# Patient Record
Sex: FEMALE | Race: OTHER | Employment: UNEMPLOYED
[De-identification: names, ages, dates, MRNs, and addresses within clinical notes are randomized per-mention and may not be internally consistent; named-entity substitution may affect disease eponyms.]

---

## 2017-01-26 ENCOUNTER — OFFICE VISIT (OUTPATIENT)
Dept: PRIMARY CARE CLINIC | Facility: CLINIC | Age: 52
End: 2017-01-26

## 2017-01-26 VITALS
RESPIRATION RATE: 20 BRPM | BODY MASS INDEX: 46.24 KG/M2 | SYSTOLIC BLOOD PRESSURE: 135 MMHG | HEART RATE: 83 BPM | DIASTOLIC BLOOD PRESSURE: 90 MMHG | WEIGHT: 252.8 LBS | TEMPERATURE: 97.4 F

## 2017-01-26 DIAGNOSIS — R06.83 SNORES: Primary | ICD-10-CM

## 2017-01-26 DIAGNOSIS — J01.10 ACUTE NON-RECURRENT FRONTAL SINUSITIS: ICD-10-CM

## 2017-01-26 DIAGNOSIS — R40.0 DAYTIME SOMNOLENCE: ICD-10-CM

## 2017-01-26 DIAGNOSIS — J01.00 ACUTE NON-RECURRENT MAXILLARY SINUSITIS: ICD-10-CM

## 2017-01-26 PROCEDURE — 99213 OFFICE O/P EST LOW 20 MIN: CPT | Performed by: NURSE PRACTITIONER

## 2017-01-26 RX ORDER — AMOXICILLIN AND CLAVULANATE POTASSIUM 500; 125 MG/1; MG/1
1 TABLET, FILM COATED ORAL 2 TIMES DAILY
Qty: 14 TABLET | Refills: 0 | Status: SHIPPED | OUTPATIENT
Start: 2017-01-26 | End: 2017-02-02

## 2017-01-26 ASSESSMENT — ENCOUNTER SYMPTOMS
TROUBLE SWALLOWING: 0
SORE THROAT: 0
EYES NEGATIVE: 1
SINUS PRESSURE: 1
GASTROINTESTINAL NEGATIVE: 1
RESPIRATORY NEGATIVE: 1

## 2017-01-30 RX ORDER — POTASSIUM CHLORIDE 1500 MG/1
20 TABLET, FILM COATED, EXTENDED RELEASE ORAL DAILY
Qty: 30 TABLET | Refills: 3 | Status: SHIPPED | OUTPATIENT
Start: 2017-01-30 | End: 2017-01-30 | Stop reason: SDUPTHER

## 2017-01-30 RX ORDER — POTASSIUM CHLORIDE 1500 MG/1
20 TABLET, FILM COATED, EXTENDED RELEASE ORAL DAILY
Qty: 30 TABLET | Refills: 3 | Status: SHIPPED | OUTPATIENT
Start: 2017-01-30 | End: 2017-05-22

## 2017-02-06 PROBLEM — E86.0 DEHYDRATION: Status: ACTIVE | Noted: 2017-02-06

## 2017-02-07 PROBLEM — E66.01 MORBID OBESITY (HCC): Chronic | Status: ACTIVE | Noted: 2017-02-07

## 2017-02-08 ENCOUNTER — OFFICE VISIT (OUTPATIENT)
Dept: PRIMARY CARE CLINIC | Facility: CLINIC | Age: 52
End: 2017-02-08

## 2017-02-08 VITALS
HEART RATE: 71 BPM | DIASTOLIC BLOOD PRESSURE: 88 MMHG | BODY MASS INDEX: 47.6 KG/M2 | SYSTOLIC BLOOD PRESSURE: 134 MMHG | RESPIRATION RATE: 18 BRPM | WEIGHT: 258.7 LBS | HEIGHT: 62 IN

## 2017-02-08 DIAGNOSIS — R42 VERTIGO: Primary | ICD-10-CM

## 2017-02-08 DIAGNOSIS — Z12.11 COLON CANCER SCREENING: ICD-10-CM

## 2017-02-08 DIAGNOSIS — J30.9 ALLERGIC RHINITIS, UNSPECIFIED ALLERGIC RHINITIS TRIGGER, UNSPECIFIED RHINITIS SEASONALITY: ICD-10-CM

## 2017-02-08 DIAGNOSIS — R63.1 POLYDIPSIA: ICD-10-CM

## 2017-02-08 DIAGNOSIS — I10 ESSENTIAL HYPERTENSION: Chronic | ICD-10-CM

## 2017-02-08 PROCEDURE — 99213 OFFICE O/P EST LOW 20 MIN: CPT | Performed by: NURSE PRACTITIONER

## 2017-02-08 RX ORDER — CARVEDILOL 12.5 MG/1
12.5 TABLET ORAL 2 TIMES DAILY
Qty: 180 TABLET | Refills: 3 | Status: SHIPPED | OUTPATIENT
Start: 2017-02-08 | End: 2017-10-06 | Stop reason: SDUPTHER

## 2017-02-08 ASSESSMENT — ENCOUNTER SYMPTOMS
SORE THROAT: 0
VISUAL CHANGE: 0
NAUSEA: 0
VOMITING: 0
RESPIRATORY NEGATIVE: 1
EYES NEGATIVE: 1
GASTROINTESTINAL NEGATIVE: 1
TROUBLE SWALLOWING: 0

## 2017-02-10 ENCOUNTER — TELEPHONE (OUTPATIENT)
Dept: PRIMARY CARE CLINIC | Facility: CLINIC | Age: 52
End: 2017-02-10

## 2017-02-10 PROBLEM — J30.9 ALLERGIC RHINITIS: Status: ACTIVE | Noted: 2017-02-10

## 2017-02-10 RX ORDER — CETIRIZINE HYDROCHLORIDE 5 MG/1
5 TABLET ORAL DAILY
Qty: 30 TABLET | Refills: 3 | Status: SHIPPED | OUTPATIENT
Start: 2017-02-10 | End: 2017-05-10 | Stop reason: SDUPTHER

## 2017-02-13 DIAGNOSIS — R40.0 DAYTIME SOMNOLENCE: ICD-10-CM

## 2017-02-13 DIAGNOSIS — R06.83 SNORES: ICD-10-CM

## 2017-02-14 ENCOUNTER — TELEPHONE (OUTPATIENT)
Dept: PRIMARY CARE CLINIC | Facility: CLINIC | Age: 52
End: 2017-02-14

## 2017-02-21 ENCOUNTER — OFFICE VISIT (OUTPATIENT)
Dept: PRIMARY CARE CLINIC | Facility: CLINIC | Age: 52
End: 2017-02-21

## 2017-02-21 VITALS
TEMPERATURE: 97.5 F | WEIGHT: 246.6 LBS | HEART RATE: 88 BPM | SYSTOLIC BLOOD PRESSURE: 128 MMHG | BODY MASS INDEX: 45.1 KG/M2 | DIASTOLIC BLOOD PRESSURE: 85 MMHG | RESPIRATION RATE: 20 BRPM

## 2017-02-21 DIAGNOSIS — R73.09 ELEVATED GLUCOSE: ICD-10-CM

## 2017-02-21 DIAGNOSIS — J01.91 ACUTE RECURRENT SINUSITIS, UNSPECIFIED: Primary | ICD-10-CM

## 2017-02-21 PROCEDURE — 99213 OFFICE O/P EST LOW 20 MIN: CPT | Performed by: NURSE PRACTITIONER

## 2017-02-21 RX ORDER — FLUTICASONE PROPIONATE 50 MCG
1 SPRAY, SUSPENSION (ML) NASAL DAILY
Qty: 1 BOTTLE | Refills: 3 | Status: SHIPPED | OUTPATIENT
Start: 2017-02-21 | End: 2017-05-10 | Stop reason: SDUPTHER

## 2017-02-21 ASSESSMENT — ENCOUNTER SYMPTOMS
SORE THROAT: 0
TROUBLE SWALLOWING: 0
SHORTNESS OF BREATH: 0
COUGH: 0
SINUS COMPLAINT: 1
SINUS PRESSURE: 1
RESPIRATORY NEGATIVE: 1
GASTROINTESTINAL NEGATIVE: 1
EYES NEGATIVE: 1

## 2017-02-22 ENCOUNTER — TELEPHONE (OUTPATIENT)
Dept: PRIMARY CARE CLINIC | Facility: CLINIC | Age: 52
End: 2017-02-22

## 2017-02-22 DIAGNOSIS — Z12.11 COLON CANCER SCREENING: Primary | ICD-10-CM

## 2017-02-22 RX ORDER — LEVOFLOXACIN 250 MG/1
250 TABLET ORAL DAILY
Qty: 10 TABLET | Refills: 0 | Status: SHIPPED | OUTPATIENT
Start: 2017-02-22 | End: 2017-03-04

## 2017-02-22 ASSESSMENT — ENCOUNTER SYMPTOMS: RHINORRHEA: 0

## 2017-02-26 PROBLEM — Z12.11 COLON CANCER SCREENING: Status: ACTIVE | Noted: 2017-02-26

## 2017-03-08 ENCOUNTER — OFFICE VISIT (OUTPATIENT)
Dept: ONCOLOGY | Facility: CLINIC | Age: 52
End: 2017-03-08

## 2017-03-08 VITALS
RESPIRATION RATE: 16 BRPM | HEIGHT: 62 IN | BODY MASS INDEX: 47.11 KG/M2 | SYSTOLIC BLOOD PRESSURE: 139 MMHG | TEMPERATURE: 97.2 F | WEIGHT: 256 LBS | DIASTOLIC BLOOD PRESSURE: 71 MMHG | HEART RATE: 97 BPM

## 2017-03-08 DIAGNOSIS — I77.6 VASCULITIS (HCC): Chronic | ICD-10-CM

## 2017-03-08 DIAGNOSIS — D50.8 OTHER IRON DEFICIENCY ANEMIA: Primary | ICD-10-CM

## 2017-03-08 DIAGNOSIS — D50.8 OTHER IRON DEFICIENCY ANEMIA: ICD-10-CM

## 2017-03-08 DIAGNOSIS — N17.9 AKI (ACUTE KIDNEY INJURY) (HCC): Chronic | ICD-10-CM

## 2017-03-08 DIAGNOSIS — I77.82 ANCA-ASSOCIATED VASCULITIS: Chronic | ICD-10-CM

## 2017-03-08 DIAGNOSIS — I77.6 VASCULITIS (HCC): ICD-10-CM

## 2017-03-08 DIAGNOSIS — N92.1 MENOMETRORRHAGIA: ICD-10-CM

## 2017-03-08 PROCEDURE — 99214 OFFICE O/P EST MOD 30 MIN: CPT | Performed by: INTERNAL MEDICINE

## 2017-03-09 ENCOUNTER — OFFICE VISIT (OUTPATIENT)
Dept: PRIMARY CARE CLINIC | Facility: CLINIC | Age: 52
End: 2017-03-09

## 2017-03-09 ENCOUNTER — TELEPHONE (OUTPATIENT)
Dept: PRIMARY CARE CLINIC | Facility: CLINIC | Age: 52
End: 2017-03-09

## 2017-03-09 VITALS
SYSTOLIC BLOOD PRESSURE: 107 MMHG | WEIGHT: 256.1 LBS | RESPIRATION RATE: 20 BRPM | HEART RATE: 96 BPM | TEMPERATURE: 97.8 F | DIASTOLIC BLOOD PRESSURE: 78 MMHG | BODY MASS INDEX: 46.84 KG/M2

## 2017-03-09 DIAGNOSIS — K52.9 GASTROENTERITIS: ICD-10-CM

## 2017-03-09 DIAGNOSIS — R05.9 COUGH: Primary | ICD-10-CM

## 2017-03-09 DIAGNOSIS — E78.1 HYPERTRIGLYCERIDEMIA: Primary | ICD-10-CM

## 2017-03-09 DIAGNOSIS — J06.9 UPPER RESPIRATORY TRACT INFECTION, UNSPECIFIED TYPE: ICD-10-CM

## 2017-03-09 DIAGNOSIS — N18.30 CHRONIC KIDNEY DISEASE, STAGE III (MODERATE) (HCC): Chronic | ICD-10-CM

## 2017-03-09 DIAGNOSIS — I10 ESSENTIAL HYPERTENSION: Chronic | ICD-10-CM

## 2017-03-09 DIAGNOSIS — R52 BODY ACHES: ICD-10-CM

## 2017-03-09 LAB
INFLUENZA A ANTIBODY: NORMAL
INFLUENZA B ANTIBODY: NORMAL

## 2017-03-09 PROCEDURE — 87804 INFLUENZA ASSAY W/OPTIC: CPT | Performed by: NURSE PRACTITIONER

## 2017-03-09 PROCEDURE — 99213 OFFICE O/P EST LOW 20 MIN: CPT | Performed by: NURSE PRACTITIONER

## 2017-03-09 RX ORDER — FENOFIBRATE 48 MG/1
48 TABLET, COATED ORAL DAILY
Qty: 30 TABLET | Refills: 3 | Status: SHIPPED | OUTPATIENT
Start: 2017-03-09 | End: 2017-03-16 | Stop reason: ALTCHOICE

## 2017-03-09 ASSESSMENT — ENCOUNTER SYMPTOMS
BLOOD IN STOOL: 0
SINUS PRESSURE: 1
SORE THROAT: 1
CHEST TIGHTNESS: 0
DIARRHEA: 0
EYES NEGATIVE: 1
NAUSEA: 0
VOICE CHANGE: 0
SHORTNESS OF BREATH: 0
WHEEZING: 0
TROUBLE SWALLOWING: 0
STRIDOR: 0
VOMITING: 1
RHINORRHEA: 1

## 2017-03-10 ASSESSMENT — ENCOUNTER SYMPTOMS: COUGH: 1

## 2017-03-13 DIAGNOSIS — Z90.710 S/P TAH-BSO: Primary | ICD-10-CM

## 2017-03-13 DIAGNOSIS — Z90.79 S/P TAH-BSO: Primary | ICD-10-CM

## 2017-03-13 DIAGNOSIS — Z90.722 S/P TAH-BSO: Primary | ICD-10-CM

## 2017-03-16 ENCOUNTER — TELEPHONE (OUTPATIENT)
Dept: PRIMARY CARE CLINIC | Age: 52
End: 2017-03-16

## 2017-03-16 DIAGNOSIS — E78.2 MIXED HYPERLIPIDEMIA: Primary | ICD-10-CM

## 2017-03-16 RX ORDER — ATORVASTATIN CALCIUM 20 MG/1
20 TABLET, FILM COATED ORAL DAILY
Qty: 30 TABLET | Refills: 3 | Status: SHIPPED | OUTPATIENT
Start: 2017-03-16 | End: 2017-05-10 | Stop reason: SDUPTHER

## 2017-03-21 ENCOUNTER — HOSPITAL ENCOUNTER (OUTPATIENT)
Age: 52
Discharge: HOME | End: 2017-03-21
Payer: MEDICARE

## 2017-03-21 PROCEDURE — 84156 ASSAY OF PROTEIN URINE: CPT

## 2017-03-21 PROCEDURE — 82570 ASSAY OF URINE CREATININE: CPT

## 2017-03-21 PROCEDURE — 80053 COMPREHEN METABOLIC PANEL: CPT

## 2017-03-21 PROCEDURE — 85025 COMPLETE CBC W/AUTO DIFF WBC: CPT

## 2017-03-21 PROCEDURE — 36415 COLL VENOUS BLD VENIPUNCTURE: CPT

## 2017-03-22 PROBLEM — E86.0 DEHYDRATION: Status: RESOLVED | Noted: 2017-02-06 | Resolved: 2017-03-22

## 2017-03-22 PROBLEM — J30.9 ALLERGIC RHINITIS: Chronic | Status: ACTIVE | Noted: 2017-02-10

## 2017-03-28 ENCOUNTER — HOSPITAL ENCOUNTER (OUTPATIENT)
Age: 52
Discharge: HOME | End: 2017-03-28
Payer: MEDICARE

## 2017-03-28 LAB
ANION GAP SERPL CALCULATED.3IONS-SCNC: 16 MMOL/L (ref 9–17)
BUN BLDV-MCNC: 33 MG/DL (ref 6–20)
BUN/CREAT BLD: 18 (ref 9–20)
CALCIUM SERPL-MCNC: 8.9 MG/DL (ref 8.6–10.4)
CHLORIDE BLD-SCNC: 99 MMOL/L (ref 98–107)
CO2: 25 MMOL/L (ref 20–31)
CREAT SERPL-MCNC: 1.79 MG/DL (ref 0.5–0.9)
GFR AFRICAN AMERICAN: 36 ML/MIN
GFR NON-AFRICAN AMERICAN: 30 ML/MIN
GFR SERPL CREATININE-BSD FRML MDRD: ABNORMAL ML/MIN/{1.73_M2}
GFR SERPL CREATININE-BSD FRML MDRD: ABNORMAL ML/MIN/{1.73_M2}
GLUCOSE BLD-MCNC: 156 MG/DL (ref 70–99)
POTASSIUM SERPL-SCNC: 3.3 MMOL/L (ref 3.7–5.3)
SODIUM BLD-SCNC: 140 MMOL/L (ref 135–144)

## 2017-03-28 PROCEDURE — 80048 BASIC METABOLIC PNL TOTAL CA: CPT

## 2017-03-28 PROCEDURE — 36415 COLL VENOUS BLD VENIPUNCTURE: CPT

## 2017-04-07 PROBLEM — D50.9 IRON DEFICIENCY ANEMIA: Status: ACTIVE | Noted: 2017-04-07

## 2017-05-02 ENCOUNTER — TELEPHONE (OUTPATIENT)
Dept: PRIMARY CARE CLINIC | Age: 52
End: 2017-05-02

## 2017-05-05 ENCOUNTER — TELEPHONE (OUTPATIENT)
Dept: PRIMARY CARE CLINIC | Age: 52
End: 2017-05-05

## 2017-05-05 ENCOUNTER — HOSPITAL ENCOUNTER (OUTPATIENT)
Age: 52
Discharge: HOME | End: 2017-05-05
Payer: MEDICARE

## 2017-05-05 DIAGNOSIS — R63.1 POLYDIPSIA: ICD-10-CM

## 2017-05-05 DIAGNOSIS — I10 ESSENTIAL HYPERTENSION: Chronic | ICD-10-CM

## 2017-05-05 DIAGNOSIS — R40.0 DAYTIME SOMNOLENCE: ICD-10-CM

## 2017-05-05 DIAGNOSIS — N18.9 CKD (CHRONIC KIDNEY DISEASE), UNSPECIFIED STAGE: Primary | ICD-10-CM

## 2017-05-05 DIAGNOSIS — E78.1 HYPERTRIGLYCERIDEMIA: ICD-10-CM

## 2017-05-05 LAB
-: ABNORMAL
ABSOLUTE EOS #: 0.2 K/UL (ref 0–0.4)
ABSOLUTE LYMPH #: 0.6 K/UL (ref 1–4.8)
ABSOLUTE MONO #: 0.4 K/UL (ref 0–1)
ALBUMIN SERPL-MCNC: 3.4 G/DL (ref 3.5–5.2)
ALBUMIN/GLOBULIN RATIO: 0.9 (ref 1–2.5)
ALP BLD-CCNC: 116 U/L (ref 35–104)
ALT SERPL-CCNC: 8 U/L (ref 5–33)
AMORPHOUS: ABNORMAL
ANION GAP SERPL CALCULATED.3IONS-SCNC: 16 MMOL/L (ref 9–17)
AST SERPL-CCNC: 15 U/L
BACTERIA: ABNORMAL
BASOPHILS # BLD: 0 %
BASOPHILS ABSOLUTE: 0 K/UL (ref 0–0.2)
BILIRUB SERPL-MCNC: 0.24 MG/DL (ref 0.3–1.2)
BILIRUBIN URINE: NEGATIVE
BUN BLDV-MCNC: 33 MG/DL (ref 6–20)
BUN/CREAT BLD: 18 (ref 9–20)
CALCIUM SERPL-MCNC: 8.8 MG/DL (ref 8.6–10.4)
CASTS UA: ABNORMAL /LPF
CHLORIDE BLD-SCNC: 100 MMOL/L (ref 98–107)
CHOLESTEROL/HDL RATIO: 5.7
CHOLESTEROL: 266 MG/DL
CO2: 23 MMOL/L (ref 20–31)
COLOR: YELLOW
COMMENT UA: ABNORMAL
CREAT SERPL-MCNC: 1.86 MG/DL (ref 0.5–0.9)
CREATININE URINE: 119.2 MG/DL (ref 28–217)
CRYSTALS, UA: ABNORMAL /HPF
DIFFERENTIAL TYPE: ABNORMAL
EOSINOPHILS RELATIVE PERCENT: 4 %
EPITHELIAL CELLS UA: ABNORMAL /HPF (ref 0–25)
ESTIMATED AVERAGE GLUCOSE: 120 MG/DL
GFR AFRICAN AMERICAN: 35 ML/MIN
GFR NON-AFRICAN AMERICAN: 29 ML/MIN
GFR SERPL CREATININE-BSD FRML MDRD: ABNORMAL ML/MIN/{1.73_M2}
GFR SERPL CREATININE-BSD FRML MDRD: ABNORMAL ML/MIN/{1.73_M2}
GLUCOSE BLD-MCNC: 114 MG/DL (ref 70–99)
GLUCOSE URINE: NEGATIVE
HBA1C MFR BLD: 5.8 % (ref 4.8–5.9)
HCT VFR BLD CALC: 31.2 % (ref 36–46)
HDLC SERPL-MCNC: 47 MG/DL
HEMOGLOBIN: 10.8 G/DL (ref 12–16)
KETONES, URINE: NEGATIVE
LDL CHOLESTEROL DIRECT: 99 MG/DL
LDL CHOLESTEROL: ABNORMAL MG/DL (ref 0–130)
LEUKOCYTE ESTERASE, URINE: ABNORMAL
LYMPHOCYTES # BLD: 10 %
MCH RBC QN AUTO: 32.3 PG (ref 26–34)
MCHC RBC AUTO-ENTMCNC: 34.5 G/DL (ref 31–37)
MCV RBC AUTO: 93.8 FL (ref 80–100)
MICROALBUMIN/CREAT 24H UR: 2182 MG/L
MICROALBUMIN/CREAT UR-RTO: 1831 MCG/MG CREAT
MONOCYTES # BLD: 7 %
MUCUS: ABNORMAL
NITRITE, URINE: NEGATIVE
OTHER OBSERVATIONS UA: ABNORMAL
PDW BLD-RTO: 15.1 % (ref 12.1–15.2)
PH UA: 5.5 (ref 5–9)
PLATELET # BLD: 285 K/UL (ref 140–450)
PLATELET ESTIMATE: ABNORMAL
PMV BLD AUTO: ABNORMAL FL (ref 6–12)
POTASSIUM SERPL-SCNC: 3.7 MMOL/L (ref 3.7–5.3)
PROTEIN UA: ABNORMAL
RBC # BLD: 3.33 M/UL (ref 4–5.2)
RBC # BLD: ABNORMAL 10*6/UL
RBC UA: ABNORMAL /HPF (ref 0–2)
RENAL EPITHELIAL, UA: ABNORMAL /HPF
SEG NEUTROPHILS: 79 %
SEGMENTED NEUTROPHILS ABSOLUTE COUNT: 4.3 K/UL (ref 1.8–7.7)
SODIUM BLD-SCNC: 139 MMOL/L (ref 135–144)
SPECIFIC GRAVITY UA: 1.02 (ref 1.01–1.02)
TOTAL PROTEIN: 7.1 G/DL (ref 6.4–8.3)
TRICHOMONAS: ABNORMAL
TRIGL SERPL-MCNC: 621 MG/DL
TSH SERPL DL<=0.05 MIU/L-ACNC: 1.48 MIU/L (ref 0.3–5)
TURBIDITY: CLEAR
URINE HGB: ABNORMAL
UROBILINOGEN, URINE: NORMAL
VLDLC SERPL CALC-MCNC: ABNORMAL MG/DL (ref 1–30)
WBC # BLD: 5.4 K/UL (ref 3.5–11)
WBC # BLD: ABNORMAL 10*3/UL
WBC UA: ABNORMAL /HPF (ref 0–5)
YEAST: ABNORMAL

## 2017-05-05 PROCEDURE — 84443 ASSAY THYROID STIM HORMONE: CPT

## 2017-05-05 PROCEDURE — 83721 ASSAY OF BLOOD LIPOPROTEIN: CPT

## 2017-05-05 PROCEDURE — 82043 UR ALBUMIN QUANTITATIVE: CPT

## 2017-05-05 PROCEDURE — 85025 COMPLETE CBC W/AUTO DIFF WBC: CPT

## 2017-05-05 PROCEDURE — 81001 URINALYSIS AUTO W/SCOPE: CPT

## 2017-05-05 PROCEDURE — 80061 LIPID PANEL: CPT

## 2017-05-05 PROCEDURE — 82570 ASSAY OF URINE CREATININE: CPT

## 2017-05-05 PROCEDURE — 83036 HEMOGLOBIN GLYCOSYLATED A1C: CPT

## 2017-05-05 PROCEDURE — 36415 COLL VENOUS BLD VENIPUNCTURE: CPT

## 2017-05-05 PROCEDURE — 80053 COMPREHEN METABOLIC PANEL: CPT

## 2017-05-05 RX ORDER — CIPROFLOXACIN 250 MG/1
250 TABLET, FILM COATED ORAL
Qty: 9 TABLET | Refills: 0 | Status: SHIPPED | OUTPATIENT
Start: 2017-05-05 | End: 2017-05-12

## 2017-05-08 ENCOUNTER — HOSPITAL ENCOUNTER (OUTPATIENT)
Age: 52
Discharge: HOME | End: 2017-05-08
Payer: MEDICARE

## 2017-05-08 DIAGNOSIS — N18.9 CKD (CHRONIC KIDNEY DISEASE), UNSPECIFIED STAGE: ICD-10-CM

## 2017-05-08 LAB
ALBUMIN SERPL-MCNC: 3.5 G/DL (ref 3.5–5.2)
ALBUMIN/GLOBULIN RATIO: 1 (ref 1–2.5)
ALP BLD-CCNC: 120 U/L (ref 35–104)
ALT SERPL-CCNC: 8 U/L (ref 5–33)
ANION GAP SERPL CALCULATED.3IONS-SCNC: 13 MMOL/L (ref 9–17)
AST SERPL-CCNC: 14 U/L
BILIRUB SERPL-MCNC: 0.18 MG/DL (ref 0.3–1.2)
BUN BLDV-MCNC: 33 MG/DL (ref 6–20)
BUN/CREAT BLD: 20 (ref 9–20)
CALCIUM SERPL-MCNC: 9 MG/DL (ref 8.6–10.4)
CHLORIDE BLD-SCNC: 98 MMOL/L (ref 98–107)
CO2: 27 MMOL/L (ref 20–31)
CREAT SERPL-MCNC: 1.69 MG/DL (ref 0.5–0.9)
GFR AFRICAN AMERICAN: 39 ML/MIN
GFR NON-AFRICAN AMERICAN: 32 ML/MIN
GFR SERPL CREATININE-BSD FRML MDRD: ABNORMAL ML/MIN/{1.73_M2}
GFR SERPL CREATININE-BSD FRML MDRD: ABNORMAL ML/MIN/{1.73_M2}
GLUCOSE BLD-MCNC: 118 MG/DL (ref 70–99)
POTASSIUM SERPL-SCNC: 4 MMOL/L (ref 3.7–5.3)
SODIUM BLD-SCNC: 138 MMOL/L (ref 135–144)
TOTAL PROTEIN: 7 G/DL (ref 6.4–8.3)

## 2017-05-08 PROCEDURE — 80053 COMPREHEN METABOLIC PANEL: CPT

## 2017-05-08 PROCEDURE — 36415 COLL VENOUS BLD VENIPUNCTURE: CPT

## 2017-05-10 ENCOUNTER — OFFICE VISIT (OUTPATIENT)
Dept: PRIMARY CARE CLINIC | Age: 52
End: 2017-05-10
Payer: MEDICARE

## 2017-05-10 ENCOUNTER — TELEPHONE (OUTPATIENT)
Dept: PRIMARY CARE CLINIC | Age: 52
End: 2017-05-10

## 2017-05-10 VITALS
HEART RATE: 91 BPM | DIASTOLIC BLOOD PRESSURE: 78 MMHG | SYSTOLIC BLOOD PRESSURE: 118 MMHG | WEIGHT: 267.3 LBS | TEMPERATURE: 97.6 F | RESPIRATION RATE: 20 BRPM | BODY MASS INDEX: 48.89 KG/M2

## 2017-05-10 DIAGNOSIS — Z23 NEED FOR 23-POLYVALENT PNEUMOCOCCAL POLYSACCHARIDE VACCINE: Primary | ICD-10-CM

## 2017-05-10 DIAGNOSIS — N18.30 CHRONIC KIDNEY DISEASE, STAGE III (MODERATE) (HCC): Chronic | ICD-10-CM

## 2017-05-10 DIAGNOSIS — E78.2 MIXED HYPERLIPIDEMIA: ICD-10-CM

## 2017-05-10 DIAGNOSIS — J30.9 ALLERGIC RHINITIS, UNSPECIFIED ALLERGIC RHINITIS TRIGGER, UNSPECIFIED RHINITIS SEASONALITY: ICD-10-CM

## 2017-05-10 DIAGNOSIS — E78.49 OTHER HYPERLIPIDEMIA: ICD-10-CM

## 2017-05-10 DIAGNOSIS — E66.01 MORBID OBESITY, UNSPECIFIED OBESITY TYPE (HCC): Chronic | ICD-10-CM

## 2017-05-10 DIAGNOSIS — I10 ESSENTIAL HYPERTENSION: Chronic | ICD-10-CM

## 2017-05-10 DIAGNOSIS — F41.8 DEPRESSION WITH ANXIETY: ICD-10-CM

## 2017-05-10 PROBLEM — E78.5 HYPERLIPIDEMIA: Status: ACTIVE | Noted: 2017-05-10

## 2017-05-10 PROCEDURE — 90732 PPSV23 VACC 2 YRS+ SUBQ/IM: CPT | Performed by: NURSE PRACTITIONER

## 2017-05-10 PROCEDURE — 99213 OFFICE O/P EST LOW 20 MIN: CPT | Performed by: NURSE PRACTITIONER

## 2017-05-10 PROCEDURE — 90471 IMMUNIZATION ADMIN: CPT | Performed by: NURSE PRACTITIONER

## 2017-05-10 RX ORDER — FLUTICASONE PROPIONATE 50 MCG
1 SPRAY, SUSPENSION (ML) NASAL DAILY
Qty: 1 BOTTLE | Refills: 3 | Status: SHIPPED | OUTPATIENT
Start: 2017-05-10 | End: 2017-08-10 | Stop reason: SDUPTHER

## 2017-05-10 RX ORDER — ATORVASTATIN CALCIUM 20 MG/1
20 TABLET, FILM COATED ORAL DAILY
Qty: 30 TABLET | Refills: 3 | Status: SHIPPED | OUTPATIENT
Start: 2017-05-10 | End: 2017-05-10 | Stop reason: DRUGHIGH

## 2017-05-10 RX ORDER — ATORVASTATIN CALCIUM 40 MG/1
40 TABLET, FILM COATED ORAL DAILY
Qty: 30 TABLET | Refills: 3 | Status: SHIPPED | OUTPATIENT
Start: 2017-05-10 | End: 2017-08-08 | Stop reason: DRUGHIGH

## 2017-05-10 RX ORDER — DULOXETIN HYDROCHLORIDE 30 MG/1
30 CAPSULE, DELAYED RELEASE ORAL DAILY
Qty: 30 CAPSULE | Refills: 3 | Status: SHIPPED | OUTPATIENT
Start: 2017-05-10 | End: 2017-08-10 | Stop reason: SDUPTHER

## 2017-05-10 RX ORDER — CETIRIZINE HYDROCHLORIDE 5 MG/1
5 TABLET ORAL DAILY
Qty: 30 TABLET | Refills: 3 | Status: SHIPPED | OUTPATIENT
Start: 2017-05-10 | End: 2017-08-10 | Stop reason: SDUPTHER

## 2017-05-10 ASSESSMENT — ENCOUNTER SYMPTOMS
RECTAL PAIN: 0
BLOOD IN STOOL: 0
GASTROINTESTINAL NEGATIVE: 1
ABDOMINAL PAIN: 0
EYES NEGATIVE: 1
RESPIRATORY NEGATIVE: 1
VOMITING: 0
CONSTIPATION: 0
ANAL BLEEDING: 0
SINUS PRESSURE: 1
DIARRHEA: 0
SORE THROAT: 0
CHANGE IN BOWEL HABIT: 0
COUGH: 0
VISUAL CHANGE: 0
NAUSEA: 0
ABDOMINAL DISTENTION: 0

## 2017-05-10 ASSESSMENT — PATIENT HEALTH QUESTIONNAIRE - PHQ9
1. LITTLE INTEREST OR PLEASURE IN DOING THINGS: 0
2. FEELING DOWN, DEPRESSED OR HOPELESS: 0
SUM OF ALL RESPONSES TO PHQ QUESTIONS 1-9: 0
SUM OF ALL RESPONSES TO PHQ9 QUESTIONS 1 & 2: 0

## 2017-05-18 ENCOUNTER — HOSPITAL ENCOUNTER (EMERGENCY)
Age: 52
Discharge: HOME | End: 2017-05-18
Attending: FAMILY MEDICINE
Payer: MEDICARE

## 2017-05-18 ENCOUNTER — OFFICE VISIT (OUTPATIENT)
Dept: PRIMARY CARE CLINIC | Age: 52
End: 2017-05-18
Payer: MEDICARE

## 2017-05-18 VITALS
TEMPERATURE: 98.6 F | SYSTOLIC BLOOD PRESSURE: 114 MMHG | BODY MASS INDEX: 49.47 KG/M2 | DIASTOLIC BLOOD PRESSURE: 76 MMHG | HEIGHT: 61 IN | OXYGEN SATURATION: 96 % | WEIGHT: 262 LBS | RESPIRATION RATE: 18 BRPM | HEART RATE: 92 BPM

## 2017-05-18 VITALS
TEMPERATURE: 97.7 F | SYSTOLIC BLOOD PRESSURE: 121 MMHG | WEIGHT: 264 LBS | BODY MASS INDEX: 48.29 KG/M2 | HEART RATE: 93 BPM | DIASTOLIC BLOOD PRESSURE: 85 MMHG

## 2017-05-18 DIAGNOSIS — R50.9 FEVER, UNSPECIFIED FEVER CAUSE: ICD-10-CM

## 2017-05-18 DIAGNOSIS — R42 VERTIGO: ICD-10-CM

## 2017-05-18 DIAGNOSIS — E86.0 MILD DEHYDRATION: ICD-10-CM

## 2017-05-18 DIAGNOSIS — R11.2 NAUSEA AND VOMITING, INTRACTABILITY OF VOMITING NOT SPECIFIED, UNSPECIFIED VOMITING TYPE: Primary | ICD-10-CM

## 2017-05-18 DIAGNOSIS — R30.0 DYSURIA: Primary | ICD-10-CM

## 2017-05-18 DIAGNOSIS — R19.7 DIARRHEA, UNSPECIFIED TYPE: ICD-10-CM

## 2017-05-18 LAB
-: ABNORMAL
ABSOLUTE EOS #: 0.1 K/UL (ref 0–0.4)
ABSOLUTE LYMPH #: 0.3 K/UL (ref 1–4.8)
ABSOLUTE MONO #: 0.4 K/UL (ref 0.2–0.8)
ALBUMIN SERPL-MCNC: 3.5 G/DL (ref 3.5–5.2)
ALBUMIN/GLOBULIN RATIO: 1.1 (ref 1–2.5)
ALP BLD-CCNC: 112 U/L (ref 35–104)
ALT SERPL-CCNC: 10 U/L (ref 5–33)
AMORPHOUS: ABNORMAL
ANION GAP SERPL CALCULATED.3IONS-SCNC: 16 MMOL/L (ref 9–17)
AST SERPL-CCNC: 20 U/L
BACTERIA: ABNORMAL
BASOPHILS # BLD: 0 %
BASOPHILS ABSOLUTE: 0 K/UL (ref 0–0.2)
BILIRUB SERPL-MCNC: 0.22 MG/DL (ref 0.3–1.2)
BILIRUBIN URINE: NEGATIVE
BUN BLDV-MCNC: 31 MG/DL (ref 6–20)
BUN/CREAT BLD: 20 (ref 9–20)
CALCIUM SERPL-MCNC: 8.6 MG/DL (ref 8.6–10.4)
CASTS UA: ABNORMAL /LPF
CHLORIDE BLD-SCNC: 99 MMOL/L (ref 98–107)
CO2: 22 MMOL/L (ref 20–31)
COLOR: YELLOW
COMMENT UA: ABNORMAL
CREAT SERPL-MCNC: 1.52 MG/DL (ref 0.5–0.9)
CRYSTALS, UA: ABNORMAL /HPF
DIFFERENTIAL TYPE: ABNORMAL
EOSINOPHILS RELATIVE PERCENT: 2 %
EPITHELIAL CELLS UA: ABNORMAL /HPF (ref 0–25)
GFR AFRICAN AMERICAN: 44 ML/MIN
GFR NON-AFRICAN AMERICAN: 36 ML/MIN
GFR SERPL CREATININE-BSD FRML MDRD: ABNORMAL ML/MIN/{1.73_M2}
GFR SERPL CREATININE-BSD FRML MDRD: ABNORMAL ML/MIN/{1.73_M2}
GLUCOSE BLD-MCNC: 116 MG/DL (ref 70–99)
GLUCOSE URINE: NEGATIVE
HCT VFR BLD CALC: 29.8 % (ref 36–46)
HEMOGLOBIN: 11.2 G/DL (ref 12–16)
INFLUENZA A ANTIBODY: NORMAL
INFLUENZA B ANTIBODY: NORMAL
KETONES, URINE: NEGATIVE
LEUKOCYTE ESTERASE, URINE: NEGATIVE
LYMPHOCYTES # BLD: 8 %
MCH RBC QN AUTO: 35.5 PG (ref 26–34)
MCHC RBC AUTO-ENTMCNC: 37.4 G/DL (ref 31–37)
MCV RBC AUTO: 94.9 FL (ref 80–100)
MONOCYTES # BLD: 8 %
MUCUS: ABNORMAL
NITRITE, URINE: NEGATIVE
OTHER OBSERVATIONS UA: ABNORMAL
PDW BLD-RTO: 14.2 % (ref 12.1–15.2)
PH UA: 5.5 (ref 5–9)
PLATELET # BLD: 247 K/UL (ref 140–450)
PLATELET ESTIMATE: ABNORMAL
PMV BLD AUTO: ABNORMAL FL (ref 6–12)
POTASSIUM SERPL-SCNC: 3.6 MMOL/L (ref 3.7–5.3)
PROTEIN UA: ABNORMAL
RBC # BLD: 3.14 M/UL (ref 4–5.2)
RBC # BLD: ABNORMAL 10*6/UL
RBC UA: ABNORMAL /HPF (ref 0–2)
RENAL EPITHELIAL, UA: ABNORMAL /HPF
SEG NEUTROPHILS: 82 %
SEGMENTED NEUTROPHILS ABSOLUTE COUNT: 3.8 K/UL (ref 1.8–7.7)
SODIUM BLD-SCNC: 137 MMOL/L (ref 135–144)
SPECIFIC GRAVITY UA: 1.02 (ref 1.01–1.02)
TOTAL PROTEIN: 6.8 G/DL (ref 6.4–8.3)
TRICHOMONAS: ABNORMAL
TURBIDITY: CLEAR
URINE HGB: ABNORMAL
UROBILINOGEN, URINE: NORMAL
WBC # BLD: 4.6 K/UL (ref 3.5–11)
WBC # BLD: ABNORMAL 10*3/UL
WBC UA: ABNORMAL /HPF (ref 0–5)
YEAST: ABNORMAL

## 2017-05-18 PROCEDURE — 87804 INFLUENZA ASSAY W/OPTIC: CPT | Performed by: NURSE PRACTITIONER

## 2017-05-18 PROCEDURE — 85025 COMPLETE CBC W/AUTO DIFF WBC: CPT

## 2017-05-18 PROCEDURE — 96374 THER/PROPH/DIAG INJ IV PUSH: CPT

## 2017-05-18 PROCEDURE — 99284 EMERGENCY DEPT VISIT MOD MDM: CPT

## 2017-05-18 PROCEDURE — 80053 COMPREHEN METABOLIC PANEL: CPT

## 2017-05-18 PROCEDURE — 2580000003 HC RX 258: Performed by: FAMILY MEDICINE

## 2017-05-18 PROCEDURE — 6370000000 HC RX 637 (ALT 250 FOR IP): Performed by: FAMILY MEDICINE

## 2017-05-18 PROCEDURE — 6360000002 HC RX W HCPCS: Performed by: FAMILY MEDICINE

## 2017-05-18 PROCEDURE — 81001 URINALYSIS AUTO W/SCOPE: CPT

## 2017-05-18 PROCEDURE — 99213 OFFICE O/P EST LOW 20 MIN: CPT | Performed by: NURSE PRACTITIONER

## 2017-05-18 PROCEDURE — 87086 URINE CULTURE/COLONY COUNT: CPT

## 2017-05-18 RX ORDER — ONDANSETRON 4 MG/1
4 TABLET, ORALLY DISINTEGRATING ORAL EVERY 4 HOURS PRN
Qty: 10 TABLET | Refills: 0 | Status: SHIPPED | OUTPATIENT
Start: 2017-05-18 | End: 2019-10-28

## 2017-05-18 RX ORDER — ONDANSETRON 2 MG/ML
4 INJECTION INTRAMUSCULAR; INTRAVENOUS ONCE
Status: COMPLETED | OUTPATIENT
Start: 2017-05-18 | End: 2017-05-18

## 2017-05-18 RX ORDER — 0.9 % SODIUM CHLORIDE 0.9 %
1000 INTRAVENOUS SOLUTION INTRAVENOUS ONCE
Status: COMPLETED | OUTPATIENT
Start: 2017-05-18 | End: 2017-05-18

## 2017-05-18 RX ORDER — MECLIZINE HYDROCHLORIDE 25 MG/1
25 TABLET ORAL 3 TIMES DAILY PRN
Qty: 30 TABLET | Refills: 0 | Status: SHIPPED | OUTPATIENT
Start: 2017-05-18 | End: 2017-05-28

## 2017-05-18 RX ORDER — MECLIZINE HCL 12.5 MG/1
25 TABLET ORAL ONCE
Status: COMPLETED | OUTPATIENT
Start: 2017-05-18 | End: 2017-05-18

## 2017-05-18 RX ORDER — PROMETHAZINE HYDROCHLORIDE 25 MG/1
25 TABLET ORAL EVERY 6 HOURS PRN
Qty: 10 TABLET | Refills: 0 | Status: SHIPPED | OUTPATIENT
Start: 2017-05-18 | End: 2017-05-25

## 2017-05-18 RX ADMIN — SODIUM CHLORIDE 1000 ML: 9 INJECTION, SOLUTION INTRAVENOUS at 14:16

## 2017-05-18 RX ADMIN — MECLIZINE 25 MG: 12.5 TABLET ORAL at 14:17

## 2017-05-18 RX ADMIN — ONDANSETRON 4 MG: 2 INJECTION INTRAMUSCULAR; INTRAVENOUS at 14:17

## 2017-05-18 ASSESSMENT — PAIN DESCRIPTION - INTENSITY: RATING_2: 8

## 2017-05-18 ASSESSMENT — ENCOUNTER SYMPTOMS
TROUBLE SWALLOWING: 0
NAUSEA: 1
COUGH: 0
SINUS PRESSURE: 0
BLOOD IN STOOL: 0
ABDOMINAL PAIN: 1
RHINORRHEA: 1
EYES NEGATIVE: 1
VOMITING: 1
RESPIRATORY NEGATIVE: 1
CONSTIPATION: 0
VOICE CHANGE: 0
ABDOMINAL DISTENTION: 0
RECTAL PAIN: 0
WHEEZING: 0
DIARRHEA: 1
ANAL BLEEDING: 0
SORE THROAT: 0

## 2017-05-18 ASSESSMENT — PAIN DESCRIPTION - LOCATION
LOCATION_2: ABDOMEN
LOCATION: EAR

## 2017-05-18 ASSESSMENT — PAIN SCALES - GENERAL: PAINLEVEL_OUTOF10: 7

## 2017-05-18 ASSESSMENT — PAIN DESCRIPTION - DESCRIPTORS: DESCRIPTORS_2: CRAMPING

## 2017-05-18 ASSESSMENT — PAIN DESCRIPTION - ORIENTATION: ORIENTATION: RIGHT

## 2017-05-19 LAB
CULTURE: NORMAL
CULTURE: NORMAL
Lab: NORMAL
Lab: NORMAL
SPECIMEN DESCRIPTION: NORMAL
SPECIMEN DESCRIPTION: NORMAL
STATUS: NORMAL

## 2017-05-22 RX ORDER — POTASSIUM CHLORIDE 1500 MG/1
TABLET, FILM COATED, EXTENDED RELEASE ORAL
Qty: 30 TABLET | Refills: 0 | Status: SHIPPED | OUTPATIENT
Start: 2017-05-22 | End: 2017-06-01 | Stop reason: SDUPTHER

## 2017-05-23 ENCOUNTER — OFFICE VISIT (OUTPATIENT)
Dept: UROLOGY | Age: 52
End: 2017-05-23
Payer: MEDICARE

## 2017-05-23 VITALS
SYSTOLIC BLOOD PRESSURE: 118 MMHG | HEIGHT: 62 IN | BODY MASS INDEX: 48.95 KG/M2 | TEMPERATURE: 98.1 F | DIASTOLIC BLOOD PRESSURE: 78 MMHG | WEIGHT: 266 LBS

## 2017-05-23 DIAGNOSIS — R31.0 GROSS HEMATURIA: Primary | ICD-10-CM

## 2017-05-23 DIAGNOSIS — R35.0 URINARY FREQUENCY: ICD-10-CM

## 2017-05-23 DIAGNOSIS — R31.29 MICROHEMATURIA: ICD-10-CM

## 2017-05-23 DIAGNOSIS — N39.46 MIXED INCONTINENCE: ICD-10-CM

## 2017-05-23 DIAGNOSIS — N32.81 OAB (OVERACTIVE BLADDER): ICD-10-CM

## 2017-05-23 PROCEDURE — 99214 OFFICE O/P EST MOD 30 MIN: CPT | Performed by: PHYSICIAN ASSISTANT

## 2017-05-23 PROCEDURE — 51798 US URINE CAPACITY MEASURE: CPT | Performed by: PHYSICIAN ASSISTANT

## 2017-05-23 ASSESSMENT — ENCOUNTER SYMPTOMS
EYE PAIN: 0
DIARRHEA: 0
NAUSEA: 0
CONSTIPATION: 0
SHORTNESS OF BREATH: 0
COUGH: 0
ABDOMINAL PAIN: 0
ABDOMINAL DISTENTION: 0
VOMITING: 0

## 2017-06-01 RX ORDER — POTASSIUM CHLORIDE 1500 MG/1
TABLET, EXTENDED RELEASE ORAL
Qty: 30 TABLET | Refills: 0 | Status: SHIPPED | OUTPATIENT
Start: 2017-06-01 | End: 2017-07-17

## 2017-06-01 RX ORDER — POTASSIUM CHLORIDE 1500 MG/1
TABLET, FILM COATED, EXTENDED RELEASE ORAL
Qty: 30 TABLET | Refills: 1 | Status: SHIPPED | OUTPATIENT
Start: 2017-06-01 | End: 2017-07-17

## 2017-06-05 ENCOUNTER — HOSPITAL ENCOUNTER (OUTPATIENT)
Age: 52
Discharge: HOME | End: 2017-06-05
Payer: MEDICARE

## 2017-06-05 DIAGNOSIS — N18.30 CHRONIC KIDNEY DISEASE, STAGE III (MODERATE) (HCC): Chronic | ICD-10-CM

## 2017-06-05 LAB
ANION GAP SERPL CALCULATED.3IONS-SCNC: 15 MMOL/L (ref 9–17)
BUN BLDV-MCNC: 29 MG/DL (ref 6–20)
BUN/CREAT BLD: 17 (ref 9–20)
CALCIUM IONIZED: 1.24 MMOL/L (ref 1.13–1.33)
CALCIUM SERPL-MCNC: 8.6 MG/DL (ref 8.6–10.4)
CHLORIDE BLD-SCNC: 101 MMOL/L (ref 98–107)
CO2: 22 MMOL/L (ref 20–31)
CREAT SERPL-MCNC: 1.75 MG/DL (ref 0.5–0.9)
CREATININE URINE: 89.9 MG/DL (ref 28–217)
GFR AFRICAN AMERICAN: 37 ML/MIN
GFR NON-AFRICAN AMERICAN: 31 ML/MIN
GFR SERPL CREATININE-BSD FRML MDRD: ABNORMAL ML/MIN/{1.73_M2}
GFR SERPL CREATININE-BSD FRML MDRD: ABNORMAL ML/MIN/{1.73_M2}
GLUCOSE BLD-MCNC: 186 MG/DL (ref 70–99)
PHOSPHORUS: 3.2 MG/DL (ref 2.6–4.5)
POTASSIUM SERPL-SCNC: 3.6 MMOL/L (ref 3.7–5.3)
PTH INTACT: 116.2 PG/ML (ref 15–65)
SODIUM BLD-SCNC: 138 MMOL/L (ref 135–144)
TOTAL PROTEIN, URINE: 257 MG/DL

## 2017-06-05 PROCEDURE — 82570 ASSAY OF URINE CREATININE: CPT

## 2017-06-05 PROCEDURE — 83970 ASSAY OF PARATHORMONE: CPT

## 2017-06-05 PROCEDURE — 82330 ASSAY OF CALCIUM: CPT

## 2017-06-05 PROCEDURE — 82306 VITAMIN D 25 HYDROXY: CPT

## 2017-06-05 PROCEDURE — 84100 ASSAY OF PHOSPHORUS: CPT

## 2017-06-05 PROCEDURE — 80048 BASIC METABOLIC PNL TOTAL CA: CPT

## 2017-06-05 PROCEDURE — 36415 COLL VENOUS BLD VENIPUNCTURE: CPT

## 2017-06-05 PROCEDURE — 84156 ASSAY OF PROTEIN URINE: CPT

## 2017-06-06 LAB — VITAMIN D 25-HYDROXY: 19.2 NG/ML (ref 30–100)

## 2017-06-07 ENCOUNTER — PROCEDURE VISIT (OUTPATIENT)
Dept: UROLOGY | Age: 52
End: 2017-06-07
Payer: MEDICARE

## 2017-06-07 VITALS
SYSTOLIC BLOOD PRESSURE: 142 MMHG | HEIGHT: 62 IN | BODY MASS INDEX: 49.13 KG/M2 | DIASTOLIC BLOOD PRESSURE: 88 MMHG | WEIGHT: 267 LBS

## 2017-06-07 DIAGNOSIS — R35.0 URINARY FREQUENCY: ICD-10-CM

## 2017-06-07 DIAGNOSIS — E66.01 MORBID OBESITY, UNSPECIFIED OBESITY TYPE (HCC): Chronic | ICD-10-CM

## 2017-06-07 DIAGNOSIS — N39.46 MIXED INCONTINENCE: Primary | ICD-10-CM

## 2017-06-07 PROCEDURE — 51797 INTRAABDOMINAL PRESSURE TEST: CPT | Performed by: NURSE PRACTITIONER

## 2017-06-07 PROCEDURE — 51741 ELECTRO-UROFLOWMETRY FIRST: CPT | Performed by: NURSE PRACTITIONER

## 2017-06-07 PROCEDURE — 51728 CYSTOMETROGRAM W/VP: CPT | Performed by: NURSE PRACTITIONER

## 2017-06-08 DIAGNOSIS — E55.9 VITAMIN D DEFICIENCY: Primary | ICD-10-CM

## 2017-06-29 DIAGNOSIS — Z90.79 S/P TAH-BSO: ICD-10-CM

## 2017-06-29 DIAGNOSIS — Z90.722 S/P TAH-BSO: ICD-10-CM

## 2017-06-29 DIAGNOSIS — Z90.710 S/P TAH-BSO: ICD-10-CM

## 2017-06-29 RX ORDER — CONJUGATED ESTROGENS 0.62 MG/1
TABLET, FILM COATED ORAL
Qty: 30 TABLET | Refills: 3 | Status: SHIPPED | OUTPATIENT
Start: 2017-06-29 | End: 2017-11-02 | Stop reason: SDUPTHER

## 2017-07-17 RX ORDER — POTASSIUM CHLORIDE 1500 MG/1
TABLET, EXTENDED RELEASE ORAL
Qty: 30 TABLET | Refills: 0 | Status: SHIPPED | OUTPATIENT
Start: 2017-07-17 | End: 2017-08-22 | Stop reason: SDUPTHER

## 2017-07-21 ENCOUNTER — HOSPITAL ENCOUNTER (OUTPATIENT)
Age: 52
Setting detail: SPECIMEN
Discharge: HOME | End: 2017-07-21
Payer: MEDICARE

## 2017-07-21 ENCOUNTER — OFFICE VISIT (OUTPATIENT)
Dept: UROLOGY | Age: 52
End: 2017-07-21
Payer: MEDICARE

## 2017-07-21 VITALS
BODY MASS INDEX: 50.24 KG/M2 | SYSTOLIC BLOOD PRESSURE: 122 MMHG | HEIGHT: 62 IN | DIASTOLIC BLOOD PRESSURE: 80 MMHG | WEIGHT: 273 LBS

## 2017-07-21 DIAGNOSIS — N32.81 OAB (OVERACTIVE BLADDER): ICD-10-CM

## 2017-07-21 DIAGNOSIS — R35.0 FREQUENCY OF URINATION: ICD-10-CM

## 2017-07-21 DIAGNOSIS — R10.84 GENERALIZED ABDOMINAL PAIN: ICD-10-CM

## 2017-07-21 DIAGNOSIS — N89.8 VAGINAL ITCHING: ICD-10-CM

## 2017-07-21 DIAGNOSIS — R30.0 DYSURIA: ICD-10-CM

## 2017-07-21 DIAGNOSIS — N39.46 MIXED INCONTINENCE: Primary | ICD-10-CM

## 2017-07-21 LAB
-: ABNORMAL
AMORPHOUS: ABNORMAL
BACTERIA: ABNORMAL
BILIRUBIN URINE: NEGATIVE
CASTS UA: ABNORMAL /LPF
COLOR: YELLOW
COMMENT UA: ABNORMAL
CRYSTALS, UA: ABNORMAL /HPF
EPITHELIAL CELLS UA: ABNORMAL /HPF (ref 0–25)
GLUCOSE URINE: NEGATIVE
KETONES, URINE: NEGATIVE
LEUKOCYTE ESTERASE, URINE: NEGATIVE
MUCUS: ABNORMAL
NITRITE, URINE: NEGATIVE
OTHER OBSERVATIONS UA: ABNORMAL
PH UA: 5.5 (ref 5–9)
PROTEIN UA: ABNORMAL
RBC UA: ABNORMAL /HPF (ref 0–2)
RENAL EPITHELIAL, UA: ABNORMAL /HPF
SPECIFIC GRAVITY UA: 1.02 (ref 1.01–1.02)
TRICHOMONAS: ABNORMAL
TURBIDITY: CLEAR
URINE HGB: ABNORMAL
UROBILINOGEN, URINE: NORMAL
WBC UA: ABNORMAL /HPF (ref 0–5)
YEAST: ABNORMAL

## 2017-07-21 PROCEDURE — 87086 URINE CULTURE/COLONY COUNT: CPT

## 2017-07-21 PROCEDURE — 81001 URINALYSIS AUTO W/SCOPE: CPT

## 2017-07-21 PROCEDURE — 99214 OFFICE O/P EST MOD 30 MIN: CPT | Performed by: UROLOGY

## 2017-07-21 RX ORDER — SOLIFENACIN SUCCINATE 5 MG/1
5 TABLET, FILM COATED ORAL DAILY
Qty: 28 TABLET | Refills: 0 | COMMUNITY
Start: 2017-07-21 | End: 2017-08-24 | Stop reason: SDUPTHER

## 2017-07-21 ASSESSMENT — ENCOUNTER SYMPTOMS
COLOR CHANGE: 0
SHORTNESS OF BREATH: 0
NAUSEA: 0
EYE REDNESS: 0
COUGH: 0
ABDOMINAL PAIN: 0
VOMITING: 0
BACK PAIN: 0
WHEEZING: 0
EYE PAIN: 0

## 2017-08-01 ENCOUNTER — HOSPITAL ENCOUNTER (OUTPATIENT)
Age: 52
Discharge: HOME | End: 2017-08-01
Payer: MEDICARE

## 2017-08-01 DIAGNOSIS — N18.30 CHRONIC KIDNEY DISEASE, STAGE III (MODERATE) (HCC): Chronic | ICD-10-CM

## 2017-08-01 LAB
ANION GAP SERPL CALCULATED.3IONS-SCNC: 15 MMOL/L (ref 9–17)
BUN BLDV-MCNC: 33 MG/DL (ref 6–20)
BUN/CREAT BLD: 17 (ref 9–20)
CALCIUM SERPL-MCNC: 8.7 MG/DL (ref 8.6–10.4)
CHLORIDE BLD-SCNC: 99 MMOL/L (ref 98–107)
CO2: 24 MMOL/L (ref 20–31)
CREAT SERPL-MCNC: 1.99 MG/DL (ref 0.5–0.9)
CREATININE URINE: 107.9 MG/DL (ref 28–217)
GFR AFRICAN AMERICAN: 32 ML/MIN
GFR NON-AFRICAN AMERICAN: 26 ML/MIN
GFR SERPL CREATININE-BSD FRML MDRD: ABNORMAL ML/MIN/{1.73_M2}
GFR SERPL CREATININE-BSD FRML MDRD: ABNORMAL ML/MIN/{1.73_M2}
GLUCOSE BLD-MCNC: 119 MG/DL (ref 70–99)
POTASSIUM SERPL-SCNC: 4.2 MMOL/L (ref 3.7–5.3)
SODIUM BLD-SCNC: 138 MMOL/L (ref 135–144)
TOTAL PROTEIN, URINE: 255 MG/DL

## 2017-08-01 PROCEDURE — 80048 BASIC METABOLIC PNL TOTAL CA: CPT

## 2017-08-01 PROCEDURE — 82570 ASSAY OF URINE CREATININE: CPT

## 2017-08-01 PROCEDURE — 84156 ASSAY OF PROTEIN URINE: CPT

## 2017-08-01 PROCEDURE — 36415 COLL VENOUS BLD VENIPUNCTURE: CPT

## 2017-08-03 ENCOUNTER — APPOINTMENT (OUTPATIENT)
Dept: CT IMAGING | Age: 52
End: 2017-08-03
Payer: MEDICARE

## 2017-08-03 ENCOUNTER — HOSPITAL ENCOUNTER (EMERGENCY)
Age: 52
Discharge: HOME | End: 2017-08-03
Payer: MEDICARE

## 2017-08-03 VITALS
OXYGEN SATURATION: 97 % | RESPIRATION RATE: 7 BRPM | HEART RATE: 73 BPM | TEMPERATURE: 98.4 F | DIASTOLIC BLOOD PRESSURE: 84 MMHG | SYSTOLIC BLOOD PRESSURE: 152 MMHG

## 2017-08-03 DIAGNOSIS — H81.10 BENIGN PAROXYSMAL POSITIONAL VERTIGO, UNSPECIFIED LATERALITY: Primary | ICD-10-CM

## 2017-08-03 LAB
ABSOLUTE EOS #: 0.1 K/UL (ref 0–0.4)
ABSOLUTE LYMPH #: 0.6 K/UL (ref 1–4.8)
ABSOLUTE MONO #: 0.3 K/UL (ref 0–1)
ALBUMIN SERPL-MCNC: 3.5 G/DL (ref 3.5–5.2)
ALBUMIN/GLOBULIN RATIO: 0.9 (ref 1–2.5)
ALP BLD-CCNC: 114 U/L (ref 35–104)
ALT SERPL-CCNC: 12 U/L (ref 5–33)
ANION GAP SERPL CALCULATED.3IONS-SCNC: 13 MMOL/L (ref 9–17)
AST SERPL-CCNC: 16 U/L
BASOPHILS # BLD: 0 %
BASOPHILS ABSOLUTE: 0 K/UL (ref 0–0.2)
BILIRUB SERPL-MCNC: 0.24 MG/DL (ref 0.3–1.2)
BUN BLDV-MCNC: 29 MG/DL (ref 6–20)
BUN/CREAT BLD: 16 (ref 9–20)
CALCIUM SERPL-MCNC: 9.2 MG/DL (ref 8.6–10.4)
CHLORIDE BLD-SCNC: 99 MMOL/L (ref 98–107)
CO2: 24 MMOL/L (ref 20–31)
CREAT SERPL-MCNC: 1.81 MG/DL (ref 0.5–0.9)
DIFFERENTIAL TYPE: ABNORMAL
EOSINOPHILS RELATIVE PERCENT: 2 %
GFR AFRICAN AMERICAN: 36 ML/MIN
GFR NON-AFRICAN AMERICAN: 29 ML/MIN
GFR SERPL CREATININE-BSD FRML MDRD: ABNORMAL ML/MIN/{1.73_M2}
GFR SERPL CREATININE-BSD FRML MDRD: ABNORMAL ML/MIN/{1.73_M2}
GLUCOSE BLD-MCNC: 107 MG/DL (ref 70–99)
HCT VFR BLD CALC: 31.3 % (ref 36–46)
HEMOGLOBIN: 10.9 G/DL (ref 12–16)
LYMPHOCYTES # BLD: 12 %
MCH RBC QN AUTO: 32.6 PG (ref 26–34)
MCHC RBC AUTO-ENTMCNC: 34.9 G/DL (ref 31–37)
MCV RBC AUTO: 93.4 FL (ref 80–100)
MONOCYTES # BLD: 7 %
PDW BLD-RTO: 14.1 % (ref 12.1–15.2)
PLATELET # BLD: 298 K/UL (ref 140–450)
PLATELET ESTIMATE: ABNORMAL
PMV BLD AUTO: 8.1 FL (ref 6–12)
POTASSIUM SERPL-SCNC: 3.9 MMOL/L (ref 3.7–5.3)
RBC # BLD: 3.35 M/UL (ref 4–5.2)
RBC # BLD: ABNORMAL 10*6/UL
SEG NEUTROPHILS: 79 %
SEGMENTED NEUTROPHILS ABSOLUTE COUNT: 4.1 K/UL (ref 1.8–7.7)
SODIUM BLD-SCNC: 136 MMOL/L (ref 135–144)
THYROXINE, FREE: 1.18 NG/DL (ref 0.93–1.7)
TOTAL PROTEIN: 7.2 G/DL (ref 6.4–8.3)
TROPONIN INTERP: NORMAL
TROPONIN T: <0.03 NG/ML
TSH SERPL DL<=0.05 MIU/L-ACNC: 1.23 MIU/L (ref 0.3–5)
WBC # BLD: 5.2 K/UL (ref 3.5–11)
WBC # BLD: ABNORMAL 10*3/UL

## 2017-08-03 PROCEDURE — 84443 ASSAY THYROID STIM HORMONE: CPT

## 2017-08-03 PROCEDURE — 80053 COMPREHEN METABOLIC PANEL: CPT

## 2017-08-03 PROCEDURE — 99284 EMERGENCY DEPT VISIT MOD MDM: CPT

## 2017-08-03 PROCEDURE — 85025 COMPLETE CBC W/AUTO DIFF WBC: CPT

## 2017-08-03 PROCEDURE — 2580000003 HC RX 258: Performed by: PHYSICIAN ASSISTANT

## 2017-08-03 PROCEDURE — 84439 ASSAY OF FREE THYROXINE: CPT

## 2017-08-03 PROCEDURE — 84484 ASSAY OF TROPONIN QUANT: CPT

## 2017-08-03 PROCEDURE — 70450 CT HEAD/BRAIN W/O DYE: CPT

## 2017-08-03 PROCEDURE — 93005 ELECTROCARDIOGRAM TRACING: CPT

## 2017-08-03 PROCEDURE — 6370000000 HC RX 637 (ALT 250 FOR IP): Performed by: PHYSICIAN ASSISTANT

## 2017-08-03 RX ORDER — MECLIZINE HCL 12.5 MG/1
25 TABLET ORAL ONCE
Status: COMPLETED | OUTPATIENT
Start: 2017-08-03 | End: 2017-08-03

## 2017-08-03 RX ORDER — 0.9 % SODIUM CHLORIDE 0.9 %
500 INTRAVENOUS SOLUTION INTRAVENOUS ONCE
Status: COMPLETED | OUTPATIENT
Start: 2017-08-03 | End: 2017-08-03

## 2017-08-03 RX ORDER — BUTALBITAL, ACETAMINOPHEN AND CAFFEINE 50; 325; 40 MG/1; MG/1; MG/1
1 TABLET ORAL EVERY 4 HOURS PRN
Status: DISCONTINUED | OUTPATIENT
Start: 2017-08-03 | End: 2017-08-03 | Stop reason: HOSPADM

## 2017-08-03 RX ORDER — MECLIZINE HYDROCHLORIDE 25 MG/1
25 TABLET ORAL 3 TIMES DAILY PRN
Qty: 21 TABLET | Refills: 0 | Status: SHIPPED | OUTPATIENT
Start: 2017-08-03 | End: 2017-08-13

## 2017-08-03 RX ADMIN — BUTALBITAL, ACETAMINOPHEN, AND CAFFEINE 1 TABLET: 50; 325; 40 TABLET ORAL at 16:14

## 2017-08-03 RX ADMIN — SODIUM CHLORIDE 500 ML: 9 INJECTION, SOLUTION INTRAVENOUS at 17:11

## 2017-08-03 RX ADMIN — MECLIZINE 25 MG: 12.5 TABLET ORAL at 15:58

## 2017-08-03 ASSESSMENT — PAIN SCALES - GENERAL
PAINLEVEL_OUTOF10: 4
PAINLEVEL_OUTOF10: 8
PAINLEVEL_OUTOF10: 9

## 2017-08-03 ASSESSMENT — ENCOUNTER SYMPTOMS
COUGH: 0
NAUSEA: 0
WHEEZING: 0
BLOOD IN STOOL: 0
SORE THROAT: 0
CONSTIPATION: 0
SHORTNESS OF BREATH: 0
ABDOMINAL PAIN: 0
BACK PAIN: 0
EYE REDNESS: 0
VOMITING: 0
EYE DISCHARGE: 0
CHEST TIGHTNESS: 0
RHINORRHEA: 0
DIARRHEA: 0

## 2017-08-03 ASSESSMENT — PAIN DESCRIPTION - LOCATION: LOCATION: FLANK

## 2017-08-03 ASSESSMENT — PAIN DESCRIPTION - ORIENTATION: ORIENTATION: RIGHT;LEFT

## 2017-08-03 ASSESSMENT — PAIN DESCRIPTION - PAIN TYPE: TYPE: CHRONIC PAIN

## 2017-08-08 ENCOUNTER — TELEPHONE (OUTPATIENT)
Dept: PRIMARY CARE CLINIC | Age: 52
End: 2017-08-08

## 2017-08-08 ENCOUNTER — HOSPITAL ENCOUNTER (OUTPATIENT)
Age: 52
Discharge: HOME | End: 2017-08-08
Payer: MEDICARE

## 2017-08-08 DIAGNOSIS — E78.1 HYPERTRIGLYCERIDEMIA: Primary | ICD-10-CM

## 2017-08-08 DIAGNOSIS — N18.30 CHRONIC KIDNEY DISEASE, STAGE III (MODERATE) (HCC): Chronic | ICD-10-CM

## 2017-08-08 DIAGNOSIS — E78.5 HYPERLIPIDEMIA, UNSPECIFIED: ICD-10-CM

## 2017-08-08 LAB
-: ABNORMAL
ABSOLUTE EOS #: 0.1 K/UL (ref 0–0.4)
ABSOLUTE LYMPH #: 0.6 K/UL (ref 1–4.8)
ABSOLUTE MONO #: 0.3 K/UL (ref 0–1)
ALBUMIN SERPL-MCNC: 3.3 G/DL (ref 3.5–5.2)
ALBUMIN/GLOBULIN RATIO: 0.9 (ref 1–2.5)
ALP BLD-CCNC: 126 U/L (ref 35–104)
ALT SERPL-CCNC: 9 U/L (ref 5–33)
AMORPHOUS: ABNORMAL
ANION GAP SERPL CALCULATED.3IONS-SCNC: 14 MMOL/L (ref 9–17)
AST SERPL-CCNC: 17 U/L
BACTERIA: ABNORMAL
BASOPHILS # BLD: 1 %
BASOPHILS ABSOLUTE: 0 K/UL (ref 0–0.2)
BILIRUB SERPL-MCNC: <0.1 MG/DL (ref 0.3–1.2)
BILIRUBIN URINE: NEGATIVE
BUN BLDV-MCNC: 37 MG/DL (ref 6–20)
BUN/CREAT BLD: 17 (ref 9–20)
CALCIUM SERPL-MCNC: 9 MG/DL (ref 8.6–10.4)
CASTS UA: ABNORMAL /LPF
CHLORIDE BLD-SCNC: 96 MMOL/L (ref 98–107)
CHOLESTEROL/HDL RATIO: 6.7
CHOLESTEROL: 255 MG/DL
CO2: 25 MMOL/L (ref 20–31)
COLOR: YELLOW
COMMENT UA: ABNORMAL
CREAT SERPL-MCNC: 2.15 MG/DL (ref 0.5–0.9)
CREATININE URINE: 90.7 MG/DL (ref 28–217)
CRYSTALS, UA: ABNORMAL /HPF
DIFFERENTIAL TYPE: ABNORMAL
EOSINOPHILS RELATIVE PERCENT: 3 %
EPITHELIAL CELLS UA: ABNORMAL /HPF (ref 0–25)
GFR AFRICAN AMERICAN: 29 ML/MIN
GFR NON-AFRICAN AMERICAN: 24 ML/MIN
GFR SERPL CREATININE-BSD FRML MDRD: ABNORMAL ML/MIN/{1.73_M2}
GFR SERPL CREATININE-BSD FRML MDRD: ABNORMAL ML/MIN/{1.73_M2}
GLUCOSE BLD-MCNC: 118 MG/DL (ref 70–99)
GLUCOSE URINE: NEGATIVE
HCT VFR BLD CALC: 30.8 % (ref 36–46)
HDLC SERPL-MCNC: 38 MG/DL
HEMOGLOBIN: 10.9 G/DL (ref 12–16)
KETONES, URINE: NEGATIVE
LDL CHOLESTEROL DIRECT: 77 MG/DL
LDL CHOLESTEROL: ABNORMAL MG/DL (ref 0–130)
LEUKOCYTE ESTERASE, URINE: NEGATIVE
LYMPHOCYTES # BLD: 13 %
MCH RBC QN AUTO: 33.5 PG (ref 26–34)
MCHC RBC AUTO-ENTMCNC: 35.4 G/DL (ref 31–37)
MCV RBC AUTO: 94.5 FL (ref 80–100)
MICROALBUMIN/CREAT 24H UR: 1022 MG/L
MICROALBUMIN/CREAT UR-RTO: 1127 MCG/MG CREAT
MONOCYTES # BLD: 7 %
MUCUS: ABNORMAL
NITRITE, URINE: NEGATIVE
OTHER OBSERVATIONS UA: ABNORMAL
PDW BLD-RTO: 14.2 % (ref 12.1–15.2)
PH UA: 5.5 (ref 5–9)
PLATELET # BLD: 273 K/UL (ref 140–450)
PLATELET ESTIMATE: ABNORMAL
PMV BLD AUTO: 8.1 FL (ref 6–12)
POTASSIUM SERPL-SCNC: 3.7 MMOL/L (ref 3.7–5.3)
PROTEIN UA: ABNORMAL
RBC # BLD: 3.26 M/UL (ref 4–5.2)
RBC # BLD: ABNORMAL 10*6/UL
RBC UA: ABNORMAL /HPF (ref 0–2)
RENAL EPITHELIAL, UA: ABNORMAL /HPF
SEG NEUTROPHILS: 76 %
SEGMENTED NEUTROPHILS ABSOLUTE COUNT: 3.5 K/UL (ref 1.8–7.7)
SODIUM BLD-SCNC: 135 MMOL/L (ref 135–144)
SPECIFIC GRAVITY UA: 1.02 (ref 1.01–1.02)
TOTAL PROTEIN: 7 G/DL (ref 6.4–8.3)
TRICHOMONAS: ABNORMAL
TRIGL SERPL-MCNC: 880 MG/DL
TURBIDITY: CLEAR
URINE HGB: ABNORMAL
UROBILINOGEN, URINE: NORMAL
VLDLC SERPL CALC-MCNC: ABNORMAL MG/DL (ref 1–30)
WBC # BLD: 4.4 K/UL (ref 3.5–11)
WBC # BLD: ABNORMAL 10*3/UL
WBC UA: ABNORMAL /HPF (ref 0–5)
YEAST: ABNORMAL

## 2017-08-08 PROCEDURE — 82570 ASSAY OF URINE CREATININE: CPT

## 2017-08-08 PROCEDURE — 80061 LIPID PANEL: CPT

## 2017-08-08 PROCEDURE — 36415 COLL VENOUS BLD VENIPUNCTURE: CPT

## 2017-08-08 PROCEDURE — 85025 COMPLETE CBC W/AUTO DIFF WBC: CPT

## 2017-08-08 PROCEDURE — 80053 COMPREHEN METABOLIC PANEL: CPT

## 2017-08-08 PROCEDURE — 81001 URINALYSIS AUTO W/SCOPE: CPT

## 2017-08-08 PROCEDURE — 83721 ASSAY OF BLOOD LIPOPROTEIN: CPT

## 2017-08-08 PROCEDURE — 82043 UR ALBUMIN QUANTITATIVE: CPT

## 2017-08-08 RX ORDER — ATORVASTATIN CALCIUM 80 MG/1
80 TABLET, FILM COATED ORAL DAILY
Qty: 30 TABLET | Refills: 3 | Status: SHIPPED | OUTPATIENT
Start: 2017-08-08 | End: 2017-11-15 | Stop reason: SDUPTHER

## 2017-08-09 LAB
EKG ATRIAL RATE: 81 BPM
EKG P AXIS: 1 DEGREES
EKG P-R INTERVAL: 188 MS
EKG Q-T INTERVAL: 392 MS
EKG QRS DURATION: 76 MS
EKG QTC CALCULATION (BAZETT): 455 MS
EKG R AXIS: 6 DEGREES
EKG T AXIS: 37 DEGREES
EKG VENTRICULAR RATE: 81 BPM

## 2017-08-10 ENCOUNTER — OFFICE VISIT (OUTPATIENT)
Dept: PRIMARY CARE CLINIC | Age: 52
End: 2017-08-10
Payer: MEDICARE

## 2017-08-10 VITALS
HEART RATE: 83 BPM | TEMPERATURE: 97.6 F | RESPIRATION RATE: 20 BRPM | BODY MASS INDEX: 49.26 KG/M2 | WEIGHT: 269.3 LBS | SYSTOLIC BLOOD PRESSURE: 115 MMHG | DIASTOLIC BLOOD PRESSURE: 80 MMHG

## 2017-08-10 DIAGNOSIS — I10 ESSENTIAL HYPERTENSION: Chronic | ICD-10-CM

## 2017-08-10 DIAGNOSIS — F41.8 DEPRESSION WITH ANXIETY: Chronic | ICD-10-CM

## 2017-08-10 DIAGNOSIS — Z12.39 BREAST CANCER SCREENING: ICD-10-CM

## 2017-08-10 DIAGNOSIS — D50.9 IRON DEFICIENCY ANEMIA, UNSPECIFIED IRON DEFICIENCY ANEMIA TYPE: ICD-10-CM

## 2017-08-10 DIAGNOSIS — E66.01 MORBID OBESITY, UNSPECIFIED OBESITY TYPE (HCC): Chronic | ICD-10-CM

## 2017-08-10 DIAGNOSIS — J30.9 ALLERGIC RHINITIS, UNSPECIFIED ALLERGIC RHINITIS TRIGGER, UNSPECIFIED RHINITIS SEASONALITY: ICD-10-CM

## 2017-08-10 DIAGNOSIS — E78.5 HYPERLIPIDEMIA, UNSPECIFIED HYPERLIPIDEMIA TYPE: ICD-10-CM

## 2017-08-10 DIAGNOSIS — Z78.0 POST-MENOPAUSAL: ICD-10-CM

## 2017-08-10 DIAGNOSIS — N18.30 CHRONIC KIDNEY DISEASE, STAGE III (MODERATE) (HCC): Primary | Chronic | ICD-10-CM

## 2017-08-10 PROCEDURE — 99213 OFFICE O/P EST LOW 20 MIN: CPT | Performed by: NURSE PRACTITIONER

## 2017-08-10 RX ORDER — DULOXETIN HYDROCHLORIDE 30 MG/1
30 CAPSULE, DELAYED RELEASE ORAL DAILY
Qty: 30 CAPSULE | Refills: 3 | Status: CANCELLED | OUTPATIENT
Start: 2017-08-10

## 2017-08-10 RX ORDER — FLUTICASONE PROPIONATE 50 MCG
1 SPRAY, SUSPENSION (ML) NASAL DAILY
Qty: 1 BOTTLE | Refills: 3 | Status: SHIPPED | OUTPATIENT
Start: 2017-08-10 | End: 2017-12-12 | Stop reason: SDUPTHER

## 2017-08-10 RX ORDER — DULOXETIN HYDROCHLORIDE 30 MG/1
30 CAPSULE, DELAYED RELEASE ORAL DAILY
Qty: 30 CAPSULE | Refills: 3 | Status: SHIPPED | OUTPATIENT
Start: 2017-08-10 | End: 2017-11-16 | Stop reason: SDUPTHER

## 2017-08-10 RX ORDER — CETIRIZINE HYDROCHLORIDE 5 MG/1
5 TABLET ORAL DAILY
Qty: 30 TABLET | Refills: 3 | Status: SHIPPED | OUTPATIENT
Start: 2017-08-10 | End: 2017-11-16 | Stop reason: SDUPTHER

## 2017-08-10 RX ORDER — CETIRIZINE HYDROCHLORIDE 5 MG/1
5 TABLET ORAL DAILY
Qty: 30 TABLET | Refills: 3 | Status: CANCELLED | OUTPATIENT
Start: 2017-08-10

## 2017-08-10 RX ORDER — FLUTICASONE PROPIONATE 50 MCG
1 SPRAY, SUSPENSION (ML) NASAL DAILY
Qty: 1 BOTTLE | Refills: 3 | Status: CANCELLED | OUTPATIENT
Start: 2017-08-10

## 2017-08-10 ASSESSMENT — ENCOUNTER SYMPTOMS
TROUBLE SWALLOWING: 0
NAUSEA: 0
CHANGE IN BOWEL HABIT: 0
ABDOMINAL PAIN: 0
RESPIRATORY NEGATIVE: 1
VOMITING: 0
GASTROINTESTINAL NEGATIVE: 1
COUGH: 0
EYES NEGATIVE: 1
SINUS PRESSURE: 1

## 2017-08-12 RX ORDER — BUTALBITAL, ACETAMINOPHEN AND CAFFEINE 50; 325; 40 MG/1; MG/1; MG/1
1 TABLET ORAL EVERY 6 HOURS PRN
Qty: 120 TABLET | Refills: 1 | Status: SHIPPED | OUTPATIENT
Start: 2017-08-12 | End: 2018-03-28 | Stop reason: SDUPTHER

## 2017-08-15 ENCOUNTER — HOSPITAL ENCOUNTER (OUTPATIENT)
Dept: LAB | Age: 52
Discharge: HOME | End: 2017-08-15
Payer: MEDICARE

## 2017-08-15 DIAGNOSIS — N17.9 AKI (ACUTE KIDNEY INJURY) (HCC): Chronic | ICD-10-CM

## 2017-08-15 LAB
ANION GAP SERPL CALCULATED.3IONS-SCNC: 15 MMOL/L (ref 9–17)
BUN BLDV-MCNC: 40 MG/DL (ref 6–20)
BUN/CREAT BLD: 21 (ref 9–20)
CALCIUM SERPL-MCNC: 9.1 MG/DL (ref 8.6–10.4)
CHLORIDE BLD-SCNC: 100 MMOL/L (ref 98–107)
CO2: 23 MMOL/L (ref 20–31)
CREAT SERPL-MCNC: 1.91 MG/DL (ref 0.5–0.9)
GFR AFRICAN AMERICAN: 34 ML/MIN
GFR NON-AFRICAN AMERICAN: 28 ML/MIN
GFR SERPL CREATININE-BSD FRML MDRD: ABNORMAL ML/MIN/{1.73_M2}
GFR SERPL CREATININE-BSD FRML MDRD: ABNORMAL ML/MIN/{1.73_M2}
GLUCOSE BLD-MCNC: 114 MG/DL (ref 70–99)
POTASSIUM SERPL-SCNC: 3.6 MMOL/L (ref 3.7–5.3)
SODIUM BLD-SCNC: 138 MMOL/L (ref 135–144)

## 2017-08-15 PROCEDURE — 36415 COLL VENOUS BLD VENIPUNCTURE: CPT

## 2017-08-15 PROCEDURE — 80048 BASIC METABOLIC PNL TOTAL CA: CPT

## 2017-08-22 RX ORDER — POTASSIUM CHLORIDE 1500 MG/1
TABLET, EXTENDED RELEASE ORAL
Qty: 30 TABLET | Refills: 0 | Status: SHIPPED | OUTPATIENT
Start: 2017-08-22 | End: 2017-09-16 | Stop reason: SDUPTHER

## 2017-08-24 ENCOUNTER — OFFICE VISIT (OUTPATIENT)
Dept: UROLOGY | Age: 52
End: 2017-08-24
Payer: MEDICARE

## 2017-08-24 ENCOUNTER — HOSPITAL ENCOUNTER (OUTPATIENT)
Age: 52
Setting detail: SPECIMEN
Discharge: HOME | End: 2017-08-24
Payer: MEDICARE

## 2017-08-24 VITALS
SYSTOLIC BLOOD PRESSURE: 140 MMHG | BODY MASS INDEX: 50.05 KG/M2 | WEIGHT: 272 LBS | HEIGHT: 62 IN | DIASTOLIC BLOOD PRESSURE: 90 MMHG

## 2017-08-24 DIAGNOSIS — N39.46 MIXED INCONTINENCE: Primary | ICD-10-CM

## 2017-08-24 DIAGNOSIS — R35.0 URINARY FREQUENCY: ICD-10-CM

## 2017-08-24 DIAGNOSIS — N39.46 MIXED INCONTINENCE: ICD-10-CM

## 2017-08-24 PROCEDURE — 99214 OFFICE O/P EST MOD 30 MIN: CPT | Performed by: NURSE PRACTITIONER

## 2017-08-24 PROCEDURE — 87186 SC STD MICRODIL/AGAR DIL: CPT

## 2017-08-24 PROCEDURE — 87086 URINE CULTURE/COLONY COUNT: CPT

## 2017-08-24 PROCEDURE — 87088 URINE BACTERIA CULTURE: CPT

## 2017-08-24 PROCEDURE — 81001 URINALYSIS AUTO W/SCOPE: CPT

## 2017-08-24 PROCEDURE — 51798 US URINE CAPACITY MEASURE: CPT | Performed by: NURSE PRACTITIONER

## 2017-08-24 RX ORDER — SOLIFENACIN SUCCINATE 5 MG/1
5 TABLET, FILM COATED ORAL DAILY
Qty: 90 TABLET | Refills: 3 | Status: SHIPPED | OUTPATIENT
Start: 2017-08-24 | End: 2018-08-22 | Stop reason: SDUPTHER

## 2017-08-24 ASSESSMENT — ENCOUNTER SYMPTOMS
SHORTNESS OF BREATH: 0
COUGH: 0
ABDOMINAL PAIN: 0
BACK PAIN: 0
NAUSEA: 0
CONSTIPATION: 0
EYE REDNESS: 0
WHEEZING: 0
EYE PAIN: 0
VOMITING: 0
COLOR CHANGE: 0

## 2017-08-25 LAB
CULTURE: ABNORMAL
CULTURE: ABNORMAL
Lab: ABNORMAL
Lab: ABNORMAL
ORGANISM: ABNORMAL
SPECIMEN DESCRIPTION: ABNORMAL
SPECIMEN DESCRIPTION: ABNORMAL
STATUS: ABNORMAL

## 2017-08-28 ENCOUNTER — TELEPHONE (OUTPATIENT)
Dept: UROLOGY | Age: 52
End: 2017-08-28

## 2017-08-28 RX ORDER — SULFAMETHOXAZOLE AND TRIMETHOPRIM 800; 160 MG/1; MG/1
1 TABLET ORAL 2 TIMES DAILY
Qty: 20 TABLET | Refills: 0 | Status: SHIPPED | OUTPATIENT
Start: 2017-08-28 | End: 2017-09-07

## 2017-08-29 ENCOUNTER — TELEPHONE (OUTPATIENT)
Dept: PRIMARY CARE CLINIC | Age: 52
End: 2017-08-29

## 2017-08-29 ENCOUNTER — HOSPITAL ENCOUNTER (OUTPATIENT)
Age: 52
Discharge: HOME | End: 2017-08-29
Payer: MEDICARE

## 2017-08-29 ENCOUNTER — OFFICE VISIT (OUTPATIENT)
Dept: PRIMARY CARE CLINIC | Age: 52
End: 2017-08-29
Payer: MEDICARE

## 2017-08-29 VITALS
BODY MASS INDEX: 49.42 KG/M2 | WEIGHT: 270.2 LBS | RESPIRATION RATE: 20 BRPM | SYSTOLIC BLOOD PRESSURE: 132 MMHG | TEMPERATURE: 97.7 F | DIASTOLIC BLOOD PRESSURE: 80 MMHG | HEART RATE: 84 BPM

## 2017-08-29 DIAGNOSIS — K59.00 CONSTIPATION, UNSPECIFIED CONSTIPATION TYPE: ICD-10-CM

## 2017-08-29 DIAGNOSIS — R53.83 FATIGUE, UNSPECIFIED TYPE: ICD-10-CM

## 2017-08-29 DIAGNOSIS — N18.30 CHRONIC KIDNEY DISEASE, STAGE III (MODERATE) (HCC): Chronic | ICD-10-CM

## 2017-08-29 DIAGNOSIS — I10 ESSENTIAL HYPERTENSION: Primary | ICD-10-CM

## 2017-08-29 DIAGNOSIS — I10 ESSENTIAL HYPERTENSION: Chronic | ICD-10-CM

## 2017-08-29 DIAGNOSIS — K62.5 BRBPR (BRIGHT RED BLOOD PER RECTUM): ICD-10-CM

## 2017-08-29 DIAGNOSIS — I95.9 HYPOTENSION, UNSPECIFIED HYPOTENSION TYPE: ICD-10-CM

## 2017-08-29 LAB
ABSOLUTE EOS #: 0.1 K/UL (ref 0–0.4)
ABSOLUTE LYMPH #: 0.5 K/UL (ref 1–4.8)
ABSOLUTE MONO #: 0.3 K/UL (ref 0–1)
ALBUMIN SERPL-MCNC: 3.5 G/DL (ref 3.5–5.2)
ALBUMIN/GLOBULIN RATIO: 0.9 (ref 1–2.5)
ALP BLD-CCNC: 118 U/L (ref 35–104)
ALT SERPL-CCNC: 10 U/L (ref 5–33)
ANION GAP SERPL CALCULATED.3IONS-SCNC: 14 MMOL/L (ref 9–17)
AST SERPL-CCNC: 16 U/L
BASOPHILS # BLD: 0 %
BASOPHILS ABSOLUTE: 0 K/UL (ref 0–0.2)
BILIRUB SERPL-MCNC: 0.19 MG/DL (ref 0.3–1.2)
BUN BLDV-MCNC: 30 MG/DL (ref 6–20)
BUN/CREAT BLD: 16 (ref 9–20)
CALCIUM SERPL-MCNC: 9.1 MG/DL (ref 8.6–10.4)
CHLORIDE BLD-SCNC: 100 MMOL/L (ref 98–107)
CO2: 25 MMOL/L (ref 20–31)
CREAT SERPL-MCNC: 1.85 MG/DL (ref 0.5–0.9)
DIFFERENTIAL TYPE: ABNORMAL
EOSINOPHILS RELATIVE PERCENT: 2 %
GFR AFRICAN AMERICAN: 35 ML/MIN
GFR NON-AFRICAN AMERICAN: 29 ML/MIN
GFR SERPL CREATININE-BSD FRML MDRD: ABNORMAL ML/MIN/{1.73_M2}
GFR SERPL CREATININE-BSD FRML MDRD: ABNORMAL ML/MIN/{1.73_M2}
GLUCOSE BLD-MCNC: 106 MG/DL (ref 70–99)
HCT VFR BLD CALC: 28.9 % (ref 36–46)
HEMOGLOBIN: 10.2 G/DL (ref 12–16)
LYMPHOCYTES # BLD: 9 %
MCH RBC QN AUTO: 33.8 PG (ref 26–34)
MCHC RBC AUTO-ENTMCNC: 35.5 G/DL (ref 31–37)
MCV RBC AUTO: 95.4 FL (ref 80–100)
MONOCYTES # BLD: 6 %
PDW BLD-RTO: 14.4 % (ref 12.1–15.2)
PLATELET # BLD: 254 K/UL (ref 140–450)
PLATELET ESTIMATE: ABNORMAL
PMV BLD AUTO: 8.2 FL (ref 6–12)
POTASSIUM SERPL-SCNC: 4 MMOL/L (ref 3.7–5.3)
RBC # BLD: 3.02 M/UL (ref 4–5.2)
RBC # BLD: ABNORMAL 10*6/UL
SEG NEUTROPHILS: 83 %
SEGMENTED NEUTROPHILS ABSOLUTE COUNT: 4.6 K/UL (ref 1.8–7.7)
SODIUM BLD-SCNC: 139 MMOL/L (ref 135–144)
TOTAL PROTEIN: 7.3 G/DL (ref 6.4–8.3)
WBC # BLD: 5.6 K/UL (ref 3.5–11)
WBC # BLD: ABNORMAL 10*3/UL

## 2017-08-29 PROCEDURE — 85025 COMPLETE CBC W/AUTO DIFF WBC: CPT

## 2017-08-29 PROCEDURE — 36415 COLL VENOUS BLD VENIPUNCTURE: CPT

## 2017-08-29 PROCEDURE — 99213 OFFICE O/P EST LOW 20 MIN: CPT | Performed by: NURSE PRACTITIONER

## 2017-08-29 PROCEDURE — 80053 COMPREHEN METABOLIC PANEL: CPT

## 2017-08-29 RX ORDER — BLOOD PRESSURE TEST KIT
1 KIT MISCELLANEOUS DAILY
Qty: 1 KIT | Refills: 0 | Status: SHIPPED | OUTPATIENT
Start: 2017-08-29 | End: 2019-11-07 | Stop reason: SDUPTHER

## 2017-08-29 ASSESSMENT — ENCOUNTER SYMPTOMS
CHANGE IN BOWEL HABIT: 0
CHEST TIGHTNESS: 0
WHEEZING: 0
SHORTNESS OF BREATH: 0
ABDOMINAL PAIN: 0
TROUBLE SWALLOWING: 0
VOMITING: 0
ANAL BLEEDING: 0
BLOOD IN STOOL: 1
NAUSEA: 1
RESPIRATORY NEGATIVE: 1
ABDOMINAL DISTENTION: 0
EYES NEGATIVE: 1
RECTAL PAIN: 0
DIARRHEA: 0

## 2017-08-30 ENCOUNTER — TELEPHONE (OUTPATIENT)
Dept: PRIMARY CARE CLINIC | Age: 52
End: 2017-08-30

## 2017-08-31 ENCOUNTER — HOSPITAL ENCOUNTER (OUTPATIENT)
Dept: NUTRITION | Age: 52
Discharge: HOME | End: 2017-08-31
Payer: MEDICARE

## 2017-08-31 VITALS — HEIGHT: 62 IN | BODY MASS INDEX: 49.69 KG/M2 | WEIGHT: 270 LBS

## 2017-08-31 PROCEDURE — 97802 MEDICAL NUTRITION INDIV IN: CPT

## 2017-09-05 ENCOUNTER — TELEPHONE (OUTPATIENT)
Dept: PRIMARY CARE CLINIC | Age: 52
End: 2017-09-05

## 2017-09-05 DIAGNOSIS — K62.5 BRBPR (BRIGHT RED BLOOD PER RECTUM): Primary | ICD-10-CM

## 2017-09-05 ASSESSMENT — ENCOUNTER SYMPTOMS: CONSTIPATION: 1

## 2017-09-13 ENCOUNTER — HOSPITAL ENCOUNTER (OUTPATIENT)
Age: 52
Discharge: HOME | End: 2017-09-13
Payer: MEDICARE

## 2017-09-13 ENCOUNTER — TELEPHONE (OUTPATIENT)
Dept: UROLOGY | Age: 52
End: 2017-09-13

## 2017-09-13 DIAGNOSIS — N30.00 ACUTE CYSTITIS WITHOUT HEMATURIA: ICD-10-CM

## 2017-09-13 DIAGNOSIS — N30.00 ACUTE CYSTITIS WITHOUT HEMATURIA: Primary | ICD-10-CM

## 2017-09-13 PROCEDURE — 87077 CULTURE AEROBIC IDENTIFY: CPT

## 2017-09-13 PROCEDURE — 87086 URINE CULTURE/COLONY COUNT: CPT

## 2017-09-13 PROCEDURE — 81001 URINALYSIS AUTO W/SCOPE: CPT

## 2017-09-13 PROCEDURE — 87186 SC STD MICRODIL/AGAR DIL: CPT

## 2017-09-14 ENCOUNTER — PROCEDURE VISIT (OUTPATIENT)
Dept: UROLOGY | Age: 52
End: 2017-09-14
Payer: MEDICARE

## 2017-09-14 VITALS
BODY MASS INDEX: 50.42 KG/M2 | DIASTOLIC BLOOD PRESSURE: 78 MMHG | HEIGHT: 62 IN | WEIGHT: 274 LBS | SYSTOLIC BLOOD PRESSURE: 128 MMHG

## 2017-09-14 DIAGNOSIS — N39.46 MIXED INCONTINENCE: Primary | ICD-10-CM

## 2017-09-14 DIAGNOSIS — M62.81 MUSCLE WEAKNESS: ICD-10-CM

## 2017-09-14 DIAGNOSIS — R35.0 URINARY FREQUENCY: ICD-10-CM

## 2017-09-14 PROCEDURE — 97032 APPL MODALITY 1+ESTIM EA 15: CPT | Performed by: NURSE PRACTITIONER

## 2017-09-14 PROCEDURE — 97750 PHYSICAL PERFORMANCE TEST: CPT | Performed by: NURSE PRACTITIONER

## 2017-09-14 PROCEDURE — 51784 ANAL/URINARY MUSCLE STUDY: CPT | Performed by: NURSE PRACTITIONER

## 2017-09-14 PROCEDURE — 91122 PR ANAL/URINARY MUSCLE STUDY: CPT | Performed by: NURSE PRACTITIONER

## 2017-09-14 PROCEDURE — 99214 OFFICE O/P EST MOD 30 MIN: CPT | Performed by: NURSE PRACTITIONER

## 2017-09-15 ENCOUNTER — TELEPHONE (OUTPATIENT)
Dept: UROLOGY | Age: 52
End: 2017-09-15

## 2017-09-15 DIAGNOSIS — N30.00 ACUTE CYSTITIS WITHOUT HEMATURIA: Primary | ICD-10-CM

## 2017-09-15 RX ORDER — CIPROFLOXACIN 500 MG/1
500 TABLET, FILM COATED ORAL 2 TIMES DAILY
Qty: 20 TABLET | Refills: 0 | Status: SHIPPED | OUTPATIENT
Start: 2017-09-15 | End: 2017-09-22 | Stop reason: ALTCHOICE

## 2017-09-18 RX ORDER — POTASSIUM CHLORIDE 1500 MG/1
TABLET, EXTENDED RELEASE ORAL
Qty: 30 TABLET | Refills: 0 | Status: SHIPPED | OUTPATIENT
Start: 2017-09-18 | End: 2017-10-23 | Stop reason: SDUPTHER

## 2017-09-20 ENCOUNTER — HOSPITAL ENCOUNTER (OUTPATIENT)
Age: 52
Discharge: HOME | End: 2017-09-20
Payer: MEDICARE

## 2017-09-20 LAB
ABSOLUTE EOS #: 0.18 K/UL (ref 0–0.4)
ABSOLUTE LYMPH #: 0.45 K/UL (ref 1–4.8)
ABSOLUTE MONO #: 0.27 K/UL (ref 0–1)
ANION GAP SERPL CALCULATED.3IONS-SCNC: 16 MMOL/L (ref 9–17)
BASOPHILS # BLD: 1 %
BASOPHILS ABSOLUTE: 0.05 K/UL (ref 0–0.2)
BUN BLDV-MCNC: 34 MG/DL (ref 6–20)
BUN/CREAT BLD: 17 (ref 9–20)
CALCIUM SERPL-MCNC: 8.6 MG/DL (ref 8.6–10.4)
CHLORIDE BLD-SCNC: 98 MMOL/L (ref 98–107)
CO2: 23 MMOL/L (ref 20–31)
CREAT SERPL-MCNC: 2.06 MG/DL (ref 0.5–0.9)
DIFFERENTIAL TYPE: ABNORMAL
EOSINOPHILS RELATIVE PERCENT: 4 %
FERRITIN: 432 UG/L (ref 13–150)
GFR AFRICAN AMERICAN: 31 ML/MIN
GFR NON-AFRICAN AMERICAN: 25 ML/MIN
GFR SERPL CREATININE-BSD FRML MDRD: ABNORMAL ML/MIN/{1.73_M2}
GFR SERPL CREATININE-BSD FRML MDRD: ABNORMAL ML/MIN/{1.73_M2}
GLUCOSE BLD-MCNC: 108 MG/DL (ref 70–99)
HCT VFR BLD CALC: 28.6 % (ref 36–46)
HEMOGLOBIN: 10.2 G/DL (ref 12–16)
IRON SATURATION: 25 % (ref 20–55)
IRON: 61 UG/DL (ref 37–145)
LYMPHOCYTES # BLD: 10 %
MCH RBC QN AUTO: 34 PG (ref 26–34)
MCHC RBC AUTO-ENTMCNC: 35.6 G/DL (ref 31–37)
MCV RBC AUTO: 95.6 FL (ref 80–100)
MONOCYTES # BLD: 6 %
MORPHOLOGY: ABNORMAL
PDW BLD-RTO: 15.1 % (ref 12.1–15.2)
PLATELET # BLD: 283 K/UL (ref 140–450)
PLATELET ESTIMATE: ABNORMAL
PMV BLD AUTO: 8.3 FL (ref 6–12)
POTASSIUM SERPL-SCNC: 3.9 MMOL/L (ref 3.7–5.3)
RBC # BLD: 2.99 M/UL (ref 4–5.2)
RBC # BLD: ABNORMAL 10*6/UL
SEG NEUTROPHILS: 79 %
SEGMENTED NEUTROPHILS ABSOLUTE COUNT: 3.55 K/UL (ref 1.8–7.7)
SODIUM BLD-SCNC: 137 MMOL/L (ref 135–144)
TOTAL IRON BINDING CAPACITY: 242 UG/DL (ref 250–450)
UNSATURATED IRON BINDING CAPACITY: 181 UG/DL (ref 112–347)
WBC # BLD: 4.5 K/UL (ref 3.5–11)
WBC # BLD: ABNORMAL 10*3/UL

## 2017-09-20 PROCEDURE — 83550 IRON BINDING TEST: CPT

## 2017-09-20 PROCEDURE — 36415 COLL VENOUS BLD VENIPUNCTURE: CPT

## 2017-09-20 PROCEDURE — 85025 COMPLETE CBC W/AUTO DIFF WBC: CPT

## 2017-09-20 PROCEDURE — 83540 ASSAY OF IRON: CPT

## 2017-09-20 PROCEDURE — 82728 ASSAY OF FERRITIN: CPT

## 2017-09-20 PROCEDURE — 80048 BASIC METABOLIC PNL TOTAL CA: CPT

## 2017-09-21 ENCOUNTER — PROCEDURE VISIT (OUTPATIENT)
Dept: UROLOGY | Age: 52
End: 2017-09-21
Payer: MEDICARE

## 2017-09-21 VITALS
BODY MASS INDEX: 50.24 KG/M2 | HEIGHT: 62 IN | DIASTOLIC BLOOD PRESSURE: 88 MMHG | SYSTOLIC BLOOD PRESSURE: 142 MMHG | WEIGHT: 273 LBS

## 2017-09-21 DIAGNOSIS — M62.81 MUSCLE WEAKNESS: ICD-10-CM

## 2017-09-21 DIAGNOSIS — N39.46 MIXED INCONTINENCE: Primary | ICD-10-CM

## 2017-09-21 PROCEDURE — 97750 PHYSICAL PERFORMANCE TEST: CPT | Performed by: NURSE PRACTITIONER

## 2017-09-21 PROCEDURE — 99213 OFFICE O/P EST LOW 20 MIN: CPT | Performed by: NURSE PRACTITIONER

## 2017-09-21 PROCEDURE — 91122 PR ANAL/URINARY MUSCLE STUDY: CPT | Performed by: NURSE PRACTITIONER

## 2017-09-21 PROCEDURE — 51784 ANAL/URINARY MUSCLE STUDY: CPT | Performed by: NURSE PRACTITIONER

## 2017-09-21 PROCEDURE — 97032 APPL MODALITY 1+ESTIM EA 15: CPT | Performed by: NURSE PRACTITIONER

## 2017-09-22 ENCOUNTER — OFFICE VISIT (OUTPATIENT)
Dept: ONCOLOGY | Age: 52
End: 2017-09-22
Payer: MEDICARE

## 2017-09-22 VITALS
WEIGHT: 274 LBS | TEMPERATURE: 97.3 F | HEART RATE: 90 BPM | RESPIRATION RATE: 16 BRPM | DIASTOLIC BLOOD PRESSURE: 88 MMHG | HEIGHT: 62 IN | SYSTOLIC BLOOD PRESSURE: 128 MMHG | BODY MASS INDEX: 50.42 KG/M2

## 2017-09-22 DIAGNOSIS — D63.1 ANEMIA OF CHRONIC KIDNEY FAILURE, STAGE 4 (SEVERE) (HCC): Primary | ICD-10-CM

## 2017-09-22 DIAGNOSIS — D47.2 MGUS (MONOCLONAL GAMMOPATHY OF UNKNOWN SIGNIFICANCE): ICD-10-CM

## 2017-09-22 DIAGNOSIS — N18.30 CHRONIC KIDNEY DISEASE, STAGE III (MODERATE) (HCC): Chronic | ICD-10-CM

## 2017-09-22 DIAGNOSIS — N18.4 ANEMIA OF CHRONIC KIDNEY FAILURE, STAGE 4 (SEVERE) (HCC): Primary | ICD-10-CM

## 2017-09-22 PROCEDURE — 99214 OFFICE O/P EST MOD 30 MIN: CPT | Performed by: INTERNAL MEDICINE

## 2017-09-26 ENCOUNTER — HOSPITAL ENCOUNTER (OUTPATIENT)
Age: 52
Discharge: HOME | End: 2017-09-26
Payer: MEDICARE

## 2017-09-26 DIAGNOSIS — N30.00 ACUTE CYSTITIS WITHOUT HEMATURIA: ICD-10-CM

## 2017-09-26 LAB
-: ABNORMAL
AMORPHOUS: ABNORMAL
BACTERIA: ABNORMAL
BILIRUBIN URINE: NEGATIVE
CASTS UA: ABNORMAL /LPF
COLOR: YELLOW
COMMENT UA: ABNORMAL
CRYSTALS, UA: ABNORMAL /HPF
EPITHELIAL CELLS UA: ABNORMAL /HPF (ref 0–25)
GLUCOSE URINE: NEGATIVE
KETONES, URINE: NEGATIVE
LEUKOCYTE ESTERASE, URINE: NEGATIVE
MUCUS: ABNORMAL
NITRITE, URINE: NEGATIVE
OTHER OBSERVATIONS UA: ABNORMAL
PH UA: 5 (ref 5–9)
PROTEIN UA: ABNORMAL
RBC UA: ABNORMAL /HPF (ref 0–2)
RENAL EPITHELIAL, UA: ABNORMAL /HPF
SPECIFIC GRAVITY UA: 1.02 (ref 1.01–1.02)
TRICHOMONAS: ABNORMAL
TURBIDITY: CLEAR
URINE HGB: ABNORMAL
UROBILINOGEN, URINE: NORMAL
WBC UA: ABNORMAL /HPF (ref 0–5)
YEAST: ABNORMAL

## 2017-09-26 PROCEDURE — 87086 URINE CULTURE/COLONY COUNT: CPT

## 2017-09-26 PROCEDURE — 81001 URINALYSIS AUTO W/SCOPE: CPT

## 2017-09-27 LAB
CULTURE: NORMAL
CULTURE: NORMAL
Lab: NORMAL
SPECIMEN DESCRIPTION: NORMAL
SPECIMEN DESCRIPTION: NORMAL
STATUS: NORMAL

## 2017-09-28 ENCOUNTER — PROCEDURE VISIT (OUTPATIENT)
Dept: UROLOGY | Age: 52
End: 2017-09-28
Payer: MEDICARE

## 2017-09-28 VITALS — BODY MASS INDEX: 50.12 KG/M2 | SYSTOLIC BLOOD PRESSURE: 130 MMHG | WEIGHT: 274 LBS | DIASTOLIC BLOOD PRESSURE: 84 MMHG

## 2017-09-28 DIAGNOSIS — N39.46 MIXED INCONTINENCE: Primary | ICD-10-CM

## 2017-09-28 DIAGNOSIS — M62.81 MUSCLE WEAKNESS: ICD-10-CM

## 2017-09-28 PROCEDURE — 91122 PR ANAL/URINARY MUSCLE STUDY: CPT | Performed by: NURSE PRACTITIONER

## 2017-09-28 PROCEDURE — 97750 PHYSICAL PERFORMANCE TEST: CPT | Performed by: NURSE PRACTITIONER

## 2017-09-28 PROCEDURE — 99213 OFFICE O/P EST LOW 20 MIN: CPT | Performed by: NURSE PRACTITIONER

## 2017-09-28 PROCEDURE — 51784 ANAL/URINARY MUSCLE STUDY: CPT | Performed by: NURSE PRACTITIONER

## 2017-09-28 PROCEDURE — 97032 APPL MODALITY 1+ESTIM EA 15: CPT | Performed by: NURSE PRACTITIONER

## 2017-09-28 RX ORDER — TRAMADOL HYDROCHLORIDE 50 MG/1
50 TABLET ORAL EVERY 6 HOURS PRN
COMMUNITY
Start: 2017-09-27 | End: 2019-04-09 | Stop reason: ALTCHOICE

## 2017-10-05 ENCOUNTER — HOSPITAL ENCOUNTER (OUTPATIENT)
Dept: WOMENS IMAGING | Age: 52
Discharge: HOME | End: 2017-10-05
Payer: MEDICARE

## 2017-10-05 ENCOUNTER — TELEPHONE (OUTPATIENT)
Dept: PRIMARY CARE CLINIC | Age: 52
End: 2017-10-05

## 2017-10-05 ENCOUNTER — HOSPITAL ENCOUNTER (OUTPATIENT)
Dept: BONE DENSITY | Age: 52
Discharge: HOME | End: 2017-10-05
Payer: MEDICARE

## 2017-10-05 DIAGNOSIS — Z78.0 POST-MENOPAUSAL: ICD-10-CM

## 2017-10-05 DIAGNOSIS — Z12.39 BREAST CANCER SCREENING: ICD-10-CM

## 2017-10-05 PROCEDURE — 77080 DXA BONE DENSITY AXIAL: CPT

## 2017-10-05 PROCEDURE — G0202 SCR MAMMO BI INCL CAD: HCPCS

## 2017-10-05 NOTE — TELEPHONE ENCOUNTER
----- Message from Lilli Lopez CNP sent at 10/5/2017  5:11 PM EDT -----  Results are normal, please call patient and make them aware.

## 2017-10-06 ENCOUNTER — PROCEDURE VISIT (OUTPATIENT)
Dept: UROLOGY | Age: 52
End: 2017-10-06
Payer: MEDICARE

## 2017-10-06 VITALS
HEIGHT: 63 IN | WEIGHT: 274 LBS | SYSTOLIC BLOOD PRESSURE: 116 MMHG | DIASTOLIC BLOOD PRESSURE: 82 MMHG | BODY MASS INDEX: 48.55 KG/M2

## 2017-10-06 DIAGNOSIS — N39.46 MIXED INCONTINENCE: Primary | ICD-10-CM

## 2017-10-06 DIAGNOSIS — M62.81 MUSCLE WEAKNESS: ICD-10-CM

## 2017-10-06 PROCEDURE — 91122 PR ANAL/URINARY MUSCLE STUDY: CPT | Performed by: NURSE PRACTITIONER

## 2017-10-06 PROCEDURE — 99212 OFFICE O/P EST SF 10 MIN: CPT | Performed by: NURSE PRACTITIONER

## 2017-10-06 PROCEDURE — 97750 PHYSICAL PERFORMANCE TEST: CPT | Performed by: NURSE PRACTITIONER

## 2017-10-06 PROCEDURE — 51784 ANAL/URINARY MUSCLE STUDY: CPT | Performed by: NURSE PRACTITIONER

## 2017-10-06 PROCEDURE — 97032 APPL MODALITY 1+ESTIM EA 15: CPT | Performed by: NURSE PRACTITIONER

## 2017-10-06 NOTE — MR AVS SNAPSHOT
Additional Information about your Body Mass Index (BMI)           Your BMI as listed above is considered obese (30 or more). BMI is an estimate of body fat, calculated from your height and weight. The higher your BMI, the greater your risk of heart disease, high blood pressure, type 2 diabetes, stroke, gallstones, arthritis, sleep apnea, and certain cancers. BMI is not perfect. It may overestimate body fat in athletes and people who are more muscular. Even a small weight loss (between 5 and 10 percent of your current weight) by decreasing your calorie intake and becoming more physically active will help lower your risk of developing or worsening diseases associated with obesity. Learn more at: "[employer].uk          Instructions    Thank you for enrolling in [address] Please follow the instructions below to securely access your online medical record. [employer] allows you to send messages to your doctor, view your test results, renew your prescriptions, schedule appointments, and more. How Do I Sign Up? 1. In your Internet browser, go to [email or web address]  2. Click on the Sign Up Now link in the Sign In box. You will see the New Member Sign Up page. 3. Enter your [employer] Access Code exactly as it appears below. You will not need to use this code after youve completed the sign-up process. If you do not sign up before the expiration date, you must request a new code. Movayahart Access Code: Activation code not generated  Current [employer] Status: Patient Declined    4. Enter your Social Security Number ([id number]) and Date of Birth (mm/dd/yyyy) as indicated and click Submit. You will be taken to the next sign-up page. 5. Create a [employer] ID. This will be your [employer] login ID and cannot be changed, so think of one that is secure and easy to remember. 6. Create a [employer] password. You can change your password at any time. 7. Enter your Password Reset Question and Answer. This can be used at a later time if you forget your password. 8. Enter your e-mail address. You will receive e-mail notification when new information is available in [address] 9. Click Sign Up. You can now view your medical record. Additional Information  If you have questions, please contact your physician practice where you receive care. Remember, MyChart is NOT to be used for urgent needs. For medical emergencies, dial 911. Medications and Orders      Your Current Medications Are              traMADol (ULTRAM) 50 MG tablet     azaTHIOprine (IMURAN) 50 MG tablet TAKE THREE TABLETS BY MOUTH ONCE DAILY    KLOR-CON M20 20 MEQ extended release tablet TAKE ONE TABLET BY MOUTH ONCE DAILY    hydrochlorothiazide (HYDRODIURIL) 25 MG tablet Take 1 tablet by mouth daily    Blood Pressure KIT 1 Units by Does not apply route daily    solifenacin (VESICARE) 5 MG tablet Take 1 tablet by mouth daily    fluticasone (FLONASE) 50 MCG/ACT nasal spray 1 spray by Nasal route daily    DULoxetine (CYMBALTA) 30 MG extended release capsule Take 1 capsule by mouth daily    cetirizine (ZYRTEC) 5 MG tablet Take 1 tablet by mouth daily    atorvastatin (LIPITOR) 80 MG tablet Take 1 tablet by mouth daily    losartan (COZAAR) 25 MG tablet Take 1 tablet by mouth 2 times daily    PREMARIN 0.625 MG tablet TAKE ONE TABLET BY MOUTH ONCE DAILY    vitamin D (ERGOCALCIFEROL) 19349 UNITS CAPS capsule Take 1 capsule by mouth once a week for three months then take 1 Capsule PO once a month for six months.     ondansetron (ZOFRAN ODT) 4 MG disintegrating tablet Take 1 tablet by mouth every 4 hours as needed for Nausea or Vomiting    pantoprazole (PROTONIX) 40 MG tablet Take 1 tablet by mouth every morning (before breakfast)    carvedilol (COREG) 12.5 MG tablet Take 1 tablet by mouth 2 times daily    Docusate Calcium (STOOL SOFTENER PO) Take 100 mg by mouth daily diphenhydrAMINE (BENADRYL) 25 MG capsule Take 25 mg by mouth Pt takes 2 capsules at HS \"due to hives from it being cold now\". Pt tho states these are NOT helping her this time. NIFEdipine (ADALAT CC) 60 MG extended release tablet Take 1 tablet by mouth daily    butalbital-acetaminophen-caffeine (FIORICET, ESGIC) -40 MG per tablet Take 1 tablet by mouth every 6 hours as needed for Headaches      Allergies           No Known Allergies         Additional Information        Basic Information     Date Of Birth Sex Race Ethnicity Preferred Language Preferred Written Language    1965 Female  / English English      Problem List as of 10/6/2017  Date Reviewed: 8/29/2017                MGUS (monoclonal gammopathy of unknown significance)    Muscle weakness    Gross hematuria    Microhematuria    Mixed incontinence    Urinary frequency    Hyperlipidemia    Iron deficiency anemia    Colon cancer screening    Allergic rhinitis (Chronic)    Morbid obesity (HCC) (Chronic)    Postural vertigo (Chronic)    S/P DOMO-BSO (Chronic)    Endometrial hyperplasia without atypia, simple    Menometrorrhagia    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) (Chronic)    Essential hypertension (Chronic)    Depression with anxiety (Chronic)    Chronic kidney disease, stage III (moderate) (Chronic)    Retinal edema of both eyes    Rectocele (Chronic)    ANCA-associated vasculitis (HCC) (Chronic)    Vasculitis (HCC) (Chronic)      Immunizations as of 10/6/2017     Name Date    Influenza, [place] Head, 3 yrs and older, IM, Preservative Free 11/10/2016    Pneumococcal 13-valent Conjugate (Nbxsmaf19) 8/26/2016    Pneumococcal Polysaccharide ([id number]) 5/10/2017      Preventive Care        Date Due    HIV screening is recommended for all people regardless of risk factors  aged 15-65 years at least once (lifetime) who have never been HIV tested.  9/5/1980    Tetanus Combination Vaccine (1 - Tdap) 9/5/1984 Yearly Flu Vaccine (1) 9/1/2017    Mammograms are recommended every 2 years for low/average risk patients aged 48 - 69, and every year for high risk patients per updated national guidelines. However these guidelines can be individualized by your provider. 10/5/2019    Diabetes Screening 5/5/2020    Pneumococcal Vaccines (two) for adults aged 19-64  years who are immunocompromised or whose spleen is missing or not working  (3 of 3 - PPSV23) 5/10/2022    Cholesterol Screening 8/8/2022    Colonoscopy 5/2/2026            MyChart Signup           Our records indicate that you have declined MyChart signup.

## 2017-10-06 NOTE — PATIENT INSTRUCTIONS
Thank you for enrolling in [address] Please follow the instructions below to securely access your online medical record. [employer] allows you to send messages to your doctor, view your test results, renew your prescriptions, schedule appointments, and more. How Do I Sign Up? 1. In your Internet browser, go to [email or web address]  2. Click on the Sign Up Now link in the Sign In box. You will see the New Member Sign Up page. 3. Enter your [employer] Access Code exactly as it appears below. You will not need to use this code after youve completed the sign-up process. If you do not sign up before the expiration date, you must request a new code. Men[employer]t Access Code: Activation code not generated  Current [facility] Status: Patient Declined    4. Enter your Social Security Number ([id number]) and Date of Birth (mm/dd/yyyy) as indicated and click Submit. You will be taken to the next sign-up page. 5. Create a [employer] ID. This will be your [employer] login ID and cannot be changed, so think of one that is secure and easy to remember. 6. Create a [employer] password. You can change your password at any time. 7. Enter your Password Reset Question and Answer. This can be used at a later time if you forget your password. 8. Enter your e-mail address. You will receive e-mail notification when new information is available in [address] 9. Click Sign Up. You can now view your medical record. Additional Information  If you have questions, please contact your physician practice where you receive care. Remember, [employer] is NOT to be used for urgent needs. For medical emergencies, dial 911.

## 2017-10-18 ENCOUNTER — PROCEDURE VISIT (OUTPATIENT)
Dept: UROLOGY | Age: 52
End: 2017-10-18
Payer: MEDICARE

## 2017-10-18 VITALS — BODY MASS INDEX: 48.18 KG/M2 | WEIGHT: 272 LBS

## 2017-10-18 DIAGNOSIS — M62.81 MUSCLE WEAKNESS: ICD-10-CM

## 2017-10-18 DIAGNOSIS — N39.46 MIXED INCONTINENCE: Primary | ICD-10-CM

## 2017-10-18 PROCEDURE — 97032 APPL MODALITY 1+ESTIM EA 15: CPT | Performed by: NURSE PRACTITIONER

## 2017-10-18 PROCEDURE — 99212 OFFICE O/P EST SF 10 MIN: CPT | Performed by: NURSE PRACTITIONER

## 2017-10-18 PROCEDURE — 97750 PHYSICAL PERFORMANCE TEST: CPT | Performed by: NURSE PRACTITIONER

## 2017-10-18 PROCEDURE — 91122 PR ANAL/URINARY MUSCLE STUDY: CPT | Performed by: NURSE PRACTITIONER

## 2017-10-18 PROCEDURE — 51784 ANAL/URINARY MUSCLE STUDY: CPT | Performed by: NURSE PRACTITIONER

## 2017-10-18 NOTE — PROGRESS NOTES
PELVIC FLOOR REHAB FOLLOW UP    At last visit (PFR # 5, 1 weeks ago): Was able to hold for 5-6 seconds at low-moderate amplitude (wavy). Fatigue after 5 repetitions.      Home exercise regimen prescribed:   Repetitions - 7   Contract - 7 sec   Relax - 10 sec   + 10 Quick Flicks  Frequency- 4 times daily     Stimulated with 40 average mA for 15 minutes. Symptoms  Daytime voiding frequency: q 1 hrs, decreased  Nighttime voiding frequency: 1 times per night, decreased  Urgency: moderate, decreased    Incontinence episodes per day:5, Causes: coughing, sneezing, decreased  Number of pads used per day: 5, decreased    Pelvic pain: none    Bowel habits: unchanged, daily bowel movement  Fecal incontinence: none    OVERALL IMPROVEMENT SINCE INITIAL SESSION:   marked in degree    Compliance:  Has pt completed exercises as instructed: Yes    Changes Since Initial Visit  Intake of spicy/citrus/tomato based foods: unchanged, none  Caffeine intake per day: 1/2 cup unchanged  Fluid intake per day: 80 oz unchanged  Alcohol intake: none  Smoking:none unchanged    Was able to hold for 7-8 seconds at moderate amplitude wavy, but less than previous session. Fatigue after 8 repetitions. Home exercise regimen prescribed:   Repetitions - 8   Contract - 10 sec   Relax - 10 sec   + 10 Quick Flicks  Frequency- 4 times daily    Stimulated with 46 average mA for 15 minutes. Spent 10 mins total time face-to-face with patient, >50% of time spent in counseling discussing manometry results & home exercise plan. All questions answered/concerns addressed. Return in 2 week (s).

## 2017-10-23 ENCOUNTER — HOSPITAL ENCOUNTER (OUTPATIENT)
Age: 52
Discharge: HOME | End: 2017-10-23
Payer: MEDICARE

## 2017-10-23 DIAGNOSIS — N18.30 CHRONIC KIDNEY DISEASE, STAGE III (MODERATE) (HCC): Chronic | ICD-10-CM

## 2017-10-23 LAB
ABSOLUTE EOS #: 0.2 K/UL (ref 0–0.4)
ABSOLUTE IMMATURE GRANULOCYTE: ABNORMAL K/UL (ref 0–0.3)
ABSOLUTE LYMPH #: 0.5 K/UL (ref 1–4.8)
ABSOLUTE MONO #: 0.3 K/UL (ref 0–1)
ANION GAP SERPL CALCULATED.3IONS-SCNC: 22 MMOL/L (ref 9–17)
BASOPHILS # BLD: 0 %
BASOPHILS ABSOLUTE: 0 K/UL (ref 0–0.2)
BUN BLDV-MCNC: 38 MG/DL (ref 6–20)
BUN/CREAT BLD: 17 (ref 9–20)
CALCIUM SERPL-MCNC: 9.1 MG/DL (ref 8.6–10.4)
CHLORIDE BLD-SCNC: 100 MMOL/L (ref 98–107)
CO2: 20 MMOL/L (ref 20–31)
CREAT SERPL-MCNC: 2.25 MG/DL (ref 0.5–0.9)
CREATININE URINE: 100.1 MG/DL (ref 28–217)
DIFFERENTIAL TYPE: ABNORMAL
EOSINOPHILS RELATIVE PERCENT: 4 %
GFR AFRICAN AMERICAN: 28 ML/MIN
GFR NON-AFRICAN AMERICAN: 23 ML/MIN
GFR SERPL CREATININE-BSD FRML MDRD: ABNORMAL ML/MIN/{1.73_M2}
GFR SERPL CREATININE-BSD FRML MDRD: ABNORMAL ML/MIN/{1.73_M2}
GLUCOSE BLD-MCNC: 135 MG/DL (ref 70–99)
HCT VFR BLD CALC: 29.6 % (ref 36–46)
HEMOGLOBIN: 10.2 G/DL (ref 12–16)
IMMATURE GRANULOCYTES: ABNORMAL %
LYMPHOCYTES # BLD: 8 %
MCH RBC QN AUTO: 32.7 PG (ref 26–34)
MCHC RBC AUTO-ENTMCNC: 34.5 G/DL (ref 31–37)
MCV RBC AUTO: 94.9 FL (ref 80–100)
MONOCYTES # BLD: 5 %
PDW BLD-RTO: 14.5 % (ref 12.1–15.2)
PLATELET # BLD: 255 K/UL (ref 140–450)
PLATELET ESTIMATE: ABNORMAL
PMV BLD AUTO: 8.3 FL (ref 6–12)
POTASSIUM SERPL-SCNC: 4.1 MMOL/L (ref 3.7–5.3)
RBC # BLD: 3.12 M/UL (ref 4–5.2)
RBC # BLD: ABNORMAL 10*6/UL
SEG NEUTROPHILS: 83 %
SEGMENTED NEUTROPHILS ABSOLUTE COUNT: 5.3 K/UL (ref 1.8–7.7)
SODIUM BLD-SCNC: 142 MMOL/L (ref 135–144)
TOTAL PROTEIN, URINE: 100 MG/DL
WBC # BLD: 6.3 K/UL (ref 3.5–11)
WBC # BLD: ABNORMAL 10*3/UL

## 2017-10-23 PROCEDURE — 36415 COLL VENOUS BLD VENIPUNCTURE: CPT

## 2017-10-23 PROCEDURE — 85025 COMPLETE CBC W/AUTO DIFF WBC: CPT

## 2017-10-23 PROCEDURE — 80048 BASIC METABOLIC PNL TOTAL CA: CPT

## 2017-10-23 PROCEDURE — 84156 ASSAY OF PROTEIN URINE: CPT

## 2017-10-23 PROCEDURE — 82570 ASSAY OF URINE CREATININE: CPT

## 2017-10-23 RX ORDER — POTASSIUM CHLORIDE 20 MEQ/1
TABLET, EXTENDED RELEASE ORAL
Qty: 30 TABLET | Refills: 0 | Status: SHIPPED | OUTPATIENT
Start: 2017-10-23 | End: 2017-11-27 | Stop reason: SDUPTHER

## 2017-10-27 ENCOUNTER — HOSPITAL ENCOUNTER (OUTPATIENT)
Age: 52
Discharge: HOME | End: 2017-10-27
Payer: MEDICARE

## 2017-10-27 DIAGNOSIS — N18.4 CKD (CHRONIC KIDNEY DISEASE) STAGE 4, GFR 15-29 ML/MIN (HCC): Chronic | ICD-10-CM

## 2017-10-27 LAB
ANION GAP SERPL CALCULATED.3IONS-SCNC: 14 MMOL/L (ref 9–17)
BUN BLDV-MCNC: 29 MG/DL (ref 6–20)
BUN/CREAT BLD: 15 (ref 9–20)
CALCIUM SERPL-MCNC: 8.8 MG/DL (ref 8.6–10.4)
CHLORIDE BLD-SCNC: 100 MMOL/L (ref 98–107)
CO2: 25 MMOL/L (ref 20–31)
CREAT SERPL-MCNC: 1.91 MG/DL (ref 0.5–0.9)
GFR AFRICAN AMERICAN: 33 ML/MIN
GFR NON-AFRICAN AMERICAN: 28 ML/MIN
GFR SERPL CREATININE-BSD FRML MDRD: ABNORMAL ML/MIN/{1.73_M2}
GFR SERPL CREATININE-BSD FRML MDRD: ABNORMAL ML/MIN/{1.73_M2}
GLUCOSE BLD-MCNC: 145 MG/DL (ref 70–99)
POTASSIUM SERPL-SCNC: 3.9 MMOL/L (ref 3.7–5.3)
SODIUM BLD-SCNC: 139 MMOL/L (ref 135–144)

## 2017-10-27 PROCEDURE — 36415 COLL VENOUS BLD VENIPUNCTURE: CPT

## 2017-10-27 PROCEDURE — 80048 BASIC METABOLIC PNL TOTAL CA: CPT

## 2017-11-02 ENCOUNTER — PROCEDURE VISIT (OUTPATIENT)
Dept: UROLOGY | Age: 52
End: 2017-11-02
Payer: MEDICARE

## 2017-11-02 VITALS — DIASTOLIC BLOOD PRESSURE: 80 MMHG | BODY MASS INDEX: 49.57 KG/M2 | SYSTOLIC BLOOD PRESSURE: 120 MMHG | WEIGHT: 271 LBS

## 2017-11-02 DIAGNOSIS — Z90.710 S/P TAH-BSO: ICD-10-CM

## 2017-11-02 DIAGNOSIS — Z90.722 S/P TAH-BSO: ICD-10-CM

## 2017-11-02 DIAGNOSIS — M62.81 MUSCLE WEAKNESS: Primary | ICD-10-CM

## 2017-11-02 DIAGNOSIS — Z90.79 S/P TAH-BSO: ICD-10-CM

## 2017-11-02 DIAGNOSIS — N39.46 MIXED INCONTINENCE: ICD-10-CM

## 2017-11-02 PROCEDURE — 99212 OFFICE O/P EST SF 10 MIN: CPT | Performed by: NURSE PRACTITIONER

## 2017-11-02 PROCEDURE — G8484 FLU IMMUNIZE NO ADMIN: HCPCS | Performed by: NURSE PRACTITIONER

## 2017-11-02 PROCEDURE — 3017F COLORECTAL CA SCREEN DOC REV: CPT | Performed by: NURSE PRACTITIONER

## 2017-11-02 PROCEDURE — 97750 PHYSICAL PERFORMANCE TEST: CPT | Performed by: NURSE PRACTITIONER

## 2017-11-02 PROCEDURE — G8427 DOCREV CUR MEDS BY ELIG CLIN: HCPCS | Performed by: NURSE PRACTITIONER

## 2017-11-02 PROCEDURE — G8417 CALC BMI ABV UP PARAM F/U: HCPCS | Performed by: NURSE PRACTITIONER

## 2017-11-02 PROCEDURE — 91122 PR ANAL/URINARY MUSCLE STUDY: CPT | Performed by: NURSE PRACTITIONER

## 2017-11-02 PROCEDURE — 1036F TOBACCO NON-USER: CPT | Performed by: NURSE PRACTITIONER

## 2017-11-02 PROCEDURE — 51784 ANAL/URINARY MUSCLE STUDY: CPT | Performed by: NURSE PRACTITIONER

## 2017-11-02 PROCEDURE — 97032 APPL MODALITY 1+ESTIM EA 15: CPT | Performed by: NURSE PRACTITIONER

## 2017-11-02 PROCEDURE — 3014F SCREEN MAMMO DOC REV: CPT | Performed by: NURSE PRACTITIONER

## 2017-11-02 NOTE — PATIENT INSTRUCTIONS
Perform PFR home exercises 4 times per day in different positions (lying, sitting, standing). Do this every day except for the day of in office PFR. Discussed risks (including worsening symptoms, infection, pain) and benefits of pelvic floor rehab to help with EVE.

## 2017-11-02 NOTE — PROGRESS NOTES
PELVIC FLOOR REHAB FOLLOW UP    At last visit (PFR # 6, 2 weeks ago): Was able to hold for 7-8 seconds at moderate amplitude wavy, but less than previous session. Fatigue after 8 repetitions.      Home exercise regimen prescribed:   Repetitions - 8   Contract - 10 sec   Relax - 10 sec   + 10 Quick Flicks  Frequency- 4 times daily     Stimulated with 46 average mA for 15 minutes. Symptoms  Daytime voiding frequency: q 2 hrs, decreased  Nighttime voiding frequency: 3 times per night, unchanged  Urgency: mild, decreased    Incontinence episodes per day: triggers, Causes: coughing, sneezing, decreased  Number of pads used per day: 4-5x/day, decreased, less saturated unless coughing or sneezing    Pelvic pain: unchanged, none    Bowel habits: unchanged, daily, soft  Fecal incontinence: unchanged,none    OVERALL IMPROVEMENT SINCE INITIAL SESSION:   moderate in degree    Compliance:  Has pt completed exercises as instructed: Yes    Changes Since Initial Visit  Intake of spicy/citrus/tomato based foods: unchanged  Caffeine intake per day: unchanged, 1/2 cup/day  Fluid intake per day: unchanged, 80 oz  Alcohol intake: unchanged, none  Smoking: unchanged, none    Was able to hold for 6-8 seconds at moderate-strong amplitude, less wavy. Fatigue after 8 repetitions. Home exercise regimen prescribed:   Repetitions - 8   Contract - 10 sec   Relax - 10 sec   + 12 Quick Flicks  Frequency- 4 times daily    Stimulated with 38 average mA for 15 minutes. Perform PFR home exercises 4 times per day in different positions (lying, sitting, standing). Instructed to this every day except for the day of in office PFR. Discussed risks (including worsening symptoms, infection, pain) and benefits of pelvic floor rehab to help with EVE. Sessions typically last one hour each week. Spent 10 mins total time face-to-face with patient, >50% of time spent in counseling discussing manometry results & home exercise plan.  All questions answered/concerns addressed. Return in 2 week (s).

## 2017-11-06 DIAGNOSIS — Z90.722 S/P TAH-BSO: ICD-10-CM

## 2017-11-06 DIAGNOSIS — Z90.710 S/P TAH-BSO: ICD-10-CM

## 2017-11-06 DIAGNOSIS — Z90.79 S/P TAH-BSO: ICD-10-CM

## 2017-11-06 RX ORDER — CONJUGATED ESTROGENS 0.62 MG/1
TABLET, FILM COATED ORAL
Qty: 30 TABLET | Refills: 3 | Status: SHIPPED | OUTPATIENT
Start: 2017-11-06 | End: 2017-11-07 | Stop reason: SDUPTHER

## 2017-11-09 ENCOUNTER — TELEPHONE (OUTPATIENT)
Dept: PRIMARY CARE CLINIC | Age: 52
End: 2017-11-09

## 2017-11-09 ENCOUNTER — HOSPITAL ENCOUNTER (OUTPATIENT)
Age: 52
Discharge: HOME | End: 2017-11-09
Payer: MEDICARE

## 2017-11-09 DIAGNOSIS — R73.09 ELEVATED GLUCOSE: Primary | ICD-10-CM

## 2017-11-09 DIAGNOSIS — R53.83 FATIGUE, UNSPECIFIED TYPE: ICD-10-CM

## 2017-11-09 DIAGNOSIS — I10 ESSENTIAL HYPERTENSION: Chronic | ICD-10-CM

## 2017-11-09 DIAGNOSIS — N18.30 CHRONIC KIDNEY DISEASE, STAGE III (MODERATE) (HCC): Chronic | ICD-10-CM

## 2017-11-09 DIAGNOSIS — E78.1 HYPERTRIGLYCERIDEMIA: ICD-10-CM

## 2017-11-09 LAB
-: NORMAL
ABSOLUTE EOS #: 0.2 K/UL (ref 0–0.4)
ABSOLUTE IMMATURE GRANULOCYTE: ABNORMAL K/UL (ref 0–0.3)
ABSOLUTE LYMPH #: 0.5 K/UL (ref 1–4.8)
ABSOLUTE MONO #: 0.3 K/UL (ref 0–1)
ALBUMIN SERPL-MCNC: 3.7 G/DL (ref 3.5–5.2)
ALBUMIN/GLOBULIN RATIO: 1.1 (ref 1–2.5)
ALP BLD-CCNC: 128 U/L (ref 35–104)
ALT SERPL-CCNC: 7 U/L (ref 5–33)
AMORPHOUS: NORMAL
ANION GAP SERPL CALCULATED.3IONS-SCNC: 14 MMOL/L (ref 9–17)
AST SERPL-CCNC: 13 U/L
BACTERIA: NORMAL
BASOPHILS # BLD: 0 %
BASOPHILS ABSOLUTE: 0 K/UL (ref 0–0.2)
BILIRUB SERPL-MCNC: 0.23 MG/DL (ref 0.3–1.2)
BILIRUBIN URINE: NEGATIVE
BUN BLDV-MCNC: 38 MG/DL (ref 6–20)
BUN/CREAT BLD: 19 (ref 9–20)
CALCIUM SERPL-MCNC: 8.8 MG/DL (ref 8.6–10.4)
CASTS UA: NORMAL /LPF
CHLORIDE BLD-SCNC: 96 MMOL/L (ref 98–107)
CHOLESTEROL/HDL RATIO: 4.2
CHOLESTEROL: 206 MG/DL
CO2: 26 MMOL/L (ref 20–31)
COLOR: YELLOW
COMMENT UA: ABNORMAL
CREAT SERPL-MCNC: 2.05 MG/DL (ref 0.5–0.9)
CREATININE URINE: 108.3 MG/DL (ref 28–217)
CRYSTALS, UA: NORMAL /HPF
DIFFERENTIAL TYPE: ABNORMAL
EOSINOPHILS RELATIVE PERCENT: 4 %
EPITHELIAL CELLS UA: NORMAL /HPF (ref 0–25)
ESTIMATED AVERAGE GLUCOSE: 103 MG/DL
GFR AFRICAN AMERICAN: 31 ML/MIN
GFR NON-AFRICAN AMERICAN: 25 ML/MIN
GFR SERPL CREATININE-BSD FRML MDRD: ABNORMAL ML/MIN/{1.73_M2}
GFR SERPL CREATININE-BSD FRML MDRD: ABNORMAL ML/MIN/{1.73_M2}
GLUCOSE BLD-MCNC: 123 MG/DL (ref 70–99)
GLUCOSE URINE: NEGATIVE
HBA1C MFR BLD: 5.2 % (ref 4.8–5.9)
HCT VFR BLD CALC: 30.1 % (ref 36–46)
HDLC SERPL-MCNC: 49 MG/DL
HEMOGLOBIN: 10.3 G/DL (ref 12–16)
IMMATURE GRANULOCYTES: ABNORMAL %
KETONES, URINE: NEGATIVE
LDL CHOLESTEROL DIRECT: 84 MG/DL
LDL CHOLESTEROL: ABNORMAL MG/DL (ref 0–130)
LEUKOCYTE ESTERASE, URINE: NEGATIVE
LYMPHOCYTES # BLD: 9 %
MCH RBC QN AUTO: 32.7 PG (ref 26–34)
MCHC RBC AUTO-ENTMCNC: 34.1 G/DL (ref 31–37)
MCV RBC AUTO: 95.8 FL (ref 80–100)
MICROALBUMIN/CREAT 24H UR: 861 MG/L
MICROALBUMIN/CREAT UR-RTO: 795 MCG/MG CREAT
MONOCYTES # BLD: 6 %
MUCUS: NORMAL
NITRITE, URINE: NEGATIVE
OTHER OBSERVATIONS UA: NORMAL
PDW BLD-RTO: 14.7 % (ref 12.1–15.2)
PH UA: 5.5 (ref 5–9)
PLATELET # BLD: 296 K/UL (ref 140–450)
PLATELET ESTIMATE: ABNORMAL
PMV BLD AUTO: 7.6 FL (ref 6–12)
POTASSIUM SERPL-SCNC: 4.1 MMOL/L (ref 3.7–5.3)
PROTEIN UA: ABNORMAL
RBC # BLD: 3.14 M/UL (ref 4–5.2)
RBC # BLD: ABNORMAL 10*6/UL
RBC UA: NORMAL /HPF (ref 0–2)
RENAL EPITHELIAL, UA: NORMAL /HPF
SEG NEUTROPHILS: 81 %
SEGMENTED NEUTROPHILS ABSOLUTE COUNT: 4.5 K/UL (ref 1.8–7.7)
SODIUM BLD-SCNC: 136 MMOL/L (ref 135–144)
SPECIFIC GRAVITY UA: 1.02 (ref 1.01–1.02)
TOTAL PROTEIN: 7.2 G/DL (ref 6.4–8.3)
TRICHOMONAS: NORMAL
TRIGL SERPL-MCNC: 460 MG/DL
TURBIDITY: CLEAR
URINE HGB: ABNORMAL
UROBILINOGEN, URINE: NORMAL
VLDLC SERPL CALC-MCNC: ABNORMAL MG/DL (ref 1–30)
WBC # BLD: 5.5 K/UL (ref 3.5–11)
WBC # BLD: ABNORMAL 10*3/UL
WBC UA: NORMAL /HPF (ref 0–5)
YEAST: NORMAL

## 2017-11-09 PROCEDURE — 83721 ASSAY OF BLOOD LIPOPROTEIN: CPT

## 2017-11-09 PROCEDURE — 81001 URINALYSIS AUTO W/SCOPE: CPT

## 2017-11-09 PROCEDURE — 82570 ASSAY OF URINE CREATININE: CPT

## 2017-11-09 PROCEDURE — 83036 HEMOGLOBIN GLYCOSYLATED A1C: CPT

## 2017-11-09 PROCEDURE — 82043 UR ALBUMIN QUANTITATIVE: CPT

## 2017-11-09 PROCEDURE — 80061 LIPID PANEL: CPT

## 2017-11-09 PROCEDURE — 85025 COMPLETE CBC W/AUTO DIFF WBC: CPT

## 2017-11-09 PROCEDURE — 80053 COMPREHEN METABOLIC PANEL: CPT

## 2017-11-09 PROCEDURE — 36415 COLL VENOUS BLD VENIPUNCTURE: CPT

## 2017-11-09 NOTE — TELEPHONE ENCOUNTER
----- Message from Siena Ryder NP sent at 11/9/2017  5:02 PM EST -----  Results of testing normal. Please relate to patient/parent. Thank you.    Anita Marr

## 2017-11-09 NOTE — TELEPHONE ENCOUNTER
Called patient and spoke with  and informed him that pts A1C test was normal. Verbalizes understanding.

## 2017-11-09 NOTE — TELEPHONE ENCOUNTER
Called patient and spoke with  and informed him that pts blood sugar was elevated and that Jude Chahal was ordering an A1C test to see what her blood sugar has been for the last 3 months and that the lab has enough blood left from her blood draw this am. Also informed that creatinine level was 2.05 and that Dr Royer Anderson office notified.

## 2017-11-15 ENCOUNTER — TELEPHONE (OUTPATIENT)
Dept: PRIMARY CARE CLINIC | Age: 52
End: 2017-11-15

## 2017-11-15 ENCOUNTER — OFFICE VISIT (OUTPATIENT)
Dept: PRIMARY CARE CLINIC | Age: 52
End: 2017-11-15
Payer: MEDICARE

## 2017-11-15 VITALS
SYSTOLIC BLOOD PRESSURE: 108 MMHG | RESPIRATION RATE: 20 BRPM | TEMPERATURE: 97.5 F | DIASTOLIC BLOOD PRESSURE: 73 MMHG | WEIGHT: 270.6 LBS | HEART RATE: 90 BPM | BODY MASS INDEX: 49.49 KG/M2

## 2017-11-15 DIAGNOSIS — E78.5 HYPERLIPIDEMIA, UNSPECIFIED HYPERLIPIDEMIA TYPE: Primary | ICD-10-CM

## 2017-11-15 DIAGNOSIS — F41.8 DEPRESSION WITH ANXIETY: Chronic | ICD-10-CM

## 2017-11-15 DIAGNOSIS — N18.4 CKD (CHRONIC KIDNEY DISEASE) STAGE 4, GFR 15-29 ML/MIN (HCC): Chronic | ICD-10-CM

## 2017-11-15 DIAGNOSIS — E78.1 HYPERTRIGLYCERIDEMIA: ICD-10-CM

## 2017-11-15 DIAGNOSIS — Z23 NEED FOR INFLUENZA VACCINATION: ICD-10-CM

## 2017-11-15 DIAGNOSIS — I10 ESSENTIAL HYPERTENSION: Chronic | ICD-10-CM

## 2017-11-15 DIAGNOSIS — Z23 NEED FOR SHINGLES VACCINE: ICD-10-CM

## 2017-11-15 PROCEDURE — 90471 IMMUNIZATION ADMIN: CPT | Performed by: NURSE PRACTITIONER

## 2017-11-15 PROCEDURE — 1036F TOBACCO NON-USER: CPT | Performed by: NURSE PRACTITIONER

## 2017-11-15 PROCEDURE — G8417 CALC BMI ABV UP PARAM F/U: HCPCS | Performed by: NURSE PRACTITIONER

## 2017-11-15 PROCEDURE — G8484 FLU IMMUNIZE NO ADMIN: HCPCS | Performed by: NURSE PRACTITIONER

## 2017-11-15 PROCEDURE — G8427 DOCREV CUR MEDS BY ELIG CLIN: HCPCS | Performed by: NURSE PRACTITIONER

## 2017-11-15 PROCEDURE — 99213 OFFICE O/P EST LOW 20 MIN: CPT | Performed by: NURSE PRACTITIONER

## 2017-11-15 PROCEDURE — 90686 IIV4 VACC NO PRSV 0.5 ML IM: CPT | Performed by: NURSE PRACTITIONER

## 2017-11-15 PROCEDURE — 3017F COLORECTAL CA SCREEN DOC REV: CPT | Performed by: NURSE PRACTITIONER

## 2017-11-15 PROCEDURE — 3014F SCREEN MAMMO DOC REV: CPT | Performed by: NURSE PRACTITIONER

## 2017-11-15 RX ORDER — ATORVASTATIN CALCIUM 80 MG/1
80 TABLET, FILM COATED ORAL DAILY
Qty: 30 TABLET | Refills: 3 | Status: SHIPPED | OUTPATIENT
Start: 2017-11-15 | End: 2018-02-15 | Stop reason: SDUPTHER

## 2017-11-15 ASSESSMENT — ENCOUNTER SYMPTOMS: BLURRED VISION: 0

## 2017-11-15 NOTE — PROGRESS NOTES
After obtaining consent, and per orders of Karis Wilburn CNP, injection of Influenza vaccine given IM in Right deltoid by Alexis Viv. Patient instructed to remain in clinic for 20 minutes afterwards, and to report any adverse reaction to me immediately.

## 2017-11-15 NOTE — PROGRESS NOTES
Name: Saba Stokes  : 1965         Chief Complaint:     Chief Complaint   Patient presents with    Hypertension       History of Present Illness:      Saba Stokes is a 46 y.o.  female who presents with Hypertension    HYPERTENSION visit     BP Readings from Last 3 Encounters:   17 120/80   10/25/17 135/83   10/06/17 116/82       LDL Cholesterol (mg/dL)   Date Value   2017          HDL (mg/dL)   Date Value   2017 49     BUN (mg/dL)   Date Value   2017 38 (H)     CREATININE (mg/dL)   Date Value   2017 2.05 (H)     Glucose (mg/dL)   Date Value   2017 123 (H)              Have you changed or started any medications since your last visit including any over-the-counter medicines, vitamins, or herbal medicines? yes - Ultram   Have you stopped taking any of your medications? Is so, why? -  no  Are you having any side effects from any of your medications? - no    Have you seen any other physician or provider since your last visit? yes -  Arthritis and kidney and blood doctor  Have you had any other diagnostic tests since your last visit? yes -    Have you been seen in the emergency room and/or had an admission in a hospital since we last saw you?  no   Have you had your routine dental cleaning in the past 6 months?  yes -      Do you have an active MyChart account? If no, what is the barrier?   No: declined    Patient Care Team:  Mic Lee NP as PCP - Daphney Burleson MD as Consulting Physician (Nephrology)  Corey Kearns DO as Physician (Rheumatology)    Medical History Review  Past Medical, Family, and Social History reviewed and does contribute to the patient presenting condition    Health Maintenance   Topic Date Due    HIV screen  1980    DTaP/Tdap/Td vaccine (1 - Tdap) 1984    Flu vaccine (1) 2017    Breast cancer screen  10/05/2019    Diabetes screen  2020    Pneumococcal highest risk (3 of 3 - PPSV23) 05/10/2022    Lipid screen  11/09/2022    Colon cancer screen colonoscopy  05/02/2026    Hepatitis C screen  Completed       Patient sees nephrology for chronic kidney failure. Patient sees rheumatology for murmur joint arthritis. Patient sees urology. Patient sees hematology for anemia. Patient sees urology. Hypertension   This is a chronic problem. The current episode started more than 1 year ago. The problem has been gradually improving since onset. The problem is controlled. Pertinent negatives include no blurred vision, chest pain or palpitations. (States continues to have headaches but new medication is helping. ) Risk factors for coronary artery disease include obesity, post-menopausal state, dyslipidemia and sedentary lifestyle. Hypertensive end-organ damage includes kidney disease. Identifiable causes of hypertension include chronic renal disease. Past Medical History:     Past Medical History:   Diagnosis Date    JESS [facility]acute kidney [facility]) ([facility] [address]     Anemia     Arthritis     Chronic kidney disease     Depression with anxiety 9/8/2016    GERD (gastroesophageal reflux disease)     H/O echocardiogram 08/25/2016    EF 55%. Mild mitral regurgitation.  H/O tooth extraction 07/26/2017    Lower posterior right tooth.  History of blood transfusion     x3. Last one in May 2016    Hyperlipidemia     Hypertensive crisis 8/24/2016    Kidney stones     Vasculitis [facility])       Reviewed all health maintenance requirements and ordered appropriate tests  Health Maintenance Due   Topic Date Due    HIV screen  09/05/1980    DTaP/Tdap/Td vaccine (1 - Tdap) 09/05/1984    Flu vaccine (1) 09/01/2017       Past Surgical History:     Past Surgical History:   Procedure Laterality Date    BONE MARROW BIOPSY  5-23-16    Per Hematologist order @ [address] [place] [address].  CHOLECYSTECTOMY      COLONOSCOPY  2016    HYSTERECTOMY  11/09/2016    with tubes and ovaries    TOOTH EXTRACTION  07/26/2017    Right posterior lower tooth. distress  Breath sounds are clear to auscultation over posterior bilateral fields. Chest expansion is symmetrical with non-restricted air movement. Abdominal: Soft. Normal appearance and bowel sounds are normal. She exhibits no distension, no abdominal bruit, no pulsatile midline mass and no mass. There is no hepatosplenomegaly. There is tenderness. There is no rigidity, no rebound, no guarding, no tenderness at McBurney's point and negative Wen's sign. Mild abdominal tenderness with deep palpation  No discoloration to abdomen, no rebound, no rigidity no guarding negative McBurney's point negative McMurphy sign   Musculoskeletal: Normal range of motion. She exhibits no edema or tenderness. Lymphadenopathy:     She has no cervical adenopathy. Neurological: She is alert and oriented to person, place, and time. No cranial nerve deficit. She exhibits normal muscle tone. Coordination and gait normal.   Skin: Skin is warm and dry. No rash noted. She is not diaphoretic. No erythema. No pallor. Psychiatric: She has a normal mood and affect. Her speech is normal and behavior is normal. Judgment and thought content normal.   Nursing note and vitals reviewed. Data:     1. Hyperlipidemia, unspecified hyperlipidemia type  Lipid Panel   2. Need for influenza vaccination  INFLUENZA, QUADV, 3 YRS AND OLDER, IM, PF, PREFILL SYR OR SDV, 0.5ML (FLUZONE QUADV, PF)   3. Need for shingles vaccine  zoster vaccine live, PF, (ZOSTAVAX) 20672 UNT/0.65ML injection   4. Essential hypertension  Comprehensive Metabolic Panel    CBC Auto Differential    Urinalysis   5. CKD (chronic kidney disease) stage 4, GFR 15-29 ml/min ([facility])     6. Hypertriglyceridemia  atorvastatin (LIPITOR) 80 MG tablet   7.  Depression with anxiety  DULoxetine (CYMBALTA) 30 MG extended release capsule       Lab Results   Component Value Date     11/09/2017    K 4.1 11/09/2017    CL 96 11/09/2017    CO2 26 11/09/2017    BUN 38 11/09/2017 CREATININE 2.05 11/09/2017    GLUCOSE 123 11/09/2017    PROT 7.2 11/09/2017    LABALBU 3.7 11/09/2017    BILITOT 0.23 11/09/2017    ALKPHOS 128 11/09/2017    AST 13 11/09/2017    ALT 7 11/09/2017     Lab Results   Component Value Date    WBC 5.5 11/09/2017    RBC 3.14 11/09/2017    HGB 10.3 11/09/2017    HCT 30.1 11/09/2017    MCV 95.8 11/09/2017    MCH 32.7 11/09/2017    MCHC 34.1 11/09/2017    RDW 14.7 11/09/2017     11/09/2017    MPV 7.6 11/09/2017     Lab Results   Component Value Date    TSH 1.23 08/03/2017     Lab Results   Component Value Date    CHOL 206 11/09/2017    HDL 49 11/09/2017    LABA1C 5.2 11/09/2017       Assessment/Plan:     Triglycerides show improvement from last lipid panel. Office staff will notify nephrology today kidney function and labs. Nephrology office notified of [employer] [facility] most recent labs. Continue follow-up with nephrology as indicated. Kidney follow-up with urology, rheumatoid allergy and hematology as previously indicated. Keep upcoming appointment with urology. Informed patient to always take pain medication with food. Caregiver/patient informed today if any severe or worsening of symptoms, spikes in fever, difficulty swallowing, respiratory distress, increased abdominal pain, fever, nausea vomiting or any signs of dehydration to go to ED. Caregiver/patient verbalizes understanding. Discussed at length with Darren Rubin lifestyle modifications with diet and exercise including weight loss. Discussed with Darren Rubin the importance of cholesterol control. Encouraged to contact nutrition services as previously ordered. No diagnosis found. 1.  Darren Rubin received counseling on the following healthy behaviors: nutrition, exercise and medication adherence  2. Patient given educational materials - see patient instructions  3. Was a self-tracking handout given in paper form or via [employer]ble[employer]? No  If yes, see orders or list here.   4.  Discussed use, benefit, and side

## 2017-11-16 ENCOUNTER — PROCEDURE VISIT (OUTPATIENT)
Dept: UROLOGY | Age: 52
End: 2017-11-16
Payer: MEDICARE

## 2017-11-16 VITALS
BODY MASS INDEX: 49.69 KG/M2 | SYSTOLIC BLOOD PRESSURE: 126 MMHG | DIASTOLIC BLOOD PRESSURE: 84 MMHG | HEIGHT: 62 IN | WEIGHT: 270 LBS

## 2017-11-16 DIAGNOSIS — N39.46 MIXED INCONTINENCE: Primary | ICD-10-CM

## 2017-11-16 DIAGNOSIS — M62.81 MUSCLE WEAKNESS: ICD-10-CM

## 2017-11-16 PROCEDURE — G8427 DOCREV CUR MEDS BY ELIG CLIN: HCPCS | Performed by: NURSE PRACTITIONER

## 2017-11-16 PROCEDURE — 97032 APPL MODALITY 1+ESTIM EA 15: CPT | Performed by: NURSE PRACTITIONER

## 2017-11-16 PROCEDURE — 99212 OFFICE O/P EST SF 10 MIN: CPT | Performed by: NURSE PRACTITIONER

## 2017-11-16 PROCEDURE — 3014F SCREEN MAMMO DOC REV: CPT | Performed by: NURSE PRACTITIONER

## 2017-11-16 PROCEDURE — 1036F TOBACCO NON-USER: CPT | Performed by: NURSE PRACTITIONER

## 2017-11-16 PROCEDURE — 51784 ANAL/URINARY MUSCLE STUDY: CPT | Performed by: NURSE PRACTITIONER

## 2017-11-16 PROCEDURE — 97750 PHYSICAL PERFORMANCE TEST: CPT | Performed by: NURSE PRACTITIONER

## 2017-11-16 PROCEDURE — 91122 PR ANAL/URINARY MUSCLE STUDY: CPT | Performed by: NURSE PRACTITIONER

## 2017-11-16 PROCEDURE — G8484 FLU IMMUNIZE NO ADMIN: HCPCS | Performed by: NURSE PRACTITIONER

## 2017-11-16 PROCEDURE — G8417 CALC BMI ABV UP PARAM F/U: HCPCS | Performed by: NURSE PRACTITIONER

## 2017-11-16 PROCEDURE — 3017F COLORECTAL CA SCREEN DOC REV: CPT | Performed by: NURSE PRACTITIONER

## 2017-11-16 RX ORDER — CETIRIZINE HYDROCHLORIDE 5 MG/1
5 TABLET ORAL DAILY
Qty: 30 TABLET | Refills: 3 | Status: SHIPPED | OUTPATIENT
Start: 2017-11-16 | End: 2018-02-15 | Stop reason: SDUPTHER

## 2017-11-16 RX ORDER — DULOXETIN HYDROCHLORIDE 30 MG/1
30 CAPSULE, DELAYED RELEASE ORAL DAILY
Qty: 30 CAPSULE | Refills: 3 | Status: SHIPPED | OUTPATIENT
Start: 2017-11-16 | End: 2018-02-15 | Stop reason: SDUPTHER

## 2017-11-16 ASSESSMENT — ENCOUNTER SYMPTOMS
EYES NEGATIVE: 1
NAUSEA: 1
TROUBLE SWALLOWING: 0
CONSTIPATION: 0
RESPIRATORY NEGATIVE: 1
BLOOD IN STOOL: 0
DIARRHEA: 0
VOMITING: 0
ANAL BLEEDING: 0
ABDOMINAL DISTENTION: 0
ABDOMINAL PAIN: 0

## 2017-11-16 NOTE — PATIENT INSTRUCTIONS
Perform pelvic floor (PFR) home exercises 4 times per day in different positions (lying, sitting, standing). Do this every day except for the day of in office PFR. Risks include worsening symptoms, infection, pain. Benefits of pelvic floor rehab will help with stress urinary incontinence. Urinary leakage when you cough, sneeze, laugh, lift, bend over, exercise, etc...

## 2017-11-22 ENCOUNTER — OFFICE VISIT (OUTPATIENT)
Dept: PRIMARY CARE CLINIC | Age: 52
End: 2017-11-22
Payer: MEDICARE

## 2017-11-22 VITALS
RESPIRATION RATE: 18 BRPM | WEIGHT: 268.5 LBS | HEIGHT: 62 IN | BODY MASS INDEX: 49.41 KG/M2 | SYSTOLIC BLOOD PRESSURE: 135 MMHG | TEMPERATURE: 96.8 F | DIASTOLIC BLOOD PRESSURE: 82 MMHG | HEART RATE: 96 BPM

## 2017-11-22 DIAGNOSIS — J01.00 ACUTE NON-RECURRENT MAXILLARY SINUSITIS: Primary | ICD-10-CM

## 2017-11-22 DIAGNOSIS — J20.9 ACUTE BRONCHITIS, UNSPECIFIED ORGANISM: ICD-10-CM

## 2017-11-22 PROCEDURE — 99213 OFFICE O/P EST LOW 20 MIN: CPT | Performed by: NURSE PRACTITIONER

## 2017-11-22 PROCEDURE — G8484 FLU IMMUNIZE NO ADMIN: HCPCS | Performed by: NURSE PRACTITIONER

## 2017-11-22 PROCEDURE — G8417 CALC BMI ABV UP PARAM F/U: HCPCS | Performed by: NURSE PRACTITIONER

## 2017-11-22 PROCEDURE — 3017F COLORECTAL CA SCREEN DOC REV: CPT | Performed by: NURSE PRACTITIONER

## 2017-11-22 PROCEDURE — G8427 DOCREV CUR MEDS BY ELIG CLIN: HCPCS | Performed by: NURSE PRACTITIONER

## 2017-11-22 PROCEDURE — 3014F SCREEN MAMMO DOC REV: CPT | Performed by: NURSE PRACTITIONER

## 2017-11-22 PROCEDURE — 1036F TOBACCO NON-USER: CPT | Performed by: NURSE PRACTITIONER

## 2017-11-22 RX ORDER — BENZONATATE 100 MG/1
100 CAPSULE ORAL 3 TIMES DAILY PRN
Qty: 20 CAPSULE | Refills: 0 | Status: SHIPPED | OUTPATIENT
Start: 2017-11-22 | End: 2017-11-29

## 2017-11-22 RX ORDER — AMOXICILLIN AND CLAVULANATE POTASSIUM 250; 125 MG/1; MG/1
1 TABLET, FILM COATED ORAL 2 TIMES DAILY
Qty: 20 TABLET | Refills: 0 | Status: SHIPPED | OUTPATIENT
Start: 2017-11-22 | End: 2018-09-06 | Stop reason: SDUPTHER

## 2017-11-22 ASSESSMENT — ENCOUNTER SYMPTOMS
SHORTNESS OF BREATH: 0
WHEEZING: 0
COUGH: 1
RHINORRHEA: 1
SORE THROAT: 1

## 2017-11-22 NOTE — PATIENT INSTRUCTIONS
hot, wet towel or a warm gel pack on your face 3 or 4 times a day for 5 to 10 minutes each time. · Try a decongestant nasal spray like oxymetazoline (Afrin). Do not use it for more than 3 days in a row. Using it for more than 3 days can make your congestion worse. When should you call for help? Call your doctor now or seek immediate medical care if:  · You have new or worse swelling or redness in your face or around your eyes. · You have a new or higher fever. Watch closely for changes in your health, and be sure to contact your doctor if:  · You have new or worse facial pain. · The mucus from your nose becomes thicker (like pus) or has new blood in it. · You are not getting better as expected. Where can you learn more? Go to [email or web address] and sign in to your [employer] account. Enter I950 in the 3yy game platform box to learn more about \"Sinusitis: Care Instructions. \"     If you do not have an account, please click on the \"Sign Up Now\" link. Current as of: July 29, 2016  Content Version: 11.3  © 5644-2578 [employer]. Care instructions adapted under license by [facility] ([facility]). If you have questions about a medical condition or this instruction, always ask your healthcare professional. [address]  any warranty or liability for your use of this information.

## 2017-11-27 RX ORDER — POTASSIUM CHLORIDE 1500 MG/1
TABLET, EXTENDED RELEASE ORAL
Qty: 30 TABLET | Refills: 0 | Status: SHIPPED | OUTPATIENT
Start: 2017-11-27 | End: 2017-12-26 | Stop reason: SDUPTHER

## 2017-12-12 ENCOUNTER — OFFICE VISIT (OUTPATIENT)
Dept: PRIMARY CARE CLINIC | Age: 52
End: 2017-12-12
Payer: MEDICARE

## 2017-12-12 VITALS
RESPIRATION RATE: 20 BRPM | TEMPERATURE: 97.4 F | DIASTOLIC BLOOD PRESSURE: 77 MMHG | SYSTOLIC BLOOD PRESSURE: 133 MMHG | WEIGHT: 267.2 LBS | BODY MASS INDEX: 48.87 KG/M2 | HEART RATE: 91 BPM

## 2017-12-12 DIAGNOSIS — J01.01 ACUTE RECURRENT MAXILLARY SINUSITIS: Primary | ICD-10-CM

## 2017-12-12 DIAGNOSIS — J40 BRONCHITIS: ICD-10-CM

## 2017-12-12 PROCEDURE — 1036F TOBACCO NON-USER: CPT | Performed by: NURSE PRACTITIONER

## 2017-12-12 PROCEDURE — G8417 CALC BMI ABV UP PARAM F/U: HCPCS | Performed by: NURSE PRACTITIONER

## 2017-12-12 PROCEDURE — 99213 OFFICE O/P EST LOW 20 MIN: CPT | Performed by: NURSE PRACTITIONER

## 2017-12-12 PROCEDURE — G8484 FLU IMMUNIZE NO ADMIN: HCPCS | Performed by: NURSE PRACTITIONER

## 2017-12-12 PROCEDURE — 3014F SCREEN MAMMO DOC REV: CPT | Performed by: NURSE PRACTITIONER

## 2017-12-12 PROCEDURE — 3017F COLORECTAL CA SCREEN DOC REV: CPT | Performed by: NURSE PRACTITIONER

## 2017-12-12 PROCEDURE — G8427 DOCREV CUR MEDS BY ELIG CLIN: HCPCS | Performed by: NURSE PRACTITIONER

## 2017-12-12 RX ORDER — FLUTICASONE PROPIONATE 50 MCG
1 SPRAY, SUSPENSION (ML) NASAL DAILY
Qty: 1 BOTTLE | Refills: 3 | Status: SHIPPED | OUTPATIENT
Start: 2017-12-12 | End: 2018-02-15 | Stop reason: SDUPTHER

## 2017-12-12 RX ORDER — BENZONATATE 100 MG/1
100 CAPSULE ORAL 3 TIMES DAILY PRN
Qty: 21 CAPSULE | Refills: 0 | Status: SHIPPED | OUTPATIENT
Start: 2017-12-12 | End: 2017-12-19

## 2017-12-12 RX ORDER — LEVOFLOXACIN 250 MG/1
250 TABLET ORAL DAILY
Qty: 10 TABLET | Refills: 0 | Status: SHIPPED | OUTPATIENT
Start: 2017-12-12 | End: 2017-12-22

## 2017-12-12 ASSESSMENT — ENCOUNTER SYMPTOMS
SINUS PAIN: 1
GASTROINTESTINAL NEGATIVE: 1
RHINORRHEA: 1
WHEEZING: 0
SINUS PRESSURE: 1
TROUBLE SWALLOWING: 0
CHEST TIGHTNESS: 0
SHORTNESS OF BREATH: 0
EYES NEGATIVE: 1
SORE THROAT: 1
COUGH: 1

## 2017-12-12 NOTE — PROGRESS NOTES
Prior to Admission medications    Medication Sig Start Date End Date Taking? Authorizing Provider   KLOR-CON M20 20 MEQ extended release tablet TAKE ONE TABLET BY MOUTH ONCE DAILY 11/27/17  Yes Nino Powers MD   DULoxetine (CYMBALTA) 30 MG extended release capsule Take 1 capsule by mouth daily 11/16/17  Yes Felicia Dunlap NP   cetirizine (ZYRTEC) 5 MG tablet Take 1 tablet by mouth daily 11/16/17  Yes Felicia Dunlap NP   atorvastatin (LIPITOR) 80 MG tablet Take 1 tablet by mouth daily 11/15/17  Yes Felicia Dunlap NP   estrogens, conjugated, (PREMARIN) 0.625 MG tablet Take 1 tablet by mouth daily 11/7/17 2/5/18 Yes Noreen Steward CNM   butalbital-acetaminophen-caffeine (FIORICET, ESGIC) -40 MG per tablet Take 1 tablet by mouth every 6 hours as needed for Headaches   Yes Historical Provider, MD   pantoprazole (PROTONIX) 40 MG tablet Take 1 tablet by mouth every morning (before breakfast) 10/6/17  Yes Yamila Henry MD   carvedilol (COREG) 12.5 MG tablet Take 1 tablet by mouth 2 times daily 10/6/17  Yes Yamila Henry MD   traMADol (ULTRAM) 50 MG tablet 50 mg every 6 hours as needed  9/27/17  Yes Historical Provider, MD   azaTHIOprine (IMURAN) 50 MG tablet TAKE THREE TABLETS BY MOUTH ONCE DAILY 9/26/17  Yes Yamlia Henry MD   hydrochlorothiazide (HYDRODIURIL) 25 MG tablet Take 1 tablet by mouth daily 9/18/17  Yes Yamila Henry MD   Blood Pressure KIT 1 Units by Does not apply route daily 8/29/17  Yes Felicia Dunlap NP   solifenacin (VESICARE) 5 MG tablet Take 1 tablet by mouth daily 8/24/17 8/24/18 Yes ARISTIDES Lu   fluticasone (FLONASE) 50 MCG/ACT nasal spray 1 spray by Nasal route daily 8/10/17  Yes Felicia Dunlap NP   losartan (COZAAR) 25 MG tablet Take 1 tablet by mouth 2 times daily 8/7/17  Yes Yamila Henry MD   vitamin D (ERGOCALCIFEROL) 71889 UNITS CAPS capsule Take 1 capsule by mouth once a week for three months then take 1 Capsule PO once a month for six months.   Patient Negative. Negative for visual disturbance. Respiratory: Positive for cough. Negative for chest tightness, shortness of breath and wheezing. Cardiovascular: Negative. Negative for chest pain. Gastrointestinal: Negative. Genitourinary: Negative. Skin: Negative. Negative for rash. Neurological: Negative. Negative for headaches. Physical Exam:   Vitals:  [facility] 09/09/2016     Physical Exam   Constitutional: She is oriented to person, place, and time. Vital signs are normal. She appears well-developed and well-nourished. Non-toxic appearance. She appears ill. No distress. Appears well hydrated and non toxic. Sitting upright on exam table without distress. Respirations are regular, non labored and quiet. HENT:   Head: Normocephalic and atraumatic. Right Ear: External ear and ear canal normal. No drainage or swelling. No mastoid tenderness. Tympanic membrane is not injected, not perforated, not erythematous and not bulging. A middle ear effusion is present. Left Ear: Tympanic membrane, external ear and ear canal normal.   Nose: Mucosal edema present. No rhinorrhea. Right sinus exhibits maxillary sinus tenderness. Right sinus exhibits no frontal sinus tenderness. Left sinus exhibits maxillary sinus tenderness. Left sinus exhibits no frontal sinus tenderness. Mouth/Throat: Uvula is midline and mucous membranes are normal. Posterior oropharyngeal erythema present. No oropharyngeal exudate, posterior oropharyngeal edema or tonsillar abscesses. Eyes: Conjunctivae and EOM are normal. Pupils are equal, round, and reactive to light. Right eye exhibits no discharge. Left eye exhibits no discharge. Neck: Normal range of motion. Neck supple. No tracheal deviation present. No thyromegaly present. Cardiovascular: Normal rate, regular rhythm, normal heart sounds and intact distal pulses. Exam reveals no gallop and no friction rub. No murmur heard.   Pulses:       Radial pulses are 2+ on the 2017       Assessment/Plan:   [employer] appears to have a sinus infection (usually, multiorganismal) which would benefit from a broad-spectrum antibiotic. Creatinine clearance 27.49. Caregiver/patient informed today if any severe or worsening of symptoms, spikes in fever, difficulty swallowing, respiratory distress to go to ED. Caregiver/patient verbalizes understanding. No diagnosis found. 1.  [employer] received counseling on the following healthy behaviors: nutrition, exercise and medication adherence  2. Patient given educational materials - see patient instructions  3. Was a self-tracking handout given in paper form or via codetagt? No  If yes, see orders or list here. 4.  Discussed use, benefit, and side effects of prescribed medications. Barriers to medication compliance addressed. All patient questions answered. Pt voiced understanding. 5.  Reviewed prior labs and health maintenance  6. Continue current medications, diet and exercise. Completed Refills   Requested Prescriptions      No prescriptions requested or ordered in this encounter     No orders of the defined types were placed in this encounter. Orders Placed This Encounter   Medications    levofloxacin (LEVAQUIN) 250 MG tablet     Sig: Take 1 tablet by mouth daily for 10 days     Dispense:  10 tablet     Refill:  0    fluticasone (FLONASE) 50 MCG/ACT nasal spray     Si spray by Nasal route daily     Dispense:  1 Bottle     Refill:  3    benzonatate (TESSALON PERLES) 100 MG capsule     Sig: Take 1 capsule by mouth 3 times daily as needed for Cough     Dispense:  21 capsule     Refill:  0     She is asked to follow-up if symptoms persist or worsen. No Follow-up on file.

## 2017-12-26 ENCOUNTER — TELEPHONE (OUTPATIENT)
Dept: OBGYN | Age: 52
End: 2017-12-26

## 2017-12-26 RX ORDER — POTASSIUM CHLORIDE 20 MEQ/1
TABLET, EXTENDED RELEASE ORAL
Qty: 30 TABLET | Refills: 0 | Status: SHIPPED | OUTPATIENT
Start: 2017-12-26 | End: 2017-12-27

## 2017-12-26 NOTE — TELEPHONE ENCOUNTER
Patients spouse Panchito Hunt called and left message asking for refill of his wife's Klor con 20. Patient has appointment  01-, ok to refill? David Thacker.

## 2017-12-27 RX ORDER — POTASSIUM CHLORIDE 1500 MG/1
TABLET, EXTENDED RELEASE ORAL
Qty: 30 TABLET | Refills: 0 | Status: SHIPPED | OUTPATIENT
Start: 2017-12-27 | End: 2018-02-22 | Stop reason: SDUPTHER

## 2018-01-23 ENCOUNTER — HOSPITAL ENCOUNTER (OUTPATIENT)
Age: 53
Discharge: HOME | End: 2018-01-23
Payer: MEDICARE

## 2018-01-23 DIAGNOSIS — N18.4 CKD (CHRONIC KIDNEY DISEASE) STAGE 4, GFR 15-29 ML/MIN (HCC): Chronic | ICD-10-CM

## 2018-01-23 LAB
ABSOLUTE EOS #: 0.18 K/UL (ref 0–0.4)
ABSOLUTE IMMATURE GRANULOCYTE: ABNORMAL K/UL (ref 0–0.3)
ABSOLUTE LYMPH #: 0.36 K/UL (ref 1–4.8)
ABSOLUTE MONO #: 0.24 K/UL (ref 0–1)
ANION GAP SERPL CALCULATED.3IONS-SCNC: 12 MMOL/L (ref 9–17)
BASOPHILS # BLD: 0 % (ref 0–2)
BASOPHILS ABSOLUTE: 0 K/UL (ref 0–0.2)
BUN BLDV-MCNC: 34 MG/DL (ref 6–20)
BUN/CREAT BLD: 12 (ref 9–20)
CALCIUM IONIZED: 1.23 MMOL/L (ref 1.13–1.33)
CALCIUM SERPL-MCNC: 8.7 MG/DL (ref 8.6–10.4)
CHLORIDE BLD-SCNC: 103 MMOL/L (ref 98–107)
CO2: 27 MMOL/L (ref 20–31)
CREAT SERPL-MCNC: 2.81 MG/DL (ref 0.5–0.9)
CREATININE URINE: 126.8 MG/DL (ref 28–217)
DIFFERENTIAL TYPE: ABNORMAL
EOSINOPHILS RELATIVE PERCENT: 3 % (ref 0–8)
GFR AFRICAN AMERICAN: 21 ML/MIN
GFR NON-AFRICAN AMERICAN: 18 ML/MIN
GFR SERPL CREATININE-BSD FRML MDRD: ABNORMAL ML/MIN/{1.73_M2}
GFR SERPL CREATININE-BSD FRML MDRD: ABNORMAL ML/MIN/{1.73_M2}
GLUCOSE BLD-MCNC: 132 MG/DL (ref 70–99)
HCT VFR BLD CALC: 28.8 % (ref 36–46)
HEMOGLOBIN: 9.8 G/DL (ref 12–16)
IMMATURE GRANULOCYTES: ABNORMAL %
LYMPHOCYTES # BLD: 6 % (ref 24–44)
MCH RBC QN AUTO: 32.7 PG (ref 26–34)
MCHC RBC AUTO-ENTMCNC: 34 G/DL (ref 31–37)
MCV RBC AUTO: 96.2 FL (ref 80–100)
MONOCYTES # BLD: 4 % (ref 0–12)
NRBC AUTOMATED: ABNORMAL PER 100 WBC
PDW BLD-RTO: 14.9 % (ref 12.1–15.2)
PHOSPHORUS: 3.1 MG/DL (ref 2.6–4.5)
PLATELET # BLD: 265 K/UL (ref 140–450)
PLATELET ESTIMATE: ABNORMAL
PMV BLD AUTO: 8.2 FL (ref 6–12)
POTASSIUM SERPL-SCNC: 3.8 MMOL/L (ref 3.7–5.3)
PTH INTACT: 123.9 PG/ML (ref 15–65)
RBC # BLD: 2.99 M/UL (ref 4–5.2)
RBC # BLD: ABNORMAL 10*6/UL
SEG NEUTROPHILS: 87 % (ref 36–66)
SEGMENTED NEUTROPHILS ABSOLUTE COUNT: 5.22 K/UL (ref 1.8–7.7)
SODIUM BLD-SCNC: 142 MMOL/L (ref 135–144)
TOTAL PROTEIN, URINE: 108 MG/DL
VITAMIN D 25-HYDROXY: 21.9 NG/ML (ref 30–100)
WBC # BLD: 6 K/UL (ref 3.5–11)
WBC # BLD: ABNORMAL 10*3/UL

## 2018-01-23 PROCEDURE — 84100 ASSAY OF PHOSPHORUS: CPT

## 2018-01-23 PROCEDURE — 85025 COMPLETE CBC W/AUTO DIFF WBC: CPT

## 2018-01-23 PROCEDURE — 82306 VITAMIN D 25 HYDROXY: CPT

## 2018-01-23 PROCEDURE — 82330 ASSAY OF CALCIUM: CPT

## 2018-01-23 PROCEDURE — 83970 ASSAY OF PARATHORMONE: CPT

## 2018-01-23 PROCEDURE — 80048 BASIC METABOLIC PNL TOTAL CA: CPT

## 2018-01-23 PROCEDURE — 84156 ASSAY OF PROTEIN URINE: CPT

## 2018-01-23 PROCEDURE — 82570 ASSAY OF URINE CREATININE: CPT

## 2018-01-23 PROCEDURE — 36415 COLL VENOUS BLD VENIPUNCTURE: CPT

## 2018-01-25 ENCOUNTER — HOSPITAL ENCOUNTER (OUTPATIENT)
Age: 53
Discharge: HOME | End: 2018-01-25
Payer: MEDICARE

## 2018-01-25 DIAGNOSIS — N18.4 CKD (CHRONIC KIDNEY DISEASE) STAGE 4, GFR 15-29 ML/MIN (HCC): Chronic | ICD-10-CM

## 2018-01-25 LAB
-: ABNORMAL
AMORPHOUS: ABNORMAL
ANION GAP SERPL CALCULATED.3IONS-SCNC: 14 MMOL/L (ref 9–17)
BACTERIA: ABNORMAL
BILIRUBIN URINE: NEGATIVE
BUN BLDV-MCNC: 31 MG/DL (ref 6–20)
BUN/CREAT BLD: 17 (ref 9–20)
CALCIUM SERPL-MCNC: 8.7 MG/DL (ref 8.6–10.4)
CASTS UA: ABNORMAL /LPF
CHLORIDE BLD-SCNC: 100 MMOL/L (ref 98–107)
CO2: 26 MMOL/L (ref 20–31)
COLOR: YELLOW
COMMENT UA: ABNORMAL
CREAT SERPL-MCNC: 1.8 MG/DL (ref 0.5–0.9)
CRYSTALS, UA: ABNORMAL /HPF
EPITHELIAL CELLS UA: ABNORMAL /HPF (ref 0–25)
GFR AFRICAN AMERICAN: 36 ML/MIN
GFR NON-AFRICAN AMERICAN: 30 ML/MIN
GFR SERPL CREATININE-BSD FRML MDRD: ABNORMAL ML/MIN/{1.73_M2}
GFR SERPL CREATININE-BSD FRML MDRD: ABNORMAL ML/MIN/{1.73_M2}
GLUCOSE BLD-MCNC: 108 MG/DL (ref 70–99)
GLUCOSE URINE: NEGATIVE
KETONES, URINE: NEGATIVE
LEUKOCYTE ESTERASE, URINE: NEGATIVE
MUCUS: ABNORMAL
NITRITE, URINE: NEGATIVE
OTHER OBSERVATIONS UA: ABNORMAL
PH UA: 6 (ref 5–9)
POTASSIUM SERPL-SCNC: 4.1 MMOL/L (ref 3.7–5.3)
PROTEIN UA: ABNORMAL
RBC UA: ABNORMAL /HPF (ref 0–2)
RENAL EPITHELIAL, UA: ABNORMAL /HPF
SODIUM BLD-SCNC: 140 MMOL/L (ref 135–144)
SPECIFIC GRAVITY UA: 1.02 (ref 1.01–1.02)
TRICHOMONAS: ABNORMAL
TURBIDITY: CLEAR
URINE HGB: ABNORMAL
UROBILINOGEN, URINE: NORMAL
WBC UA: ABNORMAL /HPF (ref 0–5)
YEAST: ABNORMAL

## 2018-01-25 PROCEDURE — 81001 URINALYSIS AUTO W/SCOPE: CPT

## 2018-01-25 PROCEDURE — 80048 BASIC METABOLIC PNL TOTAL CA: CPT

## 2018-01-25 PROCEDURE — 83516 IMMUNOASSAY NONANTIBODY: CPT

## 2018-01-25 PROCEDURE — 36415 COLL VENOUS BLD VENIPUNCTURE: CPT

## 2018-01-25 RX ORDER — MECLIZINE HYDROCHLORIDE 25 MG/1
25 TABLET ORAL 3 TIMES DAILY PRN
Qty: 21 TABLET | Refills: 0 | OUTPATIENT
Start: 2018-01-25 | End: 2018-02-04

## 2018-01-25 NOTE — TELEPHONE ENCOUNTER
Appointments  Date Time Provider Tom Oneil   2/14/2018 9:30 AM Josy Ortega NP Tiff Prim [facility]   3/28/2018 10:20 AM Candelario Welsh MD Tiff Onc [facility]   3/28/2018 11:45 AM Celina Burrell MD Neph Tiff None            Patient Active Problem List:     JESS (acute kidney injury) ([facility]     Vasculitis ([facility]     ANCA-associated vasculitis (HCC)     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)     S/P DOMO-BSO     Endometrial hyperplasia without atypia, simple     Menometrorrhagia     Postural vertigo     Morbid obesity (HCC)     Allergic rhinitis     Colon cancer screening     Iron deficiency anemia     Hyperlipidemia     Gross hematuria     Microhematuria     Mixed incontinence     Urinary frequency     Muscle weakness     MGUS (monoclonal gammopathy of unknown significance)

## 2018-01-26 LAB
ANCA MYELOPEROXIDASE: 32 AU/ML
ANCA PROTEINASE 3: 7 AU/ML

## 2018-02-14 ENCOUNTER — HOSPITAL ENCOUNTER (OUTPATIENT)
Age: 53
Discharge: HOME | End: 2018-02-14
Payer: MEDICARE

## 2018-02-14 DIAGNOSIS — E78.5 HYPERLIPIDEMIA, UNSPECIFIED HYPERLIPIDEMIA TYPE: ICD-10-CM

## 2018-02-14 DIAGNOSIS — I10 ESSENTIAL HYPERTENSION: Chronic | ICD-10-CM

## 2018-02-14 LAB
-: NORMAL
ABSOLUTE EOS #: 0.1 K/UL (ref 0–0.4)
ABSOLUTE IMMATURE GRANULOCYTE: ABNORMAL K/UL (ref 0–0.3)
ABSOLUTE LYMPH #: 0.6 K/UL (ref 1–4.8)
ABSOLUTE MONO #: 0.3 K/UL (ref 0–1)
ALBUMIN SERPL-MCNC: 3.3 G/DL (ref 3.5–5.2)
ALBUMIN/GLOBULIN RATIO: 0.9 (ref 1–2.5)
ALP BLD-CCNC: 147 U/L (ref 35–104)
ALT SERPL-CCNC: 8 U/L (ref 5–33)
AMORPHOUS: NORMAL
ANION GAP SERPL CALCULATED.3IONS-SCNC: 11 MMOL/L (ref 9–17)
AST SERPL-CCNC: 14 U/L
BACTERIA: NORMAL
BASOPHILS # BLD: 0 % (ref 0–2)
BASOPHILS ABSOLUTE: 0 K/UL (ref 0–0.2)
BILIRUB SERPL-MCNC: 0.17 MG/DL (ref 0.3–1.2)
BILIRUBIN URINE: NEGATIVE
BUN BLDV-MCNC: 31 MG/DL (ref 6–20)
BUN/CREAT BLD: 15 (ref 9–20)
CALCIUM SERPL-MCNC: 8.7 MG/DL (ref 8.6–10.4)
CASTS UA: NORMAL /LPF
CHLORIDE BLD-SCNC: 97 MMOL/L (ref 98–107)
CHOLESTEROL/HDL RATIO: 5.2
CHOLESTEROL: 186 MG/DL
CO2: 28 MMOL/L (ref 20–31)
COLOR: YELLOW
COMMENT UA: ABNORMAL
CREAT SERPL-MCNC: 2.09 MG/DL (ref 0.5–0.9)
CRYSTALS, UA: NORMAL /HPF
DIFFERENTIAL TYPE: ABNORMAL
EOSINOPHILS RELATIVE PERCENT: 3 % (ref 0–8)
EPITHELIAL CELLS UA: NORMAL /HPF (ref 0–25)
GFR AFRICAN AMERICAN: 30 ML/MIN
GFR NON-AFRICAN AMERICAN: 25 ML/MIN
GFR SERPL CREATININE-BSD FRML MDRD: ABNORMAL ML/MIN/{1.73_M2}
GFR SERPL CREATININE-BSD FRML MDRD: ABNORMAL ML/MIN/{1.73_M2}
GLUCOSE BLD-MCNC: 125 MG/DL (ref 70–99)
GLUCOSE URINE: NEGATIVE
HCT VFR BLD CALC: 29.2 % (ref 36–46)
HDLC SERPL-MCNC: 36 MG/DL
HEMOGLOBIN: 10.1 G/DL (ref 12–16)
IMMATURE GRANULOCYTES: ABNORMAL %
KETONES, URINE: NEGATIVE
LDL CHOLESTEROL DIRECT: 60 MG/DL
LDL CHOLESTEROL: ABNORMAL MG/DL (ref 0–130)
LEUKOCYTE ESTERASE, URINE: NEGATIVE
LYMPHOCYTES # BLD: 12 % (ref 24–44)
MCH RBC QN AUTO: 32.3 PG (ref 26–34)
MCHC RBC AUTO-ENTMCNC: 34.4 G/DL (ref 31–37)
MCV RBC AUTO: 93.8 FL (ref 80–100)
MONOCYTES # BLD: 6 % (ref 0–12)
MUCUS: NORMAL
NITRITE, URINE: NEGATIVE
NRBC AUTOMATED: ABNORMAL PER 100 WBC
OTHER OBSERVATIONS UA: NORMAL
PDW BLD-RTO: 15.1 % (ref 12.1–15.2)
PH UA: 5.5 (ref 5–9)
PLATELET # BLD: 265 K/UL (ref 140–450)
PLATELET ESTIMATE: ABNORMAL
PMV BLD AUTO: 7.7 FL (ref 6–12)
POTASSIUM SERPL-SCNC: 4 MMOL/L (ref 3.7–5.3)
PROTEIN UA: ABNORMAL
RBC # BLD: 3.11 M/UL (ref 4–5.2)
RBC # BLD: ABNORMAL 10*6/UL
RBC UA: NORMAL /HPF (ref 0–2)
RENAL EPITHELIAL, UA: NORMAL /HPF
SEG NEUTROPHILS: 79 % (ref 36–66)
SEGMENTED NEUTROPHILS ABSOLUTE COUNT: 3.6 K/UL (ref 1.8–7.7)
SODIUM BLD-SCNC: 136 MMOL/L (ref 135–144)
SPECIFIC GRAVITY UA: 1.02 (ref 1.01–1.02)
TOTAL PROTEIN: 6.9 G/DL (ref 6.4–8.3)
TRICHOMONAS: NORMAL
TRIGL SERPL-MCNC: 452 MG/DL
TURBIDITY: CLEAR
URINE HGB: ABNORMAL
UROBILINOGEN, URINE: NORMAL
VLDLC SERPL CALC-MCNC: ABNORMAL MG/DL (ref 1–30)
WBC # BLD: 4.6 K/UL (ref 3.5–11)
WBC # BLD: ABNORMAL 10*3/UL
WBC UA: NORMAL /HPF (ref 0–5)
YEAST: NORMAL

## 2018-02-14 PROCEDURE — 85025 COMPLETE CBC W/AUTO DIFF WBC: CPT

## 2018-02-14 PROCEDURE — 81001 URINALYSIS AUTO W/SCOPE: CPT

## 2018-02-14 PROCEDURE — 83721 ASSAY OF BLOOD LIPOPROTEIN: CPT

## 2018-02-14 PROCEDURE — 80061 LIPID PANEL: CPT

## 2018-02-14 PROCEDURE — 36415 COLL VENOUS BLD VENIPUNCTURE: CPT

## 2018-02-14 PROCEDURE — 80053 COMPREHEN METABOLIC PANEL: CPT

## 2018-02-15 ENCOUNTER — TELEPHONE (OUTPATIENT)
Dept: PRIMARY CARE CLINIC | Age: 53
End: 2018-02-15

## 2018-02-15 ENCOUNTER — OFFICE VISIT (OUTPATIENT)
Dept: PRIMARY CARE CLINIC | Age: 53
End: 2018-02-15
Payer: MEDICARE

## 2018-02-15 VITALS
BODY MASS INDEX: 46.31 KG/M2 | SYSTOLIC BLOOD PRESSURE: 124 MMHG | RESPIRATION RATE: 20 BRPM | TEMPERATURE: 98.3 F | HEART RATE: 84 BPM | WEIGHT: 269.8 LBS | DIASTOLIC BLOOD PRESSURE: 82 MMHG

## 2018-02-15 DIAGNOSIS — I10 ESSENTIAL HYPERTENSION: Primary | Chronic | ICD-10-CM

## 2018-02-15 DIAGNOSIS — R04.0 EPISTAXIS: ICD-10-CM

## 2018-02-15 DIAGNOSIS — F41.8 DEPRESSION WITH ANXIETY: Chronic | ICD-10-CM

## 2018-02-15 DIAGNOSIS — E78.1 HYPERTRIGLYCERIDEMIA: ICD-10-CM

## 2018-02-15 DIAGNOSIS — T14.8XXA HEMATOMA: ICD-10-CM

## 2018-02-15 DIAGNOSIS — R73.09 ELEVATED GLUCOSE: ICD-10-CM

## 2018-02-15 DIAGNOSIS — E78.5 HYPERLIPIDEMIA, UNSPECIFIED HYPERLIPIDEMIA TYPE: ICD-10-CM

## 2018-02-15 DIAGNOSIS — J01.01 ACUTE RECURRENT MAXILLARY SINUSITIS: ICD-10-CM

## 2018-02-15 DIAGNOSIS — J30.9 CHRONIC ALLERGIC RHINITIS, UNSPECIFIED SEASONALITY, UNSPECIFIED TRIGGER: Chronic | ICD-10-CM

## 2018-02-15 PROCEDURE — 3017F COLORECTAL CA SCREEN DOC REV: CPT | Performed by: NURSE PRACTITIONER

## 2018-02-15 PROCEDURE — 1036F TOBACCO NON-USER: CPT | Performed by: NURSE PRACTITIONER

## 2018-02-15 PROCEDURE — 3014F SCREEN MAMMO DOC REV: CPT | Performed by: NURSE PRACTITIONER

## 2018-02-15 PROCEDURE — G8427 DOCREV CUR MEDS BY ELIG CLIN: HCPCS | Performed by: NURSE PRACTITIONER

## 2018-02-15 PROCEDURE — G8484 FLU IMMUNIZE NO ADMIN: HCPCS | Performed by: NURSE PRACTITIONER

## 2018-02-15 PROCEDURE — G8417 CALC BMI ABV UP PARAM F/U: HCPCS | Performed by: NURSE PRACTITIONER

## 2018-02-15 PROCEDURE — 99213 OFFICE O/P EST LOW 20 MIN: CPT | Performed by: NURSE PRACTITIONER

## 2018-02-15 RX ORDER — FLUTICASONE PROPIONATE 50 MCG
1 SPRAY, SUSPENSION (ML) NASAL DAILY
Qty: 1 BOTTLE | Refills: 3 | Status: SHIPPED | OUTPATIENT
Start: 2018-02-15 | End: 2018-03-28 | Stop reason: SDUPTHER

## 2018-02-15 RX ORDER — CETIRIZINE HYDROCHLORIDE 5 MG/1
5 TABLET ORAL DAILY
Qty: 30 TABLET | Refills: 3 | Status: SHIPPED | OUTPATIENT
Start: 2018-02-15 | End: 2018-05-16 | Stop reason: SDUPTHER

## 2018-02-15 RX ORDER — DULOXETIN HYDROCHLORIDE 30 MG/1
30 CAPSULE, DELAYED RELEASE ORAL DAILY
Qty: 30 CAPSULE | Refills: 3 | Status: SHIPPED | OUTPATIENT
Start: 2018-02-15 | End: 2018-05-16 | Stop reason: SDUPTHER

## 2018-02-15 RX ORDER — ATORVASTATIN CALCIUM 80 MG/1
80 TABLET, FILM COATED ORAL DAILY
Qty: 30 TABLET | Refills: 3 | Status: SHIPPED | OUTPATIENT
Start: 2018-02-15 | End: 2018-05-16 | Stop reason: SDUPTHER

## 2018-02-15 ASSESSMENT — ENCOUNTER SYMPTOMS
SINUS PRESSURE: 1
EYES NEGATIVE: 1
GASTROINTESTINAL NEGATIVE: 1
SINUS COMPLAINT: 1
TROUBLE SWALLOWING: 0
RESPIRATORY NEGATIVE: 1
COUGH: 0
CHEST TIGHTNESS: 0
SORE THROAT: 0
COLOR CHANGE: 1

## 2018-02-15 NOTE — TELEPHONE ENCOUNTER
Called patient and spoke with  and reminded to f/u with urologist and nephrologist and to keep appt later today.  verbalizes understanding.

## 2018-02-15 NOTE — PROGRESS NOTES
Name: Di Malin  : 1965         Chief Complaint:     Chief Complaint   Patient presents with    Hypertension    Sinus Problem    Arm Pain     left arm states had labs done yesterday and having left arm apin and feels like arm is swollen. History of Present Illness:      Di Malin is a 46 y.o.  female who presents with Hypertension; Sinus Problem; and Arm Pain (left arm states had labs done yesterday and having left arm apin and feels like arm is swollen.)    HYPERTENSION visit     BP Readings from Last 3 Encounters:   02/15/18 124/82   18 (!) 145/82   17 133/77       LDL Cholesterol (mg/dL)   Date Value   2018          HDL (mg/dL)   Date Value   2018 36 (L)     BUN (mg/dL)   Date Value   2018 31 (H)     CREATININE (mg/dL)   Date Value   2018 2.09 (H)     Glucose (mg/dL)   Date Value   2018 125 (H)              Have you changed or started any medications since your last visit including any over-the-counter medicines, vitamins, or herbal medicines? no   Have you stopped taking any of your medications? Is so, why? -  no  Are you having any side effects from any of your medications? - no    Have you seen any other physician or provider since your last visit? yes - Dr Stephen Sheffield   Have you had any other diagnostic tests since your last visit? yes -    Have you been seen in the emergency room and/or had an admission in a hospital since we last saw you?  no   Have you had your routine dental cleaning in the past 6 months?  yes -      Do you have an active MyChart account? If no, what is the barrier?   No: declined    Patient Care Team:  Salvador Sanchez NP as PCP - Yunior Sorenson MD as Consulting Physician (Nephrology)  Iwona Horan DO as Physician (Rheumatology)    Medical History Review  Past Medical, Family, and Social History reviewed and does contribute to the patient presenting condition    Health Maintenance   Topic Date Due    HIV auscultation over posterior bilateral fields. Chest expansion is symmetrical with non-restricted air movement. Abdominal: Soft. Bowel sounds are normal. She exhibits no distension and no mass. There is no tenderness. Musculoskeletal: Normal range of motion. She exhibits no edema or tenderness. Lymphadenopathy:     She has no cervical adenopathy. Neurological: She is alert and oriented to person, place, and time. No cranial nerve deficit. She exhibits normal muscle tone. Coordination and gait normal.   Skin: Skin is warm and dry. Bruising noted. No lesion and no rash noted. She is not diaphoretic. No erythema. No pallor. Psychiatric: She has a normal mood and affect. Her speech is normal and behavior is normal. Judgment and thought content normal.   Nursing note and vitals reviewed. Data:     1. Essential hypertension  Comprehensive Metabolic Panel    CBC Auto Differential    Urinalysis   2. Hyperlipidemia, unspecified hyperlipidemia type  Lipid Panel   3. Depression with anxiety  DULoxetine (CYMBALTA) 30 MG extended release capsule   4. Elevated glucose  Hemoglobin A1C   5. Hematoma     6. Epistaxis     7. Chronic allergic rhinitis, unspecified seasonality, unspecified trigger  cetirizine (ZYRTEC) 5 MG tablet   8. Acute recurrent maxillary sinusitis  fluticasone (FLONASE) 50 MCG/ACT nasal spray   9.  Hypertriglyceridemia  atorvastatin (LIPITOR) 80 MG tablet       Lab Results   Component Value Date     02/14/2018    K 4.0 02/14/2018    CL 97 02/14/2018    CO2 28 02/14/2018    BUN 31 02/14/2018    CREATININE 2.09 02/14/2018    GLUCOSE 125 02/14/2018    PROT 6.9 02/14/2018    LABALBU 3.3 02/14/2018    BILITOT 0.17 02/14/2018    ALKPHOS 147 02/14/2018    AST 14 02/14/2018    ALT 8 02/14/2018     Lab Results   Component Value Date    WBC 4.6 02/14/2018    RBC 3.11 02/14/2018    HGB 10.1 02/14/2018    HCT 29.2 02/14/2018    MCV 93.8 02/14/2018    MCH 32.3 02/14/2018    MCHC 34.4 02/14/2018

## 2018-02-22 RX ORDER — POTASSIUM CHLORIDE 1500 MG/1
TABLET, EXTENDED RELEASE ORAL
Qty: 30 TABLET | Refills: 0 | Status: SHIPPED | OUTPATIENT
Start: 2018-02-22 | End: 2018-03-21 | Stop reason: SDUPTHER

## 2018-02-27 DIAGNOSIS — Z12.11 COLON CANCER SCREENING: Primary | ICD-10-CM

## 2018-03-16 DIAGNOSIS — Z12.39 SCREENING FOR BREAST CANCER: Primary | ICD-10-CM

## 2018-03-21 RX ORDER — POTASSIUM CHLORIDE 1500 MG/1
TABLET, EXTENDED RELEASE ORAL
Qty: 30 TABLET | Refills: 0 | Status: SHIPPED | OUTPATIENT
Start: 2018-03-21 | End: 2018-03-22 | Stop reason: SDUPTHER

## 2018-03-22 RX ORDER — POTASSIUM CHLORIDE 20 MEQ/1
TABLET, EXTENDED RELEASE ORAL
Qty: 90 TABLET | Refills: 3 | Status: SHIPPED | OUTPATIENT
Start: 2018-03-22 | End: 2019-03-25 | Stop reason: SDUPTHER

## 2018-03-23 ENCOUNTER — HOSPITAL ENCOUNTER (OUTPATIENT)
Age: 53
Discharge: HOME | End: 2018-03-23
Payer: MEDICARE

## 2018-03-23 DIAGNOSIS — N18.30 CHRONIC KIDNEY DISEASE, STAGE III (MODERATE) (HCC): Chronic | ICD-10-CM

## 2018-03-23 DIAGNOSIS — D47.2 MGUS (MONOCLONAL GAMMOPATHY OF UNKNOWN SIGNIFICANCE): ICD-10-CM

## 2018-03-23 DIAGNOSIS — D63.1 ANEMIA OF CHRONIC KIDNEY FAILURE, STAGE 4 (SEVERE) (HCC): ICD-10-CM

## 2018-03-23 DIAGNOSIS — N18.4 ANEMIA OF CHRONIC KIDNEY FAILURE, STAGE 4 (SEVERE) (HCC): ICD-10-CM

## 2018-03-23 LAB
ABSOLUTE EOS #: 0.05 K/UL (ref 0–0.44)
ABSOLUTE IMMATURE GRANULOCYTE: 0 K/UL (ref 0–0.3)
ABSOLUTE LYMPH #: 0.78 K/UL (ref 1.1–3.7)
ABSOLUTE MONO #: 0.25 K/UL (ref 0.1–1.2)
ANION GAP SERPL CALCULATED.3IONS-SCNC: 13 MMOL/L (ref 9–17)
BASOPHILS # BLD: 0 % (ref 0–2)
BASOPHILS ABSOLUTE: 0 K/UL (ref 0–0.2)
BUN BLDV-MCNC: 37 MG/DL (ref 6–20)
BUN/CREAT BLD: 19 (ref 9–20)
CALCIUM IONIZED: 1.22 MMOL/L (ref 1.13–1.33)
CALCIUM SERPL-MCNC: 9.3 MG/DL (ref 8.6–10.4)
CHLORIDE BLD-SCNC: 102 MMOL/L (ref 98–107)
CO2: 25 MMOL/L (ref 20–31)
CREAT SERPL-MCNC: 1.9 MG/DL (ref 0.5–0.9)
CREATININE URINE: 90.5 MG/DL (ref 28–217)
DIFFERENTIAL TYPE: ABNORMAL
EOSINOPHILS RELATIVE PERCENT: 1 % (ref 1–4)
FREE KAPPA/LAMBDA RATIO: 2.26 (ref 0.26–1.65)
GFR AFRICAN AMERICAN: 34 ML/MIN
GFR NON-AFRICAN AMERICAN: 28 ML/MIN
GFR SERPL CREATININE-BSD FRML MDRD: ABNORMAL ML/MIN/{1.73_M2}
GFR SERPL CREATININE-BSD FRML MDRD: ABNORMAL ML/MIN/{1.73_M2}
GLUCOSE BLD-MCNC: 111 MG/DL (ref 70–99)
HCT VFR BLD CALC: 32 % (ref 36.3–47.1)
HEMOGLOBIN: 10.6 G/DL (ref 11.9–15.1)
IGA: 156 MG/DL (ref 70–400)
IGG: 1057 MG/DL (ref 700–1600)
IGM: 223 MG/DL (ref 40–230)
IMMATURE GRANULOCYTES: 0 %
KAPPA FREE LIGHT CHAINS QNT: 8.78 MG/DL (ref 0.37–1.94)
LAMBDA FREE LIGHT CHAINS QNT: 3.88 MG/DL (ref 0.57–2.63)
LYMPHOCYTES # BLD: 16 % (ref 24–43)
MCH RBC QN AUTO: 31.9 PG (ref 25.2–33.5)
MCHC RBC AUTO-ENTMCNC: 33.1 G/DL (ref 28.4–34.8)
MCV RBC AUTO: 96.4 FL (ref 82.6–102.9)
MONOCYTES # BLD: 5 % (ref 3–12)
MORPHOLOGY: NORMAL
NRBC AUTOMATED: 0 PER 100 WBC
PDW BLD-RTO: 13.9 % (ref 11.8–14.4)
PLATELET # BLD: 268 K/UL (ref 138–453)
PLATELET ESTIMATE: ABNORMAL
PMV BLD AUTO: 9.6 FL (ref 8.1–13.5)
POTASSIUM SERPL-SCNC: 3.9 MMOL/L (ref 3.7–5.3)
PTH INTACT: 95.66 PG/ML (ref 15–65)
RBC # BLD: 3.32 M/UL (ref 3.95–5.11)
RBC # BLD: ABNORMAL 10*6/UL
SEG NEUTROPHILS: 78 % (ref 36–65)
SEGMENTED NEUTROPHILS ABSOLUTE COUNT: 3.82 K/UL (ref 1.5–8.1)
SODIUM BLD-SCNC: 140 MMOL/L (ref 135–144)
TOTAL PROTEIN, URINE: 142 MG/DL
VITAMIN D 25-HYDROXY: 21.2 NG/ML (ref 30–100)
WBC # BLD: 4.9 K/UL (ref 3.5–11.3)
WBC # BLD: ABNORMAL 10*3/UL

## 2018-03-23 PROCEDURE — 80048 BASIC METABOLIC PNL TOTAL CA: CPT

## 2018-03-23 PROCEDURE — 82330 ASSAY OF CALCIUM: CPT

## 2018-03-23 PROCEDURE — 82570 ASSAY OF URINE CREATININE: CPT

## 2018-03-23 PROCEDURE — 83883 ASSAY NEPHELOMETRY NOT SPEC: CPT

## 2018-03-23 PROCEDURE — 84156 ASSAY OF PROTEIN URINE: CPT

## 2018-03-23 PROCEDURE — 36415 COLL VENOUS BLD VENIPUNCTURE: CPT

## 2018-03-23 PROCEDURE — 85025 COMPLETE CBC W/AUTO DIFF WBC: CPT

## 2018-03-23 PROCEDURE — 82784 ASSAY IGA/IGD/IGG/IGM EACH: CPT

## 2018-03-23 PROCEDURE — 82306 VITAMIN D 25 HYDROXY: CPT

## 2018-03-23 PROCEDURE — 83970 ASSAY OF PARATHORMONE: CPT

## 2018-03-28 ENCOUNTER — OFFICE VISIT (OUTPATIENT)
Dept: ONCOLOGY | Age: 53
End: 2018-03-28
Payer: MEDICARE

## 2018-03-28 VITALS
WEIGHT: 270.2 LBS | TEMPERATURE: 97.5 F | DIASTOLIC BLOOD PRESSURE: 65 MMHG | HEART RATE: 94 BPM | BODY MASS INDEX: 46.13 KG/M2 | RESPIRATION RATE: 16 BRPM | SYSTOLIC BLOOD PRESSURE: 135 MMHG | HEIGHT: 64 IN

## 2018-03-28 DIAGNOSIS — D47.2 MGUS (MONOCLONAL GAMMOPATHY OF UNKNOWN SIGNIFICANCE): Primary | ICD-10-CM

## 2018-03-28 DIAGNOSIS — I77.82 ANCA-ASSOCIATED VASCULITIS: Chronic | ICD-10-CM

## 2018-03-28 DIAGNOSIS — N18.9 HISTORY OF ANEMIA DUE TO CHRONIC KIDNEY DISEASE: ICD-10-CM

## 2018-03-28 DIAGNOSIS — Z86.2 HISTORY OF ANEMIA DUE TO CHRONIC KIDNEY DISEASE: ICD-10-CM

## 2018-03-28 DIAGNOSIS — J01.01 ACUTE RECURRENT MAXILLARY SINUSITIS: ICD-10-CM

## 2018-03-28 PROCEDURE — G8417 CALC BMI ABV UP PARAM F/U: HCPCS | Performed by: INTERNAL MEDICINE

## 2018-03-28 PROCEDURE — 3014F SCREEN MAMMO DOC REV: CPT | Performed by: INTERNAL MEDICINE

## 2018-03-28 PROCEDURE — G8427 DOCREV CUR MEDS BY ELIG CLIN: HCPCS | Performed by: INTERNAL MEDICINE

## 2018-03-28 PROCEDURE — 1036F TOBACCO NON-USER: CPT | Performed by: INTERNAL MEDICINE

## 2018-03-28 PROCEDURE — G8482 FLU IMMUNIZE ORDER/ADMIN: HCPCS | Performed by: INTERNAL MEDICINE

## 2018-03-28 PROCEDURE — 3017F COLORECTAL CA SCREEN DOC REV: CPT | Performed by: INTERNAL MEDICINE

## 2018-03-28 PROCEDURE — 99214 OFFICE O/P EST MOD 30 MIN: CPT | Performed by: INTERNAL MEDICINE

## 2018-03-28 RX ORDER — CONJUGATED ESTROGENS 0.62 MG/1
0.62 TABLET, FILM COATED ORAL DAILY
COMMUNITY
Start: 2018-03-05 | End: 2018-06-27 | Stop reason: ALTCHOICE

## 2018-03-28 RX ORDER — FLUTICASONE PROPIONATE 50 MCG
2 SPRAY, SUSPENSION (ML) NASAL 2 TIMES DAILY
Qty: 1 BOTTLE | Refills: 3 | Status: ON HOLD | OUTPATIENT
Start: 2018-03-28 | End: 2018-11-06 | Stop reason: HOSPADM

## 2018-03-28 NOTE — PROGRESS NOTES
BIOPSY:    ANCA-ASSOCIATED, PAUCI-IMMUNE NECROTIZING GLOMERULONEPHRITIS   WITH CELLULAR / FIBROCELLULAR CRESCENTS IN 17 OF 33   GLOMERULI (46%). GLOBAL GLOMERULOSCLEROSIS IN 12 GLOMERULI (32%). MILD INTERSTITIAL LYMPHOHISTIOCYTIC INFLAMMATION. MILD INTERSTITIAL FIBROSIS AND TUBULAR ATROPHY (20%). -- Diagnosis Comment --    RENAL BIOPSY DEMONSTRATES ACTIVE NECROTIZING GLOMERULONEPHRITIS,  PAUCI-IMMUNE AND ANCA ASSOCIATED. OF 37 GLOMERULI 5 SHOW  FIBROCELLULAR CRESCENTS, 12 SHOW CELLULAR CRESCENTS AND 12 ARE  END-STAGING GLOBALLY SCLEROTIC. IMPRESSION:   · Anemia, microcytic, Iron deficiency. · Anemia of chronic renal insufficiency  · MARIO  · Elevated kappa light chain immunoglobulins. Bone marrow negative for myeloma. · Vasculitis, ANCA associated necrotizing glomerulonephritis. Plan:   I reviewed the labs as above and discussed with the patient. Previously done Bone marrow test is negative for myeloma. Patient had a kidney biopsy which showed vasculitis. She was evaluated by rheumatology. She would have follow-up. At the present time iron studies are normal.  She has normocytic anemia. His anemia of chronic renal insufficiency. Hemoglobin is above 10. We will consider treatment with Epogen or Aranesp if hemoglobin drops below 10. RV 6 months with repeated labs including immunoglobulins levels. Patient's questions were answered to the best of her satisfaction and she verbalized full understanding and agreement.

## 2018-04-19 ENCOUNTER — HOSPITAL ENCOUNTER (OUTPATIENT)
Age: 53
Setting detail: SPECIMEN
Discharge: HOME | End: 2018-04-19
Payer: MEDICARE

## 2018-04-19 ENCOUNTER — OFFICE VISIT (OUTPATIENT)
Dept: OBGYN | Age: 53
End: 2018-04-19
Payer: MEDICARE

## 2018-04-19 VITALS
BODY MASS INDEX: 48.36 KG/M2 | DIASTOLIC BLOOD PRESSURE: 76 MMHG | HEIGHT: 62 IN | SYSTOLIC BLOOD PRESSURE: 120 MMHG | WEIGHT: 262.8 LBS

## 2018-04-19 DIAGNOSIS — Z11.51 ENCOUNTER FOR SCREENING FOR HUMAN PAPILLOMAVIRUS (HPV): ICD-10-CM

## 2018-04-19 DIAGNOSIS — Z00.121 ENCOUNTER FOR ROUTINE CHILD HEALTH EXAMINATION WITH ABNORMAL FINDINGS: ICD-10-CM

## 2018-04-19 DIAGNOSIS — Z00.121 ENCOUNTER FOR ROUTINE CHILD HEALTH EXAMINATION WITH ABNORMAL FINDINGS: Primary | ICD-10-CM

## 2018-04-19 DIAGNOSIS — N90.89 MASS OF LABIUM: ICD-10-CM

## 2018-04-19 PROBLEM — M25.562 BILATERAL KNEE PAIN: Status: ACTIVE | Noted: 2017-09-27

## 2018-04-19 PROBLEM — Z91.199 PATIENT NONADHERENCE: Status: ACTIVE | Noted: 2017-09-27

## 2018-04-19 PROBLEM — M70.52 PES ANSERINUS BURSITIS OF BOTH KNEES: Status: ACTIVE | Noted: 2017-09-27

## 2018-04-19 PROBLEM — M19.90 UNSPECIFIED OSTEOARTHRITIS, UNSPECIFIED SITE: Status: ACTIVE | Noted: 2018-04-19

## 2018-04-19 PROBLEM — D72.829 ELEVATED WHITE BLOOD CELL COUNT, UNSPECIFIED: Status: ACTIVE | Noted: 2018-04-19

## 2018-04-19 PROBLEM — M51.36 OTHER INTERVERTEBRAL DISC DEGENERATION, LUMBAR REGION: Status: ACTIVE | Noted: 2018-04-19

## 2018-04-19 PROBLEM — R76.8 SCL-70 ANTIBODY POSITIVE: Status: ACTIVE | Noted: 2017-09-27

## 2018-04-19 PROBLEM — R94.4 ABNORMAL RENAL FUNCTION TEST: Status: ACTIVE | Noted: 2018-04-19

## 2018-04-19 PROBLEM — M25.552 BILATERAL HIP PAIN: Status: ACTIVE | Noted: 2017-09-27

## 2018-04-19 PROBLEM — E55.9 VITAMIN D DEFICIENCY: Status: ACTIVE | Noted: 2018-04-19

## 2018-04-19 PROBLEM — M19.011 OSTEOARTHRITIS OF RIGHT ACROMIOCLAVICULAR JOINT: Status: ACTIVE | Noted: 2017-09-27

## 2018-04-19 PROBLEM — R76.8 P-ANCA AND MPO ANTIBODIES POSITIVE: Status: ACTIVE | Noted: 2017-09-27

## 2018-04-19 PROBLEM — M05.762: Status: ACTIVE | Noted: 2018-04-19

## 2018-04-19 PROBLEM — M70.51 PES ANSERINUS BURSITIS OF BOTH KNEES: Status: ACTIVE | Noted: 2017-09-27

## 2018-04-19 PROBLEM — M51.34 OTHER INTERVERTEBRAL DISC DEGENERATION, THORACIC REGION: Status: ACTIVE | Noted: 2018-04-19

## 2018-04-19 PROBLEM — M46.1 OSTEOARTHRITIS OF SACROILIAC JOINT (HCC): Status: ACTIVE | Noted: 2017-09-27

## 2018-04-19 PROBLEM — L50.2 URTICARIA DUE TO COLD: Status: ACTIVE | Noted: 2018-04-19

## 2018-04-19 PROBLEM — C55 CANCER OF UTERUS (HCC): Status: ACTIVE | Noted: 2018-04-19

## 2018-04-19 PROBLEM — R76.8 RHEUMATOID FACTOR POSITIVE: Status: ACTIVE | Noted: 2017-09-27

## 2018-04-19 PROBLEM — M25.551 BILATERAL HIP PAIN: Status: ACTIVE | Noted: 2017-09-27

## 2018-04-19 PROBLEM — M25.561 BILATERAL KNEE PAIN: Status: ACTIVE | Noted: 2017-09-27

## 2018-04-19 PROBLEM — R79.82 ELEVATED C-REACTIVE PROTEIN (CRP): Status: ACTIVE | Noted: 2018-04-19

## 2018-04-19 PROBLEM — D47.3 ESSENTIAL THROMBOCYTHEMIA (HCC): Status: ACTIVE | Noted: 2018-04-19

## 2018-04-19 PROBLEM — E83.51 HYPOCALCEMIA: Status: ACTIVE | Noted: 2018-04-19

## 2018-04-19 PROBLEM — R79.9 ABNORMAL FINDING OF BLOOD CHEMISTRY: Status: ACTIVE | Noted: 2018-04-19

## 2018-04-19 PROBLEM — M51.369 OTHER INTERVERTEBRAL DISC DEGENERATION, LUMBAR REGION: Status: ACTIVE | Noted: 2018-04-19

## 2018-04-19 PROBLEM — M31.7 MICROSCOPIC POLYANGIITIS (HCC): Status: ACTIVE | Noted: 2018-04-19

## 2018-04-19 PROBLEM — R73.9 HYPERGLYCEMIA: Status: ACTIVE | Noted: 2018-04-19

## 2018-04-19 PROBLEM — R76.9 ABNORMAL IMMUNOLOGICAL FINDING IN SERUM: Status: ACTIVE | Noted: 2018-04-19

## 2018-04-19 PROBLEM — R76.8 ANTI-CYCLIC CITRULLINATED PEPTIDE ANTIBODY POSITIVE: Status: ACTIVE | Noted: 2017-09-27

## 2018-04-19 PROBLEM — M70.61 GREATER TROCHANTERIC BURSITIS OF BOTH HIPS: Status: ACTIVE | Noted: 2017-09-27

## 2018-04-19 PROBLEM — M70.62 GREATER TROCHANTERIC BURSITIS OF BOTH HIPS: Status: ACTIVE | Noted: 2017-09-27

## 2018-04-19 PROCEDURE — G0145 SCR C/V CYTO,THINLAYER,RESCR: HCPCS

## 2018-04-19 PROCEDURE — 87624 HPV HI-RISK TYP POOLED RSLT: CPT

## 2018-04-19 PROCEDURE — 11100 PR BIOPSY OF SKIN LESION: CPT | Performed by: ADVANCED PRACTICE MIDWIFE

## 2018-04-19 PROCEDURE — 88305 TISSUE EXAM BY PATHOLOGIST: CPT

## 2018-04-19 PROCEDURE — 99396 PREV VISIT EST AGE 40-64: CPT | Performed by: ADVANCED PRACTICE MIDWIFE

## 2018-04-20 LAB
HPV SAMPLE: NORMAL
HPV SOURCE: NORMAL
HPV, GENOTYPE 16: NOT DETECTED
HPV, GENOTYPE 18: NOT DETECTED
HPV, HIGH RISK OTHER: NOT DETECTED
HPV, INTERPRETATION: NORMAL

## 2018-04-23 LAB — SURGICAL PATHOLOGY REPORT: NORMAL

## 2018-04-26 ENCOUNTER — OFFICE VISIT (OUTPATIENT)
Dept: OBGYN | Age: 53
End: 2018-04-26
Payer: MEDICARE

## 2018-04-26 VITALS
SYSTOLIC BLOOD PRESSURE: 126 MMHG | DIASTOLIC BLOOD PRESSURE: 84 MMHG | WEIGHT: 266.4 LBS | BODY MASS INDEX: 49.02 KG/M2 | HEIGHT: 62 IN

## 2018-04-26 DIAGNOSIS — D23.9 HIDRADENOMA: Primary | ICD-10-CM

## 2018-04-26 PROCEDURE — G8417 CALC BMI ABV UP PARAM F/U: HCPCS | Performed by: ADVANCED PRACTICE MIDWIFE

## 2018-04-26 PROCEDURE — 1036F TOBACCO NON-USER: CPT | Performed by: ADVANCED PRACTICE MIDWIFE

## 2018-04-26 PROCEDURE — G8427 DOCREV CUR MEDS BY ELIG CLIN: HCPCS | Performed by: ADVANCED PRACTICE MIDWIFE

## 2018-04-26 PROCEDURE — 99213 OFFICE O/P EST LOW 20 MIN: CPT | Performed by: ADVANCED PRACTICE MIDWIFE

## 2018-04-26 PROCEDURE — 3017F COLORECTAL CA SCREEN DOC REV: CPT | Performed by: ADVANCED PRACTICE MIDWIFE

## 2018-05-01 ENCOUNTER — HOSPITAL ENCOUNTER (OUTPATIENT)
Age: 53
Discharge: HOME | End: 2018-05-01
Payer: MEDICARE

## 2018-05-01 LAB
ANION GAP SERPL CALCULATED.3IONS-SCNC: 13 MMOL/L (ref 9–17)
BUN BLDV-MCNC: 32 MG/DL (ref 6–20)
BUN/CREAT BLD: 15 (ref 9–20)
CALCIUM SERPL-MCNC: 8.8 MG/DL (ref 8.6–10.4)
CHLORIDE BLD-SCNC: 102 MMOL/L (ref 98–107)
CO2: 26 MMOL/L (ref 20–31)
CREAT SERPL-MCNC: 2.08 MG/DL (ref 0.5–0.9)
GFR AFRICAN AMERICAN: 30 ML/MIN
GFR NON-AFRICAN AMERICAN: 25 ML/MIN
GFR SERPL CREATININE-BSD FRML MDRD: ABNORMAL ML/MIN/{1.73_M2}
GFR SERPL CREATININE-BSD FRML MDRD: ABNORMAL ML/MIN/{1.73_M2}
GLUCOSE BLD-MCNC: 103 MG/DL (ref 70–99)
POTASSIUM SERPL-SCNC: 3.6 MMOL/L (ref 3.7–5.3)
SODIUM BLD-SCNC: 141 MMOL/L (ref 135–144)

## 2018-05-01 PROCEDURE — 80048 BASIC METABOLIC PNL TOTAL CA: CPT

## 2018-05-01 PROCEDURE — 36415 COLL VENOUS BLD VENIPUNCTURE: CPT

## 2018-05-08 LAB — CYTOLOGY REPORT: NORMAL

## 2018-05-15 ENCOUNTER — TELEPHONE (OUTPATIENT)
Dept: PRIMARY CARE CLINIC | Age: 53
End: 2018-05-15

## 2018-05-15 ENCOUNTER — HOSPITAL ENCOUNTER (OUTPATIENT)
Age: 53
Discharge: HOME | End: 2018-05-15
Payer: MEDICARE

## 2018-05-15 DIAGNOSIS — I10 ESSENTIAL HYPERTENSION: Chronic | ICD-10-CM

## 2018-05-15 DIAGNOSIS — R73.09 ELEVATED GLUCOSE: ICD-10-CM

## 2018-05-15 DIAGNOSIS — E78.5 HYPERLIPIDEMIA, UNSPECIFIED HYPERLIPIDEMIA TYPE: ICD-10-CM

## 2018-05-15 DIAGNOSIS — D50.9 IRON DEFICIENCY ANEMIA, UNSPECIFIED IRON DEFICIENCY ANEMIA TYPE: ICD-10-CM

## 2018-05-15 DIAGNOSIS — K22.719 BARRETT'S ESOPHAGUS WITH DYSPLASIA: ICD-10-CM

## 2018-05-15 DIAGNOSIS — R79.9 ELEVATED BUN: Primary | ICD-10-CM

## 2018-05-15 DIAGNOSIS — I77.6 VASCULITIS (HCC): ICD-10-CM

## 2018-05-15 LAB
-: ABNORMAL
ABSOLUTE EOS #: 0.22 K/UL (ref 0–0.44)
ABSOLUTE IMMATURE GRANULOCYTE: 0 K/UL (ref 0–0.3)
ABSOLUTE LYMPH #: 0.44 K/UL (ref 1.1–3.7)
ABSOLUTE MONO #: 0.39 K/UL (ref 0.1–1.2)
ALBUMIN SERPL-MCNC: 3.7 G/DL (ref 3.5–5.2)
ALBUMIN/GLOBULIN RATIO: 1 (ref 1–2.5)
ALP BLD-CCNC: 154 U/L (ref 35–104)
ALT SERPL-CCNC: 11 U/L (ref 5–33)
AMORPHOUS: ABNORMAL
ANION GAP SERPL CALCULATED.3IONS-SCNC: 13 MMOL/L (ref 9–17)
AST SERPL-CCNC: 15 U/L
BACTERIA: ABNORMAL
BASOPHILS # BLD: 1 % (ref 0–2)
BASOPHILS ABSOLUTE: 0.06 K/UL (ref 0–0.2)
BILIRUB SERPL-MCNC: 0.25 MG/DL (ref 0.3–1.2)
BILIRUBIN URINE: NEGATIVE
BUN BLDV-MCNC: 37 MG/DL (ref 6–20)
BUN/CREAT BLD: 18 (ref 9–20)
CALCIUM SERPL-MCNC: 9.4 MG/DL (ref 8.6–10.4)
CASTS UA: ABNORMAL /LPF
CHLORIDE BLD-SCNC: 99 MMOL/L (ref 98–107)
CHOLESTEROL/HDL RATIO: 4.5
CHOLESTEROL: 185 MG/DL
CO2: 27 MMOL/L (ref 20–31)
COLOR: YELLOW
COMMENT UA: ABNORMAL
CREAT SERPL-MCNC: 2.09 MG/DL (ref 0.5–0.9)
CRYSTALS, UA: ABNORMAL /HPF
DIFFERENTIAL TYPE: ABNORMAL
EOSINOPHILS RELATIVE PERCENT: 4 % (ref 1–4)
EPITHELIAL CELLS UA: ABNORMAL /HPF (ref 0–25)
ESTIMATED AVERAGE GLUCOSE: 111 MG/DL
GFR AFRICAN AMERICAN: 30 ML/MIN
GFR NON-AFRICAN AMERICAN: 25 ML/MIN
GFR SERPL CREATININE-BSD FRML MDRD: ABNORMAL ML/MIN/{1.73_M2}
GFR SERPL CREATININE-BSD FRML MDRD: ABNORMAL ML/MIN/{1.73_M2}
GLUCOSE BLD-MCNC: 116 MG/DL (ref 70–99)
GLUCOSE URINE: NEGATIVE
HBA1C MFR BLD: 5.5 % (ref 4.8–5.9)
HCT VFR BLD CALC: 32.3 % (ref 36.3–47.1)
HDLC SERPL-MCNC: 41 MG/DL
HEMOGLOBIN: 10.6 G/DL (ref 11.9–15.1)
IMMATURE GRANULOCYTES: 0 %
KETONES, URINE: NEGATIVE
LDL CHOLESTEROL DIRECT: 76 MG/DL
LDL CHOLESTEROL: ABNORMAL MG/DL (ref 0–130)
LEUKOCYTE ESTERASE, URINE: NEGATIVE
LYMPHOCYTES # BLD: 8 % (ref 24–43)
MCH RBC QN AUTO: 31.3 PG (ref 25.2–33.5)
MCHC RBC AUTO-ENTMCNC: 32.8 G/DL (ref 28.4–34.8)
MCV RBC AUTO: 95.3 FL (ref 82.6–102.9)
MONOCYTES # BLD: 7 % (ref 3–12)
MORPHOLOGY: NORMAL
MUCUS: ABNORMAL
NITRITE, URINE: NEGATIVE
NRBC AUTOMATED: 0 PER 100 WBC
OTHER OBSERVATIONS UA: ABNORMAL
PDW BLD-RTO: 13.2 % (ref 11.8–14.4)
PH UA: 5.5 (ref 5–9)
PLATELET # BLD: 244 K/UL (ref 138–453)
PLATELET ESTIMATE: ABNORMAL
PMV BLD AUTO: 9.2 FL (ref 8.1–13.5)
POTASSIUM SERPL-SCNC: 3.9 MMOL/L (ref 3.7–5.3)
PROTEIN UA: ABNORMAL
RBC # BLD: 3.39 M/UL (ref 3.95–5.11)
RBC # BLD: ABNORMAL 10*6/UL
RBC UA: ABNORMAL /HPF (ref 0–2)
RENAL EPITHELIAL, UA: ABNORMAL /HPF
SEG NEUTROPHILS: 80 % (ref 36–65)
SEGMENTED NEUTROPHILS ABSOLUTE COUNT: 4.39 K/UL (ref 1.5–8.1)
SODIUM BLD-SCNC: 139 MMOL/L (ref 135–144)
SPECIFIC GRAVITY UA: 1.02 (ref 1.01–1.02)
TOTAL PROTEIN: 7.4 G/DL (ref 6.4–8.3)
TRICHOMONAS: ABNORMAL
TRIGL SERPL-MCNC: 413 MG/DL
TURBIDITY: CLEAR
URINE HGB: ABNORMAL
UROBILINOGEN, URINE: NORMAL
VLDLC SERPL CALC-MCNC: ABNORMAL MG/DL (ref 1–30)
WBC # BLD: 5.5 K/UL (ref 3.5–11.3)
WBC # BLD: ABNORMAL 10*3/UL
WBC UA: ABNORMAL /HPF (ref 0–5)
YEAST: ABNORMAL

## 2018-05-15 PROCEDURE — 80053 COMPREHEN METABOLIC PANEL: CPT

## 2018-05-15 PROCEDURE — 81001 URINALYSIS AUTO W/SCOPE: CPT

## 2018-05-15 PROCEDURE — 83721 ASSAY OF BLOOD LIPOPROTEIN: CPT

## 2018-05-15 PROCEDURE — 85025 COMPLETE CBC W/AUTO DIFF WBC: CPT

## 2018-05-15 PROCEDURE — 80061 LIPID PANEL: CPT

## 2018-05-15 PROCEDURE — 36415 COLL VENOUS BLD VENIPUNCTURE: CPT

## 2018-05-15 PROCEDURE — 83036 HEMOGLOBIN GLYCOSYLATED A1C: CPT

## 2018-05-16 ENCOUNTER — OFFICE VISIT (OUTPATIENT)
Dept: PRIMARY CARE CLINIC | Age: 53
End: 2018-05-16
Payer: MEDICARE

## 2018-05-16 VITALS
WEIGHT: 266.4 LBS | BODY MASS INDEX: 48.73 KG/M2 | HEART RATE: 90 BPM | TEMPERATURE: 97.7 F | SYSTOLIC BLOOD PRESSURE: 110 MMHG | DIASTOLIC BLOOD PRESSURE: 86 MMHG | RESPIRATION RATE: 20 BRPM

## 2018-05-16 DIAGNOSIS — I10 ESSENTIAL HYPERTENSION: Chronic | ICD-10-CM

## 2018-05-16 DIAGNOSIS — F33.9 EPISODE OF RECURRENT MAJOR DEPRESSIVE DISORDER, UNSPECIFIED DEPRESSION EPISODE SEVERITY (HCC): ICD-10-CM

## 2018-05-16 DIAGNOSIS — E78.5 HYPERLIPIDEMIA, UNSPECIFIED HYPERLIPIDEMIA TYPE: ICD-10-CM

## 2018-05-16 DIAGNOSIS — N18.4 CKD (CHRONIC KIDNEY DISEASE) STAGE 4, GFR 15-29 ML/MIN (HCC): Chronic | ICD-10-CM

## 2018-05-16 DIAGNOSIS — M79.604 BILATERAL LEG PAIN: Primary | ICD-10-CM

## 2018-05-16 DIAGNOSIS — J30.9 CHRONIC ALLERGIC RHINITIS, UNSPECIFIED SEASONALITY, UNSPECIFIED TRIGGER: Chronic | ICD-10-CM

## 2018-05-16 DIAGNOSIS — M79.604 RIGHT LEG PAIN: ICD-10-CM

## 2018-05-16 DIAGNOSIS — F41.8 DEPRESSION WITH ANXIETY: Chronic | ICD-10-CM

## 2018-05-16 DIAGNOSIS — M79.605 LEFT LEG PAIN: ICD-10-CM

## 2018-05-16 DIAGNOSIS — E78.1 HYPERTRIGLYCERIDEMIA: ICD-10-CM

## 2018-05-16 DIAGNOSIS — M79.605 BILATERAL LEG PAIN: Primary | ICD-10-CM

## 2018-05-16 PROCEDURE — 3017F COLORECTAL CA SCREEN DOC REV: CPT | Performed by: NURSE PRACTITIONER

## 2018-05-16 PROCEDURE — 96160 PT-FOCUSED HLTH RISK ASSMT: CPT | Performed by: NURSE PRACTITIONER

## 2018-05-16 PROCEDURE — G8417 CALC BMI ABV UP PARAM F/U: HCPCS | Performed by: NURSE PRACTITIONER

## 2018-05-16 PROCEDURE — 1036F TOBACCO NON-USER: CPT | Performed by: NURSE PRACTITIONER

## 2018-05-16 PROCEDURE — G8427 DOCREV CUR MEDS BY ELIG CLIN: HCPCS | Performed by: NURSE PRACTITIONER

## 2018-05-16 PROCEDURE — 99214 OFFICE O/P EST MOD 30 MIN: CPT | Performed by: NURSE PRACTITIONER

## 2018-05-16 RX ORDER — CETIRIZINE HYDROCHLORIDE 5 MG/1
5 TABLET ORAL DAILY
Qty: 30 TABLET | Refills: 3 | Status: SHIPPED | OUTPATIENT
Start: 2018-05-16 | End: 2018-09-06 | Stop reason: SDUPTHER

## 2018-05-16 RX ORDER — DULOXETIN HYDROCHLORIDE 30 MG/1
30 CAPSULE, DELAYED RELEASE ORAL DAILY
Qty: 30 CAPSULE | Refills: 3 | Status: SHIPPED | OUTPATIENT
Start: 2018-05-16 | End: 2018-09-06 | Stop reason: SDUPTHER

## 2018-05-16 RX ORDER — ATORVASTATIN CALCIUM 80 MG/1
80 TABLET, FILM COATED ORAL DAILY
Qty: 30 TABLET | Refills: 3 | Status: SHIPPED | OUTPATIENT
Start: 2018-05-16 | End: 2018-09-06 | Stop reason: SDUPTHER

## 2018-05-16 ASSESSMENT — PATIENT HEALTH QUESTIONNAIRE - PHQ9
4. FEELING TIRED OR HAVING LITTLE ENERGY: 3
3. TROUBLE FALLING OR STAYING ASLEEP: 3
SUM OF ALL RESPONSES TO PHQ9 QUESTIONS 1 & 2: 3
7. TROUBLE CONCENTRATING ON THINGS, SUCH AS READING THE NEWSPAPER OR WATCHING TELEVISION: 1
6. FEELING BAD ABOUT YOURSELF - OR THAT YOU ARE A FAILURE OR HAVE LET YOURSELF OR YOUR FAMILY DOWN: 3
2. FEELING DOWN, DEPRESSED OR HOPELESS: 1
5. POOR APPETITE OR OVEREATING: 1
9. THOUGHTS THAT YOU WOULD BE BETTER OFF DEAD, OR OF HURTING YOURSELF: 1
8. MOVING OR SPEAKING SO SLOWLY THAT OTHER PEOPLE COULD HAVE NOTICED. OR THE OPPOSITE, BEING SO FIGETY OR RESTLESS THAT YOU HAVE BEEN MOVING AROUND A LOT MORE THAN USUAL: 1
SUM OF ALL RESPONSES TO PHQ QUESTIONS 1-9: 16
1. LITTLE INTEREST OR PLEASURE IN DOING THINGS: 2
10. IF YOU CHECKED OFF ANY PROBLEMS, HOW DIFFICULT HAVE THESE PROBLEMS MADE IT FOR YOU TO DO YOUR WORK, TAKE CARE OF THINGS AT HOME, OR GET ALONG WITH OTHER PEOPLE: 1

## 2018-05-16 ASSESSMENT — ENCOUNTER SYMPTOMS
SORE THROAT: 0
EYES NEGATIVE: 1
GASTROINTESTINAL NEGATIVE: 1
RESPIRATORY NEGATIVE: 1
TROUBLE SWALLOWING: 0

## 2018-05-17 ASSESSMENT — ENCOUNTER SYMPTOMS
ANAL BLEEDING: 0
ABDOMINAL PAIN: 0
ABDOMINAL DISTENTION: 0
BLOOD IN STOOL: 0

## 2018-05-19 PROBLEM — Z12.11 COLON CANCER SCREENING: Status: RESOLVED | Noted: 2017-02-26 | Resolved: 2018-05-19

## 2018-06-13 ENCOUNTER — TELEPHONE (OUTPATIENT)
Dept: PRIMARY CARE CLINIC | Age: 53
End: 2018-06-13

## 2018-06-13 ENCOUNTER — HOSPITAL ENCOUNTER (OUTPATIENT)
Dept: VASCULAR LAB | Age: 53
Discharge: HOME | End: 2018-06-15
Payer: MEDICARE

## 2018-06-13 DIAGNOSIS — M79.604 RIGHT LEG PAIN: ICD-10-CM

## 2018-06-13 DIAGNOSIS — M79.605 LEFT LEG PAIN: ICD-10-CM

## 2018-06-13 PROCEDURE — 93970 EXTREMITY STUDY: CPT

## 2018-06-20 RX ORDER — BUTALBITAL, ACETAMINOPHEN AND CAFFEINE 50; 325; 40 MG/1; MG/1; MG/1
1 TABLET ORAL EVERY 6 HOURS PRN
Qty: 120 TABLET | Refills: 0 | Status: SHIPPED | OUTPATIENT
Start: 2018-06-20 | End: 2019-04-09 | Stop reason: SDUPTHER

## 2018-06-26 ENCOUNTER — HOSPITAL ENCOUNTER (OUTPATIENT)
Age: 53
Discharge: HOME | End: 2018-06-26
Payer: MEDICARE

## 2018-06-26 DIAGNOSIS — N18.4 CKD (CHRONIC KIDNEY DISEASE) STAGE 4, GFR 15-29 ML/MIN (HCC): Chronic | ICD-10-CM

## 2018-06-26 LAB
ANION GAP SERPL CALCULATED.3IONS-SCNC: 16 MMOL/L (ref 9–17)
BUN BLDV-MCNC: 29 MG/DL (ref 6–20)
BUN/CREAT BLD: 14 (ref 9–20)
CALCIUM IONIZED: 1.23 MMOL/L (ref 1.13–1.33)
CALCIUM SERPL-MCNC: 9.1 MG/DL (ref 8.6–10.4)
CHLORIDE BLD-SCNC: 101 MMOL/L (ref 98–107)
CO2: 25 MMOL/L (ref 20–31)
CREAT SERPL-MCNC: 2.03 MG/DL (ref 0.5–0.9)
CREATININE URINE: 178.6 MG/DL (ref 28–217)
GFR AFRICAN AMERICAN: 31 ML/MIN
GFR NON-AFRICAN AMERICAN: 26 ML/MIN
GFR SERPL CREATININE-BSD FRML MDRD: ABNORMAL ML/MIN/{1.73_M2}
GFR SERPL CREATININE-BSD FRML MDRD: ABNORMAL ML/MIN/{1.73_M2}
GLUCOSE BLD-MCNC: 164 MG/DL (ref 70–99)
PHOSPHORUS: 3.6 MG/DL (ref 2.6–4.5)
POTASSIUM SERPL-SCNC: 3.3 MMOL/L (ref 3.7–5.3)
PTH INTACT: 105.9 PG/ML (ref 15–65)
SODIUM BLD-SCNC: 142 MMOL/L (ref 135–144)
TOTAL PROTEIN, URINE: 177 MG/DL

## 2018-06-26 PROCEDURE — 82330 ASSAY OF CALCIUM: CPT

## 2018-06-26 PROCEDURE — 84156 ASSAY OF PROTEIN URINE: CPT

## 2018-06-26 PROCEDURE — 80048 BASIC METABOLIC PNL TOTAL CA: CPT

## 2018-06-26 PROCEDURE — 36415 COLL VENOUS BLD VENIPUNCTURE: CPT

## 2018-06-26 PROCEDURE — 84100 ASSAY OF PHOSPHORUS: CPT

## 2018-06-26 PROCEDURE — 82306 VITAMIN D 25 HYDROXY: CPT

## 2018-06-26 PROCEDURE — 83970 ASSAY OF PARATHORMONE: CPT

## 2018-06-26 PROCEDURE — 82570 ASSAY OF URINE CREATININE: CPT

## 2018-06-27 PROBLEM — R76.9 ABNORMAL IMMUNOLOGICAL FINDING IN SERUM: Status: RESOLVED | Noted: 2018-04-19 | Resolved: 2018-06-27

## 2018-06-27 PROBLEM — M25.551 BILATERAL HIP PAIN: Chronic | Status: ACTIVE | Noted: 2017-09-27

## 2018-06-27 PROBLEM — R76.8 P-ANCA AND MPO ANTIBODIES POSITIVE: Status: RESOLVED | Noted: 2017-09-27 | Resolved: 2018-06-27

## 2018-06-27 PROBLEM — E83.51 HYPOCALCEMIA: Status: RESOLVED | Noted: 2018-04-19 | Resolved: 2018-06-27

## 2018-06-27 PROBLEM — K22.719 BARRETT'S ESOPHAGUS WITH DYSPLASIA: Chronic | Status: ACTIVE | Noted: 2018-05-15

## 2018-06-27 PROBLEM — M25.561 BILATERAL KNEE PAIN: Chronic | Status: ACTIVE | Noted: 2017-09-27

## 2018-06-27 PROBLEM — E55.9 VITAMIN D DEFICIENCY: Chronic | Status: ACTIVE | Noted: 2018-04-19

## 2018-06-27 PROBLEM — R94.4 ABNORMAL RENAL FUNCTION TEST: Status: RESOLVED | Noted: 2018-04-19 | Resolved: 2018-06-27

## 2018-06-27 PROBLEM — M25.562 BILATERAL KNEE PAIN: Chronic | Status: ACTIVE | Noted: 2017-09-27

## 2018-06-27 PROBLEM — M05.762: Chronic | Status: ACTIVE | Noted: 2018-04-19

## 2018-06-27 PROBLEM — R76.8 ANTI-CYCLIC CITRULLINATED PEPTIDE ANTIBODY POSITIVE: Chronic | Status: ACTIVE | Noted: 2017-09-27

## 2018-06-27 PROBLEM — M25.552 BILATERAL HIP PAIN: Chronic | Status: ACTIVE | Noted: 2017-09-27

## 2018-06-27 PROBLEM — E21.3 HYPERPARATHYROIDISM (HCC): Status: ACTIVE | Noted: 2018-06-27

## 2018-06-27 PROBLEM — N39.46 MIXED INCONTINENCE: Status: RESOLVED | Noted: 2017-05-23 | Resolved: 2018-06-27

## 2018-06-27 PROBLEM — R31.0 GROSS HEMATURIA: Status: RESOLVED | Noted: 2017-05-23 | Resolved: 2018-06-27

## 2018-06-27 PROBLEM — R73.9 HYPERGLYCEMIA: Status: RESOLVED | Noted: 2018-04-19 | Resolved: 2018-06-27

## 2018-06-27 PROBLEM — R79.9 ABNORMAL FINDING OF BLOOD CHEMISTRY: Status: RESOLVED | Noted: 2018-04-19 | Resolved: 2018-06-27

## 2018-06-27 PROBLEM — R31.29 MICROHEMATURIA: Status: RESOLVED | Noted: 2017-05-23 | Resolved: 2018-06-27

## 2018-06-27 LAB — VITAMIN D 25-HYDROXY: 21.7 NG/ML (ref 30–100)

## 2018-06-28 ENCOUNTER — OFFICE VISIT (OUTPATIENT)
Dept: PRIMARY CARE CLINIC | Age: 53
End: 2018-06-28
Payer: MEDICARE

## 2018-06-28 ENCOUNTER — HOSPITAL ENCOUNTER (OUTPATIENT)
Age: 53
Setting detail: SPECIMEN
Discharge: HOME | End: 2018-06-28
Payer: MEDICARE

## 2018-06-28 VITALS
BODY MASS INDEX: 48.12 KG/M2 | SYSTOLIC BLOOD PRESSURE: 115 MMHG | DIASTOLIC BLOOD PRESSURE: 77 MMHG | HEART RATE: 95 BPM | WEIGHT: 263.1 LBS | TEMPERATURE: 97.8 F | RESPIRATION RATE: 20 BRPM

## 2018-06-28 DIAGNOSIS — J02.9 SORE THROAT: Primary | ICD-10-CM

## 2018-06-28 DIAGNOSIS — J30.9 CHRONIC ALLERGIC RHINITIS, UNSPECIFIED SEASONALITY, UNSPECIFIED TRIGGER: Chronic | ICD-10-CM

## 2018-06-28 DIAGNOSIS — J02.9 SORE THROAT: ICD-10-CM

## 2018-06-28 LAB — S PYO AG THROAT QL: NORMAL

## 2018-06-28 PROCEDURE — G8427 DOCREV CUR MEDS BY ELIG CLIN: HCPCS | Performed by: NURSE PRACTITIONER

## 2018-06-28 PROCEDURE — G8417 CALC BMI ABV UP PARAM F/U: HCPCS | Performed by: NURSE PRACTITIONER

## 2018-06-28 PROCEDURE — 99213 OFFICE O/P EST LOW 20 MIN: CPT | Performed by: NURSE PRACTITIONER

## 2018-06-28 PROCEDURE — 3017F COLORECTAL CA SCREEN DOC REV: CPT | Performed by: NURSE PRACTITIONER

## 2018-06-28 PROCEDURE — 87651 STREP A DNA AMP PROBE: CPT

## 2018-06-28 PROCEDURE — 1036F TOBACCO NON-USER: CPT | Performed by: NURSE PRACTITIONER

## 2018-06-28 PROCEDURE — 87880 STREP A ASSAY W/OPTIC: CPT | Performed by: NURSE PRACTITIONER

## 2018-06-28 ASSESSMENT — ENCOUNTER SYMPTOMS
SINUS PRESSURE: 0
EYES NEGATIVE: 1
GASTROINTESTINAL NEGATIVE: 1
WHEEZING: 0
VOICE CHANGE: 1
SHORTNESS OF BREATH: 0
RESPIRATORY NEGATIVE: 1
SINUS PAIN: 0
COUGH: 0
RHINORRHEA: 1
TROUBLE SWALLOWING: 0
SORE THROAT: 1

## 2018-06-30 LAB
DIRECT EXAM: NORMAL
Lab: NORMAL
SPECIMEN DESCRIPTION: NORMAL
STATUS: NORMAL

## 2018-07-02 ENCOUNTER — TELEPHONE (OUTPATIENT)
Dept: PRIMARY CARE CLINIC | Age: 53
End: 2018-07-02

## 2018-07-02 NOTE — TELEPHONE ENCOUNTER
----- Message from ARISTIDES Hernández CNP sent at 7/2/2018  2:16 PM EDT -----  Results of strep normal.   She is asked to follow-up if symptoms persist or worsen. Please relate to patient/parent. Thank you.    [facility]

## 2018-07-03 ENCOUNTER — HOSPITAL ENCOUNTER (OUTPATIENT)
Age: 53
Discharge: HOME | End: 2018-07-03
Payer: MEDICARE

## 2018-07-03 DIAGNOSIS — I10 ESSENTIAL HYPERTENSION: Chronic | ICD-10-CM

## 2018-07-03 DIAGNOSIS — D50.9 IRON DEFICIENCY ANEMIA, UNSPECIFIED IRON DEFICIENCY ANEMIA TYPE: ICD-10-CM

## 2018-07-03 DIAGNOSIS — E55.9 VITAMIN D DEFICIENCY: Chronic | ICD-10-CM

## 2018-07-03 DIAGNOSIS — N18.4 CKD (CHRONIC KIDNEY DISEASE) STAGE 4, GFR 15-29 ML/MIN (HCC): Chronic | ICD-10-CM

## 2018-07-03 LAB
ANION GAP SERPL CALCULATED.3IONS-SCNC: 13 MMOL/L (ref 9–17)
CHLORIDE BLD-SCNC: 102 MMOL/L (ref 98–107)
CO2: 25 MMOL/L (ref 20–31)
POTASSIUM SERPL-SCNC: 3.9 MMOL/L (ref 3.7–5.3)
SODIUM BLD-SCNC: 140 MMOL/L (ref 135–144)

## 2018-07-03 PROCEDURE — 80051 ELECTROLYTE PANEL: CPT

## 2018-07-03 PROCEDURE — 36415 COLL VENOUS BLD VENIPUNCTURE: CPT

## 2018-08-16 ENCOUNTER — HOSPITAL ENCOUNTER (OUTPATIENT)
Age: 53
Discharge: HOME | End: 2018-08-16
Payer: MEDICARE

## 2018-08-16 DIAGNOSIS — I10 ESSENTIAL HYPERTENSION: Chronic | ICD-10-CM

## 2018-08-16 DIAGNOSIS — E78.5 HYPERLIPIDEMIA, UNSPECIFIED HYPERLIPIDEMIA TYPE: ICD-10-CM

## 2018-08-16 LAB
ABSOLUTE EOS #: 0.29 K/UL (ref 0–0.44)
ABSOLUTE IMMATURE GRANULOCYTE: <0.03 K/UL (ref 0–0.3)
ABSOLUTE LYMPH #: 0.75 K/UL (ref 1.1–3.7)
ABSOLUTE MONO #: 0.4 K/UL (ref 0.1–1.2)
ALBUMIN SERPL-MCNC: 3.7 G/DL (ref 3.5–5.2)
ALBUMIN/GLOBULIN RATIO: 1.1 (ref 1–2.5)
ALP BLD-CCNC: 166 U/L (ref 35–104)
ALT SERPL-CCNC: 12 U/L (ref 5–33)
ANION GAP SERPL CALCULATED.3IONS-SCNC: 12 MMOL/L (ref 9–17)
AST SERPL-CCNC: 16 U/L
BASOPHILS # BLD: 1 % (ref 0–2)
BASOPHILS ABSOLUTE: 0.03 K/UL (ref 0–0.2)
BILIRUB SERPL-MCNC: 0.21 MG/DL (ref 0.3–1.2)
BUN BLDV-MCNC: 38 MG/DL (ref 6–20)
BUN/CREAT BLD: 17 (ref 9–20)
CALCIUM SERPL-MCNC: 9.3 MG/DL (ref 8.6–10.4)
CHLORIDE BLD-SCNC: 104 MMOL/L (ref 98–107)
CHOLESTEROL/HDL RATIO: 4.4
CHOLESTEROL: 157 MG/DL
CO2: 26 MMOL/L (ref 20–31)
CREAT SERPL-MCNC: 2.27 MG/DL (ref 0.5–0.9)
DIFFERENTIAL TYPE: ABNORMAL
EOSINOPHILS RELATIVE PERCENT: 5 % (ref 1–4)
GFR AFRICAN AMERICAN: 27 ML/MIN
GFR NON-AFRICAN AMERICAN: 23 ML/MIN
GFR SERPL CREATININE-BSD FRML MDRD: ABNORMAL ML/MIN/{1.73_M2}
GFR SERPL CREATININE-BSD FRML MDRD: ABNORMAL ML/MIN/{1.73_M2}
GLUCOSE BLD-MCNC: 127 MG/DL (ref 70–99)
HCT VFR BLD CALC: 30.7 % (ref 36.3–47.1)
HDLC SERPL-MCNC: 36 MG/DL
HEMOGLOBIN: 10 G/DL (ref 11.9–15.1)
IMMATURE GRANULOCYTES: 0 %
LDL CHOLESTEROL: 68 MG/DL (ref 0–130)
LYMPHOCYTES # BLD: 14 % (ref 24–43)
MCH RBC QN AUTO: 30 PG (ref 25.2–33.5)
MCHC RBC AUTO-ENTMCNC: 32.6 G/DL (ref 28.4–34.8)
MCV RBC AUTO: 92.2 FL (ref 82.6–102.9)
MONOCYTES # BLD: 7 % (ref 3–12)
NRBC AUTOMATED: 0 PER 100 WBC
PDW BLD-RTO: 13.5 % (ref 11.8–14.4)
PLATELET # BLD: 219 K/UL (ref 138–453)
PLATELET ESTIMATE: ABNORMAL
PMV BLD AUTO: 9.6 FL (ref 8.1–13.5)
POTASSIUM SERPL-SCNC: 3.7 MMOL/L (ref 3.7–5.3)
RBC # BLD: 3.33 M/UL (ref 3.95–5.11)
RBC # BLD: ABNORMAL 10*6/UL
SEG NEUTROPHILS: 73 % (ref 36–65)
SEGMENTED NEUTROPHILS ABSOLUTE COUNT: 3.99 K/UL (ref 1.5–8.1)
SODIUM BLD-SCNC: 142 MMOL/L (ref 135–144)
TOTAL PROTEIN: 7.2 G/DL (ref 6.4–8.3)
TRIGL SERPL-MCNC: 265 MG/DL
VLDLC SERPL CALC-MCNC: ABNORMAL MG/DL (ref 1–30)
WBC # BLD: 5.5 K/UL (ref 3.5–11.3)
WBC # BLD: ABNORMAL 10*3/UL

## 2018-08-16 PROCEDURE — 85025 COMPLETE CBC W/AUTO DIFF WBC: CPT

## 2018-08-16 PROCEDURE — 80061 LIPID PANEL: CPT

## 2018-08-16 PROCEDURE — 36415 COLL VENOUS BLD VENIPUNCTURE: CPT

## 2018-08-16 PROCEDURE — 80053 COMPREHEN METABOLIC PANEL: CPT

## 2018-08-17 ENCOUNTER — TELEPHONE (OUTPATIENT)
Dept: PRIMARY CARE CLINIC | Age: 53
End: 2018-08-17

## 2018-08-20 ENCOUNTER — HOSPITAL ENCOUNTER (OUTPATIENT)
Age: 53
Discharge: HOME | End: 2018-08-20
Payer: MEDICARE

## 2018-08-20 DIAGNOSIS — N18.4 CKD (CHRONIC KIDNEY DISEASE) STAGE 4, GFR 15-29 ML/MIN (HCC): Chronic | ICD-10-CM

## 2018-08-20 LAB
ANION GAP SERPL CALCULATED.3IONS-SCNC: 13 MMOL/L (ref 9–17)
BUN BLDV-MCNC: 39 MG/DL (ref 6–20)
BUN/CREAT BLD: 18 (ref 9–20)
CALCIUM SERPL-MCNC: 9.3 MG/DL (ref 8.6–10.4)
CHLORIDE BLD-SCNC: 101 MMOL/L (ref 98–107)
CO2: 27 MMOL/L (ref 20–31)
CREAT SERPL-MCNC: 2.11 MG/DL (ref 0.5–0.9)
GFR AFRICAN AMERICAN: 30 ML/MIN
GFR NON-AFRICAN AMERICAN: 25 ML/MIN
GFR SERPL CREATININE-BSD FRML MDRD: ABNORMAL ML/MIN/{1.73_M2}
GFR SERPL CREATININE-BSD FRML MDRD: ABNORMAL ML/MIN/{1.73_M2}
GLUCOSE BLD-MCNC: 115 MG/DL (ref 70–99)
POTASSIUM SERPL-SCNC: 4.1 MMOL/L (ref 3.7–5.3)
SODIUM BLD-SCNC: 141 MMOL/L (ref 135–144)

## 2018-08-20 PROCEDURE — 36415 COLL VENOUS BLD VENIPUNCTURE: CPT

## 2018-08-20 PROCEDURE — 80048 BASIC METABOLIC PNL TOTAL CA: CPT

## 2018-09-05 ENCOUNTER — HOSPITAL ENCOUNTER (OUTPATIENT)
Age: 53
Discharge: HOME | End: 2018-09-05
Payer: MEDICARE

## 2018-09-05 DIAGNOSIS — N18.30 CHRONIC KIDNEY DISEASE, STAGE III (MODERATE) (HCC): Chronic | ICD-10-CM

## 2018-09-05 DIAGNOSIS — D47.2 MGUS (MONOCLONAL GAMMOPATHY OF UNKNOWN SIGNIFICANCE): ICD-10-CM

## 2018-09-05 DIAGNOSIS — N18.4 ANEMIA OF CHRONIC KIDNEY FAILURE, STAGE 4 (SEVERE) (HCC): ICD-10-CM

## 2018-09-05 DIAGNOSIS — D63.1 ANEMIA OF CHRONIC KIDNEY FAILURE, STAGE 4 (SEVERE) (HCC): ICD-10-CM

## 2018-09-05 LAB
ABSOLUTE EOS #: 0.39 K/UL (ref 0–0.44)
ABSOLUTE IMMATURE GRANULOCYTE: <0.03 K/UL (ref 0–0.3)
ABSOLUTE LYMPH #: 0.89 K/UL (ref 1.1–3.7)
ABSOLUTE MONO #: 0.45 K/UL (ref 0.1–1.2)
ANION GAP SERPL CALCULATED.3IONS-SCNC: 13 MMOL/L (ref 9–17)
BASOPHILS # BLD: 1 % (ref 0–2)
BASOPHILS ABSOLUTE: 0.04 K/UL (ref 0–0.2)
BUN BLDV-MCNC: 40 MG/DL (ref 6–20)
BUN/CREAT BLD: 21 (ref 9–20)
CALCIUM SERPL-MCNC: 9 MG/DL (ref 8.6–10.4)
CHLORIDE BLD-SCNC: 100 MMOL/L (ref 98–107)
CO2: 26 MMOL/L (ref 20–31)
CREAT SERPL-MCNC: 1.94 MG/DL (ref 0.5–0.9)
DIFFERENTIAL TYPE: ABNORMAL
EOSINOPHILS RELATIVE PERCENT: 7 % (ref 1–4)
FREE KAPPA/LAMBDA RATIO: 2.06 (ref 0.26–1.65)
GFR AFRICAN AMERICAN: 33 ML/MIN
GFR NON-AFRICAN AMERICAN: 27 ML/MIN
GFR SERPL CREATININE-BSD FRML MDRD: ABNORMAL ML/MIN/{1.73_M2}
GFR SERPL CREATININE-BSD FRML MDRD: ABNORMAL ML/MIN/{1.73_M2}
GLUCOSE BLD-MCNC: 110 MG/DL (ref 70–99)
HCT VFR BLD CALC: 32.6 % (ref 36.3–47.1)
HEMOGLOBIN: 10.5 G/DL (ref 11.9–15.1)
IGA: 179 MG/DL (ref 70–400)
IGG: 1424 MG/DL (ref 700–1600)
IGM: 224 MG/DL (ref 40–230)
IMMATURE GRANULOCYTES: 0 %
KAPPA FREE LIGHT CHAINS QNT: 11.13 MG/DL (ref 0.37–1.94)
LAMBDA FREE LIGHT CHAINS QNT: 5.4 MG/DL (ref 0.57–2.63)
LYMPHOCYTES # BLD: 15 % (ref 24–43)
MCH RBC QN AUTO: 29.9 PG (ref 25.2–33.5)
MCHC RBC AUTO-ENTMCNC: 32.2 G/DL (ref 28.4–34.8)
MCV RBC AUTO: 92.9 FL (ref 82.6–102.9)
MONOCYTES # BLD: 8 % (ref 3–12)
NRBC AUTOMATED: 0 PER 100 WBC
PDW BLD-RTO: 13.2 % (ref 11.8–14.4)
PLATELET # BLD: 263 K/UL (ref 138–453)
PLATELET ESTIMATE: ABNORMAL
PMV BLD AUTO: 9.6 FL (ref 8.1–13.5)
POTASSIUM SERPL-SCNC: 4.4 MMOL/L (ref 3.7–5.3)
RBC # BLD: 3.51 M/UL (ref 3.95–5.11)
RBC # BLD: ABNORMAL 10*6/UL
SEG NEUTROPHILS: 69 % (ref 36–65)
SEGMENTED NEUTROPHILS ABSOLUTE COUNT: 4.24 K/UL (ref 1.5–8.1)
SODIUM BLD-SCNC: 139 MMOL/L (ref 135–144)
WBC # BLD: 6 K/UL (ref 3.5–11.3)
WBC # BLD: ABNORMAL 10*3/UL

## 2018-09-05 PROCEDURE — 82784 ASSAY IGA/IGD/IGG/IGM EACH: CPT

## 2018-09-05 PROCEDURE — 36415 COLL VENOUS BLD VENIPUNCTURE: CPT

## 2018-09-05 PROCEDURE — 80048 BASIC METABOLIC PNL TOTAL CA: CPT

## 2018-09-05 PROCEDURE — 83883 ASSAY NEPHELOMETRY NOT SPEC: CPT

## 2018-09-05 PROCEDURE — 85025 COMPLETE CBC W/AUTO DIFF WBC: CPT

## 2018-09-06 ENCOUNTER — TELEPHONE (OUTPATIENT)
Dept: PRIMARY CARE CLINIC | Age: 53
End: 2018-09-06

## 2018-09-06 ENCOUNTER — OFFICE VISIT (OUTPATIENT)
Dept: PRIMARY CARE CLINIC | Age: 53
End: 2018-09-06
Payer: MEDICARE

## 2018-09-06 VITALS
DIASTOLIC BLOOD PRESSURE: 76 MMHG | TEMPERATURE: 97.9 F | BODY MASS INDEX: 47.87 KG/M2 | RESPIRATION RATE: 20 BRPM | WEIGHT: 261.7 LBS | HEART RATE: 79 BPM | SYSTOLIC BLOOD PRESSURE: 119 MMHG

## 2018-09-06 DIAGNOSIS — Z23 NEED FOR SHINGLES VACCINE: ICD-10-CM

## 2018-09-06 DIAGNOSIS — R73.09 ELEVATED GLUCOSE: ICD-10-CM

## 2018-09-06 DIAGNOSIS — Z23 NEED FOR INFLUENZA VACCINATION: ICD-10-CM

## 2018-09-06 DIAGNOSIS — E78.2 MIXED HYPERLIPIDEMIA: ICD-10-CM

## 2018-09-06 DIAGNOSIS — Z23 NEED FOR TDAP VACCINATION: ICD-10-CM

## 2018-09-06 DIAGNOSIS — F41.8 DEPRESSION WITH ANXIETY: Chronic | ICD-10-CM

## 2018-09-06 DIAGNOSIS — I10 ESSENTIAL HYPERTENSION: Primary | Chronic | ICD-10-CM

## 2018-09-06 DIAGNOSIS — J01.10 ACUTE NON-RECURRENT FRONTAL SINUSITIS: ICD-10-CM

## 2018-09-06 DIAGNOSIS — N18.4 CKD (CHRONIC KIDNEY DISEASE) STAGE 4, GFR 15-29 ML/MIN (HCC): Chronic | ICD-10-CM

## 2018-09-06 DIAGNOSIS — D50.8 OTHER IRON DEFICIENCY ANEMIA: ICD-10-CM

## 2018-09-06 DIAGNOSIS — Z12.39 BREAST CANCER SCREENING: ICD-10-CM

## 2018-09-06 DIAGNOSIS — R04.0 EPISTAXIS: ICD-10-CM

## 2018-09-06 DIAGNOSIS — J01.00 ACUTE NON-RECURRENT MAXILLARY SINUSITIS: ICD-10-CM

## 2018-09-06 DIAGNOSIS — J30.2 SEASONAL ALLERGIES: ICD-10-CM

## 2018-09-06 LAB — HBA1C MFR BLD: 6.1 %

## 2018-09-06 PROCEDURE — G8427 DOCREV CUR MEDS BY ELIG CLIN: HCPCS | Performed by: NURSE PRACTITIONER

## 2018-09-06 PROCEDURE — 1036F TOBACCO NON-USER: CPT | Performed by: NURSE PRACTITIONER

## 2018-09-06 PROCEDURE — 90472 IMMUNIZATION ADMIN EACH ADD: CPT | Performed by: NURSE PRACTITIONER

## 2018-09-06 PROCEDURE — 99214 OFFICE O/P EST MOD 30 MIN: CPT | Performed by: NURSE PRACTITIONER

## 2018-09-06 PROCEDURE — 90686 IIV4 VACC NO PRSV 0.5 ML IM: CPT | Performed by: NURSE PRACTITIONER

## 2018-09-06 PROCEDURE — 3017F COLORECTAL CA SCREEN DOC REV: CPT | Performed by: NURSE PRACTITIONER

## 2018-09-06 PROCEDURE — 90715 TDAP VACCINE 7 YRS/> IM: CPT | Performed by: NURSE PRACTITIONER

## 2018-09-06 PROCEDURE — G8417 CALC BMI ABV UP PARAM F/U: HCPCS | Performed by: NURSE PRACTITIONER

## 2018-09-06 PROCEDURE — 83036 HEMOGLOBIN GLYCOSYLATED A1C: CPT | Performed by: NURSE PRACTITIONER

## 2018-09-06 PROCEDURE — 90471 IMMUNIZATION ADMIN: CPT | Performed by: NURSE PRACTITIONER

## 2018-09-06 RX ORDER — DOXYCYCLINE HYCLATE 100 MG
100 TABLET ORAL 2 TIMES DAILY
Qty: 14 TABLET | Refills: 0 | Status: SHIPPED | OUTPATIENT
Start: 2018-09-06 | End: 2018-09-13

## 2018-09-06 RX ORDER — CETIRIZINE HYDROCHLORIDE 5 MG/1
5 TABLET ORAL DAILY
Qty: 30 TABLET | Refills: 3 | Status: SHIPPED | OUTPATIENT
Start: 2018-09-06 | End: 2019-01-15 | Stop reason: SDUPTHER

## 2018-09-06 RX ORDER — DULOXETIN HYDROCHLORIDE 30 MG/1
30 CAPSULE, DELAYED RELEASE ORAL DAILY
Qty: 30 CAPSULE | Refills: 3 | Status: SHIPPED | OUTPATIENT
Start: 2018-09-06 | End: 2019-04-06 | Stop reason: SDUPTHER

## 2018-09-06 RX ORDER — ATORVASTATIN CALCIUM 80 MG/1
80 TABLET, FILM COATED ORAL DAILY
Qty: 30 TABLET | Refills: 3 | Status: SHIPPED | OUTPATIENT
Start: 2018-09-06 | End: 2019-01-27 | Stop reason: SDUPTHER

## 2018-09-06 RX ORDER — AMOXICILLIN AND CLAVULANATE POTASSIUM 250; 125 MG/1; MG/1
1 TABLET, FILM COATED ORAL 2 TIMES DAILY
Qty: 20 TABLET | Refills: 0 | Status: SHIPPED | OUTPATIENT
Start: 2018-09-06 | End: 2018-09-06

## 2018-09-06 ASSESSMENT — ENCOUNTER SYMPTOMS
EYES NEGATIVE: 1
RESPIRATORY NEGATIVE: 1
SINUS PAIN: 1
SORE THROAT: 0
ABDOMINAL PAIN: 0
SINUS PRESSURE: 1
TROUBLE SWALLOWING: 0
GASTROINTESTINAL NEGATIVE: 1

## 2018-09-06 NOTE — TELEPHONE ENCOUNTER
Alternative antibiotic sent to pharmacy due to unavailability of Augmentin. Please inform patient.   Thank you

## 2018-09-06 NOTE — PROGRESS NOTES
problem has been unchanged. Associated symptoms include congestion. Pertinent negatives include no abdominal pain, chest pain, chills, fever, headaches, rash or sore throat. Associated symptoms comments: C/o sinus pressure, nose feels plugged up. Nothing aggravates the symptoms. She has tried nothing for the symptoms. Mental Health Problem   The current episode started more than 1 month ago. This is a chronic problem. The degree of incapacity that she is experiencing as a consequence of her illness is mild. Additional symptoms of the illness do not include hypersomnia, appetite change, unexpected weight change, agitation, headaches or abdominal pain. She does not admit to suicidal ideas. She does not have a plan to commit suicide. She does not contemplate harming herself. She has not already injured self. She does not contemplate injuring another person. She has not already  injured another person. Past Medical History:     Past Medical History:   Diagnosis Date    [facility] [address]     Anemia     Arthritis     Chronic kidney disease     Depression with anxiety 9/8/2016    GERD (gastroesophageal reflux disease)     H/O echocardiogram 08/25/2016    EF 55%. Mild mitral regurgitation.  H/O tooth extraction 07/26/2017    Lower posterior right tooth.  History of blood transfusion     x3. Last one in May 2016    Hyperlipidemia     Hypertensive crisis 8/24/2016    Kidney stones     Vasculitis [facility])       Reviewed all health maintenance requirements and ordered appropriate tests  There are no preventive care reminders to display for this patient. Past Surgical History:     Past Surgical History:   Procedure Laterality Date    BONE MARROW BIOPSY  5-23-16    Per Hematologist order @ [facility].     CHOLECYSTECTOMY      COLONOSCOPY  2016    ESOPHAGEAL DILATATION  9/12/2018    ESOPHAGEAL DILATATION performed by Akin Harrell MD at [address] [place] [address]  11/09/2016    with tubes swallowing. Eyes: Negative. Negative for visual disturbance. Respiratory: Negative. Cardiovascular: Negative. Negative for chest pain, palpitations and leg swelling. Gastrointestinal: Negative. Negative for abdominal pain. Genitourinary: Negative. Musculoskeletal: Negative. Skin: Negative. Negative for rash. Neurological: Negative. Negative for dizziness and headaches. Psychiatric/Behavioral: Negative. Negative for agitation, self-injury, sleep disturbance and suicidal ideas. Physical Exam:   Vitals:  /76 (Site: Left Arm, Position: Sitting, Cuff Size: Large Adult)   Pulse 79   Temp 97.9 °F (36.6 °C) (Temporal)   Resp 20   Wt 261 lb 11.2 oz (118.7 kg)   LMP 09/09/2016   BMI 47.87 kg/m²     Physical Exam   Constitutional: She is oriented to person, place, and time. Vital signs are normal. She appears well-developed and well-nourished. She is cooperative. Non-toxic appearance. She does not appear ill. No distress. Appears well hydrated and non toxic. Sitting upright on exam table without distress. Respirations are regular, non labored and quiet. Talkative    HENT:   Head: Normocephalic and atraumatic. Right Ear: Tympanic membrane, external ear and ear canal normal.   Left Ear: Tympanic membrane, external ear and ear canal normal.   Nose: Mucosal edema present. No rhinorrhea. No epistaxis. Right sinus exhibits maxillary sinus tenderness and frontal sinus tenderness. Left sinus exhibits maxillary sinus tenderness and frontal sinus tenderness. Mouth/Throat: Uvula is midline and mucous membranes are normal. No trismus in the jaw. Posterior oropharyngeal erythema present. No oropharyngeal exudate, posterior oropharyngeal edema or tonsillar abscesses. Uvula midline, no swelling, no elongation, no erythema. No peritonsillar abscess. No blood noted to the posterior oropharynx. Eyes: Pupils are equal, round, and reactive to light.  EOM are normal.   Neck: Normal range of motion. Neck supple. No tracheal deviation present. No thyromegaly present. Cardiovascular: Normal rate, regular rhythm, normal heart sounds and intact distal pulses. Exam reveals no gallop and no friction rub. No murmur heard. Pulses:       Radial pulses are 2+ on the left side. Dorsalis pedis pulses are 2+ on the right side, and 2+ on the left side. No peripheral edema   Pulmonary/Chest: Effort normal and breath sounds normal. No respiratory distress. She has no decreased breath sounds. She has no wheezes. She has no rhonchi. She has no rales. No cough, no wheeze, no distress  Breath sounds are clear to auscultation over posterior bilateral fields. Chest expansion is symmetrical with non-restricted air movement. Abdominal: Soft. Normal appearance and bowel sounds are normal. There is no hepatosplenomegaly. There is no rigidity, no guarding, no tenderness at McBurney's point and negative Wen's sign. Musculoskeletal: Normal range of motion. She exhibits no edema. Lymphadenopathy:     She has no cervical adenopathy. Neurological: She is alert and oriented to person, place, and time. She exhibits normal muscle tone. Skin: Skin is warm and dry. No rash noted. No erythema. Psychiatric: She has a normal mood and affect. Her speech is normal and behavior is normal. Judgment and thought content normal.   Nursing note and vitals reviewed.       Date:     Lab Results   Component Value Date     09/05/2018    K 4.4 09/05/2018     09/05/2018    CO2 26 09/05/2018    BUN 40 09/05/2018    CREATININE 1.94 09/05/2018    GLUCOSE 110 09/05/2018    PROT 7.2 08/16/2018    LABALBU 3.7 08/16/2018    BILITOT 0.21 08/16/2018    ALKPHOS 166 08/16/2018    AST 16 08/16/2018    ALT 12 08/16/2018     Lab Results   Component Value Date    WBC 6.0 09/05/2018    RBC 3.51 09/05/2018    HGB 10.5 09/05/2018    HCT 32.6 09/05/2018    MCV 92.9 09/05/2018    MCH 29.9 09/05/2018    MCHC 32.2

## 2018-09-11 ENCOUNTER — OFFICE VISIT (OUTPATIENT)
Dept: GASTROENTEROLOGY | Age: 53
End: 2018-09-11
Payer: MEDICARE

## 2018-09-11 VITALS
WEIGHT: 259.1 LBS | HEART RATE: 89 BPM | HEIGHT: 64 IN | BODY MASS INDEX: 44.24 KG/M2 | TEMPERATURE: 98.1 F | RESPIRATION RATE: 16 BRPM | SYSTOLIC BLOOD PRESSURE: 121 MMHG | DIASTOLIC BLOOD PRESSURE: 77 MMHG

## 2018-09-11 DIAGNOSIS — K22.70 BARRETT'S ESOPHAGUS WITHOUT DYSPLASIA: ICD-10-CM

## 2018-09-11 DIAGNOSIS — R13.19 ESOPHAGEAL DYSPHAGIA: ICD-10-CM

## 2018-09-11 DIAGNOSIS — K21.9 GASTROESOPHAGEAL REFLUX DISEASE, ESOPHAGITIS PRESENCE NOT SPECIFIED: Primary | ICD-10-CM

## 2018-09-11 DIAGNOSIS — Z86.010 HISTORY OF COLON POLYPS: ICD-10-CM

## 2018-09-11 PROBLEM — Z86.0100 HISTORY OF COLON POLYPS: Status: ACTIVE | Noted: 2018-09-11

## 2018-09-11 PROCEDURE — G8417 CALC BMI ABV UP PARAM F/U: HCPCS | Performed by: INTERNAL MEDICINE

## 2018-09-11 PROCEDURE — 99204 OFFICE O/P NEW MOD 45 MIN: CPT | Performed by: INTERNAL MEDICINE

## 2018-09-11 PROCEDURE — G8427 DOCREV CUR MEDS BY ELIG CLIN: HCPCS | Performed by: INTERNAL MEDICINE

## 2018-09-11 PROCEDURE — 1036F TOBACCO NON-USER: CPT | Performed by: INTERNAL MEDICINE

## 2018-09-11 PROCEDURE — 3017F COLORECTAL CA SCREEN DOC REV: CPT | Performed by: INTERNAL MEDICINE

## 2018-09-11 RX ORDER — MECLIZINE HYDROCHLORIDE 25 MG/1
25 TABLET ORAL 3 TIMES DAILY PRN
COMMUNITY
End: 2019-09-26 | Stop reason: SDUPTHER

## 2018-09-11 NOTE — PATIENT INSTRUCTIONS
SURVEY:    You may be receiving a survey from [employer] regarding your visit today. Please complete the survey to enable us to provide the highest quality of care to you and your family. If you cannot score us a very good on any question, please call the office to discuss how we could have made your experience a very good one. Thank you.

## 2018-09-11 NOTE — PROGRESS NOTES
coloration and turgor, no rashes, no suspicious skin lesions noted    This lady has known GERD with biopsy proven short segment Chicas's esophagus without dysplasia. In spite of of daily PPI therapy, she has ongoing symptoms as discussed above. Her ongoing symptoms could be due to inadequately treated GERD, neoplasm or stricture. She will undergo EGD for further evaluation. She also has a history of a 1 cm adenomatous colon polyp which was removed in May, 2016. Given the size of the lesion, she will need a follow-up colonoscopy in May, 2019. Alternatives, risks and benefits of this approach were discussed with the patient who understands and agrees. Please note that portions of this note were completed with a voice recognition program. Efforts were made to edit the dictation but occasionally words are mis-transcribed.

## 2018-09-12 ENCOUNTER — ANESTHESIA (OUTPATIENT)
Dept: OPERATING ROOM | Age: 53
End: 2018-09-12
Payer: MEDICARE

## 2018-09-12 ENCOUNTER — HOSPITAL ENCOUNTER (OUTPATIENT)
Age: 53
Setting detail: OUTPATIENT SURGERY
Discharge: HOME | End: 2018-09-12
Attending: INTERNAL MEDICINE | Admitting: INTERNAL MEDICINE
Payer: MEDICARE

## 2018-09-12 ENCOUNTER — ANESTHESIA EVENT (OUTPATIENT)
Dept: OPERATING ROOM | Age: 53
End: 2018-09-12
Payer: MEDICARE

## 2018-09-12 VITALS
RESPIRATION RATE: 18 BRPM | BODY MASS INDEX: 44.22 KG/M2 | OXYGEN SATURATION: 96 % | DIASTOLIC BLOOD PRESSURE: 83 MMHG | TEMPERATURE: 97.1 F | HEIGHT: 64 IN | HEART RATE: 82 BPM | WEIGHT: 259 LBS | SYSTOLIC BLOOD PRESSURE: 135 MMHG

## 2018-09-12 VITALS
RESPIRATION RATE: 17 BRPM | SYSTOLIC BLOOD PRESSURE: 116 MMHG | DIASTOLIC BLOOD PRESSURE: 75 MMHG | OXYGEN SATURATION: 97 %

## 2018-09-12 PROCEDURE — 3609012400 HC EGD TRANSORAL BIOPSY SINGLE/MULTIPLE: Performed by: INTERNAL MEDICINE

## 2018-09-12 PROCEDURE — 2709999900 HC NON-CHARGEABLE SUPPLY: Performed by: INTERNAL MEDICINE

## 2018-09-12 PROCEDURE — 43248 EGD GUIDE WIRE INSERTION: CPT | Performed by: INTERNAL MEDICINE

## 2018-09-12 PROCEDURE — 43239 EGD BIOPSY SINGLE/MULTIPLE: CPT | Performed by: INTERNAL MEDICINE

## 2018-09-12 PROCEDURE — 3609019100 HC ESOPHAGEAL DILATION: Performed by: INTERNAL MEDICINE

## 2018-09-12 PROCEDURE — 7100000010 HC PHASE II RECOVERY - FIRST 15 MIN: Performed by: INTERNAL MEDICINE

## 2018-09-12 PROCEDURE — 88342 IMHCHEM/IMCYTCHM 1ST ANTB: CPT

## 2018-09-12 PROCEDURE — 2580000003 HC RX 258: Performed by: INTERNAL MEDICINE

## 2018-09-12 PROCEDURE — 3700000000 HC ANESTHESIA ATTENDED CARE: Performed by: INTERNAL MEDICINE

## 2018-09-12 PROCEDURE — 7100000011 HC PHASE II RECOVERY - ADDTL 15 MIN: Performed by: INTERNAL MEDICINE

## 2018-09-12 PROCEDURE — 88305 TISSUE EXAM BY PATHOLOGIST: CPT

## 2018-09-12 PROCEDURE — 6360000002 HC RX W HCPCS: Performed by: NURSE ANESTHETIST, CERTIFIED REGISTERED

## 2018-09-12 PROCEDURE — 3700000001 HC ADD 15 MINUTES (ANESTHESIA): Performed by: INTERNAL MEDICINE

## 2018-09-12 PROCEDURE — 2500000003 HC RX 250 WO HCPCS: Performed by: NURSE ANESTHETIST, CERTIFIED REGISTERED

## 2018-09-12 RX ORDER — LIDOCAINE HYDROCHLORIDE 20 MG/ML
INJECTION, SOLUTION EPIDURAL; INFILTRATION; INTRACAUDAL; PERINEURAL PRN
Status: DISCONTINUED | OUTPATIENT
Start: 2018-09-12 | End: 2018-09-12 | Stop reason: SDUPTHER

## 2018-09-12 RX ORDER — FENTANYL CITRATE 50 UG/ML
INJECTION, SOLUTION INTRAMUSCULAR; INTRAVENOUS PRN
Status: DISCONTINUED | OUTPATIENT
Start: 2018-09-12 | End: 2018-09-12 | Stop reason: SDUPTHER

## 2018-09-12 RX ORDER — PROPOFOL 10 MG/ML
INJECTION, EMULSION INTRAVENOUS PRN
Status: DISCONTINUED | OUTPATIENT
Start: 2018-09-12 | End: 2018-09-12 | Stop reason: SDUPTHER

## 2018-09-12 RX ORDER — SODIUM CHLORIDE, SODIUM LACTATE, POTASSIUM CHLORIDE, CALCIUM CHLORIDE 600; 310; 30; 20 MG/100ML; MG/100ML; MG/100ML; MG/100ML
INJECTION, SOLUTION INTRAVENOUS CONTINUOUS
Status: DISCONTINUED | OUTPATIENT
Start: 2018-09-12 | End: 2018-09-12 | Stop reason: HOSPADM

## 2018-09-12 RX ADMIN — LIDOCAINE HYDROCHLORIDE 40 MG: 20 INJECTION, SOLUTION EPIDURAL; INFILTRATION; INTRACAUDAL at 14:24

## 2018-09-12 RX ADMIN — FENTANYL CITRATE 25 MCG: 50 INJECTION INTRAMUSCULAR; INTRAVENOUS at 14:35

## 2018-09-12 RX ADMIN — PROPOFOL 30 MG: 10 INJECTION, EMULSION INTRAVENOUS at 14:35

## 2018-09-12 RX ADMIN — PROPOFOL 40 MG: 10 INJECTION, EMULSION INTRAVENOUS at 14:39

## 2018-09-12 RX ADMIN — PROPOFOL 40 MG: 10 INJECTION, EMULSION INTRAVENOUS at 14:31

## 2018-09-12 RX ADMIN — PROPOFOL 40 MG: 10 INJECTION, EMULSION INTRAVENOUS at 14:33

## 2018-09-12 RX ADMIN — SODIUM CHLORIDE, POTASSIUM CHLORIDE, SODIUM LACTATE AND CALCIUM CHLORIDE: 600; 310; 30; 20 INJECTION, SOLUTION INTRAVENOUS at 13:10

## 2018-09-12 RX ADMIN — LIDOCAINE HYDROCHLORIDE 60 MG: 20 INJECTION, SOLUTION EPIDURAL; INFILTRATION; INTRACAUDAL at 14:29

## 2018-09-12 RX ADMIN — PROPOFOL 80 MG: 10 INJECTION, EMULSION INTRAVENOUS at 14:29

## 2018-09-12 RX ADMIN — FENTANYL CITRATE 25 MCG: 50 INJECTION INTRAMUSCULAR; INTRAVENOUS at 14:24

## 2018-09-12 ASSESSMENT — PAIN - FUNCTIONAL ASSESSMENT: PAIN_FUNCTIONAL_ASSESSMENT: 0-10

## 2018-09-12 ASSESSMENT — PAIN SCALES - GENERAL
PAINLEVEL_OUTOF10: 0

## 2018-09-12 ASSESSMENT — LIFESTYLE VARIABLES: SMOKING_STATUS: 0

## 2018-09-12 NOTE — ANESTHESIA PRE PROCEDURE
Mira Kern MD   traMADol (ULTRAM) 50 MG tablet 50 mg every 6 hours as needed  9/27/17  Yes Historical Provider, MD   hydrochlorothiazide (HYDRODIURIL) 25 MG tablet Take 1 tablet by mouth daily 9/18/17  Yes Digna Delacruz MD   meclizine (ANTIVERT) 25 MG tablet Take 25 mg by mouth 3 times daily as needed    Historical Provider, MD   calcitRIOL (ROCALTROL) 0.25 MCG capsule Take 1 capsule by mouth three times a week Monday/Wednesday/Friday 6/27/18 9/11/18  Digna Delacruz MD   Blood Pressure KIT 1 Units by Does not apply route daily 8/29/17   Kvng Dunlap APRN - CNP   ondansetron (ZOFRAN ODT) 4 MG disintegrating tablet Take 1 tablet by mouth every 4 hours as needed for Nausea or Vomiting 5/18/17   Irene Marin MD   Docusate Calcium (STOOL SOFTENER PO) Take 100 mg by mouth as needed     Historical Provider, MD   diphenhydrAMINE (BENADRYL) 25 MG capsule Take 25 mg by mouth Pt takes 2 capsules at HS \"due to hives from it being cold now\". Pt tho states these are NOT helping her this time.     Historical Provider, MD       Current medications:    Current Facility-Administered Medications   Medication Dose Route Frequency Provider Last Rate Last Dose    lactated ringers infusion   Intravenous Continuous Danny Thompson  mL/hr at 09/12/18 1310         Allergies:  No Known Allergies    Problem List:    Patient Active Problem List   Diagnosis Code    JESS (acute kidney injury) ([facility] [address] N17.9    Arteritis ([facility] [address] I77.6    ANCA-associated vasculitis (HCC) I77.6    Rectocele N81.6    Retinal edema of both eyes H35.81    CKD (chronic kidney disease) stage 4, GFR 15-29 ml/min ([facility] N18.4    Depression with anxiety F41.8    Essential hypertension I10    Rheumatoid arthritis with positive rheumatoid factor ([facility] M05.9    S/P DOMO-BSO Z90.710, Z90.722, Z90.79    Endometrial hyperplasia without atypia, simple N85.01    Menometrorrhagia N92.1    Postural vertigo AVQ8960    Morbid obesity ([facility] [address] E66.01    Allergic POC Tests: No results for input(s): POCGLU, POCNA, POCK, POCCL, POCBUN, POCHEMO, POCHCT in the last 72 hours. Coags:   Lab Results   Component Value Date    PROTIME 9.4 08/24/2016    INR 0.9 08/24/2016    APTT 25.6 08/24/2016       HCG (If Applicable): No results found for: PREGTESTUR, PREGSERUM, HCG, HCGQUANT     ABGs: No results found for: PHART, PO2ART, EPX4GMF, TPJ0SNK, BEART, [id number]     Type & Screen (If Applicable):  No results found for: LABABO, LABRH    Anesthesia Evaluation   no history of anesthetic complications:   Airway: Mallampati: II  TM distance: >3 FB   Neck ROM: full  Mouth opening: > = 3 FB Dental: normal exam         Pulmonary:normal exam    (+) sleep apnea: on noncompliant,      (-) recent URI and not a current smoker                           Cardiovascular:  Exercise tolerance: good (>4 METS),   (+) hypertension: moderate,          Beta Blocker:  Dose within 24 Hrs         Neuro/Psych:               GI/Hepatic/Renal:   (+) GERD (Barretts esophagus): poorly controlled,           Endo/Other:    (+) : arthritis: rheumatoid. , .                 Abdominal:   (+) obese,         Vascular:                                        Anesthesia Plan      general and TIVA     ASA 3       Induction: intravenous. MIPS: Prophylactic antiemetics administered. Anesthetic plan and risks discussed with patient.                       [address] [place] [address], APRN - CRNA   9/12/2018

## 2018-09-12 NOTE — ANESTHESIA POSTPROCEDURE EVALUATION
Department of Anesthesiology  Postprocedure Note    Patient: Bhavin Miranda  MRN: [id number]  YOB: 1965  Date of evaluation: 9/12/2018  Time:  3:19 PM     Procedure Summary     Date:  09/12/18 Room / [facility]:  [facility]    Anesthesia Start:  8961 Anesthesia Stop:  5677    Procedures:       EGD BIOPSY (N/A )      ESOPHAGEAL DILATATION Diagnosis:  (GERD, JAQUEZ'S ESOPHAGUS,GASTROESOPHAGEAL REFLUX DISEASE, ESOPHAGITIS )    Surgeon:  Abraham Thompson MD Responsible Provider:  ARISTIDES Ghosh CRNA    Anesthesia Type:  general, TIVA ASA Status:  3          Anesthesia Type: general, TIVA    Ovidio Phase I:      Ovidio Phase II:      Last vitals: Reviewed and per EMR flowsheets. Anesthesia Post Evaluation    Patient location during evaluation: PACU  Patient participation: complete - patient participated  Level of consciousness: awake and alert  Pain score: 0  Airway patency: patent  Nausea & Vomiting: no nausea and no vomiting  Complications: no  Cardiovascular status: blood pressure returned to baseline  Respiratory status: acceptable and room air  Hydration status: stable  Comments: Patient had bleeding initially from left eye after arrival to recovery. Patient had very reactive airway during procedure, extensive coughing. RN states Dr. Chilango Galo removed small clot from lateral canthus when speaking with patient post op. Conjunctiva of lower lid red and irritated, no active bleeding or hematoma present. Patient is mother-in-law of periop RN, she is aware and will followup for any concerns.

## 2018-09-12 NOTE — PROGRESS NOTES
Discharge instructions reviewed with patient and patient's daughter. Verbalized understanding and denied any questions.

## 2018-09-17 LAB — SURGICAL PATHOLOGY REPORT: NORMAL

## 2018-09-18 ENCOUNTER — TELEPHONE (OUTPATIENT)
Dept: PRIMARY CARE CLINIC | Age: 53
End: 2018-09-18

## 2018-09-18 NOTE — TELEPHONE ENCOUNTER
Please verify Sol Warren has received the letter stated below from Dr. Dewayne Morse. Informed to repeat EGD in 3 years. Thank you             [place] [address] [address]  Chyna Moore [address]  Phone: [phone]  Fax: [phone]        9/18/18     [address] [place] [id number]        Dear Ms. Amol,     Your gastric biopsy was negative for H. Pylori and esophageal biopsies revealed Chicas's esophagus with no evidence of dysplasia. I suggest that you add a second dose of Protonix daily to be taken before dinner. If this does not lead to any significant improvement in 6-8 weeks, we will have to consider further evaluation and possible surgical intervention as we had discussed. In view of the Chicas's esophagus, you will will need a follow-up EGD in 3 years for repeat biopsies.     If you have any questions or concerns, please do not hesitate to call the GI clinic at [phone].            Sincerely,    Maricarmen Skinner.  Dewayne Morse MD, [facility]

## 2018-09-20 PROBLEM — Z84.2 FAMILY HISTORY OF TOTAL ABDOMINAL HYSTERECTOMY AND BILATERAL SALPINGO-OOPHORECTOMY: Status: ACTIVE | Noted: 2018-09-20

## 2018-09-25 ENCOUNTER — TELEPHONE (OUTPATIENT)
Dept: GASTROENTEROLOGY | Age: 53
End: 2018-09-25

## 2018-09-25 DIAGNOSIS — K21.00 GASTROESOPHAGEAL REFLUX DISEASE WITH ESOPHAGITIS: Primary | ICD-10-CM

## 2018-09-25 RX ORDER — PANTOPRAZOLE SODIUM 40 MG/1
40 TABLET, DELAYED RELEASE ORAL 2 TIMES DAILY
Qty: 60 TABLET | Refills: 5 | Status: SHIPPED | OUTPATIENT
Start: 2018-09-25 | End: 2019-04-19 | Stop reason: SDUPTHER

## 2018-09-25 NOTE — TELEPHONE ENCOUNTER
Patient is requesting new script for protonix 40 mg , one tablet BID. Stated she had EGD done and saw Dr. Beni Ruth and he was ok with the increased dose of medication but they indicated he would not write the script. PLEASE ADVISE.

## 2018-09-26 NOTE — TELEPHONE ENCOUNTER
I called and notified Gloria Yumi () that the prescription has been sent electronically to [facility]. Voices understanding.

## 2018-10-03 ENCOUNTER — OFFICE VISIT (OUTPATIENT)
Dept: ONCOLOGY | Age: 53
End: 2018-10-03
Payer: MEDICARE

## 2018-10-03 VITALS
BODY MASS INDEX: 44.9 KG/M2 | HEART RATE: 88 BPM | WEIGHT: 263 LBS | HEIGHT: 64 IN | RESPIRATION RATE: 20 BRPM | TEMPERATURE: 98.9 F | DIASTOLIC BLOOD PRESSURE: 89 MMHG | SYSTOLIC BLOOD PRESSURE: 139 MMHG

## 2018-10-03 DIAGNOSIS — I77.82 ANCA-ASSOCIATED VASCULITIS: Chronic | ICD-10-CM

## 2018-10-03 DIAGNOSIS — N18.4 CKD (CHRONIC KIDNEY DISEASE) STAGE 4, GFR 15-29 ML/MIN (HCC): Primary | Chronic | ICD-10-CM

## 2018-10-03 DIAGNOSIS — D63.1 ANEMIA OF CHRONIC KIDNEY FAILURE, UNSPECIFIED STAGE: ICD-10-CM

## 2018-10-03 DIAGNOSIS — N18.9 ANEMIA OF CHRONIC KIDNEY FAILURE, UNSPECIFIED STAGE: ICD-10-CM

## 2018-10-03 PROCEDURE — 1036F TOBACCO NON-USER: CPT | Performed by: INTERNAL MEDICINE

## 2018-10-03 PROCEDURE — 99214 OFFICE O/P EST MOD 30 MIN: CPT | Performed by: INTERNAL MEDICINE

## 2018-10-03 PROCEDURE — G8417 CALC BMI ABV UP PARAM F/U: HCPCS | Performed by: INTERNAL MEDICINE

## 2018-10-03 PROCEDURE — G8482 FLU IMMUNIZE ORDER/ADMIN: HCPCS | Performed by: INTERNAL MEDICINE

## 2018-10-03 PROCEDURE — 3017F COLORECTAL CA SCREEN DOC REV: CPT | Performed by: INTERNAL MEDICINE

## 2018-10-03 PROCEDURE — G8428 CUR MEDS NOT DOCUMENT: HCPCS | Performed by: INTERNAL MEDICINE

## 2018-10-03 RX ORDER — OMEPRAZOLE 10 MG/1
10 CAPSULE, DELAYED RELEASE ORAL 2 TIMES DAILY
Status: ON HOLD | COMMUNITY
End: 2018-11-06 | Stop reason: HOSPADM

## 2018-10-03 NOTE — LETTER
Randall Hale MD    10/3/2018     Raul Jimenez, APRN - CNP   [address]    Dear Ienz Menendez : Thank you for referring Sergey Robertson, 1965, to me for evaluation. Below are the relevant portions of my assessment and plan of care. Chief Complaint   Patient presents with    Chronic Renal Failure    Anemia       DIAGNOSIS:       · Anemia, microcytic, Iron deficiency. · Anemia of chronic renal insufficiency  · MARIO  · Elevated kappa light chain immunoglobulins. Bone marrow negative for myeloma. · Vasculitis, ANCA associated necrotizing glomerulonephritis. CURRENT THERAPY:         Status post IV iron dextran June 2016. BRIEF CASE HISTORY:       Sergey Robertson is a very pleasant 48 y.o. female who is referred to us for anemia and monoclonal protein. The patient has been running between 7 and 90 hemoglobin. White count and platelets are slightly elevated. MCV and MCH are low. However, she has other concerning signs including rising creatinine. Workup showed small monoclonal protein less than 0.1 g..    She is sent to us for a consultation, she is quite concerned about the findings. She has neutrophilic leukocytosis and microcytic anemia. She denies any active bleeding. urine analysis showed +4 proteinuria  The patient was started on Cytoxan induction. Then maintenance Imuran by nephrology. All treatments were stopped in March/2018  INTERIM HISTORY:   The patient seen for follow-up anemia and abnormal immunoglobulins. Overall she is doing well but she started having significant nosebleeds over the last few weeks. She describes about an hour or bleeding almost every day. She was sent to ENT where the bleeding was cauterized. I do not see any records of a biopsy    PAST MEDICAL HISTORY: has a past medical history of JESS (acute kidney injury) ([address] Anemia;  Arthritis; Chronic kidney disease; Depression with

## 2018-10-03 NOTE — PROGRESS NOTES
Chief Complaint   Patient presents with    Chronic Renal Failure    Anemia       DIAGNOSIS:       · Anemia, microcytic, Iron deficiency. · Anemia of chronic renal insufficiency  · MARIO  · Elevated kappa light chain immunoglobulins. Bone marrow negative for myeloma. · Vasculitis, ANCA associated necrotizing glomerulonephritis. CURRENT THERAPY:         Status post IV iron dextran June 2016. BRIEF CASE HISTORY:       James Colón is a very pleasant 48 y.o. female who is referred to us for anemia and monoclonal protein. The patient has been running between 7 and 90 hemoglobin. White count and platelets are slightly elevated. MCV and MCH are low. However, she has other concerning signs including rising creatinine. Workup showed small monoclonal protein less than 0.1 g..    She is sent to us for a consultation, she is quite concerned about the findings. She has neutrophilic leukocytosis and microcytic anemia. She denies any active bleeding. urine analysis showed +4 proteinuria  The patient was started on Cytoxan induction. Then maintenance Imuran by nephrology. All treatments were stopped in March/2018  INTERIM HISTORY:   The patient seen for follow-up anemia and abnormal immunoglobulins. Overall she is doing well but she started having significant nosebleeds over the last few weeks. She describes about an hour or bleeding almost every day. She was sent to ENT where the bleeding was cauterized. I do not see any records of a biopsy    PAST MEDICAL HISTORY: has a past medical history of JESS (acute kidney injury) ([address] Anemia; Arthritis; Chronic kidney disease; Depression with anxiety; GERD (gastroesophageal reflux disease); H/O echocardiogram; H/O tooth extraction; History of blood transfusion; Hyperlipidemia; Hypertensive crisis; Kidney stones; and Vasculitis ([address]     PAST SURGICAL HISTORY: has a past surgical history that includes Cholecystectomy; bone marrow biopsy (5-23-16);

## 2018-10-04 ENCOUNTER — HOSPITAL ENCOUNTER (OUTPATIENT)
Dept: LAB | Age: 53
Discharge: HOME | End: 2018-10-04
Payer: MEDICARE

## 2018-10-04 DIAGNOSIS — N18.4 CKD (CHRONIC KIDNEY DISEASE) STAGE 4, GFR 15-29 ML/MIN (HCC): Chronic | ICD-10-CM

## 2018-10-04 DIAGNOSIS — N18.9 ANEMIA OF CHRONIC KIDNEY FAILURE, UNSPECIFIED STAGE: ICD-10-CM

## 2018-10-04 DIAGNOSIS — D63.1 ANEMIA OF CHRONIC KIDNEY FAILURE, UNSPECIFIED STAGE: ICD-10-CM

## 2018-10-04 LAB
CREATININE URINE: 84.3 MG/DL (ref 28–217)
SEDIMENTATION RATE, ERYTHROCYTE: 112 MM (ref 0–20)
TOTAL PROTEIN, URINE: 80 MG/DL
URINE TOTAL PROTEIN CREATININE RATIO: 0.95 (ref 0–0.2)

## 2018-10-04 PROCEDURE — 82570 ASSAY OF URINE CREATININE: CPT

## 2018-10-04 PROCEDURE — 84156 ASSAY OF PROTEIN URINE: CPT

## 2018-10-04 PROCEDURE — 85651 RBC SED RATE NONAUTOMATED: CPT

## 2018-10-04 PROCEDURE — 36415 COLL VENOUS BLD VENIPUNCTURE: CPT

## 2018-10-15 ENCOUNTER — HOSPITAL ENCOUNTER (OUTPATIENT)
Age: 53
Discharge: HOME | End: 2018-10-15
Payer: MEDICARE

## 2018-10-15 DIAGNOSIS — N18.4 CKD (CHRONIC KIDNEY DISEASE) STAGE 4, GFR 15-29 ML/MIN (HCC): Chronic | ICD-10-CM

## 2018-10-15 LAB
ANION GAP SERPL CALCULATED.3IONS-SCNC: 14 MMOL/L (ref 9–17)
BUN BLDV-MCNC: 35 MG/DL (ref 6–20)
BUN/CREAT BLD: 16 (ref 9–20)
CALCIUM IONIZED: 1.22 MMOL/L (ref 1.13–1.33)
CALCIUM SERPL-MCNC: 9 MG/DL (ref 8.6–10.4)
CHLORIDE BLD-SCNC: 98 MMOL/L (ref 98–107)
CO2: 25 MMOL/L (ref 20–31)
CREAT SERPL-MCNC: 2.21 MG/DL (ref 0.5–0.9)
CREATININE URINE: 108 MG/DL (ref 28–217)
GFR AFRICAN AMERICAN: 28 ML/MIN
GFR NON-AFRICAN AMERICAN: 23 ML/MIN
GFR SERPL CREATININE-BSD FRML MDRD: ABNORMAL ML/MIN/{1.73_M2}
GFR SERPL CREATININE-BSD FRML MDRD: ABNORMAL ML/MIN/{1.73_M2}
GLUCOSE BLD-MCNC: 133 MG/DL (ref 70–99)
POTASSIUM SERPL-SCNC: 3.8 MMOL/L (ref 3.7–5.3)
PTH INTACT: 124.2 PG/ML (ref 15–65)
SODIUM BLD-SCNC: 137 MMOL/L (ref 135–144)
TOTAL PROTEIN, URINE: 108 MG/DL
VITAMIN D 25-HYDROXY: 28 NG/ML (ref 30–100)

## 2018-10-15 PROCEDURE — 83970 ASSAY OF PARATHORMONE: CPT

## 2018-10-15 PROCEDURE — 82306 VITAMIN D 25 HYDROXY: CPT

## 2018-10-15 PROCEDURE — 36415 COLL VENOUS BLD VENIPUNCTURE: CPT

## 2018-10-15 PROCEDURE — 84156 ASSAY OF PROTEIN URINE: CPT

## 2018-10-15 PROCEDURE — 82330 ASSAY OF CALCIUM: CPT

## 2018-10-15 PROCEDURE — 80048 BASIC METABOLIC PNL TOTAL CA: CPT

## 2018-10-15 PROCEDURE — 82570 ASSAY OF URINE CREATININE: CPT

## 2018-10-24 ENCOUNTER — HOSPITAL ENCOUNTER (OUTPATIENT)
Age: 53
Discharge: HOME | End: 2018-10-24
Payer: MEDICARE

## 2018-10-24 DIAGNOSIS — N18.4 CKD (CHRONIC KIDNEY DISEASE) STAGE 4, GFR 15-29 ML/MIN (HCC): Chronic | ICD-10-CM

## 2018-10-24 LAB
-: ABNORMAL
AMORPHOUS: ABNORMAL
ANION GAP SERPL CALCULATED.3IONS-SCNC: 11 MMOL/L (ref 9–17)
BACTERIA: ABNORMAL
BILIRUBIN URINE: NEGATIVE
BUN BLDV-MCNC: 32 MG/DL (ref 6–20)
BUN/CREAT BLD: 14 (ref 9–20)
CALCIUM SERPL-MCNC: 9.3 MG/DL (ref 8.6–10.4)
CASTS UA: ABNORMAL /LPF
CHLORIDE BLD-SCNC: 97 MMOL/L (ref 98–107)
CO2: 28 MMOL/L (ref 20–31)
COLOR: YELLOW
COMMENT UA: ABNORMAL
CREAT SERPL-MCNC: 2.23 MG/DL (ref 0.5–0.9)
CRYSTALS, UA: ABNORMAL /HPF
EPITHELIAL CELLS UA: ABNORMAL /HPF (ref 0–25)
GFR AFRICAN AMERICAN: 28 ML/MIN
GFR NON-AFRICAN AMERICAN: 23 ML/MIN
GFR SERPL CREATININE-BSD FRML MDRD: ABNORMAL ML/MIN/{1.73_M2}
GFR SERPL CREATININE-BSD FRML MDRD: ABNORMAL ML/MIN/{1.73_M2}
GLUCOSE BLD-MCNC: 118 MG/DL (ref 70–99)
GLUCOSE URINE: NEGATIVE
KETONES, URINE: NEGATIVE
LEUKOCYTE ESTERASE, URINE: NEGATIVE
MUCUS: ABNORMAL
NITRITE, URINE: NEGATIVE
OTHER OBSERVATIONS UA: ABNORMAL
PH UA: 6.5 (ref 5–9)
POTASSIUM SERPL-SCNC: 4.1 MMOL/L (ref 3.7–5.3)
PROTEIN UA: ABNORMAL
RBC UA: ABNORMAL /HPF (ref 0–2)
RENAL EPITHELIAL, UA: ABNORMAL /HPF
SODIUM BLD-SCNC: 136 MMOL/L (ref 135–144)
SPECIFIC GRAVITY UA: 1.02 (ref 1.01–1.02)
TRICHOMONAS: ABNORMAL
TURBIDITY: CLEAR
URINE HGB: ABNORMAL
UROBILINOGEN, URINE: NORMAL
WBC UA: ABNORMAL /HPF (ref 0–5)
YEAST: ABNORMAL

## 2018-10-24 PROCEDURE — 36415 COLL VENOUS BLD VENIPUNCTURE: CPT

## 2018-10-24 PROCEDURE — 83516 IMMUNOASSAY NONANTIBODY: CPT

## 2018-10-24 PROCEDURE — 80048 BASIC METABOLIC PNL TOTAL CA: CPT

## 2018-10-24 PROCEDURE — 81001 URINALYSIS AUTO W/SCOPE: CPT

## 2018-10-26 LAB
ANCA MYELOPEROXIDASE: 229 AU/ML
ANCA PROTEINASE 3: 13 AU/ML

## 2018-10-31 ENCOUNTER — HOSPITAL ENCOUNTER (OUTPATIENT)
Age: 53
Discharge: HOME | End: 2018-10-31
Payer: MEDICARE

## 2018-10-31 ENCOUNTER — HOSPITAL ENCOUNTER (OUTPATIENT)
Dept: GENERAL RADIOLOGY | Age: 53
Discharge: HOME | End: 2018-11-02
Payer: MEDICARE

## 2018-10-31 ENCOUNTER — HOSPITAL ENCOUNTER (OUTPATIENT)
Age: 53
Discharge: HOME | End: 2018-11-02
Payer: MEDICARE

## 2018-10-31 DIAGNOSIS — R05.9 COUGH: ICD-10-CM

## 2018-10-31 LAB
-: ABNORMAL
ALBUMIN SERPL-MCNC: 3.7 G/DL (ref 3.5–5.2)
ALBUMIN/GLOBULIN RATIO: 0.9 (ref 1–2.5)
ALP BLD-CCNC: 141 U/L (ref 35–104)
ALT SERPL-CCNC: 13 U/L (ref 5–33)
AMORPHOUS: ABNORMAL
ANION GAP SERPL CALCULATED.3IONS-SCNC: 11 MMOL/L (ref 9–17)
AST SERPL-CCNC: 18 U/L
BACTERIA: ABNORMAL
BILIRUB SERPL-MCNC: 0.28 MG/DL (ref 0.3–1.2)
BILIRUBIN URINE: NEGATIVE
BUN BLDV-MCNC: 33 MG/DL (ref 6–20)
BUN/CREAT BLD: 13 (ref 9–20)
C-REACTIVE PROTEIN: 20.4 MG/L (ref 0–5)
CALCIUM SERPL-MCNC: 9.5 MG/DL (ref 8.6–10.4)
CASTS UA: ABNORMAL /LPF
CHLORIDE BLD-SCNC: 99 MMOL/L (ref 98–107)
CO2: 27 MMOL/L (ref 20–31)
COLOR: YELLOW
COMMENT UA: ABNORMAL
COMPLEMENT C3: 166 MG/DL (ref 90–180)
COMPLEMENT C4: 38 MG/DL (ref 10–40)
CREAT SERPL-MCNC: 2.53 MG/DL (ref 0.5–0.9)
CRYSTALS, UA: ABNORMAL /HPF
EPITHELIAL CELLS UA: ABNORMAL /HPF (ref 0–25)
GFR AFRICAN AMERICAN: 24 ML/MIN
GFR NON-AFRICAN AMERICAN: 20 ML/MIN
GFR SERPL CREATININE-BSD FRML MDRD: ABNORMAL ML/MIN/{1.73_M2}
GFR SERPL CREATININE-BSD FRML MDRD: ABNORMAL ML/MIN/{1.73_M2}
GLUCOSE BLD-MCNC: 117 MG/DL (ref 70–99)
GLUCOSE URINE: NEGATIVE
HAV IGM SER IA-ACNC: NONREACTIVE
HCT VFR BLD CALC: 30.6 % (ref 36.3–47.1)
HEMOGLOBIN: 10.1 G/DL (ref 11.9–15.1)
HEPATITIS B CORE IGM ANTIBODY: NONREACTIVE
HEPATITIS B SURFACE ANTIGEN: NONREACTIVE
HEPATITIS C ANTIBODY: NONREACTIVE
KETONES, URINE: NEGATIVE
LEUKOCYTE ESTERASE, URINE: NEGATIVE
MAGNESIUM: 2 MG/DL (ref 1.6–2.6)
MCH RBC QN AUTO: 30 PG (ref 25.2–33.5)
MCHC RBC AUTO-ENTMCNC: 33 G/DL (ref 28.4–34.8)
MCV RBC AUTO: 90.8 FL (ref 82.6–102.9)
MUCUS: ABNORMAL
NITRITE, URINE: NEGATIVE
NRBC AUTOMATED: 0 PER 100 WBC
OTHER OBSERVATIONS UA: ABNORMAL
PDW BLD-RTO: 13.5 % (ref 11.8–14.4)
PH UA: 6 (ref 5–9)
PLATELET # BLD: 255 K/UL (ref 138–453)
PMV BLD AUTO: 9.3 FL (ref 8.1–13.5)
POTASSIUM SERPL-SCNC: 4.2 MMOL/L (ref 3.7–5.3)
PROTEIN UA: ABNORMAL
RBC # BLD: 3.37 M/UL (ref 3.95–5.11)
RBC UA: ABNORMAL /HPF (ref 0–2)
RENAL EPITHELIAL, UA: ABNORMAL /HPF
RHEUMATOID FACTOR: 42.8 IU/ML
SEDIMENTATION RATE, ERYTHROCYTE: 101 MM (ref 0–20)
SODIUM BLD-SCNC: 137 MMOL/L (ref 135–144)
SPECIFIC GRAVITY UA: 1.01 (ref 1.01–1.02)
TOTAL CK: 138 U/L (ref 26–192)
TOTAL PROTEIN: 7.9 G/DL (ref 6.4–8.3)
TRICHOMONAS: ABNORMAL
TSH SERPL DL<=0.05 MIU/L-ACNC: 1.33 MIU/L (ref 0.3–5)
TURBIDITY: CLEAR
URIC ACID: 9 MG/DL (ref 2.4–5.7)
URINE HGB: ABNORMAL
UROBILINOGEN, URINE: NORMAL
WBC # BLD: 5.4 K/UL (ref 3.5–11.3)
WBC UA: ABNORMAL /HPF (ref 0–5)
YEAST: ABNORMAL

## 2018-10-31 PROCEDURE — 80074 ACUTE HEPATITIS PANEL: CPT

## 2018-10-31 PROCEDURE — 83735 ASSAY OF MAGNESIUM: CPT

## 2018-10-31 PROCEDURE — 86140 C-REACTIVE PROTEIN: CPT

## 2018-10-31 PROCEDURE — 86160 COMPLEMENT ANTIGEN: CPT

## 2018-10-31 PROCEDURE — 86038 ANTINUCLEAR ANTIBODIES: CPT

## 2018-10-31 PROCEDURE — 86334 IMMUNOFIX E-PHORESIS SERUM: CPT

## 2018-10-31 PROCEDURE — 85651 RBC SED RATE NONAUTOMATED: CPT

## 2018-10-31 PROCEDURE — 71046 X-RAY EXAM CHEST 2 VIEWS: CPT

## 2018-10-31 PROCEDURE — 84550 ASSAY OF BLOOD/URIC ACID: CPT

## 2018-10-31 PROCEDURE — 84155 ASSAY OF PROTEIN SERUM: CPT

## 2018-10-31 PROCEDURE — 86431 RHEUMATOID FACTOR QUANT: CPT

## 2018-10-31 PROCEDURE — 84443 ASSAY THYROID STIM HORMONE: CPT

## 2018-10-31 PROCEDURE — 85027 COMPLETE CBC AUTOMATED: CPT

## 2018-10-31 PROCEDURE — 83516 IMMUNOASSAY NONANTIBODY: CPT

## 2018-10-31 PROCEDURE — 86200 CCP ANTIBODY: CPT

## 2018-10-31 PROCEDURE — 86235 NUCLEAR ANTIGEN ANTIBODY: CPT

## 2018-10-31 PROCEDURE — 81001 URINALYSIS AUTO W/SCOPE: CPT

## 2018-10-31 PROCEDURE — 80053 COMPREHEN METABOLIC PANEL: CPT

## 2018-10-31 PROCEDURE — 36415 COLL VENOUS BLD VENIPUNCTURE: CPT

## 2018-10-31 PROCEDURE — 84165 PROTEIN E-PHORESIS SERUM: CPT

## 2018-10-31 PROCEDURE — 82550 ASSAY OF CK (CPK): CPT

## 2018-11-01 ENCOUNTER — HOSPITAL ENCOUNTER (INPATIENT)
Age: 53
LOS: 5 days | Discharge: HOME | DRG: 346 | End: 2018-11-06
Attending: INTERNAL MEDICINE | Admitting: INTERNAL MEDICINE
Payer: MEDICARE

## 2018-11-01 PROBLEM — R04.0 EPISTAXIS, RECURRENT: Status: ACTIVE | Noted: 2018-11-01

## 2018-11-01 LAB
ALBUMIN (CALCULATED): 4.1 G/DL (ref 3.2–5.2)
ALBUMIN PERCENT: 56 % (ref 45–65)
ALPHA 1 PERCENT: 3 % (ref 3–6)
ALPHA 2 PERCENT: 12 % (ref 6–13)
ALPHA-1-GLOBULIN: 0.2 G/DL (ref 0.1–0.4)
ALPHA-2-GLOBULIN: 0.9 G/DL (ref 0.5–0.9)
ANTI-NUCLEAR ANTIBODY (ANA): ABNORMAL
ANTI-SCLERODERMA: 68 U/ML
BETA GLOBULIN: 0.8 G/DL (ref 0.5–1.1)
BETA PERCENT: 11 % (ref 11–19)
GAMMA GLOBULIN %: 17 % (ref 9–20)
GAMMA GLOBULIN: 1.2 G/DL (ref 0.5–1.5)
PATHOLOGIST: NORMAL
PATHOLOGIST: NORMAL
PROTEIN ELECTROPHORESIS, SERUM: NORMAL
SERUM IFX INTERP: NORMAL
TISSUE TRANSGLUTAMINASE IGA: 0.6 U/ML
TOTAL PROT. SUM,%: 99 % (ref 98–102)
TOTAL PROT. SUM: 7.2 G/DL (ref 6.3–8.2)
TOTAL PROTEIN: 7.2 G/DL (ref 6.4–8.3)
TROPONIN INTERP: NORMAL
TROPONIN T: <0.03 NG/ML

## 2018-11-01 PROCEDURE — 82550 ASSAY OF CK (CPK): CPT

## 2018-11-01 PROCEDURE — 84156 ASSAY OF PROTEIN URINE: CPT

## 2018-11-01 PROCEDURE — 84484 ASSAY OF TROPONIN QUANT: CPT

## 2018-11-01 PROCEDURE — 6370000000 HC RX 637 (ALT 250 FOR IP): Performed by: HOSPITALIST

## 2018-11-01 PROCEDURE — 82570 ASSAY OF URINE CREATININE: CPT

## 2018-11-01 PROCEDURE — 1200000000 HC SEMI PRIVATE

## 2018-11-01 PROCEDURE — 36415 COLL VENOUS BLD VENIPUNCTURE: CPT

## 2018-11-01 PROCEDURE — 6370000000 HC RX 637 (ALT 250 FOR IP): Performed by: OTOLARYNGOLOGY

## 2018-11-01 RX ORDER — SODIUM CHLORIDE 9 MG/ML
INJECTION, SOLUTION INTRAVENOUS CONTINUOUS
Status: DISCONTINUED | OUTPATIENT
Start: 2018-11-01 | End: 2018-11-02

## 2018-11-01 RX ORDER — SODIUM CHLORIDE/ALOE VERA
GEL (GRAM) NASAL 2 TIMES DAILY
Status: DISCONTINUED | OUTPATIENT
Start: 2018-11-01 | End: 2018-11-06 | Stop reason: HOSPADM

## 2018-11-01 RX ORDER — POTASSIUM CHLORIDE 20MEQ/15ML
40 LIQUID (ML) ORAL PRN
Status: DISCONTINUED | OUTPATIENT
Start: 2018-11-01 | End: 2018-11-06 | Stop reason: HOSPADM

## 2018-11-01 RX ORDER — POTASSIUM CHLORIDE 20 MEQ/1
40 TABLET, EXTENDED RELEASE ORAL PRN
Status: DISCONTINUED | OUTPATIENT
Start: 2018-11-01 | End: 2018-11-06 | Stop reason: HOSPADM

## 2018-11-01 RX ORDER — TRAMADOL HYDROCHLORIDE 50 MG/1
50 TABLET ORAL EVERY 6 HOURS PRN
Status: DISCONTINUED | OUTPATIENT
Start: 2018-11-01 | End: 2018-11-06 | Stop reason: HOSPADM

## 2018-11-01 RX ORDER — ATORVASTATIN CALCIUM 80 MG/1
80 TABLET, FILM COATED ORAL DAILY
Status: DISCONTINUED | OUTPATIENT
Start: 2018-11-01 | End: 2018-11-06 | Stop reason: HOSPADM

## 2018-11-01 RX ORDER — DULOXETIN HYDROCHLORIDE 30 MG/1
30 CAPSULE, DELAYED RELEASE ORAL DAILY
Status: DISCONTINUED | OUTPATIENT
Start: 2018-11-01 | End: 2018-11-06 | Stop reason: HOSPADM

## 2018-11-01 RX ORDER — DOCUSATE SODIUM 100 MG/1
100 CAPSULE, LIQUID FILLED ORAL PRN
Status: DISCONTINUED | OUTPATIENT
Start: 2018-11-01 | End: 2018-11-06 | Stop reason: HOSPADM

## 2018-11-01 RX ORDER — LABETALOL HYDROCHLORIDE 5 MG/ML
20 INJECTION, SOLUTION INTRAVENOUS EVERY 4 HOURS PRN
Status: DISCONTINUED | OUTPATIENT
Start: 2018-11-01 | End: 2018-11-06 | Stop reason: HOSPADM

## 2018-11-01 RX ORDER — SODIUM CHLORIDE 0.9 % (FLUSH) 0.9 %
10 SYRINGE (ML) INJECTION EVERY 12 HOURS SCHEDULED
Status: DISCONTINUED | OUTPATIENT
Start: 2018-11-01 | End: 2018-11-06 | Stop reason: HOSPADM

## 2018-11-01 RX ORDER — SODIUM CHLORIDE 0.9 % (FLUSH) 0.9 %
10 SYRINGE (ML) INJECTION PRN
Status: DISCONTINUED | OUTPATIENT
Start: 2018-11-01 | End: 2018-11-06 | Stop reason: HOSPADM

## 2018-11-01 RX ORDER — CALCITRIOL 0.25 UG/1
0.25 CAPSULE, LIQUID FILLED ORAL DAILY
Status: DISCONTINUED | OUTPATIENT
Start: 2018-11-01 | End: 2018-11-06 | Stop reason: HOSPADM

## 2018-11-01 RX ORDER — CETIRIZINE HYDROCHLORIDE 10 MG/1
5 TABLET ORAL DAILY
Status: DISCONTINUED | OUTPATIENT
Start: 2018-11-01 | End: 2018-11-06 | Stop reason: HOSPADM

## 2018-11-01 RX ORDER — PANTOPRAZOLE SODIUM 40 MG/1
40 TABLET, DELAYED RELEASE ORAL 2 TIMES DAILY
Status: DISCONTINUED | OUTPATIENT
Start: 2018-11-01 | End: 2018-11-06 | Stop reason: HOSPADM

## 2018-11-01 RX ORDER — POTASSIUM CHLORIDE 7.45 MG/ML
10 INJECTION INTRAVENOUS PRN
Status: DISCONTINUED | OUTPATIENT
Start: 2018-11-01 | End: 2018-11-06 | Stop reason: HOSPADM

## 2018-11-01 RX ORDER — ONDANSETRON 2 MG/ML
4 INJECTION INTRAMUSCULAR; INTRAVENOUS EVERY 6 HOURS PRN
Status: DISCONTINUED | OUTPATIENT
Start: 2018-11-01 | End: 2018-11-06 | Stop reason: HOSPADM

## 2018-11-01 RX ORDER — CARVEDILOL 12.5 MG/1
12.5 TABLET ORAL 2 TIMES DAILY
Status: DISCONTINUED | OUTPATIENT
Start: 2018-11-01 | End: 2018-11-06 | Stop reason: HOSPADM

## 2018-11-01 RX ORDER — OXYMETAZOLINE HYDROCHLORIDE 0.05 G/100ML
2 SPRAY NASAL 2 TIMES DAILY
Status: COMPLETED | OUTPATIENT
Start: 2018-11-01 | End: 2018-11-03

## 2018-11-01 RX ORDER — BUTALBITAL, ACETAMINOPHEN AND CAFFEINE 50; 325; 40 MG/1; MG/1; MG/1
1 TABLET ORAL EVERY 6 HOURS PRN
Status: DISCONTINUED | OUTPATIENT
Start: 2018-11-01 | End: 2018-11-06 | Stop reason: HOSPADM

## 2018-11-01 RX ORDER — MAGNESIUM SULFATE 1 G/100ML
1 INJECTION INTRAVENOUS PRN
Status: DISCONTINUED | OUTPATIENT
Start: 2018-11-01 | End: 2018-11-06 | Stop reason: HOSPADM

## 2018-11-01 RX ADMIN — CETIRIZINE HYDROCHLORIDE 5 MG: 10 TABLET ORAL at 17:44

## 2018-11-01 RX ADMIN — BUTALBITAL, ACETAMINOPHEN, AND CAFFEINE 1 TABLET: 50; 325; 40 TABLET ORAL at 17:43

## 2018-11-01 RX ADMIN — Medication: at 17:46

## 2018-11-01 RX ADMIN — CALCITRIOL 0.25 MCG: 0.25 CAPSULE ORAL at 17:44

## 2018-11-01 RX ADMIN — OXYMETAZOLINE HYDROCHLORIDE 2 SPRAY: 5 SPRAY NASAL at 14:24

## 2018-11-01 RX ADMIN — CARVEDILOL 12.5 MG: 12.5 TABLET, FILM COATED ORAL at 21:38

## 2018-11-01 RX ADMIN — PANTOPRAZOLE SODIUM 40 MG: 40 TABLET, DELAYED RELEASE ORAL at 21:38

## 2018-11-01 RX ADMIN — DULOXETINE HYDROCHLORIDE 30 MG: 30 CAPSULE, DELAYED RELEASE ORAL at 17:43

## 2018-11-01 RX ADMIN — VITAMIN D, TAB 1000IU (100/BT) 1000 UNITS: 25 TAB at 17:43

## 2018-11-01 RX ADMIN — OXYMETAZOLINE HYDROCHLORIDE 2 SPRAY: 5 SPRAY NASAL at 21:38

## 2018-11-01 RX ADMIN — ATORVASTATIN CALCIUM 80 MG: 80 TABLET, FILM COATED ORAL at 17:44

## 2018-11-01 ASSESSMENT — PAIN DESCRIPTION - LOCATION
LOCATION: FLANK
LOCATION: BACK;LEG

## 2018-11-01 ASSESSMENT — PAIN DESCRIPTION - FREQUENCY
FREQUENCY: CONTINUOUS
FREQUENCY: CONTINUOUS

## 2018-11-01 ASSESSMENT — PAIN DESCRIPTION - ORIENTATION
ORIENTATION: OTHER (COMMENT)
ORIENTATION: RIGHT

## 2018-11-01 ASSESSMENT — PAIN DESCRIPTION - PAIN TYPE
TYPE: ACUTE PAIN
TYPE: CHRONIC PAIN

## 2018-11-01 ASSESSMENT — PAIN DESCRIPTION - DESCRIPTORS
DESCRIPTORS: THROBBING
DESCRIPTORS: SORE

## 2018-11-01 ASSESSMENT — PAIN DESCRIPTION - ONSET: ONSET: ON-GOING

## 2018-11-01 ASSESSMENT — PAIN SCALES - GENERAL
PAINLEVEL_OUTOF10: 5
PAINLEVEL_OUTOF10: 6
PAINLEVEL_OUTOF10: 8

## 2018-11-01 NOTE — H&P
daily  hydrochlorothiazide (HYDRODIURIL) 25 MG tablet, TAKE ONE TABLET BY MOUTH ONCE DAILY  pantoprazole (PROTONIX) 40 MG tablet, Take 1 tablet by mouth 2 times daily  cetirizine (ZYRTEC) 5 MG tablet, Take 1 tablet by mouth daily  DULoxetine (CYMBALTA) 30 MG extended release capsule, Take 1 capsule by mouth daily  atorvastatin (LIPITOR) 80 MG tablet, Take 1 tablet by mouth daily  VESICARE 5 MG tablet, TAKE ONE TABLET BY MOUTH ONCE DAILY  butalbital-acetaminophen-caffeine (FIORICET, ESGIC) -40 MG per tablet, Take 1 tablet by mouth every 6 hours as needed for Headaches  fluticasone (FLONASE) 50 MCG/ACT nasal spray, 2 sprays by Nasal route 2 times daily (Patient taking differently: 2 sprays by Nasal route 2 times daily as needed )  potassium chloride (KLOR-CON M20) 20 MEQ extended release tablet, TAKE 1 TABLET BY MOUTH ONCE DAILY  traMADol (ULTRAM) 50 MG tablet, 50 mg every 6 hours as needed   Docusate Calcium (STOOL SOFTENER PO), Take 100 mg by mouth as needed   diphenhydrAMINE (BENADRYL) 25 MG capsule, Take 25 mg by mouth Pt takes 2 capsules at HS \"due to hives from it being cold now\". Pt tho states these are NOT helping her this time. calcitRIOL (ROCALTROL) 0.25 MCG capsule, Take 1 capsule by mouth daily  omeprazole (PRILOSEC) 10 MG delayed release capsule, Take 10 mg by mouth 2 times daily  meclizine (ANTIVERT) 25 MG tablet, Take 25 mg by mouth 3 times daily as needed  vitamin D (CHOLECALCIFEROL) 1000 UNIT TABS tablet, Take 1,000 Units by mouth daily  Blood Pressure KIT, 1 Units by Does not apply route daily  ondansetron (ZOFRAN ODT) 4 MG disintegrating tablet, Take 1 tablet by mouth every 4 hours as needed for Nausea or Vomiting    Allergies:  Patient has no known allergies.     Social History:   TOBACCO:  Never smoker  ETOH: none  DRUGS: none    Family History:   Family History   Problem Relation Age of Onset    Cancer Mother     Diabetes Mother     Heart Disease Mother     High Blood Pressure Mother

## 2018-11-02 ENCOUNTER — APPOINTMENT (OUTPATIENT)
Dept: ULTRASOUND IMAGING | Age: 53
DRG: 346 | End: 2018-11-02
Attending: INTERNAL MEDICINE
Payer: MEDICARE

## 2018-11-02 LAB
ABSOLUTE EOS #: 0.23 K/UL (ref 0–0.44)
ABSOLUTE IMMATURE GRANULOCYTE: <0.03 K/UL (ref 0–0.3)
ABSOLUTE LYMPH #: 0.86 K/UL (ref 1.1–3.7)
ABSOLUTE MONO #: 0.42 K/UL (ref 0.1–1.2)
ALBUMIN SERPL-MCNC: 3.2 G/DL (ref 3.5–5.2)
ALBUMIN/GLOBULIN RATIO: 0.9 (ref 1–2.5)
ALP BLD-CCNC: 115 U/L (ref 35–104)
ALT SERPL-CCNC: 11 U/L (ref 5–33)
AMPHETAMINE SCREEN URINE: NEGATIVE
ANION GAP SERPL CALCULATED.3IONS-SCNC: 17 MMOL/L (ref 9–17)
AST SERPL-CCNC: 19 U/L
BARBITURATE SCREEN URINE: POSITIVE
BASOPHILS # BLD: 1 % (ref 0–2)
BASOPHILS ABSOLUTE: 0.04 K/UL (ref 0–0.2)
BENZODIAZEPINE SCREEN, URINE: NEGATIVE
BILIRUB SERPL-MCNC: 0.24 MG/DL (ref 0.3–1.2)
BILIRUBIN DIRECT: <0.08 MG/DL
BILIRUBIN, INDIRECT: ABNORMAL MG/DL (ref 0–1)
BUN BLDV-MCNC: 40 MG/DL (ref 6–20)
BUN/CREAT BLD: ABNORMAL (ref 9–20)
BUPRENORPHINE URINE: ABNORMAL
CALCIUM SERPL-MCNC: 8.6 MG/DL (ref 8.6–10.4)
CANNABINOID SCREEN URINE: NEGATIVE
CCP IGG ANTIBODIES: 2.4 U/ML
CHLORIDE BLD-SCNC: 100 MMOL/L (ref 98–107)
CO2: 21 MMOL/L (ref 20–31)
COCAINE METABOLITE, URINE: NEGATIVE
CREAT SERPL-MCNC: 2.23 MG/DL (ref 0.5–0.9)
CREATININE URINE: 81 MG/DL (ref 28–217)
DIFFERENTIAL TYPE: ABNORMAL
EOSINOPHILS RELATIVE PERCENT: 6 % (ref 1–4)
GFR AFRICAN AMERICAN: 28 ML/MIN
GFR NON-AFRICAN AMERICAN: 23 ML/MIN
GFR SERPL CREATININE-BSD FRML MDRD: ABNORMAL ML/MIN/{1.73_M2}
GFR SERPL CREATININE-BSD FRML MDRD: ABNORMAL ML/MIN/{1.73_M2}
GLOBULIN: ABNORMAL G/DL (ref 1.5–3.8)
GLUCOSE BLD-MCNC: 112 MG/DL (ref 70–99)
HCT VFR BLD CALC: 30.1 % (ref 36.3–47.1)
HEMOGLOBIN: 9.5 G/DL (ref 11.9–15.1)
IMMATURE GRANULOCYTES: 0 %
INR BLD: 1
LYMPHOCYTES # BLD: 21 % (ref 24–43)
MCH RBC QN AUTO: 29.1 PG (ref 25.2–33.5)
MCHC RBC AUTO-ENTMCNC: 31.6 G/DL (ref 28.4–34.8)
MCV RBC AUTO: 92 FL (ref 82.6–102.9)
MDMA URINE: ABNORMAL
METHADONE SCREEN, URINE: NEGATIVE
METHAMPHETAMINE, URINE: ABNORMAL
MONOCYTES # BLD: 10 % (ref 3–12)
NRBC AUTOMATED: 0 PER 100 WBC
OPIATES, URINE: NEGATIVE
OXYCODONE SCREEN URINE: NEGATIVE
PARTIAL THROMBOPLASTIN TIME: 27.9 SEC (ref 20.5–30.5)
PDW BLD-RTO: 13.7 % (ref 11.8–14.4)
PHENCYCLIDINE, URINE: NEGATIVE
PLATELET # BLD: 217 K/UL (ref 138–453)
PLATELET ESTIMATE: ABNORMAL
PMV BLD AUTO: 9.9 FL (ref 8.1–13.5)
POTASSIUM SERPL-SCNC: 4.1 MMOL/L (ref 3.7–5.3)
PROPOXYPHENE, URINE: ABNORMAL
PROTHROMBIN TIME: 10.4 SEC (ref 9–12)
RBC # BLD: 3.27 M/UL (ref 3.95–5.11)
RBC # BLD: ABNORMAL 10*6/UL
SEG NEUTROPHILS: 62 % (ref 36–65)
SEGMENTED NEUTROPHILS ABSOLUTE COUNT: 2.46 K/UL (ref 1.5–8.1)
SODIUM BLD-SCNC: 138 MMOL/L (ref 135–144)
TEST INFORMATION: ABNORMAL
TOTAL CK: 93 U/L (ref 26–192)
TOTAL PROTEIN, URINE: 80 MG/DL
TOTAL PROTEIN: 6.7 G/DL (ref 6.4–8.3)
TRICYCLIC ANTIDEPRESSANTS, UR: ABNORMAL
TROPONIN INTERP: NORMAL
TROPONIN INTERP: NORMAL
TROPONIN T: <0.03 NG/ML
TROPONIN T: <0.03 NG/ML
WBC # BLD: 4 K/UL (ref 3.5–11.3)
WBC # BLD: ABNORMAL 10*3/UL

## 2018-11-02 PROCEDURE — 82570 ASSAY OF URINE CREATININE: CPT

## 2018-11-02 PROCEDURE — 85025 COMPLETE CBC W/AUTO DIFF WBC: CPT

## 2018-11-02 PROCEDURE — 6370000000 HC RX 637 (ALT 250 FOR IP): Performed by: HOSPITALIST

## 2018-11-02 PROCEDURE — 1200000000 HC SEMI PRIVATE

## 2018-11-02 PROCEDURE — 84484 ASSAY OF TROPONIN QUANT: CPT

## 2018-11-02 PROCEDURE — 80307 DRUG TEST PRSMV CHEM ANLYZR: CPT

## 2018-11-02 PROCEDURE — 76770 US EXAM ABDO BACK WALL COMP: CPT

## 2018-11-02 PROCEDURE — 84156 ASSAY OF PROTEIN URINE: CPT

## 2018-11-02 PROCEDURE — 6360000002 HC RX W HCPCS: Performed by: INTERNAL MEDICINE

## 2018-11-02 PROCEDURE — 85610 PROTHROMBIN TIME: CPT

## 2018-11-02 PROCEDURE — 85730 THROMBOPLASTIN TIME PARTIAL: CPT

## 2018-11-02 PROCEDURE — 82550 ASSAY OF CK (CPK): CPT

## 2018-11-02 PROCEDURE — 80076 HEPATIC FUNCTION PANEL: CPT

## 2018-11-02 PROCEDURE — 94761 N-INVAS EAR/PLS OXIMETRY MLT: CPT

## 2018-11-02 PROCEDURE — 86480 TB TEST CELL IMMUN MEASURE: CPT

## 2018-11-02 PROCEDURE — 80048 BASIC METABOLIC PNL TOTAL CA: CPT

## 2018-11-02 PROCEDURE — 2580000003 HC RX 258: Performed by: HOSPITALIST

## 2018-11-02 PROCEDURE — 36415 COLL VENOUS BLD VENIPUNCTURE: CPT

## 2018-11-02 RX ORDER — METHYLPREDNISOLONE SODIUM SUCCINATE 40 MG/ML
40 INJECTION, POWDER, LYOPHILIZED, FOR SOLUTION INTRAMUSCULAR; INTRAVENOUS EVERY 12 HOURS
Status: DISCONTINUED | OUTPATIENT
Start: 2018-11-02 | End: 2018-11-06 | Stop reason: HOSPADM

## 2018-11-02 RX ADMIN — OXYMETAZOLINE HYDROCHLORIDE 2 SPRAY: 5 SPRAY NASAL at 20:30

## 2018-11-02 RX ADMIN — PANTOPRAZOLE SODIUM 40 MG: 40 TABLET, DELAYED RELEASE ORAL at 10:31

## 2018-11-02 RX ADMIN — Medication: at 18:00

## 2018-11-02 RX ADMIN — CARVEDILOL 12.5 MG: 12.5 TABLET, FILM COATED ORAL at 20:31

## 2018-11-02 RX ADMIN — Medication: at 10:34

## 2018-11-02 RX ADMIN — METHYLPREDNISOLONE SODIUM SUCCINATE 40 MG: 40 INJECTION, POWDER, FOR SOLUTION INTRAMUSCULAR; INTRAVENOUS at 18:00

## 2018-11-02 RX ADMIN — TRAMADOL HYDROCHLORIDE 50 MG: 50 TABLET, FILM COATED ORAL at 01:37

## 2018-11-02 RX ADMIN — BUTALBITAL, ACETAMINOPHEN, AND CAFFEINE 1 TABLET: 50; 325; 40 TABLET ORAL at 18:00

## 2018-11-02 RX ADMIN — OXYMETAZOLINE HYDROCHLORIDE 2 SPRAY: 5 SPRAY NASAL at 10:34

## 2018-11-02 RX ADMIN — CARVEDILOL 12.5 MG: 12.5 TABLET, FILM COATED ORAL at 10:31

## 2018-11-02 RX ADMIN — SODIUM CHLORIDE: 9 INJECTION, SOLUTION INTRAVENOUS at 01:26

## 2018-11-02 RX ADMIN — ATORVASTATIN CALCIUM 80 MG: 80 TABLET, FILM COATED ORAL at 10:31

## 2018-11-02 RX ADMIN — VITAMIN D, TAB 1000IU (100/BT) 1000 UNITS: 25 TAB at 10:31

## 2018-11-02 RX ADMIN — DULOXETINE HYDROCHLORIDE 30 MG: 30 CAPSULE, DELAYED RELEASE ORAL at 10:31

## 2018-11-02 RX ADMIN — BUTALBITAL, ACETAMINOPHEN, AND CAFFEINE 1 TABLET: 50; 325; 40 TABLET ORAL at 10:31

## 2018-11-02 RX ADMIN — Medication 10 ML: at 01:27

## 2018-11-02 RX ADMIN — PANTOPRAZOLE SODIUM 40 MG: 40 TABLET, DELAYED RELEASE ORAL at 20:30

## 2018-11-02 RX ADMIN — Medication 10 ML: at 20:31

## 2018-11-02 RX ADMIN — CETIRIZINE HYDROCHLORIDE 5 MG: 10 TABLET ORAL at 10:31

## 2018-11-02 RX ADMIN — CALCITRIOL 0.25 MCG: 0.25 CAPSULE ORAL at 10:31

## 2018-11-02 RX ADMIN — BUTALBITAL, ACETAMINOPHEN, AND CAFFEINE 1 TABLET: 50; 325; 40 TABLET ORAL at 05:02

## 2018-11-02 ASSESSMENT — PAIN DESCRIPTION - DESCRIPTORS
DESCRIPTORS: HEADACHE
DESCRIPTORS: HEADACHE

## 2018-11-02 ASSESSMENT — PAIN SCALES - GENERAL
PAINLEVEL_OUTOF10: 5
PAINLEVEL_OUTOF10: 6
PAINLEVEL_OUTOF10: 0
PAINLEVEL_OUTOF10: 9
PAINLEVEL_OUTOF10: 9
PAINLEVEL_OUTOF10: 5
PAINLEVEL_OUTOF10: 5
PAINLEVEL_OUTOF10: 4

## 2018-11-02 ASSESSMENT — PAIN DESCRIPTION - LOCATION
LOCATION: HEAD

## 2018-11-02 ASSESSMENT — PAIN DESCRIPTION - PAIN TYPE
TYPE: ACUTE PAIN

## 2018-11-02 ASSESSMENT — PAIN DESCRIPTION - FREQUENCY
FREQUENCY: INTERMITTENT
FREQUENCY: INTERMITTENT

## 2018-11-02 NOTE — CONSULTS
tenderness  No flexor crepitus, : 4+/4+  HIPS: full ROM on FABERE  KNEES: no swelling, no warmth, no effusion  ANKLES:  No warmth, no swelling, no erythema. Full ROM  TOES: non-tender  SKIN: No rashes or nodules noted.     DATA:    CBC with Differential:    Lab Results   Component Value Date    WBC 4.0 11/02/2018    RBC 3.27 11/02/2018    HGB 9.5 11/02/2018    HCT 30.1 11/02/2018     11/02/2018    MCV 92.0 11/02/2018    MCH 29.1 11/02/2018    MCHC 31.6 11/02/2018    RDW 13.7 11/02/2018    METASPCT 2 07/27/2016    LYMPHOPCT 21 11/02/2018    MONOPCT 10 11/02/2018    MYELOPCT 1 07/06/2016    BASOPCT 1 11/02/2018    MONOSABS 0.42 11/02/2018    LYMPHSABS 0.86 11/02/2018    EOSABS 0.23 11/02/2018    BASOSABS 0.04 11/02/2018    DIFFTYPE NOT REPORTED 11/02/2018     CMP:    Lab Results   Component Value Date     11/02/2018    K 4.1 11/02/2018     11/02/2018    CO2 21 11/02/2018    BUN 40 11/02/2018    CREATININE 2.23 11/02/2018    GFRAA 28 11/02/2018    LABGLOM 23 11/02/2018    GLUCOSE 112 11/02/2018    PROT 6.7 11/02/2018    LABALBU 3.2 11/02/2018    CALCIUM 8.6 11/02/2018    BILITOT 0.24 11/02/2018    ALKPHOS 115 11/02/2018    AST 19 11/02/2018    ALT 11 11/02/2018     U/A:    Lab Results   Component Value Date    COLORU YELLOW 10/31/2018    PROTEINU 2+ 10/31/2018    PHUR 6.0 10/31/2018    WBCUA 0 TO 2 10/31/2018    RBCUA 0 TO 2 10/31/2018    MUCUS NOT REPORTED 10/31/2018    TRICHOMONAS NOT REPORTED 10/31/2018    YEAST NOT REPORTED 10/31/2018    BACTERIA TRACE 10/31/2018    SPECGRAV 1.015 10/31/2018    LEUKOCYTESUR NEGATIVE 10/31/2018    UROBILINOGEN Normal 10/31/2018    BILIRUBINUR NEGATIVE 10/31/2018    GLUCOSEU NEGATIVE 10/31/2018    AMORPHOUS NOT REPORTED 10/31/2018     Urine Culture:  No components found for: AMBER  Blood Culture:  No components found for: CBLOOD, CFUNGUSBL    IMPRESSION/RECOMMENDATIONS:      Patient Active Problem List   Diagnosis    JESS (acute kidney injury) ([facility]   

## 2018-11-03 ENCOUNTER — APPOINTMENT (OUTPATIENT)
Dept: CT IMAGING | Age: 53
DRG: 346 | End: 2018-11-03
Attending: INTERNAL MEDICINE
Payer: MEDICARE

## 2018-11-03 LAB
ANION GAP SERPL CALCULATED.3IONS-SCNC: 15 MMOL/L (ref 9–17)
BUN BLDV-MCNC: 36 MG/DL (ref 6–20)
BUN/CREAT BLD: ABNORMAL (ref 9–20)
CALCIUM SERPL-MCNC: 9.4 MG/DL (ref 8.6–10.4)
CHLORIDE BLD-SCNC: 101 MMOL/L (ref 98–107)
CO2: 23 MMOL/L (ref 20–31)
CREAT SERPL-MCNC: 2.06 MG/DL (ref 0.5–0.9)
GFR AFRICAN AMERICAN: 31 ML/MIN
GFR NON-AFRICAN AMERICAN: 25 ML/MIN
GFR SERPL CREATININE-BSD FRML MDRD: ABNORMAL ML/MIN/{1.73_M2}
GFR SERPL CREATININE-BSD FRML MDRD: ABNORMAL ML/MIN/{1.73_M2}
GLUCOSE BLD-MCNC: 153 MG/DL (ref 70–99)
POTASSIUM SERPL-SCNC: 4.4 MMOL/L (ref 3.7–5.3)
SODIUM BLD-SCNC: 139 MMOL/L (ref 135–144)

## 2018-11-03 PROCEDURE — 80048 BASIC METABOLIC PNL TOTAL CA: CPT

## 2018-11-03 PROCEDURE — 70486 CT MAXILLOFACIAL W/O DYE: CPT

## 2018-11-03 PROCEDURE — 99232 SBSQ HOSP IP/OBS MODERATE 35: CPT | Performed by: INTERNAL MEDICINE

## 2018-11-03 PROCEDURE — 6360000002 HC RX W HCPCS: Performed by: INTERNAL MEDICINE

## 2018-11-03 PROCEDURE — 6370000000 HC RX 637 (ALT 250 FOR IP): Performed by: HOSPITALIST

## 2018-11-03 PROCEDURE — 1200000000 HC SEMI PRIVATE

## 2018-11-03 PROCEDURE — 71250 CT THORAX DX C-: CPT

## 2018-11-03 PROCEDURE — 36415 COLL VENOUS BLD VENIPUNCTURE: CPT

## 2018-11-03 PROCEDURE — 6370000000 HC RX 637 (ALT 250 FOR IP): Performed by: STUDENT IN AN ORGANIZED HEALTH CARE EDUCATION/TRAINING PROGRAM

## 2018-11-03 PROCEDURE — 2580000003 HC RX 258: Performed by: HOSPITALIST

## 2018-11-03 RX ORDER — LACTULOSE 10 G/15ML
20 SOLUTION ORAL 3 TIMES DAILY
Status: DISCONTINUED | OUTPATIENT
Start: 2018-11-03 | End: 2018-11-06 | Stop reason: HOSPADM

## 2018-11-03 RX ADMIN — TRAMADOL HYDROCHLORIDE 50 MG: 50 TABLET, FILM COATED ORAL at 03:29

## 2018-11-03 RX ADMIN — CETIRIZINE HYDROCHLORIDE 5 MG: 10 TABLET ORAL at 08:46

## 2018-11-03 RX ADMIN — Medication: at 08:47

## 2018-11-03 RX ADMIN — DULOXETINE HYDROCHLORIDE 30 MG: 30 CAPSULE, DELAYED RELEASE ORAL at 08:46

## 2018-11-03 RX ADMIN — CARVEDILOL 12.5 MG: 12.5 TABLET, FILM COATED ORAL at 20:38

## 2018-11-03 RX ADMIN — METHYLPREDNISOLONE SODIUM SUCCINATE 40 MG: 40 INJECTION, POWDER, FOR SOLUTION INTRAMUSCULAR; INTRAVENOUS at 17:01

## 2018-11-03 RX ADMIN — TRAMADOL HYDROCHLORIDE 50 MG: 50 TABLET, FILM COATED ORAL at 08:47

## 2018-11-03 RX ADMIN — CALCITRIOL 0.25 MCG: 0.25 CAPSULE ORAL at 08:46

## 2018-11-03 RX ADMIN — LACTULOSE 20 G: 20 SOLUTION ORAL at 08:47

## 2018-11-03 RX ADMIN — CARVEDILOL 12.5 MG: 12.5 TABLET, FILM COATED ORAL at 08:46

## 2018-11-03 RX ADMIN — OXYMETAZOLINE HYDROCHLORIDE 2 SPRAY: 5 SPRAY NASAL at 20:38

## 2018-11-03 RX ADMIN — Medication 10 ML: at 08:50

## 2018-11-03 RX ADMIN — ATORVASTATIN CALCIUM 80 MG: 80 TABLET, FILM COATED ORAL at 08:46

## 2018-11-03 RX ADMIN — VITAMIN D, TAB 1000IU (100/BT) 1000 UNITS: 25 TAB at 08:46

## 2018-11-03 RX ADMIN — PANTOPRAZOLE SODIUM 40 MG: 40 TABLET, DELAYED RELEASE ORAL at 20:38

## 2018-11-03 RX ADMIN — OXYMETAZOLINE HYDROCHLORIDE 2 SPRAY: 5 SPRAY NASAL at 08:53

## 2018-11-03 RX ADMIN — Medication 10 ML: at 20:38

## 2018-11-03 RX ADMIN — Medication: at 17:01

## 2018-11-03 RX ADMIN — PANTOPRAZOLE SODIUM 40 MG: 40 TABLET, DELAYED RELEASE ORAL at 08:47

## 2018-11-03 RX ADMIN — METHYLPREDNISOLONE SODIUM SUCCINATE 40 MG: 40 INJECTION, POWDER, FOR SOLUTION INTRAMUSCULAR; INTRAVENOUS at 03:32

## 2018-11-03 RX ADMIN — BUTALBITAL, ACETAMINOPHEN, AND CAFFEINE 1 TABLET: 50; 325; 40 TABLET ORAL at 14:07

## 2018-11-03 ASSESSMENT — PAIN SCALES - GENERAL
PAINLEVEL_OUTOF10: 4
PAINLEVEL_OUTOF10: 9
PAINLEVEL_OUTOF10: 0
PAINLEVEL_OUTOF10: 8
PAINLEVEL_OUTOF10: 6
PAINLEVEL_OUTOF10: 3

## 2018-11-04 LAB
ANION GAP SERPL CALCULATED.3IONS-SCNC: 12 MMOL/L (ref 9–17)
BUN BLDV-MCNC: 44 MG/DL (ref 6–20)
BUN/CREAT BLD: ABNORMAL (ref 9–20)
CALCIUM SERPL-MCNC: 9.1 MG/DL (ref 8.6–10.4)
CHLORIDE BLD-SCNC: 103 MMOL/L (ref 98–107)
CO2: 21 MMOL/L (ref 20–31)
CREAT SERPL-MCNC: 2 MG/DL (ref 0.5–0.9)
GFR AFRICAN AMERICAN: 32 ML/MIN
GFR NON-AFRICAN AMERICAN: 26 ML/MIN
GFR SERPL CREATININE-BSD FRML MDRD: ABNORMAL ML/MIN/{1.73_M2}
GFR SERPL CREATININE-BSD FRML MDRD: ABNORMAL ML/MIN/{1.73_M2}
GLUCOSE BLD-MCNC: 134 MG/DL (ref 70–99)
POTASSIUM SERPL-SCNC: 4.3 MMOL/L (ref 3.7–5.3)
SODIUM BLD-SCNC: 136 MMOL/L (ref 135–144)

## 2018-11-04 PROCEDURE — 99232 SBSQ HOSP IP/OBS MODERATE 35: CPT | Performed by: INTERNAL MEDICINE

## 2018-11-04 PROCEDURE — 80048 BASIC METABOLIC PNL TOTAL CA: CPT

## 2018-11-04 PROCEDURE — 6370000000 HC RX 637 (ALT 250 FOR IP): Performed by: HOSPITALIST

## 2018-11-04 PROCEDURE — 1200000000 HC SEMI PRIVATE

## 2018-11-04 PROCEDURE — 6360000002 HC RX W HCPCS: Performed by: INTERNAL MEDICINE

## 2018-11-04 PROCEDURE — 94762 N-INVAS EAR/PLS OXIMTRY CONT: CPT

## 2018-11-04 PROCEDURE — 36415 COLL VENOUS BLD VENIPUNCTURE: CPT

## 2018-11-04 PROCEDURE — 2580000003 HC RX 258: Performed by: HOSPITALIST

## 2018-11-04 RX ORDER — DAPSONE 100 MG/1
100 TABLET ORAL DAILY
Status: DISCONTINUED | OUTPATIENT
Start: 2018-11-05 | End: 2018-11-06 | Stop reason: HOSPADM

## 2018-11-04 RX ADMIN — Medication: at 17:00

## 2018-11-04 RX ADMIN — CETIRIZINE HYDROCHLORIDE 5 MG: 10 TABLET ORAL at 09:23

## 2018-11-04 RX ADMIN — Medication: at 09:23

## 2018-11-04 RX ADMIN — DULOXETINE HYDROCHLORIDE 30 MG: 30 CAPSULE, DELAYED RELEASE ORAL at 09:23

## 2018-11-04 RX ADMIN — CALCITRIOL 0.25 MCG: 0.25 CAPSULE ORAL at 09:23

## 2018-11-04 RX ADMIN — CARVEDILOL 12.5 MG: 12.5 TABLET, FILM COATED ORAL at 21:38

## 2018-11-04 RX ADMIN — ATORVASTATIN CALCIUM 80 MG: 80 TABLET, FILM COATED ORAL at 09:23

## 2018-11-04 RX ADMIN — Medication 10 ML: at 21:30

## 2018-11-04 RX ADMIN — VITAMIN D, TAB 1000IU (100/BT) 1000 UNITS: 25 TAB at 09:23

## 2018-11-04 RX ADMIN — PANTOPRAZOLE SODIUM 40 MG: 40 TABLET, DELAYED RELEASE ORAL at 09:23

## 2018-11-04 RX ADMIN — Medication 10 ML: at 09:24

## 2018-11-04 RX ADMIN — PANTOPRAZOLE SODIUM 40 MG: 40 TABLET, DELAYED RELEASE ORAL at 21:38

## 2018-11-04 RX ADMIN — METHYLPREDNISOLONE SODIUM SUCCINATE 40 MG: 40 INJECTION, POWDER, FOR SOLUTION INTRAMUSCULAR; INTRAVENOUS at 04:26

## 2018-11-04 RX ADMIN — BUTALBITAL, ACETAMINOPHEN, AND CAFFEINE 1 TABLET: 50; 325; 40 TABLET ORAL at 07:51

## 2018-11-04 RX ADMIN — METHYLPREDNISOLONE SODIUM SUCCINATE 40 MG: 40 INJECTION, POWDER, FOR SOLUTION INTRAMUSCULAR; INTRAVENOUS at 17:00

## 2018-11-04 RX ADMIN — CARVEDILOL 12.5 MG: 12.5 TABLET, FILM COATED ORAL at 09:23

## 2018-11-04 ASSESSMENT — PAIN SCALES - GENERAL
PAINLEVEL_OUTOF10: 6
PAINLEVEL_OUTOF10: 2

## 2018-11-05 LAB
ANION GAP SERPL CALCULATED.3IONS-SCNC: 14 MMOL/L (ref 9–17)
BUN BLDV-MCNC: 45 MG/DL (ref 6–20)
BUN/CREAT BLD: ABNORMAL (ref 9–20)
C-REACTIVE PROTEIN: 5.8 MG/L (ref 0–5)
CALCIUM SERPL-MCNC: 8.5 MG/DL (ref 8.6–10.4)
CHLORIDE BLD-SCNC: 103 MMOL/L (ref 98–107)
CO2: 20 MMOL/L (ref 20–31)
CREAT SERPL-MCNC: 2.14 MG/DL (ref 0.5–0.9)
GFR AFRICAN AMERICAN: 29 ML/MIN
GFR NON-AFRICAN AMERICAN: 24 ML/MIN
GFR SERPL CREATININE-BSD FRML MDRD: ABNORMAL ML/MIN/{1.73_M2}
GFR SERPL CREATININE-BSD FRML MDRD: ABNORMAL ML/MIN/{1.73_M2}
GLUCOSE BLD-MCNC: 130 MG/DL (ref 70–99)
POTASSIUM SERPL-SCNC: 4.3 MMOL/L (ref 3.7–5.3)
QUANTI TB GOLD PLUS: NEGATIVE
QUANTI TB1 MINUS NIL: 0 IU/ML (ref 0–0.34)
QUANTI TB2 MINUS NIL: 0 IU/ML (ref 0–0.34)
QUANTIFERON MITOGEN: 8.8 IU/ML
QUANTIFERON NIL: 0.03 IU/ML
SEDIMENTATION RATE, ERYTHROCYTE: 86 MM (ref 0–20)
SODIUM BLD-SCNC: 137 MMOL/L (ref 135–144)

## 2018-11-05 PROCEDURE — 2580000003 HC RX 258: Performed by: INTERNAL MEDICINE

## 2018-11-05 PROCEDURE — 85651 RBC SED RATE NONAUTOMATED: CPT

## 2018-11-05 PROCEDURE — 6370000000 HC RX 637 (ALT 250 FOR IP): Performed by: HOSPITALIST

## 2018-11-05 PROCEDURE — 2580000003 HC RX 258: Performed by: HOSPITALIST

## 2018-11-05 PROCEDURE — 86140 C-REACTIVE PROTEIN: CPT

## 2018-11-05 PROCEDURE — 80048 BASIC METABOLIC PNL TOTAL CA: CPT

## 2018-11-05 PROCEDURE — 6360000002 HC RX W HCPCS: Performed by: INTERNAL MEDICINE

## 2018-11-05 PROCEDURE — 2500000003 HC RX 250 WO HCPCS: Performed by: HOSPITALIST

## 2018-11-05 PROCEDURE — 76937 US GUIDE VASCULAR ACCESS: CPT

## 2018-11-05 PROCEDURE — 1200000000 HC SEMI PRIVATE

## 2018-11-05 PROCEDURE — 6370000000 HC RX 637 (ALT 250 FOR IP): Performed by: INTERNAL MEDICINE

## 2018-11-05 PROCEDURE — 36415 COLL VENOUS BLD VENIPUNCTURE: CPT

## 2018-11-05 RX ORDER — LOSARTAN POTASSIUM 25 MG/1
25 TABLET ORAL 2 TIMES DAILY
Status: DISCONTINUED | OUTPATIENT
Start: 2018-11-05 | End: 2018-11-06 | Stop reason: HOSPADM

## 2018-11-05 RX ORDER — DIPHENHYDRAMINE HCL 25 MG
50 TABLET ORAL
Status: COMPLETED | OUTPATIENT
Start: 2018-11-05 | End: 2018-11-05

## 2018-11-05 RX ORDER — LOSARTAN POTASSIUM 25 MG/1
25 TABLET ORAL DAILY
Status: DISCONTINUED | OUTPATIENT
Start: 2018-11-05 | End: 2018-11-05

## 2018-11-05 RX ORDER — ACETAMINOPHEN 500 MG
1000 TABLET ORAL
Status: COMPLETED | OUTPATIENT
Start: 2018-11-05 | End: 2018-11-05

## 2018-11-05 RX ADMIN — DIPHENHYDRAMINE HCL 50 MG: 25 TABLET ORAL at 18:30

## 2018-11-05 RX ADMIN — Medication 10 ML: at 09:11

## 2018-11-05 RX ADMIN — Medication 10 ML: at 22:31

## 2018-11-05 RX ADMIN — Medication: at 17:53

## 2018-11-05 RX ADMIN — Medication 20 MG: at 09:12

## 2018-11-05 RX ADMIN — PANTOPRAZOLE SODIUM 40 MG: 40 TABLET, DELAYED RELEASE ORAL at 09:09

## 2018-11-05 RX ADMIN — RITUXIMAB 860 MG: 10 INJECTION, SOLUTION INTRAVENOUS at 19:20

## 2018-11-05 RX ADMIN — METHYLPREDNISOLONE SODIUM SUCCINATE 40 MG: 40 INJECTION, POWDER, FOR SOLUTION INTRAMUSCULAR; INTRAVENOUS at 04:41

## 2018-11-05 RX ADMIN — VITAMIN D, TAB 1000IU (100/BT) 1000 UNITS: 25 TAB at 09:09

## 2018-11-05 RX ADMIN — DULOXETINE HYDROCHLORIDE 30 MG: 30 CAPSULE, DELAYED RELEASE ORAL at 09:08

## 2018-11-05 RX ADMIN — Medication: at 09:09

## 2018-11-05 RX ADMIN — CARVEDILOL 12.5 MG: 12.5 TABLET, FILM COATED ORAL at 22:30

## 2018-11-05 RX ADMIN — Medication 20 MG: at 18:52

## 2018-11-05 RX ADMIN — METHYLPREDNISOLONE SODIUM SUCCINATE 40 MG: 40 INJECTION, POWDER, FOR SOLUTION INTRAMUSCULAR; INTRAVENOUS at 16:33

## 2018-11-05 RX ADMIN — ACETAMINOPHEN 1000 MG: 500 TABLET ORAL at 18:25

## 2018-11-05 RX ADMIN — TRAMADOL HYDROCHLORIDE 50 MG: 50 TABLET, FILM COATED ORAL at 13:47

## 2018-11-05 RX ADMIN — PANTOPRAZOLE SODIUM 40 MG: 40 TABLET, DELAYED RELEASE ORAL at 21:28

## 2018-11-05 RX ADMIN — LOSARTAN POTASSIUM 25 MG: 25 TABLET, FILM COATED ORAL at 12:42

## 2018-11-05 RX ADMIN — CARVEDILOL 12.5 MG: 12.5 TABLET, FILM COATED ORAL at 09:08

## 2018-11-05 RX ADMIN — LOSARTAN POTASSIUM 25 MG: 25 TABLET, FILM COATED ORAL at 22:30

## 2018-11-05 RX ADMIN — ATORVASTATIN CALCIUM 80 MG: 80 TABLET, FILM COATED ORAL at 09:09

## 2018-11-05 RX ADMIN — CETIRIZINE HYDROCHLORIDE 5 MG: 10 TABLET ORAL at 09:09

## 2018-11-05 RX ADMIN — DAPSONE 100 MG: 100 TABLET ORAL at 09:09

## 2018-11-05 RX ADMIN — CALCITRIOL 0.25 MCG: 0.25 CAPSULE ORAL at 09:09

## 2018-11-05 RX ADMIN — BUTALBITAL, ACETAMINOPHEN, AND CAFFEINE 1 TABLET: 50; 325; 40 TABLET ORAL at 09:27

## 2018-11-05 ASSESSMENT — PAIN SCALES - GENERAL
PAINLEVEL_OUTOF10: 3
PAINLEVEL_OUTOF10: 7
PAINLEVEL_OUTOF10: 3
PAINLEVEL_OUTOF10: 7
PAINLEVEL_OUTOF10: 0

## 2018-11-06 VITALS
SYSTOLIC BLOOD PRESSURE: 149 MMHG | BODY MASS INDEX: 48.21 KG/M2 | TEMPERATURE: 98.4 F | WEIGHT: 262 LBS | HEIGHT: 62 IN | OXYGEN SATURATION: 94 % | HEART RATE: 68 BPM | DIASTOLIC BLOOD PRESSURE: 86 MMHG | RESPIRATION RATE: 16 BRPM

## 2018-11-06 LAB
ANION GAP SERPL CALCULATED.3IONS-SCNC: 12 MMOL/L (ref 9–17)
BUN BLDV-MCNC: 42 MG/DL (ref 6–20)
BUN/CREAT BLD: ABNORMAL (ref 9–20)
CALCIUM SERPL-MCNC: 8.5 MG/DL (ref 8.6–10.4)
CHLORIDE BLD-SCNC: 107 MMOL/L (ref 98–107)
CO2: 20 MMOL/L (ref 20–31)
CREAT SERPL-MCNC: 1.82 MG/DL (ref 0.5–0.9)
GFR AFRICAN AMERICAN: 35 ML/MIN
GFR NON-AFRICAN AMERICAN: 29 ML/MIN
GFR SERPL CREATININE-BSD FRML MDRD: ABNORMAL ML/MIN/{1.73_M2}
GFR SERPL CREATININE-BSD FRML MDRD: ABNORMAL ML/MIN/{1.73_M2}
GLUCOSE BLD-MCNC: 129 MG/DL (ref 70–99)
POTASSIUM SERPL-SCNC: 4.6 MMOL/L (ref 3.7–5.3)
SODIUM BLD-SCNC: 139 MMOL/L (ref 135–144)

## 2018-11-06 PROCEDURE — 99239 HOSP IP/OBS DSCHRG MGMT >30: CPT | Performed by: INTERNAL MEDICINE

## 2018-11-06 PROCEDURE — 6370000000 HC RX 637 (ALT 250 FOR IP): Performed by: HOSPITALIST

## 2018-11-06 PROCEDURE — 2580000003 HC RX 258: Performed by: HOSPITALIST

## 2018-11-06 PROCEDURE — 6360000002 HC RX W HCPCS: Performed by: INTERNAL MEDICINE

## 2018-11-06 PROCEDURE — 2500000003 HC RX 250 WO HCPCS: Performed by: HOSPITALIST

## 2018-11-06 PROCEDURE — 80048 BASIC METABOLIC PNL TOTAL CA: CPT

## 2018-11-06 PROCEDURE — 6370000000 HC RX 637 (ALT 250 FOR IP): Performed by: INTERNAL MEDICINE

## 2018-11-06 PROCEDURE — 6370000000 HC RX 637 (ALT 250 FOR IP): Performed by: STUDENT IN AN ORGANIZED HEALTH CARE EDUCATION/TRAINING PROGRAM

## 2018-11-06 PROCEDURE — 36415 COLL VENOUS BLD VENIPUNCTURE: CPT

## 2018-11-06 RX ORDER — OXYMETAZOLINE HYDROCHLORIDE 0.05 G/100ML
2 SPRAY NASAL DAILY PRN
Qty: 1 BOTTLE | Refills: 1 | Status: SHIPPED | OUTPATIENT
Start: 2018-11-06 | End: 2019-11-06

## 2018-11-06 RX ORDER — HYDROCHLOROTHIAZIDE 25 MG/1
12.5 TABLET ORAL DAILY
Qty: 30 TABLET | Refills: 2 | Status: SHIPPED | OUTPATIENT
Start: 2018-11-06 | End: 2018-12-14 | Stop reason: SDUPTHER

## 2018-11-06 RX ORDER — SODIUM CHLORIDE/ALOE VERA
GEL (GRAM) NASAL 2 TIMES DAILY
Qty: 1 TUBE | Refills: 3 | Status: SHIPPED | OUTPATIENT
Start: 2018-11-06

## 2018-11-06 RX ORDER — PREDNISONE 20 MG/1
40 TABLET ORAL DAILY
Qty: 60 TABLET | Refills: 0 | Status: SHIPPED | OUTPATIENT
Start: 2018-11-06 | End: 2018-12-06

## 2018-11-06 RX ORDER — ECHINACEA PURPUREA EXTRACT 125 MG
1 TABLET ORAL PRN
Qty: 1 BOTTLE | Refills: 3 | Status: SHIPPED | OUTPATIENT
Start: 2018-11-06

## 2018-11-06 RX ORDER — DAPSONE 100 MG/1
100 TABLET ORAL DAILY
Qty: 30 TABLET | Refills: 1 | Status: SHIPPED | OUTPATIENT
Start: 2018-11-07 | End: 2018-12-07

## 2018-11-06 RX ADMIN — Medication 20 MG: at 11:36

## 2018-11-06 RX ADMIN — DAPSONE 100 MG: 100 TABLET ORAL at 08:54

## 2018-11-06 RX ADMIN — PANTOPRAZOLE SODIUM 40 MG: 40 TABLET, DELAYED RELEASE ORAL at 08:53

## 2018-11-06 RX ADMIN — LOSARTAN POTASSIUM 25 MG: 25 TABLET, FILM COATED ORAL at 08:53

## 2018-11-06 RX ADMIN — Medication: at 10:17

## 2018-11-06 RX ADMIN — CALCITRIOL 0.25 MCG: 0.25 CAPSULE ORAL at 08:53

## 2018-11-06 RX ADMIN — Medication 10 ML: at 08:54

## 2018-11-06 RX ADMIN — VITAMIN D, TAB 1000IU (100/BT) 1000 UNITS: 25 TAB at 08:53

## 2018-11-06 RX ADMIN — DULOXETINE HYDROCHLORIDE 30 MG: 30 CAPSULE, DELAYED RELEASE ORAL at 08:53

## 2018-11-06 RX ADMIN — ATORVASTATIN CALCIUM 80 MG: 80 TABLET, FILM COATED ORAL at 08:54

## 2018-11-06 RX ADMIN — CETIRIZINE HYDROCHLORIDE 5 MG: 10 TABLET ORAL at 08:53

## 2018-11-06 RX ADMIN — CARVEDILOL 12.5 MG: 12.5 TABLET, FILM COATED ORAL at 08:53

## 2018-11-06 RX ADMIN — Medication 10 ML: at 01:36

## 2018-11-06 RX ADMIN — METHYLPREDNISOLONE SODIUM SUCCINATE 40 MG: 40 INJECTION, POWDER, FOR SOLUTION INTRAMUSCULAR; INTRAVENOUS at 04:06

## 2018-11-06 RX ADMIN — Medication 10 ML: at 04:06

## 2018-11-06 RX ADMIN — Medication 20 MG: at 01:36

## 2018-11-06 NOTE — PLAN OF CARE
Problem: Falls - Risk of:  Goal: Will remain free from falls  Will remain free from falls   Outcome: Met This Shift    Goal: Absence of physical injury  Absence of physical injury   Outcome: Met This Shift      Problem: Pain:  Goal: Pain level will decrease  Pain level will decrease   Outcome: Met This Shift  Denies pain this shift.   Goal: Control of acute pain  Control of acute pain   Outcome: Met This Shift    Goal: Control of chronic pain  Control of chronic pain   Outcome: Met This Shift

## 2018-11-06 NOTE — PROGRESS NOTES
Physical Therapy  DATE: 2018    NAME: Monica Herbert  MRN: [id number]   : 1965    Patient not seen this date for Physical Therapy due to:  [] Blood transfusion in progress  [] Hemodialysis  []  Patient Declined  [] Spine Precautions   [] Strict Bedrest  [] Surgery/ Procedure  [] Testing      [] Other        [x] PT being discontinued at this time. Patient independent. No further needs. Pt educated on the importance of mobility, verbalizes no concerns about safety and functional mobility at this time and states mobility is at baseline. [] PT being discontinued at this time as the patient has been transferred to palliative care. No further needs. Janey Rosales, SPT  Evaluation/treatment performed by Student PT under the supervision of co-signing PT who agrees with all evaluation/treatment and documentation.
Rituxan infusion complete. No adverse reaction observed. Tolerated well.
pt has chronic kidney disease, did you still want fluids at 125mL/hr?     [address] [facility] [address] about above    No new orders at this time
distal pulses 2+     Medications:  Scheduled Medications   lactulose  20 g Oral TID    methylPREDNISolone  40 mg Intravenous Q12H    saline nasal gel   Nasal BID    atorvastatin  80 mg Oral Daily    calcitRIOL  0.25 mcg Oral Daily    carvedilol  12.5 mg Oral BID    cetirizine  5 mg Oral Daily    DULoxetine  30 mg Oral Daily    pantoprazole  40 mg Oral BID    vitamin D  1,000 Units Oral Daily    sodium chloride flush  10 mL Intravenous 2 times per day       PRN Medications  butalbital-acetaminophen-caffeine 1 tablet Q6H PRN   docusate sodium 100 mg PRN   traMADol 50 mg Q6H PRN   sodium chloride flush 10 mL PRN   magnesium hydroxide 30 mL Daily PRN   ondansetron 4 mg Q6H PRN   potassium chloride 40 mEq PRN   Or     potassium chloride 40 mEq PRN   Or     potassium chloride 10 mEq PRN   magnesium sulfate 1 g PRN   labetalol 20 mg Q4H PRN       Diagnostic Labs and Imaging:  CBC:  Recent Labs      11/02/18   0542   WBC  4.0   HGB  9.5*   PLT  217     BMP: Recent Labs      11/02/18   0542  11/03/18   0545  11/04/18   0529   NA  138  139  136   K  4.1  4.4  4.3   CL  100  101  103   CO2  21  23  21   BUN  40*  36*  44*   CREATININE  2.23*  2.06*  2.00*   GLUCOSE  112*  153*  134*     Hepatic: Recent Labs      11/02/18   0542   AST  19   ALT  11   BILITOT  0.24*   ALKPHOS  115*       Assessment and Plan:   1. c-ANCA positive vasculitis possible relapse: UA negative for RBC or casts. Likely extra renal relapse at this time. Recent spike of MPO to 229 with raised ESR and CRP. Epistaxis better after oxymetazoline start. Abdomen soft and not distended, given stool softener. CT of the sinuses showing mild opacification of maxillary sinuses, chest chest showing bi basal scarring with fissural nodules largest  Solid  noncalcified pulmonary nodule right upper lobe measuring 4 mm with no evidence of acute bleed. 2. JESS on CKD: Resolved. 3. Hypertension: Losartan and HCTZ on hold. Continue Coreg 12.5 mg po qd.  Labetalol
outpatient. 3. CKD 3 with baseline creat 2- 2.2  4. Hypertension: HCTZ on hold. Restart Losartan 25 mg for continued HTN. Continue Coreg 12.5 mg po qd. Labetalol 20 mg IV q 4 h prn for SBP > 160 mmHg. 5. GERD with barrets: Continue home protonix. 6. DVT prophylaxis: Heparin held due to recent epistaxis. Gabrielle Hernandez MD, PGY-1     Department of Internal Medicine  [facility]         11/6/2018, 1:15 PM    Attending Physician Statement  I have discussed the care of Anamika Aragon, including pertinent history and exam findings with the resident. I have reviewed the key elements of all parts of the encounter with the resident. I have seen and examined the patient with the resident and the key elements of all parts of the encounter have been performed by me.

## 2018-11-06 NOTE — CARE COORDINATION
Discharge [address] [facility]  Clinical Case Management Department  Written by: Prashant Majano RN    Patient Name: Larsteve Graft  Attending Provider: No att. providers found  Admit Date: 2018 11:18 AM  MRN: [id number]  Account: [de-identified]                     : 1965  Discharge Date: 2018      Disposition: home, continue weekly Rituxan infusions at [facility]. They will call to set up times.      Prashant Majano RN

## 2018-11-07 ENCOUNTER — TELEPHONE (OUTPATIENT)
Dept: PRIMARY CARE CLINIC | Age: 53
End: 2018-11-07

## 2018-11-07 NOTE — TELEPHONE ENCOUNTER
[facility] Transitions Initial Follow Up Call    Outreach made within 2 business days of discharge: Yes    Patient: Nikki Siu Patient : 1965   MRN: [id number]  Reason for Admission: There are no discharge diagnoses documented for the most recent discharge. Discharge Date: 18       Spoke with:     Discharge department/facility: [facility] Interactive Patient Contact:  Was patient able to fill all prescriptions: Yes  Was patient instructed to bring all medications to the follow-up visit: Yes  Is patient taking all medications as directed in the discharge summary?  Yes  Does patient understand their discharge instructions: Yes  Does patient have questions or concerns that need addressed prior to 7-14 day follow up office visit: no    Scheduled appointment with PCP within 7-14 days    Follow Up  Future Appointments  Date Time Provider Tom Oneil   2018 2:40 PM ARISTIDES Arnold - CNP [facility]   2018 2:15 PM Bradley Nur MD [facility] None   2019 1:20 PM Elijah Mane MD [facility]       Melissa Munguia

## 2018-11-08 RX ORDER — METHYLPREDNISOLONE SODIUM SUCCINATE 125 MG/2ML
125 INJECTION, POWDER, LYOPHILIZED, FOR SOLUTION INTRAMUSCULAR; INTRAVENOUS ONCE
Status: CANCELLED
Start: 2018-11-12 | End: 2018-11-12

## 2018-11-08 RX ORDER — SODIUM CHLORIDE 9 MG/ML
INJECTION, SOLUTION INTRAVENOUS ONCE
Status: CANCELLED | OUTPATIENT
Start: 2018-11-12

## 2018-11-08 RX ORDER — SODIUM CHLORIDE 0.9 % (FLUSH) 0.9 %
5 SYRINGE (ML) INJECTION PRN
Status: CANCELLED | OUTPATIENT
Start: 2018-11-12

## 2018-11-08 RX ORDER — SODIUM CHLORIDE 0.9 % (FLUSH) 0.9 %
10 SYRINGE (ML) INJECTION PRN
Status: CANCELLED | OUTPATIENT
Start: 2018-11-12

## 2018-11-08 RX ORDER — ACETAMINOPHEN 500 MG
1000 TABLET ORAL ONCE
Status: CANCELLED | OUTPATIENT
Start: 2018-11-12

## 2018-11-08 RX ORDER — DIPHENHYDRAMINE HCL 50 MG
50 CAPSULE ORAL ONCE
Status: CANCELLED
Start: 2018-11-12 | End: 2018-11-12

## 2018-11-09 LAB
SEND OUT REPORT: NORMAL
TEST NAME: NORMAL

## 2018-11-12 ENCOUNTER — OFFICE VISIT (OUTPATIENT)
Dept: PRIMARY CARE CLINIC | Age: 53
End: 2018-11-12
Payer: MEDICARE

## 2018-11-12 VITALS
RESPIRATION RATE: 20 BRPM | HEART RATE: 76 BPM | WEIGHT: 252.3 LBS | BODY MASS INDEX: 46.15 KG/M2 | TEMPERATURE: 97.2 F | DIASTOLIC BLOOD PRESSURE: 69 MMHG | SYSTOLIC BLOOD PRESSURE: 113 MMHG

## 2018-11-12 DIAGNOSIS — M05.762: ICD-10-CM

## 2018-11-12 DIAGNOSIS — I77.6 VASCULITIS (HCC): Primary | ICD-10-CM

## 2018-11-12 DIAGNOSIS — M31.7 MICROSCOPIC POLYANGIITIS (HCC): ICD-10-CM

## 2018-11-12 DIAGNOSIS — D47.3 ESSENTIAL THROMBOCYTHEMIA (HCC): ICD-10-CM

## 2018-11-12 DIAGNOSIS — M05.79 RHEUMATOID ARTHRITIS INVOLVING MULTIPLE SITES WITH POSITIVE RHEUMATOID FACTOR (HCC): ICD-10-CM

## 2018-11-12 PROCEDURE — 99495 TRANSJ CARE MGMT MOD F2F 14D: CPT | Performed by: NURSE PRACTITIONER

## 2018-11-12 PROCEDURE — 1111F DSCHRG MED/CURRENT MED MERGE: CPT | Performed by: NURSE PRACTITIONER

## 2018-11-13 NOTE — PROGRESS NOTES
Nasal route daily as needed for Congestion             pantoprazole (PROTONIX) 40 MG tablet  Take 1 tablet by mouth 2 times daily             potassium chloride (KLOR-CON M20) 20 MEQ extended release tablet  TAKE 1 TABLET BY MOUTH ONCE DAILY             predniSONE (DELTASONE) 20 MG tablet  Take 2 tablets by mouth daily             saline nasal gel (AYR) GEL  by Nasal route 2 times daily             sodium chloride (ALTAMIST SPRAY) 0.65 % nasal spray  1 spray by Nasal route as needed for Congestion             traMADol (ULTRAM) 50 MG tablet  50 mg every 6 hours as needed. As needed.              VESICARE 5 MG tablet  TAKE ONE TABLET BY MOUTH ONCE DAILY             vitamin D (CHOLECALCIFEROL) 1000 UNIT TABS tablet  Take 1,000 Units by mouth daily                   Medications marked \"taking\" at this time  Outpatient Prescriptions Marked as Taking for the 11/12/18 encounter (Office Visit) with ARISTIDES Marshall CNP   Medication Sig Dispense Refill    dapsone 100 MG tablet Take 1 tablet by mouth daily 30 tablet 1    hydrochlorothiazide (HYDRODIURIL) 25 MG tablet Take 0.5 tablets by mouth daily 30 tablet 2    oxymetazoline (12 HOUR NASAL SPRAY) 0.05 % nasal spray 2 sprays by Nasal route daily as needed for Congestion 1 Bottle 1    predniSONE (DELTASONE) 20 MG tablet Take 2 tablets by mouth daily 60 tablet 0    sodium chloride (ALTAMIST SPRAY) 0.65 % nasal spray 1 spray by Nasal route as needed for Congestion 1 Bottle 3    calcitRIOL (ROCALTROL) 0.25 MCG capsule Take 1 capsule by mouth daily Monday/Wednesday/Friday 30 capsule 3    carvedilol (COREG) 12.5 MG tablet TAKE ONE TABLET BY MOUTH TWICE DAILY 60 tablet 5    losartan (COZAAR) 25 MG tablet Take 1 tablet by mouth 2 times daily 60 tablet 5    pantoprazole (PROTONIX) 40 MG tablet Take 1 tablet by mouth 2 times daily 60 tablet 5    meclizine (ANTIVERT) 25 MG tablet Take 25 mg by mouth 3 times daily as needed      vitamin D (CHOLECALCIFEROL) 1000 UNIT TABS tablet Take 1,000 Units by mouth daily      cetirizine (ZYRTEC) 5 MG tablet Take 1 tablet by mouth daily 30 tablet 3    DULoxetine (CYMBALTA) 30 MG extended release capsule Take 1 capsule by mouth daily 30 capsule 3    atorvastatin (LIPITOR) 80 MG tablet Take 1 tablet by mouth daily 30 tablet 3    VESICARE 5 MG tablet TAKE ONE TABLET BY MOUTH ONCE DAILY 90 tablet 0    butalbital-acetaminophen-caffeine (FIORICET, ESGIC) -40 MG per tablet Take 1 tablet by mouth every 6 hours as needed for Headaches 120 tablet 0    potassium chloride (KLOR-CON M20) 20 MEQ extended release tablet TAKE 1 TABLET BY MOUTH ONCE DAILY 90 tablet 3    traMADol (ULTRAM) 50 MG tablet 50 mg every 6 hours as needed. As needed.  ondansetron (ZOFRAN ODT) 4 MG disintegrating tablet Take 1 tablet by mouth every 4 hours as needed for Nausea or Vomiting 10 tablet 0    Docusate Calcium (STOOL SOFTENER PO) Take 100 mg by mouth as needed           Medications patient taking as of now reconciled against medications ordered at time of hospital discharge: Yes    Chief Complaint   Patient presents with    Other     Transitional Care- C-ANCA Vasculitis associated necrotizing glomerulonephritis, Anemia, Chronic Kidney failure, stage 4    Epistaxis     had nose bleed this am       History of Present illness - Follow up of Hospital diagnosis(es): The patient is a pleasant 48 y.o. 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 Pylori, elevated kappa light chain immunoglobulins (bone marrow negative for myeloma), nephrolithiasis, and morbid obesity presented to hospital at her nephrologist's request. Pt has had a history of C-ANCA Myeloperoxidase vasculitis.  She was initially treated on Cytoxan

## 2018-11-16 ENCOUNTER — HOSPITAL ENCOUNTER (OUTPATIENT)
Dept: INFUSION THERAPY | Age: 53
Discharge: HOME | End: 2018-11-16
Payer: MEDICARE

## 2018-11-16 VITALS
RESPIRATION RATE: 16 BRPM | SYSTOLIC BLOOD PRESSURE: 134 MMHG | TEMPERATURE: 96.2 F | DIASTOLIC BLOOD PRESSURE: 73 MMHG | HEART RATE: 68 BPM | WEIGHT: 249 LBS | BODY MASS INDEX: 45.82 KG/M2 | HEIGHT: 62 IN

## 2018-11-16 DIAGNOSIS — M31.7 MICROSCOPIC POLYANGIITIS (HCC): ICD-10-CM

## 2018-11-16 PROCEDURE — 96375 TX/PRO/DX INJ NEW DRUG ADDON: CPT

## 2018-11-16 PROCEDURE — 6360000002 HC RX W HCPCS: Performed by: INTERNAL MEDICINE

## 2018-11-16 PROCEDURE — 96415 CHEMO IV INFUSION ADDL HR: CPT

## 2018-11-16 PROCEDURE — 2580000003 HC RX 258: Performed by: INTERNAL MEDICINE

## 2018-11-16 PROCEDURE — 96413 CHEMO IV INFUSION 1 HR: CPT

## 2018-11-16 PROCEDURE — 6370000000 HC RX 637 (ALT 250 FOR IP): Performed by: INTERNAL MEDICINE

## 2018-11-16 RX ORDER — DIPHENHYDRAMINE HCL 25 MG
50 CAPSULE ORAL ONCE
Status: CANCELLED
Start: 2018-11-16 | End: 2018-11-16

## 2018-11-16 RX ORDER — SODIUM CHLORIDE 0.9 % (FLUSH) 0.9 %
10 SYRINGE (ML) INJECTION PRN
Status: DISCONTINUED | OUTPATIENT
Start: 2018-11-16 | End: 2018-11-17 | Stop reason: HOSPADM

## 2018-11-16 RX ORDER — DIPHENHYDRAMINE HCL 25 MG
50 CAPSULE ORAL ONCE
Status: COMPLETED | OUTPATIENT
Start: 2018-11-16 | End: 2018-11-16

## 2018-11-16 RX ORDER — SODIUM CHLORIDE 0.9 % (FLUSH) 0.9 %
10 SYRINGE (ML) INJECTION PRN
Status: CANCELLED | OUTPATIENT
Start: 2018-11-16

## 2018-11-16 RX ORDER — ACETAMINOPHEN 500 MG
1000 TABLET ORAL ONCE
Status: COMPLETED | OUTPATIENT
Start: 2018-11-16 | End: 2018-11-16

## 2018-11-16 RX ORDER — ACETAMINOPHEN 500 MG
1000 TABLET ORAL ONCE
Status: CANCELLED | OUTPATIENT
Start: 2018-11-16

## 2018-11-16 RX ORDER — METHYLPREDNISOLONE SODIUM SUCCINATE 125 MG/2ML
125 INJECTION, POWDER, LYOPHILIZED, FOR SOLUTION INTRAMUSCULAR; INTRAVENOUS ONCE
Status: COMPLETED | OUTPATIENT
Start: 2018-11-16 | End: 2018-11-16

## 2018-11-16 RX ORDER — SODIUM CHLORIDE 9 MG/ML
INJECTION, SOLUTION INTRAVENOUS ONCE
Status: CANCELLED | OUTPATIENT
Start: 2018-11-16

## 2018-11-16 RX ORDER — SODIUM CHLORIDE 9 MG/ML
INJECTION, SOLUTION INTRAVENOUS ONCE
Status: COMPLETED | OUTPATIENT
Start: 2018-11-16 | End: 2018-11-16

## 2018-11-16 RX ORDER — SODIUM CHLORIDE 0.9 % (FLUSH) 0.9 %
5 SYRINGE (ML) INJECTION PRN
Status: CANCELLED | OUTPATIENT
Start: 2018-11-16

## 2018-11-16 RX ORDER — METHYLPREDNISOLONE SODIUM SUCCINATE 125 MG/2ML
125 INJECTION, POWDER, LYOPHILIZED, FOR SOLUTION INTRAMUSCULAR; INTRAVENOUS ONCE
Status: CANCELLED
Start: 2018-11-16 | End: 2018-11-16

## 2018-11-16 RX ADMIN — Medication 10 ML: at 09:11

## 2018-11-16 RX ADMIN — DIPHENHYDRAMINE HYDROCHLORIDE 50 MG: 25 CAPSULE ORAL at 09:18

## 2018-11-16 RX ADMIN — RITUXIMAB 800 MG: 10 INJECTION, SOLUTION INTRAVENOUS at 09:47

## 2018-11-16 RX ADMIN — METHYLPREDNISOLONE SODIUM SUCCINATE 125 MG: 125 INJECTION, POWDER, FOR SOLUTION INTRAMUSCULAR; INTRAVENOUS at 09:16

## 2018-11-16 RX ADMIN — SODIUM CHLORIDE: 9 INJECTION, SOLUTION INTRAVENOUS at 09:12

## 2018-11-16 RX ADMIN — ACETAMINOPHEN 1000 MG: 500 TABLET ORAL at 09:18

## 2018-11-16 ASSESSMENT — PAIN SCALES - GENERAL: PAINLEVEL_OUTOF10: 0

## 2018-11-19 ENCOUNTER — HOSPITAL ENCOUNTER (OUTPATIENT)
Age: 53
Discharge: HOME | End: 2018-11-19
Payer: MEDICARE

## 2018-11-19 DIAGNOSIS — N18.4 CKD (CHRONIC KIDNEY DISEASE) STAGE 4, GFR 15-29 ML/MIN (HCC): Chronic | ICD-10-CM

## 2018-11-19 LAB
ABSOLUTE EOS #: 0 K/UL (ref 0–0.44)
ABSOLUTE IMMATURE GRANULOCYTE: 0 K/UL (ref 0–0.3)
ABSOLUTE LYMPH #: 1.02 K/UL (ref 1.1–3.7)
ABSOLUTE MONO #: 0.28 K/UL (ref 0.1–1.2)
ANION GAP SERPL CALCULATED.3IONS-SCNC: 12 MMOL/L (ref 9–17)
BASOPHILS # BLD: 0 % (ref 0–2)
BASOPHILS ABSOLUTE: 0 K/UL (ref 0–0.2)
BUN BLDV-MCNC: 44 MG/DL (ref 6–20)
BUN/CREAT BLD: 21 (ref 9–20)
CALCIUM IONIZED: 1.22 MMOL/L (ref 1.13–1.33)
CALCIUM SERPL-MCNC: 8.6 MG/DL (ref 8.6–10.4)
CHLORIDE BLD-SCNC: 104 MMOL/L (ref 98–107)
CO2: 24 MMOL/L (ref 20–31)
CREAT SERPL-MCNC: 2.1 MG/DL (ref 0.5–0.9)
DIFFERENTIAL TYPE: ABNORMAL
EOSINOPHILS RELATIVE PERCENT: 0 % (ref 1–4)
GFR AFRICAN AMERICAN: 30 ML/MIN
GFR NON-AFRICAN AMERICAN: 25 ML/MIN
GFR SERPL CREATININE-BSD FRML MDRD: ABNORMAL ML/MIN/{1.73_M2}
GFR SERPL CREATININE-BSD FRML MDRD: ABNORMAL ML/MIN/{1.73_M2}
GLUCOSE BLD-MCNC: 106 MG/DL (ref 70–99)
HCT VFR BLD CALC: 33.3 % (ref 36.3–47.1)
HEMOGLOBIN: 10.4 G/DL (ref 11.9–15.1)
IMMATURE GRANULOCYTES: 0 %
LYMPHOCYTES # BLD: 11 % (ref 24–43)
MCH RBC QN AUTO: 29.9 PG (ref 25.2–33.5)
MCHC RBC AUTO-ENTMCNC: 31.2 G/DL (ref 28.4–34.8)
MCV RBC AUTO: 95.7 FL (ref 82.6–102.9)
MONOCYTES # BLD: 3 % (ref 3–12)
MORPHOLOGY: NORMAL
NRBC AUTOMATED: 0 PER 100 WBC
PDW BLD-RTO: 14.6 % (ref 11.8–14.4)
PLATELET # BLD: 186 K/UL (ref 138–453)
PLATELET ESTIMATE: ABNORMAL
PMV BLD AUTO: 10.1 FL (ref 8.1–13.5)
POTASSIUM SERPL-SCNC: 4.1 MMOL/L (ref 3.7–5.3)
PTH INTACT: 120.5 PG/ML (ref 15–65)
RBC # BLD: 3.48 M/UL (ref 3.95–5.11)
RBC # BLD: ABNORMAL 10*6/UL
SEG NEUTROPHILS: 86 % (ref 36–65)
SEGMENTED NEUTROPHILS ABSOLUTE COUNT: 8 K/UL (ref 1.5–8.1)
SODIUM BLD-SCNC: 140 MMOL/L (ref 135–144)
WBC # BLD: 9.3 K/UL (ref 3.5–11.3)
WBC # BLD: ABNORMAL 10*3/UL

## 2018-11-19 PROCEDURE — 83970 ASSAY OF PARATHORMONE: CPT

## 2018-11-19 PROCEDURE — 80048 BASIC METABOLIC PNL TOTAL CA: CPT

## 2018-11-19 PROCEDURE — 85025 COMPLETE CBC W/AUTO DIFF WBC: CPT

## 2018-11-19 PROCEDURE — 36415 COLL VENOUS BLD VENIPUNCTURE: CPT

## 2018-11-19 PROCEDURE — 82330 ASSAY OF CALCIUM: CPT

## 2018-11-20 ENCOUNTER — TELEPHONE (OUTPATIENT)
Dept: PRIMARY CARE CLINIC | Age: 53
End: 2018-11-20

## 2018-11-20 DIAGNOSIS — R32 URINARY INCONTINENCE, UNSPECIFIED TYPE: Primary | ICD-10-CM

## 2018-11-20 RX ORDER — DIAPER,BRIEF,ADULT, DISPOSABLE
3 EACH MISCELLANEOUS DAILY
Qty: 300 ML | Refills: 11 | Status: SHIPPED | OUTPATIENT
Start: 2018-11-20 | End: 2022-04-28

## 2018-11-26 RX ORDER — SOLIFENACIN SUCCINATE 5 MG/1
TABLET, FILM COATED ORAL
Qty: 30 TABLET | Refills: 0 | Status: SHIPPED | OUTPATIENT
Start: 2018-11-26 | End: 2019-12-09 | Stop reason: ALTCHOICE

## 2018-11-30 ENCOUNTER — HOSPITAL ENCOUNTER (OUTPATIENT)
Dept: INFUSION THERAPY | Age: 53
Discharge: HOME | End: 2018-11-30
Payer: MEDICARE

## 2018-11-30 VITALS
TEMPERATURE: 97.2 F | DIASTOLIC BLOOD PRESSURE: 80 MMHG | RESPIRATION RATE: 20 BRPM | HEIGHT: 62 IN | SYSTOLIC BLOOD PRESSURE: 142 MMHG | HEART RATE: 88 BPM | BODY MASS INDEX: 45.08 KG/M2 | WEIGHT: 245 LBS

## 2018-11-30 DIAGNOSIS — M31.7 MICROSCOPIC POLYANGIITIS (HCC): ICD-10-CM

## 2018-11-30 PROCEDURE — 96415 CHEMO IV INFUSION ADDL HR: CPT

## 2018-11-30 PROCEDURE — 2580000003 HC RX 258: Performed by: INTERNAL MEDICINE

## 2018-11-30 PROCEDURE — 6360000002 HC RX W HCPCS: Performed by: INTERNAL MEDICINE

## 2018-11-30 PROCEDURE — 6370000000 HC RX 637 (ALT 250 FOR IP): Performed by: INTERNAL MEDICINE

## 2018-11-30 PROCEDURE — 96375 TX/PRO/DX INJ NEW DRUG ADDON: CPT

## 2018-11-30 PROCEDURE — 96413 CHEMO IV INFUSION 1 HR: CPT

## 2018-11-30 RX ORDER — METHYLPREDNISOLONE SODIUM SUCCINATE 125 MG/2ML
125 INJECTION, POWDER, LYOPHILIZED, FOR SOLUTION INTRAMUSCULAR; INTRAVENOUS ONCE
Status: COMPLETED | OUTPATIENT
Start: 2018-11-30 | End: 2018-11-30

## 2018-11-30 RX ORDER — SODIUM CHLORIDE 0.9 % (FLUSH) 0.9 %
10 SYRINGE (ML) INJECTION PRN
Status: DISCONTINUED | OUTPATIENT
Start: 2018-11-30 | End: 2018-12-01 | Stop reason: HOSPADM

## 2018-11-30 RX ORDER — DIPHENHYDRAMINE HCL 25 MG
50 CAPSULE ORAL ONCE
Status: COMPLETED | OUTPATIENT
Start: 2018-11-30 | End: 2018-11-30

## 2018-11-30 RX ORDER — ACETAMINOPHEN 500 MG
1000 TABLET ORAL ONCE
Status: CANCELLED | OUTPATIENT
Start: 2018-11-30

## 2018-11-30 RX ORDER — SODIUM CHLORIDE 0.9 % (FLUSH) 0.9 %
10 SYRINGE (ML) INJECTION PRN
Status: CANCELLED | OUTPATIENT
Start: 2018-11-30

## 2018-11-30 RX ORDER — DIPHENHYDRAMINE HCL 25 MG
50 CAPSULE ORAL ONCE
Status: CANCELLED
Start: 2018-11-30 | End: 2018-11-30

## 2018-11-30 RX ORDER — METHYLPREDNISOLONE SODIUM SUCCINATE 125 MG/2ML
125 INJECTION, POWDER, LYOPHILIZED, FOR SOLUTION INTRAMUSCULAR; INTRAVENOUS ONCE
Status: CANCELLED
Start: 2018-11-30 | End: 2018-11-30

## 2018-11-30 RX ORDER — ACETAMINOPHEN 500 MG
1000 TABLET ORAL ONCE
Status: COMPLETED | OUTPATIENT
Start: 2018-11-30 | End: 2018-11-30

## 2018-11-30 RX ORDER — SODIUM CHLORIDE 0.9 % (FLUSH) 0.9 %
5 SYRINGE (ML) INJECTION PRN
Status: CANCELLED | OUTPATIENT
Start: 2018-11-30

## 2018-11-30 RX ORDER — SODIUM CHLORIDE 9 MG/ML
INJECTION, SOLUTION INTRAVENOUS ONCE
Status: CANCELLED | OUTPATIENT
Start: 2018-11-30

## 2018-11-30 RX ORDER — SODIUM CHLORIDE 9 MG/ML
INJECTION, SOLUTION INTRAVENOUS ONCE
Status: COMPLETED | OUTPATIENT
Start: 2018-11-30 | End: 2018-11-30

## 2018-11-30 RX ADMIN — RITUXIMAB 800 MG: 10 INJECTION, SOLUTION INTRAVENOUS at 10:06

## 2018-11-30 RX ADMIN — DIPHENHYDRAMINE HYDROCHLORIDE 50 MG: 25 CAPSULE ORAL at 09:31

## 2018-11-30 RX ADMIN — Medication 10 ML: at 09:24

## 2018-11-30 RX ADMIN — ACETAMINOPHEN 1000 MG: 500 TABLET ORAL at 09:32

## 2018-11-30 RX ADMIN — SODIUM CHLORIDE: 9 INJECTION, SOLUTION INTRAVENOUS at 09:30

## 2018-11-30 RX ADMIN — METHYLPREDNISOLONE SODIUM SUCCINATE 125 MG: 125 INJECTION, POWDER, FOR SOLUTION INTRAMUSCULAR; INTRAVENOUS at 09:32

## 2018-11-30 ASSESSMENT — PAIN SCALES - GENERAL: PAINLEVEL_OUTOF10: 0

## 2018-12-04 RX ORDER — PREDNISONE 20 MG/1
TABLET ORAL
Qty: 60 TABLET | Refills: 0 | OUTPATIENT
Start: 2018-12-04

## 2018-12-04 RX ORDER — PREDNISONE 20 MG/1
40 TABLET ORAL DAILY
Qty: 60 TABLET | Refills: 0 | Status: CANCELLED | OUTPATIENT
Start: 2018-12-04 | End: 2019-01-03

## 2018-12-04 NOTE — TELEPHONE ENCOUNTER
advised and verbalized understanding.
01/21/2019   Phosphorus Once 01/21/2019   CBC Auto Differential Once 01/21/2019   Transfuse RBC     CBC Auto Differential     Basic Metabolic Panel     Immunoglobulin Panel (IgG, IgA, IgM)     [place]/Lambda Free Lt Chains, Serum Quant         Next Visit Date:  Future Appointments  Date Time Provider Tom Oneil   12/7/2018 9:00 AM SCHEDULE, [facility] ROOM 5 [facility] [place]   1/16/2019 1:20 PM Eliz Constantino MD [facility] Spe [facility]   1/28/2019 1:20 PM Wilbert Paris MD [facility] None   2/13/2019 1:00 PM Clemetine Pacini Might, APRN - CNP [facility]            Patient Active Problem List:     JESS (acute kidney injury) ([facility] [address] [facility]     Arteritis ([facility]     ANCA-associated vasculitis flare(HCC)     Rectocele     Retinal edema of both eyes     CKD (chronic kidney disease) stage 4, GFR 15-29 ml/min ([facility])     Depression with anxiety     Essential hypertension     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)     S/P DOMO-BSO     Endometrial hyperplasia without atypia, simple     Postural vertigo     Morbid obesity (HCC)     Allergic rhinitis     Iron deficiency anemia     Hyperlipidemia     Urinary frequency     Muscle weakness     MGUS (monoclonal gammopathy of unknown significance)     Anti-cyclic citrullinated peptide antibody positive     Bilateral hip pain     Bilateral knee pain     Cancer of uterus (HCC)     Elevated C-reactive protein (CRP)     Elevated white blood cell count, unspecified     Essential thrombocythemia (HCC)     Greater trochanteric bursitis of both hips     Microscopic polyangiitis (HCC)     Osteoarthritis of right acromioclavicular joint     Osteoarthritis of sacroiliac joint     Other intervertebral disc degeneration, thoracic region     Patient nonadherence     Pes anserinus bursitis of both knees     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

## 2018-12-07 ENCOUNTER — HOSPITAL ENCOUNTER (OUTPATIENT)
Dept: INFUSION THERAPY | Age: 53
Discharge: HOME | End: 2018-12-07
Payer: MEDICARE

## 2018-12-07 VITALS
SYSTOLIC BLOOD PRESSURE: 131 MMHG | BODY MASS INDEX: 44.53 KG/M2 | HEIGHT: 62 IN | TEMPERATURE: 97 F | HEART RATE: 86 BPM | DIASTOLIC BLOOD PRESSURE: 82 MMHG | RESPIRATION RATE: 18 BRPM | WEIGHT: 242 LBS

## 2018-12-07 DIAGNOSIS — M31.7 MICROSCOPIC POLYANGIITIS (HCC): ICD-10-CM

## 2018-12-07 PROCEDURE — 96413 CHEMO IV INFUSION 1 HR: CPT

## 2018-12-07 PROCEDURE — 6370000000 HC RX 637 (ALT 250 FOR IP): Performed by: INTERNAL MEDICINE

## 2018-12-07 PROCEDURE — 6360000002 HC RX W HCPCS: Performed by: INTERNAL MEDICINE

## 2018-12-07 PROCEDURE — 2580000003 HC RX 258: Performed by: INTERNAL MEDICINE

## 2018-12-07 PROCEDURE — 96415 CHEMO IV INFUSION ADDL HR: CPT

## 2018-12-07 PROCEDURE — 96375 TX/PRO/DX INJ NEW DRUG ADDON: CPT

## 2018-12-07 RX ORDER — ACETAMINOPHEN 500 MG
1000 TABLET ORAL ONCE
Status: COMPLETED | OUTPATIENT
Start: 2018-12-07 | End: 2018-12-07

## 2018-12-07 RX ORDER — SODIUM CHLORIDE 0.9 % (FLUSH) 0.9 %
10 SYRINGE (ML) INJECTION PRN
Status: DISCONTINUED | OUTPATIENT
Start: 2018-12-07 | End: 2018-12-08 | Stop reason: HOSPADM

## 2018-12-07 RX ORDER — DIPHENHYDRAMINE HCL 25 MG
50 CAPSULE ORAL ONCE
Status: CANCELLED
Start: 2018-12-07 | End: 2018-12-07

## 2018-12-07 RX ORDER — ACETAMINOPHEN 500 MG
1000 TABLET ORAL ONCE
Status: CANCELLED | OUTPATIENT
Start: 2018-12-07

## 2018-12-07 RX ORDER — SODIUM CHLORIDE 0.9 % (FLUSH) 0.9 %
10 SYRINGE (ML) INJECTION PRN
Status: CANCELLED | OUTPATIENT
Start: 2018-12-07

## 2018-12-07 RX ORDER — METHYLPREDNISOLONE SODIUM SUCCINATE 125 MG/2ML
125 INJECTION, POWDER, LYOPHILIZED, FOR SOLUTION INTRAMUSCULAR; INTRAVENOUS ONCE
Status: COMPLETED | OUTPATIENT
Start: 2018-12-07 | End: 2018-12-07

## 2018-12-07 RX ORDER — DIPHENHYDRAMINE HCL 25 MG
50 CAPSULE ORAL ONCE
Status: COMPLETED | OUTPATIENT
Start: 2018-12-07 | End: 2018-12-07

## 2018-12-07 RX ORDER — METHYLPREDNISOLONE SODIUM SUCCINATE 125 MG/2ML
125 INJECTION, POWDER, LYOPHILIZED, FOR SOLUTION INTRAMUSCULAR; INTRAVENOUS ONCE
Status: CANCELLED
Start: 2018-12-07 | End: 2018-12-07

## 2018-12-07 RX ORDER — SODIUM CHLORIDE 0.9 % (FLUSH) 0.9 %
5 SYRINGE (ML) INJECTION PRN
Status: CANCELLED | OUTPATIENT
Start: 2018-12-07

## 2018-12-07 RX ORDER — SODIUM CHLORIDE 9 MG/ML
INJECTION, SOLUTION INTRAVENOUS ONCE
Status: CANCELLED | OUTPATIENT
Start: 2018-12-07

## 2018-12-07 RX ORDER — SODIUM CHLORIDE 9 MG/ML
INJECTION, SOLUTION INTRAVENOUS ONCE
Status: COMPLETED | OUTPATIENT
Start: 2018-12-07 | End: 2018-12-07

## 2018-12-07 RX ADMIN — RITUXIMAB 800 MG: 10 INJECTION, SOLUTION INTRAVENOUS at 09:48

## 2018-12-07 RX ADMIN — METHYLPREDNISOLONE SODIUM SUCCINATE 125 MG: 125 INJECTION, POWDER, FOR SOLUTION INTRAMUSCULAR; INTRAVENOUS at 09:15

## 2018-12-07 RX ADMIN — SODIUM CHLORIDE: 9 INJECTION, SOLUTION INTRAVENOUS at 09:08

## 2018-12-07 RX ADMIN — DIPHENHYDRAMINE HYDROCHLORIDE 50 MG: 25 CAPSULE ORAL at 09:15

## 2018-12-07 RX ADMIN — Medication 10 ML: at 09:03

## 2018-12-07 RX ADMIN — ACETAMINOPHEN 1000 MG: 500 TABLET ORAL at 09:14

## 2018-12-07 ASSESSMENT — PAIN SCALES - GENERAL: PAINLEVEL_OUTOF10: 0

## 2019-01-02 ENCOUNTER — OFFICE VISIT (OUTPATIENT)
Dept: PRIMARY CARE CLINIC | Age: 54
End: 2019-01-02
Payer: MEDICARE

## 2019-01-02 VITALS
WEIGHT: 247.6 LBS | HEART RATE: 104 BPM | TEMPERATURE: 97.2 F | BODY MASS INDEX: 45.29 KG/M2 | DIASTOLIC BLOOD PRESSURE: 107 MMHG | SYSTOLIC BLOOD PRESSURE: 182 MMHG

## 2019-01-02 DIAGNOSIS — J20.9 BRONCHITIS, ACUTE, WITH BRONCHOSPASM: ICD-10-CM

## 2019-01-02 DIAGNOSIS — J32.2 SINUSITIS CHRONIC, ETHMOIDAL: Primary | ICD-10-CM

## 2019-01-02 PROCEDURE — 1036F TOBACCO NON-USER: CPT | Performed by: NURSE PRACTITIONER

## 2019-01-02 PROCEDURE — 3017F COLORECTAL CA SCREEN DOC REV: CPT | Performed by: NURSE PRACTITIONER

## 2019-01-02 PROCEDURE — G8482 FLU IMMUNIZE ORDER/ADMIN: HCPCS | Performed by: NURSE PRACTITIONER

## 2019-01-02 PROCEDURE — G8417 CALC BMI ABV UP PARAM F/U: HCPCS | Performed by: NURSE PRACTITIONER

## 2019-01-02 PROCEDURE — 99214 OFFICE O/P EST MOD 30 MIN: CPT | Performed by: NURSE PRACTITIONER

## 2019-01-02 PROCEDURE — G8427 DOCREV CUR MEDS BY ELIG CLIN: HCPCS | Performed by: NURSE PRACTITIONER

## 2019-01-02 RX ORDER — AMOXICILLIN AND CLAVULANATE POTASSIUM 875; 125 MG/1; MG/1
1 TABLET, FILM COATED ORAL 2 TIMES DAILY
Qty: 14 TABLET | Refills: 0 | Status: SHIPPED | OUTPATIENT
Start: 2019-01-02 | End: 2019-01-09

## 2019-01-02 RX ORDER — BENZONATATE 100 MG/1
100 CAPSULE ORAL 2 TIMES DAILY PRN
Qty: 20 CAPSULE | Refills: 0 | Status: SHIPPED | OUTPATIENT
Start: 2019-01-02 | End: 2019-01-09

## 2019-01-02 ASSESSMENT — ENCOUNTER SYMPTOMS
SORE THROAT: 0
WHEEZING: 1
SINUS PRESSURE: 1
VOMITING: 1
COUGH: 1
EYES NEGATIVE: 1
RHINORRHEA: 1
SINUS PAIN: 1

## 2019-01-15 DIAGNOSIS — J30.2 SEASONAL ALLERGIES: ICD-10-CM

## 2019-01-15 RX ORDER — CETIRIZINE HYDROCHLORIDE 5 MG/1
5 TABLET ORAL DAILY
Qty: 90 TABLET | Refills: 3 | Status: SHIPPED | OUTPATIENT
Start: 2019-01-15 | End: 2019-05-23 | Stop reason: ALTCHOICE

## 2019-01-21 ENCOUNTER — HOSPITAL ENCOUNTER (OUTPATIENT)
Age: 54
Discharge: HOME | End: 2019-01-21
Payer: MEDICARE

## 2019-01-21 DIAGNOSIS — N18.4 CKD (CHRONIC KIDNEY DISEASE) STAGE 4, GFR 15-29 ML/MIN (HCC): Chronic | ICD-10-CM

## 2019-01-21 DIAGNOSIS — N18.9 ANEMIA OF CHRONIC KIDNEY FAILURE, UNSPECIFIED STAGE: ICD-10-CM

## 2019-01-21 DIAGNOSIS — D63.1 ANEMIA OF CHRONIC KIDNEY FAILURE, UNSPECIFIED STAGE: ICD-10-CM

## 2019-01-21 LAB
ABSOLUTE EOS #: 0.1 K/UL (ref 0–0.44)
ABSOLUTE IMMATURE GRANULOCYTE: 0.1 K/UL (ref 0–0.3)
ABSOLUTE LYMPH #: 0.82 K/UL (ref 1.1–3.7)
ABSOLUTE MONO #: 0 K/UL (ref 0.1–1.2)
ALBUMIN SERPL-MCNC: 3.5 G/DL (ref 3.5–5.2)
ALBUMIN/GLOBULIN RATIO: 1.3 (ref 1–2.5)
ALP BLD-CCNC: 96 U/L (ref 35–104)
ALT SERPL-CCNC: 24 U/L (ref 5–33)
ANION GAP SERPL CALCULATED.3IONS-SCNC: 11 MMOL/L (ref 9–17)
AST SERPL-CCNC: 19 U/L
BASOPHILS # BLD: 0 % (ref 0–2)
BASOPHILS ABSOLUTE: 0 K/UL (ref 0–0.2)
BILIRUB SERPL-MCNC: 0.21 MG/DL (ref 0.3–1.2)
BUN BLDV-MCNC: 39 MG/DL (ref 6–20)
BUN/CREAT BLD: 21 (ref 9–20)
CALCIUM IONIZED: 1.13 MMOL/L (ref 1.13–1.33)
CALCIUM SERPL-MCNC: 8.7 MG/DL (ref 8.6–10.4)
CHLORIDE BLD-SCNC: 100 MMOL/L (ref 98–107)
CO2: 27 MMOL/L (ref 20–31)
CREAT SERPL-MCNC: 1.87 MG/DL (ref 0.5–0.9)
CREATININE URINE: 45.5 MG/DL (ref 28–217)
DIFFERENTIAL TYPE: ABNORMAL
EOSINOPHILS RELATIVE PERCENT: 1 % (ref 1–4)
GFR AFRICAN AMERICAN: 34 ML/MIN
GFR NON-AFRICAN AMERICAN: 28 ML/MIN
GFR SERPL CREATININE-BSD FRML MDRD: ABNORMAL ML/MIN/{1.73_M2}
GFR SERPL CREATININE-BSD FRML MDRD: ABNORMAL ML/MIN/{1.73_M2}
GLUCOSE BLD-MCNC: 108 MG/DL (ref 70–99)
HCT VFR BLD CALC: 34.9 % (ref 36.3–47.1)
HEMOGLOBIN: 10.9 G/DL (ref 11.9–15.1)
IMMATURE GRANULOCYTES: 1 %
LYMPHOCYTES # BLD: 8 % (ref 24–43)
MCH RBC QN AUTO: 33.1 PG (ref 25.2–33.5)
MCHC RBC AUTO-ENTMCNC: 31.2 G/DL (ref 28.4–34.8)
MCV RBC AUTO: 106.1 FL (ref 82.6–102.9)
MONOCYTES # BLD: 0 % (ref 3–12)
MORPHOLOGY: NORMAL
NRBC AUTOMATED: 0 PER 100 WBC
PDW BLD-RTO: 13.3 % (ref 11.8–14.4)
PHOSPHORUS: 3.9 MG/DL (ref 2.6–4.5)
PLATELET # BLD: 227 K/UL (ref 138–453)
PLATELET ESTIMATE: ABNORMAL
PMV BLD AUTO: 10.2 FL (ref 8.1–13.5)
POTASSIUM SERPL-SCNC: 4.3 MMOL/L (ref 3.7–5.3)
PTH INTACT: 134.7 PG/ML (ref 15–65)
RBC # BLD: 3.29 M/UL (ref 3.95–5.11)
RBC # BLD: ABNORMAL 10*6/UL
SEDIMENTATION RATE, ERYTHROCYTE: 28 MM (ref 0–20)
SEG NEUTROPHILS: 90 % (ref 36–65)
SEGMENTED NEUTROPHILS ABSOLUTE COUNT: 9.28 K/UL (ref 1.5–8.1)
SODIUM BLD-SCNC: 138 MMOL/L (ref 135–144)
TOTAL PROTEIN, URINE: 154 MG/DL
TOTAL PROTEIN: 6.2 G/DL (ref 6.4–8.3)
VITAMIN D 25-HYDROXY: 23.9 NG/ML (ref 30–100)
WBC # BLD: 10.3 K/UL (ref 3.5–11.3)
WBC # BLD: ABNORMAL 10*3/UL

## 2019-01-21 PROCEDURE — 80053 COMPREHEN METABOLIC PANEL: CPT

## 2019-01-21 PROCEDURE — 83970 ASSAY OF PARATHORMONE: CPT

## 2019-01-21 PROCEDURE — 82570 ASSAY OF URINE CREATININE: CPT

## 2019-01-21 PROCEDURE — 84100 ASSAY OF PHOSPHORUS: CPT

## 2019-01-21 PROCEDURE — 82306 VITAMIN D 25 HYDROXY: CPT

## 2019-01-21 PROCEDURE — 82330 ASSAY OF CALCIUM: CPT

## 2019-01-21 PROCEDURE — 36415 COLL VENOUS BLD VENIPUNCTURE: CPT

## 2019-01-21 PROCEDURE — 85025 COMPLETE CBC W/AUTO DIFF WBC: CPT

## 2019-01-21 PROCEDURE — 85651 RBC SED RATE NONAUTOMATED: CPT

## 2019-01-21 PROCEDURE — 84156 ASSAY OF PROTEIN URINE: CPT

## 2019-02-06 ENCOUNTER — OFFICE VISIT (OUTPATIENT)
Dept: ONCOLOGY | Age: 54
End: 2019-02-06
Payer: MEDICARE

## 2019-02-06 VITALS
BODY MASS INDEX: 47.48 KG/M2 | DIASTOLIC BLOOD PRESSURE: 85 MMHG | RESPIRATION RATE: 20 BRPM | HEIGHT: 62 IN | TEMPERATURE: 97.5 F | WEIGHT: 258 LBS | SYSTOLIC BLOOD PRESSURE: 134 MMHG | HEART RATE: 87 BPM

## 2019-02-06 DIAGNOSIS — R76.8 ANTI-CYCLIC CITRULLINATED PEPTIDE ANTIBODY POSITIVE: Primary | Chronic | ICD-10-CM

## 2019-02-06 DIAGNOSIS — D47.2 MGUS (MONOCLONAL GAMMOPATHY OF UNKNOWN SIGNIFICANCE): ICD-10-CM

## 2019-02-06 DIAGNOSIS — I77.82 ANCA-ASSOCIATED VASCULITIS: Chronic | ICD-10-CM

## 2019-02-06 DIAGNOSIS — N18.4 CKD (CHRONIC KIDNEY DISEASE) STAGE 4, GFR 15-29 ML/MIN (HCC): Chronic | ICD-10-CM

## 2019-02-06 PROCEDURE — 3017F COLORECTAL CA SCREEN DOC REV: CPT | Performed by: INTERNAL MEDICINE

## 2019-02-06 PROCEDURE — G8482 FLU IMMUNIZE ORDER/ADMIN: HCPCS | Performed by: INTERNAL MEDICINE

## 2019-02-06 PROCEDURE — 1036F TOBACCO NON-USER: CPT | Performed by: INTERNAL MEDICINE

## 2019-02-06 PROCEDURE — G8428 CUR MEDS NOT DOCUMENT: HCPCS | Performed by: INTERNAL MEDICINE

## 2019-02-06 PROCEDURE — 99214 OFFICE O/P EST MOD 30 MIN: CPT | Performed by: INTERNAL MEDICINE

## 2019-02-06 PROCEDURE — G8417 CALC BMI ABV UP PARAM F/U: HCPCS | Performed by: INTERNAL MEDICINE

## 2019-02-07 ENCOUNTER — OFFICE VISIT (OUTPATIENT)
Dept: PRIMARY CARE CLINIC | Age: 54
End: 2019-02-07
Payer: MEDICARE

## 2019-02-07 VITALS
HEART RATE: 87 BPM | SYSTOLIC BLOOD PRESSURE: 108 MMHG | BODY MASS INDEX: 47.55 KG/M2 | RESPIRATION RATE: 20 BRPM | TEMPERATURE: 97.7 F | DIASTOLIC BLOOD PRESSURE: 81 MMHG | WEIGHT: 260 LBS

## 2019-02-07 DIAGNOSIS — J01.01 ACUTE RECURRENT MAXILLARY SINUSITIS: Primary | ICD-10-CM

## 2019-02-07 PROCEDURE — G8482 FLU IMMUNIZE ORDER/ADMIN: HCPCS | Performed by: NURSE PRACTITIONER

## 2019-02-07 PROCEDURE — 1036F TOBACCO NON-USER: CPT | Performed by: NURSE PRACTITIONER

## 2019-02-07 PROCEDURE — G8417 CALC BMI ABV UP PARAM F/U: HCPCS | Performed by: NURSE PRACTITIONER

## 2019-02-07 PROCEDURE — G8427 DOCREV CUR MEDS BY ELIG CLIN: HCPCS | Performed by: NURSE PRACTITIONER

## 2019-02-07 PROCEDURE — 3017F COLORECTAL CA SCREEN DOC REV: CPT | Performed by: NURSE PRACTITIONER

## 2019-02-07 PROCEDURE — 99213 OFFICE O/P EST LOW 20 MIN: CPT | Performed by: NURSE PRACTITIONER

## 2019-02-07 RX ORDER — LEVOFLOXACIN 250 MG/1
250 TABLET ORAL DAILY
Qty: 10 TABLET | Refills: 0 | Status: SHIPPED | OUTPATIENT
Start: 2019-02-07 | End: 2019-02-17

## 2019-02-07 ASSESSMENT — ENCOUNTER SYMPTOMS
FACIAL SWELLING: 1
COUGH: 0
GASTROINTESTINAL NEGATIVE: 1
SORE THROAT: 1
EYES NEGATIVE: 1
RESPIRATORY NEGATIVE: 1
SINUS PAIN: 1
SINUS PRESSURE: 1
RHINORRHEA: 0

## 2019-02-13 ENCOUNTER — OFFICE VISIT (OUTPATIENT)
Dept: PRIMARY CARE CLINIC | Age: 54
End: 2019-02-13
Payer: MEDICARE

## 2019-02-13 VITALS
HEART RATE: 98 BPM | TEMPERATURE: 97.7 F | SYSTOLIC BLOOD PRESSURE: 124 MMHG | WEIGHT: 259.2 LBS | DIASTOLIC BLOOD PRESSURE: 96 MMHG | BODY MASS INDEX: 47.7 KG/M2 | RESPIRATION RATE: 16 BRPM | HEIGHT: 62 IN

## 2019-02-13 DIAGNOSIS — M31.7 MICROSCOPIC POLYANGIITIS (HCC): ICD-10-CM

## 2019-02-13 DIAGNOSIS — E78.2 MIXED HYPERLIPIDEMIA: ICD-10-CM

## 2019-02-13 DIAGNOSIS — J06.9 UPPER RESPIRATORY TRACT INFECTION, UNSPECIFIED TYPE: ICD-10-CM

## 2019-02-13 DIAGNOSIS — I10 ESSENTIAL HYPERTENSION: Primary | Chronic | ICD-10-CM

## 2019-02-13 DIAGNOSIS — Z23 NEED FOR SHINGLES VACCINE: ICD-10-CM

## 2019-02-13 DIAGNOSIS — D47.3 ESSENTIAL THROMBOCYTHEMIA (HCC): ICD-10-CM

## 2019-02-13 PROCEDURE — 99214 OFFICE O/P EST MOD 30 MIN: CPT | Performed by: NURSE PRACTITIONER

## 2019-02-13 RX ORDER — GUAIFENESIN 600 MG/1
600 TABLET, EXTENDED RELEASE ORAL 2 TIMES DAILY
Qty: 30 TABLET | Refills: 0 | Status: SHIPPED | OUTPATIENT
Start: 2019-02-13 | End: 2019-02-28

## 2019-02-13 ASSESSMENT — ENCOUNTER SYMPTOMS
NAUSEA: 0
TROUBLE SWALLOWING: 0
SHORTNESS OF BREATH: 0
SINUS PRESSURE: 1
WHEEZING: 0
DIARRHEA: 0
VOMITING: 0
ABDOMINAL PAIN: 0

## 2019-02-13 ASSESSMENT — PATIENT HEALTH QUESTIONNAIRE - PHQ9
1. LITTLE INTEREST OR PLEASURE IN DOING THINGS: 1
SUM OF ALL RESPONSES TO PHQ9 QUESTIONS 1 & 2: 1
SUM OF ALL RESPONSES TO PHQ QUESTIONS 1-9: 1
2. FEELING DOWN, DEPRESSED OR HOPELESS: 0
SUM OF ALL RESPONSES TO PHQ QUESTIONS 1-9: 1

## 2019-02-21 ENCOUNTER — HOSPITAL ENCOUNTER (OUTPATIENT)
Dept: CT IMAGING | Age: 54
Discharge: HOME | End: 2019-02-23
Payer: MEDICARE

## 2019-02-21 ENCOUNTER — OFFICE VISIT (OUTPATIENT)
Dept: OBGYN | Age: 54
End: 2019-02-21
Payer: MEDICARE

## 2019-02-21 VITALS
HEIGHT: 62 IN | WEIGHT: 263 LBS | BODY MASS INDEX: 48.4 KG/M2 | SYSTOLIC BLOOD PRESSURE: 132 MMHG | DIASTOLIC BLOOD PRESSURE: 82 MMHG

## 2019-02-21 DIAGNOSIS — J01.01 ACUTE RECURRENT MAXILLARY SINUSITIS: ICD-10-CM

## 2019-02-21 DIAGNOSIS — E89.41 SURGICAL MENOPAUSE, SYMPTOMATIC: Primary | ICD-10-CM

## 2019-02-21 DIAGNOSIS — Z90.79 S/P TAH-BSO: ICD-10-CM

## 2019-02-21 DIAGNOSIS — Z90.722 S/P TAH-BSO: ICD-10-CM

## 2019-02-21 DIAGNOSIS — Z90.710 S/P TAH-BSO: ICD-10-CM

## 2019-02-21 PROCEDURE — 70486 CT MAXILLOFACIAL W/O DYE: CPT

## 2019-02-21 PROCEDURE — 1036F TOBACCO NON-USER: CPT | Performed by: ADVANCED PRACTICE MIDWIFE

## 2019-02-21 PROCEDURE — G8427 DOCREV CUR MEDS BY ELIG CLIN: HCPCS | Performed by: ADVANCED PRACTICE MIDWIFE

## 2019-02-21 PROCEDURE — 99213 OFFICE O/P EST LOW 20 MIN: CPT | Performed by: ADVANCED PRACTICE MIDWIFE

## 2019-02-21 PROCEDURE — 3017F COLORECTAL CA SCREEN DOC REV: CPT | Performed by: ADVANCED PRACTICE MIDWIFE

## 2019-02-21 PROCEDURE — G8417 CALC BMI ABV UP PARAM F/U: HCPCS | Performed by: ADVANCED PRACTICE MIDWIFE

## 2019-02-21 PROCEDURE — G8482 FLU IMMUNIZE ORDER/ADMIN: HCPCS | Performed by: ADVANCED PRACTICE MIDWIFE

## 2019-02-21 ASSESSMENT — PATIENT HEALTH QUESTIONNAIRE - PHQ9
2. FEELING DOWN, DEPRESSED OR HOPELESS: 0
1. LITTLE INTEREST OR PLEASURE IN DOING THINGS: 0
SUM OF ALL RESPONSES TO PHQ9 QUESTIONS 1 & 2: 0
SUM OF ALL RESPONSES TO PHQ QUESTIONS 1-9: 0
SUM OF ALL RESPONSES TO PHQ QUESTIONS 1-9: 0

## 2019-03-25 ENCOUNTER — HOSPITAL ENCOUNTER (OUTPATIENT)
Age: 54
Discharge: HOME | End: 2019-03-25
Payer: MEDICARE

## 2019-03-25 DIAGNOSIS — N18.4 CKD (CHRONIC KIDNEY DISEASE) STAGE 4, GFR 15-29 ML/MIN (HCC): Chronic | ICD-10-CM

## 2019-03-25 DIAGNOSIS — R76.8 ANTI-CYCLIC CITRULLINATED PEPTIDE ANTIBODY POSITIVE: Chronic | ICD-10-CM

## 2019-03-25 DIAGNOSIS — D47.2 MGUS (MONOCLONAL GAMMOPATHY OF UNKNOWN SIGNIFICANCE): ICD-10-CM

## 2019-03-25 DIAGNOSIS — I77.82 ANCA-ASSOCIATED VASCULITIS: Chronic | ICD-10-CM

## 2019-03-25 LAB
ABSOLUTE RETIC #: 0.08 M/UL (ref 0.03–0.08)
FOLATE: 6.6 NG/ML
IMMATURE RETIC FRACT: 15.7 % (ref 2.7–18.3)
RETIC %: 2.2 % (ref 0.5–1.9)
RETIC HEMOGLOBIN: 34 PG (ref 28.2–35.7)
SEDIMENTATION RATE, ERYTHROCYTE: 91 MM (ref 0–20)
TSH SERPL DL<=0.05 MIU/L-ACNC: 2.17 MIU/L (ref 0.3–5)
VITAMIN B-12: 479 PG/ML (ref 232–1245)

## 2019-03-25 PROCEDURE — 85651 RBC SED RATE NONAUTOMATED: CPT

## 2019-03-25 PROCEDURE — 82607 VITAMIN B-12: CPT

## 2019-03-25 PROCEDURE — 36415 COLL VENOUS BLD VENIPUNCTURE: CPT

## 2019-03-25 PROCEDURE — 82746 ASSAY OF FOLIC ACID SERUM: CPT

## 2019-03-25 PROCEDURE — 84443 ASSAY THYROID STIM HORMONE: CPT

## 2019-03-25 PROCEDURE — 85045 AUTOMATED RETICULOCYTE COUNT: CPT

## 2019-03-26 RX ORDER — POTASSIUM CHLORIDE 20 MEQ/1
TABLET, EXTENDED RELEASE ORAL
Qty: 30 TABLET | Refills: 11 | Status: SHIPPED | OUTPATIENT
Start: 2019-03-26 | End: 2020-04-02

## 2019-04-09 ENCOUNTER — OFFICE VISIT (OUTPATIENT)
Dept: PRIMARY CARE CLINIC | Age: 54
End: 2019-04-09
Payer: MEDICARE

## 2019-04-09 VITALS
HEART RATE: 83 BPM | BODY MASS INDEX: 49.86 KG/M2 | DIASTOLIC BLOOD PRESSURE: 95 MMHG | SYSTOLIC BLOOD PRESSURE: 170 MMHG | WEIGHT: 272.6 LBS | TEMPERATURE: 98 F

## 2019-04-09 DIAGNOSIS — R19.5 LOOSE STOOLS: ICD-10-CM

## 2019-04-09 DIAGNOSIS — H66.001 ACUTE SUPPURATIVE OTITIS MEDIA OF RIGHT EAR WITHOUT SPONTANEOUS RUPTURE OF TYMPANIC MEMBRANE, RECURRENCE NOT SPECIFIED: Primary | ICD-10-CM

## 2019-04-09 DIAGNOSIS — R05.9 COUGH: ICD-10-CM

## 2019-04-09 LAB
INFLUENZA A ANTIBODY: NEGATIVE
INFLUENZA B ANTIBODY: NEGATIVE

## 2019-04-09 PROCEDURE — 3017F COLORECTAL CA SCREEN DOC REV: CPT | Performed by: NURSE PRACTITIONER

## 2019-04-09 PROCEDURE — G8417 CALC BMI ABV UP PARAM F/U: HCPCS | Performed by: NURSE PRACTITIONER

## 2019-04-09 PROCEDURE — 99214 OFFICE O/P EST MOD 30 MIN: CPT | Performed by: NURSE PRACTITIONER

## 2019-04-09 PROCEDURE — 87804 INFLUENZA ASSAY W/OPTIC: CPT | Performed by: NURSE PRACTITIONER

## 2019-04-09 PROCEDURE — G8427 DOCREV CUR MEDS BY ELIG CLIN: HCPCS | Performed by: NURSE PRACTITIONER

## 2019-04-09 PROCEDURE — 1036F TOBACCO NON-USER: CPT | Performed by: NURSE PRACTITIONER

## 2019-04-09 RX ORDER — BUTALBITAL, ACETAMINOPHEN AND CAFFEINE 50; 325; 40 MG/1; MG/1; MG/1
1 TABLET ORAL EVERY 6 HOURS PRN
Qty: 120 TABLET | Refills: 0 | Status: SHIPPED | OUTPATIENT
Start: 2019-04-09 | End: 2019-12-30 | Stop reason: SDUPTHER

## 2019-04-09 RX ORDER — AMOXICILLIN AND CLAVULANATE POTASSIUM 875; 125 MG/1; MG/1
1 TABLET, FILM COATED ORAL 2 TIMES DAILY
Qty: 20 TABLET | Refills: 0 | Status: SHIPPED | OUTPATIENT
Start: 2019-04-09 | End: 2019-04-19

## 2019-04-09 RX ORDER — HYDROCODONE BITARTRATE AND ACETAMINOPHEN 5; 325 MG/1; MG/1
1 TABLET ORAL EVERY 6 HOURS PRN
Qty: 12 TABLET | Refills: 0 | Status: SHIPPED | OUTPATIENT
Start: 2019-04-09 | End: 2019-04-12

## 2019-04-09 ASSESSMENT — ENCOUNTER SYMPTOMS
DIARRHEA: 1
BLOATING: 0
SORE THROAT: 1
VOMITING: 0
ABDOMINAL PAIN: 0
RHINORRHEA: 1
FLATUS: 0
COUGH: 1

## 2019-04-09 NOTE — PATIENT INSTRUCTIONS
Patient Education        Ear Infection (Otitis Media): Care Instructions  Your Care Instructions    An ear infection may start with a cold and affect the middle ear (otitis media). It can hurt a lot. Most ear infections clear up on their own in a couple of days. Most often you will not need antibiotics. This is because many ear infections are caused by a virus. Antibiotics don't work against a virus. Regular doses of pain medicines are the best way to reduce your fever and help you feel better. Follow-up care is a key part of your treatment and safety. Be sure to make and go to all appointments, and call your doctor if you are having problems. It's also a good idea to know your test results and keep a list of the medicines you take. How can you care for yourself at home? · Take pain medicines exactly as directed. ? If the doctor gave you a prescription medicine for pain, take it as prescribed. ? If you are not taking a prescription pain medicine, take an over-the-counter medicine, such as acetaminophen (Tylenol), ibuprofen (Advil, Motrin), or naproxen (Aleve). Read and follow all instructions on the label. ? Do not take two or more pain medicines at the same time unless the doctor told you to. Many pain medicines have acetaminophen, which is Tylenol. Too much acetaminophen (Tylenol) can be harmful. · Plan to take a full dose of pain reliever before bedtime. Getting enough sleep will help you get better. · Try a warm, moist washcloth on the ear. It may help relieve pain. · If your doctor prescribed antibiotics, take them as directed. Do not stop taking them just because you feel better. You need to take the full course of antibiotics. When should you call for help?   Call your doctor now or seek immediate medical care if:    · You have new or increasing ear pain.     · You have new or increasing pus or blood draining from your ear.     · You have a fever with a stiff neck or a severe headache.    Watch closely for changes in your health, and be sure to contact your doctor if:    · You have new or worse symptoms.     · You are not getting better after taking an antibiotic for 2 days. Where can you learn more? Go to [email or web address] and sign in to your [employer] account. Enter O444 in the LaunchKey box to learn more about \"Ear Infection (Otitis Media): Care Instructions. \"     If you do not have an account, please click on the \"Sign Up Now\" link. Current as of: March 27, 2018  Content Version: 11.9  © 1282-4037 [employer]. Care instructions adapted under license by [facility] ([facility]). If you have questions about a medical condition or this instruction, always ask your healthcare professional. [address] any warranty or liability for your use of this information.

## 2019-04-09 NOTE — PROGRESS NOTES
[facility] PHYSICIANS  [facility] PRIMARY CARE [place]  [address] [facility] [address]  Dept: [phone]  Dept Fax: [phone]    Hay Nunez is a 48 y.o. female who [facility] today for her medical conditions/complaints as noted below. Hay Nunez is c/o of Cough (ear pain, diarrhea, congestion -took tylenol -started 2 days ago)      HPI:     Tierra Moffett is here today for a walk in care visit. Otalgia    There is pain in the right ear. This is a new problem. The current episode started in the past 7 days. The problem occurs constantly. The problem has been gradually worsening. There has been no fever. The pain is at a severity of 6/10. The pain is moderate. Associated symptoms include coughing, diarrhea, headaches, rhinorrhea and a sore throat. Pertinent negatives include no abdominal pain, ear discharge, hearing loss, neck pain, rash or vomiting. She has tried acetaminophen for the symptoms. The treatment provided mild relief. There is no history of a chronic ear infection, hearing loss or a tympanostomy tube. Diarrhea    This is a recurrent problem. The current episode started in the past 7 days. The problem occurs less than 2 times per day. The problem has been unchanged. The stool consistency is described as watery. The patient states that diarrhea does not awaken her from sleep. Associated symptoms include coughing, headaches and a URI. Pertinent negatives include no abdominal pain, arthralgias, bloating, chills, fever, increased  flatus, myalgias, sweats, vomiting or weight loss. Nothing aggravates the symptoms. There are no known risk factors. She has tried nothing for the symptoms. The treatment provided no relief. There is no history of bowel resection, inflammatory bowel disease or irritable bowel syndrome.        Past Medical History:   Diagnosis Date    JESS (acute kidney injury) ([facility] [address]     Anemia     Arthritis     Chronic kidney disease     Depression with anxiety 9/8/2016    GERD (gastroesophageal reflux disease)     H/O echocardiogram 08/25/2016    EF 55%. Mild mitral regurgitation.  H/O tooth extraction 07/26/2017    Lower posterior right tooth.  History of blood transfusion     x3. Last one in May 2016    Hyperlipidemia     Hypertensive crisis 8/24/2016    Kidney stones     Vasculitis (HCC)         Current Outpatient Medications   Medication Sig Dispense Refill    amoxicillin-clavulanate (AUGMENTIN) 875-125 MG per tablet Take 1 tablet by mouth 2 times daily for 10 days 20 tablet 0    HYDROcodone-acetaminophen (NORCO) 5-325 MG per tablet Take 1 tablet by mouth every 6 hours as needed for Pain for up to 3 days. Intended supply: 3 days. Take lowest dose possible to manage pain 12 tablet 0    butalbital-acetaminophen-caffeine (FIORICET, ESGIC) -40 MG per tablet Take 1 tablet by mouth every 6 hours as needed for Headaches 120 tablet 0    DULoxetine (CYMBALTA) 30 MG extended release capsule TAKE 1 CAPSULE BY MOUTH ONCE DAILY 90 capsule 3    potassium chloride (KLOR-CON M20) 20 MEQ extended release tablet TAKE 1 TABLET BY MOUTH ONCE DAILY 30 tablet 11    estrogens, conjugated, (PREMARIN) 0.625 MG tablet TAKE ONE TABLET BY MOUTH ONCE DAILY 30 tablet 3    losartan (COZAAR) 25 MG tablet TAKE 1 TABLET BY MOUTH TWICE DAILY 60 tablet 5    atorvastatin (LIPITOR) 80 MG tablet TAKE 1 TABLET BY MOUTH ONCE DAILY 90 tablet 3    predniSONE (DELTASONE) 5 MG tablet Take 5 mg by mouth daily Pt is taking this on a weaning dose.       cetirizine (ZYRTEC) 5 MG tablet Take 1 tablet by mouth daily 90 tablet 3    hydrochlorothiazide (HYDRODIURIL) 25 MG tablet Take 0.5 tablets by mouth daily 45 tablet 3    calcitRIOL (ROCALTROL) 0.25 MCG capsule Take 2 mcg by mouth daily       VESICARE 5 MG tablet TAKE 1 TABLET BY MOUTH ONCE DAILY 30 tablet 0    Incontinence Supply Disposable (PREVAIL ADULT BRIEF LARGE) MISC 3 each by Does not apply route daily 300 mL 11    saline nasal gel (AYR) GEL by Nasal route 2 times daily (Patient taking differently: by Nasal route 2 times daily as needed ) 1 Tube 3    carvedilol (COREG) 12.5 MG tablet TAKE ONE TABLET BY MOUTH TWICE DAILY 60 tablet 5    losartan (COZAAR) 25 MG tablet Take 1 tablet by mouth 2 times daily 60 tablet 5    pantoprazole (PROTONIX) 40 MG tablet Take 1 tablet by mouth 2 times daily 60 tablet 5    meclizine (ANTIVERT) 25 MG tablet Take 25 mg by mouth 3 times daily as needed      vitamin D (CHOLECALCIFEROL) 1000 UNIT TABS tablet Take 2,000 Units by mouth daily       Blood Pressure KIT 1 Units by Does not apply route daily 1 kit 0    ondansetron (ZOFRAN ODT) 4 MG disintegrating tablet Take 1 tablet by mouth every 4 hours as needed for Nausea or Vomiting 10 tablet 0    Docusate Calcium (STOOL SOFTENER PO) Take 100 mg by mouth as needed       oxymetazoline (12 HOUR NASAL SPRAY) 0.05 % nasal spray 2 sprays by Nasal route daily as needed for Congestion 1 Bottle 1    sodium chloride (ALTAMIST SPRAY) 0.65 % nasal spray 1 spray by Nasal route as needed for Congestion 1 Bottle 3    diphenhydrAMINE (BENADRYL) 25 MG capsule Take 25 mg by mouth Pt takes 2 capsules at HS \"due to hives from it being cold now\". Pt tho states these are NOT helping her this time. No current facility-administered medications for this visit. No Known Allergies    Subjective:      Review of Systems   Constitutional: Negative for chills, fever and weight loss. HENT: Positive for ear pain, rhinorrhea and sore throat. Negative for ear discharge and hearing loss. Respiratory: Positive for cough. Gastrointestinal: Positive for diarrhea. Negative for abdominal pain, bloating, flatus and vomiting. Musculoskeletal: Negative for arthralgias, myalgias and neck pain. Skin: Negative for rash. Neurological: Positive for headaches. Objective:     Physical Exam   Constitutional: She appears well-developed and well-nourished.  She appears ill. No distress. HENT:   Right Ear: Ear canal normal. Tympanic membrane is erythematous and bulging. A middle ear effusion is present. Left Ear: Tympanic membrane and ear canal normal.  No middle ear effusion. Nose: Mucosal edema present. No rhinorrhea. Right sinus exhibits frontal sinus tenderness. Right sinus exhibits no maxillary sinus tenderness. Left sinus exhibits frontal sinus tenderness. Left sinus exhibits no maxillary sinus tenderness. Mouth/Throat: Mucous membranes are normal. Mucous membranes are not dry. Posterior oropharyngeal erythema present. Eyes: Conjunctivae are normal.   Neck: Normal range of motion. Neck supple. Cardiovascular: Normal rate, regular rhythm and normal heart sounds. Pulmonary/Chest: Effort normal and breath sounds normal. She has no wheezes. Abdominal: Soft. Bowel sounds are normal. She exhibits no distension. There is no tenderness. Lymphadenopathy:     She has no cervical adenopathy. Neurological: She is alert. Skin: Skin is warm and dry. No rash noted. Nursing note and vitals reviewed. BP (!) 170/95 (Site: Left Lower Arm, Position: Sitting, Cuff Size: Medium Adult)   Pulse 83   Temp 98 °F (36.7 °C) (Temporal)   Wt 272 lb 9.6 oz (123.7 kg)   LMP 09/09/2016   BMI 49.86 kg/m²     Assessment:      Diagnosis Orders   1. Acute suppurative otitis media of right ear without spontaneous rupture of tympanic membrane, recurrence not specified  amoxicillin-clavulanate (AUGMENTIN) 875-125 MG per tablet    HYDROcodone-acetaminophen (NORCO) 5-325 MG per tablet   2. Cough  POCT Influenza A/B   3. Loose stools         Plan:             Discussed exam, POCT findings, plan of care (including prescriptive and supportive as listed below) and follow-up atlength with patient and or Patient. Reviewed all prescribed and recommended medications, administration and side effects.  Encouraged to return to [facility] for noimprovement and or worsening of symptoms. To ER or call 911 if any difficulty breathing, shortness of breath, inability to swallow, hives or temp greater than 103 degrees. Questions answered. They verbalized understandingand agreement. Return if symptoms worsen or fail to improve. Orders Placed This Encounter   Medications    amoxicillin-clavulanate (AUGMENTIN) 875-125 MG per tablet     Sig: Take 1 tablet by mouth 2 times daily for 10 days     Dispense:  20 tablet     Refill:  0    HYDROcodone-acetaminophen (NORCO) 5-325 MG per tablet     Sig: Take 1 tablet by mouth every 6 hours as needed for Pain for up to 3 days. Intended supply: 3 days. Take lowest dose possible to manage pain     Dispense:  12 tablet     Refill:  0     Reduce doses taken as pain becomes manageable    butalbital-acetaminophen-caffeine (FIORICET, ESGIC) -40 MG per tablet     Sig: Take 1 tablet by mouth every 6 hours as needed for Headaches     Dispense:  120 tablet     Refill:  0          All patient questions answered. Pt voiced understanding.       Electronically signed by ARISTIDES Castro CNP on 4/9/2019 at 11:34 AM

## 2019-04-16 ENCOUNTER — TELEPHONE (OUTPATIENT)
Dept: PRIMARY CARE CLINIC | Age: 54
End: 2019-04-16

## 2019-04-16 DIAGNOSIS — R05.9 COUGH: Primary | ICD-10-CM

## 2019-04-16 RX ORDER — ALBUTEROL SULFATE 90 UG/1
2 AEROSOL, METERED RESPIRATORY (INHALATION) EVERY 6 HOURS PRN
Qty: 1 INHALER | Refills: 3 | Status: SHIPPED | OUTPATIENT
Start: 2019-04-16

## 2019-04-16 NOTE — TELEPHONE ENCOUNTER
Patient contacted the office this morning requesting medication refill or change due to symptoms still ongoing. She was seen in the office on  04/09/2019. Patient mentioned she uses [address]. She states she is still experiencing fatigue, cold symptoms, and severe cough. Patient was advised the providers would be informed and the office would be back in touch with her today.

## 2019-04-22 ENCOUNTER — OFFICE VISIT (OUTPATIENT)
Dept: OBGYN | Age: 54
End: 2019-04-22
Payer: MEDICARE

## 2019-04-22 VITALS
BODY MASS INDEX: 48.4 KG/M2 | SYSTOLIC BLOOD PRESSURE: 122 MMHG | WEIGHT: 263 LBS | HEIGHT: 62 IN | DIASTOLIC BLOOD PRESSURE: 80 MMHG

## 2019-04-22 DIAGNOSIS — N95.1 HOT FLASH, MENOPAUSAL: Primary | ICD-10-CM

## 2019-04-22 DIAGNOSIS — Z90.722 S/P TAH-BSO: ICD-10-CM

## 2019-04-22 DIAGNOSIS — E89.41 SURGICAL MENOPAUSE, SYMPTOMATIC: ICD-10-CM

## 2019-04-22 DIAGNOSIS — R61 SWEATING PROFUSELY: ICD-10-CM

## 2019-04-22 DIAGNOSIS — Z90.79 S/P TAH-BSO: ICD-10-CM

## 2019-04-22 DIAGNOSIS — Z90.710 S/P TAH-BSO: ICD-10-CM

## 2019-04-22 PROCEDURE — 1036F TOBACCO NON-USER: CPT | Performed by: ADVANCED PRACTICE MIDWIFE

## 2019-04-22 PROCEDURE — 3017F COLORECTAL CA SCREEN DOC REV: CPT | Performed by: ADVANCED PRACTICE MIDWIFE

## 2019-04-22 PROCEDURE — G8417 CALC BMI ABV UP PARAM F/U: HCPCS | Performed by: ADVANCED PRACTICE MIDWIFE

## 2019-04-22 PROCEDURE — 99213 OFFICE O/P EST LOW 20 MIN: CPT | Performed by: ADVANCED PRACTICE MIDWIFE

## 2019-04-22 PROCEDURE — G8427 DOCREV CUR MEDS BY ELIG CLIN: HCPCS | Performed by: ADVANCED PRACTICE MIDWIFE

## 2019-04-22 RX ORDER — PANTOPRAZOLE SODIUM 40 MG/1
TABLET, DELAYED RELEASE ORAL
Qty: 60 TABLET | Refills: 5 | Status: SHIPPED | OUTPATIENT
Start: 2019-04-22 | End: 2019-10-28 | Stop reason: SDUPTHER

## 2019-04-22 NOTE — PROGRESS NOTES
Sweating profusely  estrogens, conjugated, (PREMARIN) 0.625 MG tablet             I am having Kathrin Carmichael maintain her Docusate Calcium (STOOL SOFTENER PO), diphenhydrAMINE, ondansetron, Blood Pressure, vitamin D, meclizine, pantoprazole, carvedilol, losartan, saline nasal gel, oxymetazoline, sodium chloride, PREVAIL ADULT BRIEF LARGE, calcitRIOL, VESICARE, hydrochlorothiazide, cetirizine, atorvastatin, predniSONE, losartan, potassium chloride, DULoxetine, butalbital-acetaminophen-caffeine, albuterol sulfate HFA, and estrogens (conjugated). No follow-ups on file. There are no Patient Instructions on file for this visit. Over 50% of time spent on counseling and care coordination on: see assessment and plan,  She was also counseled on her preventative health maintenance recommendations and follow-up.         FF time: 15 min      [facility]  Pool,4/22/2019 11:12 AM

## 2019-05-07 ENCOUNTER — HOSPITAL ENCOUNTER (OUTPATIENT)
Age: 54
Discharge: HOME | End: 2019-05-07
Payer: MEDICARE

## 2019-05-07 ENCOUNTER — TELEPHONE (OUTPATIENT)
Dept: PRIMARY CARE CLINIC | Age: 54
End: 2019-05-07

## 2019-05-07 DIAGNOSIS — E78.2 MIXED HYPERLIPIDEMIA: ICD-10-CM

## 2019-05-07 LAB
CHOLESTEROL/HDL RATIO: 3.6
CHOLESTEROL: 222 MG/DL
HDLC SERPL-MCNC: 61 MG/DL
LDL CHOLESTEROL: 99 MG/DL (ref 0–130)
TRIGL SERPL-MCNC: 308 MG/DL
VLDLC SERPL CALC-MCNC: ABNORMAL MG/DL (ref 1–30)

## 2019-05-07 PROCEDURE — 80061 LIPID PANEL: CPT

## 2019-05-07 PROCEDURE — 36415 COLL VENOUS BLD VENIPUNCTURE: CPT

## 2019-05-07 NOTE — TELEPHONE ENCOUNTER
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)     S/P DOMO-BSO     Endometrial hyperplasia without atypia, simple     Postural vertigo     Morbid obesity (HCC)     Allergic rhinitis     Iron deficiency anemia     Hyperlipidemia     Urinary frequency     Muscle weakness     MGUS (monoclonal gammopathy of unknown significance)     Anti-cyclic citrullinated peptide antibody positive     Bilateral hip pain     Bilateral knee pain     Cancer of uterus (HCC)     Elevated C-reactive protein (CRP)     Elevated white blood cell count, unspecified     Essential thrombocythemia (HCC)     Greater trochanteric bursitis of both hips     Microscopic polyangiitis (HCC)     Osteoarthritis of right acromioclavicular joint     Osteoarthritis of sacroiliac joint     Other intervertebral disc degeneration, thoracic region     Patient nonadherence     Pes anserinus bursitis of both knees     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     Urticaria due to cold     Vitamin D deficiency     Elevated BUN     Chicas's esophagus without dysplasia     Hyperparathyroidism (HCC)     Gastroesophageal reflux disease     Esophageal dysphagia     History of colon polyps     Epistaxis, recurrent

## 2019-05-20 ENCOUNTER — HOSPITAL ENCOUNTER (OUTPATIENT)
Age: 54
Discharge: HOME | End: 2019-05-20
Payer: MEDICARE

## 2019-05-20 DIAGNOSIS — N18.4 CKD (CHRONIC KIDNEY DISEASE) STAGE 4, GFR 15-29 ML/MIN (HCC): ICD-10-CM

## 2019-05-20 LAB
ABSOLUTE EOS #: 0.18 K/UL (ref 0–0.44)
ABSOLUTE IMMATURE GRANULOCYTE: 0 K/UL (ref 0–0.3)
ABSOLUTE LYMPH #: 0.79 K/UL (ref 1.1–3.7)
ABSOLUTE MONO #: 0.53 K/UL (ref 0.1–1.2)
ANION GAP SERPL CALCULATED.3IONS-SCNC: 13 MMOL/L (ref 9–17)
BASOPHILS # BLD: 1 % (ref 0–2)
BASOPHILS ABSOLUTE: 0.09 K/UL (ref 0–0.2)
BUN BLDV-MCNC: 36 MG/DL (ref 6–20)
BUN/CREAT BLD: 19 (ref 9–20)
CALCIUM SERPL-MCNC: 8.9 MG/DL (ref 8.6–10.4)
CHLORIDE BLD-SCNC: 100 MMOL/L (ref 98–107)
CO2: 26 MMOL/L (ref 20–31)
CREAT SERPL-MCNC: 1.93 MG/DL (ref 0.5–0.9)
CREATININE URINE: 64.5 MG/DL (ref 28–217)
DIFFERENTIAL TYPE: ABNORMAL
EOSINOPHILS RELATIVE PERCENT: 2 % (ref 1–4)
GFR AFRICAN AMERICAN: 33 ML/MIN
GFR NON-AFRICAN AMERICAN: 27 ML/MIN
GFR SERPL CREATININE-BSD FRML MDRD: ABNORMAL ML/MIN/{1.73_M2}
GFR SERPL CREATININE-BSD FRML MDRD: ABNORMAL ML/MIN/{1.73_M2}
GLUCOSE BLD-MCNC: 110 MG/DL (ref 70–99)
HCT VFR BLD CALC: 36 % (ref 36.3–47.1)
HEMOGLOBIN: 11.4 G/DL (ref 11.9–15.1)
IMMATURE GRANULOCYTES: 0 %
LYMPHOCYTES # BLD: 9 % (ref 24–43)
MCH RBC QN AUTO: 29.2 PG (ref 25.2–33.5)
MCHC RBC AUTO-ENTMCNC: 31.7 G/DL (ref 28.4–34.8)
MCV RBC AUTO: 92.3 FL (ref 82.6–102.9)
MONOCYTES # BLD: 6 % (ref 3–12)
NRBC AUTOMATED: 0 PER 100 WBC
PDW BLD-RTO: 13.8 % (ref 11.8–14.4)
PHOSPHORUS: 3.7 MG/DL (ref 2.6–4.5)
PLATELET # BLD: 263 K/UL (ref 138–453)
PLATELET ESTIMATE: ABNORMAL
PMV BLD AUTO: 10.5 FL (ref 8.1–13.5)
POTASSIUM SERPL-SCNC: 3.7 MMOL/L (ref 3.7–5.3)
RBC # BLD: 3.9 M/UL (ref 3.95–5.11)
RBC # BLD: ABNORMAL 10*6/UL
SEG NEUTROPHILS: 82 % (ref 36–65)
SEGMENTED NEUTROPHILS ABSOLUTE COUNT: 7.21 K/UL (ref 1.5–8.1)
SODIUM BLD-SCNC: 139 MMOL/L (ref 135–144)
TOTAL PROTEIN, URINE: 194 MG/DL
VITAMIN D 25-HYDROXY: 23.5 NG/ML (ref 30–100)
WBC # BLD: 8.8 K/UL (ref 3.5–11.3)
WBC # BLD: ABNORMAL 10*3/UL

## 2019-05-20 PROCEDURE — 82570 ASSAY OF URINE CREATININE: CPT

## 2019-05-20 PROCEDURE — 84100 ASSAY OF PHOSPHORUS: CPT

## 2019-05-20 PROCEDURE — 84156 ASSAY OF PROTEIN URINE: CPT

## 2019-05-20 PROCEDURE — 80048 BASIC METABOLIC PNL TOTAL CA: CPT

## 2019-05-20 PROCEDURE — 82306 VITAMIN D 25 HYDROXY: CPT

## 2019-05-20 PROCEDURE — 82330 ASSAY OF CALCIUM: CPT

## 2019-05-20 PROCEDURE — 83970 ASSAY OF PARATHORMONE: CPT

## 2019-05-20 PROCEDURE — 85025 COMPLETE CBC W/AUTO DIFF WBC: CPT

## 2019-05-21 LAB
CALCIUM IONIZED: 1.23 MMOL/L (ref 1.13–1.33)
PTH INTACT: 91.08 PG/ML (ref 15–65)

## 2019-05-23 ENCOUNTER — OFFICE VISIT (OUTPATIENT)
Dept: PRIMARY CARE CLINIC | Age: 54
End: 2019-05-23
Payer: MEDICARE

## 2019-05-23 VITALS
OXYGEN SATURATION: 99 % | BODY MASS INDEX: 49.66 KG/M2 | SYSTOLIC BLOOD PRESSURE: 162 MMHG | WEIGHT: 271.5 LBS | TEMPERATURE: 97.3 F | DIASTOLIC BLOOD PRESSURE: 90 MMHG | RESPIRATION RATE: 22 BRPM | HEART RATE: 91 BPM

## 2019-05-23 DIAGNOSIS — J30.2 SEASONAL ALLERGIES: ICD-10-CM

## 2019-05-23 DIAGNOSIS — J01.00 ACUTE MAXILLARY SINUSITIS, RECURRENCE NOT SPECIFIED: Primary | ICD-10-CM

## 2019-05-23 DIAGNOSIS — I10 ESSENTIAL HYPERTENSION: ICD-10-CM

## 2019-05-23 DIAGNOSIS — E78.5 HYPERLIPIDEMIA, UNSPECIFIED HYPERLIPIDEMIA TYPE: ICD-10-CM

## 2019-05-23 DIAGNOSIS — J20.9 ACUTE BRONCHITIS, UNSPECIFIED ORGANISM: ICD-10-CM

## 2019-05-23 PROCEDURE — 99214 OFFICE O/P EST MOD 30 MIN: CPT | Performed by: NURSE PRACTITIONER

## 2019-05-23 PROCEDURE — 3017F COLORECTAL CA SCREEN DOC REV: CPT | Performed by: NURSE PRACTITIONER

## 2019-05-23 PROCEDURE — G8417 CALC BMI ABV UP PARAM F/U: HCPCS | Performed by: NURSE PRACTITIONER

## 2019-05-23 PROCEDURE — 1036F TOBACCO NON-USER: CPT | Performed by: NURSE PRACTITIONER

## 2019-05-23 PROCEDURE — G8427 DOCREV CUR MEDS BY ELIG CLIN: HCPCS | Performed by: NURSE PRACTITIONER

## 2019-05-23 RX ORDER — DOXYCYCLINE HYCLATE 100 MG/1
100 CAPSULE ORAL 2 TIMES DAILY
Qty: 14 CAPSULE | Refills: 0 | Status: SHIPPED | OUTPATIENT
Start: 2019-05-23 | End: 2019-05-30

## 2019-05-23 RX ORDER — GUAIFENESIN 600 MG/1
600 TABLET, EXTENDED RELEASE ORAL 2 TIMES DAILY
Qty: 30 TABLET | Refills: 0 | Status: SHIPPED | OUTPATIENT
Start: 2019-05-23 | End: 2019-06-07

## 2019-05-23 RX ORDER — FLUTICASONE PROPIONATE 50 MCG
1 SPRAY, SUSPENSION (ML) NASAL DAILY
Qty: 2 BOTTLE | Refills: 1 | Status: SHIPPED | OUTPATIENT
Start: 2019-05-23 | End: 2021-09-16 | Stop reason: SDUPTHER

## 2019-05-23 ASSESSMENT — ENCOUNTER SYMPTOMS
NAUSEA: 0
EYE DISCHARGE: 0
WHEEZING: 1
VOMITING: 0
CHEST TIGHTNESS: 0
BACK PAIN: 1
SHORTNESS OF BREATH: 1
COUGH: 1
EYE ITCHING: 0
SINUS PRESSURE: 1
EYE PAIN: 0
DIARRHEA: 0
TROUBLE SWALLOWING: 0

## 2019-05-23 NOTE — PROGRESS NOTES
Name: Shannon Newell  : 1965         Chief Complaint:     Chief Complaint   Patient presents with    Hyperlipidemia     routine check, 3 month check    Hypertension    URI     X 3 days with cough,fever,     Wheezing     started this AM.       History of Present Illness:      Shannon Newell is a 48 y.o.  female who presents with Hyperlipidemia (routine check, 3 month check); Hypertension; URI (X 3 days with cough,fever, ); and Wheezing (started this AM.)      Pao Kirk is here today for a routine office visit. Hyperlipidemia   This is a chronic problem. The current episode started more than 1 year ago. Recent lipid tests were reviewed and are variable. Exacerbating diseases include chronic renal disease and obesity. She has no history of diabetes or hypothyroidism. Factors aggravating her hyperlipidemia include beta blockers, thiazides and fatty foods. Associated symptoms include shortness of breath. Pertinent negatives include no chest pain. Current antihyperlipidemic treatment includes statins. The current treatment provides moderate improvement of lipids. Risk factors for coronary artery disease include dyslipidemia, hypertension and obesity. Hypertension   This is a chronic problem. The current episode started more than 1 year ago. Associated symptoms include headaches (frontal same as previous headaches), malaise/fatigue and shortness of breath. Pertinent negatives include no chest pain or palpitations. Risk factors for coronary artery disease include dyslipidemia and obesity. There is no history of kidney disease. Identifiable causes of hypertension include chronic renal disease. URI    This is a new problem. The current episode started in the past 7 days. The problem has been waxing and waning. Maximum temperature: FELT HOT. Associated symptoms include congestion, coughing (non productive.), ear pain, headaches (frontal same as previous headaches) and wheezing.  Pertinent negatives include no chest pain, diarrhea, nausea, rash, sinus pain, sneezing or vomiting. She has tried nothing for the symptoms. Past Medical History:     Past Medical History:   Diagnosis Date    JESS (acute kidney injury) ([address]     Anemia     Arthritis     Chronic kidney disease     Depression with anxiety 9/8/2016    GERD (gastroesophageal reflux disease)     H/O echocardiogram 08/25/2016    EF 55%. Mild mitral regurgitation.  H/O tooth extraction 07/26/2017    Lower posterior right tooth.  History of blood transfusion     x3. Last one in May 2016    Hyperlipidemia     Hypertensive crisis 8/24/2016    Kidney stones     Vasculitis [facility])       Reviewed all health maintenance requirements and ordered appropriate tests  There are no preventive care reminders to display for this patient. Past Surgical History:     Past Surgical History:   Procedure Laterality Date    BONE MARROW BIOPSY  5-23-16    Per Hematologist order @ [facility].  CHOLECYSTECTOMY      COLONOSCOPY  2016    ESOPHAGEAL DILATATION  9/12/2018    ESOPHAGEAL DILATATION performed by Alvaro Fountian MD at [address]  11/09/2016    with tubes and ovaries    TOOTH EXTRACTION  07/26/2017    Right posterior lower tooth.  TUBAL LIGATION      UPPER GASTROINTESTINAL ENDOSCOPY  09/12/2018    -dilation,bx(Chicas's esophagus,neg H-Pylori)hiatal hernia,grade 2 reflux esophagitis    UPPER GASTROINTESTINAL ENDOSCOPY N/A 9/12/2018    EGD BIOPSY performed by Alvaro Fountain MD at [address]        Medications:       Prior to Admission medications    Medication Sig Start Date End Date Taking?  Authorizing Provider   cetirizine (ZYRTEC) 5 MG tablet Take 1 tablet by mouth daily 5/28/19  Yes ARISTIDES Rios CNP   doxycycline hyclate (VIBRAMYCIN) 100 MG capsule Take 1 capsule by mouth 2 times daily for 7 days 5/23/19 5/30/19 Yes ARISTIDES Rios CNP   fluticasone (FLONASE) 50 MCG/ACT nasal spray 1 spray by Each Nostril route daily 5/23/19  Yes [de-identified] M ARISTIDES Bright CNP   guaiFENesin (MUCINEX) 600 MG extended release tablet Take 1 tablet by mouth 2 times daily for 15 days 5/23/19 6/7/19 Yes ARISTIDES Rios CNP   losartan (COZAAR) 50 MG tablet Take 1 tablet by mouth 2 times daily 5/21/19  Yes Kip Burden MD   carvedilol (COREG) 12.5 MG tablet Take 1 tablet by mouth 2 times daily 5/3/19  Yes Kip Burden MD   pantoprazole (PROTONIX) 40 MG tablet TAKE 1 TABLET BY MOUTH TWICE DAILY 4/22/19  Yes Will Ferro MD   estrogens, conjugated, (PREMARIN) 0.625 MG tablet TAKE ONE TABLET BY MOUTH ONCE DAILY 4/22/19  Yes ARISTIDES Estrella CNM   albuterol sulfate HFA (VENTOLIN HFA) 108 (90 Base) MCG/ACT inhaler Inhale 2 puffs into the lungs every 6 hours as needed for Wheezing 4/16/19  Yes ARISTIDES Vance CNP   butalbital-acetaminophen-caffeine (FIORICET, ESGIC) -40 MG per tablet Take 1 tablet by mouth every 6 hours as needed for Headaches 4/9/19  Yes ARISTIDES Vance CNP   DULoxetine (CYMBALTA) 30 MG extended release capsule TAKE 1 CAPSULE BY MOUTH ONCE DAILY 4/8/19  Yes ARISTIDES Rios CNP   potassium chloride (KLOR-CON M20) 20 MEQ extended release tablet TAKE 1 TABLET BY MOUTH ONCE DAILY 3/26/19  Yes Bernie Chandler MD   atorvastatin (LIPITOR) 80 MG tablet TAKE 1 TABLET BY MOUTH ONCE DAILY 1/28/19  Yes ARISTIDES Vance CNP   predniSONE (DELTASONE) 5 MG tablet Take 5 mg by mouth daily Pt is taking this on a weaning dose.    Yes Historical Provider, MD   hydrochlorothiazide (HYDRODIURIL) 25 MG tablet Take 0.5 tablets by mouth daily 12/14/18  Yes Kip Burden MD   calcitRIOL (ROCALTROL) 0.25 MCG capsule Take 2 mcg by mouth daily    Yes Historical Provider, MD   VESICARE 5 MG tablet TAKE 1 TABLET BY MOUTH ONCE DAILY 11/26/18  Yes ARISTIDES Lr CNP   Incontinence Supply Disposable (PREVAIL ADULT BRIEF LARGE) MISC 3 each by Does not apply route daily 11/20/18  Yes David Vargas, APRN - VANDANA   saline nasal gel (AYR) GEL by Nasal route 2 times daily  Patient taking differently: by Nasal route 2 times daily as needed  11/6/18  Yes Brandon Kim MD   oxymetazoline (12 HOUR NASAL SPRAY) 0.05 % nasal spray 2 sprays by Nasal route daily as needed for Congestion 11/6/18 11/6/19 Yes Brandon Kim MD   sodium chloride (ALTAMIST SPRAY) 0.65 % nasal spray 1 spray by Nasal route as needed for Congestion 11/6/18  Yes Brandon Kim MD   meclizine (ANTIVERT) 25 MG tablet Take 25 mg by mouth 3 times daily as needed   Yes Historical Provider, MD   vitamin D (CHOLECALCIFEROL) 1000 UNIT TABS tablet Take 2,000 Units by mouth daily    Yes Historical Provider, MD   Blood Pressure KIT 1 Units by Does not apply route daily 8/29/17  Yes Kvng Dunlap, APRN - CNP   ondansetron (ZOFRAN ODT) 4 MG disintegrating tablet Take 1 tablet by mouth every 4 hours as needed for Nausea or Vomiting 5/18/17  Yes Alyx Pollard MD   Docusate Calcium (STOOL SOFTENER PO) Take 100 mg by mouth as needed    Yes Historical Provider, MD   diphenhydrAMINE (BENADRYL) 25 MG capsule Take 25 mg by mouth Pt takes 2 capsules at HS \"due to hives from it being cold now\". Pt tho states these are NOT helping her this time. Yes Historical Provider, MD        Allergies:       Patient has no known allergies. Social History:     Tobacco:    reports that she has never smoked. She has never used smokeless tobacco.  Alcohol:      reports that she does not drink alcohol. Drug Use:  reports that she does not use drugs.     Family History:     Family History   Problem Relation Age of Onset   Fabian Cancer Mother     Diabetes Mother     Heart Disease Mother     High Blood Pressure Mother     Cancer Father         prostate    High Blood Pressure Father     Cancer Sister         ovarian    Diabetes Brother     Cancer Sister         ovarian    High Blood Pressure Brother     Diabetes Brother        Review of Systems:     Positive and Negative as described in HPI    Review of Systems   Constitutional: Positive for activity change, chills, fatigue and malaise/fatigue. Negative for fever. HENT: Positive for congestion, ear pain, postnasal drip and sinus pressure. Negative for dental problem, sinus pain, sneezing and trouble swallowing. Eyes: Negative for pain, discharge and itching. Respiratory: Positive for cough (non productive.), shortness of breath and wheezing. Negative for chest tightness. Cardiovascular: Negative for chest pain and palpitations. Gastrointestinal: Negative for diarrhea, nausea and vomiting. Genitourinary: Negative for difficulty urinating, hematuria and urgency. Musculoskeletal: Positive for back pain. Negative for gait problem and joint swelling. Skin: Negative for rash and wound. Neurological: Positive for dizziness (From coughing) and headaches (frontal same as previous headaches). Negative for syncope and light-headedness. Psychiatric/Behavioral: Positive for dysphoric mood (well controlled on cymbalta). Negative for agitation. The patient is not nervous/anxious. Physical Exam:   Vitals:  BP (!) 162/90 (Site: Left Lower Arm, Position: Sitting, Cuff Size: Large Adult) Comment: MANUAL, MEDICATION JHAS BEEN ADJUSTED  Pulse 91   Temp 97.3 °F (36.3 °C) (Temporal)   Resp 22   Wt 271 lb 8 oz (123.2 kg)   LMP 09/09/2016   SpO2 99%   BMI 49.66 kg/m²     Physical Exam   Constitutional: She is oriented to person, place, and time. She appears well-developed and well-nourished. Non-toxic appearance. She appears ill. No distress. HENT:   Right Ear: Tympanic membrane is not erythematous and not bulging. A middle ear effusion is present. Left Ear: Tympanic membrane is not erythematous and not bulging. No middle ear effusion. Nose: Mucosal edema and rhinorrhea present. Right sinus exhibits no maxillary sinus tenderness and no frontal sinus tenderness. Left sinus exhibits maxillary sinus tenderness.  Left sinus exhibits no frontal sinus tenderness. Mouth/Throat: Posterior oropharyngeal erythema present. No oropharyngeal exudate. Eyes: Pupils are equal, round, and reactive to light. Right eye exhibits no discharge. Left eye exhibits no discharge. Neck: Normal range of motion. Neck supple. Cardiovascular: Normal rate and regular rhythm. No murmur heard. Pulmonary/Chest: Effort normal and breath sounds normal. No respiratory distress. She has no decreased breath sounds. She has no wheezes. She has no rhonchi. She has no rales. She exhibits tenderness (posterior right tender to touch. ). Abdominal: Soft. She exhibits no distension. There is no tenderness. Obese abdomen   Musculoskeletal: Normal range of motion. She exhibits no edema. Neurological: She is alert and oriented to person, place, and time. Skin: Skin is warm. No rash noted. No erythema. Psychiatric: She has a normal mood and affect. Nursing note and vitals reviewed. Data:     Lab Results   Component Value Date     05/20/2019    K 3.7 05/20/2019     05/20/2019    CO2 26 05/20/2019    BUN 36 05/20/2019    CREATININE 1.93 05/20/2019    GLUCOSE 110 05/20/2019    PROT 6.2 01/21/2019    LABALBU 3.5 01/21/2019    BILITOT 0.21 01/21/2019    ALKPHOS 96 01/21/2019    AST 19 01/21/2019    ALT 24 01/21/2019     Lab Results   Component Value Date    WBC 8.8 05/20/2019    RBC 3.90 05/20/2019    HGB 11.4 05/20/2019    HCT 36.0 05/20/2019    MCV 92.3 05/20/2019    MCH 29.2 05/20/2019    MCHC 31.7 05/20/2019    RDW 13.8 05/20/2019     05/20/2019    MPV 10.5 05/20/2019     Lab Results   Component Value Date    TSH 2.17 03/25/2019     Lab Results   Component Value Date    CHOL 222 05/07/2019    HDL 61 05/07/2019    LABA1C 6.1 09/06/2018       Assessment/Plan:      Diagnosis Orders   1. Acute maxillary sinusitis, recurrence not specified  doxycycline hyclate (VIBRAMYCIN) 100 MG capsule    fluticasone (FLONASE) 50 MCG/ACT nasal spray   2. Hyperlipidemia, unspecified hyperlipidemia type  Stable. Continue Lipitor 80 mg daily. Educated on diet and exercise. 3. Acute bronchitis, unspecified organism  No wheezing on exam. She is on prednisone 5 mg daily will not give a burst at this time. . She has albuterol at home she will use q6 hrs as needed for shortness of breath/wheezing. I advised her to come back to the office if cough worsens or symptoms do not improve. I advised her to go to the ER if she developes worsening shortness of breath, chest pain or hemoptysis. 4. Essential hypertension  She follows with nephrology for management of chronic kidney disease. Her Cozaar was increased to from 25 mg BID to 50 mg BID on 2019. Continue coreg 12.5 BID, hydrochlorothiazide 12.5,        1. Jesus Holder received counseling on the following healthy behaviors: nutrition, exercise and medication adherence  2. Patient given educational materials - see patient instructions  3. Was a self-tracking handout given in paper form or via [employer]? No  If yes, see orders or list here. 4.  Discussed use, benefit, and side effects of prescribed medications. Barriers to medication compliance addressed. All patient questions answered. Pt voiced understanding. 5.  Reviewed prior labs and health maintenance  6. Continue current medications, diet and exercise.     Completed Refills   Requested Prescriptions     Signed Prescriptions Disp Refills    doxycycline hyclate (VIBRAMYCIN) 100 MG capsule 14 capsule 0     Sig: Take 1 capsule by mouth 2 times daily for 7 days    fluticasone (FLONASE) 50 MCG/ACT nasal spray 2 Bottle 1     Si spray by Each Nostril route daily    guaiFENesin (MUCINEX) 600 MG extended release tablet 30 tablet 0     Sig: Take 1 tablet by mouth 2 times daily for 15 days    cetirizine (ZYRTEC) 5 MG tablet 90 tablet 3     Sig: Take 1 tablet by mouth daily         Return in about 3 months (around 2019), or if symptoms worsen or fail to improve, for Check up.

## 2019-05-23 NOTE — PATIENT INSTRUCTIONS
SURVEY:    You may be receiving a survey from [email or web address] regarding your visit today. Please complete the survey to enable us to provide the highest quality of care to you and your family. If you cannot score us a very good on any question, please call the office to discuss how we could have made your experience a very good one. Thank you. Patient Education        Sinusitis: Care Instructions  Your Care Instructions    Sinusitis is an infection of the lining of the sinus cavities in your head. Sinusitis often follows a cold. It causes pain and pressure in your head and face. In most cases, sinusitis gets better on its own in 1 to 2 weeks. But some mild symptoms may last for several weeks. Sometimes antibiotics are needed. Follow-up care is a key part of your treatment and safety. Be sure to make and go to all appointments, and call your doctor if you are having problems. It's also a good idea to know your test results and keep a list of the medicines you take. How can you care for yourself at home? · Take an over-the-counter pain medicine, such as acetaminophen (Tylenol), ibuprofen (Advil, Motrin), or naproxen (Aleve). Read and follow all instructions on the label. · If the doctor prescribed antibiotics, take them as directed. Do not stop taking them just because you feel better. You need to take the full course of antibiotics. · Be careful when taking over-the-counter cold or flu medicines and Tylenol at the same time. Many of these medicines have acetaminophen, which is Tylenol. Read the labels to make sure that you are not taking more than the recommended dose. Too much acetaminophen (Tylenol) can be harmful. · Breathe warm, moist air from a steamy shower, a hot bath, or a sink filled with hot water. Avoid cold, dry air. Using a humidifier in your home may help. Follow the directions for cleaning the machine.   · Use saline (saltwater) nasal washes to help keep your nasal passages open and wash inflamed bronchial tubes make you cough. You may have trouble breathing. Most cases of bronchitis are caused by viruses like those that cause colds. Antibiotics usually do not help and they may be harmful. Bronchitis usually develops rapidly and lasts about 2 to 3 weeks in otherwise healthy people. Follow-up care is a key part of your treatment and safety. Be sure to make and go to all appointments, and call your doctor if you are having problems. It's also a good idea to know your test results and keep a list of the medicines you take. How can you care for yourself at home? · Take all medicines exactly as prescribed. Call your doctor if you think you are having a problem with your medicine. · Get some extra rest.  · Take an over-the-counter pain medicine, such as acetaminophen (Tylenol), ibuprofen (Advil, Motrin), or naproxen (Aleve) to reduce fever and relieve body aches. Read and follow all instructions on the label. · Do not take two or more pain medicines at the same time unless the doctor told you to. Many pain medicines have acetaminophen, which is Tylenol. Too much acetaminophen (Tylenol) can be harmful. · Take an over-the-counter cough medicine that contains dextromethorphan to help quiet a dry, hacking cough so that you can sleep. Avoid cough medicines that have more than one active ingredient. Read and follow all instructions on the label. · Breathe moist air from a humidifier, hot shower, or sink filled with hot water. The heat and moisture will thin mucus so you can cough it out. · Do not smoke. Smoking can make bronchitis worse. If you need help quitting, talk to your doctor about stop-smoking programs and medicines. These can increase your chances of quitting for good. When should you call for help? Call 911 anytime you think you may need emergency care.  For example, call if:    · You have severe trouble breathing.    Call your doctor now or seek immediate medical care if:    · You have new or worse trouble breathing.     · You cough up dark brown or bloody mucus (sputum).     · You have a new or higher fever.     · You have a new rash.    Watch closely for changes in your health, and be sure to contact your doctor if:    · You cough more deeply or more often, especially if you notice more mucus or a change in the color of your mucus.     · You are not getting better as expected. Where can you learn more? Go to https://[email or web address].[employer]hentic[employer]. org and sign in to your [employer] account. Enter H333 in the [employer] box to learn more about \"Bronchitis: Care Instructions. \"     If you do not have an account, please click on the \"Sign Up Now\" link. Current as of: September 5, 2018  Content Version: 12.0  © 5910-5091 [employer]. Care instructions adapted under license by [facility] ([facility]). If you have questions about a medical condition or this instruction, always ask your healthcare professional. [address] any warranty or liability for your use of this information.

## 2019-05-28 RX ORDER — CETIRIZINE HYDROCHLORIDE 5 MG/1
5 TABLET ORAL DAILY
Qty: 90 TABLET | Refills: 3
Start: 2019-05-28 | End: 2020-07-27

## 2019-05-28 ASSESSMENT — ENCOUNTER SYMPTOMS: SINUS PAIN: 0

## 2019-05-29 ENCOUNTER — TELEPHONE (OUTPATIENT)
Dept: ONCOLOGY | Age: 54
End: 2019-05-29

## 2019-05-29 NOTE — TELEPHONE ENCOUNTER
Called [place] to obtain authorization for Rituxan  for DX M31.7. Authorization obtained effective 5/28/2019-5/28/2020 for 500 mg X 2 doses over 12 months. Auth:  [id number]. Medical authorization # [id number]. Documents scanned into media.

## 2019-06-05 ENCOUNTER — OFFICE VISIT (OUTPATIENT)
Dept: ONCOLOGY | Age: 54
End: 2019-06-05
Payer: MEDICARE

## 2019-06-05 VITALS
HEART RATE: 83 BPM | HEIGHT: 62 IN | WEIGHT: 268.6 LBS | DIASTOLIC BLOOD PRESSURE: 92 MMHG | RESPIRATION RATE: 18 BRPM | SYSTOLIC BLOOD PRESSURE: 156 MMHG | TEMPERATURE: 96.6 F | BODY MASS INDEX: 49.43 KG/M2

## 2019-06-05 DIAGNOSIS — R76.8 ANTI-CYCLIC CITRULLINATED PEPTIDE ANTIBODY POSITIVE: Primary | ICD-10-CM

## 2019-06-05 DIAGNOSIS — N18.9 ANEMIA OF CHRONIC KIDNEY FAILURE, UNSPECIFIED STAGE: ICD-10-CM

## 2019-06-05 DIAGNOSIS — I77.82 ANCA-ASSOCIATED VASCULITIS: ICD-10-CM

## 2019-06-05 DIAGNOSIS — D47.2 MGUS (MONOCLONAL GAMMOPATHY OF UNKNOWN SIGNIFICANCE): ICD-10-CM

## 2019-06-05 DIAGNOSIS — D47.2 MGUS (MONOCLONAL GAMMOPATHY OF UNKNOWN SIGNIFICANCE): Primary | ICD-10-CM

## 2019-06-05 DIAGNOSIS — N18.4 CKD (CHRONIC KIDNEY DISEASE) STAGE 4, GFR 15-29 ML/MIN (HCC): ICD-10-CM

## 2019-06-05 DIAGNOSIS — D63.1 ANEMIA OF CHRONIC KIDNEY FAILURE, UNSPECIFIED STAGE: ICD-10-CM

## 2019-06-05 PROCEDURE — 99214 OFFICE O/P EST MOD 30 MIN: CPT | Performed by: INTERNAL MEDICINE

## 2019-06-05 PROCEDURE — 3017F COLORECTAL CA SCREEN DOC REV: CPT | Performed by: INTERNAL MEDICINE

## 2019-06-05 PROCEDURE — 1036F TOBACCO NON-USER: CPT | Performed by: INTERNAL MEDICINE

## 2019-06-05 PROCEDURE — G8428 CUR MEDS NOT DOCUMENT: HCPCS | Performed by: INTERNAL MEDICINE

## 2019-06-05 PROCEDURE — G8417 CALC BMI ABV UP PARAM F/U: HCPCS | Performed by: INTERNAL MEDICINE

## 2019-06-05 RX ORDER — 0.9 % SODIUM CHLORIDE 0.9 %
10 VIAL (ML) INJECTION ONCE
Status: CANCELLED | OUTPATIENT
Start: 2019-06-10

## 2019-06-05 RX ORDER — SODIUM CHLORIDE 0.9 % (FLUSH) 0.9 %
10 SYRINGE (ML) INJECTION PRN
Status: CANCELLED | OUTPATIENT
Start: 2019-06-10

## 2019-06-05 RX ORDER — DIPHENHYDRAMINE HYDROCHLORIDE 50 MG/ML
50 INJECTION INTRAMUSCULAR; INTRAVENOUS ONCE
Status: CANCELLED | OUTPATIENT
Start: 2019-06-10

## 2019-06-05 RX ORDER — METHYLPREDNISOLONE SODIUM SUCCINATE 125 MG/2ML
100 INJECTION, POWDER, LYOPHILIZED, FOR SOLUTION INTRAMUSCULAR; INTRAVENOUS ONCE
Status: CANCELLED | OUTPATIENT
Start: 2019-06-10

## 2019-06-05 RX ORDER — SODIUM CHLORIDE 9 MG/ML
INJECTION, SOLUTION INTRAVENOUS CONTINUOUS
Status: CANCELLED | OUTPATIENT
Start: 2019-06-10

## 2019-06-05 RX ORDER — SODIUM CHLORIDE 0.9 % (FLUSH) 0.9 %
5 SYRINGE (ML) INJECTION PRN
Status: CANCELLED | OUTPATIENT
Start: 2019-06-10

## 2019-06-05 RX ORDER — METHYLPREDNISOLONE SODIUM SUCCINATE 125 MG/2ML
125 INJECTION, POWDER, LYOPHILIZED, FOR SOLUTION INTRAMUSCULAR; INTRAVENOUS ONCE
Status: CANCELLED | OUTPATIENT
Start: 2019-06-10

## 2019-06-05 RX ORDER — ACETAMINOPHEN 325 MG/1
650 TABLET ORAL ONCE
Status: CANCELLED | OUTPATIENT
Start: 2019-06-10

## 2019-06-05 RX ORDER — DIPHENHYDRAMINE HYDROCHLORIDE 50 MG/ML
25 INJECTION INTRAMUSCULAR; INTRAVENOUS ONCE
Status: CANCELLED | OUTPATIENT
Start: 2019-06-10

## 2019-06-05 RX ORDER — HEPARIN SODIUM (PORCINE) LOCK FLUSH IV SOLN 100 UNIT/ML 100 UNIT/ML
500 SOLUTION INTRAVENOUS PRN
Status: CANCELLED | OUTPATIENT
Start: 2019-06-10

## 2019-06-05 NOTE — PATIENT INSTRUCTIONS
rv in 3 months, need cbc, cmp, light chains and spep prior to RV   Schedule rituxan per rheumatology recommendations

## 2019-06-05 NOTE — PROGRESS NOTES
Chief Complaint   Patient presents with    Anemia    Chronic Kidney Disease       DIAGNOSIS:       · Anemia, microcytic, Iron deficiency. · Anemia of chronic renal insufficiency  · MARIO  · Elevated kappa light chain immunoglobulins. Bone marrow negative for myeloma. · Vasculitis, ANCA associated necrotizing glomerulonephritis. CURRENT THERAPY:         Status post IV iron dextran June 2016. Received 4 doses of IV rituximab weekly, completed in December/2018  Plan maintenance rituximab every 6 months    BRIEF CASE HISTORY:       Jake Rose is a very pleasant 48 y.o. female who is referred to us for anemia and monoclonal protein. The patient has been running between 7 and 90 hemoglobin. White count and platelets are slightly elevated. MCV and MCH are low. However, she has other concerning signs including rising creatinine. Workup showed small monoclonal protein less than 0.1 g..    She is sent to us for a consultation, she is quite concerned about the findings. She has neutrophilic leukocytosis and microcytic anemia. She denies any active bleeding. urine analysis showed +4 proteinuria  The patient was started on Cytoxan induction. Then maintenance Imuran by nephrology. All treatments were stopped in March/2018  In late 2018, she was admitted to the hospital with worsening kidney function and vasculitis, she was seen by rheumatology, the decision was to proceed with rituximab. Because of distance, she decided to see that in our office. She completed 4 weekly doses of rituximab in December/2018. INTERIM HISTORY:   The patient seen for follow-up anemia . She overall feels about the same. She continues to complain of diffuse aches and pains especially in his sides. She complains of severe exhaustion unchanged from before. Her Cymbalta dose was changed to 60 mg but that did not help.   PAST MEDICAL HISTORY: has a past medical history of JESS (acute kidney injury) ([facility] [address] Anemia, Arthritis, Chronic kidney disease, Depression with anxiety, GERD (gastroesophageal reflux disease), H/O echocardiogram, H/O tooth extraction, History of blood transfusion, Hyperlipidemia, Hypertensive crisis, Kidney stones, and [facility] PAST SURGICAL HISTORY: has a past surgical history that includes Cholecystectomy; bone marrow biopsy (5-23-16); Tubal ligation; Colonoscopy (2016); Hysterectomy (11/09/2016); Tooth Extraction (07/26/2017); Upper gastrointestinal endoscopy (09/12/2018); Upper gastrointestinal endoscopy (N/A, 9/12/2018); and Esophagus dilation (9/12/2018). CURRENT MEDICATIONS:  has a current medication list which includes the following prescription(s): calcitriol, cetirizine, fluticasone, guaifenesin, losartan, carvedilol, pantoprazole, estrogens (conjugated), albuterol sulfate hfa, duloxetine, potassium chloride, atorvastatin, prednisone, hydrochlorothiazide, vesicare, prevail adult brief large, saline nasal gel, oxymetazoline, sodium chloride, meclizine, vitamin d, blood pressure, ondansetron, butalbital-acetaminophen-caffeine, docusate calcium, and diphenhydramine. ALLERGIES:  has No Known Allergies. FAMILY HISTORY: Negative for any hematological or oncological conditions. SOCIAL HISTORY:  reports that she has never smoked. She has never used smokeless tobacco. She reports that she does not drink alcohol or use drugs. REVIEW OF SYSTEMS:   General: no fever or night sweats, Weight is stable. + fatigue   ENT: No double or blurred vision, no tinnitus or hearing problem, no dysphagia or sore throat   Respiratory: No chest pain, no shortness of breath, no cough or hemoptysis. Cardiovascular: Denies chest pain, PND or orthopnea. No L E swelling or palpitations. Gastrointestinal:    No nausea or vomiting, abdominal pain, diarrhea or constipation. Genitourinary: Denies dysuria, hematuria, frequency, urgency or incontinence.     Neurological: Denies headaches, decreased LOC, no sensory or motor focal deficits. Musculoskeletal:  Diffuse aches and pains. Skin: There are no rashes or bleeding. Psychiatric:  No anxiety, no depression. Endocrine: no diabetes or thyroid disease. Hematologic: no bleeding , no adenopathy. PHYSICAL EXAM: Shows a well appearing 48y.o.-year-old female who is not in pain or distress. Vital Signs: Blood pressure (!) 156/92, pulse 83, temperature 96.6 °F (35.9 °C), temperature source Temporal, resp. rate 18, height 5' 2\" (1.575 m), weight 268 lb 9.6 oz (121.8 kg), last menstrual period 09/09/2016, not currently breastfeeding. HEENT: Normocephalic and atraumatic. Pupils are equal, round, reactive to light and accommodation. Extraocular muscles are intact. Neck: Showed no JVD, no carotid bruit . Lungs: Clear to auscultation bilaterally. Heart: Regular without any murmur. Abdomen: Soft, nontender. No hepatosplenomegaly. Extremities: Lower extremities show no edema, clubbing, or cyanosis. Neuro exam: intact cranial nerves bilaterally no motor or sensory deficit, gait is normal. Lymphatic: no adenopathy appreciated in the supraclavicular, axillary, cervical or inguinal area    Review of radiological studies:     Review of laboratory data:   Lab Results   Component Value Date    WBC 8.8 05/20/2019    HGB 11.4 (L) 05/20/2019    HCT 36.0 (L) 05/20/2019    MCV 92.3 05/20/2019     05/20/2019     Lab Results   Component Value Date    IRON 61 09/20/2017    TIBC 242 (L) 09/20/2017    FERRITIN 432 (H) 09/20/2017     Results for Larson Jose E (MRN [id number]) as of 10/3/2018 14:30   Ref.  Range 3/23/2018 10:28 9/5/2018 09:01   [place] Free Light Chains QNT Latest Ref Range: 0.37 - 1.94 mg/dL 8.78 (H) 11.13 (H)   Lambda Free Light Chains QNT Latest Ref Range: 0.57 - 2.63 mg/dL 3.88 (H) 5.40 (H)   Free Kappa/Lambda Ratio Latest Ref Range: 0.26 - 1.65  2.26 (H) 2.06 (H)     Bone marrow test 5/23/16:  Final Diagnosis    BONE MARROW ASPIRATE, CLOT SECTION AND BIOPSY:    MATURING TRILINEAGE HEMATOPOIESIS, NORMAL CELLULARITY. PLASMA CELLS, 5% (POLYCLONAL). Kidney biopsy 5/27/16:  KIDNEY, CORE NEEDLE BIOPSY:    ANCA-ASSOCIATED, PAUCI-IMMUNE NECROTIZING GLOMERULONEPHRITIS   WITH CELLULAR / FIBROCELLULAR CRESCENTS IN 17 OF 33   GLOMERULI (46%). GLOBAL GLOMERULOSCLEROSIS IN 12 GLOMERULI (32%). MILD INTERSTITIAL LYMPHOHISTIOCYTIC INFLAMMATION. MILD INTERSTITIAL FIBROSIS AND TUBULAR ATROPHY (20%). -- Diagnosis Comment --    RENAL BIOPSY DEMONSTRATES ACTIVE NECROTIZING GLOMERULONEPHRITIS,  PAUCI-IMMUNE AND ANCA ASSOCIATED. OF 37 GLOMERULI 5 SHOW  FIBROCELLULAR CRESCENTS, 12 SHOW CELLULAR CRESCENTS AND 12 ARE  END-STAGING GLOBALLY SCLEROTIC. IMPRESSION:   · Anemia, microcytic, Iron deficiency. Corrected with IV iron  · Anemia of chronic renal insufficiency  · ANCA Vasculitis  · Elevated kappa light chain immunoglobulins. Bone marrow negative for myeloma. · Vasculitis, ANCA associated necrotizing glomerulonephritis. Treated with Cytoxan and then Imuran, then finally switched to rituximab in December/2018      Plan:   I reviewed the labs as above and discussed with the patient. Her hemoglobin improved significantly. This above 11  With plans to continue with maintenance rituximab every 6 months  Not sure about the cause of her fatigue and pain.   I asked her to follow with rheumatology, hopefully maintenance rituximab would help with that  We will see her back in 3 months

## 2019-06-10 ENCOUNTER — HOSPITAL ENCOUNTER (OUTPATIENT)
Dept: INFUSION THERAPY | Age: 54
Discharge: HOME | End: 2019-06-10
Payer: MEDICARE

## 2019-06-10 VITALS
WEIGHT: 272 LBS | SYSTOLIC BLOOD PRESSURE: 159 MMHG | DIASTOLIC BLOOD PRESSURE: 91 MMHG | TEMPERATURE: 96 F | BODY MASS INDEX: 50.05 KG/M2 | RESPIRATION RATE: 18 BRPM | HEIGHT: 62 IN | HEART RATE: 85 BPM

## 2019-06-10 DIAGNOSIS — R76.8 SCL-70 ANTIBODY POSITIVE: ICD-10-CM

## 2019-06-10 DIAGNOSIS — M31.7 MICROSCOPIC POLYANGIITIS (HCC): Primary | ICD-10-CM

## 2019-06-10 PROCEDURE — 96413 CHEMO IV INFUSION 1 HR: CPT

## 2019-06-10 PROCEDURE — 96415 CHEMO IV INFUSION ADDL HR: CPT

## 2019-06-10 PROCEDURE — 6360000002 HC RX W HCPCS: Performed by: INTERNAL MEDICINE

## 2019-06-10 PROCEDURE — 6370000000 HC RX 637 (ALT 250 FOR IP): Performed by: INTERNAL MEDICINE

## 2019-06-10 PROCEDURE — 2580000003 HC RX 258: Performed by: INTERNAL MEDICINE

## 2019-06-10 PROCEDURE — 96375 TX/PRO/DX INJ NEW DRUG ADDON: CPT

## 2019-06-10 PROCEDURE — 96417 CHEMO IV INFUS EACH ADDL SEQ: CPT

## 2019-06-10 RX ORDER — SODIUM CHLORIDE 9 MG/ML
INJECTION, SOLUTION INTRAVENOUS CONTINUOUS
Status: ACTIVE | OUTPATIENT
Start: 2019-06-10 | End: 2019-06-10

## 2019-06-10 RX ORDER — 0.9 % SODIUM CHLORIDE 0.9 %
10 VIAL (ML) INJECTION ONCE
Status: CANCELLED | OUTPATIENT
Start: 2019-06-24

## 2019-06-10 RX ORDER — SODIUM CHLORIDE 0.9 % (FLUSH) 0.9 %
10 SYRINGE (ML) INJECTION PRN
Status: DISCONTINUED | OUTPATIENT
Start: 2019-06-10 | End: 2019-06-11 | Stop reason: HOSPADM

## 2019-06-10 RX ORDER — SODIUM CHLORIDE 0.9 % (FLUSH) 0.9 %
10 SYRINGE (ML) INJECTION PRN
Status: CANCELLED | OUTPATIENT
Start: 2019-06-24

## 2019-06-10 RX ORDER — METHYLPREDNISOLONE SODIUM SUCCINATE 125 MG/2ML
100 INJECTION, POWDER, LYOPHILIZED, FOR SOLUTION INTRAMUSCULAR; INTRAVENOUS ONCE
Status: CANCELLED | OUTPATIENT
Start: 2019-06-24

## 2019-06-10 RX ORDER — METHYLPREDNISOLONE SODIUM SUCCINATE 125 MG/2ML
100 INJECTION, POWDER, LYOPHILIZED, FOR SOLUTION INTRAMUSCULAR; INTRAVENOUS ONCE
Status: COMPLETED | OUTPATIENT
Start: 2019-06-10 | End: 2019-06-10

## 2019-06-10 RX ORDER — METHYLPREDNISOLONE SODIUM SUCCINATE 125 MG/2ML
125 INJECTION, POWDER, LYOPHILIZED, FOR SOLUTION INTRAMUSCULAR; INTRAVENOUS ONCE
Status: CANCELLED | OUTPATIENT
Start: 2019-06-24

## 2019-06-10 RX ORDER — EPINEPHRINE 1 MG/ML
0.3 INJECTION, SOLUTION, CONCENTRATE INTRAVENOUS PRN
Status: CANCELLED | OUTPATIENT
Start: 2019-06-24

## 2019-06-10 RX ORDER — SODIUM CHLORIDE 9 MG/ML
INJECTION, SOLUTION INTRAVENOUS CONTINUOUS
Status: CANCELLED | OUTPATIENT
Start: 2019-06-24

## 2019-06-10 RX ORDER — DIPHENHYDRAMINE HYDROCHLORIDE 50 MG/ML
50 INJECTION INTRAMUSCULAR; INTRAVENOUS ONCE
Status: CANCELLED | OUTPATIENT
Start: 2019-06-24

## 2019-06-10 RX ORDER — DIPHENHYDRAMINE HYDROCHLORIDE 50 MG/ML
25 INJECTION INTRAMUSCULAR; INTRAVENOUS ONCE
Status: CANCELLED | OUTPATIENT
Start: 2019-06-24

## 2019-06-10 RX ORDER — SODIUM CHLORIDE 0.9 % (FLUSH) 0.9 %
5 SYRINGE (ML) INJECTION PRN
Status: CANCELLED | OUTPATIENT
Start: 2019-06-24

## 2019-06-10 RX ORDER — HEPARIN SODIUM (PORCINE) LOCK FLUSH IV SOLN 100 UNIT/ML 100 UNIT/ML
500 SOLUTION INTRAVENOUS PRN
Status: CANCELLED | OUTPATIENT
Start: 2019-06-24

## 2019-06-10 RX ORDER — DIPHENHYDRAMINE HYDROCHLORIDE 50 MG/ML
25 INJECTION INTRAMUSCULAR; INTRAVENOUS ONCE
Status: COMPLETED | OUTPATIENT
Start: 2019-06-10 | End: 2019-06-10

## 2019-06-10 RX ORDER — ACETAMINOPHEN 325 MG/1
650 TABLET ORAL ONCE
Status: CANCELLED | OUTPATIENT
Start: 2019-06-24

## 2019-06-10 RX ORDER — ACETAMINOPHEN 325 MG/1
650 TABLET ORAL ONCE
Status: COMPLETED | OUTPATIENT
Start: 2019-06-10 | End: 2019-06-10

## 2019-06-10 RX ADMIN — SODIUM CHLORIDE: 9 INJECTION, SOLUTION INTRAVENOUS at 09:25

## 2019-06-10 RX ADMIN — METHYLPREDNISOLONE SODIUM SUCCINATE 100 MG: 125 INJECTION, POWDER, FOR SOLUTION INTRAMUSCULAR; INTRAVENOUS at 09:30

## 2019-06-10 RX ADMIN — ACETAMINOPHEN 650 MG: 325 TABLET ORAL at 09:25

## 2019-06-10 RX ADMIN — RITUXIMAB 500 MG: 10 INJECTION, SOLUTION INTRAVENOUS at 10:05

## 2019-06-10 RX ADMIN — Medication 10 ML: at 09:25

## 2019-06-10 RX ADMIN — DIPHENHYDRAMINE HYDROCHLORIDE 25 MG: 50 INJECTION, SOLUTION INTRAMUSCULAR; INTRAVENOUS at 09:25

## 2019-06-10 ASSESSMENT — PAIN SCALES - GENERAL: PAINLEVEL_OUTOF10: 0

## 2019-06-17 DIAGNOSIS — E89.41 SURGICAL MENOPAUSE, SYMPTOMATIC: ICD-10-CM

## 2019-06-17 DIAGNOSIS — Z90.710 S/P TAH-BSO: ICD-10-CM

## 2019-06-17 DIAGNOSIS — R61 SWEATING PROFUSELY: ICD-10-CM

## 2019-06-17 DIAGNOSIS — N95.1 HOT FLASH, MENOPAUSAL: ICD-10-CM

## 2019-06-17 DIAGNOSIS — Z90.79 S/P TAH-BSO: ICD-10-CM

## 2019-06-17 DIAGNOSIS — Z90.722 S/P TAH-BSO: ICD-10-CM

## 2019-07-06 ENCOUNTER — HOSPITAL ENCOUNTER (EMERGENCY)
Age: 54
Discharge: HOME | End: 2019-07-06
Attending: EMERGENCY MEDICINE
Payer: MEDICARE

## 2019-07-06 ENCOUNTER — APPOINTMENT (OUTPATIENT)
Dept: GENERAL RADIOLOGY | Age: 54
End: 2019-07-06
Payer: MEDICARE

## 2019-07-06 ENCOUNTER — APPOINTMENT (OUTPATIENT)
Dept: CT IMAGING | Age: 54
End: 2019-07-06
Payer: MEDICARE

## 2019-07-06 VITALS
HEART RATE: 81 BPM | HEIGHT: 62 IN | TEMPERATURE: 98.3 F | WEIGHT: 273 LBS | DIASTOLIC BLOOD PRESSURE: 78 MMHG | OXYGEN SATURATION: 97 % | BODY MASS INDEX: 50.24 KG/M2 | SYSTOLIC BLOOD PRESSURE: 153 MMHG | RESPIRATION RATE: 18 BRPM

## 2019-07-06 DIAGNOSIS — S39.012A STRAIN OF LUMBAR REGION, INITIAL ENCOUNTER: Primary | ICD-10-CM

## 2019-07-06 DIAGNOSIS — M79.89 RIGHT LEG SWELLING: ICD-10-CM

## 2019-07-06 LAB
-: ABNORMAL
ABSOLUTE EOS #: 0.07 K/UL (ref 0–0.44)
ABSOLUTE IMMATURE GRANULOCYTE: 0 K/UL (ref 0–0.3)
ABSOLUTE LYMPH #: 0.35 K/UL (ref 1.1–3.7)
ABSOLUTE MONO #: 0.42 K/UL (ref 0.1–1.2)
AMORPHOUS: ABNORMAL
ANION GAP SERPL CALCULATED.3IONS-SCNC: 11 MMOL/L (ref 9–17)
BACTERIA: ABNORMAL
BASOPHILS # BLD: 1 % (ref 0–2)
BASOPHILS ABSOLUTE: 0.07 K/UL (ref 0–0.2)
BILIRUBIN URINE: NEGATIVE
BUN BLDV-MCNC: 37 MG/DL (ref 6–20)
BUN/CREAT BLD: 16 (ref 9–20)
CALCIUM SERPL-MCNC: 8.8 MG/DL (ref 8.6–10.4)
CASTS UA: ABNORMAL /LPF
CHLORIDE BLD-SCNC: 101 MMOL/L (ref 98–107)
CO2: 26 MMOL/L (ref 20–31)
COLOR: YELLOW
COMMENT UA: ABNORMAL
CREAT SERPL-MCNC: 2.36 MG/DL (ref 0.5–0.9)
CRYSTALS, UA: ABNORMAL /HPF
DIFFERENTIAL TYPE: ABNORMAL
EOSINOPHILS RELATIVE PERCENT: 1 % (ref 1–4)
EPITHELIAL CELLS UA: ABNORMAL /HPF (ref 0–25)
GFR AFRICAN AMERICAN: 26 ML/MIN
GFR NON-AFRICAN AMERICAN: 22 ML/MIN
GFR SERPL CREATININE-BSD FRML MDRD: ABNORMAL ML/MIN/{1.73_M2}
GFR SERPL CREATININE-BSD FRML MDRD: ABNORMAL ML/MIN/{1.73_M2}
GLUCOSE BLD-MCNC: 99 MG/DL (ref 70–99)
GLUCOSE URINE: NEGATIVE
HCT VFR BLD CALC: 35.7 % (ref 36.3–47.1)
HEMOGLOBIN: 11.7 G/DL (ref 11.9–15.1)
IMMATURE GRANULOCYTES: 0 %
KETONES, URINE: NEGATIVE
LEUKOCYTE ESTERASE, URINE: NEGATIVE
LYMPHOCYTES # BLD: 5 % (ref 24–43)
MCH RBC QN AUTO: 29.8 PG (ref 25.2–33.5)
MCHC RBC AUTO-ENTMCNC: 32.8 G/DL (ref 28.4–34.8)
MCV RBC AUTO: 90.8 FL (ref 82.6–102.9)
MONOCYTES # BLD: 6 % (ref 3–12)
MORPHOLOGY: NORMAL
MUCUS: ABNORMAL
NITRITE, URINE: NEGATIVE
NRBC AUTOMATED: 0 PER 100 WBC
OTHER OBSERVATIONS UA: ABNORMAL
PDW BLD-RTO: 13.9 % (ref 11.8–14.4)
PH UA: 5.5 (ref 5–9)
PLATELET # BLD: 262 K/UL (ref 138–453)
PLATELET ESTIMATE: ABNORMAL
PMV BLD AUTO: 10.2 FL (ref 8.1–13.5)
POTASSIUM SERPL-SCNC: 3.8 MMOL/L (ref 3.7–5.3)
PROTEIN UA: ABNORMAL
RBC # BLD: 3.93 M/UL (ref 3.95–5.11)
RBC # BLD: ABNORMAL 10*6/UL
RBC UA: ABNORMAL /HPF (ref 0–2)
RENAL EPITHELIAL, UA: ABNORMAL /HPF
SEG NEUTROPHILS: 87 % (ref 36–65)
SEGMENTED NEUTROPHILS ABSOLUTE COUNT: 6.09 K/UL (ref 1.5–8.1)
SODIUM BLD-SCNC: 138 MMOL/L (ref 135–144)
SPECIFIC GRAVITY UA: 1.02 (ref 1.01–1.02)
TOTAL CK: 84 U/L (ref 26–192)
TRICHOMONAS: ABNORMAL
TURBIDITY: CLEAR
URINE HGB: ABNORMAL
UROBILINOGEN, URINE: NORMAL
WBC # BLD: 7 K/UL (ref 3.5–11.3)
WBC # BLD: ABNORMAL 10*3/UL
WBC UA: ABNORMAL /HPF (ref 0–5)
YEAST: ABNORMAL

## 2019-07-06 PROCEDURE — 74176 CT ABD & PELVIS W/O CONTRAST: CPT

## 2019-07-06 PROCEDURE — 81001 URINALYSIS AUTO W/SCOPE: CPT

## 2019-07-06 PROCEDURE — 96374 THER/PROPH/DIAG INJ IV PUSH: CPT

## 2019-07-06 PROCEDURE — 99284 EMERGENCY DEPT VISIT MOD MDM: CPT

## 2019-07-06 PROCEDURE — 82550 ASSAY OF CK (CPK): CPT

## 2019-07-06 PROCEDURE — 96372 THER/PROPH/DIAG INJ SC/IM: CPT

## 2019-07-06 PROCEDURE — 36415 COLL VENOUS BLD VENIPUNCTURE: CPT

## 2019-07-06 PROCEDURE — 2580000003 HC RX 258: Performed by: EMERGENCY MEDICINE

## 2019-07-06 PROCEDURE — 72100 X-RAY EXAM L-S SPINE 2/3 VWS: CPT

## 2019-07-06 PROCEDURE — 6360000002 HC RX W HCPCS: Performed by: EMERGENCY MEDICINE

## 2019-07-06 PROCEDURE — 85025 COMPLETE CBC W/AUTO DIFF WBC: CPT

## 2019-07-06 PROCEDURE — 80048 BASIC METABOLIC PNL TOTAL CA: CPT

## 2019-07-06 PROCEDURE — 96375 TX/PRO/DX INJ NEW DRUG ADDON: CPT

## 2019-07-06 RX ORDER — MORPHINE SULFATE 2 MG/ML
2 INJECTION, SOLUTION INTRAMUSCULAR; INTRAVENOUS ONCE
Status: COMPLETED | OUTPATIENT
Start: 2019-07-06 | End: 2019-07-06

## 2019-07-06 RX ORDER — ORPHENADRINE CITRATE 30 MG/ML
60 INJECTION INTRAMUSCULAR; INTRAVENOUS ONCE
Status: COMPLETED | OUTPATIENT
Start: 2019-07-06 | End: 2019-07-06

## 2019-07-06 RX ORDER — 0.9 % SODIUM CHLORIDE 0.9 %
1000 INTRAVENOUS SOLUTION INTRAVENOUS ONCE
Status: COMPLETED | OUTPATIENT
Start: 2019-07-06 | End: 2019-07-06

## 2019-07-06 RX ORDER — PREDNISONE 20 MG/1
20 TABLET ORAL DAILY
Qty: 5 TABLET | Refills: 0 | Status: SHIPPED | OUTPATIENT
Start: 2019-07-06 | End: 2019-07-11

## 2019-07-06 RX ORDER — KETOROLAC TROMETHAMINE 15 MG/ML
15 INJECTION, SOLUTION INTRAMUSCULAR; INTRAVENOUS ONCE
Status: DISCONTINUED | OUTPATIENT
Start: 2019-07-06 | End: 2019-07-06

## 2019-07-06 RX ORDER — ONDANSETRON 2 MG/ML
4 INJECTION INTRAMUSCULAR; INTRAVENOUS ONCE
Status: COMPLETED | OUTPATIENT
Start: 2019-07-06 | End: 2019-07-06

## 2019-07-06 RX ADMIN — ENOXAPARIN SODIUM 120 MG: 120 INJECTION SUBCUTANEOUS at 16:00

## 2019-07-06 RX ADMIN — ORPHENADRINE CITRATE 60 MG: 60 INJECTION INTRAMUSCULAR; INTRAVENOUS at 13:58

## 2019-07-06 RX ADMIN — ONDANSETRON 4 MG: 2 INJECTION INTRAMUSCULAR; INTRAVENOUS at 14:40

## 2019-07-06 RX ADMIN — SODIUM CHLORIDE 1000 ML: 9 INJECTION, SOLUTION INTRAVENOUS at 13:54

## 2019-07-06 RX ADMIN — MORPHINE SULFATE 2 MG: 2 INJECTION, SOLUTION INTRAMUSCULAR; INTRAVENOUS at 14:40

## 2019-07-06 ASSESSMENT — ENCOUNTER SYMPTOMS
EYE DISCHARGE: 0
ABDOMINAL PAIN: 1
CHEST TIGHTNESS: 0
SORE THROAT: 0
APNEA: 0
EYE REDNESS: 0
SHORTNESS OF BREATH: 0

## 2019-07-06 ASSESSMENT — PAIN DESCRIPTION - PAIN TYPE: TYPE: ACUTE PAIN

## 2019-07-06 ASSESSMENT — PAIN DESCRIPTION - ORIENTATION: ORIENTATION: RIGHT;LOWER

## 2019-07-06 ASSESSMENT — PAIN SCALES - GENERAL
PAINLEVEL_OUTOF10: 9
PAINLEVEL_OUTOF10: 9

## 2019-07-06 ASSESSMENT — PAIN DESCRIPTION - DIRECTION: RADIATING_TOWARDS: RIGHT LEG

## 2019-07-06 ASSESSMENT — PAIN DESCRIPTION - LOCATION: LOCATION: BACK;FLANK

## 2019-07-06 NOTE — ED PROVIDER NOTES
[facility] ED  EMERGENCY DEPARTMENT ENCOUNTER      Pt Name: Lupe Pelaez  MRN: [id number]  [place] 1965  Date of evaluation: 7/6/2019  Provider: Emiliano Rosales, [address] [place] [address]       Chief Complaint   Patient presents with    Back Pain     Right lower, onset 2-3 days ago, radiating to right leg. Pt states she has kidney disease       HISTORY OF PRESENT ILLNESS    Lupe Pelaez is a 48 y.o. female with a PMH of JESS who presents to the emergency department from home with CC of low back, right flank, right leg swelling, and right abdominal pain for 2-3 days. Patient states the flank pain is worse with flexion, extension, and rotation of lumbar spine. Patient has associated right leg pain and swelling. Patient denies: hematuria, dysuria, vaginal discharge, vaginal bleeding, fever, nausea, vomiting, chest pain, sob, and neuro-focal focal deficit. Triage notes and Nursing notes were reviewed by myself. Any discrepancies are addressed above. PAST MEDICAL HISTORY     Past Medical History:   Diagnosis Date    [facility]acute [facility]) ([facility] [address]     Anemia     Arthritis     Chronic kidney disease     Depression with anxiety 9/8/2016    GERD (gastroesophageal reflux disease)     H/O echocardiogram 08/25/2016    EF 55%. Mild mitral regurgitation.  H/O tooth extraction 07/26/2017    Lower posterior right tooth.  History of blood transfusion     x3. Last one in May 2016    Hyperlipidemia     Hypertensive crisis 8/24/2016    Kidney stones     Vasculitis [facility])        SURGICAL HISTORY       Past Surgical History:   Procedure Laterality Date    BONE MARROW BIOPSY  5-23-16    Per Hematologist order @ [facility].  CHOLECYSTECTOMY      COLONOSCOPY  2016    ESOPHAGEAL DILATATION  9/12/2018    ESOPHAGEAL DILATATION performed by Aida Khan MD at [address]  11/09/2016    with tubes and ovaries    TOOTH EXTRACTION  07/26/2017    Right posterior lower tooth.     Negative for pallor and rash. Neurological: Negative for dizziness, tremors, seizures, syncope, facial asymmetry, speech difficulty, weakness, light-headedness, numbness and headaches. All other systems reviewed and are negative. Except as noted above the remainder of the review of systems was reviewed and is negative. PHYSICAL EXAM    (up to 7 for level 4, 8 or more for level 5)     ED Triage Vitals [07/06/19 1244]   BP Temp Temp Source Pulse Resp SpO2 Height Weight   -- 98.3 °F (36.8 °C) Tympanic 91 18 97 % 5' 2\" (1.575 m) 260 lb (117.9 kg)       Physical Exam   Constitutional: She is oriented to person, place, and time. She appears well-developed and well-nourished. HENT:   Head: Normocephalic and atraumatic. Eyes: Pupils are equal, round, and reactive to light. Conjunctivae and EOM are normal.   Neck: Normal range of motion. Neck supple. Cardiovascular: Normal rate, regular rhythm, normal heart sounds and intact distal pulses. Pulses:       Radial pulses are 2+ on the right side, and 2+ on the left side. Dorsalis pedis pulses are 2+ on the right side, and 2+ on the left side. Pulmonary/Chest: Effort normal and breath sounds normal.   Abdominal: Soft. Bowel sounds are normal. There is tenderness in the right lower quadrant. There is CVA tenderness (right). Musculoskeletal: Normal range of motion. Lumbar back: She exhibits tenderness and spasm. Right lower leg: She exhibits tenderness and swelling. Increased lumbar pain with:  Flexion/extension/rotation of lumbar spine   Neurological: She is alert and oriented to person, place, and time. GCS eye subscore is 4. GCS verbal subscore is 5. GCS motor subscore is 6. Reflex Scores:       Bicep reflexes are 2+ on the right side and 2+ on the left side. Patellar reflexes are 2+ on the right side and 2+ on the left side. Cranial nerves II through XII intact.   +5/5 upper extremity upper/lower extremity strength bilaterally. Patient denies any loss of bowel or bladder function. Patient denies saddle anesthesia and numbness. Patient denies neuro-focal focal deficit or weakness. Labs and imaging done in Emergency department are normal.  Upon discharge, patient denies abdominal rebound and guarding. Patient is educated upon appendicitis, and encouraged to return to the emergency room with increased RLQ abdominal pain, worsening fever, and intractable vomiting. I do have a clinical concern for DVT of the right leg. I did speak with the patient about the pros/cons of Lovenox and treatment of DVT prior to u/s of the right lower extremity. Patient does want to have a Lovenox shot prior to U/S of right leg in the morning. Patient to return to emergency room in the morning for an U/S of right leg to rule out DVT. I suspect patient likely has a lower back sprain. Patient informed to take NSAIDS, and Tylenol at home. Vitals stable. No fever. Patient to return to emergency room in the morning for an U/S of right leg to rule out DVT. Will d/c to follow-up in 7-10 days with PCP or orthopedic surgery if no resolution of pain. Patient may require an MRI of lumbar spine. ED Medications administered this visit:    Medications   0.9 % sodium chloride bolus (0 mLs Intravenous Stopped 7/6/19 1424)   orphenadrine (NORFLEX) injection 60 mg (60 mg Intramuscular Given 7/6/19 1358)   morphine (PF) injection 2 mg (2 mg Intravenous Given 7/6/19 1440)   ondansetron (ZOFRAN) injection 4 mg (4 mg Intravenous Given 7/6/19 1440)   enoxaparin (LOVENOX) injection 120 mg (120 mg Subcutaneous Given 7/6/19 1600)          CLINICAL       1. Strain of lumbar region, initial encounter    2.  Right leg swelling          DISPOSITION/PLAN   DISPOSITION  07/06/2019 02:55:03 PM      PATIENT REFERRED TO:  ARISTIDES Storey - CNP  [address]  [phone]    Schedule an appointment as soon as possible for a visit in 1

## 2019-07-07 ENCOUNTER — HOSPITAL ENCOUNTER (OUTPATIENT)
Age: 54
Discharge: HOME | End: 2019-07-09
Payer: MEDICARE

## 2019-07-07 ENCOUNTER — HOSPITAL ENCOUNTER (OUTPATIENT)
Dept: VASCULAR LAB | Age: 54
Discharge: HOME | End: 2019-07-09
Payer: MEDICARE

## 2019-07-07 DIAGNOSIS — R60.9 SWELLING: ICD-10-CM

## 2019-07-07 PROCEDURE — 93971 EXTREMITY STUDY: CPT

## 2019-07-08 ENCOUNTER — TELEPHONE (OUTPATIENT)
Dept: PRIMARY CARE CLINIC | Age: 54
End: 2019-07-08

## 2019-08-08 ENCOUNTER — HOSPITAL ENCOUNTER (OUTPATIENT)
Age: 54
Discharge: HOME | End: 2019-08-08
Payer: MEDICARE

## 2019-08-08 DIAGNOSIS — N18.4 CKD (CHRONIC KIDNEY DISEASE) STAGE 4, GFR 15-29 ML/MIN (HCC): ICD-10-CM

## 2019-08-08 LAB
ANION GAP SERPL CALCULATED.3IONS-SCNC: 15 MMOL/L (ref 9–17)
BUN BLDV-MCNC: 30 MG/DL (ref 6–20)
BUN/CREAT BLD: 14 (ref 9–20)
CALCIUM SERPL-MCNC: 9 MG/DL (ref 8.6–10.4)
CHLORIDE BLD-SCNC: 99 MMOL/L (ref 98–107)
CO2: 26 MMOL/L (ref 20–31)
CREAT SERPL-MCNC: 2.2 MG/DL (ref 0.5–0.9)
CREATININE URINE: 87 MG/DL (ref 28–217)
GFR AFRICAN AMERICAN: 28 ML/MIN
GFR NON-AFRICAN AMERICAN: 23 ML/MIN
GFR SERPL CREATININE-BSD FRML MDRD: ABNORMAL ML/MIN/{1.73_M2}
GFR SERPL CREATININE-BSD FRML MDRD: ABNORMAL ML/MIN/{1.73_M2}
GLUCOSE BLD-MCNC: 102 MG/DL (ref 70–99)
POTASSIUM SERPL-SCNC: 3.9 MMOL/L (ref 3.7–5.3)
SODIUM BLD-SCNC: 140 MMOL/L (ref 135–144)
TOTAL PROTEIN, URINE: 336 MG/DL

## 2019-08-08 PROCEDURE — 82570 ASSAY OF URINE CREATININE: CPT

## 2019-08-08 PROCEDURE — 80048 BASIC METABOLIC PNL TOTAL CA: CPT

## 2019-08-08 PROCEDURE — 36415 COLL VENOUS BLD VENIPUNCTURE: CPT

## 2019-08-08 PROCEDURE — 84156 ASSAY OF PROTEIN URINE: CPT

## 2019-08-19 PROBLEM — M31.7 MICROSCOPIC POLYANGIITIS (HCC): Chronic | Status: ACTIVE | Noted: 2018-04-19

## 2019-08-19 PROBLEM — M62.81 MUSCLE WEAKNESS: Status: RESOLVED | Noted: 2017-09-14 | Resolved: 2019-08-19

## 2019-08-19 PROBLEM — E21.3 HYPERPARATHYROIDISM (HCC): Chronic | Status: ACTIVE | Noted: 2018-06-27

## 2019-08-19 PROBLEM — E78.5 HYPERLIPIDEMIA: Chronic | Status: ACTIVE | Noted: 2017-05-10

## 2019-08-19 PROBLEM — D50.9 IRON DEFICIENCY ANEMIA: Chronic | Status: ACTIVE | Noted: 2017-04-07

## 2019-08-26 ENCOUNTER — TELEPHONE (OUTPATIENT)
Dept: PRIMARY CARE CLINIC | Age: 54
End: 2019-08-26

## 2019-08-26 DIAGNOSIS — Z12.31 SCREENING MAMMOGRAM, ENCOUNTER FOR: Primary | ICD-10-CM

## 2019-08-27 ENCOUNTER — HOSPITAL ENCOUNTER (OUTPATIENT)
Age: 54
Discharge: HOME | End: 2019-08-27
Payer: MEDICARE

## 2019-08-27 DIAGNOSIS — Z79.899 MEDICATION DOSE CHANGED: ICD-10-CM

## 2019-08-27 DIAGNOSIS — I77.82 ANCA-ASSOCIATED VASCULITIS: ICD-10-CM

## 2019-08-27 DIAGNOSIS — N18.4 CKD (CHRONIC KIDNEY DISEASE) STAGE 4, GFR 15-29 ML/MIN (HCC): Chronic | ICD-10-CM

## 2019-08-27 DIAGNOSIS — D63.1 ANEMIA OF CHRONIC KIDNEY FAILURE, UNSPECIFIED STAGE: ICD-10-CM

## 2019-08-27 DIAGNOSIS — N18.9 ANEMIA OF CHRONIC KIDNEY FAILURE, UNSPECIFIED STAGE: ICD-10-CM

## 2019-08-27 LAB
ABSOLUTE EOS #: 0.3 K/UL (ref 0–0.44)
ABSOLUTE IMMATURE GRANULOCYTE: <0.03 K/UL (ref 0–0.3)
ABSOLUTE LYMPH #: 1.02 K/UL (ref 1.1–3.7)
ABSOLUTE MONO #: 0.5 K/UL (ref 0.1–1.2)
ALBUMIN SERPL-MCNC: 3.5 G/DL (ref 3.5–5.2)
ALBUMIN/GLOBULIN RATIO: 1.1 (ref 1–2.5)
ALP BLD-CCNC: 104 U/L (ref 35–104)
ALT SERPL-CCNC: 10 U/L (ref 5–33)
ANION GAP SERPL CALCULATED.3IONS-SCNC: 12 MMOL/L (ref 9–17)
AST SERPL-CCNC: 12 U/L
BASOPHILS # BLD: 1 % (ref 0–2)
BASOPHILS ABSOLUTE: 0.06 K/UL (ref 0–0.2)
BILIRUB SERPL-MCNC: <0.1 MG/DL (ref 0.3–1.2)
BUN BLDV-MCNC: 34 MG/DL (ref 6–20)
BUN/CREAT BLD: 16 (ref 9–20)
CALCIUM SERPL-MCNC: 8.8 MG/DL (ref 8.6–10.4)
CHLORIDE BLD-SCNC: 106 MMOL/L (ref 98–107)
CO2: 24 MMOL/L (ref 20–31)
CREAT SERPL-MCNC: 2.19 MG/DL (ref 0.5–0.9)
CREATININE URINE: 96.5 MG/DL (ref 28–217)
DIFFERENTIAL TYPE: ABNORMAL
EOSINOPHILS RELATIVE PERCENT: 4 % (ref 1–4)
FREE KAPPA/LAMBDA RATIO: 1.06 (ref 0.26–1.65)
GFR AFRICAN AMERICAN: 28 ML/MIN
GFR NON-AFRICAN AMERICAN: 23 ML/MIN
GFR SERPL CREATININE-BSD FRML MDRD: ABNORMAL ML/MIN/{1.73_M2}
GFR SERPL CREATININE-BSD FRML MDRD: ABNORMAL ML/MIN/{1.73_M2}
GLUCOSE BLD-MCNC: 107 MG/DL (ref 70–99)
HCT VFR BLD CALC: 36.3 % (ref 36.3–47.1)
HEMOGLOBIN: 11.5 G/DL (ref 11.9–15.1)
IMMATURE GRANULOCYTES: 0 %
KAPPA FREE LIGHT CHAINS QNT: 3.64 MG/DL (ref 0.37–1.94)
LAMBDA FREE LIGHT CHAINS QNT: 3.43 MG/DL (ref 0.57–2.63)
LYMPHOCYTES # BLD: 13 % (ref 24–43)
MCH RBC QN AUTO: 29.8 PG (ref 25.2–33.5)
MCHC RBC AUTO-ENTMCNC: 31.7 G/DL (ref 28.4–34.8)
MCV RBC AUTO: 94 FL (ref 82.6–102.9)
MONOCYTES # BLD: 7 % (ref 3–12)
NRBC AUTOMATED: 0 PER 100 WBC
PDW BLD-RTO: 13.9 % (ref 11.8–14.4)
PLATELET # BLD: 279 K/UL (ref 138–453)
PLATELET ESTIMATE: ABNORMAL
PMV BLD AUTO: 10.3 FL (ref 8.1–13.5)
POTASSIUM SERPL-SCNC: 3.9 MMOL/L (ref 3.7–5.3)
RBC # BLD: 3.86 M/UL (ref 3.95–5.11)
RBC # BLD: ABNORMAL 10*6/UL
SEG NEUTROPHILS: 75 % (ref 36–65)
SEGMENTED NEUTROPHILS ABSOLUTE COUNT: 5.79 K/UL (ref 1.5–8.1)
SODIUM BLD-SCNC: 142 MMOL/L (ref 135–144)
TOTAL PROTEIN, URINE: 399 MG/DL
TOTAL PROTEIN: 6.7 G/DL (ref 6.4–8.3)
WBC # BLD: 7.7 K/UL (ref 3.5–11.3)
WBC # BLD: ABNORMAL 10*3/UL

## 2019-08-27 PROCEDURE — 82570 ASSAY OF URINE CREATININE: CPT

## 2019-08-27 PROCEDURE — 84156 ASSAY OF PROTEIN URINE: CPT

## 2019-08-27 PROCEDURE — 84165 PROTEIN E-PHORESIS SERUM: CPT

## 2019-08-27 PROCEDURE — 36415 COLL VENOUS BLD VENIPUNCTURE: CPT

## 2019-08-27 PROCEDURE — 84155 ASSAY OF PROTEIN SERUM: CPT

## 2019-08-27 PROCEDURE — 85025 COMPLETE CBC W/AUTO DIFF WBC: CPT

## 2019-08-27 PROCEDURE — 80053 COMPREHEN METABOLIC PANEL: CPT

## 2019-08-27 PROCEDURE — 83883 ASSAY NEPHELOMETRY NOT SPEC: CPT

## 2019-08-28 LAB
ALBUMIN (CALCULATED): 3.4 G/DL (ref 3.2–5.2)
ALBUMIN PERCENT: 56 % (ref 45–65)
ALPHA 1 PERCENT: 4 % (ref 3–6)
ALPHA 2 PERCENT: 17 % (ref 6–13)
ALPHA-1-GLOBULIN: 0.2 G/DL (ref 0.1–0.4)
ALPHA-2-GLOBULIN: 1 G/DL (ref 0.5–0.9)
BETA GLOBULIN: 0.9 G/DL (ref 0.5–1.1)
BETA PERCENT: 15 % (ref 11–19)
GAMMA GLOBULIN %: 9 % (ref 9–20)
GAMMA GLOBULIN: 0.6 G/DL (ref 0.5–1.5)
PATHOLOGIST: ABNORMAL
PROTEIN ELECTROPHORESIS, SERUM: ABNORMAL
TOTAL PROT. SUM,%: 101 % (ref 98–102)
TOTAL PROT. SUM: 6.1 G/DL (ref 6.3–8.2)
TOTAL PROTEIN: 6.1 G/DL (ref 6.4–8.3)

## 2019-09-06 ENCOUNTER — OFFICE VISIT (OUTPATIENT)
Dept: PRIMARY CARE CLINIC | Age: 54
End: 2019-09-06
Payer: MEDICARE

## 2019-09-06 VITALS
WEIGHT: 273.2 LBS | SYSTOLIC BLOOD PRESSURE: 127 MMHG | BODY MASS INDEX: 50.27 KG/M2 | HEIGHT: 62 IN | TEMPERATURE: 97.6 F | HEART RATE: 94 BPM | RESPIRATION RATE: 18 BRPM | DIASTOLIC BLOOD PRESSURE: 72 MMHG

## 2019-09-06 DIAGNOSIS — I10 ESSENTIAL HYPERTENSION: Primary | ICD-10-CM

## 2019-09-06 DIAGNOSIS — E78.5 HYPERLIPIDEMIA, UNSPECIFIED HYPERLIPIDEMIA TYPE: ICD-10-CM

## 2019-09-06 DIAGNOSIS — R73.9 ELEVATED BLOOD SUGAR: ICD-10-CM

## 2019-09-06 DIAGNOSIS — N18.4 CKD (CHRONIC KIDNEY DISEASE) STAGE 4, GFR 15-29 ML/MIN (HCC): ICD-10-CM

## 2019-09-06 DIAGNOSIS — R04.0 EPISTAXIS, RECURRENT: ICD-10-CM

## 2019-09-06 DIAGNOSIS — F41.8 DEPRESSION WITH ANXIETY: ICD-10-CM

## 2019-09-06 DIAGNOSIS — Z23 NEED FOR INFLUENZA VACCINATION: ICD-10-CM

## 2019-09-06 PROBLEM — Z86.79 HISTORY OF ARTERITIS: Status: ACTIVE | Noted: 2018-10-26

## 2019-09-06 PROBLEM — R06.02 SOB (SHORTNESS OF BREATH): Status: ACTIVE | Noted: 2018-10-26

## 2019-09-06 PROBLEM — Z87.39 HISTORY OF RHEUMATOID ARTHRITIS: Status: ACTIVE | Noted: 2018-10-26

## 2019-09-06 PROBLEM — Z91.199 NONCOMPLIANCE: Status: ACTIVE | Noted: 2018-10-26

## 2019-09-06 PROBLEM — R05.9 COUGH: Status: ACTIVE | Noted: 2018-10-26

## 2019-09-06 LAB — HBA1C MFR BLD: 5.8 %

## 2019-09-06 PROCEDURE — 83036 HEMOGLOBIN GLYCOSYLATED A1C: CPT | Performed by: NURSE PRACTITIONER

## 2019-09-06 PROCEDURE — 1036F TOBACCO NON-USER: CPT | Performed by: NURSE PRACTITIONER

## 2019-09-06 PROCEDURE — 99214 OFFICE O/P EST MOD 30 MIN: CPT | Performed by: NURSE PRACTITIONER

## 2019-09-06 PROCEDURE — 3017F COLORECTAL CA SCREEN DOC REV: CPT | Performed by: NURSE PRACTITIONER

## 2019-09-06 PROCEDURE — 90686 IIV4 VACC NO PRSV 0.5 ML IM: CPT | Performed by: NURSE PRACTITIONER

## 2019-09-06 PROCEDURE — 90471 IMMUNIZATION ADMIN: CPT | Performed by: NURSE PRACTITIONER

## 2019-09-06 PROCEDURE — G8427 DOCREV CUR MEDS BY ELIG CLIN: HCPCS | Performed by: NURSE PRACTITIONER

## 2019-09-06 PROCEDURE — G8417 CALC BMI ABV UP PARAM F/U: HCPCS | Performed by: NURSE PRACTITIONER

## 2019-09-06 RX ORDER — GABAPENTIN 300 MG/1
300 CAPSULE ORAL 2 TIMES DAILY
Refills: 0 | COMMUNITY
Start: 2019-09-04 | End: 2020-09-25 | Stop reason: ALTCHOICE

## 2019-09-06 ASSESSMENT — ENCOUNTER SYMPTOMS
NAUSEA: 0
VOMITING: 0
WHEEZING: 0
EYE PAIN: 0
SHORTNESS OF BREATH: 0
ABDOMINAL PAIN: 0
SINUS PRESSURE: 0
EYE DISCHARGE: 0
CONSTIPATION: 0
CHEST TIGHTNESS: 0
COUGH: 0
DIARRHEA: 0
TROUBLE SWALLOWING: 0
SINUS PAIN: 0
BACK PAIN: 1
EYE ITCHING: 0

## 2019-09-06 NOTE — PATIENT INSTRUCTIONS
SURVEY:    You may be receiving a survey from [employer] regarding your visit today. Please complete the survey to enable us to provide the highest quality of care to you and your family. If you cannot score us a very good on any question, please call the office to discuss how we could have made your experience a very good one. Thank you. Patient Education        High Cholesterol: Care Instructions  Your Care Instructions    Cholesterol is a type of fat in your blood. It is needed for many body functions, such as making new cells. Cholesterol is made by your body. It also comes from food you eat. High cholesterol means that you have too much of the fat in your blood. This raises your risk of a heart attack and stroke. LDL and HDL are part of your total cholesterol. LDL is the \"bad\" cholesterol. High LDL can raise your risk for heart disease, heart attack, and stroke. HDL is the \"good\" cholesterol. It helps clear bad cholesterol from the body. High HDL is linked with a lower risk of heart disease, heart attack, and stroke. Your cholesterol levels help your doctor find out your risk for having a heart attack or stroke. You and your doctor can talk about whether you need to lower your risk and what treatment is best for you. A heart-healthy lifestyle along with medicines can help lower your cholesterol and your risk. The way you choose to lower your risk will depend on how high your risk is for heart attack and stroke. It will also depend on how you feel about taking medicines. Follow-up care is a key part of your treatment and safety. Be sure to make and go to all appointments, and call your doctor if you are having problems. It's also a good idea to know your test results and keep a list of the medicines you take. How can you care for yourself at home? · Eat a variety of foods every day.  Good choices include fruits, vegetables, whole grains (like oatmeal), dried beans and peas, nuts and seeds, soy high blood pressure is hypertension. Despite what a lot of people think, high blood pressure usually doesn't cause headaches or make you feel dizzy or lightheaded. It usually has no symptoms. But it does increase your risk of stroke, heart attack, and other problems. You and your doctor will talk about your risks of these problems based on your blood pressure. Your doctor will give you a goal for your blood pressure. Your goal will be based on your health and your age. Lifestyle changes, such as eating healthy and being active, are always important to help lower blood pressure. You might also take medicine to reach your blood pressure goal.  Follow-up care is a key part of your treatment and safety. Be sure to make and go to all appointments, and call your doctor if you are having problems. It's also a good idea to know your test results and keep a list of the medicines you take. How can you care for yourself at home? Medical treatment  · If you stop taking your medicine, your blood pressure will go back up. You may take one or more types of medicine to lower your blood pressure. Be safe with medicines. Take your medicine exactly as prescribed. Call your doctor if you think you are having a problem with your medicine. · Talk to your doctor before you start taking aspirin every day. Aspirin can help certain people lower their risk of a heart attack or stroke. But taking aspirin isn't right for everyone, because it can cause serious bleeding. · See your doctor regularly. You may need to see the doctor more often at first or until your blood pressure comes down. · If you are taking blood pressure medicine, talk to your doctor before you take decongestants or anti-inflammatory medicine, such as ibuprofen. Some of these medicines can raise blood pressure. · Learn how to check your blood pressure at home. Lifestyle changes  · Stay at a healthy weight.  This is especially important if you put on weight around the

## 2019-09-10 ENCOUNTER — TELEPHONE (OUTPATIENT)
Dept: PRIMARY CARE CLINIC | Age: 54
End: 2019-09-10

## 2019-09-11 ENCOUNTER — OFFICE VISIT (OUTPATIENT)
Dept: ONCOLOGY | Age: 54
End: 2019-09-11
Payer: MEDICARE

## 2019-09-11 VITALS
SYSTOLIC BLOOD PRESSURE: 151 MMHG | DIASTOLIC BLOOD PRESSURE: 96 MMHG | TEMPERATURE: 96.9 F | RESPIRATION RATE: 18 BRPM | BODY MASS INDEX: 51.34 KG/M2 | HEIGHT: 62 IN | HEART RATE: 86 BPM | WEIGHT: 279 LBS

## 2019-09-11 DIAGNOSIS — D47.2 MGUS (MONOCLONAL GAMMOPATHY OF UNKNOWN SIGNIFICANCE): Primary | ICD-10-CM

## 2019-09-11 DIAGNOSIS — D63.1 ANEMIA OF CHRONIC KIDNEY FAILURE, UNSPECIFIED STAGE: ICD-10-CM

## 2019-09-11 DIAGNOSIS — N18.9 ANEMIA OF CHRONIC KIDNEY FAILURE, UNSPECIFIED STAGE: ICD-10-CM

## 2019-09-11 DIAGNOSIS — I15.1 HYPERTENSION SECONDARY TO OTHER RENAL DISORDERS: ICD-10-CM

## 2019-09-11 DIAGNOSIS — N28.89 HYPERTENSION SECONDARY TO OTHER RENAL DISORDERS: ICD-10-CM

## 2019-09-11 PROCEDURE — 99213 OFFICE O/P EST LOW 20 MIN: CPT | Performed by: INTERNAL MEDICINE

## 2019-09-11 PROCEDURE — 1036F TOBACCO NON-USER: CPT | Performed by: INTERNAL MEDICINE

## 2019-09-11 PROCEDURE — G8417 CALC BMI ABV UP PARAM F/U: HCPCS | Performed by: INTERNAL MEDICINE

## 2019-09-11 PROCEDURE — 3017F COLORECTAL CA SCREEN DOC REV: CPT | Performed by: INTERNAL MEDICINE

## 2019-09-11 PROCEDURE — G8427 DOCREV CUR MEDS BY ELIG CLIN: HCPCS | Performed by: INTERNAL MEDICINE

## 2019-09-18 ENCOUNTER — NURSE ONLY (OUTPATIENT)
Dept: PRIMARY CARE CLINIC | Age: 54
End: 2019-09-18

## 2019-09-18 ENCOUNTER — APPOINTMENT (OUTPATIENT)
Dept: GENERAL RADIOLOGY | Age: 54
End: 2019-09-18
Payer: MEDICARE

## 2019-09-18 ENCOUNTER — HOSPITAL ENCOUNTER (EMERGENCY)
Age: 54
Discharge: HOME | End: 2019-09-18
Payer: MEDICARE

## 2019-09-18 ENCOUNTER — APPOINTMENT (OUTPATIENT)
Dept: CT IMAGING | Age: 54
End: 2019-09-18
Payer: MEDICARE

## 2019-09-18 VITALS
RESPIRATION RATE: 22 BRPM | DIASTOLIC BLOOD PRESSURE: 141 MMHG | WEIGHT: 279.7 LBS | OXYGEN SATURATION: 97 % | SYSTOLIC BLOOD PRESSURE: 190 MMHG | HEART RATE: 79 BPM | BODY MASS INDEX: 51.16 KG/M2 | TEMPERATURE: 97.7 F

## 2019-09-18 VITALS
RESPIRATION RATE: 16 BRPM | DIASTOLIC BLOOD PRESSURE: 95 MMHG | HEART RATE: 81 BPM | OXYGEN SATURATION: 97 % | TEMPERATURE: 98.2 F | SYSTOLIC BLOOD PRESSURE: 157 MMHG

## 2019-09-18 DIAGNOSIS — R42 VERTIGO: Primary | ICD-10-CM

## 2019-09-18 DIAGNOSIS — I16.1 HYPERTENSIVE EMERGENCY: Primary | ICD-10-CM

## 2019-09-18 LAB
-: ABNORMAL
AMORPHOUS: ABNORMAL
ANION GAP SERPL CALCULATED.3IONS-SCNC: 15 MMOL/L (ref 9–17)
BACTERIA: ABNORMAL
BILIRUBIN URINE: NEGATIVE
BNP INTERPRETATION: ABNORMAL
BUN BLDV-MCNC: 32 MG/DL (ref 6–20)
BUN/CREAT BLD: 15 (ref 9–20)
CALCIUM SERPL-MCNC: 9.2 MG/DL (ref 8.6–10.4)
CASTS UA: ABNORMAL /LPF
CHLORIDE BLD-SCNC: 97 MMOL/L (ref 98–107)
CO2: 25 MMOL/L (ref 20–31)
COLOR: YELLOW
COMMENT UA: ABNORMAL
CREAT SERPL-MCNC: 2.18 MG/DL (ref 0.5–0.9)
CRYSTALS, UA: ABNORMAL /HPF
EKG ATRIAL RATE: 79 BPM
EKG P AXIS: 6 DEGREES
EKG P-R INTERVAL: 168 MS
EKG Q-T INTERVAL: 396 MS
EKG QRS DURATION: 74 MS
EKG QTC CALCULATION (BAZETT): 454 MS
EKG R AXIS: 2 DEGREES
EKG T AXIS: 23 DEGREES
EKG VENTRICULAR RATE: 79 BPM
EPITHELIAL CELLS UA: ABNORMAL /HPF (ref 0–25)
GFR AFRICAN AMERICAN: 28 ML/MIN
GFR NON-AFRICAN AMERICAN: 24 ML/MIN
GFR SERPL CREATININE-BSD FRML MDRD: ABNORMAL ML/MIN/{1.73_M2}
GFR SERPL CREATININE-BSD FRML MDRD: ABNORMAL ML/MIN/{1.73_M2}
GLUCOSE BLD-MCNC: 119 MG/DL (ref 70–99)
GLUCOSE URINE: NEGATIVE
HCT VFR BLD CALC: 33.7 % (ref 36.3–47.1)
HEMOGLOBIN: 10.9 G/DL (ref 11.9–15.1)
KETONES, URINE: NEGATIVE
LEUKOCYTE ESTERASE, URINE: NEGATIVE
MAGNESIUM: 2 MG/DL (ref 1.6–2.6)
MCH RBC QN AUTO: 30.4 PG (ref 25.2–33.5)
MCHC RBC AUTO-ENTMCNC: 32.3 G/DL (ref 28.4–34.8)
MCV RBC AUTO: 93.9 FL (ref 82.6–102.9)
MUCUS: ABNORMAL
NITRITE, URINE: NEGATIVE
NRBC AUTOMATED: 0 PER 100 WBC
OTHER OBSERVATIONS UA: ABNORMAL
PDW BLD-RTO: 13.8 % (ref 11.8–14.4)
PH UA: 6.5 (ref 5–9)
PLATELET # BLD: 257 K/UL (ref 138–453)
PMV BLD AUTO: 10.5 FL (ref 8.1–13.5)
POTASSIUM SERPL-SCNC: 4.1 MMOL/L (ref 3.7–5.3)
PRO-BNP: 695 PG/ML
PROTEIN UA: ABNORMAL
RBC # BLD: 3.59 M/UL (ref 3.95–5.11)
RBC UA: ABNORMAL /HPF (ref 0–2)
RENAL EPITHELIAL, UA: ABNORMAL /HPF
SODIUM BLD-SCNC: 137 MMOL/L (ref 135–144)
SPECIFIC GRAVITY UA: 1.01 (ref 1.01–1.02)
TRICHOMONAS: ABNORMAL
TROPONIN INTERP: NORMAL
TROPONIN T: <0.03 NG/ML
TROPONIN, HIGH SENSITIVITY: NORMAL NG/L (ref 0–14)
TURBIDITY: CLEAR
URINE HGB: ABNORMAL
UROBILINOGEN, URINE: NORMAL
WBC # BLD: 8 K/UL (ref 3.5–11.3)
WBC UA: ABNORMAL /HPF (ref 0–5)
YEAST: ABNORMAL

## 2019-09-18 PROCEDURE — 71045 X-RAY EXAM CHEST 1 VIEW: CPT

## 2019-09-18 PROCEDURE — 99284 EMERGENCY DEPT VISIT MOD MDM: CPT

## 2019-09-18 PROCEDURE — 81001 URINALYSIS AUTO W/SCOPE: CPT

## 2019-09-18 PROCEDURE — 6370000000 HC RX 637 (ALT 250 FOR IP): Performed by: PHYSICIAN ASSISTANT

## 2019-09-18 PROCEDURE — 70450 CT HEAD/BRAIN W/O DYE: CPT

## 2019-09-18 PROCEDURE — 93010 ELECTROCARDIOGRAM REPORT: CPT | Performed by: INTERNAL MEDICINE

## 2019-09-18 PROCEDURE — 93005 ELECTROCARDIOGRAM TRACING: CPT | Performed by: PHYSICIAN ASSISTANT

## 2019-09-18 PROCEDURE — 83735 ASSAY OF MAGNESIUM: CPT

## 2019-09-18 PROCEDURE — 80048 BASIC METABOLIC PNL TOTAL CA: CPT

## 2019-09-18 PROCEDURE — 83880 ASSAY OF NATRIURETIC PEPTIDE: CPT

## 2019-09-18 PROCEDURE — 84484 ASSAY OF TROPONIN QUANT: CPT

## 2019-09-18 PROCEDURE — 85027 COMPLETE CBC AUTOMATED: CPT

## 2019-09-18 RX ORDER — ACETAMINOPHEN 325 MG/1
650 TABLET ORAL ONCE
Status: DISCONTINUED | OUTPATIENT
Start: 2019-09-18 | End: 2019-09-18

## 2019-09-18 RX ORDER — BUTALBITAL, ACETAMINOPHEN AND CAFFEINE 50; 325; 40 MG/1; MG/1; MG/1
1 TABLET ORAL ONCE
Status: COMPLETED | OUTPATIENT
Start: 2019-09-18 | End: 2019-09-18

## 2019-09-18 RX ORDER — MECLIZINE HCL 12.5 MG/1
25 TABLET ORAL ONCE
Status: COMPLETED | OUTPATIENT
Start: 2019-09-18 | End: 2019-09-18

## 2019-09-18 RX ADMIN — MECLIZINE 25 MG: 12.5 TABLET ORAL at 13:38

## 2019-09-18 RX ADMIN — BUTALBITAL, ACETAMINOPHEN, AND CAFFEINE 1 TABLET: 50; 325; 40 TABLET ORAL at 13:37

## 2019-09-18 ASSESSMENT — ENCOUNTER SYMPTOMS
BLOOD IN STOOL: 0
CHEST TIGHTNESS: 0
EYE REDNESS: 0
SORE THROAT: 0
NAUSEA: 0
CONSTIPATION: 0
VOMITING: 0
SHORTNESS OF BREATH: 0
COUGH: 0
BACK PAIN: 0
RHINORRHEA: 0
DIARRHEA: 0
ABDOMINAL PAIN: 0
EYE DISCHARGE: 0
WHEEZING: 0

## 2019-09-18 ASSESSMENT — PAIN SCALES - GENERAL
PAINLEVEL_OUTOF10: 8
PAINLEVEL_OUTOF10: 9

## 2019-09-18 ASSESSMENT — PAIN DESCRIPTION - LOCATION: LOCATION: GENERALIZED

## 2019-09-18 ASSESSMENT — PAIN DESCRIPTION - PAIN TYPE: TYPE: ACUTE PAIN

## 2019-09-18 NOTE — ED PROVIDER NOTES
EKG: All EKG's are interpreted by the Emergency Department Physician who either signs orCo-signs this chart in the absence of a cardiologist.      RADIOLOGY:   Non-plainfilm images such as CT, Ultrasound and MRI are read by the radiologist. Jarome Crosser radiographic images are visualized and preliminarily interpreted by the emergency physician with the below findings:      Interpretationper the Radiologist below, if available at the time of this note:    CT Head WO Contrast   Final Result   No acute intracranial abnormality. XR CHEST PORTABLE   Final Result   No acute process. ED BEDSIDE ULTRASOUND:   Performed by ED Physician - none    LABS:  Labs Reviewed   CBC - Abnormal; Notable for the following components:       Result Value    RBC 3.59 (*)     Hemoglobin 10.9 (*)     Hematocrit 33.7 (*)     All other components within normal limits   BASIC METABOLIC PANEL - Abnormal; Notable for the following components:    Glucose 119 (*)     BUN 32 (*)     CREATININE 2.18 (*)     Chloride 97 (*)     GFR Non- 24 (*)     GFR  28 (*)     All other components within normal limits   URINE RT REFLEX TO CULTURE - Abnormal; Notable for the following components:    Urine Hgb TRACE (*)     Protein, UA 2+ (*)     All other components within normal limits   BRAIN NATRIURETIC PEPTIDE - Abnormal; Notable for the following components:    Pro- (*)     All other components within normal limits   MICROSCOPIC URINALYSIS - Abnormal; Notable for the following components:    Bacteria, UA 1+ (*)     All other components within normal limits   MAGNESIUM   TROPONIN       All other labs were within normal range or not returned as of this dictation.     EMERGENCY DEPARTMENT COURSE and DIFFERENTIAL DIAGNOSIS/MDM:   Vitals:    Vitals:    09/18/19 1132 09/18/19 1147 09/18/19 1148 09/18/19 1324   BP: (!) 145/90 (!) 142/94     Pulse:       Resp:       Temp:       TempSrc:       SpO2: 94%  97% 97%

## 2019-09-18 NOTE — PATIENT INSTRUCTIONS
Health. If you have questions about a medical condition or this instruction, always ask your healthcare professional. [place] [address] any warranty or liability for your use of this information.

## 2019-09-19 ENCOUNTER — TELEPHONE (OUTPATIENT)
Dept: PRIMARY CARE CLINIC | Age: 54
End: 2019-09-19

## 2019-09-26 ENCOUNTER — TELEPHONE (OUTPATIENT)
Dept: PRIMARY CARE CLINIC | Age: 54
End: 2019-09-26

## 2019-09-26 RX ORDER — MECLIZINE HYDROCHLORIDE 25 MG/1
25 TABLET ORAL 3 TIMES DAILY PRN
Qty: 30 TABLET | Refills: 0 | Status: SHIPPED | OUTPATIENT
Start: 2019-09-26 | End: 2020-07-22 | Stop reason: SDUPTHER

## 2019-09-26 NOTE — TELEPHONE ENCOUNTER
Let her know I will send this however if her dizziness does not improve she needs to be evaluated.
Letter know I will send this in however if her dizziness does not improve she needs to be evaluated.
Urticaria due to cold     Vitamin D deficiency     Elevated BUN     Chicas's esophagus without dysplasia     Hyperparathyroidism (HCC)     Gastroesophageal reflux disease     Esophageal dysphagia     History of colon polyps     Epistaxis, recurrent     Cough     History of arteritis     History of rheumatoid arthritis     Noncompliance     SOB (shortness of breath)

## 2019-10-06 ENCOUNTER — HOSPITAL ENCOUNTER (OUTPATIENT)
Dept: WOMENS IMAGING | Age: 54
Discharge: HOME | End: 2019-10-08
Payer: MEDICARE

## 2019-10-06 DIAGNOSIS — Z12.31 SCREENING MAMMOGRAM, ENCOUNTER FOR: ICD-10-CM

## 2019-10-06 PROBLEM — R05.9 COUGH: Status: RESOLVED | Noted: 2018-10-26 | Resolved: 2019-10-06

## 2019-10-06 PROCEDURE — 77063 BREAST TOMOSYNTHESIS BI: CPT

## 2019-10-07 ENCOUNTER — TELEPHONE (OUTPATIENT)
Dept: PRIMARY CARE CLINIC | Age: 54
End: 2019-10-07

## 2019-10-28 ENCOUNTER — HOSPITAL ENCOUNTER (EMERGENCY)
Age: 54
Discharge: HOME | End: 2019-10-28
Attending: EMERGENCY MEDICINE
Payer: MEDICARE

## 2019-10-28 VITALS
TEMPERATURE: 100.6 F | HEART RATE: 108 BPM | BODY MASS INDEX: 51.03 KG/M2 | RESPIRATION RATE: 18 BRPM | DIASTOLIC BLOOD PRESSURE: 101 MMHG | SYSTOLIC BLOOD PRESSURE: 177 MMHG | WEIGHT: 279 LBS

## 2019-10-28 DIAGNOSIS — B34.9 VIRAL SYNDROME: Primary | ICD-10-CM

## 2019-10-28 DIAGNOSIS — N30.00 ACUTE CYSTITIS WITHOUT HEMATURIA: ICD-10-CM

## 2019-10-28 LAB
-: ABNORMAL
ABSOLUTE EOS #: 0.09 K/UL (ref 0–0.44)
ABSOLUTE IMMATURE GRANULOCYTE: 0 K/UL (ref 0–0.3)
ABSOLUTE LYMPH #: 0.09 K/UL (ref 1.1–3.7)
ABSOLUTE MONO #: 0.09 K/UL (ref 0.1–1.2)
ALBUMIN SERPL-MCNC: 3.4 G/DL (ref 3.5–5.2)
ALBUMIN/GLOBULIN RATIO: 1 (ref 1–2.5)
ALP BLD-CCNC: 113 U/L (ref 35–104)
ALT SERPL-CCNC: 14 U/L (ref 5–33)
AMORPHOUS: ABNORMAL
ANION GAP SERPL CALCULATED.3IONS-SCNC: 12 MMOL/L (ref 9–17)
AST SERPL-CCNC: 19 U/L
BACTERIA: ABNORMAL
BASOPHILS # BLD: 0 % (ref 0–2)
BASOPHILS ABSOLUTE: 0 K/UL (ref 0–0.2)
BILIRUB SERPL-MCNC: 0.31 MG/DL (ref 0.3–1.2)
BILIRUBIN URINE: NEGATIVE
BUN BLDV-MCNC: 28 MG/DL (ref 6–20)
BUN/CREAT BLD: 13 (ref 9–20)
CALCIUM SERPL-MCNC: 8.3 MG/DL (ref 8.6–10.4)
CASTS UA: ABNORMAL /LPF
CHLORIDE BLD-SCNC: 106 MMOL/L (ref 98–107)
CO2: 20 MMOL/L (ref 20–31)
COLOR: YELLOW
COMMENT UA: ABNORMAL
CREAT SERPL-MCNC: 2.08 MG/DL (ref 0.5–0.9)
CRYSTALS, UA: ABNORMAL /HPF
DIFFERENTIAL TYPE: ABNORMAL
DIRECT EXAM: NORMAL
EOSINOPHILS RELATIVE PERCENT: 1 % (ref 1–4)
EPITHELIAL CELLS UA: ABNORMAL /HPF (ref 0–25)
GFR AFRICAN AMERICAN: 30 ML/MIN
GFR NON-AFRICAN AMERICAN: 25 ML/MIN
GFR SERPL CREATININE-BSD FRML MDRD: ABNORMAL ML/MIN/{1.73_M2}
GFR SERPL CREATININE-BSD FRML MDRD: ABNORMAL ML/MIN/{1.73_M2}
GLUCOSE BLD-MCNC: 113 MG/DL (ref 70–99)
GLUCOSE URINE: NEGATIVE
HCT VFR BLD CALC: 37.5 % (ref 36.3–47.1)
HEMOGLOBIN: 12.3 G/DL (ref 11.9–15.1)
IMMATURE GRANULOCYTES: 0 %
KETONES, URINE: NEGATIVE
LEUKOCYTE ESTERASE, URINE: NEGATIVE
LIPASE: 17 U/L (ref 13–60)
LYMPHOCYTES # BLD: 1 % (ref 24–43)
Lab: NORMAL
MCH RBC QN AUTO: 30.4 PG (ref 25.2–33.5)
MCHC RBC AUTO-ENTMCNC: 32.8 G/DL (ref 28.4–34.8)
MCV RBC AUTO: 92.6 FL (ref 82.6–102.9)
MONOCYTES # BLD: 1 % (ref 3–12)
MORPHOLOGY: NORMAL
MUCUS: ABNORMAL
NITRITE, URINE: POSITIVE
NRBC AUTOMATED: 0 PER 100 WBC
OTHER OBSERVATIONS UA: ABNORMAL
PDW BLD-RTO: 13.3 % (ref 11.8–14.4)
PH UA: 5.5 (ref 5–9)
PLATELET # BLD: 266 K/UL (ref 138–453)
PLATELET ESTIMATE: ABNORMAL
PMV BLD AUTO: 9.9 FL (ref 8.1–13.5)
POTASSIUM SERPL-SCNC: 3.6 MMOL/L (ref 3.7–5.3)
PROTEIN UA: ABNORMAL
RBC # BLD: 4.05 M/UL (ref 3.95–5.11)
RBC # BLD: ABNORMAL 10*6/UL
RBC UA: ABNORMAL /HPF (ref 0–2)
RENAL EPITHELIAL, UA: ABNORMAL /HPF
SEG NEUTROPHILS: 97 % (ref 36–65)
SEGMENTED NEUTROPHILS ABSOLUTE COUNT: 8.73 K/UL (ref 1.5–8.1)
SODIUM BLD-SCNC: 138 MMOL/L (ref 135–144)
SPECIFIC GRAVITY UA: 1.02 (ref 1.01–1.02)
SPECIMEN DESCRIPTION: NORMAL
TOTAL PROTEIN: 6.7 G/DL (ref 6.4–8.3)
TRICHOMONAS: ABNORMAL
TROPONIN INTERP: NORMAL
TROPONIN T: <0.03 NG/ML
TROPONIN, HIGH SENSITIVITY: NORMAL NG/L (ref 0–14)
TURBIDITY: CLEAR
URINE HGB: ABNORMAL
UROBILINOGEN, URINE: NORMAL
WBC # BLD: 9 K/UL (ref 3.5–11.3)
WBC # BLD: ABNORMAL 10*3/UL
WBC UA: ABNORMAL /HPF (ref 0–5)
YEAST: ABNORMAL

## 2019-10-28 PROCEDURE — 83690 ASSAY OF LIPASE: CPT

## 2019-10-28 PROCEDURE — 87186 SC STD MICRODIL/AGAR DIL: CPT

## 2019-10-28 PROCEDURE — 85025 COMPLETE CBC W/AUTO DIFF WBC: CPT

## 2019-10-28 PROCEDURE — 84484 ASSAY OF TROPONIN QUANT: CPT

## 2019-10-28 PROCEDURE — 36415 COLL VENOUS BLD VENIPUNCTURE: CPT

## 2019-10-28 PROCEDURE — 2580000003 HC RX 258: Performed by: EMERGENCY MEDICINE

## 2019-10-28 PROCEDURE — 96375 TX/PRO/DX INJ NEW DRUG ADDON: CPT

## 2019-10-28 PROCEDURE — 87086 URINE CULTURE/COLONY COUNT: CPT

## 2019-10-28 PROCEDURE — 6370000000 HC RX 637 (ALT 250 FOR IP): Performed by: EMERGENCY MEDICINE

## 2019-10-28 PROCEDURE — 87088 URINE BACTERIA CULTURE: CPT

## 2019-10-28 PROCEDURE — 81001 URINALYSIS AUTO W/SCOPE: CPT

## 2019-10-28 PROCEDURE — 6360000002 HC RX W HCPCS: Performed by: EMERGENCY MEDICINE

## 2019-10-28 PROCEDURE — 96374 THER/PROPH/DIAG INJ IV PUSH: CPT

## 2019-10-28 PROCEDURE — 87804 INFLUENZA ASSAY W/OPTIC: CPT

## 2019-10-28 PROCEDURE — 99284 EMERGENCY DEPT VISIT MOD MDM: CPT

## 2019-10-28 PROCEDURE — 93005 ELECTROCARDIOGRAM TRACING: CPT | Performed by: EMERGENCY MEDICINE

## 2019-10-28 PROCEDURE — 80053 COMPREHEN METABOLIC PANEL: CPT

## 2019-10-28 RX ORDER — ONDANSETRON 2 MG/ML
4 INJECTION INTRAMUSCULAR; INTRAVENOUS ONCE
Status: COMPLETED | OUTPATIENT
Start: 2019-10-28 | End: 2019-10-28

## 2019-10-28 RX ORDER — 0.9 % SODIUM CHLORIDE 0.9 %
500 INTRAVENOUS SOLUTION INTRAVENOUS ONCE
Status: COMPLETED | OUTPATIENT
Start: 2019-10-28 | End: 2019-10-28

## 2019-10-28 RX ORDER — PANTOPRAZOLE SODIUM 40 MG/1
TABLET, DELAYED RELEASE ORAL
Qty: 60 TABLET | Refills: 0 | Status: SHIPPED | OUTPATIENT
Start: 2019-10-28 | End: 2019-12-09

## 2019-10-28 RX ORDER — CEPHALEXIN 500 MG/1
500 CAPSULE ORAL 2 TIMES DAILY
Qty: 14 CAPSULE | Refills: 0 | Status: SHIPPED | OUTPATIENT
Start: 2019-10-28 | End: 2019-11-04

## 2019-10-28 RX ORDER — ONDANSETRON 4 MG/1
4 TABLET, FILM COATED ORAL EVERY 8 HOURS PRN
Qty: 15 TABLET | Refills: 0 | Status: SHIPPED | OUTPATIENT
Start: 2019-10-28 | End: 2020-07-22 | Stop reason: SDUPTHER

## 2019-10-28 RX ORDER — DIPHENHYDRAMINE HYDROCHLORIDE 50 MG/ML
50 INJECTION INTRAMUSCULAR; INTRAVENOUS ONCE
Status: COMPLETED | OUTPATIENT
Start: 2019-10-28 | End: 2019-10-28

## 2019-10-28 RX ORDER — ACETAMINOPHEN 500 MG
1000 TABLET ORAL ONCE
Status: COMPLETED | OUTPATIENT
Start: 2019-10-28 | End: 2019-10-28

## 2019-10-28 RX ADMIN — SODIUM CHLORIDE 500 ML: 9 INJECTION, SOLUTION INTRAVENOUS at 13:17

## 2019-10-28 RX ADMIN — ACETAMINOPHEN 1000 MG: 500 TABLET, FILM COATED ORAL at 12:08

## 2019-10-28 RX ADMIN — SODIUM CHLORIDE 500 ML: 9 INJECTION, SOLUTION INTRAVENOUS at 12:08

## 2019-10-28 RX ADMIN — DIPHENHYDRAMINE HYDROCHLORIDE 50 MG: 50 INJECTION, SOLUTION INTRAMUSCULAR; INTRAVENOUS at 13:17

## 2019-10-28 RX ADMIN — ONDANSETRON 4 MG: 2 INJECTION INTRAMUSCULAR; INTRAVENOUS at 12:08

## 2019-10-28 ASSESSMENT — PAIN DESCRIPTION - PAIN TYPE: TYPE: ACUTE PAIN

## 2019-10-28 ASSESSMENT — PAIN DESCRIPTION - DESCRIPTORS: DESCRIPTORS: ACHING

## 2019-10-28 ASSESSMENT — PAIN SCALES - GENERAL
PAINLEVEL_OUTOF10: 9
PAINLEVEL_OUTOF10: 9

## 2019-10-28 ASSESSMENT — PAIN DESCRIPTION - LOCATION: LOCATION: GENERALIZED

## 2019-10-29 ENCOUNTER — TELEPHONE (OUTPATIENT)
Dept: PRIMARY CARE CLINIC | Age: 54
End: 2019-10-29

## 2019-10-29 LAB
EKG ATRIAL RATE: 106 BPM
EKG P AXIS: 50 DEGREES
EKG P-R INTERVAL: 180 MS
EKG Q-T INTERVAL: 318 MS
EKG QRS DURATION: 64 MS
EKG QTC CALCULATION (BAZETT): 422 MS
EKG R AXIS: 12 DEGREES
EKG T AXIS: 23 DEGREES
EKG VENTRICULAR RATE: 106 BPM

## 2019-10-29 PROCEDURE — 93010 ELECTROCARDIOGRAM REPORT: CPT | Performed by: INTERNAL MEDICINE

## 2019-10-29 RX ORDER — PANTOPRAZOLE SODIUM 40 MG/1
TABLET, DELAYED RELEASE ORAL
Qty: 60 TABLET | Refills: 5 | Status: SHIPPED | OUTPATIENT
Start: 2019-10-29 | End: 2020-05-26 | Stop reason: SDUPTHER

## 2019-10-29 ASSESSMENT — ENCOUNTER SYMPTOMS
ABDOMINAL PAIN: 0
SHORTNESS OF BREATH: 0
EYE PAIN: 0

## 2019-10-30 LAB
CULTURE: ABNORMAL
Lab: ABNORMAL
SPECIMEN DESCRIPTION: ABNORMAL

## 2019-11-07 DIAGNOSIS — I10 ESSENTIAL HYPERTENSION: ICD-10-CM

## 2019-11-07 RX ORDER — BLOOD PRESSURE TEST KIT
1 KIT MISCELLANEOUS DAILY
Qty: 1 KIT | Refills: 0 | Status: SHIPPED | OUTPATIENT
Start: 2019-11-07

## 2019-11-18 ENCOUNTER — HOSPITAL ENCOUNTER (OUTPATIENT)
Age: 54
Discharge: HOME | End: 2019-11-18
Payer: MEDICARE

## 2019-11-18 DIAGNOSIS — D50.8 OTHER IRON DEFICIENCY ANEMIA: Chronic | ICD-10-CM

## 2019-11-18 DIAGNOSIS — E55.9 AVITAMINOSIS D: ICD-10-CM

## 2019-11-18 DIAGNOSIS — N18.4 CKD (CHRONIC KIDNEY DISEASE) STAGE 4, GFR 15-29 ML/MIN (HCC): Chronic | ICD-10-CM

## 2019-11-18 LAB
ABSOLUTE EOS #: 0 K/UL (ref 0–0.44)
ABSOLUTE IMMATURE GRANULOCYTE: 0 K/UL (ref 0–0.3)
ABSOLUTE LYMPH #: 0.65 K/UL (ref 1.1–3.7)
ABSOLUTE MONO #: 0.37 K/UL (ref 0.1–1.2)
ALBUMIN SERPL-MCNC: 3.2 G/DL (ref 3.5–5.2)
ANION GAP SERPL CALCULATED.3IONS-SCNC: 14 MMOL/L (ref 9–17)
BASOPHILS # BLD: 0 % (ref 0–2)
BASOPHILS ABSOLUTE: 0 K/UL (ref 0–0.2)
BUN BLDV-MCNC: 33 MG/DL (ref 6–20)
BUN/CREAT BLD: 13 (ref 9–20)
CALCIUM IONIZED: 1.17 MMOL/L (ref 1.13–1.33)
CALCIUM SERPL-MCNC: 8.5 MG/DL (ref 8.6–10.4)
CHLORIDE BLD-SCNC: 103 MMOL/L (ref 98–107)
CO2: 22 MMOL/L (ref 20–31)
CREAT SERPL-MCNC: 2.63 MG/DL (ref 0.5–0.9)
CREATININE URINE: 69.9 MG/DL (ref 28–217)
CREATININE URINE: 76.1 MG/DL (ref 28–217)
DIFFERENTIAL TYPE: ABNORMAL
EOSINOPHILS RELATIVE PERCENT: 0 % (ref 1–4)
GFR AFRICAN AMERICAN: 23 ML/MIN
GFR NON-AFRICAN AMERICAN: 19 ML/MIN
GFR SERPL CREATININE-BSD FRML MDRD: ABNORMAL ML/MIN/{1.73_M2}
GFR SERPL CREATININE-BSD FRML MDRD: ABNORMAL ML/MIN/{1.73_M2}
GLUCOSE BLD-MCNC: 93 MG/DL (ref 70–99)
HCT VFR BLD CALC: 34.7 % (ref 36.3–47.1)
HEMOGLOBIN: 11.3 G/DL (ref 11.9–15.1)
IMMATURE GRANULOCYTES: 0 %
LYMPHOCYTES # BLD: 7 % (ref 24–43)
MCH RBC QN AUTO: 30.3 PG (ref 25.2–33.5)
MCHC RBC AUTO-ENTMCNC: 32.6 G/DL (ref 28.4–34.8)
MCV RBC AUTO: 93 FL (ref 82.6–102.9)
MICROALBUMIN/CREAT 24H UR: 2724 MG/L
MICROALBUMIN/CREAT UR-RTO: 3897 MCG/MG CREAT
MONOCYTES # BLD: 4 % (ref 3–12)
MORPHOLOGY: NORMAL
NRBC AUTOMATED: 0 PER 100 WBC
PDW BLD-RTO: 13.2 % (ref 11.8–14.4)
PHOSPHORUS: 2.7 MG/DL (ref 2.6–4.5)
PLATELET # BLD: 290 K/UL (ref 138–453)
PLATELET ESTIMATE: ABNORMAL
PMV BLD AUTO: 10.4 FL (ref 8.1–13.5)
POTASSIUM SERPL-SCNC: 4.1 MMOL/L (ref 3.7–5.3)
PTH INTACT: 182.6 PG/ML (ref 15–65)
RBC # BLD: 3.73 M/UL (ref 3.95–5.11)
RBC # BLD: ABNORMAL 10*6/UL
SEG NEUTROPHILS: 89 % (ref 36–65)
SEGMENTED NEUTROPHILS ABSOLUTE COUNT: 8.28 K/UL (ref 1.5–8.1)
SODIUM BLD-SCNC: 139 MMOL/L (ref 135–144)
TOTAL PROTEIN, URINE: 395 MG/DL
VITAMIN D 25-HYDROXY: 20.1 NG/ML (ref 30–100)
WBC # BLD: 9.3 K/UL (ref 3.5–11.3)
WBC # BLD: ABNORMAL 10*3/UL

## 2019-11-18 PROCEDURE — 82306 VITAMIN D 25 HYDROXY: CPT

## 2019-11-18 PROCEDURE — 84100 ASSAY OF PHOSPHORUS: CPT

## 2019-11-18 PROCEDURE — 82330 ASSAY OF CALCIUM: CPT

## 2019-11-18 PROCEDURE — 82570 ASSAY OF URINE CREATININE: CPT

## 2019-11-18 PROCEDURE — 82040 ASSAY OF SERUM ALBUMIN: CPT

## 2019-11-18 PROCEDURE — 85025 COMPLETE CBC W/AUTO DIFF WBC: CPT

## 2019-11-18 PROCEDURE — 83970 ASSAY OF PARATHORMONE: CPT

## 2019-11-18 PROCEDURE — 36415 COLL VENOUS BLD VENIPUNCTURE: CPT

## 2019-11-18 PROCEDURE — 82043 UR ALBUMIN QUANTITATIVE: CPT

## 2019-11-18 PROCEDURE — 84156 ASSAY OF PROTEIN URINE: CPT

## 2019-11-18 PROCEDURE — 80048 BASIC METABOLIC PNL TOTAL CA: CPT

## 2019-11-25 ENCOUNTER — HOSPITAL ENCOUNTER (OUTPATIENT)
Age: 54
Discharge: HOME | End: 2019-11-25
Payer: MEDICARE

## 2019-11-25 DIAGNOSIS — N18.4 CKD (CHRONIC KIDNEY DISEASE) STAGE 4, GFR 15-29 ML/MIN (HCC): ICD-10-CM

## 2019-11-25 LAB
ANION GAP SERPL CALCULATED.3IONS-SCNC: 14 MMOL/L (ref 9–17)
BUN BLDV-MCNC: 36 MG/DL (ref 6–20)
BUN/CREAT BLD: 13 (ref 9–20)
CALCIUM SERPL-MCNC: 9.3 MG/DL (ref 8.6–10.4)
CHLORIDE BLD-SCNC: 102 MMOL/L (ref 98–107)
CO2: 23 MMOL/L (ref 20–31)
CREAT SERPL-MCNC: 2.67 MG/DL (ref 0.5–0.9)
GFR AFRICAN AMERICAN: 23 ML/MIN
GFR NON-AFRICAN AMERICAN: 19 ML/MIN
GFR SERPL CREATININE-BSD FRML MDRD: ABNORMAL ML/MIN/{1.73_M2}
GFR SERPL CREATININE-BSD FRML MDRD: ABNORMAL ML/MIN/{1.73_M2}
GLUCOSE BLD-MCNC: 112 MG/DL (ref 70–99)
POTASSIUM SERPL-SCNC: 4.5 MMOL/L (ref 3.7–5.3)
SODIUM BLD-SCNC: 139 MMOL/L (ref 135–144)

## 2019-11-25 PROCEDURE — 36415 COLL VENOUS BLD VENIPUNCTURE: CPT

## 2019-11-25 PROCEDURE — 80048 BASIC METABOLIC PNL TOTAL CA: CPT

## 2019-12-03 ENCOUNTER — HOSPITAL ENCOUNTER (OUTPATIENT)
Age: 54
Discharge: HOME | End: 2019-12-03
Payer: MEDICARE

## 2019-12-03 DIAGNOSIS — N18.4 CKD (CHRONIC KIDNEY DISEASE) STAGE 4, GFR 15-29 ML/MIN (HCC): ICD-10-CM

## 2019-12-03 LAB
ANION GAP SERPL CALCULATED.3IONS-SCNC: 12 MMOL/L (ref 9–17)
BUN BLDV-MCNC: 36 MG/DL (ref 6–20)
BUN/CREAT BLD: 14 (ref 9–20)
CALCIUM SERPL-MCNC: 8.7 MG/DL (ref 8.6–10.4)
CHLORIDE BLD-SCNC: 104 MMOL/L (ref 98–107)
CO2: 24 MMOL/L (ref 20–31)
CREAT SERPL-MCNC: 2.59 MG/DL (ref 0.5–0.9)
GFR AFRICAN AMERICAN: 23 ML/MIN
GFR NON-AFRICAN AMERICAN: 19 ML/MIN
GFR SERPL CREATININE-BSD FRML MDRD: ABNORMAL ML/MIN/{1.73_M2}
GFR SERPL CREATININE-BSD FRML MDRD: ABNORMAL ML/MIN/{1.73_M2}
GLUCOSE BLD-MCNC: 105 MG/DL (ref 70–99)
POTASSIUM SERPL-SCNC: 3.9 MMOL/L (ref 3.7–5.3)
SODIUM BLD-SCNC: 140 MMOL/L (ref 135–144)

## 2019-12-03 PROCEDURE — 36415 COLL VENOUS BLD VENIPUNCTURE: CPT

## 2019-12-03 PROCEDURE — 80048 BASIC METABOLIC PNL TOTAL CA: CPT

## 2019-12-09 ENCOUNTER — HOSPITAL ENCOUNTER (OUTPATIENT)
Dept: INFUSION THERAPY | Age: 54
Discharge: HOME | End: 2019-12-09
Payer: MEDICARE

## 2019-12-09 VITALS
BODY MASS INDEX: 53 KG/M2 | HEIGHT: 62 IN | SYSTOLIC BLOOD PRESSURE: 153 MMHG | DIASTOLIC BLOOD PRESSURE: 95 MMHG | HEART RATE: 79 BPM | TEMPERATURE: 97.6 F | WEIGHT: 288 LBS | RESPIRATION RATE: 20 BRPM

## 2019-12-09 DIAGNOSIS — M31.7 MICROSCOPIC POLYANGIITIS (HCC): Primary | ICD-10-CM

## 2019-12-09 DIAGNOSIS — R76.8 SCL-70 ANTIBODY POSITIVE: ICD-10-CM

## 2019-12-09 PROCEDURE — 96413 CHEMO IV INFUSION 1 HR: CPT

## 2019-12-09 PROCEDURE — 6370000000 HC RX 637 (ALT 250 FOR IP): Performed by: INTERNAL MEDICINE

## 2019-12-09 PROCEDURE — 6360000002 HC RX W HCPCS: Performed by: INTERNAL MEDICINE

## 2019-12-09 PROCEDURE — 96415 CHEMO IV INFUSION ADDL HR: CPT

## 2019-12-09 PROCEDURE — 2580000003 HC RX 258: Performed by: INTERNAL MEDICINE

## 2019-12-09 PROCEDURE — 96375 TX/PRO/DX INJ NEW DRUG ADDON: CPT

## 2019-12-09 RX ORDER — DIPHENHYDRAMINE HYDROCHLORIDE 50 MG/ML
25 INJECTION INTRAMUSCULAR; INTRAVENOUS ONCE
Status: CANCELLED | OUTPATIENT
Start: 2019-12-09

## 2019-12-09 RX ORDER — METHYLPREDNISOLONE SODIUM SUCCINATE 125 MG/2ML
125 INJECTION, POWDER, LYOPHILIZED, FOR SOLUTION INTRAMUSCULAR; INTRAVENOUS ONCE
Status: CANCELLED | OUTPATIENT
Start: 2019-12-09

## 2019-12-09 RX ORDER — SODIUM CHLORIDE 0.9 % (FLUSH) 0.9 %
10 SYRINGE (ML) INJECTION PRN
Status: DISCONTINUED | OUTPATIENT
Start: 2019-12-09 | End: 2019-12-10 | Stop reason: HOSPADM

## 2019-12-09 RX ORDER — SODIUM CHLORIDE 9 MG/ML
INJECTION, SOLUTION INTRAVENOUS CONTINUOUS
Status: ACTIVE | OUTPATIENT
Start: 2019-12-09 | End: 2019-12-09

## 2019-12-09 RX ORDER — EPINEPHRINE 1 MG/ML
0.3 INJECTION, SOLUTION, CONCENTRATE INTRAVENOUS PRN
Status: CANCELLED | OUTPATIENT
Start: 2019-12-09

## 2019-12-09 RX ORDER — HEPARIN SODIUM (PORCINE) LOCK FLUSH IV SOLN 100 UNIT/ML 100 UNIT/ML
500 SOLUTION INTRAVENOUS PRN
Status: CANCELLED | OUTPATIENT
Start: 2019-12-09

## 2019-12-09 RX ORDER — 0.9 % SODIUM CHLORIDE 0.9 %
10 VIAL (ML) INJECTION ONCE
Status: CANCELLED | OUTPATIENT
Start: 2019-12-09

## 2019-12-09 RX ORDER — METHYLPREDNISOLONE SODIUM SUCCINATE 125 MG/2ML
100 INJECTION, POWDER, LYOPHILIZED, FOR SOLUTION INTRAMUSCULAR; INTRAVENOUS ONCE
Status: CANCELLED | OUTPATIENT
Start: 2019-12-09

## 2019-12-09 RX ORDER — DIPHENHYDRAMINE HYDROCHLORIDE 50 MG/ML
25 INJECTION INTRAMUSCULAR; INTRAVENOUS ONCE
Status: COMPLETED | OUTPATIENT
Start: 2019-12-09 | End: 2019-12-09

## 2019-12-09 RX ORDER — METHYLPREDNISOLONE SODIUM SUCCINATE 125 MG/2ML
100 INJECTION, POWDER, LYOPHILIZED, FOR SOLUTION INTRAMUSCULAR; INTRAVENOUS ONCE
Status: COMPLETED | OUTPATIENT
Start: 2019-12-09 | End: 2019-12-09

## 2019-12-09 RX ORDER — DIPHENHYDRAMINE HYDROCHLORIDE 50 MG/ML
50 INJECTION INTRAMUSCULAR; INTRAVENOUS ONCE
Status: CANCELLED | OUTPATIENT
Start: 2019-12-09

## 2019-12-09 RX ORDER — SODIUM CHLORIDE 9 MG/ML
INJECTION, SOLUTION INTRAVENOUS CONTINUOUS
Status: CANCELLED | OUTPATIENT
Start: 2019-12-09

## 2019-12-09 RX ORDER — ACETAMINOPHEN 325 MG/1
650 TABLET ORAL ONCE
Status: CANCELLED | OUTPATIENT
Start: 2019-12-09

## 2019-12-09 RX ORDER — SODIUM CHLORIDE 0.9 % (FLUSH) 0.9 %
10 SYRINGE (ML) INJECTION PRN
Status: CANCELLED | OUTPATIENT
Start: 2019-12-09

## 2019-12-09 RX ORDER — ACETAMINOPHEN 325 MG/1
650 TABLET ORAL ONCE
Status: COMPLETED | OUTPATIENT
Start: 2019-12-09 | End: 2019-12-09

## 2019-12-09 RX ORDER — SODIUM CHLORIDE 0.9 % (FLUSH) 0.9 %
5 SYRINGE (ML) INJECTION PRN
Status: CANCELLED | OUTPATIENT
Start: 2019-12-09

## 2019-12-09 RX ADMIN — METHYLPREDNISOLONE SODIUM SUCCINATE 100 MG: 125 INJECTION, POWDER, FOR SOLUTION INTRAMUSCULAR; INTRAVENOUS at 09:57

## 2019-12-09 RX ADMIN — RITUXIMAB 500 MG: 10 INJECTION, SOLUTION INTRAVENOUS at 10:17

## 2019-12-09 RX ADMIN — SODIUM CHLORIDE: 9 INJECTION, SOLUTION INTRAVENOUS at 09:30

## 2019-12-09 RX ADMIN — Medication 10 ML: at 09:30

## 2019-12-09 RX ADMIN — ACETAMINOPHEN 650 MG: 325 TABLET ORAL at 09:38

## 2019-12-09 RX ADMIN — DIPHENHYDRAMINE HYDROCHLORIDE 25 MG: 50 INJECTION, SOLUTION INTRAMUSCULAR; INTRAVENOUS at 09:58

## 2019-12-09 ASSESSMENT — PAIN SCALES - GENERAL
PAINLEVEL_OUTOF10: 0
PAINLEVEL_OUTOF10: 0

## 2019-12-10 ENCOUNTER — OFFICE VISIT (OUTPATIENT)
Dept: PRIMARY CARE CLINIC | Age: 54
End: 2019-12-10
Payer: MEDICARE

## 2019-12-10 VITALS
HEIGHT: 62 IN | WEIGHT: 288.6 LBS | TEMPERATURE: 98.3 F | DIASTOLIC BLOOD PRESSURE: 86 MMHG | RESPIRATION RATE: 16 BRPM | BODY MASS INDEX: 53.11 KG/M2 | HEART RATE: 95 BPM | SYSTOLIC BLOOD PRESSURE: 177 MMHG

## 2019-12-10 DIAGNOSIS — R60.0 LOWER LEG EDEMA: ICD-10-CM

## 2019-12-10 DIAGNOSIS — E21.3 HYPERPARATHYROIDISM (HCC): ICD-10-CM

## 2019-12-10 DIAGNOSIS — I10 ESSENTIAL HYPERTENSION: Primary | ICD-10-CM

## 2019-12-10 PROCEDURE — G8482 FLU IMMUNIZE ORDER/ADMIN: HCPCS | Performed by: NURSE PRACTITIONER

## 2019-12-10 PROCEDURE — G8417 CALC BMI ABV UP PARAM F/U: HCPCS | Performed by: NURSE PRACTITIONER

## 2019-12-10 PROCEDURE — G8427 DOCREV CUR MEDS BY ELIG CLIN: HCPCS | Performed by: NURSE PRACTITIONER

## 2019-12-10 PROCEDURE — 3017F COLORECTAL CA SCREEN DOC REV: CPT | Performed by: NURSE PRACTITIONER

## 2019-12-10 PROCEDURE — 1036F TOBACCO NON-USER: CPT | Performed by: NURSE PRACTITIONER

## 2019-12-10 PROCEDURE — 99214 OFFICE O/P EST MOD 30 MIN: CPT | Performed by: NURSE PRACTITIONER

## 2019-12-10 RX ORDER — HYDRALAZINE HYDROCHLORIDE 25 MG/1
25 TABLET, FILM COATED ORAL 3 TIMES DAILY
Qty: 270 TABLET | Refills: 0 | Status: SHIPPED | OUTPATIENT
Start: 2019-12-10 | End: 2019-12-13 | Stop reason: SDUPTHER

## 2019-12-10 ASSESSMENT — ENCOUNTER SYMPTOMS
RHINORRHEA: 0
ORTHOPNEA: 0
ABDOMINAL PAIN: 0
BLURRED VISION: 0
DIARRHEA: 0
NAUSEA: 0
COUGH: 0
SORE THROAT: 0
WHEEZING: 0
VOMITING: 0
CONSTIPATION: 0
SHORTNESS OF BREATH: 0

## 2019-12-13 ENCOUNTER — CLINICAL DOCUMENTATION (OUTPATIENT)
Dept: PRIMARY CARE CLINIC | Age: 54
End: 2019-12-13

## 2019-12-13 ENCOUNTER — HOSPITAL ENCOUNTER (EMERGENCY)
Age: 54
Discharge: HOME | End: 2019-12-13
Attending: EMERGENCY MEDICINE
Payer: MEDICARE

## 2019-12-13 VITALS
RESPIRATION RATE: 16 BRPM | WEIGHT: 280 LBS | BODY MASS INDEX: 51.21 KG/M2 | TEMPERATURE: 96.4 F | DIASTOLIC BLOOD PRESSURE: 116 MMHG | HEART RATE: 78 BPM | SYSTOLIC BLOOD PRESSURE: 186 MMHG | OXYGEN SATURATION: 98 %

## 2019-12-13 DIAGNOSIS — I10 ESSENTIAL HYPERTENSION: Primary | ICD-10-CM

## 2019-12-13 LAB
ABSOLUTE EOS #: 0.06 K/UL (ref 0–0.44)
ABSOLUTE IMMATURE GRANULOCYTE: 0.04 K/UL (ref 0–0.3)
ABSOLUTE LYMPH #: 0.7 K/UL (ref 1.1–3.7)
ABSOLUTE MONO #: 0.51 K/UL (ref 0.1–1.2)
ALBUMIN SERPL-MCNC: 3.3 G/DL (ref 3.5–5.2)
ALBUMIN/GLOBULIN RATIO: 1 (ref 1–2.5)
ALP BLD-CCNC: 106 U/L (ref 35–104)
ALT SERPL-CCNC: 12 U/L (ref 5–33)
ANION GAP SERPL CALCULATED.3IONS-SCNC: 10 MMOL/L (ref 9–17)
AST SERPL-CCNC: 16 U/L
BASOPHILS # BLD: 0 % (ref 0–2)
BASOPHILS ABSOLUTE: 0.03 K/UL (ref 0–0.2)
BILIRUB SERPL-MCNC: 0.18 MG/DL (ref 0.3–1.2)
BUN BLDV-MCNC: 36 MG/DL (ref 6–20)
BUN/CREAT BLD: 12 (ref 9–20)
CALCIUM SERPL-MCNC: 8.6 MG/DL (ref 8.6–10.4)
CHLORIDE BLD-SCNC: 99 MMOL/L (ref 98–107)
CO2: 24 MMOL/L (ref 20–31)
CREAT SERPL-MCNC: 3.13 MG/DL (ref 0.5–0.9)
DIFFERENTIAL TYPE: ABNORMAL
EKG ATRIAL RATE: 86 BPM
EKG P AXIS: 22 DEGREES
EKG P-R INTERVAL: 162 MS
EKG Q-T INTERVAL: 378 MS
EKG QRS DURATION: 72 MS
EKG QTC CALCULATION (BAZETT): 452 MS
EKG R AXIS: 3 DEGREES
EKG T AXIS: 22 DEGREES
EKG VENTRICULAR RATE: 86 BPM
EOSINOPHILS RELATIVE PERCENT: 1 % (ref 1–4)
GFR AFRICAN AMERICAN: 19 ML/MIN
GFR NON-AFRICAN AMERICAN: 15 ML/MIN
GFR SERPL CREATININE-BSD FRML MDRD: ABNORMAL ML/MIN/{1.73_M2}
GFR SERPL CREATININE-BSD FRML MDRD: ABNORMAL ML/MIN/{1.73_M2}
GLUCOSE BLD-MCNC: 103 MG/DL (ref 70–99)
HCT VFR BLD CALC: 34.3 % (ref 36.3–47.1)
HEMOGLOBIN: 11.3 G/DL (ref 11.9–15.1)
IMMATURE GRANULOCYTES: 0 %
LYMPHOCYTES # BLD: 8 % (ref 24–43)
MCH RBC QN AUTO: 30.6 PG (ref 25.2–33.5)
MCHC RBC AUTO-ENTMCNC: 32.9 G/DL (ref 28.4–34.8)
MCV RBC AUTO: 93 FL (ref 82.6–102.9)
MONOCYTES # BLD: 6 % (ref 3–12)
NRBC AUTOMATED: 0 PER 100 WBC
PDW BLD-RTO: 13.2 % (ref 11.8–14.4)
PLATELET # BLD: 288 K/UL (ref 138–453)
PLATELET ESTIMATE: ABNORMAL
PMV BLD AUTO: 10.3 FL (ref 8.1–13.5)
POTASSIUM SERPL-SCNC: 4 MMOL/L (ref 3.7–5.3)
RBC # BLD: 3.69 M/UL (ref 3.95–5.11)
RBC # BLD: ABNORMAL 10*6/UL
SEG NEUTROPHILS: 85 % (ref 36–65)
SEGMENTED NEUTROPHILS ABSOLUTE COUNT: 7.67 K/UL (ref 1.5–8.1)
SODIUM BLD-SCNC: 133 MMOL/L (ref 135–144)
TOTAL PROTEIN: 6.6 G/DL (ref 6.4–8.3)
WBC # BLD: 9 K/UL (ref 3.5–11.3)
WBC # BLD: ABNORMAL 10*3/UL

## 2019-12-13 PROCEDURE — 36415 COLL VENOUS BLD VENIPUNCTURE: CPT

## 2019-12-13 PROCEDURE — 85025 COMPLETE CBC W/AUTO DIFF WBC: CPT

## 2019-12-13 PROCEDURE — 93005 ELECTROCARDIOGRAM TRACING: CPT | Performed by: EMERGENCY MEDICINE

## 2019-12-13 PROCEDURE — 96374 THER/PROPH/DIAG INJ IV PUSH: CPT

## 2019-12-13 PROCEDURE — 99283 EMERGENCY DEPT VISIT LOW MDM: CPT

## 2019-12-13 PROCEDURE — 80053 COMPREHEN METABOLIC PANEL: CPT

## 2019-12-13 PROCEDURE — 2500000003 HC RX 250 WO HCPCS: Performed by: EMERGENCY MEDICINE

## 2019-12-13 PROCEDURE — 93010 ELECTROCARDIOGRAM REPORT: CPT | Performed by: INTERNAL MEDICINE

## 2019-12-13 RX ORDER — HYDRALAZINE HYDROCHLORIDE 25 MG/1
50 TABLET, FILM COATED ORAL 3 TIMES DAILY
Qty: 270 TABLET | Refills: 0 | Status: SHIPPED | OUTPATIENT
Start: 2019-12-13 | End: 2020-02-03 | Stop reason: SDUPTHER

## 2019-12-13 RX ORDER — METOPROLOL TARTRATE 5 MG/5ML
5 INJECTION INTRAVENOUS ONCE
Status: COMPLETED | OUTPATIENT
Start: 2019-12-13 | End: 2019-12-13

## 2019-12-13 RX ADMIN — METOPROLOL TARTRATE 5 MG: 5 INJECTION INTRAVENOUS at 16:46

## 2019-12-13 ASSESSMENT — ENCOUNTER SYMPTOMS
SHORTNESS OF BREATH: 0
EYE PAIN: 0
ABDOMINAL PAIN: 0

## 2019-12-13 ASSESSMENT — PAIN DESCRIPTION - LOCATION: LOCATION: GENERALIZED

## 2019-12-13 ASSESSMENT — PAIN DESCRIPTION - PAIN TYPE: TYPE: ACUTE PAIN

## 2019-12-16 ENCOUNTER — TELEPHONE (OUTPATIENT)
Dept: PRIMARY CARE CLINIC | Age: 54
End: 2019-12-16

## 2019-12-20 ENCOUNTER — HOSPITAL ENCOUNTER (OUTPATIENT)
Dept: NUTRITION | Age: 54
Discharge: HOME | End: 2019-12-20
Payer: MEDICARE

## 2019-12-20 VITALS — HEIGHT: 62 IN | BODY MASS INDEX: 52.84 KG/M2 | WEIGHT: 287.13 LBS

## 2019-12-20 PROCEDURE — 97802 MEDICAL NUTRITION INDIV IN: CPT

## 2019-12-24 ENCOUNTER — NURSE ONLY (OUTPATIENT)
Dept: PRIMARY CARE CLINIC | Age: 54
End: 2019-12-24

## 2019-12-24 VITALS
DIASTOLIC BLOOD PRESSURE: 57 MMHG | HEART RATE: 90 BPM | SYSTOLIC BLOOD PRESSURE: 117 MMHG | WEIGHT: 286.4 LBS | BODY MASS INDEX: 52.38 KG/M2 | RESPIRATION RATE: 22 BRPM

## 2019-12-24 DIAGNOSIS — Z01.30 BLOOD PRESSURE CHECK: Primary | ICD-10-CM

## 2019-12-26 ENCOUNTER — OFFICE VISIT (OUTPATIENT)
Dept: PRIMARY CARE CLINIC | Age: 54
End: 2019-12-26
Payer: MEDICARE

## 2019-12-26 VITALS
HEART RATE: 98 BPM | BODY MASS INDEX: 52.26 KG/M2 | TEMPERATURE: 97.6 F | WEIGHT: 285.7 LBS | SYSTOLIC BLOOD PRESSURE: 138 MMHG | RESPIRATION RATE: 22 BRPM | DIASTOLIC BLOOD PRESSURE: 80 MMHG

## 2019-12-26 DIAGNOSIS — R05.9 COUGH: ICD-10-CM

## 2019-12-26 DIAGNOSIS — J20.9 BRONCHITIS, ACUTE, WITH BRONCHOSPASM: Primary | ICD-10-CM

## 2019-12-26 LAB
INFLUENZA A ANTIBODY: NORMAL
INFLUENZA B ANTIBODY: NORMAL

## 2019-12-26 PROCEDURE — G8482 FLU IMMUNIZE ORDER/ADMIN: HCPCS | Performed by: NURSE PRACTITIONER

## 2019-12-26 PROCEDURE — 3017F COLORECTAL CA SCREEN DOC REV: CPT | Performed by: NURSE PRACTITIONER

## 2019-12-26 PROCEDURE — 87804 INFLUENZA ASSAY W/OPTIC: CPT | Performed by: NURSE PRACTITIONER

## 2019-12-26 PROCEDURE — 1036F TOBACCO NON-USER: CPT | Performed by: NURSE PRACTITIONER

## 2019-12-26 PROCEDURE — G8417 CALC BMI ABV UP PARAM F/U: HCPCS | Performed by: NURSE PRACTITIONER

## 2019-12-26 PROCEDURE — 99213 OFFICE O/P EST LOW 20 MIN: CPT | Performed by: NURSE PRACTITIONER

## 2019-12-26 PROCEDURE — G8427 DOCREV CUR MEDS BY ELIG CLIN: HCPCS | Performed by: NURSE PRACTITIONER

## 2019-12-26 RX ORDER — DOXYCYCLINE HYCLATE 100 MG
100 TABLET ORAL 2 TIMES DAILY
Qty: 20 TABLET | Refills: 0 | Status: SHIPPED | OUTPATIENT
Start: 2019-12-26 | End: 2020-01-05

## 2019-12-26 ASSESSMENT — ENCOUNTER SYMPTOMS
SORE THROAT: 1
SHORTNESS OF BREATH: 1
WHEEZING: 1
COUGH: 1
GASTROINTESTINAL NEGATIVE: 1
ALLERGIC/IMMUNOLOGIC NEGATIVE: 1
CHEST TIGHTNESS: 1
EYES NEGATIVE: 1

## 2019-12-30 RX ORDER — BUTALBITAL, ACETAMINOPHEN AND CAFFEINE 50; 325; 40 MG/1; MG/1; MG/1
1 TABLET ORAL EVERY 6 HOURS PRN
Qty: 120 TABLET | Refills: 0 | Status: SHIPPED | OUTPATIENT
Start: 2019-12-30 | End: 2020-06-23 | Stop reason: SDUPTHER

## 2020-02-03 RX ORDER — HYDRALAZINE HYDROCHLORIDE 50 MG/1
50 TABLET, FILM COATED ORAL 3 TIMES DAILY
Qty: 270 TABLET | Refills: 1 | Status: SHIPPED | OUTPATIENT
Start: 2020-02-03 | End: 2020-08-31

## 2020-02-03 RX ORDER — ATORVASTATIN CALCIUM 80 MG/1
TABLET, FILM COATED ORAL
Qty: 90 TABLET | Refills: 1 | Status: SHIPPED | OUTPATIENT
Start: 2020-02-03 | End: 2020-08-03

## 2020-02-03 NOTE — TELEPHONE ENCOUNTER
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)     S/P DOMO-BSO     Endometrial hyperplasia without atypia, simple     Postural vertigo     Morbid obesity (HCC)     Allergic rhinitis     Iron deficiency anemia     Hyperlipidemia     Urinary frequency     MGUS (monoclonal gammopathy of unknown significance)     Anti-cyclic citrullinated peptide antibody positive     Bilateral hip pain     Bilateral knee pain     Cancer of uterus (HCC)     Elevated C-reactive protein (CRP)     Elevated white blood cell count, unspecified     Essential thrombocythemia (HCC)     Greater trochanteric bursitis of both hips     Microscopic polyangiitis (HCC)     Osteoarthritis of right acromioclavicular joint     Osteoarthritis of sacroiliac joint     Other intervertebral disc degeneration, thoracic region     Patient nonadherence     Pes anserinus bursitis of both knees     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

## 2020-02-11 ENCOUNTER — HOSPITAL ENCOUNTER (OUTPATIENT)
Age: 55
Discharge: HOME | End: 2020-02-11
Payer: MEDICARE

## 2020-02-11 LAB
ANION GAP SERPL CALCULATED.3IONS-SCNC: 16 MMOL/L (ref 9–17)
BUN BLDV-MCNC: 34 MG/DL (ref 6–20)
BUN/CREAT BLD: 10 (ref 9–20)
CALCIUM IONIZED: 1.28 MMOL/L (ref 1.13–1.33)
CALCIUM SERPL-MCNC: 9 MG/DL (ref 8.6–10.4)
CHLORIDE BLD-SCNC: 100 MMOL/L (ref 98–107)
CO2: 22 MMOL/L (ref 20–31)
CREAT SERPL-MCNC: 3.3 MG/DL (ref 0.5–0.9)
CREATININE URINE: 80.1 MG/DL (ref 28–217)
GFR AFRICAN AMERICAN: 18 ML/MIN
GFR NON-AFRICAN AMERICAN: 15 ML/MIN
GFR SERPL CREATININE-BSD FRML MDRD: ABNORMAL ML/MIN/{1.73_M2}
GFR SERPL CREATININE-BSD FRML MDRD: ABNORMAL ML/MIN/{1.73_M2}
GLUCOSE BLD-MCNC: 127 MG/DL (ref 70–99)
POTASSIUM SERPL-SCNC: 4 MMOL/L (ref 3.7–5.3)
PTH INTACT: 69.06 PG/ML (ref 15–65)
SODIUM BLD-SCNC: 138 MMOL/L (ref 135–144)
TOTAL PROTEIN, URINE: 189 MG/DL
VITAMIN D 25-HYDROXY: 21.7 NG/ML (ref 30–100)

## 2020-02-11 PROCEDURE — 36415 COLL VENOUS BLD VENIPUNCTURE: CPT

## 2020-02-11 PROCEDURE — 80048 BASIC METABOLIC PNL TOTAL CA: CPT

## 2020-02-11 PROCEDURE — 84156 ASSAY OF PROTEIN URINE: CPT

## 2020-02-11 PROCEDURE — 82306 VITAMIN D 25 HYDROXY: CPT

## 2020-02-11 PROCEDURE — 82330 ASSAY OF CALCIUM: CPT

## 2020-02-11 PROCEDURE — 82570 ASSAY OF URINE CREATININE: CPT

## 2020-02-11 PROCEDURE — 83970 ASSAY OF PARATHORMONE: CPT

## 2020-02-19 ENCOUNTER — HOSPITAL ENCOUNTER (OUTPATIENT)
Age: 55
Discharge: HOME | End: 2020-02-19
Payer: MEDICARE

## 2020-02-19 LAB
ANION GAP SERPL CALCULATED.3IONS-SCNC: 16 MMOL/L (ref 9–17)
BUN BLDV-MCNC: 28 MG/DL (ref 6–20)
BUN/CREAT BLD: 10 (ref 9–20)
CALCIUM SERPL-MCNC: 8 MG/DL (ref 8.6–10.4)
CHLORIDE BLD-SCNC: 99 MMOL/L (ref 98–107)
CO2: 22 MMOL/L (ref 20–31)
CREAT SERPL-MCNC: 2.82 MG/DL (ref 0.5–0.9)
GFR AFRICAN AMERICAN: 21 ML/MIN
GFR NON-AFRICAN AMERICAN: 17 ML/MIN
GFR SERPL CREATININE-BSD FRML MDRD: ABNORMAL ML/MIN/{1.73_M2}
GFR SERPL CREATININE-BSD FRML MDRD: ABNORMAL ML/MIN/{1.73_M2}
GLUCOSE BLD-MCNC: 153 MG/DL (ref 70–99)
POTASSIUM SERPL-SCNC: 3.7 MMOL/L (ref 3.7–5.3)
SODIUM BLD-SCNC: 137 MMOL/L (ref 135–144)

## 2020-02-19 PROCEDURE — 80048 BASIC METABOLIC PNL TOTAL CA: CPT

## 2020-02-19 PROCEDURE — 36415 COLL VENOUS BLD VENIPUNCTURE: CPT

## 2020-02-24 ENCOUNTER — TELEPHONE (OUTPATIENT)
Dept: ONCOLOGY | Age: 55
End: 2020-02-24

## 2020-02-24 NOTE — TELEPHONE ENCOUNTER
I had received a vmail stating from the  that they needed to talk to someone about her appointment tried to reach out to the patients  and no response waiting on a call back.

## 2020-03-06 ENCOUNTER — HOSPITAL ENCOUNTER (OUTPATIENT)
Age: 55
Discharge: HOME | End: 2020-03-06
Payer: MEDICARE

## 2020-03-06 LAB
ANION GAP SERPL CALCULATED.3IONS-SCNC: 14 MMOL/L (ref 9–17)
BUN BLDV-MCNC: 26 MG/DL (ref 6–20)
BUN/CREAT BLD: 8 (ref 9–20)
CALCIUM SERPL-MCNC: 8.7 MG/DL (ref 8.6–10.4)
CHLORIDE BLD-SCNC: 101 MMOL/L (ref 98–107)
CO2: 23 MMOL/L (ref 20–31)
CREAT SERPL-MCNC: 3.42 MG/DL (ref 0.5–0.9)
GFR AFRICAN AMERICAN: 17 ML/MIN
GFR NON-AFRICAN AMERICAN: 14 ML/MIN
GFR SERPL CREATININE-BSD FRML MDRD: ABNORMAL ML/MIN/{1.73_M2}
GFR SERPL CREATININE-BSD FRML MDRD: ABNORMAL ML/MIN/{1.73_M2}
GLUCOSE BLD-MCNC: 139 MG/DL (ref 70–99)
POTASSIUM SERPL-SCNC: 3.7 MMOL/L (ref 3.7–5.3)
SODIUM BLD-SCNC: 138 MMOL/L (ref 135–144)

## 2020-03-06 PROCEDURE — 36415 COLL VENOUS BLD VENIPUNCTURE: CPT

## 2020-03-06 PROCEDURE — 80048 BASIC METABOLIC PNL TOTAL CA: CPT

## 2020-03-10 ENCOUNTER — OFFICE VISIT (OUTPATIENT)
Dept: PRIMARY CARE CLINIC | Age: 55
End: 2020-03-10
Payer: MEDICARE

## 2020-03-10 VITALS
HEIGHT: 62 IN | HEART RATE: 93 BPM | SYSTOLIC BLOOD PRESSURE: 119 MMHG | DIASTOLIC BLOOD PRESSURE: 62 MMHG | WEIGHT: 274.7 LBS | RESPIRATION RATE: 16 BRPM | TEMPERATURE: 97.7 F | BODY MASS INDEX: 50.55 KG/M2

## 2020-03-10 PROBLEM — F33.9 EPISODE OF RECURRENT MAJOR DEPRESSIVE DISORDER (HCC): Status: ACTIVE | Noted: 2020-03-10

## 2020-03-10 PROCEDURE — 3017F COLORECTAL CA SCREEN DOC REV: CPT | Performed by: NURSE PRACTITIONER

## 2020-03-10 PROCEDURE — G8427 DOCREV CUR MEDS BY ELIG CLIN: HCPCS | Performed by: NURSE PRACTITIONER

## 2020-03-10 PROCEDURE — 1036F TOBACCO NON-USER: CPT | Performed by: NURSE PRACTITIONER

## 2020-03-10 PROCEDURE — G8417 CALC BMI ABV UP PARAM F/U: HCPCS | Performed by: NURSE PRACTITIONER

## 2020-03-10 PROCEDURE — 99214 OFFICE O/P EST MOD 30 MIN: CPT | Performed by: NURSE PRACTITIONER

## 2020-03-10 PROCEDURE — G8482 FLU IMMUNIZE ORDER/ADMIN: HCPCS | Performed by: NURSE PRACTITIONER

## 2020-03-10 ASSESSMENT — ENCOUNTER SYMPTOMS
BLURRED VISION: 0
DIARRHEA: 0
VOMITING: 0
CONSTIPATION: 0
COUGH: 0
ORTHOPNEA: 0
RHINORRHEA: 0
SHORTNESS OF BREATH: 0
SORE THROAT: 0
ABDOMINAL PAIN: 0
NAUSEA: 0
WHEEZING: 0

## 2020-03-10 NOTE — PATIENT INSTRUCTIONS
SURVEY:    You may be receiving a survey from [employer] regarding your visit today. Please complete the survey to enable us to provide the highest quality of care to you and your family. If you cannot score us a very good on any question, please call the office to discuss how we could have made your experience a very good one. Thank you. Patient Education        High Blood Pressure: Care Instructions  Overview    It's normal for blood pressure to go up and down throughout the day. But if it stays up, you have high blood pressure. Another name for high blood pressure is hypertension. Despite what a lot of people think, high blood pressure usually doesn't cause headaches or make you feel dizzy or lightheaded. It usually has no symptoms. But it does increase your risk of stroke, heart attack, and other problems. You and your doctor will talk about your risks of these problems based on your blood pressure. Your doctor will give you a goal for your blood pressure. Your goal will be based on your health and your age. Lifestyle changes, such as eating healthy and being active, are always important to help lower blood pressure. You might also take medicine to reach your blood pressure goal.  Follow-up care is a key part of your treatment and safety. Be sure to make and go to all appointments, and call your doctor if you are having problems. It's also a good idea to know your test results and keep a list of the medicines you take. How can you care for yourself at home? Medical treatment  · If you stop taking your medicine, your blood pressure will go back up. You may take one or more types of medicine to lower your blood pressure. Be safe with medicines. Take your medicine exactly as prescribed. Call your doctor if you think you are having a problem with your medicine. · Talk to your doctor before you start taking aspirin every day.  Aspirin can help certain people lower their risk of a heart attack or

## 2020-03-10 NOTE — PROGRESS NOTES
History:     Past Medical History:   Diagnosis Date    JESS [facility]) ([facility]     Anemia     Arthritis     Chronic kidney disease     Depression with anxiety 9/8/2016    GERD (gastroesophageal reflux disease)     H/O echocardiogram 08/25/2016    EF 55%. Mild mitral regurgitation.  H/O tooth extraction 07/26/2017    Lower posterior right tooth.  History of blood transfusion     x3. Last one in May 2016    Hyperlipidemia     Hypertensive crisis 8/24/2016    Kidney stones     Sciatica     Vasculitis ([facility]       Reviewed all health maintenance requirements and ordered appropriate tests  There are no preventive care reminders to display for this patient. Past Surgical History:     Past Surgical History:   Procedure Laterality Date    BONE MARROW BIOPSY  5-23-16    Per Hematologist order @ [facility] ([facility]  CHOLECYSTECTOMY      COLONOSCOPY  2016    ESOPHAGEAL DILATATION  9/12/2018    ESOPHAGEAL DILATATION performed by Donald Cage MD at [address] [place]  11/09/2016    with tubes and ovaries    TOOTH EXTRACTION  07/26/2017    Right posterior lower tooth.  TUBAL LIGATION      UPPER GASTROINTESTINAL ENDOSCOPY  09/12/2018    -dilation,bx(Chicas's esophagus,neg H-Pylori)hiatal hernia,grade 2 reflux esophagitis    UPPER GASTROINTESTINAL ENDOSCOPY N/A 9/12/2018    EGD BIOPSY performed by Donald Cage MD at [address]        Medications:       Prior to Admission medications    Medication Sig Start Date End Date Taking?  Authorizing Provider   furosemide (LASIX) 20 MG tablet Take 20 mg by mouth 2 times daily   Yes Historical Provider, MD   dilTIAZem (CARDIZEM CD) 120 MG extended release capsule TAKE 1 CAPSULE BY MOUTH ONCE DAILY 2/21/20  Yes Luis Aguilera MD   estrogens, conjugated, (PREMARIN) 0.625 MG tablet Take 1 tablet by mouth daily 2/20/20  Yes ARISTIDES Denis - SHELLIE   hydrALAZINE (APRESOLINE) 50 MG tablet Take 1 tablet by mouth 3 times daily 2/3/20  Yes Yvette Greene for fatigue. Negative for chills, fever and malaise/fatigue. HENT: Negative for congestion, rhinorrhea and sore throat. Eyes: Negative for blurred vision and visual disturbance. Respiratory: Negative for cough, shortness of breath and wheezing. Cardiovascular: Positive for leg swelling. Negative for chest pain, palpitations, orthopnea and PND. Gastrointestinal: Negative for abdominal pain, constipation, diarrhea, nausea and vomiting. Genitourinary: Negative for difficulty urinating and dysuria. Musculoskeletal: Negative for gait problem and neck pain. Skin: Negative for rash. Neurological: Negative for dizziness, syncope, light-headedness and headaches. Physical Exam:   Vitals:  /62 (Site: Right Upper Arm, Position: Sitting, Cuff Size: Large Adult)   Pulse 93   Temp 97.7 °F (36.5 °C) (Temporal)   Resp 16   Ht 5' 2\" (1.575 m)   Wt 274 lb 11.2 oz (124.6 kg)   LMP 09/09/2016   BMI 50.24 kg/m²     Physical Exam  Vitals signs and nursing note reviewed. Constitutional:       General: She is not in acute distress. Appearance: She is obese. HENT:      Mouth/Throat:      Mouth: Mucous membranes are moist.   Eyes:      Conjunctiva/sclera: Conjunctivae normal.   Neck:      Musculoskeletal: Normal range of motion and neck supple. Cardiovascular:      Rate and Rhythm: Normal rate and regular rhythm. Pulmonary:      Effort: Pulmonary effort is normal.      Breath sounds: Normal breath sounds. No wheezing or rales. Abdominal:      General: Bowel sounds are normal. There is no distension. Palpations: Abdomen is soft. Tenderness: There is no abdominal tenderness. Musculoskeletal:      Right lower leg: Edema (trace) present. Left lower leg: Edema (trace) present. Lymphadenopathy:      Cervical: No cervical adenopathy. Skin:     General: Skin is warm and dry. Findings: No rash.    Neurological:      Mental Status: She is alert and oriented to person, place, exercise. Completed Refills   Requested Prescriptions      No prescriptions requested or ordered in this encounter         Return in about 6 months (around 9/10/2020) for check up.

## 2020-04-02 RX ORDER — POTASSIUM CHLORIDE 20 MEQ/1
20 TABLET, EXTENDED RELEASE ORAL DAILY
Qty: 30 TABLET | Refills: 11 | Status: SHIPPED | OUTPATIENT
Start: 2020-04-02 | End: 2022-08-01

## 2020-05-04 RX ORDER — DULOXETIN HYDROCHLORIDE 30 MG/1
CAPSULE, DELAYED RELEASE ORAL
Qty: 90 CAPSULE | Refills: 3 | Status: SHIPPED | OUTPATIENT
Start: 2020-05-04 | End: 2021-06-04

## 2020-05-21 RX ORDER — CONJUGATED ESTROGENS 0.62 MG/1
TABLET, FILM COATED ORAL
Qty: 21 TABLET | Refills: 0 | OUTPATIENT
Start: 2020-05-21

## 2020-05-22 RX ORDER — PANTOPRAZOLE SODIUM 40 MG/1
TABLET, DELAYED RELEASE ORAL
Qty: 60 TABLET | Refills: 0 | OUTPATIENT
Start: 2020-05-22

## 2020-05-26 RX ORDER — PANTOPRAZOLE SODIUM 40 MG/1
TABLET, DELAYED RELEASE ORAL
Qty: 180 TABLET | Refills: 0 | Status: SHIPPED | OUTPATIENT
Start: 2020-05-26 | End: 2020-08-11

## 2020-05-26 NOTE — TELEPHONE ENCOUNTER
Dr. Asif Becerra writes Kathrin's pantoprazole 40mg BID script, he is out on sick leave until the end of June, would you be willing to write her a script for 90 days to [facility]   Topic Date Due    Lipid screen  05/07/2020    HIV screen  03/10/2021 (Originally 9/5/1980)    A1C test (Diabetic or Prediabetic)  09/06/2020    Potassium monitoring  03/06/2021    Creatinine monitoring  03/06/2021    Breast cancer screen  10/06/2021    Pneumococcal 0-64 years Vaccine (3 of 3 - PPSV23) 05/10/2022    Colon cancer screen colonoscopy  05/02/2026    DTaP/Tdap/Td vaccine (2 - Td) 09/06/2028    Flu vaccine  Completed    Shingles Vaccine  Completed    Hepatitis C screen  Completed    Hepatitis A vaccine  Aged Out    Hepatitis B vaccine  Aged Out    Hib vaccine  Aged Out    Meningococcal (ACWY) vaccine  Aged Out             (applicable per patient's age: Cancer Screenings, Depression Screening, Fall Risk Screening, Immunizations)    Hemoglobin A1C (%)   Date Value   09/06/2019 5.8   09/06/2018 6.1   05/15/2018 5.5     Microalb/Crt.  Ratio (mcg/mg creat)   Date Value   11/18/2019 3,897 (H)     LDL Cholesterol (mg/dL)   Date Value   05/07/2019 99     AST (U/L)   Date Value   12/13/2019 16     ALT (U/L)   Date Value   12/13/2019 12     BUN (mg/dL)   Date Value   03/06/2020 26 (H)      (goal A1C is < 7)   (goal LDL is <100) need 30-50% reduction from baseline     BP Readings from Last 3 Encounters:   03/10/20 119/62   02/24/20 (!) 153/89   12/26/19 138/80    (goal /80)      All Future Testing planned in [facility]:  Lab Frequency Next Occurrence   CBC Auto Differential Once 09/06/2020   ALT Once 09/10/2020   AST Once 81/43/7125   Basic Metabolic Panel Once 61/54/0066   Lipid Panel Once 09/06/2020   Transfuse RBC TRANSFUSE 1 UNIT        Next Visit Date:  Future Appointments   Date Time Provider Tom Oneil   6/9/2020  9:00 AM SCHEDULE, [facility] MED [facility] ROOM 9 [facility] [place]   9/10/2020 10:00 AM

## 2020-06-09 ENCOUNTER — HOSPITAL ENCOUNTER (OUTPATIENT)
Dept: INFUSION THERAPY | Age: 55
Discharge: HOME | End: 2020-06-09
Payer: MEDICARE

## 2020-06-09 VITALS
SYSTOLIC BLOOD PRESSURE: 142 MMHG | RESPIRATION RATE: 18 BRPM | WEIGHT: 275 LBS | BODY MASS INDEX: 50.61 KG/M2 | DIASTOLIC BLOOD PRESSURE: 66 MMHG | HEART RATE: 84 BPM | HEIGHT: 62 IN | TEMPERATURE: 97.8 F

## 2020-06-09 DIAGNOSIS — R76.8 SCL-70 ANTIBODY POSITIVE: ICD-10-CM

## 2020-06-09 DIAGNOSIS — M31.7 MICROSCOPIC POLYANGIITIS (HCC): Primary | ICD-10-CM

## 2020-06-09 PROCEDURE — 6370000000 HC RX 637 (ALT 250 FOR IP): Performed by: INTERNAL MEDICINE

## 2020-06-09 PROCEDURE — 96415 CHEMO IV INFUSION ADDL HR: CPT

## 2020-06-09 PROCEDURE — 96413 CHEMO IV INFUSION 1 HR: CPT

## 2020-06-09 PROCEDURE — 6360000002 HC RX W HCPCS: Performed by: INTERNAL MEDICINE

## 2020-06-09 PROCEDURE — 96375 TX/PRO/DX INJ NEW DRUG ADDON: CPT

## 2020-06-09 PROCEDURE — 2580000003 HC RX 258: Performed by: INTERNAL MEDICINE

## 2020-06-09 RX ORDER — SODIUM CHLORIDE 9 MG/ML
INJECTION, SOLUTION INTRAVENOUS CONTINUOUS
Status: CANCELLED
Start: 2020-12-09

## 2020-06-09 RX ORDER — SODIUM CHLORIDE 9 MG/ML
INJECTION, SOLUTION INTRAVENOUS CONTINUOUS
Status: DISCONTINUED | OUTPATIENT
Start: 2020-06-09 | End: 2020-06-10 | Stop reason: HOSPADM

## 2020-06-09 RX ORDER — 0.9 % SODIUM CHLORIDE 0.9 %
10 VIAL (ML) INJECTION ONCE
Status: CANCELLED | OUTPATIENT
Start: 2020-06-09

## 2020-06-09 RX ORDER — DIPHENHYDRAMINE HYDROCHLORIDE 50 MG/ML
25 INJECTION INTRAMUSCULAR; INTRAVENOUS ONCE
Status: CANCELLED
Start: 2020-12-09

## 2020-06-09 RX ORDER — ACETAMINOPHEN 325 MG/1
650 TABLET ORAL ONCE
Status: CANCELLED
Start: 2020-06-09

## 2020-06-09 RX ORDER — METHYLPREDNISOLONE SODIUM SUCCINATE 125 MG/2ML
100 INJECTION, POWDER, LYOPHILIZED, FOR SOLUTION INTRAMUSCULAR; INTRAVENOUS ONCE
Status: CANCELLED
Start: 2020-12-09

## 2020-06-09 RX ORDER — SODIUM CHLORIDE 9 MG/ML
INJECTION, SOLUTION INTRAVENOUS CONTINUOUS
Status: CANCELLED
Start: 2020-06-09

## 2020-06-09 RX ORDER — SODIUM CHLORIDE 0.9 % (FLUSH) 0.9 %
10 SYRINGE (ML) INJECTION PRN
Status: CANCELLED | OUTPATIENT
Start: 2020-06-09

## 2020-06-09 RX ORDER — ACETAMINOPHEN 325 MG/1
650 TABLET ORAL ONCE
Status: CANCELLED
Start: 2020-12-09

## 2020-06-09 RX ORDER — METHYLPREDNISOLONE SODIUM SUCCINATE 125 MG/2ML
100 INJECTION, POWDER, LYOPHILIZED, FOR SOLUTION INTRAMUSCULAR; INTRAVENOUS ONCE
Status: COMPLETED | OUTPATIENT
Start: 2020-06-09 | End: 2020-06-09

## 2020-06-09 RX ORDER — ACETAMINOPHEN 325 MG/1
650 TABLET ORAL ONCE
Status: COMPLETED | OUTPATIENT
Start: 2020-06-09 | End: 2020-06-09

## 2020-06-09 RX ORDER — SODIUM CHLORIDE 0.9 % (FLUSH) 0.9 %
10 SYRINGE (ML) INJECTION PRN
Status: DISCONTINUED | OUTPATIENT
Start: 2020-06-09 | End: 2020-06-10 | Stop reason: HOSPADM

## 2020-06-09 RX ORDER — DIPHENHYDRAMINE HYDROCHLORIDE 50 MG/ML
25 INJECTION INTRAMUSCULAR; INTRAVENOUS ONCE
Status: CANCELLED
Start: 2020-06-09

## 2020-06-09 RX ORDER — DIPHENHYDRAMINE HYDROCHLORIDE 50 MG/ML
25 INJECTION INTRAMUSCULAR; INTRAVENOUS ONCE
Status: COMPLETED | OUTPATIENT
Start: 2020-06-09 | End: 2020-06-09

## 2020-06-09 RX ORDER — SODIUM CHLORIDE 0.9 % (FLUSH) 0.9 %
5 SYRINGE (ML) INJECTION PRN
Status: CANCELLED | OUTPATIENT
Start: 2020-06-09

## 2020-06-09 RX ORDER — METHYLPREDNISOLONE SODIUM SUCCINATE 125 MG/2ML
125 INJECTION, POWDER, LYOPHILIZED, FOR SOLUTION INTRAMUSCULAR; INTRAVENOUS ONCE
Status: CANCELLED | OUTPATIENT
Start: 2020-06-09

## 2020-06-09 RX ORDER — HEPARIN SODIUM (PORCINE) LOCK FLUSH IV SOLN 100 UNIT/ML 100 UNIT/ML
500 SOLUTION INTRAVENOUS PRN
Status: CANCELLED | OUTPATIENT
Start: 2020-06-09

## 2020-06-09 RX ORDER — EPINEPHRINE 1 MG/ML
0.3 INJECTION, SOLUTION, CONCENTRATE INTRAVENOUS PRN
Status: CANCELLED | OUTPATIENT
Start: 2020-06-09

## 2020-06-09 RX ORDER — METHYLPREDNISOLONE SODIUM SUCCINATE 125 MG/2ML
100 INJECTION, POWDER, LYOPHILIZED, FOR SOLUTION INTRAMUSCULAR; INTRAVENOUS ONCE
Status: CANCELLED
Start: 2020-06-09

## 2020-06-09 RX ORDER — DIPHENHYDRAMINE HYDROCHLORIDE 50 MG/ML
50 INJECTION INTRAMUSCULAR; INTRAVENOUS ONCE
Status: CANCELLED | OUTPATIENT
Start: 2020-06-09

## 2020-06-09 RX ORDER — SODIUM CHLORIDE 9 MG/ML
INJECTION, SOLUTION INTRAVENOUS CONTINUOUS
Status: CANCELLED | OUTPATIENT
Start: 2020-06-09

## 2020-06-09 RX ADMIN — RITUXIMAB 500 MG: 10 INJECTION, SOLUTION INTRAVENOUS at 10:16

## 2020-06-09 RX ADMIN — METHYLPREDNISOLONE SODIUM SUCCINATE 100 MG: 125 INJECTION, POWDER, FOR SOLUTION INTRAMUSCULAR; INTRAVENOUS at 09:35

## 2020-06-09 RX ADMIN — ACETAMINOPHEN 650 MG: 325 TABLET ORAL at 09:34

## 2020-06-09 RX ADMIN — SODIUM CHLORIDE: 9 INJECTION, SOLUTION INTRAVENOUS at 09:15

## 2020-06-09 RX ADMIN — DIPHENHYDRAMINE HYDROCHLORIDE 25 MG: 50 INJECTION, SOLUTION INTRAMUSCULAR; INTRAVENOUS at 09:40

## 2020-06-09 RX ADMIN — Medication 10 ML: at 09:15

## 2020-06-09 ASSESSMENT — PAIN SCALES - GENERAL: PAINLEVEL_OUTOF10: 0

## 2020-06-23 RX ORDER — BUTALBITAL, ACETAMINOPHEN AND CAFFEINE 50; 325; 40 MG/1; MG/1; MG/1
1 TABLET ORAL EVERY 6 HOURS PRN
Qty: 120 TABLET | Refills: 0 | Status: SHIPPED | OUTPATIENT
Start: 2020-06-23 | End: 2020-11-23

## 2020-07-21 ENCOUNTER — HOSPITAL ENCOUNTER (OUTPATIENT)
Age: 55
Discharge: HOME | End: 2020-07-21
Payer: MEDICARE

## 2020-07-21 LAB
ABSOLUTE BANDS #: 0.07 K/UL (ref 0–1)
ABSOLUTE EOS #: 0.14 K/UL (ref 0–0.44)
ABSOLUTE IMMATURE GRANULOCYTE: 0.07 K/UL (ref 0–0.3)
ABSOLUTE LYMPH #: 0.48 K/UL (ref 1.1–3.7)
ABSOLUTE MONO #: 0.07 K/UL (ref 0.1–1.2)
ANION GAP SERPL CALCULATED.3IONS-SCNC: 11 MMOL/L (ref 9–17)
BANDS: 1 % (ref 0–10)
BASOPHILS # BLD: 0 % (ref 0–2)
BASOPHILS ABSOLUTE: 0 K/UL (ref 0–0.2)
BUN BLDV-MCNC: 37 MG/DL (ref 6–20)
BUN/CREAT BLD: 11 (ref 9–20)
CALCIUM IONIZED: 1.23 MMOL/L (ref 1.13–1.33)
CALCIUM SERPL-MCNC: 9.1 MG/DL (ref 8.6–10.4)
CHLORIDE BLD-SCNC: 104 MMOL/L (ref 98–107)
CO2: 21 MMOL/L (ref 20–31)
CREAT SERPL-MCNC: 3.45 MG/DL (ref 0.5–0.9)
DIFFERENTIAL TYPE: ABNORMAL
EOSINOPHILS RELATIVE PERCENT: 2 % (ref 1–4)
GFR AFRICAN AMERICAN: 17 ML/MIN
GFR NON-AFRICAN AMERICAN: 14 ML/MIN
GFR SERPL CREATININE-BSD FRML MDRD: ABNORMAL ML/MIN/{1.73_M2}
GFR SERPL CREATININE-BSD FRML MDRD: ABNORMAL ML/MIN/{1.73_M2}
GLUCOSE BLD-MCNC: 161 MG/DL (ref 70–99)
HCT VFR BLD CALC: 28.7 % (ref 36.3–47.1)
HEMOGLOBIN: 9.2 G/DL (ref 11.9–15.1)
IMMATURE GRANULOCYTES: 1 %
LYMPHOCYTES # BLD: 7 % (ref 24–43)
MCH RBC QN AUTO: 29.7 PG (ref 25.2–33.5)
MCHC RBC AUTO-ENTMCNC: 32.1 G/DL (ref 28.4–34.8)
MCV RBC AUTO: 92.6 FL (ref 82.6–102.9)
MONOCYTES # BLD: 1 % (ref 3–12)
MORPHOLOGY: NORMAL
NRBC AUTOMATED: 0 PER 100 WBC
PDW BLD-RTO: 13.5 % (ref 11.8–14.4)
PHOSPHORUS: 3.8 MG/DL (ref 2.6–4.5)
PLATELET # BLD: 268 K/UL (ref 138–453)
PLATELET ESTIMATE: ABNORMAL
PMV BLD AUTO: 10.2 FL (ref 8.1–13.5)
POTASSIUM SERPL-SCNC: 4.1 MMOL/L (ref 3.7–5.3)
PTH INTACT: 159 PG/ML (ref 15–65)
RBC # BLD: 3.1 M/UL (ref 3.95–5.11)
RBC # BLD: ABNORMAL 10*6/UL
SEG NEUTROPHILS: 88 % (ref 36–65)
SEGMENTED NEUTROPHILS ABSOLUTE COUNT: 5.97 K/UL (ref 1.5–8.1)
SODIUM BLD-SCNC: 136 MMOL/L (ref 135–144)
VITAMIN D 25-HYDROXY: 24 NG/ML (ref 30–100)
WBC # BLD: 6.8 K/UL (ref 3.5–11.3)
WBC # BLD: ABNORMAL 10*3/UL

## 2020-07-21 PROCEDURE — 82306 VITAMIN D 25 HYDROXY: CPT

## 2020-07-21 PROCEDURE — 85025 COMPLETE CBC W/AUTO DIFF WBC: CPT

## 2020-07-21 PROCEDURE — 82330 ASSAY OF CALCIUM: CPT

## 2020-07-21 PROCEDURE — 36415 COLL VENOUS BLD VENIPUNCTURE: CPT

## 2020-07-21 PROCEDURE — 80048 BASIC METABOLIC PNL TOTAL CA: CPT

## 2020-07-21 PROCEDURE — 84100 ASSAY OF PHOSPHORUS: CPT

## 2020-07-21 PROCEDURE — 83970 ASSAY OF PARATHORMONE: CPT

## 2020-07-22 RX ORDER — MECLIZINE HYDROCHLORIDE 25 MG/1
25 TABLET ORAL 3 TIMES DAILY PRN
Qty: 30 TABLET | Refills: 0 | Status: SHIPPED | OUTPATIENT
Start: 2020-07-22 | End: 2022-08-24 | Stop reason: SDUPTHER

## 2020-07-22 RX ORDER — ONDANSETRON 4 MG/1
4 TABLET, FILM COATED ORAL EVERY 8 HOURS PRN
Qty: 15 TABLET | Refills: 0 | Status: SHIPPED | OUTPATIENT
Start: 2020-07-22 | End: 2022-08-24 | Stop reason: SDUPTHER

## 2020-07-22 NOTE — TELEPHONE ENCOUNTER
injury) ([facility] [address]     [facility]     ANCA-associated vasculitis flare(HCC)     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)     S/P DOMO-BSO     Endometrial hyperplasia without atypia, simple     Postural vertigo     Morbid obesity (HCC)     Allergic rhinitis     Iron deficiency anemia     Hyperlipidemia     Urinary frequency     MGUS (monoclonal gammopathy of unknown significance)     Anti-cyclic citrullinated peptide antibody positive     Bilateral hip pain     Bilateral knee pain     Cancer of uterus (HCC)     Elevated C-reactive protein (CRP)     Elevated white blood cell count, unspecified     Essential thrombocythemia (HCC)     Greater trochanteric bursitis of both hips     Microscopic polyangiitis (HCC)     Osteoarthritis of right acromioclavicular joint     Osteoarthritis of sacroiliac joint     Other intervertebral disc degeneration, thoracic region     Patient nonadherence     Pes anserinus bursitis of both knees     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

## 2020-08-03 RX ORDER — ATORVASTATIN CALCIUM 80 MG/1
TABLET, FILM COATED ORAL
Qty: 90 TABLET | Refills: 0 | Status: SHIPPED | OUTPATIENT
Start: 2020-08-03 | End: 2020-10-27 | Stop reason: SDUPTHER

## 2020-08-03 NOTE — TELEPHONE ENCOUNTER
Health Maintenance   Topic Date Due    Lipid screen  05/07/2020    HIV screen  03/10/2021 (Originally 9/5/1980)    Flu vaccine (1) 09/01/2020    A1C test (Diabetic or Prediabetic)  09/06/2020    Potassium monitoring  07/21/2021    Creatinine monitoring  07/21/2021    Breast cancer screen  10/06/2021    Pneumococcal 0-64 years Vaccine (3 of 3 - PPSV23) 05/10/2022    Colon cancer screen colonoscopy  05/02/2026    DTaP/Tdap/Td vaccine (2 - Td) 09/06/2028    Shingles Vaccine  Completed    Hepatitis C screen  Completed    Hepatitis A vaccine  Aged Out    Hepatitis B vaccine  Aged Out    Hib vaccine  Aged Out    Meningococcal (ACWY) vaccine  Aged Out             (applicable per patient's age: Cancer Screenings, Depression Screening, Fall Risk Screening, Immunizations)    Hemoglobin A1C (%)   Date Value   09/06/2019 5.8   09/06/2018 6.1   05/15/2018 5.5     Microalb/Crt.  Ratio (mcg/mg creat)   Date Value   11/18/2019 3,897 (H)     LDL Cholesterol (mg/dL)   Date Value   05/07/2019 99     AST (U/L)   Date Value   12/13/2019 16     ALT (U/L)   Date Value   12/13/2019 12     BUN (mg/dL)   Date Value   07/21/2020 37 (H)      (goal A1C is < 7)   (goal LDL is <100) need 30-50% reduction from baseline     BP Readings from Last 3 Encounters:   07/27/20 (!) 125/59   06/09/20 (!) 142/66   03/10/20 119/62    (goal /80)      All Future Testing planned in [facility]:  Lab Frequency Next Occurrence   CBC Auto Differential Once 09/06/2020   ALT Once 09/10/2020   AST Once 53/76/6807   Basic Metabolic Panel Once 93/17/0086   Lipid Panel Once 73/74/7905   Basic Metabolic Panel Once 68/83/5324   Ferritin Once 08/10/2020   Iron and TIBC Once 08/10/2020   CBC Auto Differential Once 70/94/5007   Basic Metabolic Panel Once 10/97/7414   PTH, INTACT WITH IONIZED CALCIUM Once 09/14/2020   Transfuse RBC TRANSFUSE 1 UNIT        Next Visit Date:  Future Appointments   Date Time Provider Tom Oneil   9/10/2020 10:00 AM Lester Allen Might, APRN - CNP [facility]   9/28/2020 12:15 PM MD DREW [facility] None   12/9/2020  9:00 AM SCHEDULE, [facility] MED ONC ROOM 9 [facility] ON[facility] [place]            Patient Active Problem List:     JESS (acute kidney injury) ([facility] [address]     Arteritis ([facility] [address]     ANCA-associated vasculitis flare(HCC)     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)     S/P DOMO-BSO     Endometrial hyperplasia without atypia, simple     Postural vertigo     Morbid obesity (HCC)     Allergic rhinitis     Iron deficiency anemia     Hyperlipidemia     Urinary frequency     MGUS (monoclonal gammopathy of unknown significance)     Anti-cyclic citrullinated peptide antibody positive     Bilateral hip pain     Bilateral knee pain     Cancer of uterus (HCC)     Elevated C-reactive protein (CRP)     Elevated white blood cell count, unspecified     Essential thrombocythemia (HCC)     Greater trochanteric bursitis of both hips     Microscopic polyangiitis (HCC)     Osteoarthritis of right acromioclavicular joint     Osteoarthritis of sacroiliac joint     Other intervertebral disc degeneration, thoracic region     Patient nonadherence     Pes anserinus bursitis of both knees     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

## 2020-08-10 ENCOUNTER — HOSPITAL ENCOUNTER (OUTPATIENT)
Age: 55
Discharge: HOME | End: 2020-08-10
Payer: MEDICARE

## 2020-08-10 LAB
ABSOLUTE EOS #: 0.12 K/UL (ref 0–0.44)
ABSOLUTE IMMATURE GRANULOCYTE: 0 K/UL (ref 0–0.3)
ABSOLUTE LYMPH #: 0.56 K/UL (ref 1.1–3.7)
ABSOLUTE MONO #: 0.5 K/UL (ref 0.1–1.2)
ANION GAP SERPL CALCULATED.3IONS-SCNC: 13 MMOL/L (ref 9–17)
BASOPHILS # BLD: 0 % (ref 0–2)
BASOPHILS ABSOLUTE: 0 K/UL (ref 0–0.2)
BUN BLDV-MCNC: 37 MG/DL (ref 6–20)
BUN/CREAT BLD: 11 (ref 9–20)
CALCIUM SERPL-MCNC: 8.9 MG/DL (ref 8.6–10.4)
CHLORIDE BLD-SCNC: 104 MMOL/L (ref 98–107)
CO2: 22 MMOL/L (ref 20–31)
CREAT SERPL-MCNC: 3.5 MG/DL (ref 0.5–0.9)
DIFFERENTIAL TYPE: ABNORMAL
EOSINOPHILS RELATIVE PERCENT: 2 % (ref 1–4)
FERRITIN: 178 UG/L (ref 13–150)
GFR AFRICAN AMERICAN: 17 ML/MIN
GFR NON-AFRICAN AMERICAN: 14 ML/MIN
GFR SERPL CREATININE-BSD FRML MDRD: ABNORMAL ML/MIN/{1.73_M2}
GFR SERPL CREATININE-BSD FRML MDRD: ABNORMAL ML/MIN/{1.73_M2}
GLUCOSE BLD-MCNC: 122 MG/DL (ref 70–99)
HCT VFR BLD CALC: 30.3 % (ref 36.3–47.1)
HEMOGLOBIN: 9.5 G/DL (ref 11.9–15.1)
IMMATURE GRANULOCYTES: 0 %
IRON SATURATION: 19 % (ref 20–55)
IRON: 52 UG/DL (ref 37–145)
LYMPHOCYTES # BLD: 9 % (ref 24–43)
MCH RBC QN AUTO: 29.2 PG (ref 25.2–33.5)
MCHC RBC AUTO-ENTMCNC: 31.4 G/DL (ref 28.4–34.8)
MCV RBC AUTO: 93.2 FL (ref 82.6–102.9)
MONOCYTES # BLD: 8 % (ref 3–12)
MORPHOLOGY: NORMAL
NRBC AUTOMATED: 0 PER 100 WBC
PDW BLD-RTO: 13.5 % (ref 11.8–14.4)
PLATELET # BLD: 284 K/UL (ref 138–453)
PLATELET ESTIMATE: ABNORMAL
PMV BLD AUTO: 9.5 FL (ref 8.1–13.5)
POTASSIUM SERPL-SCNC: 4.2 MMOL/L (ref 3.7–5.3)
RBC # BLD: 3.25 M/UL (ref 3.95–5.11)
RBC # BLD: ABNORMAL 10*6/UL
SEG NEUTROPHILS: 81 % (ref 36–65)
SEGMENTED NEUTROPHILS ABSOLUTE COUNT: 5.02 K/UL (ref 1.5–8.1)
SODIUM BLD-SCNC: 139 MMOL/L (ref 135–144)
TOTAL IRON BINDING CAPACITY: 267 UG/DL (ref 250–450)
UNSATURATED IRON BINDING CAPACITY: 215 UG/DL (ref 112–347)
WBC # BLD: 6.2 K/UL (ref 3.5–11.3)
WBC # BLD: ABNORMAL 10*3/UL

## 2020-08-10 PROCEDURE — 83550 IRON BINDING TEST: CPT

## 2020-08-10 PROCEDURE — 82728 ASSAY OF FERRITIN: CPT

## 2020-08-10 PROCEDURE — 83540 ASSAY OF IRON: CPT

## 2020-08-10 PROCEDURE — 80048 BASIC METABOLIC PNL TOTAL CA: CPT

## 2020-08-10 PROCEDURE — 36415 COLL VENOUS BLD VENIPUNCTURE: CPT

## 2020-08-10 PROCEDURE — 85025 COMPLETE CBC W/AUTO DIFF WBC: CPT

## 2020-08-11 RX ORDER — PANTOPRAZOLE SODIUM 40 MG/1
TABLET, DELAYED RELEASE ORAL
Qty: 180 TABLET | Refills: 1 | Status: SHIPPED | OUTPATIENT
Start: 2020-08-11 | End: 2021-03-02

## 2020-08-11 RX ORDER — PANTOPRAZOLE SODIUM 40 MG/1
TABLET, DELAYED RELEASE ORAL
Qty: 180 TABLET | Refills: 3 | OUTPATIENT
Start: 2020-08-11

## 2020-08-11 NOTE — TELEPHONE ENCOUNTER
Health Maintenance   Topic Date Due    Lipid screen  05/07/2020    HIV screen  03/10/2021 (Originally 9/5/1980)    Flu vaccine (1) 09/01/2020    A1C test (Diabetic or Prediabetic)  09/06/2020    Potassium monitoring  08/10/2021    Creatinine monitoring  08/10/2021    Breast cancer screen  10/06/2021    Pneumococcal 0-64 years Vaccine (3 of 3 - PPSV23) 05/10/2022    Colon cancer screen colonoscopy  05/02/2026    DTaP/Tdap/Td vaccine (2 - Td) 09/06/2028    Shingles Vaccine  Completed    Hepatitis C screen  Completed    Hepatitis A vaccine  Aged Out    Hepatitis B vaccine  Aged Out    Hib vaccine  Aged Out    Meningococcal (ACWY) vaccine  Aged Out             (applicable per patient's age: Cancer Screenings, Depression Screening, Fall Risk Screening, Immunizations)    Hemoglobin A1C (%)   Date Value   09/06/2019 5.8   09/06/2018 6.1   05/15/2018 5.5     Microalb/Crt.  Ratio (mcg/mg creat)   Date Value   11/18/2019 3,897 (H)     LDL Cholesterol (mg/dL)   Date Value   05/07/2019 99     AST (U/L)   Date Value   12/13/2019 16     ALT (U/L)   Date Value   12/13/2019 12     BUN (mg/dL)   Date Value   08/10/2020 37 (H)      (goal A1C is < 7)   (goal LDL is <100) need 30-50% reduction from baseline     BP Readings from Last 3 Encounters:   07/27/20 (!) 125/59   06/09/20 (!) 142/66   03/10/20 119/62    (goal /80)      All Future Testing planned in [facility]:  Lab Frequency Next Occurrence   CBC Auto Differential Once 09/06/2020   ALT Once 09/10/2020   AST Once 95/31/6097   Basic Metabolic Panel Once 97/39/1109   Lipid Panel Once 89/86/6319   Basic Metabolic Panel Once 64/01/1861   PTH, INTACT WITH IONIZED CALCIUM Once 09/14/2020   Transfuse RBC TRANSFUSE 1 UNIT        Next Visit Date:  Future Appointments   Date Time Provider Tom Oneil   9/28/2020 12:15 PM Abdulkadir Ramirez MD [facility] None   12/9/2020  9:00 AM SCHEDULE, [facility] MED ONC ROOM 9 [facility] MED ONC [place]            Patient Active Problem List:     JESS (acute kidney injury) ([facility] [address]     Arteritis ([facility] [address]     ANCA-associated vasculitis flare(HCC)     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)     S/P DOMO-BSO     Endometrial hyperplasia without atypia, simple     Postural vertigo     Morbid obesity (HCC)     Allergic rhinitis     Iron deficiency anemia     Hyperlipidemia     Urinary frequency     MGUS (monoclonal gammopathy of unknown significance)     Anti-cyclic citrullinated peptide antibody positive     Bilateral hip pain     Bilateral knee pain     Cancer of uterus (HCC)     Elevated C-reactive protein (CRP)     Elevated white blood cell count, unspecified     Essential thrombocythemia (HCC)     Greater trochanteric bursitis of both hips     Microscopic polyangiitis (HCC)     Osteoarthritis of right acromioclavicular joint     Osteoarthritis of sacroiliac joint     Other intervertebral disc degeneration, thoracic region     Patient nonadherence     Pes anserinus bursitis of both knees     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     Episode of recurrent major depressive disorder ([facility] [address]

## 2020-08-11 NOTE — TELEPHONE ENCOUNTER
Pt will reshedule appointment once she gets her dialysis scheduled. Health Maintenance   Topic Date Due    Lipid screen  05/07/2020    HIV screen  03/10/2021 (Originally 9/5/1980)    Flu vaccine (1) 09/01/2020    A1C test (Diabetic or Prediabetic)  09/06/2020    Potassium monitoring  08/10/2021    Creatinine monitoring  08/10/2021    Breast cancer screen  10/06/2021    Pneumococcal 0-64 years Vaccine (3 of 3 - PPSV23) 05/10/2022    Colon cancer screen colonoscopy  05/02/2026    DTaP/Tdap/Td vaccine (2 - Td) 09/06/2028    Shingles Vaccine  Completed    Hepatitis C screen  Completed    Hepatitis A vaccine  Aged Out    Hepatitis B vaccine  Aged Out    Hib vaccine  Aged Out    Meningococcal (ACWY) vaccine  Aged Out             (applicable per patient's age: Cancer Screenings, Depression Screening, Fall Risk Screening, Immunizations)    Hemoglobin A1C (%)   Date Value   09/06/2019 5.8   09/06/2018 6.1   05/15/2018 5.5     Microalb/Crt.  Ratio (mcg/mg creat)   Date Value   11/18/2019 3,897 (H)     LDL Cholesterol (mg/dL)   Date Value   05/07/2019 99     AST (U/L)   Date Value   12/13/2019 16     ALT (U/L)   Date Value   12/13/2019 12     BUN (mg/dL)   Date Value   08/10/2020 37 (H)      (goal A1C is < 7)   (goal LDL is <100) need 30-50% reduction from baseline     BP Readings from Last 3 Encounters:   07/27/20 (!) 125/59   06/09/20 (!) 142/66   03/10/20 119/62    (goal /80)      All Future Testing planned in [facility]:  Lab Frequency Next Occurrence   CBC Auto Differential Once 09/06/2020   ALT Once 09/10/2020   AST Once 65/33/6297   Basic Metabolic Panel Once 70/01/3351   Lipid Panel Once 43/78/1290   Basic Metabolic Panel Once 37/92/8563   PTH, INTACT WITH IONIZED CALCIUM Once 09/14/2020   Transfuse RBC TRANSFUSE 1 UNIT        Next Visit Date:  Future Appointments   Date Time Provider Tom Oneil   9/28/2020 12:15 PM Favio Ignacio MD [facility] None   12/9/2020  9:00 AM SCHEDULE, [facility] MED ONC ROOM 9 [facility] ONC [place]            Patient Active Problem List:     JESS (acute kidney injury) ([facility] [address]     Arteritis ([facility] [address]     ANCA-associated vasculitis flare(HCC)     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)     S/P DOMO-BSO     Endometrial hyperplasia without atypia, simple     Postural vertigo     Morbid obesity (HCC)     Allergic rhinitis     Iron deficiency anemia     Hyperlipidemia     Urinary frequency     MGUS (monoclonal gammopathy of unknown significance)     Anti-cyclic citrullinated peptide antibody positive     Bilateral hip pain     Bilateral knee pain     Cancer of uterus (HCC)     Elevated C-reactive protein (CRP)     Elevated white blood cell count, unspecified     Essential thrombocythemia (HCC)     Greater trochanteric bursitis of both hips     Microscopic polyangiitis (HCC)     Osteoarthritis of right acromioclavicular joint     Osteoarthritis of sacroiliac joint     Other intervertebral disc degeneration, thoracic region     Patient nonadherence     Pes anserinus bursitis of both knees     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     Episode of recurrent major depressive disorder ([facility]

## 2020-09-14 ENCOUNTER — TELEPHONE (OUTPATIENT)
Dept: PRIMARY CARE CLINIC | Age: 55
End: 2020-09-14

## 2020-09-14 ENCOUNTER — HOSPITAL ENCOUNTER (OUTPATIENT)
Age: 55
Discharge: HOME | End: 2020-09-14
Payer: MEDICARE

## 2020-09-14 LAB
ANION GAP SERPL CALCULATED.3IONS-SCNC: 10 MMOL/L (ref 9–17)
BUN BLDV-MCNC: 34 MG/DL (ref 6–20)
BUN/CREAT BLD: 10 (ref 9–20)
CALCIUM IONIZED: 1.2 MMOL/L (ref 1.13–1.33)
CALCIUM SERPL-MCNC: 8.6 MG/DL (ref 8.6–10.4)
CHLORIDE BLD-SCNC: 105 MMOL/L (ref 98–107)
CO2: 22 MMOL/L (ref 20–31)
CREAT SERPL-MCNC: 3.37 MG/DL (ref 0.5–0.9)
GFR AFRICAN AMERICAN: 17 ML/MIN
GFR NON-AFRICAN AMERICAN: 14 ML/MIN
GFR SERPL CREATININE-BSD FRML MDRD: ABNORMAL ML/MIN/{1.73_M2}
GFR SERPL CREATININE-BSD FRML MDRD: ABNORMAL ML/MIN/{1.73_M2}
GLUCOSE BLD-MCNC: 124 MG/DL (ref 70–99)
POTASSIUM SERPL-SCNC: 3.9 MMOL/L (ref 3.7–5.3)
PTH INTACT: 131.1 PG/ML (ref 15–65)
SODIUM BLD-SCNC: 137 MMOL/L (ref 135–144)

## 2020-09-14 PROCEDURE — 82330 ASSAY OF CALCIUM: CPT

## 2020-09-14 PROCEDURE — 80048 BASIC METABOLIC PNL TOTAL CA: CPT

## 2020-09-14 PROCEDURE — 36415 COLL VENOUS BLD VENIPUNCTURE: CPT

## 2020-09-14 PROCEDURE — 83970 ASSAY OF PARATHORMONE: CPT

## 2020-09-18 ENCOUNTER — TELEPHONE (OUTPATIENT)
Dept: PRIMARY CARE CLINIC | Age: 55
End: 2020-09-18

## 2020-09-25 ENCOUNTER — OFFICE VISIT (OUTPATIENT)
Dept: CARDIOLOGY | Age: 55
End: 2020-09-25
Payer: MEDICARE

## 2020-09-25 VITALS
HEIGHT: 62 IN | WEIGHT: 263 LBS | SYSTOLIC BLOOD PRESSURE: 137 MMHG | RESPIRATION RATE: 17 BRPM | HEART RATE: 84 BPM | BODY MASS INDEX: 48.4 KG/M2 | DIASTOLIC BLOOD PRESSURE: 80 MMHG

## 2020-09-25 PROCEDURE — G8427 DOCREV CUR MEDS BY ELIG CLIN: HCPCS | Performed by: INTERNAL MEDICINE

## 2020-09-25 PROCEDURE — 93000 ELECTROCARDIOGRAM COMPLETE: CPT | Performed by: INTERNAL MEDICINE

## 2020-09-25 PROCEDURE — G8417 CALC BMI ABV UP PARAM F/U: HCPCS | Performed by: INTERNAL MEDICINE

## 2020-09-25 PROCEDURE — 99243 OFF/OP CNSLTJ NEW/EST LOW 30: CPT | Performed by: INTERNAL MEDICINE

## 2020-09-25 NOTE — PROGRESS NOTES
Ruthy Riddle am scribing for and in the presence of Chago Deleon MD.    Patient: Hiren Montelongo  : 1965  Date of Visit: 2020    REASON FOR VISIT / CONSULTATION: Follow-up (Last seen a couple years ago. Hx of vasculitis disease, dialysis, effecting eyesight. SOB with exertion, wlaking issues. Palpitations (feels it racing). Does have lightheaded/dizziness. Denies: CP. ) and Cardiac Clearance (Port put in - She thinks Dr Annamaria Lino? the  to see which arm to put it in. )      History of Present Illness:        Dear Jonna Penn, APRN - CNP    I had the pleasure of seeing your patient Hiren Montelongo as part of a pre-operative cardiac evaluation today. She is going for AV fistula surgery. HPI: Ms. Ivery Bloch is a 46 y.o. 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      Patient admitted to the hospital on  with systolic BP of 868 mmHg, started on nitroprusside drip. Treated for hypertensive urgency. I saw her back at that time and adjusted her blood pressure medications. She has never been a smoker. No family history of heart disease. She has never had diabetes. She has had hypertension since all of her problems started and does have hyperlipidemia. EKG done today in office on 2020 showed sinus rhythm with no acute ischemic changes. Exercise Tolerance: Ms. Ivery Bloch reports that she has a fair exercise tolerance. Her says that she could ambulate a flight of stairs or walk a block without chest pain or significant shortness of breath. Ms. Ivery Bloch is here today to receive cardiac clearance to have her port inserted for dialysis due to her kidneys shutting down. Her vision is starting to worsen. She was told that this is part of her autoimmune disorder.   She has seen 3 ophthalmologist and they all agreed that nothing can be done for her vision. She seems to be very concerned about that part saying that she can live with dialysis but it is hard for her to be blind. She does have shortness of breath and palpitations. She can feel her heart racing. She does not do much physical activity regularly. She has to force herself to exercise. She has been battling with lightheaded/dizziness that is related due to her worsening eyesight and migraines. She does not sleep well at night. She is up almost every hour 8 night. She is under a lot of stress. She does have sleep apnea and only sometimes uses her CPAP for treatment. She denies orthopnea or PND. She battles with her weight. She has been trying to watch what she eats. She denied any current or recent chest pain, pressure or tightness. She denies any increased lower extremity edema, abdominal pain, bowel issues, bleeding problems, problems with her medications or any other concerns at this time. No nausea or vomiting. No fever or cough. She has 3 kids and 8 grandchildren ranging from ages 6years old to 3 months old. She enjoys spending time with them so they keep her busy. PAST MEDICAL HISTORY:        Past Medical History:   Diagnosis Date    [facility] [address]     Anemia     Arthritis     Chronic kidney disease     Depression with anxiety 9/8/2016    GERD (gastroesophageal reflux disease)     H/O echocardiogram 08/25/2016    EF 55%. Mild mitral regurgitation.  H/O tooth extraction 07/26/2017    Lower posterior right tooth.  History of blood transfusion     x3. Last one in May 2016    Hyperlipidemia     Hypertensive crisis 8/24/2016    Kidney stones     Sciatica     Vasculitis ([facility] [address]        CURRENT ALLERGIES: Patient has no known allergies. REVIEW OF SYSTEMS: 14 systems were reviewed. Pertinent positives and negatives as above, all else negative.      Past Surgical History: Procedure Laterality Date    BONE MARROW BIOPSY  5-23-16    Per Hematologist order @ [address] [place] [address].  CHOLECYSTECTOMY      COLONOSCOPY  2016    ESOPHAGEAL DILATATION  9/12/2018    ESOPHAGEAL DILATATION performed by Raulito Dudley MD at [address]  11/09/2016    with tubes and ovaries    TOOTH EXTRACTION  07/26/2017    Right posterior lower tooth.     TUBAL LIGATION      UPPER GASTROINTESTINAL ENDOSCOPY  09/12/2018    -dede,bx(Chicas's esophagus,neg H-Pylori)hiatal hernia,grade 2 reflux esophagitis    UPPER GASTROINTESTINAL ENDOSCOPY N/A 9/12/2018    EGD BIOPSY performed by Raulito Dudley MD at [address] History:  Social History     Tobacco Use    Smoking status: Never Smoker    Smokeless tobacco: Never Used   Substance Use Topics    Alcohol use: No    Drug use: No        CURRENT MEDICATIONS:        Outpatient Medications Marked as Taking for the 9/25/20 encounter (Office Visit) with Vernestine Fabry, MD   Medication Sig Dispense Refill    hydrALAZINE (APRESOLINE) 50 MG tablet TAKE 1 TABLET BY MOUTH THREE TIMES DAILY 270 tablet 1    pantoprazole (PROTONIX) 40 MG tablet Take 1 tablet by mouth twice daily 180 tablet 1    atorvastatin (LIPITOR) 80 MG tablet Take 1 tablet by mouth once daily 90 tablet 0    meclizine (ANTIVERT) 25 MG tablet Take 1 tablet by mouth 3 times daily as needed for Dizziness 30 tablet 0    furosemide (LASIX) 20 MG tablet Take 1 tablet by mouth 2 times daily 60 tablet 5    butalbital-acetaminophen-caffeine (FIORICET, ESGIC) -40 MG per tablet Take 1 tablet by mouth every 6 hours as needed for Headaches 120 tablet 0    calcitRIOL (ROCALTROL) 0.25 MCG capsule Take 1 capsule by mouth daily 30 capsule 5    estrogens, conjugated, (PREMARIN) 0.625 MG tablet Take 1 tablet by mouth daily 21 tablet 5    DULoxetine (CYMBALTA) 30 MG extended release capsule TAKE 1 CAPSULE BY MOUTH ONCE DAILY 90 capsule 3    carvedilol (COREG) 12.5 MG tablet Take 1 tablet by mouth 2 times daily 60 tablet 5    potassium chloride (KLOR-CON M) 20 MEQ extended release tablet Take 1 tablet by mouth daily 30 tablet 11    dilTIAZem (CARDIZEM CD) 120 MG extended release capsule TAKE 1 CAPSULE BY MOUTH ONCE DAILY 30 capsule 0    losartan (COZAAR) 50 MG tablet TAKE 1 TABLET BY MOUTH TWICE DAILY 60 tablet 0    Blood Pressure KIT 1 Units by Does not apply route daily 1 kit 0    predniSONE (DELTASONE) 5 MG tablet Take 5 mg by mouth daily      fluticasone (FLONASE) 50 MCG/ACT nasal spray 1 spray by Each Nostril route daily (Patient taking differently: 1 spray by Each Nostril route daily as needed for Rhinitis or Allergies ) 2 Bottle 1    albuterol sulfate HFA (VENTOLIN HFA) 108 (90 Base) MCG/ACT inhaler Inhale 2 puffs into the lungs every 6 hours as needed for Wheezing 1 Inhaler 3    Incontinence Supply Disposable (PREVAIL ADULT BRIEF LARGE) MISC 3 each by Does not apply route daily 300 mL 11    saline nasal gel (AYR) GEL by Nasal route 2 times daily (Patient taking differently: by Nasal route as needed ) 1 Tube 3    sodium chloride (ALTAMIST SPRAY) 0.65 % nasal spray 1 spray by Nasal route as needed for Congestion 1 Bottle 3    vitamin D (CHOLECALCIFEROL) 1000 UNIT TABS tablet Take 2,000 Units by mouth 2 times daily       Docusate Calcium (STOOL SOFTENER PO) Take 100 mg by mouth as needed       diphenhydrAMINE (BENADRYL) 25 MG capsule Take 25 mg by mouth every 6 hours as needed for Itching or Allergies Pt takes 2 capsules at HS \"due to hives from it being cold now\". Pt tho states these are NOT helping her this time. FAMILY HISTORY: family history includes Cancer in her father, mother, sister, and sister; Diabetes in her brother, brother, and mother; Heart Disease in her mother; High Blood Pressure in her brother, father, and mother.      Physical Examination:     /80 (Site: Right Lower Arm, Position: Sitting, Cuff Size: Medium Adult)   Pulse 84   Resp 17   Ht 5' 2\" (1.575 m)   Wt 263 lb (119.3 kg)   LMP 09/09/2016   BMI 48.10 kg/m²  Body mass index is 48.1 kg/m². Constitutional: She is oriented to person, place, and time. She appears well-developed and well-nourished. In no acute distress. HEENT: Normocephalic and atraumatic. No JVD present. Carotid bruit is not present. No mass and no thyromegaly present. No lymphadenopathy present. Cardiovascular: Normal rate, regular rhythm, normal heart sounds. Exam reveals no gallop and no friction rubs. No murmur was heard. .   Pulmonary/Chest: Effort normal and breath sounds normal. No respiratory distress. She has no wheezes, rhonchi or rales. Abdominal: Soft, non-tender. Bowel sounds and aorta are normal. She exhibits no organomegaly, mass or bruit. Extremities: No significant edema. No cyanosis or clubbing. 2+ radial and carotid pulses. Distal extremity pulses: 2+ bilaterally. Neurological: She is alert and oriented to person, place, and time. No evidence of gross cranial nerve deficit. Coordination appeared normal.   Skin: Skin is warm and dry. There is no rash or diaphoresis. Psychiatric: She has a normal mood and affect. Her speech is normal and behavior is normal.      MOST RECENT LABS ON RECORD:   Lab Results   Component Value Date    WBC 6.2 08/10/2020    HGB 9.5 (L) 08/10/2020    HCT 30.3 (L) 08/10/2020     08/10/2020    CHOL 222 (H) 05/07/2019    TRIG 308 (H) 05/07/2019    HDL 61 05/07/2019    LDLDIRECT 76 05/15/2018    ALT 12 12/13/2019    AST 16 12/13/2019     09/14/2020    K 3.9 09/14/2020     09/14/2020    CREATININE 3.37 (H) 09/14/2020    BUN 34 (H) 09/14/2020    CO2 22 09/14/2020    TSH 2.17 03/25/2019    INR 1.0 11/02/2018    LABA1C 5.8 09/06/2019    LABMICR 3,897 (H) 11/18/2019    BNP 6 10/31/2013       ASSESSMENT:     1. Preop cardiovascular exam    2. Essential hypertension    3. Mixed hyperlipidemia    4. MARY (obstructive sleep apnea)    5.  Severe obesity (BMI >= 40) (HCC) (Cardizem CD) 120 mg once daily. · Continue hydralazine 50 mg twice daily. Hyperlipidemia: Mixed  Cholesterol Reduction Therapy: Continue Atorvastatin (Lipitor) 80 mg daily. · Obstructive sleep apnea:  · Counseled regarding Latter day use of the CPAP. · Counseled regarding diet, exercise and weight management. I also told Ms. Carmichael to continue her other medications and follow up with you as previously scheduled. FOLLOW UP:   I told Ms. Antwan Martell to call my office if she had any problems, but otherwise told her to No follow-ups on file. However, I would be happy to see her sooner should the need arise. Once again, thank you for allowing me to participate in this patients care. Please do not hesitate to contact me could I be of further assistance. Sincerely,  Farhat Pagan MD, F.A.C.C. [facility] Cardiology Specialist    [address]  Phone: [phone], Fax: [phone]     I believe that the risk of significant morbidity and mortality related to the patient's current medical conditions are: Intermediate. 40 minutes were spent with the patient and all of their questions were answered. The documentation recorded by the scribe, accurately and completely reflects the services I personally performed and the decisions made by me. Farhat Pagan MD, F.A.C.C.  September 25, 2020

## 2020-09-28 ENCOUNTER — HOSPITAL ENCOUNTER (OUTPATIENT)
Dept: NON INVASIVE DIAGNOSTICS | Age: 55
Discharge: HOME | End: 2020-09-28
Payer: MEDICARE

## 2020-09-28 ENCOUNTER — HOSPITAL ENCOUNTER (OUTPATIENT)
Dept: VASCULAR LAB | Age: 55
Discharge: HOME | End: 2020-09-30
Payer: MEDICARE

## 2020-09-28 LAB
LV EF: 55 %
LVEF MODALITY: NORMAL

## 2020-09-28 PROCEDURE — 6360000004 HC RX CONTRAST MEDICATION: Performed by: INTERNAL MEDICINE

## 2020-09-28 PROCEDURE — 93351 STRESS TTE COMPLETE: CPT

## 2020-09-28 PROCEDURE — 93970 EXTREMITY STUDY: CPT

## 2020-09-28 RX ORDER — CONJUGATED ESTROGENS 0.62 MG/1
TABLET, FILM COATED ORAL
Qty: 21 TABLET | Refills: 3 | Status: SHIPPED | OUTPATIENT
Start: 2020-09-28 | End: 2020-12-28 | Stop reason: SDUPTHER

## 2020-09-28 RX ADMIN — PERFLUTREN 2.2 MG: 6.52 INJECTION, SUSPENSION INTRAVENOUS at 12:23

## 2020-09-29 ENCOUNTER — TELEPHONE (OUTPATIENT)
Dept: CARDIOLOGY | Age: 55
End: 2020-09-29

## 2020-09-29 NOTE — TELEPHONE ENCOUNTER
----- Message from Roxanna Douglas MD sent at 9/28/2020  8:54 PM EDT -----  Stress echo is normal.  She is good to go for the surgery. Please call with questions and/or concerns.   Thank you

## 2020-10-05 ENCOUNTER — TELEPHONE (OUTPATIENT)
Dept: PRIMARY CARE CLINIC | Age: 55
End: 2020-10-05

## 2020-10-05 ENCOUNTER — HOSPITAL ENCOUNTER (OUTPATIENT)
Age: 55
Discharge: HOME | End: 2020-10-05
Payer: MEDICARE

## 2020-10-05 LAB
ALT SERPL-CCNC: 7 U/L (ref 5–33)
ANION GAP SERPL CALCULATED.3IONS-SCNC: 12 MMOL/L (ref 9–17)
AST SERPL-CCNC: 11 U/L
BUN BLDV-MCNC: 37 MG/DL (ref 6–20)
BUN/CREAT BLD: 11 (ref 9–20)
CALCIUM SERPL-MCNC: 8.8 MG/DL (ref 8.6–10.4)
CHLORIDE BLD-SCNC: 106 MMOL/L (ref 98–107)
CO2: 20 MMOL/L (ref 20–31)
CREAT SERPL-MCNC: 3.4 MG/DL (ref 0.5–0.9)
GFR AFRICAN AMERICAN: 17 ML/MIN
GFR NON-AFRICAN AMERICAN: 14 ML/MIN
GFR SERPL CREATININE-BSD FRML MDRD: ABNORMAL ML/MIN/{1.73_M2}
GFR SERPL CREATININE-BSD FRML MDRD: ABNORMAL ML/MIN/{1.73_M2}
GLUCOSE BLD-MCNC: 151 MG/DL (ref 70–99)
POTASSIUM SERPL-SCNC: 4.1 MMOL/L (ref 3.7–5.3)
SODIUM BLD-SCNC: 138 MMOL/L (ref 135–144)

## 2020-10-05 PROCEDURE — 84460 ALANINE AMINO (ALT) (SGPT): CPT

## 2020-10-05 PROCEDURE — 36415 COLL VENOUS BLD VENIPUNCTURE: CPT

## 2020-10-05 PROCEDURE — 84450 TRANSFERASE (AST) (SGOT): CPT

## 2020-10-05 PROCEDURE — 80048 BASIC METABOLIC PNL TOTAL CA: CPT

## 2020-10-16 ENCOUNTER — HOSPITAL ENCOUNTER (OUTPATIENT)
Dept: MRI IMAGING | Age: 55
Discharge: HOME | End: 2020-10-18
Payer: MEDICARE

## 2020-10-16 PROCEDURE — 72141 MRI NECK SPINE W/O DYE: CPT

## 2020-10-16 PROCEDURE — 70540 MRI ORBIT/FACE/NECK W/O DYE: CPT

## 2020-10-16 PROCEDURE — 72146 MRI CHEST SPINE W/O DYE: CPT

## 2020-10-27 RX ORDER — ATORVASTATIN CALCIUM 80 MG/1
80 TABLET, FILM COATED ORAL DAILY
Qty: 90 TABLET | Refills: 1 | Status: SHIPPED | OUTPATIENT
Start: 2020-10-27 | End: 2021-05-07

## 2020-10-27 NOTE — TELEPHONE ENCOUNTER
Phone call from  requesting a refill of Atorvastatin. Health Maintenance   Topic Date Due    Lipid screen  05/07/2020    Flu vaccine (1) 09/01/2020    A1C test (Diabetic or Prediabetic)  09/06/2020    HIV screen  03/10/2021 (Originally 9/5/1980)    Potassium monitoring  10/05/2021    Creatinine monitoring  10/05/2021    Breast cancer screen  10/06/2021    Pneumococcal 0-64 years Vaccine (3 of 3 - PPSV23) 05/10/2022    Colon cancer screen colonoscopy  05/02/2026    DTaP/Tdap/Td vaccine (2 - Td) 09/06/2028    Shingles Vaccine  Completed    Hepatitis C screen  Completed    Hepatitis A vaccine  Aged Out    Hepatitis B vaccine  Aged Out    Hib vaccine  Aged Out    Meningococcal (ACWY) vaccine  Aged Out             (applicable per patient's age: Cancer Screenings, Depression Screening, Fall Risk Screening, Immunizations)    Hemoglobin A1C (%)   Date Value   09/06/2019 5.8   09/06/2018 6.1   05/15/2018 5.5     Microalb/Crt.  Ratio (mcg/mg creat)   Date Value   11/18/2019 3,897 (H)     LDL Cholesterol (mg/dL)   Date Value   05/07/2019 99     AST (U/L)   Date Value   10/05/2020 11     ALT (U/L)   Date Value   10/05/2020 7     BUN (mg/dL)   Date Value   10/05/2020 37 (H)      (goal A1C is < 7)   (goal LDL is <100) need 30-50% reduction from baseline     BP Readings from Last 3 Encounters:   09/28/20 131/84   09/25/20 137/80   07/27/20 (!) 125/59    (goal /80)      All Future Testing planned in [facility]:  Lab Frequency Next Occurrence   CBC Auto Differential Once 12/08/2020   Lipid Panel Once 02/55/0421   Basic Metabolic Panel Once 81/94/7817   CBC Auto Differential Once 11/16/2020   Transfuse RBC TRANSFUSE 1 UNIT        Next Visit Date:  Future Appointments   Date Time Provider Tom Oneil   11/2/2020  2:40 PM ARISTIDES Lopez - CNP [facility]   11/23/2020 12:30 PM Qasim Miranda MD [facility] None   12/9/2020  9:00 AM SCHEDULE, [facility] MED ONC ROOM 9 [facility] MED ONC [place] Patient Active Problem List:     JESS (acute kidney injury) ([facility] [address]     Arteritis ([address]     ANCA-associated vasculitis flare(HCC)     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)     S/P DOMO-BSO     Endometrial hyperplasia without atypia, simple     Postural vertigo     Morbid obesity (HCC)     Allergic rhinitis     Iron deficiency anemia     Hyperlipidemia     Urinary frequency     MGUS (monoclonal gammopathy of unknown significance)     Anti-cyclic citrullinated peptide antibody positive     Bilateral hip pain     Bilateral knee pain     Cancer of uterus (HCC)     Elevated C-reactive protein (CRP)     Elevated white blood cell count, unspecified     Essential thrombocythemia (HCC)     Greater trochanteric bursitis of both hips     Microscopic polyangiitis (HCC)     Osteoarthritis of right acromioclavicular joint     Osteoarthritis of sacroiliac joint ([facility] [address]     Other intervertebral disc degeneration, thoracic region     Patient nonadherence     Pes anserinus bursitis of both knees     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

## 2020-11-02 ENCOUNTER — OFFICE VISIT (OUTPATIENT)
Dept: PRIMARY CARE CLINIC | Age: 55
End: 2020-11-02
Payer: MEDICARE

## 2020-11-02 VITALS
RESPIRATION RATE: 22 BRPM | DIASTOLIC BLOOD PRESSURE: 78 MMHG | BODY MASS INDEX: 47.06 KG/M2 | SYSTOLIC BLOOD PRESSURE: 138 MMHG | HEART RATE: 88 BPM | WEIGHT: 257.3 LBS | TEMPERATURE: 97.8 F

## 2020-11-02 PROCEDURE — 90688 IIV4 VACCINE SPLT 0.5 ML IM: CPT | Performed by: NURSE PRACTITIONER

## 2020-11-02 PROCEDURE — 83036 HEMOGLOBIN GLYCOSYLATED A1C: CPT | Performed by: NURSE PRACTITIONER

## 2020-11-02 PROCEDURE — G8482 FLU IMMUNIZE ORDER/ADMIN: HCPCS | Performed by: NURSE PRACTITIONER

## 2020-11-02 PROCEDURE — 90471 IMMUNIZATION ADMIN: CPT | Performed by: NURSE PRACTITIONER

## 2020-11-02 PROCEDURE — G8417 CALC BMI ABV UP PARAM F/U: HCPCS | Performed by: NURSE PRACTITIONER

## 2020-11-02 PROCEDURE — 3017F COLORECTAL CA SCREEN DOC REV: CPT | Performed by: NURSE PRACTITIONER

## 2020-11-02 PROCEDURE — 99214 OFFICE O/P EST MOD 30 MIN: CPT | Performed by: NURSE PRACTITIONER

## 2020-11-02 PROCEDURE — G8427 DOCREV CUR MEDS BY ELIG CLIN: HCPCS | Performed by: NURSE PRACTITIONER

## 2020-11-02 PROCEDURE — 1036F TOBACCO NON-USER: CPT | Performed by: NURSE PRACTITIONER

## 2020-11-02 RX ORDER — METOCLOPRAMIDE 5 MG/1
5 TABLET ORAL 2 TIMES DAILY
Qty: 60 TABLET | Refills: 0 | Status: SHIPPED | OUTPATIENT
Start: 2020-11-02 | End: 2020-11-30 | Stop reason: SDUPTHER

## 2020-11-02 ASSESSMENT — ENCOUNTER SYMPTOMS
SHORTNESS OF BREATH: 0
RHINORRHEA: 0
VOMITING: 0
WHEEZING: 0
SORE THROAT: 0
ORTHOPNEA: 0
COUGH: 0
DIARRHEA: 0
ABDOMINAL PAIN: 0
HEMATOCHEZIA: 0
FLATUS: 0
CONSTIPATION: 0
ABDOMINAL DISTENTION: 1
BLURRED VISION: 0
BELCHING: 0
NAUSEA: 0

## 2020-11-02 ASSESSMENT — PATIENT HEALTH QUESTIONNAIRE - PHQ9
SUM OF ALL RESPONSES TO PHQ9 QUESTIONS 1 & 2: 0
SUM OF ALL RESPONSES TO PHQ QUESTIONS 1-9: 0
1. LITTLE INTEREST OR PLEASURE IN DOING THINGS: 0
SUM OF ALL RESPONSES TO PHQ QUESTIONS 1-9: 0
2. FEELING DOWN, DEPRESSED OR HOPELESS: 0
SUM OF ALL RESPONSES TO PHQ QUESTIONS 1-9: 0

## 2020-11-02 ASSESSMENT — CROHNS DISEASE ACTIVITY INDEX (CDAI): CDAI SCORE: 0

## 2020-11-02 NOTE — PROGRESS NOTES
After obtaining consent, and per orders of Lupe Vargas, injection of Flu given in Right deltoid by Leif Escudero. Patient instructed to remain in clinic for 20 minutes afterwards, and to report any adverse reaction to me immediately. Vaccine Information Sheet, \"Influenza - Inactivated\"  given to Wanda Campbell, or parent/legal guardian of  Wanda Campbell and verbalized understanding. Patient responses:    Have you ever had a reaction to a flu vaccine? No  Are you able to eat eggs without adverse effects? Yes  Do you have any current illness? No  Have you ever had Guillian [place] Syndrome? No    Flu vaccine given per order. Please see immunization tab.

## 2020-11-02 NOTE — PATIENT INSTRUCTIONS
SURVEY:    You may be receiving a survey from [employer] regarding your visit today. Please complete the survey to enable us to provide the highest quality of care to you and your family. If you cannot score us a very good on any question, please call the office to discuss how we could have made your experience a very good one. Thank you. Patient Education        Influenza (Flu) Vaccine: Care Instructions  Your Care Instructions     Influenza (flu) is an infection in the lungs and breathing passages. It is caused by the influenza virus. There are different strains, or types, of the flu virus every year. The flu comes on quickly. It can cause a cough, stuffy nose, fever, chills, tiredness, and aches and pains. These symptoms may last up to 10 days. The flu can make you feel very sick, but most of the time it doesn't lead to other problems. But it can cause serious problems in people who are older or who have a long-term illness, such as heart disease or diabetes. You can help prevent the flu by getting a flu vaccine every year, as soon as it is available. You cannot get the flu from the vaccine. The vaccine prevents most cases of the flu. But even when the vaccine doesn't prevent the flu, it can make symptoms less severe and reduce the chance of problems from the flu. Sometimes, young children and people who have an immune system problem may have a slight fever or muscle aches or pains 6 to 12 hours after getting the shot. These symptoms usually last 1 or 2 days. Follow-up care is a key part of your treatment and safety. Be sure to make and go to all appointments, and call your doctor if you are having problems. It's also a good idea to know your test results and keep a list of the medicines you take. Who should get the flu vaccine? Everyone age 7 months or older should get a flu vaccine each year. It lowers the chance of getting and spreading the flu.  The vaccine is very important for people who are at high risk for getting other health problems from the flu. This includes:  · Anyone 48years of age or older. · People who live in a long-term care center, such as a nursing home. · All children 6 months through 25years of age. · Adults and children 6 months and older who have long-term heart or lung problems, such as asthma. · Adults and children 6 months and older who needed medical care or were in a hospital during the past year because of diabetes, chronic kidney disease, or a weak immune system (including HIV or AIDS). · Women who will be pregnant during the flu season. · People who have any condition that can make it hard to breathe or swallow (such as a brain injury or muscle disorders). · People who can give the flu to others who are at high risk for problems from the flu. This includes all health care workers and close contacts of people age 72 or older. Who should not get the flu vaccine? The person who gives the vaccine may tell you not to get it if you:  · Have a severe allergy to eggs or any part of the vaccine. · Have had a severe reaction to a flu vaccine in the past.  · Have had Guillain-Barré syndrome (GBS). · Are sick with a fever. (Get the vaccine when symptoms are gone.)  How can you care for yourself at home? · If you or your child has a sore arm or a slight fever after the shot, take an over-the-counter pain medicine, such as acetaminophen (Tylenol) or ibuprofen (Advil, Motrin). Read and follow all instructions on the label. Do not give aspirin to anyone younger than 20. It has been linked to Reye syndrome, a serious illness. · Do not take two or more pain medicines at the same time unless the doctor told you to. Many pain medicines have acetaminophen, which is Tylenol. Too much acetaminophen (Tylenol) can be harmful. When should you call for help? Call 911 anytime you think you may need emergency care.  For example, call if after getting the flu vaccine:    · You have symptoms of a severe reaction to the flu vaccine. Symptoms of a severe reaction may include:  ? Severe difficulty breathing. ? Sudden raised, red areas (hives) all over your body. ? Severe lightheadedness. Call your doctor now or seek immediate medical care if after getting the flu vaccine:    · You think you are having a reaction to the flu vaccine, such as a new fever. Watch closely for changes in your health, and be sure to contact your doctor if you have any problems. Where can you learn more? Go to https://[employer]. org and sign in to your [employer] account. Enter H855 in the [employer] box to learn more about \"Influenza (Flu) Vaccine: Care Instructions. \"     If you do not have an account, please click on the \"Sign Up Now\" link. Current as of: December 9, 2019               Content Version: 12.6  © 0527-8721 [employer]. Care instructions adapted under license by [facility] ([facility]). If you have questions about a medical condition or this instruction, always ask your healthcare professional. [address] any warranty or liability for your use of this information.

## 2020-11-02 NOTE — PROGRESS NOTES
Name: Kimberly Montez  : 1965         Chief Complaint:     Chief Complaint   Patient presents with    Hypertension     routine check    Bloated       History of Present Illness:      Kimberly Montez is a 54 y.o.  female who presents with Hypertension (routine check) and Srinivasan Valles is here today for a routine office visit. ESRD-patient will be starting dialysis within the next 2 weeks. She will be following with nephrology closely. Hypertension   This is a chronic problem. The current episode started more than 1 year ago. The problem has been gradually worsening since onset. The problem is uncontrolled. Associated symptoms include anxiety and peripheral edema. Pertinent negatives include no blurred vision, chest pain, headaches, malaise/fatigue, neck pain, orthopnea, palpitations, PND, shortness of breath or sweats. There are no associated agents to hypertension. Risk factors for coronary artery disease include family history, obesity, post-menopausal state and stress. Past treatments include angiotensin blockers, calcium channel blockers and diuretics. The current treatment provides mild improvement. Compliance problems include diet and exercise. Hypertensive end-organ damage includes kidney disease. There is no history of CAD/MI, CVA or heart failure. Identifiable causes of hypertension include chronic renal disease. Abdominal Pain   This is a recurrent problem. The current episode started more than 1 month ago. The onset quality is gradual. The problem occurs daily. The problem has been unchanged. The pain is located in the epigastric region. The pain is at a severity of 3/10. The pain is mild. The quality of the pain is a sensation of fullness. The abdominal pain does not radiate. Associated symptoms include anorexia.  Pertinent negatives include no arthralgias, belching, constipation, diarrhea, dysuria, fever, flatus, frequency, headaches, hematochezia, hematuria, melena, myalgias, nausea, vomiting or weight loss. The pain is aggravated by eating. The pain is relieved by nothing. She has tried nothing for the symptoms. The treatment provided no relief. Her past medical history is significant for abdominal surgery and GERD. There is no history of colon cancer, Crohn's disease, gallstones, irritable bowel syndrome, pancreatitis, PUD or ulcerative colitis. Past Medical History:     Past Medical History:   Diagnosis Date    [facility] [address]     Anemia     Arthritis     Chronic kidney disease     Depression with anxiety 9/8/2016    GERD (gastroesophageal reflux disease)     H/O echocardiogram 08/25/2016    EF 55%. Mild mitral regurgitation.  H/O tooth extraction 07/26/2017    Lower posterior right tooth.  History of blood transfusion     x3. Last one in May 2016    Hyperlipidemia     Hypertensive crisis 8/24/2016    Kidney stones     Sciatica     Vasculitis ([facility] [address]       Reviewed all health maintenance requirements and ordered appropriate tests  Health Maintenance Due   Topic Date Due    Lipid screen  05/07/2020    A1C test (Diabetic or Prediabetic)  09/06/2020       Past Surgical History:     Past Surgical History:   Procedure Laterality Date    BONE MARROW BIOPSY  5-23-16    Per Hematologist order @ [facility].  CHOLECYSTECTOMY      COLONOSCOPY  2016    ESOPHAGEAL DILATATION  9/12/2018    ESOPHAGEAL DILATATION performed by Teresa Rose MD at [address] [place]  11/09/2016    with tubes and ovaries    TOOTH EXTRACTION  07/26/2017    Right posterior lower tooth.  TUBAL LIGATION      UPPER GASTROINTESTINAL ENDOSCOPY  09/12/2018    -dede,bx(Chicas's esophagus,neg H-Pylori)hiatal hernia,grade 2 reflux esophagitis    UPPER GASTROINTESTINAL ENDOSCOPY N/A 9/12/2018    EGD BIOPSY performed by Teresa Rose MD at [address]        Medications:       Prior to Admission medications    Medication Sig Start Date End Date Taking?  Authorizing Provider   metoclopramide (REGLAN) 5 MG tablet Take 1 tablet by mouth 2 times daily 11/2/20  Yes ARISTIDES Wallace CNP   atorvastatin (LIPITOR) 80 MG tablet Take 1 tablet by mouth daily 10/27/20  Yes ARISTIDES Wallace - CNP   carvedilol (COREG) 12.5 MG tablet Take 1 tablet by mouth 2 times daily 10/26/20  Yes Karla Streeter MD   PREMARIN 0.625 MG tablet Take 1 tablet by mouth once daily 9/28/20  Yes ARISTIDES Bowser - SHELLIE   hydrALAZINE (APRESOLINE) 50 MG tablet TAKE 1 TABLET BY MOUTH THREE TIMES DAILY 8/31/20  Yes ARISTIDES Wallace CNP   pantoprazole (PROTONIX) 40 MG tablet Take 1 tablet by mouth twice daily 8/11/20  Yes ARISTIDES Wallace - CNP   meclizine (ANTIVERT) 25 MG tablet Take 1 tablet by mouth 3 times daily as needed for Dizziness 7/22/20  Yes ARISTIDES Wallace - CNP   furosemide (LASIX) 20 MG tablet Take 1 tablet by mouth 2 times daily 6/30/20 11/2/20 Yes Karla Streeter MD   butalbital-acetaminophen-caffeine (FIORICET, ESGIC) -40 MG per tablet Take 1 tablet by mouth every 6 hours as needed for Headaches 6/23/20  Yes Daly Vargas APRN - CNP   calcitRIOL (ROCALTROL) 0.25 MCG capsule Take 1 capsule by mouth daily 6/2/20  Yes Karla Streeter MD   DULoxetine (CYMBALTA) 30 MG extended release capsule TAKE 1 CAPSULE BY MOUTH ONCE DAILY 5/4/20  Yes Daly Vargas APRN - CNP   potassium chloride (KLOR-CON M) 20 MEQ extended release tablet Take 1 tablet by mouth daily 4/2/20  Yes Jose Patel MD   dilTIAZem (CARDIZEM CD) 120 MG extended release capsule TAKE 1 CAPSULE BY MOUTH ONCE DAILY 2/21/20  Yes Karla Streeter MD   losartan (COZAAR) 50 MG tablet TAKE 1 TABLET BY MOUTH TWICE DAILY 12/11/19  Yes Karla Streeter MD   Blood Pressure KIT 1 Units by Does not apply route daily 11/7/19  Yes Alex Bright, APRN - CNP   albuterol sulfate HFA (VENTOLIN HFA) 108 (90 Base) MCG/ACT inhaler Inhale 2 puffs into the lungs every 6 hours as needed for Wheezing 4/16/19  Yes Daly Vargas, APRN - CNP Incontinence Supply Disposable (PREVAIL ADULT BRIEF LARGE) MISC 3 each by Does not apply route daily 11/20/18  Yes Helen Hammans Might, APRN - CNP   saline nasal gel (AYR) GEL by Nasal route 2 times daily  Patient taking differently: by Nasal route as needed  11/6/18  Yes Cami Holden MD   sodium chloride (ALTAMIST SPRAY) 0.65 % nasal spray 1 spray by Nasal route as needed for Congestion 11/6/18  Yes Cami Holden MD   vitamin D (CHOLECALCIFEROL) 1000 UNIT TABS tablet Take 2,000 Units by mouth 2 times daily    Yes Historical Provider, MD   Docusate Calcium (STOOL SOFTENER PO) Take 100 mg by mouth as needed    Yes Historical Provider, MD   diphenhydrAMINE (BENADRYL) 25 MG capsule Take 25 mg by mouth every 6 hours as needed for Itching or Allergies Pt takes 2 capsules at HS \"due to hives from it being cold now\". Pt tho states these are NOT helping her this time. Yes Historical Provider, MD   predniSONE (DELTASONE) 5 MG tablet Take 5 mg by mouth daily    Historical Provider, MD   fluticasone (FLONASE) 50 MCG/ACT nasal spray 1 spray by Each Nostril route daily  Patient not taking: Reported on 11/2/2020 5/23/19   ARISTIDES Waller CNP        Allergies:       Patient has no known allergies. Social History:     Tobacco:    reports that she has never smoked. She has never used smokeless tobacco.  Alcohol:      reports no history of alcohol use. Drug Use:  reports no history of drug use. Family History:     Family History   Problem Relation Age of Onset   Olimpia Hallbs Cancer Mother     Diabetes Mother     Heart Disease Mother     High Blood Pressure Mother     Cancer Father         prostate    High Blood Pressure Father     Cancer Sister         ovarian    Diabetes Brother     Cancer Sister         ovarian    High Blood Pressure Brother     Diabetes Brother        Review of Systems:     Positive and Negative as described in HPI    Review of Systems   Constitutional: Positive for fatigue.  Negative for chills, fever, malaise/fatigue and weight loss. HENT: Negative for congestion, rhinorrhea and sore throat. Eyes: Negative for blurred vision and visual disturbance. Respiratory: Negative for cough, shortness of breath and wheezing. Cardiovascular: Negative for chest pain, palpitations, orthopnea and PND. Gastrointestinal: Positive for abdominal distention (bloating) and anorexia. Negative for abdominal pain, constipation, diarrhea, flatus, hematochezia, melena, nausea and vomiting. Genitourinary: Negative for difficulty urinating, dysuria, frequency and hematuria. Musculoskeletal: Negative for arthralgias, gait problem, myalgias, neck pain and neck stiffness. Skin: Negative for rash. Neurological: Negative for dizziness, syncope, light-headedness and headaches. Physical Exam:   Vitals:  /78 (Site: Right Lower Arm)   Pulse 88   Temp 97.8 °F (36.6 °C) (Temporal)   Resp 22   Wt 257 lb 4.8 oz (116.7 kg)   LMP 09/09/2016   BMI 47.06 kg/m²     Physical Exam  Vitals signs and nursing note reviewed. Constitutional:       General: She is not in acute distress. Appearance: She is obese. HENT:      Mouth/Throat:      Mouth: Mucous membranes are moist.   Eyes:      Conjunctiva/sclera: Conjunctivae normal.   Neck:      Musculoskeletal: Normal range of motion and neck supple. Cardiovascular:      Rate and Rhythm: Normal rate and regular rhythm. Pulmonary:      Effort: Pulmonary effort is normal.      Breath sounds: Normal breath sounds. No wheezing or rales. Abdominal:      General: Bowel sounds are normal. There is no distension. Palpations: Abdomen is soft. Tenderness: There is abdominal tenderness (mild upper abdomen). Musculoskeletal:      Right lower leg: Edema (trace) present. Left lower leg: Edema (trace) present. Lymphadenopathy:      Cervical: No cervical adenopathy. Skin:     General: Skin is warm and dry. Findings: No rash.    Neurological:      Mental Status: She is alert and oriented to person, place, and time. Psychiatric:         Mood and Affect: Mood normal.         Behavior: Behavior normal.         Data:     Lab Results   Component Value Date     10/05/2020    K 4.1 10/05/2020     10/05/2020    CO2 20 10/05/2020    BUN 37 10/05/2020    CREATININE 3.40 10/05/2020    GLUCOSE 151 10/05/2020    PROT 6.6 12/13/2019    LABALBU 3.3 12/13/2019    BILITOT 0.18 12/13/2019    ALKPHOS 106 12/13/2019    AST 11 10/05/2020    ALT 7 10/05/2020     Lab Results   Component Value Date    WBC 6.2 08/10/2020    RBC 3.25 08/10/2020    HGB 9.5 08/10/2020    HCT 30.3 08/10/2020    MCV 93.2 08/10/2020    MCH 29.2 08/10/2020    MCHC 31.4 08/10/2020    RDW 13.5 08/10/2020     08/10/2020    MPV 9.5 08/10/2020     Lab Results   Component Value Date    TSH 2.17 03/25/2019     Lab Results   Component Value Date    CHOL 222 05/07/2019    HDL 61 05/07/2019    LABA1C 5.8 09/06/2019       Assessment/Plan:      Diagnosis Orders   1. Essential hypertension     2. End stage renal disease (HCC)     3. Bloating  metoclopramide (REGLAN) 5 MG tablet   4. Need for vaccination  POCT glycosylated hemoglobin (Hb A1C)    INFLUENZA, QUADV, 3 YRS AND OLDER, IM, MDV, 0.5ML (Niesha Lyn)     We will try Reglan 5 mg twice daily for the suspected loading and gastroparesis due to her chronic conditions. She will also need to check with her nephrologist as she will be starting dialysis soon. 1.  Thomas Hernandez received counseling on the following healthy behaviors: nutrition, exercise and medication adherence  2. Patient given educational materials - see patient instructions  3. Was a self-tracking handout given in paper form or via [employer]? No  If yes, see orders or list here. 4.  Discussed use, benefit, and side effects of prescribed medications. Barriers to medication compliance addressed. All patient questions answered. Pt voiced understanding.    5.  Reviewed prior labs and health maintenance  6. Continue current medications, diet and exercise. Completed Refills   Requested Prescriptions     Signed Prescriptions Disp Refills    metoclopramide (REGLAN) 5 MG tablet 60 tablet 0     Sig: Take 1 tablet by mouth 2 times daily         Return in about 6 months (around 5/2/2021) for Check up.

## 2020-11-03 LAB — HBA1C MFR BLD: 5.6 %

## 2020-11-05 ENCOUNTER — TELEPHONE (OUTPATIENT)
Dept: PRIMARY CARE CLINIC | Age: 55
End: 2020-11-05

## 2020-11-23 ENCOUNTER — HOSPITAL ENCOUNTER (OUTPATIENT)
Age: 55
Discharge: HOME | End: 2020-11-23
Payer: MEDICARE

## 2020-11-23 LAB
ABSOLUTE EOS #: 0.25 K/UL (ref 0–0.44)
ABSOLUTE IMMATURE GRANULOCYTE: <0.03 K/UL (ref 0–0.3)
ABSOLUTE LYMPH #: 0.86 K/UL (ref 1.1–3.7)
ABSOLUTE MONO #: 0.56 K/UL (ref 0.1–1.2)
ANION GAP SERPL CALCULATED.3IONS-SCNC: 14 MMOL/L (ref 9–17)
BASOPHILS # BLD: 1 % (ref 0–2)
BASOPHILS ABSOLUTE: 0.04 K/UL (ref 0–0.2)
BUN BLDV-MCNC: 33 MG/DL (ref 6–20)
BUN/CREAT BLD: 10 (ref 9–20)
CALCIUM SERPL-MCNC: 8.8 MG/DL (ref 8.6–10.4)
CHLORIDE BLD-SCNC: 104 MMOL/L (ref 98–107)
CO2: 21 MMOL/L (ref 20–31)
CREAT SERPL-MCNC: 3.28 MG/DL (ref 0.5–0.9)
DIFFERENTIAL TYPE: ABNORMAL
EOSINOPHILS RELATIVE PERCENT: 5 % (ref 1–4)
GFR AFRICAN AMERICAN: 18 ML/MIN
GFR NON-AFRICAN AMERICAN: 15 ML/MIN
GFR SERPL CREATININE-BSD FRML MDRD: ABNORMAL ML/MIN/{1.73_M2}
GFR SERPL CREATININE-BSD FRML MDRD: ABNORMAL ML/MIN/{1.73_M2}
GLUCOSE BLD-MCNC: 116 MG/DL (ref 70–99)
HCT VFR BLD CALC: 29.1 % (ref 36.3–47.1)
HEMOGLOBIN: 9.1 G/DL (ref 11.9–15.1)
IMMATURE GRANULOCYTES: 0 %
LYMPHOCYTES # BLD: 16 % (ref 24–43)
MCH RBC QN AUTO: 29.6 PG (ref 25.2–33.5)
MCHC RBC AUTO-ENTMCNC: 31.3 G/DL (ref 28.4–34.8)
MCV RBC AUTO: 94.8 FL (ref 82.6–102.9)
MONOCYTES # BLD: 10 % (ref 3–12)
NRBC AUTOMATED: 0 PER 100 WBC
PDW BLD-RTO: 13.9 % (ref 11.8–14.4)
PLATELET # BLD: 242 K/UL (ref 138–453)
PLATELET ESTIMATE: ABNORMAL
PMV BLD AUTO: 10.8 FL (ref 8.1–13.5)
POTASSIUM SERPL-SCNC: 4.1 MMOL/L (ref 3.7–5.3)
RBC # BLD: 3.07 M/UL (ref 3.95–5.11)
RBC # BLD: ABNORMAL 10*6/UL
SEG NEUTROPHILS: 68 % (ref 36–65)
SEGMENTED NEUTROPHILS ABSOLUTE COUNT: 3.71 K/UL (ref 1.5–8.1)
SODIUM BLD-SCNC: 139 MMOL/L (ref 135–144)
WBC # BLD: 5.4 K/UL (ref 3.5–11.3)
WBC # BLD: ABNORMAL 10*3/UL

## 2020-11-23 PROCEDURE — 80048 BASIC METABOLIC PNL TOTAL CA: CPT

## 2020-11-23 PROCEDURE — 85025 COMPLETE CBC W/AUTO DIFF WBC: CPT

## 2020-11-23 PROCEDURE — 36415 COLL VENOUS BLD VENIPUNCTURE: CPT

## 2020-11-23 RX ORDER — GUAIFENESIN 600 MG/1
600 TABLET, EXTENDED RELEASE ORAL 2 TIMES DAILY
Qty: 30 TABLET | Refills: 0 | Status: SHIPPED | OUTPATIENT
Start: 2020-11-23 | End: 2022-01-10 | Stop reason: SDUPTHER

## 2020-11-23 RX ORDER — BUTALBITAL, ACETAMINOPHEN AND CAFFEINE 50; 325; 40 MG/1; MG/1; MG/1
1 TABLET ORAL EVERY 6 HOURS PRN
Qty: 120 TABLET | Refills: 0 | Status: SHIPPED | OUTPATIENT
Start: 2020-11-23 | End: 2021-03-11

## 2020-11-23 NOTE — TELEPHONE ENCOUNTER
Uday January states her sinuses are bothering her again and she would like a prescription for Mucinex 600 ER also

## 2020-11-30 RX ORDER — METOCLOPRAMIDE 5 MG/1
5 TABLET ORAL 2 TIMES DAILY
Qty: 60 TABLET | Refills: 0 | Status: SHIPPED | OUTPATIENT
Start: 2020-11-30 | End: 2020-12-28

## 2020-12-02 ENCOUNTER — OFFICE VISIT (OUTPATIENT)
Dept: ONCOLOGY | Age: 55
End: 2020-12-02
Payer: MEDICARE

## 2020-12-02 VITALS
SYSTOLIC BLOOD PRESSURE: 155 MMHG | HEIGHT: 62 IN | DIASTOLIC BLOOD PRESSURE: 84 MMHG | RESPIRATION RATE: 18 BRPM | WEIGHT: 253 LBS | TEMPERATURE: 97.2 F | BODY MASS INDEX: 46.56 KG/M2 | HEART RATE: 76 BPM

## 2020-12-02 PROBLEM — N18.5 ANEMIA IN STAGE 5 CHRONIC KIDNEY DISEASE, NOT ON CHRONIC DIALYSIS (HCC): Status: ACTIVE | Noted: 2020-12-02

## 2020-12-02 PROBLEM — D63.1 ANEMIA IN STAGE 5 CHRONIC KIDNEY DISEASE, NOT ON CHRONIC DIALYSIS (HCC): Status: ACTIVE | Noted: 2020-12-02

## 2020-12-02 PROCEDURE — 99214 OFFICE O/P EST MOD 30 MIN: CPT | Performed by: INTERNAL MEDICINE

## 2020-12-02 PROCEDURE — G8482 FLU IMMUNIZE ORDER/ADMIN: HCPCS | Performed by: INTERNAL MEDICINE

## 2020-12-02 PROCEDURE — G8417 CALC BMI ABV UP PARAM F/U: HCPCS | Performed by: INTERNAL MEDICINE

## 2020-12-02 PROCEDURE — G8427 DOCREV CUR MEDS BY ELIG CLIN: HCPCS | Performed by: INTERNAL MEDICINE

## 2020-12-02 PROCEDURE — 1036F TOBACCO NON-USER: CPT | Performed by: INTERNAL MEDICINE

## 2020-12-02 PROCEDURE — 3017F COLORECTAL CA SCREEN DOC REV: CPT | Performed by: INTERNAL MEDICINE

## 2020-12-02 RX ORDER — OXYCODONE HYDROCHLORIDE 5 MG/1
5 TABLET ORAL EVERY 6 HOURS PRN
COMMUNITY
Start: 2020-10-19 | End: 2021-01-07

## 2020-12-02 NOTE — PROGRESS NOTES
Chief Complaint   Patient presents with    Follow-up     anca vasculitis       DIAGNOSIS:       · Anemia, microcytic, Iron deficiency. · Anemia of chronic renal insufficiency  · MARIO  · Elevated kappa light chain immunoglobulins. Bone marrow negative for myeloma. · Vasculitis, ANCA associated necrotizing glomerulonephritis. CURRENT THERAPY:         Status post IV iron dextran June 2016. Received 4 doses of IV rituximab weekly, completed in December/2018  Plan maintenance rituximab every 6 months    BRIEF CASE HISTORY:       Tyree Wallis is a very pleasant 54 y.o. female who is referred to us for anemia and monoclonal protein. The patient has been running between 7 and 90 hemoglobin. White count and platelets are slightly elevated. MCV and MCH are low. However, she has other concerning signs including rising creatinine. Workup showed small monoclonal protein less than 0.1 g..    She is sent to us for a consultation, she is quite concerned about the findings. She has neutrophilic leukocytosis and microcytic anemia. She denies any active bleeding. urine analysis showed +4 proteinuria  The patient was started on Cytoxan induction. Then maintenance Imuran by nephrology. All treatments were stopped in March/2018  In late 2018, she was admitted to the hospital with worsening kidney function and vasculitis, she was seen by rheumatology, the decision was to proceed with rituximab. Because of distance, she decided to see that in our office. She completed 4 weekly doses of rituximab in December/2018. After that, and under rheumatology guidance, the plan was to give maintenance rituximab every 6 months. INTERIM HISTORY:   The patient seen for follow-up anemia . She developed worsening kidney function and fistula was inserted and she is planned to start on dialysis. She is feeling exhausted. Dialysis fistula was created and the plan is for dialysis in the next few months.   Hemoglobin has been dropping. Nephrologist sent her back to consider Aranesp  PAST MEDICAL HISTORY: has a past medical history of JESS (acute kidney injury) ([facility] [address] Anemia, Arthritis, Chronic kidney disease, Depression with anxiety, GERD (gastroesophageal reflux disease), H/O echocardiogram, H/O tooth extraction, History of blood transfusion, Hyperlipidemia, Hypertensive crisis, Kidney stones, Sciatica, and Vasculitis ([facility] [address] PAST SURGICAL HISTORY: has a past surgical history that includes Cholecystectomy; bone marrow biopsy (5-23-16); Tubal ligation; Colonoscopy (2016); Hysterectomy (11/09/2016); Tooth Extraction (07/26/2017); Upper gastrointestinal endoscopy (09/12/2018); Upper gastrointestinal endoscopy (N/A, 9/12/2018); Esophagus dilation (9/12/2018); and Port Surgery (Left, 10/2020). CURRENT MEDICATIONS:  has a current medication list which includes the following prescription(s): oxycodone, metoclopramide, losartan, calcitriol, butalbital-acetaminophen-caffeine, atorvastatin, carvedilol, premarin, hydralazine, pantoprazole, meclizine, furosemide, duloxetine, potassium chloride, diltiazem, blood pressure, prednisone, fluticasone, albuterol sulfate hfa, saline nasal gel, sodium chloride, vitamin d, docusate calcium, diphenhydramine, guaifenesin, and prevail adult brief large. ALLERGIES:  has No Known Allergies. FAMILY HISTORY: Negative for any hematological or oncological conditions. SOCIAL HISTORY:  reports that she has never smoked. She has never used smokeless tobacco. She reports that she does not drink alcohol or use drugs. REVIEW OF SYSTEMS:   General: no fever or night sweats, Weight is stable. + fatigue   ENT: No double or blurred vision, no tinnitus or hearing problem, no dysphagia or sore throat   Respiratory: No chest pain, no shortness of breath, no cough or hemoptysis. Cardiovascular: Denies chest pain, PND or orthopnea. No L E swelling or palpitations.    Gastrointestinal:    No nausea or vomiting, abdominal pain, diarrhea or constipation. Genitourinary: Denies dysuria, hematuria, frequency, urgency or incontinence. Neurological: Denies headaches, decreased LOC, no sensory or motor focal deficits. Musculoskeletal:  Diffuse aches and pains. Skin: There are no rashes or bleeding. Psychiatric:  No anxiety, no depression. Endocrine: no diabetes or thyroid disease. Hematologic: no bleeding , no adenopathy. PHYSICAL EXAM: Shows a well appearing 54y.o.-year-old female who is not in pain or distress. Vital Signs: Blood pressure (!) 155/84, pulse 76, temperature 97.2 °F (36.2 °C), temperature source Temporal, resp. rate 18, height 5' 2\" (1.575 m), weight 253 lb (114.8 kg), last menstrual period 09/09/2016, not currently breastfeeding. HEENT: Normocephalic and atraumatic. Pupils are equal, round, reactive to light and accommodation. Extraocular muscles are intact. Neck: Showed no JVD, no carotid bruit . Lungs: Clear to auscultation bilaterally. Heart: Regular without any murmur. Abdomen: Soft, nontender. No hepatosplenomegaly. Extremities: Lower extremities show no edema, clubbing, or cyanosis. Neuro exam: intact cranial nerves bilaterally no motor or sensory deficit, gait is normal. Lymphatic: no adenopathy appreciated in the supraclavicular, axillary, cervical or inguinal area  There is a fistula in   left forearm  Review of radiological studies:     Review of laboratory data:   Lab Results   Component Value Date    WBC 5.4 11/23/2020    HGB 9.1 (L) 11/23/2020    HCT 29.1 (L) 11/23/2020    MCV 94.8 11/23/2020     11/23/2020     Lab Results   Component Value Date    IRON 52 08/10/2020    TIBC 267 08/10/2020    FERRITIN 178 (H) 08/10/2020     Results for Eloy Duggan (MRN [id number]) as of 9/11/2019 14:15   Ref.  Range 10/6/2016 09:18 3/23/2018 10:28 9/5/2018 09:01 10/31/2018 09:33 8/27/2019 09:44   [place] Free Light Chains QNT Latest Ref Range: 0.37 - 1.94 mg/dL  8.78 (H) 11.13 (H)  3.64 (H) Lambda Free Light Chains QNT Latest Ref Range: 0.57 - 2.63 mg/dL  3.88 (H) 5.40 (H)  3.43 (H)   Free Kappa/Lambda Ratio Latest Ref Range: 0.26 - 1.65   2.26 (H) 2.06 (H)  1.06     Bone marrow test 5/23/16:  Final Diagnosis    BONE MARROW ASPIRATE, CLOT SECTION AND BIOPSY:    MATURING TRILINEAGE HEMATOPOIESIS, NORMAL CELLULARITY. PLASMA CELLS, 5% (POLYCLONAL). Kidney biopsy 5/27/16:  KIDNEY, CORE NEEDLE BIOPSY:    ANCA-ASSOCIATED, PAUCI-IMMUNE NECROTIZING GLOMERULONEPHRITIS   WITH CELLULAR / FIBROCELLULAR CRESCENTS IN 17 OF 33   GLOMERULI (46%). GLOBAL GLOMERULOSCLEROSIS IN 12 GLOMERULI (32%). MILD INTERSTITIAL LYMPHOHISTIOCYTIC INFLAMMATION. MILD INTERSTITIAL FIBROSIS AND TUBULAR ATROPHY (20%). -- Diagnosis Comment --    RENAL BIOPSY DEMONSTRATES ACTIVE NECROTIZING GLOMERULONEPHRITIS,  PAUCI-IMMUNE AND ANCA ASSOCIATED. OF 37 GLOMERULI 5 SHOW  FIBROCELLULAR CRESCENTS, 12 SHOW CELLULAR CRESCENTS AND 12 ARE  END-STAGING GLOBALLY SCLEROTIC. IMPRESSION:   · Anemia, microcytic, Iron deficiency. Corrected with IV iron  · Anemia of chronic renal insufficiency  · ANCA Vasculitis  · Elevated kappa light chain immunoglobulins. Bone marrow negative for myeloma. · Vasculitis, ANCA associated necrotizing glomerulonephritis. Treated with Cytoxan and then Imuran, then finally switched to rituximab in December/2018      Plan:   I reviewed the labs as above and discussed with the patient. We will confirm with the rheumatologist that she will need continued rituximab every 6 months  We plan to give her Aranesp 60 mcg every other week as needed to keep hemoglobin above 10  We will continue that until she starts on dialysis where her LINETTE will be given with dialysis  Otherwise I will plan to continue current therapy without changes.   We will check on her back in 6 months

## 2020-12-04 ENCOUNTER — HOSPITAL ENCOUNTER (OUTPATIENT)
Dept: LAB | Age: 55
Discharge: HOME | End: 2020-12-04
Payer: MEDICARE

## 2020-12-04 LAB
-: ABNORMAL
ABSOLUTE EOS #: 0.28 K/UL (ref 0–0.44)
ABSOLUTE IMMATURE GRANULOCYTE: 0 K/UL (ref 0–0.3)
ABSOLUTE LYMPH #: 0.5 K/UL (ref 1.1–3.7)
ABSOLUTE MONO #: 0.57 K/UL (ref 0.1–1.2)
ALBUMIN SERPL-MCNC: 3.7 G/DL (ref 3.5–5.2)
ALBUMIN/GLOBULIN RATIO: 1.2 (ref 1–2.5)
ALP BLD-CCNC: 100 U/L (ref 35–104)
ALT SERPL-CCNC: 8 U/L (ref 5–33)
AMORPHOUS: ABNORMAL
ANION GAP SERPL CALCULATED.3IONS-SCNC: 10 MMOL/L (ref 9–17)
AST SERPL-CCNC: 15 U/L
BACTERIA: ABNORMAL
BASOPHILS # BLD: 2 % (ref 0–2)
BASOPHILS ABSOLUTE: 0.14 K/UL (ref 0–0.2)
BILIRUB SERPL-MCNC: 0.19 MG/DL (ref 0.3–1.2)
BILIRUBIN URINE: NEGATIVE
BUN BLDV-MCNC: 39 MG/DL (ref 6–20)
BUN/CREAT BLD: 12 (ref 9–20)
C-REACTIVE PROTEIN: 30 MG/L (ref 0–5)
CALCIUM SERPL-MCNC: 9 MG/DL (ref 8.6–10.4)
CASTS UA: ABNORMAL /LPF
CHLORIDE BLD-SCNC: 105 MMOL/L (ref 98–107)
CO2: 21 MMOL/L (ref 20–31)
COLOR: YELLOW
COMMENT UA: ABNORMAL
CREAT SERPL-MCNC: 3.21 MG/DL (ref 0.5–0.9)
CRYSTALS, UA: ABNORMAL /HPF
DIFFERENTIAL TYPE: ABNORMAL
EOSINOPHILS RELATIVE PERCENT: 4 % (ref 1–4)
EPITHELIAL CELLS UA: ABNORMAL /HPF (ref 0–25)
GFR AFRICAN AMERICAN: 18 ML/MIN
GFR NON-AFRICAN AMERICAN: 15 ML/MIN
GFR SERPL CREATININE-BSD FRML MDRD: ABNORMAL ML/MIN/{1.73_M2}
GFR SERPL CREATININE-BSD FRML MDRD: ABNORMAL ML/MIN/{1.73_M2}
GLUCOSE BLD-MCNC: 122 MG/DL (ref 70–99)
GLUCOSE URINE: NEGATIVE
HCT VFR BLD CALC: 28 % (ref 36.3–47.1)
HEMOGLOBIN: 8.9 G/DL (ref 11.9–15.1)
IMMATURE GRANULOCYTES: 0 %
KETONES, URINE: NEGATIVE
LEUKOCYTE ESTERASE, URINE: NEGATIVE
LYMPHOCYTES # BLD: 7 % (ref 24–43)
MCH RBC QN AUTO: 30.1 PG (ref 25.2–33.5)
MCHC RBC AUTO-ENTMCNC: 31.8 G/DL (ref 28.4–34.8)
MCV RBC AUTO: 94.6 FL (ref 82.6–102.9)
MONOCYTES # BLD: 8 % (ref 3–12)
MORPHOLOGY: NORMAL
MUCUS: ABNORMAL
NITRITE, URINE: NEGATIVE
NRBC AUTOMATED: 0 PER 100 WBC
OTHER OBSERVATIONS UA: ABNORMAL
PDW BLD-RTO: 14 % (ref 11.8–14.4)
PH UA: 5.5 (ref 5–9)
PLATELET # BLD: 262 K/UL (ref 138–453)
PLATELET ESTIMATE: ABNORMAL
PMV BLD AUTO: 10 FL (ref 8.1–13.5)
POTASSIUM SERPL-SCNC: 4 MMOL/L (ref 3.7–5.3)
PROTEIN UA: ABNORMAL
RBC # BLD: 2.96 M/UL (ref 3.95–5.11)
RBC # BLD: ABNORMAL 10*6/UL
RBC UA: ABNORMAL /HPF (ref 0–2)
RENAL EPITHELIAL, UA: ABNORMAL /HPF
SEDIMENTATION RATE, ERYTHROCYTE: 90 MM (ref 0–20)
SEG NEUTROPHILS: 79 % (ref 36–65)
SEGMENTED NEUTROPHILS ABSOLUTE COUNT: 5.61 K/UL (ref 1.5–8.1)
SODIUM BLD-SCNC: 136 MMOL/L (ref 135–144)
SPECIFIC GRAVITY UA: 1.02 (ref 1.01–1.02)
TOTAL PROTEIN: 6.9 G/DL (ref 6.4–8.3)
TRICHOMONAS: ABNORMAL
TURBIDITY: CLEAR
URINE HGB: NEGATIVE
UROBILINOGEN, URINE: NORMAL
WBC # BLD: 7.1 K/UL (ref 3.5–11.3)
WBC # BLD: ABNORMAL 10*3/UL
WBC UA: ABNORMAL /HPF (ref 0–5)
YEAST: ABNORMAL

## 2020-12-04 PROCEDURE — 83516 IMMUNOASSAY NONANTIBODY: CPT

## 2020-12-04 PROCEDURE — 85651 RBC SED RATE NONAUTOMATED: CPT

## 2020-12-04 PROCEDURE — 85025 COMPLETE CBC W/AUTO DIFF WBC: CPT

## 2020-12-04 PROCEDURE — 80053 COMPREHEN METABOLIC PANEL: CPT

## 2020-12-04 PROCEDURE — 86140 C-REACTIVE PROTEIN: CPT

## 2020-12-04 PROCEDURE — 81001 URINALYSIS AUTO W/SCOPE: CPT

## 2020-12-04 PROCEDURE — 36415 COLL VENOUS BLD VENIPUNCTURE: CPT

## 2020-12-08 LAB
ANCA MYELOPEROXIDASE: 6 AU/ML
ANCA PROTEINASE 3: 5 AU/ML

## 2020-12-08 RX ORDER — METHYLPREDNISOLONE SODIUM SUCCINATE 125 MG/2ML
125 INJECTION, POWDER, LYOPHILIZED, FOR SOLUTION INTRAMUSCULAR; INTRAVENOUS ONCE
Status: CANCELLED
Start: 2020-12-09

## 2020-12-08 RX ORDER — DIPHENHYDRAMINE HCL 50 MG
50 CAPSULE ORAL ONCE
Status: CANCELLED
Start: 2020-12-09

## 2020-12-08 RX ORDER — ACETAMINOPHEN 500 MG
1000 TABLET ORAL ONCE
Status: CANCELLED
Start: 2020-12-09

## 2020-12-09 ENCOUNTER — HOSPITAL ENCOUNTER (OUTPATIENT)
Dept: LAB | Age: 55
Discharge: HOME | End: 2020-12-09
Payer: MEDICARE

## 2020-12-09 LAB
ABSOLUTE EOS #: 0.3 K/UL (ref 0–0.44)
ABSOLUTE IMMATURE GRANULOCYTE: <0.03 K/UL (ref 0–0.3)
ABSOLUTE LYMPH #: 0.78 K/UL (ref 1.1–3.7)
ABSOLUTE MONO #: 0.61 K/UL (ref 0.1–1.2)
BASOPHILS # BLD: 1 % (ref 0–2)
BASOPHILS ABSOLUTE: 0.06 K/UL (ref 0–0.2)
DIFFERENTIAL TYPE: ABNORMAL
EOSINOPHILS RELATIVE PERCENT: 4 % (ref 1–4)
FERRITIN: 169 UG/L (ref 13–150)
HCT VFR BLD CALC: 28.6 % (ref 36.3–47.1)
HEMOGLOBIN: 8.9 G/DL (ref 11.9–15.1)
IMMATURE GRANULOCYTES: 0 %
IRON SATURATION: 16 % (ref 20–55)
IRON: 45 UG/DL (ref 37–145)
LYMPHOCYTES # BLD: 11 % (ref 24–43)
MCH RBC QN AUTO: 29.8 PG (ref 25.2–33.5)
MCHC RBC AUTO-ENTMCNC: 31.1 G/DL (ref 28.4–34.8)
MCV RBC AUTO: 95.7 FL (ref 82.6–102.9)
MONOCYTES # BLD: 9 % (ref 3–12)
NRBC AUTOMATED: 0 PER 100 WBC
PDW BLD-RTO: 13.9 % (ref 11.8–14.4)
PLATELET # BLD: 285 K/UL (ref 138–453)
PLATELET ESTIMATE: ABNORMAL
PMV BLD AUTO: 9.9 FL (ref 8.1–13.5)
RBC # BLD: 2.99 M/UL (ref 3.95–5.11)
RBC # BLD: ABNORMAL 10*6/UL
SEG NEUTROPHILS: 75 % (ref 36–65)
SEGMENTED NEUTROPHILS ABSOLUTE COUNT: 5.38 K/UL (ref 1.5–8.1)
TOTAL IRON BINDING CAPACITY: 276 UG/DL (ref 250–450)
UNSATURATED IRON BINDING CAPACITY: 231 UG/DL (ref 112–347)
WBC # BLD: 7.2 K/UL (ref 3.5–11.3)
WBC # BLD: ABNORMAL 10*3/UL

## 2020-12-09 PROCEDURE — 82728 ASSAY OF FERRITIN: CPT

## 2020-12-09 PROCEDURE — 83540 ASSAY OF IRON: CPT

## 2020-12-09 PROCEDURE — 83550 IRON BINDING TEST: CPT

## 2020-12-09 PROCEDURE — 85025 COMPLETE CBC W/AUTO DIFF WBC: CPT

## 2020-12-09 PROCEDURE — 36415 COLL VENOUS BLD VENIPUNCTURE: CPT

## 2020-12-10 RX ORDER — EPINEPHRINE 1 MG/ML
0.3 INJECTION, SOLUTION, CONCENTRATE INTRAVENOUS PRN
Status: CANCELLED | OUTPATIENT
Start: 2020-12-14

## 2020-12-10 RX ORDER — HEPARIN SODIUM (PORCINE) LOCK FLUSH IV SOLN 100 UNIT/ML 100 UNIT/ML
500 SOLUTION INTRAVENOUS PRN
Status: CANCELLED | OUTPATIENT
Start: 2020-12-14

## 2020-12-10 RX ORDER — SODIUM CHLORIDE 0.9 % (FLUSH) 0.9 %
10 SYRINGE (ML) INJECTION PRN
Status: CANCELLED | OUTPATIENT
Start: 2020-12-14

## 2020-12-10 RX ORDER — DIPHENHYDRAMINE HYDROCHLORIDE 50 MG/ML
50 INJECTION INTRAMUSCULAR; INTRAVENOUS ONCE
Status: CANCELLED | OUTPATIENT
Start: 2020-12-14

## 2020-12-10 RX ORDER — SODIUM CHLORIDE 9 MG/ML
INJECTION, SOLUTION INTRAVENOUS CONTINUOUS
Status: CANCELLED | OUTPATIENT
Start: 2020-12-14

## 2020-12-10 RX ORDER — SODIUM CHLORIDE 0.9 % (FLUSH) 0.9 %
5 SYRINGE (ML) INJECTION PRN
Status: CANCELLED | OUTPATIENT
Start: 2020-12-14

## 2020-12-10 RX ORDER — METHYLPREDNISOLONE SODIUM SUCCINATE 125 MG/2ML
125 INJECTION, POWDER, LYOPHILIZED, FOR SOLUTION INTRAMUSCULAR; INTRAVENOUS ONCE
Status: CANCELLED | OUTPATIENT
Start: 2020-12-14

## 2020-12-10 RX ORDER — MEPERIDINE HYDROCHLORIDE 50 MG/ML
12.5 INJECTION INTRAMUSCULAR; INTRAVENOUS; SUBCUTANEOUS ONCE
Status: CANCELLED | OUTPATIENT
Start: 2020-12-14

## 2020-12-14 ENCOUNTER — HOSPITAL ENCOUNTER (OUTPATIENT)
Dept: INFUSION THERAPY | Age: 55
Discharge: HOME | End: 2020-12-14
Payer: MEDICARE

## 2020-12-14 VITALS
DIASTOLIC BLOOD PRESSURE: 62 MMHG | HEIGHT: 62 IN | BODY MASS INDEX: 46.74 KG/M2 | SYSTOLIC BLOOD PRESSURE: 131 MMHG | TEMPERATURE: 96.5 F | HEART RATE: 78 BPM | RESPIRATION RATE: 18 BRPM | WEIGHT: 254 LBS

## 2020-12-14 VITALS — SYSTOLIC BLOOD PRESSURE: 145 MMHG | DIASTOLIC BLOOD PRESSURE: 66 MMHG | HEART RATE: 84 BPM

## 2020-12-14 DIAGNOSIS — N18.6 END STAGE RENAL DISEASE (HCC): ICD-10-CM

## 2020-12-14 DIAGNOSIS — N18.5 ANEMIA IN STAGE 5 CHRONIC KIDNEY DISEASE, NOT ON CHRONIC DIALYSIS (HCC): Primary | ICD-10-CM

## 2020-12-14 DIAGNOSIS — D63.1 ANEMIA IN STAGE 5 CHRONIC KIDNEY DISEASE, NOT ON CHRONIC DIALYSIS (HCC): Primary | ICD-10-CM

## 2020-12-14 DIAGNOSIS — M31.7 MICROSCOPIC POLYANGIITIS (HCC): Primary | ICD-10-CM

## 2020-12-14 DIAGNOSIS — R76.8 SCL-70 ANTIBODY POSITIVE: ICD-10-CM

## 2020-12-14 LAB
ABSOLUTE EOS #: 0.33 K/UL (ref 0–0.44)
ABSOLUTE IMMATURE GRANULOCYTE: 0.03 K/UL (ref 0–0.3)
ABSOLUTE LYMPH #: 0.88 K/UL (ref 1.1–3.7)
ABSOLUTE MONO #: 0.43 K/UL (ref 0.1–1.2)
BASOPHILS # BLD: 1 % (ref 0–2)
BASOPHILS ABSOLUTE: 0.04 K/UL (ref 0–0.2)
DIFFERENTIAL TYPE: ABNORMAL
EOSINOPHILS RELATIVE PERCENT: 5 % (ref 1–4)
HCT VFR BLD CALC: 28.6 % (ref 36.3–47.1)
HEMOGLOBIN: 9 G/DL (ref 11.9–15.1)
IMMATURE GRANULOCYTES: 0 %
LYMPHOCYTES # BLD: 12 % (ref 24–43)
MCH RBC QN AUTO: 29.5 PG (ref 25.2–33.5)
MCHC RBC AUTO-ENTMCNC: 31.5 G/DL (ref 28.4–34.8)
MCV RBC AUTO: 93.8 FL (ref 82.6–102.9)
MONOCYTES # BLD: 6 % (ref 3–12)
MYELOPEROXIDASE AB: 0 AU/ML (ref 0–19)
NRBC AUTOMATED: 0 PER 100 WBC
PDW BLD-RTO: 13.8 % (ref 11.8–14.4)
PLATELET # BLD: 281 K/UL (ref 138–453)
PLATELET ESTIMATE: ABNORMAL
PMV BLD AUTO: 10.8 FL (ref 8.1–13.5)
RBC # BLD: 3.05 M/UL (ref 3.95–5.11)
RBC # BLD: ABNORMAL 10*6/UL
SEG NEUTROPHILS: 76 % (ref 36–65)
SEGMENTED NEUTROPHILS ABSOLUTE COUNT: 5.67 K/UL (ref 1.5–8.1)
WBC # BLD: 7.4 K/UL (ref 3.5–11.3)
WBC # BLD: ABNORMAL 10*3/UL

## 2020-12-14 PROCEDURE — 2580000003 HC RX 258: Performed by: INTERNAL MEDICINE

## 2020-12-14 PROCEDURE — 96365 THER/PROPH/DIAG IV INF INIT: CPT

## 2020-12-14 PROCEDURE — 6360000002 HC RX W HCPCS: Performed by: INTERNAL MEDICINE

## 2020-12-14 PROCEDURE — 96375 TX/PRO/DX INJ NEW DRUG ADDON: CPT

## 2020-12-14 PROCEDURE — 85025 COMPLETE CBC W/AUTO DIFF WBC: CPT

## 2020-12-14 PROCEDURE — 6370000000 HC RX 637 (ALT 250 FOR IP): Performed by: INTERNAL MEDICINE

## 2020-12-14 PROCEDURE — 96413 CHEMO IV INFUSION 1 HR: CPT

## 2020-12-14 PROCEDURE — 96415 CHEMO IV INFUSION ADDL HR: CPT

## 2020-12-14 PROCEDURE — 96372 THER/PROPH/DIAG INJ SC/IM: CPT

## 2020-12-14 RX ORDER — HEPARIN SODIUM (PORCINE) LOCK FLUSH IV SOLN 100 UNIT/ML 100 UNIT/ML
500 SOLUTION INTRAVENOUS PRN
Status: CANCELLED | OUTPATIENT
Start: 2021-06-09

## 2020-12-14 RX ORDER — SODIUM CHLORIDE 9 MG/ML
INJECTION, SOLUTION INTRAVENOUS CONTINUOUS
Status: DISCONTINUED | OUTPATIENT
Start: 2020-12-14 | End: 2020-12-15 | Stop reason: HOSPADM

## 2020-12-14 RX ORDER — SODIUM CHLORIDE 9 MG/ML
INJECTION, SOLUTION INTRAVENOUS CONTINUOUS
Status: CANCELLED | OUTPATIENT
Start: 2020-12-21

## 2020-12-14 RX ORDER — SODIUM CHLORIDE 0.9 % (FLUSH) 0.9 %
10 SYRINGE (ML) INJECTION PRN
Status: CANCELLED | OUTPATIENT
Start: 2020-12-21

## 2020-12-14 RX ORDER — DIPHENHYDRAMINE HCL 25 MG
50 CAPSULE ORAL ONCE
Status: CANCELLED
Start: 2021-06-09 | End: 2021-06-09

## 2020-12-14 RX ORDER — METHYLPREDNISOLONE SODIUM SUCCINATE 125 MG/2ML
125 INJECTION, POWDER, LYOPHILIZED, FOR SOLUTION INTRAMUSCULAR; INTRAVENOUS ONCE
Status: CANCELLED | OUTPATIENT
Start: 2020-12-21 | End: 2020-12-21

## 2020-12-14 RX ORDER — SODIUM CHLORIDE 0.9 % (FLUSH) 0.9 %
5 SYRINGE (ML) INJECTION PRN
Status: CANCELLED | OUTPATIENT
Start: 2020-12-21

## 2020-12-14 RX ORDER — EPINEPHRINE 1 MG/ML
0.3 INJECTION, SOLUTION, CONCENTRATE INTRAVENOUS PRN
Status: CANCELLED | OUTPATIENT
Start: 2020-12-21

## 2020-12-14 RX ORDER — METHYLPREDNISOLONE SODIUM SUCCINATE 125 MG/2ML
125 INJECTION, POWDER, LYOPHILIZED, FOR SOLUTION INTRAMUSCULAR; INTRAVENOUS ONCE
Status: CANCELLED
Start: 2021-06-09 | End: 2021-06-09

## 2020-12-14 RX ORDER — SODIUM CHLORIDE 9 MG/ML
INJECTION, SOLUTION INTRAVENOUS CONTINUOUS
Status: DISCONTINUED | OUTPATIENT
Start: 2020-12-14 | End: 2020-12-14

## 2020-12-14 RX ORDER — ACETAMINOPHEN 500 MG
1000 TABLET ORAL ONCE
Status: CANCELLED
Start: 2021-06-09 | End: 2021-06-09

## 2020-12-14 RX ORDER — SODIUM CHLORIDE 0.9 % (FLUSH) 0.9 %
5 SYRINGE (ML) INJECTION PRN
Status: CANCELLED | OUTPATIENT
Start: 2021-06-09

## 2020-12-14 RX ORDER — DIPHENHYDRAMINE HYDROCHLORIDE 50 MG/ML
50 INJECTION INTRAMUSCULAR; INTRAVENOUS ONCE
Status: CANCELLED | OUTPATIENT
Start: 2021-06-09 | End: 2021-06-09

## 2020-12-14 RX ORDER — DIPHENHYDRAMINE HYDROCHLORIDE 50 MG/ML
50 INJECTION INTRAMUSCULAR; INTRAVENOUS ONCE
Status: CANCELLED | OUTPATIENT
Start: 2020-12-21 | End: 2020-12-21

## 2020-12-14 RX ORDER — METHYLPREDNISOLONE SODIUM SUCCINATE 125 MG/2ML
100 INJECTION, POWDER, LYOPHILIZED, FOR SOLUTION INTRAMUSCULAR; INTRAVENOUS ONCE
Status: COMPLETED | OUTPATIENT
Start: 2020-12-14 | End: 2020-12-14

## 2020-12-14 RX ORDER — SODIUM CHLORIDE 9 MG/ML
INJECTION, SOLUTION INTRAVENOUS CONTINUOUS
Status: CANCELLED | OUTPATIENT
Start: 2021-06-09

## 2020-12-14 RX ORDER — SODIUM CHLORIDE 9 MG/ML
INJECTION, SOLUTION INTRAVENOUS CONTINUOUS
Status: CANCELLED
Start: 2021-06-09

## 2020-12-14 RX ORDER — MEPERIDINE HYDROCHLORIDE 25 MG/ML
12.5 INJECTION INTRAMUSCULAR; INTRAVENOUS; SUBCUTANEOUS ONCE
Status: CANCELLED | OUTPATIENT
Start: 2020-12-21 | End: 2020-12-21

## 2020-12-14 RX ORDER — EPINEPHRINE 1 MG/ML
0.3 INJECTION, SOLUTION, CONCENTRATE INTRAVENOUS PRN
Status: CANCELLED | OUTPATIENT
Start: 2021-06-09

## 2020-12-14 RX ORDER — 0.9 % SODIUM CHLORIDE 0.9 %
10 VIAL (ML) INJECTION ONCE
Status: CANCELLED | OUTPATIENT
Start: 2021-06-09 | End: 2021-06-09

## 2020-12-14 RX ORDER — HEPARIN SODIUM (PORCINE) LOCK FLUSH IV SOLN 100 UNIT/ML 100 UNIT/ML
500 SOLUTION INTRAVENOUS PRN
Status: CANCELLED | OUTPATIENT
Start: 2020-12-21

## 2020-12-14 RX ORDER — ACETAMINOPHEN 500 MG
1000 TABLET ORAL ONCE
Status: COMPLETED | OUTPATIENT
Start: 2020-12-14 | End: 2020-12-14

## 2020-12-14 RX ORDER — DIPHENHYDRAMINE HCL 25 MG
50 CAPSULE ORAL ONCE
Status: COMPLETED | OUTPATIENT
Start: 2020-12-14 | End: 2020-12-14

## 2020-12-14 RX ORDER — SODIUM CHLORIDE 0.9 % (FLUSH) 0.9 %
10 SYRINGE (ML) INJECTION PRN
Status: CANCELLED | OUTPATIENT
Start: 2021-06-09

## 2020-12-14 RX ORDER — METHYLPREDNISOLONE SODIUM SUCCINATE 125 MG/2ML
125 INJECTION, POWDER, LYOPHILIZED, FOR SOLUTION INTRAMUSCULAR; INTRAVENOUS ONCE
Status: CANCELLED | OUTPATIENT
Start: 2021-06-09 | End: 2021-06-09

## 2020-12-14 RX ORDER — SODIUM CHLORIDE 0.9 % (FLUSH) 0.9 %
10 SYRINGE (ML) INJECTION PRN
Status: DISCONTINUED | OUTPATIENT
Start: 2020-12-14 | End: 2020-12-15 | Stop reason: HOSPADM

## 2020-12-14 RX ADMIN — IRON SUCROSE 200 MG: 20 INJECTION, SOLUTION INTRAVENOUS at 11:35

## 2020-12-14 RX ADMIN — METHYLPREDNISOLONE SODIUM SUCCINATE 100 MG: 125 INJECTION, POWDER, FOR SOLUTION INTRAMUSCULAR; INTRAVENOUS at 08:23

## 2020-12-14 RX ADMIN — ACETAMINOPHEN 1000 MG: 500 TABLET, FILM COATED ORAL at 08:21

## 2020-12-14 RX ADMIN — DIPHENHYDRAMINE HYDROCHLORIDE 50 MG: 25 CAPSULE ORAL at 08:21

## 2020-12-14 RX ADMIN — DARBEPOETIN ALFA 60 MCG: 60 INJECTION, SOLUTION INTRAVENOUS; SUBCUTANEOUS at 11:36

## 2020-12-14 RX ADMIN — SODIUM CHLORIDE: 9 INJECTION, SOLUTION INTRAVENOUS at 08:19

## 2020-12-14 RX ADMIN — Medication 10 ML: at 08:10

## 2020-12-14 RX ADMIN — RITUXIMAB 500 MG: 10 INJECTION, SOLUTION INTRAVENOUS at 09:02

## 2020-12-14 ASSESSMENT — PAIN SCALES - GENERAL: PAINLEVEL_OUTOF10: 0

## 2020-12-16 ENCOUNTER — TELEPHONE (OUTPATIENT)
Dept: PRIMARY CARE CLINIC | Age: 55
End: 2020-12-16

## 2020-12-18 ENCOUNTER — HOSPITAL ENCOUNTER (OUTPATIENT)
Age: 55
Discharge: HOME | End: 2020-12-18
Payer: MEDICARE

## 2020-12-18 LAB
ABSOLUTE EOS #: 0.22 K/UL (ref 0–0.44)
ABSOLUTE IMMATURE GRANULOCYTE: 0.05 K/UL (ref 0–0.3)
ABSOLUTE LYMPH #: 0.77 K/UL (ref 1.1–3.7)
ABSOLUTE MONO #: 0.62 K/UL (ref 0.1–1.2)
ANION GAP SERPL CALCULATED.3IONS-SCNC: 11 MMOL/L (ref 9–17)
BASOPHILS # BLD: 1 % (ref 0–2)
BASOPHILS ABSOLUTE: 0.06 K/UL (ref 0–0.2)
BUN BLDV-MCNC: 52 MG/DL (ref 6–20)
BUN/CREAT BLD: 16 (ref 9–20)
CALCIUM SERPL-MCNC: 9.3 MG/DL (ref 8.6–10.4)
CHLORIDE BLD-SCNC: 106 MMOL/L (ref 98–107)
CO2: 20 MMOL/L (ref 20–31)
CREAT SERPL-MCNC: 3.26 MG/DL (ref 0.5–0.9)
DIFFERENTIAL TYPE: ABNORMAL
EOSINOPHILS RELATIVE PERCENT: 3 % (ref 1–4)
FERRITIN: 243 UG/L (ref 13–150)
GFR AFRICAN AMERICAN: 18 ML/MIN
GFR NON-AFRICAN AMERICAN: 15 ML/MIN
GFR SERPL CREATININE-BSD FRML MDRD: ABNORMAL ML/MIN/{1.73_M2}
GFR SERPL CREATININE-BSD FRML MDRD: ABNORMAL ML/MIN/{1.73_M2}
GLUCOSE BLD-MCNC: 108 MG/DL (ref 70–99)
HCT VFR BLD CALC: 29.1 % (ref 36.3–47.1)
HEMOGLOBIN: 9.2 G/DL (ref 11.9–15.1)
IMMATURE GRANULOCYTES: 1 %
IRON SATURATION: 18 % (ref 20–55)
IRON: 51 UG/DL (ref 37–145)
LYMPHOCYTES # BLD: 10 % (ref 24–43)
MCH RBC QN AUTO: 29.8 PG (ref 25.2–33.5)
MCHC RBC AUTO-ENTMCNC: 31.6 G/DL (ref 28.4–34.8)
MCV RBC AUTO: 94.2 FL (ref 82.6–102.9)
MONOCYTES # BLD: 8 % (ref 3–12)
NRBC AUTOMATED: 0 PER 100 WBC
PDW BLD-RTO: 14.3 % (ref 11.8–14.4)
PLATELET # BLD: 271 K/UL (ref 138–453)
PLATELET ESTIMATE: ABNORMAL
PMV BLD AUTO: 9.8 FL (ref 8.1–13.5)
POTASSIUM SERPL-SCNC: 4 MMOL/L (ref 3.7–5.3)
RBC # BLD: 3.09 M/UL (ref 3.95–5.11)
RBC # BLD: ABNORMAL 10*6/UL
SEG NEUTROPHILS: 77 % (ref 36–65)
SEGMENTED NEUTROPHILS ABSOLUTE COUNT: 6.15 K/UL (ref 1.5–8.1)
SODIUM BLD-SCNC: 137 MMOL/L (ref 135–144)
TOTAL IRON BINDING CAPACITY: 284 UG/DL (ref 250–450)
UNSATURATED IRON BINDING CAPACITY: 233 UG/DL (ref 112–347)
WBC # BLD: 7.9 K/UL (ref 3.5–11.3)
WBC # BLD: ABNORMAL 10*3/UL

## 2020-12-18 PROCEDURE — 80048 BASIC METABOLIC PNL TOTAL CA: CPT

## 2020-12-18 PROCEDURE — 85025 COMPLETE CBC W/AUTO DIFF WBC: CPT

## 2020-12-18 PROCEDURE — 83550 IRON BINDING TEST: CPT

## 2020-12-18 PROCEDURE — 82728 ASSAY OF FERRITIN: CPT

## 2020-12-18 PROCEDURE — 83540 ASSAY OF IRON: CPT

## 2020-12-18 PROCEDURE — 36415 COLL VENOUS BLD VENIPUNCTURE: CPT

## 2020-12-28 ENCOUNTER — HOSPITAL ENCOUNTER (OUTPATIENT)
Age: 55
Discharge: HOME | End: 2020-12-28
Payer: MEDICARE

## 2020-12-28 ENCOUNTER — HOSPITAL ENCOUNTER (OUTPATIENT)
Dept: INFUSION THERAPY | Age: 55
Discharge: HOME | End: 2020-12-28
Payer: MEDICARE

## 2020-12-28 VITALS
TEMPERATURE: 96 F | RESPIRATION RATE: 18 BRPM | DIASTOLIC BLOOD PRESSURE: 80 MMHG | SYSTOLIC BLOOD PRESSURE: 150 MMHG | HEART RATE: 86 BPM

## 2020-12-28 DIAGNOSIS — N18.5 ANEMIA IN STAGE 5 CHRONIC KIDNEY DISEASE, NOT ON CHRONIC DIALYSIS (HCC): Primary | ICD-10-CM

## 2020-12-28 DIAGNOSIS — D63.1 ANEMIA IN STAGE 5 CHRONIC KIDNEY DISEASE, NOT ON CHRONIC DIALYSIS (HCC): Primary | ICD-10-CM

## 2020-12-28 DIAGNOSIS — N18.6 END STAGE RENAL DISEASE (HCC): ICD-10-CM

## 2020-12-28 LAB
ABSOLUTE EOS #: 0.22 K/UL (ref 0–0.44)
ABSOLUTE IMMATURE GRANULOCYTE: <0.03 K/UL (ref 0–0.3)
ABSOLUTE LYMPH #: 0.75 K/UL (ref 1.1–3.7)
ABSOLUTE MONO #: 0.54 K/UL (ref 0.1–1.2)
BASOPHILS # BLD: 1 % (ref 0–2)
BASOPHILS ABSOLUTE: 0.05 K/UL (ref 0–0.2)
DIFFERENTIAL TYPE: ABNORMAL
EOSINOPHILS RELATIVE PERCENT: 3 % (ref 1–4)
HCT VFR BLD CALC: 29.6 % (ref 36.3–47.1)
HEMOGLOBIN: 9.3 G/DL (ref 11.9–15.1)
IMMATURE GRANULOCYTES: 0 %
LYMPHOCYTES # BLD: 11 % (ref 24–43)
MCH RBC QN AUTO: 30 PG (ref 25.2–33.5)
MCHC RBC AUTO-ENTMCNC: 31.4 G/DL (ref 28.4–34.8)
MCV RBC AUTO: 95.5 FL (ref 82.6–102.9)
MONOCYTES # BLD: 8 % (ref 3–12)
NRBC AUTOMATED: 0 PER 100 WBC
PDW BLD-RTO: 14.1 % (ref 11.8–14.4)
PLATELET # BLD: 234 K/UL (ref 138–453)
PLATELET ESTIMATE: ABNORMAL
PMV BLD AUTO: 9.9 FL (ref 8.1–13.5)
RBC # BLD: 3.1 M/UL (ref 3.95–5.11)
RBC # BLD: ABNORMAL 10*6/UL
SEG NEUTROPHILS: 77 % (ref 36–65)
SEGMENTED NEUTROPHILS ABSOLUTE COUNT: 5.28 K/UL (ref 1.5–8.1)
WBC # BLD: 6.9 K/UL (ref 3.5–11.3)
WBC # BLD: ABNORMAL 10*3/UL

## 2020-12-28 PROCEDURE — 85025 COMPLETE CBC W/AUTO DIFF WBC: CPT

## 2020-12-28 PROCEDURE — 36415 COLL VENOUS BLD VENIPUNCTURE: CPT

## 2020-12-28 PROCEDURE — 96372 THER/PROPH/DIAG INJ SC/IM: CPT

## 2020-12-28 PROCEDURE — 6360000002 HC RX W HCPCS: Performed by: INTERNAL MEDICINE

## 2020-12-28 RX ORDER — METOCLOPRAMIDE 5 MG/1
5 TABLET ORAL 2 TIMES DAILY
Qty: 180 TABLET | Refills: 0 | Status: SHIPPED | OUTPATIENT
Start: 2020-12-28 | End: 2021-04-05

## 2020-12-28 RX ADMIN — DARBEPOETIN ALFA 60 MCG: 60 INJECTION, SOLUTION INTRAVENOUS; SUBCUTANEOUS at 10:15

## 2021-01-07 ENCOUNTER — TELEPHONE (OUTPATIENT)
Dept: PRIMARY CARE CLINIC | Age: 56
End: 2021-01-07

## 2021-01-07 ENCOUNTER — HOSPITAL ENCOUNTER (EMERGENCY)
Age: 56
Discharge: HOME | End: 2021-01-07
Attending: EMERGENCY MEDICINE
Payer: MEDICARE

## 2021-01-07 ENCOUNTER — APPOINTMENT (OUTPATIENT)
Dept: CT IMAGING | Age: 56
End: 2021-01-07
Payer: MEDICARE

## 2021-01-07 VITALS
HEIGHT: 62 IN | SYSTOLIC BLOOD PRESSURE: 108 MMHG | HEART RATE: 86 BPM | WEIGHT: 250 LBS | OXYGEN SATURATION: 95 % | BODY MASS INDEX: 46.01 KG/M2 | DIASTOLIC BLOOD PRESSURE: 56 MMHG | RESPIRATION RATE: 18 BRPM | TEMPERATURE: 96.4 F

## 2021-01-07 DIAGNOSIS — R10.9 NON-SURGICAL ABDOMINAL PAIN: Primary | ICD-10-CM

## 2021-01-07 DIAGNOSIS — M54.50 LUMBAR PAIN: ICD-10-CM

## 2021-01-07 LAB
-: ABNORMAL
ABSOLUTE EOS #: 0.11 K/UL (ref 0–0.44)
ABSOLUTE IMMATURE GRANULOCYTE: 0 K/UL (ref 0–0.3)
ABSOLUTE LYMPH #: 0.74 K/UL (ref 1.1–3.7)
ABSOLUTE MONO #: 0.34 K/UL (ref 0.1–1.2)
ALBUMIN SERPL-MCNC: 3.7 G/DL (ref 3.5–5.2)
ALBUMIN/GLOBULIN RATIO: 1.2 (ref 1–2.5)
ALP BLD-CCNC: 87 U/L (ref 35–104)
ALT SERPL-CCNC: 6 U/L (ref 5–33)
AMORPHOUS: ABNORMAL
ANION GAP SERPL CALCULATED.3IONS-SCNC: 10 MMOL/L (ref 9–17)
AST SERPL-CCNC: 11 U/L
BACTERIA: ABNORMAL
BASOPHILS # BLD: 0 % (ref 0–2)
BASOPHILS ABSOLUTE: 0 K/UL (ref 0–0.2)
BILIRUB SERPL-MCNC: 0.29 MG/DL (ref 0.3–1.2)
BILIRUBIN URINE: NEGATIVE
BUN BLDV-MCNC: 40 MG/DL (ref 6–20)
BUN/CREAT BLD: 11 (ref 9–20)
CALCIUM SERPL-MCNC: 9.1 MG/DL (ref 8.6–10.4)
CASTS UA: ABNORMAL /LPF
CHLORIDE BLD-SCNC: 105 MMOL/L (ref 98–107)
CO2: 18 MMOL/L (ref 20–31)
COLOR: YELLOW
COMMENT UA: ABNORMAL
CREAT SERPL-MCNC: 3.49 MG/DL (ref 0.5–0.9)
CRYSTALS, UA: ABNORMAL /HPF
DIFFERENTIAL TYPE: ABNORMAL
EOSINOPHILS RELATIVE PERCENT: 2 % (ref 1–4)
EPITHELIAL CELLS UA: ABNORMAL /HPF (ref 0–25)
GFR AFRICAN AMERICAN: 16 ML/MIN
GFR NON-AFRICAN AMERICAN: 14 ML/MIN
GFR SERPL CREATININE-BSD FRML MDRD: ABNORMAL ML/MIN/{1.73_M2}
GFR SERPL CREATININE-BSD FRML MDRD: ABNORMAL ML/MIN/{1.73_M2}
GLUCOSE BLD-MCNC: 114 MG/DL (ref 70–99)
GLUCOSE URINE: NEGATIVE
HCT VFR BLD CALC: 30.3 % (ref 36.3–47.1)
HEMOGLOBIN: 9.6 G/DL (ref 11.9–15.1)
IMMATURE GRANULOCYTES: 0 %
KETONES, URINE: NEGATIVE
LACTIC ACID, WHOLE BLOOD: NORMAL MMOL/L (ref 0.7–2.1)
LACTIC ACID: 0.7 MMOL/L (ref 0.5–2.2)
LEUKOCYTE ESTERASE, URINE: NEGATIVE
LIPASE: 24 U/L (ref 13–60)
LYMPHOCYTES # BLD: 13 % (ref 24–43)
MCH RBC QN AUTO: 29.6 PG (ref 25.2–33.5)
MCHC RBC AUTO-ENTMCNC: 31.7 G/DL (ref 28.4–34.8)
MCV RBC AUTO: 93.5 FL (ref 82.6–102.9)
MONOCYTES # BLD: 6 % (ref 3–12)
MORPHOLOGY: NORMAL
MUCUS: ABNORMAL
NITRITE, URINE: NEGATIVE
NRBC AUTOMATED: 0 PER 100 WBC
OTHER OBSERVATIONS UA: ABNORMAL
PDW BLD-RTO: 13.9 % (ref 11.8–14.4)
PH UA: 5 (ref 5–9)
PLATELET # BLD: 247 K/UL (ref 138–453)
PLATELET ESTIMATE: ABNORMAL
PMV BLD AUTO: 9.6 FL (ref 8.1–13.5)
POTASSIUM SERPL-SCNC: 3.9 MMOL/L (ref 3.7–5.3)
PROTEIN UA: ABNORMAL
RBC # BLD: 3.24 M/UL (ref 3.95–5.11)
RBC # BLD: ABNORMAL 10*6/UL
RBC UA: ABNORMAL /HPF (ref 0–2)
RENAL EPITHELIAL, UA: ABNORMAL /HPF
SEG NEUTROPHILS: 79 % (ref 36–65)
SEGMENTED NEUTROPHILS ABSOLUTE COUNT: 4.51 K/UL (ref 1.5–8.1)
SODIUM BLD-SCNC: 133 MMOL/L (ref 135–144)
SPECIFIC GRAVITY UA: 1.02 (ref 1.01–1.02)
TOTAL PROTEIN: 6.7 G/DL (ref 6.4–8.3)
TRICHOMONAS: ABNORMAL
TURBIDITY: CLEAR
URINE HGB: NEGATIVE
UROBILINOGEN, URINE: NORMAL
WBC # BLD: 5.7 K/UL (ref 3.5–11.3)
WBC # BLD: ABNORMAL 10*3/UL
WBC UA: ABNORMAL /HPF (ref 0–5)
YEAST: ABNORMAL

## 2021-01-07 PROCEDURE — 85025 COMPLETE CBC W/AUTO DIFF WBC: CPT

## 2021-01-07 PROCEDURE — 81001 URINALYSIS AUTO W/SCOPE: CPT

## 2021-01-07 PROCEDURE — 96374 THER/PROPH/DIAG INJ IV PUSH: CPT

## 2021-01-07 PROCEDURE — 2580000003 HC RX 258: Performed by: EMERGENCY MEDICINE

## 2021-01-07 PROCEDURE — 74176 CT ABD & PELVIS W/O CONTRAST: CPT

## 2021-01-07 PROCEDURE — 6360000002 HC RX W HCPCS: Performed by: EMERGENCY MEDICINE

## 2021-01-07 PROCEDURE — 83605 ASSAY OF LACTIC ACID: CPT

## 2021-01-07 PROCEDURE — 36415 COLL VENOUS BLD VENIPUNCTURE: CPT

## 2021-01-07 PROCEDURE — 83690 ASSAY OF LIPASE: CPT

## 2021-01-07 PROCEDURE — 96375 TX/PRO/DX INJ NEW DRUG ADDON: CPT

## 2021-01-07 PROCEDURE — 80053 COMPREHEN METABOLIC PANEL: CPT

## 2021-01-07 PROCEDURE — 99283 EMERGENCY DEPT VISIT LOW MDM: CPT

## 2021-01-07 RX ORDER — OXYCODONE HYDROCHLORIDE AND ACETAMINOPHEN 5; 325 MG/1; MG/1
1 TABLET ORAL EVERY 6 HOURS PRN
Qty: 10 TABLET | Refills: 0 | Status: SHIPPED | OUTPATIENT
Start: 2021-01-07 | End: 2021-01-10

## 2021-01-07 RX ORDER — SODIUM CHLORIDE 9 MG/ML
INJECTION, SOLUTION INTRAVENOUS CONTINUOUS
Status: DISCONTINUED | OUTPATIENT
Start: 2021-01-07 | End: 2021-01-07 | Stop reason: HOSPADM

## 2021-01-07 RX ORDER — ONDANSETRON 2 MG/ML
4 INJECTION INTRAMUSCULAR; INTRAVENOUS ONCE
Status: COMPLETED | OUTPATIENT
Start: 2021-01-07 | End: 2021-01-07

## 2021-01-07 RX ORDER — PREDNISONE 20 MG/1
20 TABLET ORAL EVERY 8 HOURS
Qty: 30 TABLET | Refills: 0 | Status: SHIPPED | OUTPATIENT
Start: 2021-01-07 | End: 2021-01-17

## 2021-01-07 RX ORDER — FENTANYL CITRATE 50 UG/ML
100 INJECTION, SOLUTION INTRAMUSCULAR; INTRAVENOUS ONCE
Status: COMPLETED | OUTPATIENT
Start: 2021-01-07 | End: 2021-01-07

## 2021-01-07 RX ADMIN — SODIUM CHLORIDE: 9 INJECTION, SOLUTION INTRAVENOUS at 09:50

## 2021-01-07 RX ADMIN — FENTANYL CITRATE 100 MCG: 50 INJECTION, SOLUTION INTRAMUSCULAR; INTRAVENOUS at 09:58

## 2021-01-07 RX ADMIN — ONDANSETRON 4 MG: 2 INJECTION INTRAMUSCULAR; INTRAVENOUS at 09:58

## 2021-01-07 ASSESSMENT — ENCOUNTER SYMPTOMS
SHORTNESS OF BREATH: 0
VOMITING: 0
BACK PAIN: 1
ABDOMINAL PAIN: 1
CONSTIPATION: 0
DIARRHEA: 0
ABDOMINAL DISTENTION: 0
NAUSEA: 0
WHEEZING: 0
COUGH: 0

## 2021-01-07 ASSESSMENT — PAIN DESCRIPTION - ORIENTATION: ORIENTATION: RIGHT;LEFT;MID

## 2021-01-07 ASSESSMENT — PAIN DESCRIPTION - LOCATION: LOCATION: ABDOMEN;FLANK

## 2021-01-07 ASSESSMENT — PAIN SCALES - GENERAL
PAINLEVEL_OUTOF10: 0
PAINLEVEL_OUTOF10: 9

## 2021-01-07 NOTE — TELEPHONE ENCOUNTER
Called and spoke with , Maty White and informed him that Mónica Barksdale should start with half tablet of the Oxycodone as needed and not to take the prednisone until she hears back from nephrology. Verbalizes understanding.

## 2021-01-07 NOTE — TELEPHONE ENCOUNTER
Pt. Calls stating for past four days she has had decreased appetite (eating once daily), decreased urination (only \"dribbling\" small amount), increased swelling, abdominal pain, feels like she is \"choking\" when she is sleeping. She reports her Nephrologist is not in office today. I suggested pt. Be seen in ER, she voices understanding.  DAVIDSON

## 2021-01-07 NOTE — TELEPHONE ENCOUNTER
Phone call from  stating that Vel Taylor went to ER this am due to decreased urination. States the ER wants her to take Oxycodone (Percocet) and Prednisone.  states the kidney doctor doesn't want her to take steroids because she just got weaned off of them and  is not sure about her needing Oxycodone. States he called kidney specialist early this morning before pt went to ER and has still not heard anything back. He wants to know if she should take these medications. Health Maintenance   Topic Date Due    Lipid screen  05/07/2020    HIV screen  03/10/2021 (Originally 9/5/1980)    Breast cancer screen  10/06/2021    Potassium monitoring  01/07/2022    Creatinine monitoring  01/07/2022    Pneumococcal 0-64 years Vaccine (3 of 3 - PPSV23) 05/10/2022    Diabetes screen  11/03/2023    Colon cancer screen colonoscopy  05/02/2026    DTaP/Tdap/Td vaccine (2 - Td) 09/06/2028    Flu vaccine  Completed    Shingles Vaccine  Completed    Hepatitis C screen  Completed    Hepatitis A vaccine  Aged Out    Hepatitis B vaccine  Aged Out    Hib vaccine  Aged Out    Meningococcal (ACWY) vaccine  Aged Out             (applicable per patient's age: Cancer Screenings, Depression Screening, Fall Risk Screening, Immunizations)    Hemoglobin A1C (%)   Date Value   11/03/2020 5.6   09/06/2019 5.8   09/06/2018 6.1     Microalb/Crt.  Ratio (mcg/mg creat)   Date Value   11/18/2019 3,897 (H)     LDL Cholesterol (mg/dL)   Date Value   05/07/2019 99     AST (U/L)   Date Value   01/07/2021 11     ALT (U/L)   Date Value   01/07/2021 6     BUN (mg/dL)   Date Value   01/07/2021 40 (H)      (goal A1C is < 7)   (goal LDL is <100) need 30-50% reduction from baseline     BP Readings from Last 3 Encounters:   01/07/21 (!) 108/56   12/28/20 (!) 150/80   12/14/20 131/62    (goal /80)      All Future Testing planned in [facility]:  Lab Frequency Next Occurrence   CBC Auto Differential Once 03/10/2021   Lipid Panel Once positive rheumatoid factor (HCC)     Rheumatoid factor positive     Scl-70 antibody positive     Urticaria due to cold     Vitamin D deficiency     Elevated BUN     Chicas's esophagus without dysplasia     Hyperparathyroidism (HCC)     Gastroesophageal reflux disease     Esophageal dysphagia     History of colon polyps     Epistaxis, recurrent     History of arteritis     History of rheumatoid arthritis     Noncompliance     SOB (shortness of breath)     Episode of recurrent major depressive disorder (HCC)     Anemia in stage 5 chronic kidney disease, not on chronic dialysis (HCC)     Non-surgical abdominal pain     Lumbar pain

## 2021-01-07 NOTE — ED PROVIDER NOTES
[facility] [place] ED  EMERGENCY DEPARTMENT ENCOUNTER      Pt [address]  MRN: [id number]  [place] 1965  Date of evaluation: 1/7/2021  Provider: Juana Tracey MD    [address] [place] [address]     Chief Complaint   Patient presents with    Abdominal Pain     Diffuse, chronic per pt, worse for 4 days    Flank Pain     Bilateral, ongoing, but worse x 4 days. Pt states she is to begin dialysis in the future         HISTORY OF PRESENT ILLNESS   (Location/Symptom, Timing/Onset, Context/Setting, Quality, Duration, Modifying Factors, Severity)  Note limiting factors. HPI the patient is a 55-year-old female who presents with diffuse abdominal pain which is been present for the last 4 days and low back pain which also has been present for 4 days she is rating her pain at a level 9. She is unable to describe the pain. She has had a cholecystectomy. There is been no injury to her abdomen. Apparently she is in end-stage renal disease and going to be starting on dialysis soon. She does have a history of kidney stones. Nursing Notes were reviewed. REVIEW OF SYSTEMS    (2-9 systems for level 4, 10 or more for level 5)     Review of Systems   Constitutional: Positive for activity change. HENT: Negative for congestion. Respiratory: Negative for cough, shortness of breath and wheezing. Cardiovascular: Negative for chest pain, palpitations and leg swelling. Gastrointestinal: Positive for abdominal pain. Negative for abdominal distention, constipation, diarrhea, nausea and vomiting. Genitourinary: Positive for flank pain. Negative for difficulty urinating. Musculoskeletal: Positive for back pain. Skin: Negative for pallor. Neurological: Negative for dizziness, light-headedness and headaches. Psychiatric/Behavioral: Negative for confusion, decreased concentration and dysphoric mood.               MEDICAL HISTORY     Past Medical History:   Diagnosis Date  JESS [facility]) ([facility]     Anemia     Arthritis     Chronic kidney disease     Depression with anxiety 9/8/2016    GERD (gastroesophageal reflux disease)     H/O echocardiogram 08/25/2016    EF 55%. Mild mitral regurgitation.  H/O tooth extraction 07/26/2017    Lower posterior right tooth.  History of blood transfusion     x3. Last one in May 2016    Hyperlipidemia     Hypertensive crisis 8/24/2016    Kidney stones     Sciatica     Vasculitis ([facility]          SURGICAL HISTORY       Past Surgical History:   Procedure Laterality Date    BONE MARROW BIOPSY  5-23-16    Per Hematologist order @ [facility]  CHOLECYSTECTOMY      COLONOSCOPY  2016    ESOPHAGEAL DILATATION  9/12/2018    ESOPHAGEAL DILATATION performed by Aparna Noble MD at [address]  11/09/2016    with tubes and ovaries    PORT SURGERY Left 10/2020    port placement for dialysis    TOOTH EXTRACTION  07/26/2017    Right posterior lower tooth.     TUBAL LIGATION      UPPER GASTROINTESTINAL ENDOSCOPY  09/12/2018    -dede,bx(Chicas's esophagus,neg H-Pylori)hiatal hernia,grade 2 reflux esophagitis    UPPER GASTROINTESTINAL ENDOSCOPY N/A 9/12/2018    EGD BIOPSY performed by Aparna Noble MD at [address] [place] [address]       Previous Medications    ALBUTEROL SULFATE HFA (VENTOLIN HFA) 108 (90 BASE) MCG/ACT INHALER    Inhale 2 puffs into the lungs every 6 hours as needed for Wheezing    ATORVASTATIN (LIPITOR) 80 MG TABLET    Take 1 tablet by mouth daily    BLOOD PRESSURE KIT    1 Units by Does not apply route daily    BUTALBITAL-ACETAMINOPHEN-CAFFEINE (FIORICET, ESGIC) -40 MG PER TABLET    Take 1 tablet by mouth every 6 hours as needed for Headaches    CALCITRIOL (ROCALTROL) 0.25 MCG CAPSULE    Take 1 capsule by mouth daily    CARVEDILOL (COREG) 12.5 MG TABLET    Take 1 tablet by mouth 2 times daily DILTIAZEM (CARDIZEM CD) 120 MG EXTENDED RELEASE CAPSULE    TAKE 1 CAPSULE BY MOUTH ONCE DAILY    DIPHENHYDRAMINE (BENADRYL) 25 MG CAPSULE    Take 25 mg by mouth every 6 hours as needed for Itching or Allergies Pt takes 2 capsules at HS \"due to hives from it being cold now\". Pt tho states these are NOT helping her this time. DOCUSATE CALCIUM (STOOL SOFTENER PO)    Take 100 mg by mouth as needed     DULOXETINE (CYMBALTA) 30 MG EXTENDED RELEASE CAPSULE    TAKE 1 CAPSULE BY MOUTH ONCE DAILY    ESTROGENS, CONJUGATED, (PREMARIN) 0.625 MG TABLET    Take 1 tablet by mouth once daily    FLUTICASONE (FLONASE) 50 MCG/ACT NASAL SPRAY    1 spray by Each Nostril route daily    FUROSEMIDE (LASIX) 20 MG TABLET    Take 1 tablet by mouth 2 times daily    HYDRALAZINE (APRESOLINE) 50 MG TABLET    TAKE 1 TABLET BY MOUTH THREE TIMES DAILY    INCONTINENCE SUPPLY DISPOSABLE (PREVAIL ADULT BRIEF LARGE) MISC    3 each by Does not apply route daily    LOSARTAN (COZAAR) 50 MG TABLET    TAKE 1 TABLET BY MOUTH TWICE DAILY    MECLIZINE (ANTIVERT) 25 MG TABLET    Take 1 tablet by mouth 3 times daily as needed for Dizziness    METOCLOPRAMIDE (REGLAN) 5 MG TABLET    Take 1 tablet by mouth 2 times daily    PANTOPRAZOLE (PROTONIX) 40 MG TABLET    Take 1 tablet by mouth twice daily    POTASSIUM CHLORIDE (KLOR-CON M) 20 MEQ EXTENDED RELEASE TABLET    Take 1 tablet by mouth daily    SALINE NASAL GEL (AYR) GEL    by Nasal route 2 times daily    SODIUM CHLORIDE (ALTAMIST SPRAY) 0.65 % NASAL SPRAY    1 spray by Nasal route as needed for Congestion    VITAMIN D (CHOLECALCIFEROL) 1000 UNIT TABS TABLET    Take 2,000 Units by mouth 2 times daily        ALLERGIES     Patient has no known allergies.     FAMILY HISTORY       Family History   Problem Relation Age of Onset    Cancer Mother     Diabetes Mother     Heart Disease Mother     High Blood Pressure Mother     Cancer Father         prostate    High Blood Pressure Father     Cancer Sister ovarian    Diabetes Brother     Cancer Sister         ovarian    High Blood Pressure Brother     Diabetes Brother           SOCIAL HISTORY       Social History     Socioeconomic History    Marital status:      Spouse name: Not on file    Number of children: Not on file    Years of education: Not on file    Highest education level: Not on file   Occupational History    Not on file   Social Needs    Financial resource strain: Not on file    Food insecurity     Worry: Not on file     Inability: Not on file   [employer] needs     Medical: Not on file     Non-medical: Not on file   Tobacco Use    Smoking status: Never Smoker    Smokeless tobacco: Never Used   Substance and Sexual Activity    Alcohol use: No    Drug use: No    Sexual activity: Yes     Partners: Male     Comment: tubal   Lifestyle    Physical activity     Days per week: Not on file     Minutes per session: Not on file    Stress: Not on file   Relationships    Social connections     Talks on phone: Not on file     Gets together: Not on file     Attends Restoration service: Not on file     Active member of club or organization: Not on file     Attends meetings of clubs or organizations: Not on file     Relationship status: Not on file    Intimate partner violence     Fear of current or ex partner: Not on file     Emotionally abused: Not on file     Physically abused: Not on file     Forced sexual activity: Not on file   Other Topics Concern    Not on file   Social History Narrative    Not on file       SCREENINGS             PHYSICAL EXAM  (up to 7 for level 4, 8 or more for level 5)     ED Triage Vitals [01/07/21 0858]   BP Temp Temp Source Pulse Resp SpO2 Height Weight   (!) 178/74 96.4 °F (35.8 °C) Tympanic 86 18 97 % 5' 2\" (1.575 m) 250 lb (113.4 kg)       Physical Exam  Constitutional:       General: She is not in acute distress. Appearance: She is well-developed. She is obese. She is ill-appearing.    HENT: Head: Normocephalic and atraumatic. Cardiovascular:      Rate and Rhythm: Normal rate and regular rhythm. Heart sounds: Normal heart sounds. Pulmonary:      Effort: Pulmonary effort is normal.      Breath sounds: Normal breath sounds. Abdominal:      General: Abdomen is flat. Bowel sounds are decreased. Palpations: Abdomen is soft. Tenderness: There is generalized abdominal tenderness. Skin:     General: Skin is warm and dry. Neurological:      General: No focal deficit present. Mental Status: She is alert and oriented to person, place, and time. Psychiatric:         Mood and Affect: Mood normal.         Behavior: Behavior normal.         DIAGNOSTIC RESULTS     EKG: All EKG's are interpreted by the Emergency Department Physician whoeither signs or Co-signs this chart in the absence of a cardiologist.      RADIOLOGY:   Non-plain film images such as CT, Ultrasound and MRI are read by the radiologist. Plain radiographic images are visualized and preliminarily interpreted by the emergency physician     Interpretation per the Radiologist below, if available at the time of this note:    CT ABDOMEN PELVIS WO CONTRAST   Final Result   No evidence for acute intra-abdominal or intrapelvic pathology. No bowel   obstruction or inflammation. No evidence for nephrolithiasis or urinary   obstruction.                ED BEDSIDE ULTRASOUND:   Performed by ED Physician - none    LABS:  Labs Reviewed   COMPREHENSIVE METABOLIC PANEL - Abnormal; Notable for the following components:       Result Value    Glucose 114 (*)     BUN 40 (*)     CREATININE 3.49 (*)     Sodium 133 (*)     CO2 18 (*)     Total Bilirubin 0.29 (*)     GFR Non- 14 (*)     GFR  16 (*)     All other components within normal limits   CBC WITH AUTO DIFFERENTIAL - Abnormal; Notable for the following components:    RBC 3.24 (*)     Hemoglobin 9.6 (*)     Hematocrit 30.3 (*)     Seg Neutrophils 79 (*) Lymphocytes 13 (*)     Absolute Lymph # 0.74 (*)     All other components within normal limits   URINALYSIS WITH MICROSCOPIC - Abnormal; Notable for the following components:    Specific Gravity, UA 1.025 (*)     Protein, UA 2+ (*)     Bacteria, UA 1+ (*)     All other components within normal limits   LIPASE   LACTIC ACID, PLASMA       EMERGENCY DEPARTMENT COURSE and DIFFERENTIAL DIAGNOSIS/MDM:   Vitals:    Vitals:    01/07/21 0858   BP: (!) 178/74   Pulse: 86   Resp: 18   Temp: 96.4 °F (35.8 °C)   TempSrc: Tympanic   SpO2: 97%   Weight: 250 lb (113.4 kg)   Height: 5' 2\" (1.575 m)           MDM patient does not have a surgical abdomen. I suspect patient has been having trouble with the lumbar area being painful for a while. Patient will be treated with steroids and 3 days of Percocet. CONSULTS:  None    PROCEDURES:  Unless otherwise noted below, none     Procedures    FINAL IMPRESSION      1. Non-surgical abdominal pain    2. Lumbar pain          DISPOSITION/PLAN   DISPOSITION Decision To Discharge 01/07/2021 12:05:05 PM      PATIENT REFERRED TO:  ARISTIDES Bond [facility]  [phone]    Schedule an appointment as soon as possible for a visit         DISCHARGE MEDICATIONS:  New Prescriptions    OXYCODONE-ACETAMINOPHEN (PERCOCET) 5-325 MG PER TABLET    Take 1 tablet by mouth every 6 hours as needed for Pain for up to 3 days.     PREDNISONE (DELTASONE) 20 MG TABLET    Take 1 tablet by mouth every 8 hours for 10 days              (Please note that portions ofthis note were completed with a voice recognition program.  Efforts were made to edit the dictations but occasionally words are mis-transcribed.)      Jeannine Alcala MD (electronically signed)  Attending Emergency Physician          Aime Llanos MD  01/07/21 3564

## 2021-01-11 ENCOUNTER — HOSPITAL ENCOUNTER (OUTPATIENT)
Age: 56
Discharge: HOME | End: 2021-01-11
Payer: MEDICARE

## 2021-01-11 ENCOUNTER — HOSPITAL ENCOUNTER (OUTPATIENT)
Dept: INFUSION THERAPY | Age: 56
Discharge: HOME | End: 2021-01-11
Payer: MEDICARE

## 2021-01-11 VITALS
SYSTOLIC BLOOD PRESSURE: 119 MMHG | DIASTOLIC BLOOD PRESSURE: 51 MMHG | HEART RATE: 80 BPM | TEMPERATURE: 96.1 F | RESPIRATION RATE: 16 BRPM

## 2021-01-11 DIAGNOSIS — N18.4 CKD (CHRONIC KIDNEY DISEASE) STAGE 4, GFR 15-29 ML/MIN (HCC): ICD-10-CM

## 2021-01-11 DIAGNOSIS — D63.1 ANEMIA IN STAGE 5 CHRONIC KIDNEY DISEASE, NOT ON CHRONIC DIALYSIS (HCC): ICD-10-CM

## 2021-01-11 DIAGNOSIS — N18.4 CKD (CHRONIC KIDNEY DISEASE), STAGE IV (HCC): ICD-10-CM

## 2021-01-11 DIAGNOSIS — N18.6 END STAGE RENAL DISEASE (HCC): ICD-10-CM

## 2021-01-11 DIAGNOSIS — N18.5 ANEMIA IN STAGE 5 CHRONIC KIDNEY DISEASE, NOT ON CHRONIC DIALYSIS (HCC): Primary | ICD-10-CM

## 2021-01-11 DIAGNOSIS — D63.1 ANEMIA IN STAGE 5 CHRONIC KIDNEY DISEASE, NOT ON CHRONIC DIALYSIS (HCC): Primary | ICD-10-CM

## 2021-01-11 DIAGNOSIS — N18.5 ANEMIA IN STAGE 5 CHRONIC KIDNEY DISEASE, NOT ON CHRONIC DIALYSIS (HCC): ICD-10-CM

## 2021-01-11 LAB
ABSOLUTE EOS #: 0.4 K/UL (ref 0–0.44)
ABSOLUTE IMMATURE GRANULOCYTE: 0 K/UL (ref 0–0.3)
ABSOLUTE LYMPH #: 0.95 K/UL (ref 1.1–3.7)
ABSOLUTE MONO #: 0.15 K/UL (ref 0.1–1.2)
ANION GAP SERPL CALCULATED.3IONS-SCNC: 12 MMOL/L (ref 9–17)
BASOPHILS # BLD: 1 % (ref 0–2)
BASOPHILS ABSOLUTE: 0.05 K/UL (ref 0–0.2)
BUN BLDV-MCNC: 41 MG/DL (ref 6–20)
BUN/CREAT BLD: 10 (ref 9–20)
CALCIUM IONIZED: 1.17 MMOL/L (ref 1.13–1.33)
CALCIUM SERPL-MCNC: 8.7 MG/DL (ref 8.6–10.4)
CHLORIDE BLD-SCNC: 103 MMOL/L (ref 98–107)
CO2: 21 MMOL/L (ref 20–31)
CREAT SERPL-MCNC: 3.97 MG/DL (ref 0.5–0.9)
DIFFERENTIAL TYPE: ABNORMAL
EOSINOPHILS RELATIVE PERCENT: 8 % (ref 1–4)
GFR AFRICAN AMERICAN: 14 ML/MIN
GFR NON-AFRICAN AMERICAN: 12 ML/MIN
GFR SERPL CREATININE-BSD FRML MDRD: ABNORMAL ML/MIN/{1.73_M2}
GFR SERPL CREATININE-BSD FRML MDRD: ABNORMAL ML/MIN/{1.73_M2}
GLUCOSE BLD-MCNC: 109 MG/DL (ref 70–99)
HCT VFR BLD CALC: 31.5 % (ref 36.3–47.1)
HEMOGLOBIN: 9.8 G/DL (ref 11.9–15.1)
IMMATURE GRANULOCYTES: 0 %
LYMPHOCYTES # BLD: 19 % (ref 24–43)
MCH RBC QN AUTO: 29 PG (ref 25.2–33.5)
MCHC RBC AUTO-ENTMCNC: 31.1 G/DL (ref 28.4–34.8)
MCV RBC AUTO: 93.2 FL (ref 82.6–102.9)
MONOCYTES # BLD: 3 % (ref 3–12)
MORPHOLOGY: NORMAL
NRBC AUTOMATED: 0 PER 100 WBC
PDW BLD-RTO: 13.5 % (ref 11.8–14.4)
PHOSPHORUS: 4.9 MG/DL (ref 2.6–4.5)
PLATELET # BLD: 262 K/UL (ref 138–453)
PLATELET ESTIMATE: ABNORMAL
PMV BLD AUTO: 9.9 FL (ref 8.1–13.5)
POTASSIUM SERPL-SCNC: 4.1 MMOL/L (ref 3.7–5.3)
PTH INTACT: 144.3 PG/ML (ref 15–65)
RBC # BLD: 3.38 M/UL (ref 3.95–5.11)
RBC # BLD: ABNORMAL 10*6/UL
SEG NEUTROPHILS: 69 % (ref 36–65)
SEGMENTED NEUTROPHILS ABSOLUTE COUNT: 3.45 K/UL (ref 1.5–8.1)
SODIUM BLD-SCNC: 136 MMOL/L (ref 135–144)
VITAMIN D 25-HYDROXY: 30.6 NG/ML (ref 30–100)
WBC # BLD: 5 K/UL (ref 3.5–11.3)
WBC # BLD: ABNORMAL 10*3/UL

## 2021-01-11 PROCEDURE — 82306 VITAMIN D 25 HYDROXY: CPT

## 2021-01-11 PROCEDURE — 6360000002 HC RX W HCPCS: Performed by: INTERNAL MEDICINE

## 2021-01-11 PROCEDURE — 96372 THER/PROPH/DIAG INJ SC/IM: CPT

## 2021-01-11 PROCEDURE — 82330 ASSAY OF CALCIUM: CPT

## 2021-01-11 PROCEDURE — 80048 BASIC METABOLIC PNL TOTAL CA: CPT

## 2021-01-11 PROCEDURE — 84100 ASSAY OF PHOSPHORUS: CPT

## 2021-01-11 PROCEDURE — 83970 ASSAY OF PARATHORMONE: CPT

## 2021-01-11 PROCEDURE — 36415 COLL VENOUS BLD VENIPUNCTURE: CPT

## 2021-01-11 PROCEDURE — 85025 COMPLETE CBC W/AUTO DIFF WBC: CPT

## 2021-01-11 RX ADMIN — DARBEPOETIN ALFA 60 MCG: 60 INJECTION, SOLUTION INTRAVENOUS; SUBCUTANEOUS at 10:20

## 2021-01-25 ENCOUNTER — HOSPITAL ENCOUNTER (OUTPATIENT)
Age: 56
Discharge: HOME | End: 2021-01-25
Payer: MEDICARE

## 2021-01-25 ENCOUNTER — HOSPITAL ENCOUNTER (OUTPATIENT)
Dept: INFUSION THERAPY | Age: 56
End: 2021-01-25
Payer: MEDICARE

## 2021-01-25 DIAGNOSIS — D63.1 ANEMIA IN STAGE 5 CHRONIC KIDNEY DISEASE, NOT ON CHRONIC DIALYSIS (HCC): ICD-10-CM

## 2021-01-25 DIAGNOSIS — N18.5 ANEMIA IN STAGE 5 CHRONIC KIDNEY DISEASE, NOT ON CHRONIC DIALYSIS (HCC): ICD-10-CM

## 2021-01-25 DIAGNOSIS — N18.4 CKD (CHRONIC KIDNEY DISEASE), STAGE IV (HCC): ICD-10-CM

## 2021-01-25 LAB
ABSOLUTE EOS #: 0.21 K/UL (ref 0–0.44)
ABSOLUTE EOS #: 0.35 K/UL (ref 0–0.44)
ABSOLUTE IMMATURE GRANULOCYTE: 0 K/UL (ref 0–0.3)
ABSOLUTE IMMATURE GRANULOCYTE: <0.03 K/UL (ref 0–0.3)
ABSOLUTE LYMPH #: 0.8 K/UL (ref 1.1–3.7)
ABSOLUTE LYMPH #: 1.16 K/UL (ref 1.1–3.7)
ABSOLUTE MONO #: 0.41 K/UL (ref 0.1–1.2)
ABSOLUTE MONO #: 0.43 K/UL (ref 0.1–1.2)
ANION GAP SERPL CALCULATED.3IONS-SCNC: 12 MMOL/L (ref 9–17)
BASOPHILS # BLD: 1 % (ref 0–2)
BASOPHILS # BLD: 2 % (ref 0–2)
BASOPHILS ABSOLUTE: 0.05 K/UL (ref 0–0.2)
BASOPHILS ABSOLUTE: 0.12 K/UL (ref 0–0.2)
BUN BLDV-MCNC: 45 MG/DL (ref 6–20)
BUN/CREAT BLD: 12 (ref 9–20)
CALCIUM SERPL-MCNC: 9.3 MG/DL (ref 8.6–10.4)
CHLORIDE BLD-SCNC: 100 MMOL/L (ref 98–107)
CO2: 22 MMOL/L (ref 20–31)
CREAT SERPL-MCNC: 3.9 MG/DL (ref 0.5–0.9)
DIFFERENTIAL TYPE: ABNORMAL
DIFFERENTIAL TYPE: ABNORMAL
EOSINOPHILS RELATIVE PERCENT: 3 % (ref 1–4)
EOSINOPHILS RELATIVE PERCENT: 6 % (ref 1–4)
GFR AFRICAN AMERICAN: 15 ML/MIN
GFR NON-AFRICAN AMERICAN: 12 ML/MIN
GFR SERPL CREATININE-BSD FRML MDRD: ABNORMAL ML/MIN/{1.73_M2}
GFR SERPL CREATININE-BSD FRML MDRD: ABNORMAL ML/MIN/{1.73_M2}
GLUCOSE BLD-MCNC: 93 MG/DL (ref 70–99)
HCT VFR BLD CALC: 32.7 % (ref 36.3–47.1)
HCT VFR BLD CALC: 33.5 % (ref 36.3–47.1)
HEMOGLOBIN: 10.1 G/DL (ref 11.9–15.1)
HEMOGLOBIN: 10.7 G/DL (ref 11.9–15.1)
IMMATURE GRANULOCYTES: 0 %
IMMATURE GRANULOCYTES: 0 %
LYMPHOCYTES # BLD: 12 % (ref 24–43)
LYMPHOCYTES # BLD: 20 % (ref 24–43)
MCH RBC QN AUTO: 29.2 PG (ref 25.2–33.5)
MCH RBC QN AUTO: 29.9 PG (ref 25.2–33.5)
MCHC RBC AUTO-ENTMCNC: 30.9 G/DL (ref 28.4–34.8)
MCHC RBC AUTO-ENTMCNC: 31.9 G/DL (ref 28.4–34.8)
MCV RBC AUTO: 93.6 FL (ref 82.6–102.9)
MCV RBC AUTO: 94.5 FL (ref 82.6–102.9)
MONOCYTES # BLD: 6 % (ref 3–12)
MONOCYTES # BLD: 7 % (ref 3–12)
MORPHOLOGY: ABNORMAL
NRBC AUTOMATED: 0 PER 100 WBC
NRBC AUTOMATED: 0 PER 100 WBC
PDW BLD-RTO: 13.6 % (ref 11.8–14.4)
PDW BLD-RTO: 13.8 % (ref 11.8–14.4)
PLATELET # BLD: 249 K/UL (ref 138–453)
PLATELET # BLD: 251 K/UL (ref 138–453)
PLATELET ESTIMATE: ABNORMAL
PLATELET ESTIMATE: ABNORMAL
PMV BLD AUTO: 10.2 FL (ref 8.1–13.5)
PMV BLD AUTO: 10.3 FL (ref 8.1–13.5)
POTASSIUM SERPL-SCNC: 4.4 MMOL/L (ref 3.7–5.3)
RBC # BLD: 3.46 M/UL (ref 3.95–5.11)
RBC # BLD: 3.58 M/UL (ref 3.95–5.11)
RBC # BLD: ABNORMAL 10*6/UL
RBC # BLD: ABNORMAL 10*6/UL
SEG NEUTROPHILS: 65 % (ref 36–65)
SEG NEUTROPHILS: 78 % (ref 36–65)
SEGMENTED NEUTROPHILS ABSOLUTE COUNT: 3.76 K/UL (ref 1.5–8.1)
SEGMENTED NEUTROPHILS ABSOLUTE COUNT: 5.2 K/UL (ref 1.5–8.1)
SODIUM BLD-SCNC: 134 MMOL/L (ref 135–144)
WBC # BLD: 5.8 K/UL (ref 3.5–11.3)
WBC # BLD: 6.7 K/UL (ref 3.5–11.3)
WBC # BLD: ABNORMAL 10*3/UL
WBC # BLD: ABNORMAL 10*3/UL

## 2021-01-25 PROCEDURE — 36415 COLL VENOUS BLD VENIPUNCTURE: CPT

## 2021-01-25 PROCEDURE — 85025 COMPLETE CBC W/AUTO DIFF WBC: CPT

## 2021-01-25 PROCEDURE — 80048 BASIC METABOLIC PNL TOTAL CA: CPT

## 2021-01-26 ENCOUNTER — HOSPITAL ENCOUNTER (INPATIENT)
Age: 56
LOS: 2 days | Discharge: HOME | DRG: 469 | End: 2021-01-28
Attending: FAMILY MEDICINE | Admitting: FAMILY MEDICINE
Payer: MEDICARE

## 2021-01-26 PROBLEM — N19 UREMIA: Status: ACTIVE | Noted: 2021-01-26

## 2021-01-26 PROCEDURE — 99222 1ST HOSP IP/OBS MODERATE 55: CPT | Performed by: NURSE PRACTITIONER

## 2021-01-26 PROCEDURE — 1200000000 HC SEMI PRIVATE

## 2021-01-26 RX ORDER — ONDANSETRON 2 MG/ML
4 INJECTION INTRAMUSCULAR; INTRAVENOUS EVERY 6 HOURS PRN
Status: DISCONTINUED | OUTPATIENT
Start: 2021-01-26 | End: 2021-01-28 | Stop reason: HOSPADM

## 2021-01-26 RX ORDER — CARVEDILOL 12.5 MG/1
12.5 TABLET ORAL 2 TIMES DAILY
Status: DISCONTINUED | OUTPATIENT
Start: 2021-01-26 | End: 2021-01-28 | Stop reason: HOSPADM

## 2021-01-26 RX ORDER — DILTIAZEM HYDROCHLORIDE 120 MG/1
120 CAPSULE, COATED, EXTENDED RELEASE ORAL DAILY
Status: DISCONTINUED | OUTPATIENT
Start: 2021-01-27 | End: 2021-01-28 | Stop reason: HOSPADM

## 2021-01-26 RX ORDER — ACETAMINOPHEN 325 MG/1
650 TABLET ORAL EVERY 6 HOURS PRN
Status: DISCONTINUED | OUTPATIENT
Start: 2021-01-26 | End: 2021-01-28 | Stop reason: HOSPADM

## 2021-01-26 RX ORDER — DULOXETIN HYDROCHLORIDE 30 MG/1
30 CAPSULE, DELAYED RELEASE ORAL DAILY
Status: DISCONTINUED | OUTPATIENT
Start: 2021-01-27 | End: 2021-01-28 | Stop reason: HOSPADM

## 2021-01-26 RX ORDER — ACETAMINOPHEN 650 MG/1
650 SUPPOSITORY RECTAL EVERY 6 HOURS PRN
Status: DISCONTINUED | OUTPATIENT
Start: 2021-01-26 | End: 2021-01-28 | Stop reason: HOSPADM

## 2021-01-26 RX ORDER — ATORVASTATIN CALCIUM 80 MG/1
80 TABLET, FILM COATED ORAL DAILY
Status: DISCONTINUED | OUTPATIENT
Start: 2021-01-27 | End: 2021-01-28 | Stop reason: HOSPADM

## 2021-01-26 RX ORDER — METOCLOPRAMIDE 5 MG/1
5 TABLET ORAL 2 TIMES DAILY
Status: DISCONTINUED | OUTPATIENT
Start: 2021-01-26 | End: 2021-01-28 | Stop reason: HOSPADM

## 2021-01-26 RX ORDER — HYDRALAZINE HYDROCHLORIDE 50 MG/1
50 TABLET, FILM COATED ORAL EVERY 8 HOURS SCHEDULED
Status: DISCONTINUED | OUTPATIENT
Start: 2021-01-26 | End: 2021-01-28 | Stop reason: HOSPADM

## 2021-01-26 RX ORDER — PANTOPRAZOLE SODIUM 40 MG/1
40 TABLET, DELAYED RELEASE ORAL
Status: DISCONTINUED | OUTPATIENT
Start: 2021-01-27 | End: 2021-01-28 | Stop reason: HOSPADM

## 2021-01-26 RX ORDER — SODIUM CHLORIDE 0.9 % (FLUSH) 0.9 %
10 SYRINGE (ML) INJECTION PRN
Status: DISCONTINUED | OUTPATIENT
Start: 2021-01-26 | End: 2021-01-28 | Stop reason: HOSPADM

## 2021-01-26 RX ORDER — SODIUM CHLORIDE 0.9 % (FLUSH) 0.9 %
10 SYRINGE (ML) INJECTION EVERY 12 HOURS SCHEDULED
Status: DISCONTINUED | OUTPATIENT
Start: 2021-01-26 | End: 2021-01-28 | Stop reason: HOSPADM

## 2021-01-26 RX ORDER — PROMETHAZINE HYDROCHLORIDE 12.5 MG/1
12.5 TABLET ORAL EVERY 6 HOURS PRN
Status: DISCONTINUED | OUTPATIENT
Start: 2021-01-26 | End: 2021-01-28 | Stop reason: HOSPADM

## 2021-01-26 RX ORDER — LOSARTAN POTASSIUM 50 MG/1
50 TABLET ORAL 2 TIMES DAILY
Status: DISCONTINUED | OUTPATIENT
Start: 2021-01-26 | End: 2021-01-28 | Stop reason: HOSPADM

## 2021-01-26 RX ORDER — HEPARIN SODIUM 5000 [USP'U]/ML
5000 INJECTION, SOLUTION INTRAVENOUS; SUBCUTANEOUS EVERY 8 HOURS SCHEDULED
Status: DISCONTINUED | OUTPATIENT
Start: 2021-01-26 | End: 2021-01-28 | Stop reason: HOSPADM

## 2021-01-26 RX ORDER — POLYETHYLENE GLYCOL 3350 17 G/17G
17 POWDER, FOR SOLUTION ORAL DAILY PRN
Status: DISCONTINUED | OUTPATIENT
Start: 2021-01-26 | End: 2021-01-28 | Stop reason: HOSPADM

## 2021-01-26 ASSESSMENT — PAIN SCALES - GENERAL: PAINLEVEL_OUTOF10: 0

## 2021-01-27 ENCOUNTER — APPOINTMENT (OUTPATIENT)
Dept: GENERAL RADIOLOGY | Age: 56
DRG: 469 | End: 2021-01-27
Attending: FAMILY MEDICINE
Payer: MEDICARE

## 2021-01-27 ENCOUNTER — APPOINTMENT (OUTPATIENT)
Dept: DIALYSIS | Age: 56
DRG: 469 | End: 2021-01-27
Attending: FAMILY MEDICINE
Payer: MEDICARE

## 2021-01-27 PROBLEM — G47.33 OSA (OBSTRUCTIVE SLEEP APNEA): Status: ACTIVE | Noted: 2021-01-27

## 2021-01-27 LAB
ANION GAP SERPL CALCULATED.3IONS-SCNC: 12 MMOL/L (ref 9–17)
BUN BLDV-MCNC: 39 MG/DL (ref 6–20)
BUN/CREAT BLD: ABNORMAL (ref 9–20)
CALCIUM SERPL-MCNC: 9.1 MG/DL (ref 8.6–10.4)
CHLORIDE BLD-SCNC: 106 MMOL/L (ref 98–107)
CO2: 22 MMOL/L (ref 20–31)
CREAT SERPL-MCNC: 3.54 MG/DL (ref 0.5–0.9)
GFR AFRICAN AMERICAN: 16 ML/MIN
GFR NON-AFRICAN AMERICAN: 13 ML/MIN
GFR SERPL CREATININE-BSD FRML MDRD: ABNORMAL ML/MIN/{1.73_M2}
GFR SERPL CREATININE-BSD FRML MDRD: ABNORMAL ML/MIN/{1.73_M2}
GLUCOSE BLD-MCNC: 95 MG/DL (ref 70–99)
HBV SURFACE AB TITR SER: <3.5 MIU/ML
HCT VFR BLD CALC: 29.9 % (ref 36.3–47.1)
HEMOGLOBIN: 9.4 G/DL (ref 11.9–15.1)
HEPATITIS B CORE TOTAL ANTIBODY: NONREACTIVE
HEPATITIS B SURFACE ANTIGEN: NONREACTIVE
HEPATITIS C ANTIBODY: NONREACTIVE
INR BLD: 0.9
MCH RBC QN AUTO: 29 PG (ref 25.2–33.5)
MCHC RBC AUTO-ENTMCNC: 31.4 G/DL (ref 28.4–34.8)
MCV RBC AUTO: 92.3 FL (ref 82.6–102.9)
NRBC AUTOMATED: 0 PER 100 WBC
PDW BLD-RTO: 13.7 % (ref 11.8–14.4)
PLATELET # BLD: 214 K/UL (ref 138–453)
PMV BLD AUTO: 10.7 FL (ref 8.1–13.5)
POTASSIUM SERPL-SCNC: 3.9 MMOL/L (ref 3.7–5.3)
PROTHROMBIN TIME: 9.9 SEC (ref 9–12)
RBC # BLD: 3.24 M/UL (ref 3.95–5.11)
SODIUM BLD-SCNC: 140 MMOL/L (ref 135–144)
WBC # BLD: 4.3 K/UL (ref 3.5–11.3)

## 2021-01-27 PROCEDURE — 1200000000 HC SEMI PRIVATE

## 2021-01-27 PROCEDURE — 97165 OT EVAL LOW COMPLEX 30 MIN: CPT

## 2021-01-27 PROCEDURE — 97161 PT EVAL LOW COMPLEX 20 MIN: CPT

## 2021-01-27 PROCEDURE — 80048 BASIC METABOLIC PNL TOTAL CA: CPT

## 2021-01-27 PROCEDURE — 6370000000 HC RX 637 (ALT 250 FOR IP): Performed by: NURSE PRACTITIONER

## 2021-01-27 PROCEDURE — 86803 HEPATITIS C AB TEST: CPT

## 2021-01-27 PROCEDURE — 36415 COLL VENOUS BLD VENIPUNCTURE: CPT

## 2021-01-27 PROCEDURE — 6360000002 HC RX W HCPCS: Performed by: NURSE PRACTITIONER

## 2021-01-27 PROCEDURE — 87340 HEPATITIS B SURFACE AG IA: CPT

## 2021-01-27 PROCEDURE — 5A1D70Z PERFORMANCE OF URINARY FILTRATION, INTERMITTENT, LESS THAN 6 HOURS PER DAY: ICD-10-PCS | Performed by: INTERNAL MEDICINE

## 2021-01-27 PROCEDURE — 86317 IMMUNOASSAY INFECTIOUS AGENT: CPT

## 2021-01-27 PROCEDURE — 71045 X-RAY EXAM CHEST 1 VIEW: CPT

## 2021-01-27 PROCEDURE — 6370000000 HC RX 637 (ALT 250 FOR IP): Performed by: FAMILY MEDICINE

## 2021-01-27 PROCEDURE — 90935 HEMODIALYSIS ONE EVALUATION: CPT

## 2021-01-27 PROCEDURE — 97530 THERAPEUTIC ACTIVITIES: CPT

## 2021-01-27 PROCEDURE — 85610 PROTHROMBIN TIME: CPT

## 2021-01-27 PROCEDURE — 99232 SBSQ HOSP IP/OBS MODERATE 35: CPT | Performed by: FAMILY MEDICINE

## 2021-01-27 PROCEDURE — 2580000003 HC RX 258: Performed by: NURSE PRACTITIONER

## 2021-01-27 PROCEDURE — 86704 HEP B CORE ANTIBODY TOTAL: CPT

## 2021-01-27 PROCEDURE — 99253 IP/OBS CNSLTJ NEW/EST LOW 45: CPT | Performed by: INTERNAL MEDICINE

## 2021-01-27 PROCEDURE — 85027 COMPLETE CBC AUTOMATED: CPT

## 2021-01-27 RX ORDER — BUTALBITAL, ACETAMINOPHEN AND CAFFEINE 50; 325; 40 MG/1; MG/1; MG/1
1 TABLET ORAL EVERY 4 HOURS PRN
Status: DISCONTINUED | OUTPATIENT
Start: 2021-01-27 | End: 2021-01-28 | Stop reason: HOSPADM

## 2021-01-27 RX ADMIN — ESTROGENS, CONJUGATED 0.62 MG: 0.62 TABLET, FILM COATED ORAL at 09:22

## 2021-01-27 RX ADMIN — ATORVASTATIN CALCIUM 80 MG: 80 TABLET, FILM COATED ORAL at 09:22

## 2021-01-27 RX ADMIN — DILTIAZEM HYDROCHLORIDE 120 MG: 120 CAPSULE, COATED, EXTENDED RELEASE ORAL at 09:22

## 2021-01-27 RX ADMIN — HEPARIN SODIUM 5000 UNITS: 5000 INJECTION INTRAVENOUS; SUBCUTANEOUS at 17:27

## 2021-01-27 RX ADMIN — SODIUM CHLORIDE, PRESERVATIVE FREE 10 ML: 5 INJECTION INTRAVENOUS at 20:43

## 2021-01-27 RX ADMIN — CARVEDILOL 12.5 MG: 12.5 TABLET, FILM COATED ORAL at 20:43

## 2021-01-27 RX ADMIN — CARVEDILOL 12.5 MG: 12.5 TABLET, FILM COATED ORAL at 09:22

## 2021-01-27 RX ADMIN — BUTALBITAL, ACETAMINOPHEN AND CAFFEINE 1 TABLET: 50; 325; 40 TABLET ORAL at 13:51

## 2021-01-27 RX ADMIN — SODIUM CHLORIDE, PRESERVATIVE FREE 10 ML: 5 INJECTION INTRAVENOUS at 09:22

## 2021-01-27 RX ADMIN — PANTOPRAZOLE SODIUM 40 MG: 40 TABLET, DELAYED RELEASE ORAL at 06:31

## 2021-01-27 RX ADMIN — LOSARTAN POTASSIUM 50 MG: 50 TABLET, FILM COATED ORAL at 09:22

## 2021-01-27 RX ADMIN — METOCLOPRAMIDE 5 MG: 5 TABLET ORAL at 09:22

## 2021-01-27 RX ADMIN — METOCLOPRAMIDE 5 MG: 5 TABLET ORAL at 20:43

## 2021-01-27 RX ADMIN — HEPARIN SODIUM 5000 UNITS: 5000 INJECTION INTRAVENOUS; SUBCUTANEOUS at 01:06

## 2021-01-27 RX ADMIN — HYDRALAZINE HYDROCHLORIDE 50 MG: 50 TABLET, FILM COATED ORAL at 06:37

## 2021-01-27 RX ADMIN — DULOXETINE HYDROCHLORIDE 30 MG: 30 CAPSULE, DELAYED RELEASE ORAL at 09:22

## 2021-01-27 RX ADMIN — LOSARTAN POTASSIUM 50 MG: 50 TABLET, FILM COATED ORAL at 20:43

## 2021-01-27 RX ADMIN — ACETAMINOPHEN 650 MG: 325 TABLET ORAL at 08:10

## 2021-01-27 RX ADMIN — HEPARIN SODIUM 5000 UNITS: 5000 INJECTION INTRAVENOUS; SUBCUTANEOUS at 09:22

## 2021-01-27 ASSESSMENT — ENCOUNTER SYMPTOMS
VOMITING: 0
SHORTNESS OF BREATH: 0
WHEEZING: 0
COUGH: 0
ABDOMINAL PAIN: 1
STRIDOR: 0
DIARRHEA: 0
NAUSEA: 1
BLOOD IN STOOL: 0
CONSTIPATION: 0

## 2021-01-27 ASSESSMENT — PAIN SCALES - GENERAL
PAINLEVEL_OUTOF10: 0
PAINLEVEL_OUTOF10: 9
PAINLEVEL_OUTOF10: 9

## 2021-01-27 ASSESSMENT — PAIN DESCRIPTION - LOCATION: LOCATION: HEAD

## 2021-01-27 NOTE — FLOWSHEET NOTE
Dialysis Post Treatment Note  Vitals:    01/27/21 1247   BP: 133/66   Pulse: 76   Resp: 18   Temp: 98.2 °F (36.8 °C)   SpO2:      Pre-Weight = 110.6kg  Post-weight = Weight: 242 lb 4.6 oz (109.9 kg)  Total Liters Processed = Total Liters Processed (l/min): 24.3 l/min  Rinseback Volume (mL) = Rinseback Volume (ml): 300 ml  Net Removal (mL) = NET Removed (ml): 610 ml  Type of access used=Lt forearm fistula  Length of treatment=2 hours    Pt tolerated treatment well. All blood returned. Pt transported back to room 324.

## 2021-01-27 NOTE — PROGRESS NOTES
[facility]  Office: [address], , Haile Wu, DO, Kayley Horta, DO, Ros Albright, DO, Marnie Rodgers MD, Giovanny Barbour MD, Olive Vela MD, Gila García MD, Sharyle Anon, MD, Shamika Cabrera MD, Jose Chavez MD, Alexia Betancourt MD, Cristóbal Jimenez MD, Faviola Adhikari, DO, Meryle Schuller, MD, Jeannine Ocampo MD, Maninder Poole DO, Cori Ybarra MD,  Maurilio Irizarry DO, Alexa Gonsalez MD, Luis Armando Obrien MD, Joshua Amor, [facility], [facility], CNP, July Cunningham, CNP, Esther Poole, CNS, Debbie Engel, CNP, Khushi Ac, CNP, Terryann Collet, CNP, Aidee Smallwood, CNP, Reymundo Andrade, CNP, SHERRIE ColeC, Valdo Painter, [facility], Nimesh Roblero, CNP, Jules Bauer, CNP, Giacomo Leblanc, CNP, Mitzy Dutta, CNP, Yanira Jones, [facility]   [facility]    Progress Note    1/27/2021    7:38 AM    Name:   Janey Case  MRN:     [id number]     Acct:      [de-identified]   Room:   [address] [place] [address] Day:  1  Admit Date:  1/26/2021 10:04 PM    PCP:   ARISTIDES Tse CNP  Code Status:  Full Code    Subjective:     C/C: renal failure     Interval History Status: not changed. Pt was seen and examined this morning  No acute events overnight   Has a headache but no other complaints at this time       Review of Systems:     12 point ROS performed today and negative for anything other than what was stated in subjective     Medications:      Allergies:  No Known Allergies    Current Meds:   Scheduled Meds:    atorvastatin  80 mg Oral Daily    carvedilol  12.5 mg Oral BID    dilTIAZem  120 mg Oral Daily    DULoxetine  30 mg Oral Daily    estrogens (conjugated)  0.625 mg Oral Daily    hydrALAZINE  50 mg Oral 3 times per day    losartan  50 mg Oral BID    metoclopramide  5 mg Oral BID    pantoprazole  40 mg Oral QAM AC    sodium chloride flush  10 mL Intravenous 2 times per day    heparin (porcine)  5,000 Units Subcutaneous 3 times per day     Continuous Infusions:   PRN Meds: sodium chloride flush, promethazine **OR** ondansetron, polyethylene glycol, acetaminophen **OR** acetaminophen    Data:     Past Medical History:   has a past medical history of JESS (acute kidney injury) ([facility] Anemia, Arthritis, Chronic kidney disease, Depression with anxiety, GERD (gastroesophageal reflux disease), H/O echocardiogram, H/O tooth extraction, History of blood transfusion, Hyperlipidemia, Hypertensive crisis, Kidney stones, Sciatica, and Vasculitis ([facility] Social History:   reports that she has never smoked. She has never used smokeless tobacco. She reports that she does not drink alcohol or use drugs. Family History:   Family History   Problem Relation Age of Onset   Tianna Boothe Cancer Mother     Diabetes Mother     Heart Disease Mother     High Blood Pressure Mother     Cancer Father         prostate    High Blood Pressure Father     Cancer Sister         ovarian    Diabetes Brother     Cancer Sister         ovarian    High Blood Pressure Brother     Diabetes Brother        Vitals:  /69   Pulse 72   Temp 98.3 °F (36.8 °C) (Oral)   Resp 16   Ht 5' 2\" (1.575 m)   Wt 239 lb 10.2 oz (108.7 kg)   LMP 2016   SpO2 95%   BMI 43.83 kg/m²   Temp (24hrs), Av.3 °F (36.8 °C), Min:98.3 °F (36.8 °C), Max:98.3 °F (36.8 °C)    No results for input(s): POCGLU in the last 72 hours. I/O (24Hr):   No intake or output data in the 24 hours ending 21 0738    Labs:  Hematology:  Recent Labs     21  0834 21  1433 21  0613   WBC 5.8 6.7 4.3   RBC 3.58* 3.46* 3.24*   HGB 10.7* 10.1* 9.4*   HCT 33.5* 32.7* 29.9*   MCV 93.6 94.5 92.3   MCH 29.9 29.2 29.0   MCHC 31.9 30.9 31.4   RDW 13.6 13.8 13.7    251 214   MPV 10.2 10.3 10.7   INR  --   --  0.9     Chemistry:  Recent Labs     21  1433 21  0613   * 140   K 4.4 3.9    106   CO2 22 22   GLUCOSE 93 95   BUN 45* 39*   CREATININE 3.90* 3.54*   ANIONGAP 12 12   LABGLOM 12* 13*   GFRAA 15* 16*   CALCIUM 9.3 9.1   No results for input(s): PROT, LABALBU, LABA1C, F6AAUGR, V9PNWLZ, FT4, TSH, AST, ALT, LDH, GGT, ALKPHOS, LABGGT, BILITOT, BILIDIR, AMMONIA, AMYLASE, LIPASE, LACTATE, CHOL, HDL, LDLCHOLESTEROL, CHOLHDLRATIO, TRIG, VLDL, ITA73TU, PHENYTOIN, PHENYF, URICACID, POCGLU in the last 72 hours. ABG:No results found for: POCPH, PHART, PH, POCPCO2, HSJ8YOF, PCO2, POCPO2, PO2ART, PO2, POCHCO3, HOX5EGD, HCO3, NBEA, PBEA, BEART, BE, THGBART, THB, BZN1PSN, EMUR9OES, H9JVFEPW, O2SAT, FIO2  Lab Results   Component Value Date/Time    SPECIAL NOT REPORTED 10/28/2019 02:12 PM     Lab Results   Component Value Date/Time    CULTURE ESCHERICHIA COLI >[id number] CFU/ML (A) 10/28/2019 02:12 PM       Radiology:  No results found. Physical Examination:        General appearance:  alert, cooperative and no distress  Mental Status:  oriented to person, place and time and normal affect  Lungs:  clear to auscultation bilaterally, normal effort  Heart:  regular rate and rhythm, no murmur  Abdomen:  soft, nontender, nondistended, normal bowel sounds, no masses, hepatomegaly, splenomegaly  Extremities:  no edema, redness, tenderness in the calves  Skin:  no gross lesions, rashes, induration    Assessment:        Hospital Problems           Last Modified POA    * (Principal) [facility] 1/27/2021 Yes    Overview Addendum 8/19/2019 11:05 AM by Dona Oneill MD     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 treated with induction cytoxan and steroids And maintenance therapy with Imuran until March 2018.          ANCA-associated vasculitis ([facility] 1/27/2021 Yes    CKD (chronic kidney disease) stage 4, GFR 15-29 ml/min (HCC) (Chronic) 1/27/2021 Yes    Overview Addendum 9/28/2020 12:55 PM by Dona Oneill MD     Baseline creatinine 3.2 - 3.5         Essential hypertension (Chronic) 1/27/2021 Yes 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) (Chronic) 1/27/2021 Yes    Iron deficiency anemia (Chronic) 1/27/2021 Yes    Hyperlipidemia (Chronic) 1/27/2021 Yes    Hyperparathyroidism ([address] (Chronic) 1/27/2021 Yes    Uremia 1/26/2021 Yes    MARY (obstructive sleep apnea) 1/27/2021 Yes          Plan:        1. Acute kidney injury secondary to #2  2. ANCA vasculitis   - CKD stage IV secondary to nephrosclerosis from ANCA vasculitis  - nephrology consulted for initiation of dialysis   - renally dose all meds  - avoid nephrotoxic agents  - monitor urine output     3. HTN   - continue home anti hypertensive regimen of coreg, diltiazem, hydralazine and cozaar   - v/s per unit protocol     4. HLD   - continue home statin     5. Anemia of chronic disease   6. Hx of gastritis   - hgb stable   - continue to monitor   - due to vasculitis   - EGD done recently showed gastritis w/ barretts esophagus   - continue PPI     7. DVT prophylaxis   - heparin     8.  DISPO:  - pending improvement     Renard Robles MD  1/27/2021  7:38 AM

## 2021-01-27 NOTE — PROGRESS NOTES
Physical Therapy    Facility/Department: [facility]MED [facility]  Initial Assessment    NAME: Leeanne Carrizales  : 1965  MRN: [id number]    Date of Service: 2021  1. Acute kidney injury secondary to #2  2. ANCA vasculitis   - CKD stage IV secondary to nephrosclerosis from ANCA vasculitis  - nephrology consulted for initiation of dialysis   - renally dose all meds  - avoid nephrotoxic agents  - monitor urine output   Discharge Recommendations:    No further therapy required at discharge. Assessment   Assessment: The pt ambulated 300 ft without a device x SBA. She had a c/o a headache but experienced no functional difficulties with mobilization. No further PT intervention is necessary at this time. Prognosis: Good  Decision Making: Medium Complexity  PT Education: Goals;PT Role;Plan of Care  REQUIRES PT FOLLOW UP: No  Activity Tolerance  Activity Tolerance: Patient Tolerated treatment well   Patient Diagnosis(es): There were no encounter diagnoses. has a past medical history of JESS (acute kidney injury) ([facility] Anemia, Arthritis, Chronic kidney disease, Depression with anxiety, GERD (gastroesophageal reflux disease), H/O echocardiogram, H/O tooth extraction, History of blood transfusion, Hyperlipidemia, Hypertensive crisis, Kidney stones, Sciatica, and Vasculitis ([address] has a past surgical history that includes Cholecystectomy; bone marrow biopsy (16); Tubal ligation; Colonoscopy (); Hysterectomy (2016); Tooth Extraction (2017); Upper gastrointestinal endoscopy (2018); Upper gastrointestinal endoscopy (N/A, 2018); Esophagus dilation (2018); and Port Surgery (Left, 10/2020). Restrictions  Restrictions/Precautions  Restrictions/Precautions: (up with assist)  Required Braces or Orthoses?: No  Position Activity Restriction  Other position/activity restrictions:  Up with assist  Vision/Hearing  Vision: Impaired  Vision Exceptions: Wears glasses for reading  Hearing: Within functional limits     Subjective  General  Patient assessed for rehabilitation services?: Yes  Response To Previous Treatment: Not applicable  Family / Caregiver Present: No  Follows Commands: Within Functional Limits  Subjective  Subjective: RN and pt agreeable to PT eval  Pain Screening  Patient Currently in Pain: Yes  Pain Assessment  Pain Assessment: 0-10  Pain Level: 9  Pain Location: Head  Vital Signs  Patient Currently in Pain: Yes     Orientation  Orientation  Overall Orientation Status: Within Normal Limits  Social/Functional History  Social/Functional History  Lives With: Spouse()  Type of Home: Apartment(first floor)  Home Layout: One level  Home Access: Level entry  Bathroom Shower/Tub: Tub/Shower unit  Bathroom Toilet: Standard  Home Equipment: Rolling walker, 4 wheeled walker(Pt has rollator, pt reports no use of DME at baseline)  ADL Assistance: [facility]: Independent(pt responsible for cooking/cleaning)  Homemaking Responsibilities: Yes  Ambulation Assistance: Independent  Transfer Assistance: Independent  Active : Yes  Mode of Transportation: Car  Occupation: Unemployed(wants to pursue disability)  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, stopped d/t worsening of condition  Leisure & Hobbies: read, play with grandkids, walk  IADL Comments: pt completes grocery shopping without use of scooter, takes breaks for fatigue when needed  Cognition      Objective     AROM RLE (degrees)  RLE AROM: WNL  AROM LLE (degrees)  LLE AROM : WNL  AROM RUE (degrees)  RUE AROM : WNL  AROM LUE (degrees)  LUE AROM : WNL  Strength RLE  Strength RLE: WNL  Strength LLE  Strength LLE: WNL  Strength RUE  Strength RUE: WNL  Strength LUE  Strength LUE: WNL     Sensation  Overall Sensation Status: Impaired  Bed mobility  Supine to Sit: Stand by assistance  Sit to Supine: Stand by assistance  Scooting: Stand by assistance  Transfers  Sit to Stand: Stand by assistance  Stand to sit: Stand

## 2021-01-27 NOTE — PLAN OF CARE
Nutrition Problem #1: Inadequate oral intake  Intervention: Food and/or Nutrient Delivery: Continue Current Diet, Start Oral Nutrition Supplement  Nutritional Goals: Pt to consume >75% of est'd needs via PO

## 2021-01-27 NOTE — PROGRESS NOTES
Occupational Therapy   Occupational Therapy Initial Assessment  Date: 2021   Patient Name: Blanca Douglas  MRN: [id number]     : 1965    Date of Service: 2021    Discharge Recommendations:    No therapy recommended at discharge. OT Equipment Recommendations  Equipment Needed: No    Assessment   Prognosis: Good  Decision Making: Low Complexity  OT Education: OT Role;Plan of Care  Patient Education: Pt educated on OT role, POC, what OT could do for pt in future if pt req'd it, and fatigue that can occur after hemodialysis. good return  No Skilled OT: Independent with ADL's;At baseline function; No OT goals identified; Safe to return home; Independent with functional mobility  REQUIRES OT FOLLOW UP: No  Activity Tolerance  Activity Tolerance: Patient Tolerated treatment well  Safety Devices  Safety Devices in place: Yes  Type of devices: Left in chair  Restraints  Initially in place: No           Patient Diagnosis(es): There were no encounter diagnoses. has a past medical history of JESS (acute kidney injury) ([facility] [address] Anemia, Arthritis, Chronic kidney disease, Depression with anxiety, GERD (gastroesophageal reflux disease), H/O echocardiogram, H/O tooth extraction, History of blood transfusion, Hyperlipidemia, Hypertensive crisis, Kidney stones, Sciatica, and Vasculitis ([address] has a past surgical history that includes Cholecystectomy; bone marrow biopsy (16); Tubal ligation; Colonoscopy (); Hysterectomy (2016); Tooth Extraction (2017); Upper gastrointestinal endoscopy (2018); Upper gastrointestinal endoscopy (N/A, 2018); Esophagus dilation (2018); and Port Surgery (Left, 10/2020). Restrictions  Restrictions/Precautions  Restrictions/Precautions: (up with assist)    Subjective   General  Patient assessed for rehabilitation services?: Yes  Family / Caregiver Present: No  Diagnosis: worsening renal function  General Comment  Comments: RN ok'd for OT.  Pt agreeable and tolerated well  Patient Currently in Pain: Yes  Pain Assessment  Pain Assessment: 0-10  Pain Level: 9  Pain Location: Head  Non-Pharmaceutical Pain Intervention(s): Distraction; Therapeutic presence; Ambulation/Increased Activity  Response to Pain Intervention: Patient Satisfied  Vital Signs  Patient Currently in Pain: Yes  Social/Functional History  Social/Functional History  Lives With: Spouse()  Type of Home: Apartment(first floor)  Home Layout: One level  Home Access: Level entry  Bathroom Shower/Tub: Tub/Shower unit  Bathroom Toilet: Standard  Home Equipment: (Pt has rollator, pt reports no use of DME at baseline)  ADL Assistance: [address]: Independent(pt responsible for cooking/cleaning)  Homemaking Responsibilities: Yes  Ambulation Assistance: Independent  Transfer Assistance: Independent  Active : Yes  Mode of Transportation: Car  Occupation: Unemployed(wants to pursue disability)  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, stopped d/t worsening of condition  Leisure & Hobbies: read, play with grandkids, walk  IADL Comments: pt completes grocery shopping without use of scooter, takes breaks for fatigue when needed       Objective   Vision: Impaired  Vision Exceptions: Wears glasses for reading  Hearing: Within functional limits    Orientation  Overall Orientation Status: Within Functional Limits     Balance  Sitting Balance: Independent(pt sat for ~13 minutes in recliner chair)  Standing Balance: Independent  Standing Balance  Time: ~3 minutes  Functional Mobility  Functional - Mobility Device: No device  Activity: Other  Assist Level:  Independent  Functional Mobility Comments: pt completed functional mobility in room and in hallway with PT  ADL  Feeding: Independent  Grooming: Independent  UE Bathing: Independent  LE Bathing: Independent  UE Dressing: Independent  LE Dressing: Independent  Toileting: Independent  Additional Comments: OT facilitated pt donning/doffing sock seated in recliner  Tone RUE  RUE Tone: Normotonic  Tone LUE  LUE Tone: Normotonic  Coordination  Movements Are Fluid And Coordinated: Yes     Bed mobility  Comment: not assessed d/t pt in recliner at start of session, returned to recliner at end of session  Transfers  Sit to stand: Independent  Stand to sit:  Independent     Cognition  Overall Cognitive Status: WFL        Sensation  Overall Sensation Status: Impaired(pt reports n/t in B hands and feet, stated that this is normal)        LUE AROM (degrees)  LUE AROM : WFL  Left Hand AROM (degrees)  Left Hand AROM: WFL  RUE AROM (degrees)  RUE AROM : WFL  Right Hand AROM (degrees)  Right Hand AROM: WFL  LUE Strength  Gross LUE Strength: WFL  L Hand General: 5/5  RUE Strength  Gross RUE Strength: WFL  R Hand General: 5/5        Plan   Plan  Plan Comment: d/c OT    AM-PAC Score  AM-[facility] Inpatient Daily Activity Raw Score: 24 (01/27/21 1055)  AM-PAC Inpatient ADL T-Scale Score : 57.54 (01/27/21 1055)  ADL Inpatient CMS 0-100% Score: 0 (01/27/21 1055)  ADL Inpatient CMS G-Code Modifier : CH (01/27/21 1055)        Therapy Time   Individual Concurrent Group Co-treatment   Time In 0940         Time Out 0956         Minutes 16      Co-eval with PT   Timed Code Treatment Minutes: 0 Minutes       ALVARO Porter

## 2021-01-27 NOTE — PROGRESS NOTES
Comprehensive Nutrition Assessment    Type and Reason for Visit:  Initial, Positive Nutrition Screen(Wt loss, poor appetite)    Nutrition Recommendations/Plan:   -Continue renal diet   -Suggest nepro supplements BID   -Will monitor po intake and weights     Nutrition Assessment:   Pt admitted d/t worsening renal fx w/ initiation of HD. Pt stated that her appetite has been very poor this past month and states UBW of 278 lbs x 1 yr ago. Pt reports that this wt loss was unintentional (14.0% wt loss x 1 yr, not considered significant). Pt agreed to nepro supplements on her meal trays. Writer reviewed renal diet ed w/ pt - pt reported that she received diet education yesterday. Will monitor po intake and weight trends. Malnutrition Assessment:  Malnutrition Status:   At risk for malnutrition (Comment)    Context:  Chronic Illness     Findings of the 6 clinical characteristics of malnutrition:  Energy Intake:  7 - 75% or less estimated energy requirements for 1 month or longer  Weight Loss:  (14.0% wt loss x 1 yr per EMR and pt)     Body Fat Loss:  No significant body fat loss     Muscle Mass Loss:  No significant muscle mass loss    Fluid Accumulation:  No significant fluid accumulation     Strength:  Not Performed    Estimated Daily Nutrient Needs:  Energy (kcal):  1.2-1.3 ~> 4455-1663 kcals/d; Weight Used for Energy Requirements:  Admission     Protein (g):  1.3-1.4 gm/kg ~> 65-70 gms/d; Weight Used for Protein Requirements:  Ideal          Nutrition Related Findings:  active bowel sounds; labs/meds reviewed      Wounds:  None       Current Nutrition Therapies:    DIET GENERAL; Renal    Anthropometric Measures:  · Height: 5' 2\" (157.5 cm)  · Current Body Weight: 239 lb (108.4 kg)   · Admission Body Weight: 239 lb (108.4 kg)    · Usual Body Weight: 278 lb (126.1 kg)(per pt)     · Ideal Body Weight: 110 lbs; % Ideal Body Weight 217.3 %   · BMI: 43.7  · BMI Categories: Obese Class 3 (BMI 40.0 or greater) Nutrition Diagnosis:   · Inadequate oral intake related to renal dysfunction as evidenced by intake 26-50%, intake 51-75%(Need for ONS)      Nutrition Interventions:   Food and/or Nutrient Delivery:  Continue Current Diet, Start Oral Nutrition Supplement  Nutrition Education/Counseling:  Education completed   Coordination of Nutrition Care:  Continue to monitor while inpatient    Goals: Set   Pt to consume >75% of est'd needs via PO       Nutrition Monitoring and Evaluation:   Food/Nutrient Intake Outcomes:  Food and Nutrient Intake, Supplement Intake  Physical Signs/Symptoms Outcomes:  Biochemical Data, Nutrition Focused Physical Findings, Skin, Weight, GI Status, Fluid Status or Edema     Discharge Planning:     Too soon to determine     Electronically signed by Danilo Camacho RD, LD on 1/27/21 at 11:57 AM EST    Contact: [phone]

## 2021-01-27 NOTE — CONSULTS
NEPHROLOGY     REASON FOR CONSULT:     JESS on CKD 4/5 with uremic symptoms      HISTORY OF PRESENTING ILLNESS                 This is a 54 y.o. female who has been hospitalized for initiation of hemodialysis in view of uremic symptoms. 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 I saw her in the office on Monday and she was complaining of easy fatigability/exertional shortness of breath. Denies any history of orthopnea proximal nocturnal dyspnea or chest pain. She reported appetite declining. Review of labs show progressive worsening of kidney function. Detailed discussion with her in regards to initiation of dialysis. Her AV fistula was creatinine the month of October last year. Functional.    Currently on hemodialysis for session. Tolerating the procedure well. Reports no chest pain or palpitation or dizziness. PAST MEDICAL HISTORY         Diagnosis Date    JESS (acute kidney injury) ([address]     Anemia     Arthritis     Chronic kidney disease     Depression with anxiety 9/8/2016    GERD (gastroesophageal reflux disease)     H/O echocardiogram 08/25/2016    EF 55%. Mild mitral regurgitation.  H/O tooth extraction 07/26/2017    Lower posterior right tooth.  History of blood transfusion     x3. Last one in May 2016    Hyperlipidemia     Hypertensive crisis 8/24/2016    Kidney stones     Sciatica     Vasculitis ([address]        PAST SURGICAL HISTORY          Procedure Laterality Date    BONE MARROW BIOPSY  5-23-16    Per Hematologist order @ [address] [place] [address].  CHOLECYSTECTOMY      COLONOSCOPY  2016    ESOPHAGEAL DILATATION  9/12/2018    ESOPHAGEAL DILATATION performed by Eneida Iraheta MD at [address] [place] [address]  11/09/2016    with tubes and ovaries    PORT SURGERY Left 10/2020    port placement for dialysis    TOOTH EXTRACTION  07/26/2017    Right posterior lower tooth.     TUBAL LIGATION      UPPER GASTROINTESTINAL file     Minutes per session: Not on file    Stress: Not on file   Relationships    Social connections     Talks on phone: Not on file     Gets together: Not on file     Attends Church service: Not on file     Active member of club or organization: Not on file     Attends meetings of clubs or organizations: Not on file     Relationship status: Not on file    Intimate partner violence     Fear of current or ex partner: Not on file     Emotionally abused: Not on file     Physically abused: Not on file     Forced sexual activity: Not on file   Other Topics Concern    Not on file   Social History Narrative    Not on file       FAMILY HISTORY     Family History   Problem Relation Age of Onset    Cancer Mother     Diabetes Mother     Heart Disease Mother     High Blood Pressure Mother     Cancer Father         prostate    High Blood Pressure Father     Cancer Sister         ovarian    Diabetes Brother     Cancer Sister         ovarian    High Blood Pressure Brother     Diabetes Brother           REVIEW OF SYSTEM     Constitutional: Present asthenia/anorexia and easy fatigue fatigability  HEENT : No epistaxis/visual blurriness/rhinorrhoea/sorethroat/trauma  Cardiovascular:No chest pain/palpitation/present exertional SOB  Respiratory: No cough/fever/Wheezing    Gastrointestinal: No abdominal pain/nausea/vomiting/diarrhoea/constipation  Genitourinary: No dysuria/pyuria/hematuria/incomplete emptying of bladder  Musculoskeletal:  No gait disturbance/weakness or joint complaints  Integumentary: No rash or pruritis. Neurological: No headache/diplopia/change in muscle strength/numbness or tingling. No change in gait, balance, coordination, mood, affect, memory, mentation, behavior. Psychiatric: No anxiety/depression. Endocrine: No temperature intolerance. No excessive thirst, fluid intake, or urination. No tremor.   Hematologic/Lymphatic: No abnormal bruising or bleeding, blood clots or swolle lymph nodes. Allergic/Immunologic: No nasal congestion or hives      PHYSICAL EXAM     Vitals:    01/27/21 1041 01/27/21 1111 01/27/21 1141 01/27/21 1150   BP: (!) 143/72 133/67 133/68    Pulse: 74 75 73    Resp:       Temp:       TempSrc:       SpO2:       Weight:       Height:    5' 2\" (1.575 m)     24HR INTAKE/OUTPUT:  No intake or output data in the 24 hours ending 01/27/21 1218    General appearance:Awake, alert, in no acute distress  Skin: warm and dry, no rash or erythema  Eyes: conjunctivae normal and sclera anicteric  ENT: no thrush no pharyngeal congestion  orodental hygiene   Neck: No JVD, Lymphadenopathty or thyromegaly  Respiratory: vesicular breath sounds,no wheeze/crackles  Cardiovascular: S1 S2 normal,no gallop or organic murmur. No carotid bruit  Abdomen:Non tender/non distended. Bowel sounds present  Extremities: No Cyanosis or Clubbing, present lower extremity edema  Neurological:Alert and oriented. No abnormalities of mood, affect, memory, mentation, or behavior are noted    INVESTIGATIONS      CBC:   Recent Labs     01/25/21  0834 01/25/21  1433 01/27/21  0613   WBC 5.8 6.7 4.3   RBC 3.58* 3.46* 3.24*   HGB 10.7* 10.1* 9.4*   HCT 33.5* 32.7* 29.9*   MCV 93.6 94.5 92.3   MCH 29.9 29.2 29.0   MCHC 31.9 30.9 31.4   RDW 13.6 13.8 13.7    251 214   MPV 10.2 10.3 10.7      BMP:   Recent Labs     01/25/21  1433 01/27/21  0613   * 140   K 4.4 3.9    106   CO2 22 22   BUN 45* 39*   CREATININE 3.90* 3.54*   GLUCOSE 93 95   CALCIUM 9.3 9.1        Phosphorus:  No results for input(s): PHOS in the last 72 hours. Magnesium: No results for input(s): MG in the last 72 hours. Albumin: No results for input(s): LABALBU in the last 72 hours. Radiology:  Reviewed as available. ASSESSMENT     1. 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   History of ANCA vasculitis MPO biopsy-proven 2016 May treated with induction cytoxan and steroids And maintenance therapy with Imuran until March 2018. Extrarenal relapse with epistaxis/generalized myalgia/easy fatigability with positive) titers in the month of October 2018. Being treated with rituximab by rheumatology  3. 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 HTN   5. 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 Anemia  From vasculitis and EGD proven Gastritis/esophagitis with Chicas's esophagus   7. Post menopausal bleeding - endometrial hyperplasia - S/p DOMO with BSO  8. RA - non deforming  9. MARY  10. Secondary hyperparathyroidism     PLAN:     #1 getting first session of hemodialysis today. Orders reviewed the nursing staff. #2 hemodialysis tomorrow again  #3 resume all home medications  #4 arranging for outpatient hemodialysis spot at [address] [facility]  #5 hopefully discharge next 24 to 48 hours once outpatient dialysis spot secured    Thank you for the consultation. Please do not hesitate to call with questions. This note is created with the assistance of a speech-recognition program. While intending to generate a document that actually reflects the content of the visit, no guarantees can be provided that every mistake has been identified and corrected by editing.     Sabrina Horton MD, MRCP Carmen Zamora, FACP   1/27/2021 12:18 PM    [facility]

## 2021-01-27 NOTE — H&P
[facility]  Office: [address], DO, Alessio Salcido, DO, Alejandro Barrios, DO, Jeremy Albright, DO, Audelia Baker MD, Jillian Badillo MD, Shaheen Cheng MD, Ruby Conley MD, Ze Laughlin MD, Thomas Rubin MD, Sharma Babinski, MD, Minh Sharif MD, Crsitóbal Johnston MD, John Williamson DO, Indra Rogers MD, Jessica Mojica MD, Daren Momin, DO, Louis Le MD,  Carmelo Edmondson DO, Malcolm Del Rio MD, Hiram Pickering MD, Filippo Quintero [facility], [facility], [facility], Dalton Christensen, CNP, Dayan Awan, CNS, Ashia Flynn, CNP, Christos Paiz, CNP, Lynn Garcia, CNP, Leslie Marino, CNP, Ludwin Torre, CNP, Andrea Devries PA-C, Matilda Hernandez, [facility], Johnson Lazar, CNP, Ray Campos, CNP, Sudarshan Garcia, CNP, Harvey Henley, CNP, Papa Ren, [address]    HISTORY AND PHYSICAL EXAMINATION            Date:   1/26/2021  Patient name:  Alyx Truong  Date of admission:  1/26/2021 10:04 PM  MRN:   [id number]  Account:  [de-identified]  YOB: 1965  PCP:    ARISTIDES Bridges CNP  Room:   8862/9048-03  Code Status:    Prior    Chief Complaint:     Worsening renal function    History Obtained From:     patient, electronic medical record    History of Present Illness:     Alyx Truong is a 54 y.o. / female who presents with No chief complaint on file. and is admitted to the hospital for the management of <principal problem not specified>. Patient presents to [place] [address] as a direct admit from home and nephrology office for worsening renal function. . Patient has a longstanding history of vasculitis and chronic kidney disease in which her kidney function has been steadily declining and patient has become more symptomatic to the point that she saw nephrology services today and they recommended direct admit for admission to initiate hemodialysis.   Patient already has a AV fistula in 12/28/20 1/27/21  Alma Medellin MD   losartan (COZAAR) 50 MG tablet TAKE 1 TABLET BY MOUTH TWICE DAILY 11/30/20   Alma Medellin MD   calcitRIOL (ROCALTROL) 0.25 MCG capsule Take 1 capsule by mouth daily 11/30/20   Alma Medellin MD   butalbital-acetaminophen-caffeine (FIORICET, ESGIC) -87 MG per tablet Take 1 tablet by mouth every 6 hours as needed for Headaches 11/23/20   ARISTIDES Doyle CNP   atorvastatin (LIPITOR) 80 MG tablet Take 1 tablet by mouth daily 10/27/20   ARISTIDES Doyle CNP   carvedilol (COREG) 12.5 MG tablet Take 1 tablet by mouth 2 times daily 10/26/20   Alma Medellin MD   hydrALAZINE (APRESOLINE) 50 MG tablet TAKE 1 TABLET BY MOUTH THREE TIMES DAILY 8/31/20   Linda Vargas, ARISTIDES Aranda CNP   pantoprazole (PROTONIX) 40 MG tablet Take 1 tablet by mouth twice daily 8/11/20   Linda Vargas, APRN - CNP   meclizine (ANTIVERT) 25 MG tablet Take 1 tablet by mouth 3 times daily as needed for Dizziness 7/22/20   ARISTIDES Doyle CNP   DULoxetine (CYMBALTA) 30 MG extended release capsule TAKE 1 CAPSULE BY MOUTH ONCE DAILY 5/4/20   ARISTIDES Doyle CNP   potassium chloride (KLOR-CON M) 20 MEQ extended release tablet Take 1 tablet by mouth daily 4/2/20   Bryan Culp MD   dilTIAZem (CARDIZEM CD) 120 MG extended release capsule TAKE 1 CAPSULE BY MOUTH ONCE DAILY 2/21/20   Alma Medellin MD   Blood Pressure KIT 1 Units by Does not apply route daily 11/7/19   ARISTIDES Rios CNP   fluticasone (FLONASE) 50 MCG/ACT nasal spray 1 spray by Each Nostril route daily 5/23/19   ARISTIDES Rios CNP   albuterol sulfate HFA (VENTOLIN HFA) 108 (90 Base) MCG/ACT inhaler Inhale 2 puffs into the lungs every 6 hours as needed for Wheezing 4/16/19   Kathreen Fred Might, APRN - CNP   Incontinence Supply Disposable (PREVAIL ADULT BRIEF LARGE) MISC 3 each by Does not apply route daily 11/20/18   Linda Vargas APRN - CNP   saline nasal gel (AYR) GEL by Nasal route 2 times daily  Patient taking differently: by Nasal route as needed  11/6/18   Jennifer Brown MD   sodium chloride (ALTAMIST SPRAY) 0.65 % nasal spray 1 spray by Nasal route as needed for Congestion 11/6/18   Jennifer Brown MD   vitamin D (CHOLECALCIFEROL) 1000 UNIT TABS tablet Take 2,000 Units by mouth 2 times daily     Historical Provider, MD   Docusate Calcium (STOOL SOFTENER PO) Take 100 mg by mouth as needed     Historical Provider, MD   diphenhydrAMINE (BENADRYL) 25 MG capsule Take 25 mg by mouth every 6 hours as needed for Itching or Allergies Pt takes 2 capsules at HS \"due to hives from it being cold now\". Pt tho states these are NOT helping her this time. Historical Provider, MD        Allergies:     Patient has no known allergies. Social History:     Tobacco:    reports that she has never smoked. She has never used smokeless tobacco.  Alcohol:      reports no history of alcohol use. Drug Use:  reports no history of drug use. Family History:     Family History   Problem Relation Age of Onset   Jeanne Ugarte Cancer Mother     Diabetes Mother     Heart Disease Mother     High Blood Pressure Mother     Cancer Father         prostate    High Blood Pressure Father     Cancer Sister         ovarian    Diabetes Brother     Cancer Sister         ovarian    High Blood Pressure Brother     Diabetes Brother        Review of Systems:     Positive and Negative as described in HPI. Review of Systems   Constitutional: Positive for activity change, appetite change, fatigue and unexpected weight change. Negative for chills, diaphoresis and fever. HENT: Negative for congestion and hearing loss. Respiratory: Negative for cough, shortness of breath, wheezing and stridor. Cardiovascular: Negative for chest pain, palpitations and leg swelling. Gastrointestinal: Positive for abdominal pain (Diffuse chronic) and nausea (Chronic). Negative for blood in stool, constipation, diarrhea and vomiting.    Genitourinary: Positive for decreased urine volume. Negative for dysuria and frequency. Musculoskeletal: Negative for myalgias. Skin: Negative for rash. Neurological: Positive for weakness (Nonfocal). Negative for dizziness, seizures and headaches. Psychiatric/Behavioral: The patient is not nervous/anxious. Physical Exam:   [facility] 09/09/2016   No data recorded. No results for input(s): POCGLU in the last 72 hours. No intake or output data in the 24 hours ending 01/26/21 2208    Physical Exam  Vitals signs and nursing note reviewed. Constitutional:       General: She is not in acute distress. Appearance: She is well-developed. She is not diaphoretic. HENT:      Head: Normocephalic and atraumatic. Right Ear: Hearing normal.      Left Ear: Hearing normal.      Nose: Nose normal. No rhinorrhea. Eyes:      General: Lids are normal.      Extraocular Movements:      Right eye: Normal extraocular motion. Left eye: Normal extraocular motion. Conjunctiva/sclera: Conjunctivae normal.      Right eye: Right conjunctiva is not injected. Left eye: Left conjunctiva is not injected. Pupils: Pupils are equal, round, and reactive to light. Pupils are equal.      Right eye: Pupil is reactive. Left eye: Pupil is reactive. Neck:      Musculoskeletal: Neck supple. Thyroid: No thyromegaly. Vascular: No carotid bruit. Trachea: Trachea and phonation normal. No tracheal deviation. Cardiovascular:      Rate and Rhythm: Normal rate and regular rhythm. Pulses: Normal pulses. Heart sounds: Normal heart sounds. No murmur. Arteriovenous access: left arteriovenous access is present. Comments: Left lower forearm/wrist +bruit and thrill  Pulmonary:      Effort: Pulmonary effort is normal. No respiratory distress. Breath sounds: Normal breath sounds. No stridor. No decreased breath sounds. Abdominal:      General: Bowel sounds are normal. There is no distension. Palpations: Abdomen is soft. There is no mass. Tenderness: There is no abdominal tenderness. There is no guarding. Musculoskeletal:         General: No tenderness. Skin:     General: Skin is warm and dry. Findings: No erythema, lesion or rash. Neurological:      General: No focal deficit present. Mental Status: She is alert and oriented to person, place, and time. She is not disoriented. GCS: GCS eye subscore is 4. GCS verbal subscore is 5. GCS motor subscore is 6. Cranial Nerves: No cranial nerve deficit. Sensory: No sensory deficit. Motor: Motor function is intact. Psychiatric:         Speech: Speech normal.         Behavior: Behavior normal. Behavior is cooperative. Investigations:      Laboratory Testing:  No results found for this or any previous visit (from the past 24 hour(s)). Imaging/Diagnostics:  No results found. 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 treated with induction cytoxan and steroids And maintenance therapy with Imuran until March 2018. ANCA-associated vasculitis ([facility] 1/27/2021 Yes    CKD (chronic kidney disease) stage 4, GFR 15-29 ml/min ([facility]) (Chronic) 1/27/2021 Yes    Overview Addendum 9/28/2020 12:55 PM by Renetta Spivey MD     Baseline creatinine 3.2 - 3.5         Essential hypertension (Chronic) 1/27/2021 Yes    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) (Chronic) 1/27/2021 Yes    Iron deficiency anemia (Chronic) 1/27/2021 Yes    Hyperlipidemia (Chronic) 1/27/2021 Yes    Hyperparathyroidism ([facility] (Chronic) 1/27/2021 Yes    Uremia 1/26/2021 Yes    MARY (obstructive sleep apnea) 1/27/2021 Yes          Plan:     Patient status inpatient in the Med/Surge    1.  Consultation to nephrology as they plan to initiate dialysis  2. Morning labs CBC BMP  3. Resume home antihypertensive medications  4. Monitor CBC and iron studies  5. RT protocol for struct of sleep apnea and CPAP BiPAP as needed  6. GI prophylaxis  7. DVT prophylaxis with heparin    Consultations:   IP CONSULT TO NEPHROLOGY     Patient is admitted as inpatient status because of co-morbidities listed above, severity of signs and symptoms as outlined, requirement for current medical therapies and most importantly because of direct risk to patient if care not provided in a hospital setting. Expected length of stay > 48 hours.     Rekha Jane, APRN - [id number] [facility]  1/26/2021  10:08 PM    Copy sent to Dr. Kira Epperson, APRN - CNP

## 2021-01-28 VITALS
WEIGHT: 236.99 LBS | TEMPERATURE: 98.1 F | DIASTOLIC BLOOD PRESSURE: 72 MMHG | SYSTOLIC BLOOD PRESSURE: 134 MMHG | BODY MASS INDEX: 43.61 KG/M2 | HEART RATE: 77 BPM | OXYGEN SATURATION: 97 % | RESPIRATION RATE: 17 BRPM | HEIGHT: 62 IN

## 2021-01-28 LAB
ABSOLUTE EOS #: 0.22 K/UL (ref 0–0.44)
ABSOLUTE IMMATURE GRANULOCYTE: <0.03 K/UL (ref 0–0.3)
ABSOLUTE LYMPH #: 0.75 K/UL (ref 1.1–3.7)
ABSOLUTE MONO #: 0.33 K/UL (ref 0.1–1.2)
ALBUMIN SERPL-MCNC: 3.5 G/DL (ref 3.5–5.2)
ALBUMIN/GLOBULIN RATIO: 1.3 (ref 1–2.5)
ALP BLD-CCNC: 66 U/L (ref 35–104)
ALT SERPL-CCNC: 8 U/L (ref 5–33)
ANION GAP SERPL CALCULATED.3IONS-SCNC: 12 MMOL/L (ref 9–17)
AST SERPL-CCNC: 12 U/L
BASOPHILS # BLD: 1 % (ref 0–2)
BASOPHILS ABSOLUTE: 0.05 K/UL (ref 0–0.2)
BILIRUB SERPL-MCNC: 0.19 MG/DL (ref 0.3–1.2)
BUN BLDV-MCNC: 30 MG/DL (ref 6–20)
BUN/CREAT BLD: ABNORMAL (ref 9–20)
CALCIUM SERPL-MCNC: 8.5 MG/DL (ref 8.6–10.4)
CHLORIDE BLD-SCNC: 101 MMOL/L (ref 98–107)
CO2: 24 MMOL/L (ref 20–31)
CREAT SERPL-MCNC: 3.36 MG/DL (ref 0.5–0.9)
DIFFERENTIAL TYPE: ABNORMAL
EOSINOPHILS RELATIVE PERCENT: 5 % (ref 1–4)
GFR AFRICAN AMERICAN: 17 ML/MIN
GFR NON-AFRICAN AMERICAN: 14 ML/MIN
GFR SERPL CREATININE-BSD FRML MDRD: ABNORMAL ML/MIN/{1.73_M2}
GFR SERPL CREATININE-BSD FRML MDRD: ABNORMAL ML/MIN/{1.73_M2}
GLUCOSE BLD-MCNC: 185 MG/DL (ref 70–99)
HCT VFR BLD CALC: 30.8 % (ref 36.3–47.1)
HEMOGLOBIN: 9.7 G/DL (ref 11.9–15.1)
IMMATURE GRANULOCYTES: 0 %
LYMPHOCYTES # BLD: 16 % (ref 24–43)
MAGNESIUM: 2.6 MG/DL (ref 1.6–2.6)
MCH RBC QN AUTO: 29.2 PG (ref 25.2–33.5)
MCHC RBC AUTO-ENTMCNC: 31.5 G/DL (ref 28.4–34.8)
MCV RBC AUTO: 92.8 FL (ref 82.6–102.9)
MONOCYTES # BLD: 7 % (ref 3–12)
NRBC AUTOMATED: 0 PER 100 WBC
PDW BLD-RTO: 13.6 % (ref 11.8–14.4)
PLATELET # BLD: 210 K/UL (ref 138–453)
PLATELET ESTIMATE: ABNORMAL
PMV BLD AUTO: 10.4 FL (ref 8.1–13.5)
POTASSIUM SERPL-SCNC: 3.8 MMOL/L (ref 3.7–5.3)
RBC # BLD: 3.32 M/UL (ref 3.95–5.11)
RBC # BLD: ABNORMAL 10*6/UL
SEG NEUTROPHILS: 71 % (ref 36–65)
SEGMENTED NEUTROPHILS ABSOLUTE COUNT: 3.2 K/UL (ref 1.5–8.1)
SODIUM BLD-SCNC: 137 MMOL/L (ref 135–144)
TOTAL PROTEIN: 6.2 G/DL (ref 6.4–8.3)
WBC # BLD: 4.6 K/UL (ref 3.5–11.3)
WBC # BLD: ABNORMAL 10*3/UL

## 2021-01-28 PROCEDURE — 2580000003 HC RX 258: Performed by: NURSE PRACTITIONER

## 2021-01-28 PROCEDURE — 99239 HOSP IP/OBS DSCHRG MGMT >30: CPT | Performed by: FAMILY MEDICINE

## 2021-01-28 PROCEDURE — 6370000000 HC RX 637 (ALT 250 FOR IP): Performed by: FAMILY MEDICINE

## 2021-01-28 PROCEDURE — 90935 HEMODIALYSIS ONE EVALUATION: CPT

## 2021-01-28 PROCEDURE — 5A1D70Z PERFORMANCE OF URINARY FILTRATION, INTERMITTENT, LESS THAN 6 HOURS PER DAY: ICD-10-PCS | Performed by: INTERNAL MEDICINE

## 2021-01-28 PROCEDURE — 36415 COLL VENOUS BLD VENIPUNCTURE: CPT

## 2021-01-28 PROCEDURE — 6370000000 HC RX 637 (ALT 250 FOR IP): Performed by: NURSE PRACTITIONER

## 2021-01-28 PROCEDURE — 85025 COMPLETE CBC W/AUTO DIFF WBC: CPT

## 2021-01-28 PROCEDURE — 80053 COMPREHEN METABOLIC PANEL: CPT

## 2021-01-28 PROCEDURE — 6360000002 HC RX W HCPCS: Performed by: NURSE PRACTITIONER

## 2021-01-28 PROCEDURE — 83735 ASSAY OF MAGNESIUM: CPT

## 2021-01-28 PROCEDURE — 99232 SBSQ HOSP IP/OBS MODERATE 35: CPT | Performed by: INTERNAL MEDICINE

## 2021-01-28 RX ORDER — LIDOCAINE 50 MG/G
OINTMENT TOPICAL DAILY
Qty: 1 TUBE | Refills: 0 | Status: SHIPPED | OUTPATIENT
Start: 2021-01-28 | End: 2021-02-04

## 2021-01-28 RX ADMIN — BUTALBITAL, ACETAMINOPHEN AND CAFFEINE 1 TABLET: 50; 325; 40 TABLET ORAL at 06:59

## 2021-01-28 RX ADMIN — HYDRALAZINE HYDROCHLORIDE 50 MG: 50 TABLET, FILM COATED ORAL at 01:06

## 2021-01-28 RX ADMIN — PANTOPRAZOLE SODIUM 40 MG: 40 TABLET, DELAYED RELEASE ORAL at 06:34

## 2021-01-28 RX ADMIN — LOSARTAN POTASSIUM 50 MG: 50 TABLET, FILM COATED ORAL at 14:07

## 2021-01-28 RX ADMIN — CARVEDILOL 12.5 MG: 12.5 TABLET, FILM COATED ORAL at 14:07

## 2021-01-28 RX ADMIN — BUTALBITAL, ACETAMINOPHEN AND CAFFEINE 1 TABLET: 50; 325; 40 TABLET ORAL at 01:09

## 2021-01-28 RX ADMIN — ESTROGENS, CONJUGATED 0.62 MG: 0.62 TABLET, FILM COATED ORAL at 14:07

## 2021-01-28 RX ADMIN — HEPARIN SODIUM 5000 UNITS: 5000 INJECTION INTRAVENOUS; SUBCUTANEOUS at 01:09

## 2021-01-28 RX ADMIN — SODIUM CHLORIDE, PRESERVATIVE FREE 10 ML: 5 INJECTION INTRAVENOUS at 14:06

## 2021-01-28 RX ADMIN — ATORVASTATIN CALCIUM 80 MG: 80 TABLET, FILM COATED ORAL at 14:07

## 2021-01-28 RX ADMIN — BUTALBITAL, ACETAMINOPHEN AND CAFFEINE 1 TABLET: 50; 325; 40 TABLET ORAL at 14:10

## 2021-01-28 RX ADMIN — DULOXETINE HYDROCHLORIDE 30 MG: 30 CAPSULE, DELAYED RELEASE ORAL at 14:07

## 2021-01-28 RX ADMIN — ACETAMINOPHEN 650 MG: 325 TABLET ORAL at 14:10

## 2021-01-28 RX ADMIN — DILTIAZEM HYDROCHLORIDE 120 MG: 120 CAPSULE, COATED, EXTENDED RELEASE ORAL at 14:07

## 2021-01-28 ASSESSMENT — PAIN DESCRIPTION - PROGRESSION: CLINICAL_PROGRESSION: NOT CHANGED

## 2021-01-28 ASSESSMENT — PAIN SCALES - GENERAL
PAINLEVEL_OUTOF10: 0
PAINLEVEL_OUTOF10: 8
PAINLEVEL_OUTOF10: 8

## 2021-01-28 ASSESSMENT — PAIN DESCRIPTION - PAIN TYPE: TYPE: ACUTE PAIN

## 2021-01-28 ASSESSMENT — PAIN DESCRIPTION - ONSET: ONSET: ON-GOING

## 2021-01-28 NOTE — DISCHARGE SUMMARY
[facility]  Office: [address], DO, Veta Mcardle, DO, Mina Elaine, DO, Sruthi Albright, DO, Jenny Goldman MD, Denis Khanna MD, Aga Dorman MD, Medardo Fisher MD, Akosua Nava MD, Clemencia Bateman MD, Carina Hernandez MD, Tori Kingston MD, Cristóbal Turner MD, Brittany Dumont DO, Gerson Cardenas MD, Juliano Sanchez MD, Fanny Wolff DO, Janet Wells MD,  Caleb Terry, DO, Onofre Sears MD, Antony Schuler MD, Isaura Cerda, [facility], [facility], [facility], Eileen Gallego, CNP, Marcelo Curry, CNS, Rubens Hurt, CNP, Nika Celaya, CNP, Jeimy Vanegas, CNP, Idris Ruiz, CNP, Calli Red, CNP, Juliet Becker PA-C, Lorelei Phipps, [facility], [facility], CNP, Tammie Clark, CNP, Victorina Fossa, CNP, Mainor David, CNP, Dariana Bonny, [facility]    Discharge Summary     Patient ID: Alisa Friedman  :  1965   MRN: [id number]     ACCOUNT:  [de-identified]   Patient's PCP: ARISTIDES Foreman CNP  Admit Date: 2021   Discharge Date: 2021      Length of Stay: 2  Code Status:  Full Code  Admitting Physician: Onofre Sears MD  Discharge Physician: Onofre Sears MD     Active Discharge Diagnoses:     Hospital Problem Lists:  Principal Problem:    JESS (acute kidney injury) [facility])  Active Problems:    ANCA-associated vasculitis ([facility]    CKD (chronic kidney disease) stage 4, GFR 15-29 ml/min ([facility])    Essential hypertension    Rheumatoid arthritis with positive rheumatoid factor ([facility])    Iron deficiency anemia    Hyperlipidemia    [facility] ([email or web address]    Uremia    MARY (obstructive sleep apnea)  Resolved Problems:    * No resolved hospital problems.  *      Admission Condition:  stable     Discharged Condition: stable    Hospital Stay:     Hospital Course:  Katheen Atlanta is a 54 y.o. female who was admitted for the management of    JESS (acute kidney injury) [facility]) , presented to ER with   No chief complaint on file.      1. Acute kidney injury secondary to #2  2. ANCA vasculitis   - CKD stage IV secondary to nephrosclerosis from ANCA vasculitis  - nephrology consulted for initiation of dialysis   - pt underwent first dialysis session yday 1/27, plan for second session today   - CM working on arranging out pt dialysis at [facility]   - renally dose all meds  - avoid nephrotoxic agents  - monitor urine output   - CLEARED FOR D/C BY NEPHRO  - has been set up for outpatient dialysis       3. HTN   - continue home anti hypertensive regimen of coreg, diltiazem, hydralazine and cozaar         4. HLD   - continue home statin      5. Anemia of chronic disease   6.  Hx of gastritis   - hgb stable yday   - due to vasculitis   - EGD done recently showed gastritis w/ barretts esophagus   - continue PPI     Significant therapeutic interventions: see above    Significant Diagnostic Studies:   Labs / Micro:  CBC:   Lab Results   Component Value Date    WBC 4.6 01/28/2021    RBC 3.32 01/28/2021    HGB 9.7 01/28/2021    HCT 30.8 01/28/2021    MCV 92.8 01/28/2021    MCH 29.2 01/28/2021    MCHC 31.5 01/28/2021    RDW 13.6 01/28/2021     01/28/2021     BMP:    Lab Results   Component Value Date    GLUCOSE 185 01/28/2021     01/28/2021    K 3.8 01/28/2021     01/28/2021    CO2 24 01/28/2021    ANIONGAP 12 01/28/2021    BUN 30 01/28/2021    CREATININE 3.36 01/28/2021    BUNCRER NOT REPORTED 01/28/2021    CALCIUM 8.5 01/28/2021    LABGLOM 14 01/28/2021    GFRAA 17 01/28/2021    GFR      01/28/2021    GFR NOT REPORTED 01/28/2021     HFP:    Lab Results   Component Value Date    PROT 6.2 01/28/2021     CMP:    Lab Results   Component Value Date    GLUCOSE 185 01/28/2021     01/28/2021    K 3.8 01/28/2021     01/28/2021    CO2 24 01/28/2021    BUN 30 01/28/2021    CREATININE 3.36 01/28/2021    ANIONGAP 12 01/28/2021    ALKPHOS 66 01/28/2021    ALT 8 01/28/2021    AST 12 01/28/2021    BILITOT 0.19 01/28/2021    LABALBU 3.5 01/28/2021    ALBUMIN 1.3 01/28/2021    LABGLOM 14 01/28/2021    GFRAA 17 01/28/2021    GFR      01/28/2021    GFR NOT REPORTED 01/28/2021    PROT 6.2 01/28/2021    CALCIUM 8.5 01/28/2021     PT/INR:    Lab Results   Component Value Date    PROTIME 9.9 01/27/2021    INR 0.9 01/27/2021     PTT:   Lab Results   Component Value Date    APTT 27.9 11/02/2018     FLP:    Lab Results   Component Value Date    CHOL 222 05/07/2019    TRIG 308 05/07/2019    HDL 61 05/07/2019     U/A:    Lab Results   Component Value Date    COLORU YELLOW 01/07/2021    TURBIDITY CLEAR 01/07/2021    SPECGRAV 1.025 01/07/2021    HGBUR NEGATIVE 01/07/2021    PHUR 5.0 01/07/2021    PROTEINU 2+ 01/07/2021    GLUCOSEU NEGATIVE 01/07/2021    KETUA NEGATIVE 01/07/2021    BILIRUBINUR NEGATIVE 01/07/2021    UROBILINOGEN Normal 01/07/2021    NITRU NEGATIVE 01/07/2021    LEUKOCYTESUR NEGATIVE 01/07/2021     TSH:    Lab Results   Component Value Date    TSH 2.17 03/25/2019         Radiology:  Xr Chest (single View Frontal)    Result Date: 1/27/2021  Negative chest radiograph. Consultations:    Consults:     Final Specialist Recommendations/Findings:   IP CONSULT TO NEPHROLOGY      The patient was seen and examined on day of discharge and this discharge summary is in conjunction with any daily progress note from day of discharge. Discharge plan:     Disposition: Home    Physician Follow Up:      [facility]  [address] [place] [address]    Dialysis Monday, Wednesday, Friday at 2pm beginning 1/29/21.  Arrive at 1:30pm    [facility], [address] [facility]  [phone]    In 1 week         Requiring Further Evaluation/Follow Up POST HOSPITALIZATION/Incidental Findings:      Diet: renal diet    Activity: As tolerated     Instructions to Patient:      Discharge Medications:      Medication List      START taking these medications    lidocaine 5 % ointment  Commonly known as: XYLOCAINE  Apply topically daily for 7 days Apply topically as needed. CHANGE how you take these medications    * saline nasal gel Gel  by Nasal route 2 times daily  What changed:   · when to take this  · reasons to take this     * sodium chloride 0.65 % nasal spray  Commonly known as: Altamist Spray  1 spray by Nasal route as needed for Congestion  What changed: Another medication with the same name was changed. Make sure you understand how and when to take each. * This list has 2 medication(s) that are the same as other medications prescribed for you. Read the directions carefully, and ask your doctor or other care provider to review them with you.             CONTINUE taking these medications    albuterol sulfate  (90 Base) MCG/ACT inhaler  Commonly known as: Ventolin HFA  Inhale 2 puffs into the lungs every 6 hours as needed for Wheezing     atorvastatin 80 MG tablet  Commonly known as: LIPITOR  Take 1 tablet by mouth daily     Blood Pressure Kit  1 Units by Does not apply route daily     butalbital-acetaminophen-caffeine -40 MG per tablet  Commonly known as: FIORICET, ESGIC  Take 1 tablet by mouth every 6 hours as needed for Headaches     calcitRIOL 0.25 MCG capsule  Commonly known as: Rocaltrol  Take 1 capsule by mouth daily     carvedilol 12.5 MG tablet  Commonly known as: COREG  Take 1 tablet by mouth 2 times daily     dilTIAZem 120 MG extended release capsule  Commonly known as: CARDIZEM CD  TAKE 1 CAPSULE BY MOUTH ONCE DAILY     diphenhydrAMINE 25 MG capsule  Commonly known as: BENADRYL     DULoxetine 30 MG extended release capsule  Commonly known as: CYMBALTA  TAKE 1 CAPSULE BY MOUTH ONCE DAILY     estrogens (conjugated) 0.625 MG tablet  Commonly known as: Premarin  Take 1 tablet by mouth once daily     fluticasone 50 MCG/ACT nasal spray  Commonly known as: FLONASE  1 spray by Each Nostril route daily     furosemide 20 MG tablet  Commonly known as: Lasix  Take 1 tablet by mouth 2 times daily hydrALAZINE 50 MG tablet  Commonly known as: APRESOLINE  TAKE 1 TABLET BY MOUTH THREE TIMES DAILY     losartan 50 MG tablet  Commonly known as: COZAAR  TAKE 1 TABLET BY MOUTH TWICE DAILY     meclizine 25 MG tablet  Commonly known as: ANTIVERT  Take 1 tablet by mouth 3 times daily as needed for Dizziness     metoclopramide 5 MG tablet  Commonly known as: Reglan  Take 1 tablet by mouth 2 times daily     pantoprazole 40 MG tablet  Commonly known as: PROTONIX  Take 1 tablet by mouth twice daily     potassium chloride 20 MEQ extended release tablet  Commonly known as: KLOR-CON M  Take 1 tablet by mouth daily     Prevail Adult Brief Large Misc  3 each by Does not apply route daily     STOOL SOFTENER PO     vitamin D 1000 UNIT Tabs tablet  Commonly known as: CHOLECALCIFEROL           Where to Get Your Medications      These medications were sent to [address] [place]  [address]    Phone: [phone]   · lidocaine 5 % ointment         No discharge procedures on file. Time Spent on discharge is  36 mins in patient examination, evaluation, counseling as well as medication reconciliation, prescriptions for required medications, discharge plan and follow up. Electronically signed by   Alexa Gonsalez MD  1/28/2021  2:52 PM      Thank you Dr. Jacquelin Barber, APRN - CNP for the opportunity to be involved in this patient's care.

## 2021-01-28 NOTE — PROGRESS NOTES
NEPHROLOGY PROGRESS NOTE      SUBJECTIVE     Had first hemodialysis yesterday using AV fistula. Tolerated the procedure well. Will be getting second session of dialysis today. Overall feels fine. No chest pain shortness of breath. Blood pressure stable. OBJECTIVE     Vitals:    01/27/21 1602 01/27/21 2015 01/28/21 0113 01/28/21 0600   BP: 101/63 128/62 130/75    Pulse: 79 78 72    Resp: 15 16     Temp: 99.9 °F (37.7 °C) 98.6 °F (37 °C)     TempSrc: Oral Oral     SpO2: (!) 85% 96%     Weight:    242 lb 1.6 oz (109.8 kg)   Height:         24HR INTAKE/OUTPUT:      Intake/Output Summary (Last 24 hours) at 1/28/2021 2027  Last data filed at 1/27/2021 1500  Gross per 24 hour   Intake 700 ml   Output 1010 ml   Net -310 ml       General appearance:Awake, alert, in no acute distress  HEENT: PERRLA  Respiratory::vesicular breath sounds,no wheeze/crackles  Cardiovascular:S1 S2 normal,no gallop or organic murmur. Abdomen:Non tender/non distended. Bowel sounds present  Extremities: No Cyanosis or Clubbing,Lower extremity edema  Neurological:Alert and oriented. No abnormalities of mood, affect, memory, mentation, or behavior are noted      MEDICATIONS     Scheduled Meds:    atorvastatin  80 mg Oral Daily    carvedilol  12.5 mg Oral BID    dilTIAZem  120 mg Oral Daily    DULoxetine  30 mg Oral Daily    estrogens (conjugated)  0.625 mg Oral Daily    hydrALAZINE  50 mg Oral 3 times per day    losartan  50 mg Oral BID    metoclopramide  5 mg Oral BID    pantoprazole  40 mg Oral QAM AC    sodium chloride flush  10 mL Intravenous 2 times per day    heparin (porcine)  5,000 Units Subcutaneous 3 times per day     Continuous Infusions:   PRN Meds:  butalbital-acetaminophen-caffeine, sodium chloride flush, promethazine **OR** ondansetron, polyethylene glycol, acetaminophen **OR** acetaminophen  Home Meds:                Medications Prior to Admission: metoclopramide (REGLAN) 5 MG tablet, Take 1 tablet by mouth 2 times daily  furosemide (LASIX) 20 MG tablet, Take 1 tablet by mouth 2 times daily  losartan (COZAAR) 50 MG tablet, TAKE 1 TABLET BY MOUTH TWICE DAILY  calcitRIOL (ROCALTROL) 0.25 MCG capsule, Take 1 capsule by mouth daily  butalbital-acetaminophen-caffeine (FIORICET, ESGIC) -40 MG per tablet, Take 1 tablet by mouth every 6 hours as needed for Headaches  atorvastatin (LIPITOR) 80 MG tablet, Take 1 tablet by mouth daily  carvedilol (COREG) 12.5 MG tablet, Take 1 tablet by mouth 2 times daily  hydrALAZINE (APRESOLINE) 50 MG tablet, TAKE 1 TABLET BY MOUTH THREE TIMES DAILY  pantoprazole (PROTONIX) 40 MG tablet, Take 1 tablet by mouth twice daily  meclizine (ANTIVERT) 25 MG tablet, Take 1 tablet by mouth 3 times daily as needed for Dizziness  DULoxetine (CYMBALTA) 30 MG extended release capsule, TAKE 1 CAPSULE BY MOUTH ONCE DAILY  potassium chloride (KLOR-CON M) 20 MEQ extended release tablet, Take 1 tablet by mouth daily  dilTIAZem (CARDIZEM CD) 120 MG extended release capsule, TAKE 1 CAPSULE BY MOUTH ONCE DAILY  Blood Pressure KIT, 1 Units by Does not apply route daily  fluticasone (FLONASE) 50 MCG/ACT nasal spray, 1 spray by Each Nostril route daily  albuterol sulfate HFA (VENTOLIN HFA) 108 (90 Base) MCG/ACT inhaler, Inhale 2 puffs into the lungs every 6 hours as needed for Wheezing  Incontinence Supply Disposable (PREVAIL ADULT BRIEF LARGE) MISC, 3 each by Does not apply route daily  saline nasal gel (AYR) GEL, by Nasal route 2 times daily (Patient taking differently: by Nasal route as needed )  vitamin D (CHOLECALCIFEROL) 1000 UNIT TABS tablet, Take 2,000 Units by mouth 2 times daily   Docusate Calcium (STOOL SOFTENER PO), Take 100 mg by mouth as needed   estrogens, conjugated, (PREMARIN) 0.625 MG tablet, Take 1 tablet by mouth once daily  sodium chloride (ALTAMIST SPRAY) 0.65 % nasal spray, 1 spray by Nasal route as needed for Congestion  diphenhydrAMINE (BENADRYL) 25 MG capsule, Take 25 mg by mouth every 6 hours as needed for Itching or Allergies Pt takes 2 capsules at HS \"due to hives from it being cold now\". Pt tho states these are NOT helping her this time. INVESTIGATIONS     Last 3 CMP:    Recent Labs     01/25/21  1433 01/27/21  0613 01/28/21  0834   * 140 137   K 4.4 3.9 3.8    106 101   CO2 22 22 24   BUN 45* 39* 30*   CREATININE 3.90* 3.54* 3.36*   CALCIUM 9.3 9.1 8.5*   PROT  --   --  6.2*   LABALBU  --   --  3.5   BILITOT  --   --  0.19*   ALKPHOS  --   --  66   AST  --   --  12   ALT  --   --  8       Last 3 CBC:  Recent Labs     01/25/21  1433 01/27/21  0613 01/28/21  0834   WBC 6.7 4.3 4.6   RBC 3.46* 3.24* 3.32*   HGB 10.1* 9.4* 9.7*   HCT 32.7* 29.9* 30.8*   MCV 94.5 92.3 92.8   MCH 29.2 29.0 29.2   MCHC 30.9 31.4 31.5   RDW 13.8 13.7 13.6    214 210   MPV 10.3 10.7 10.4       ASSESSMENT     1. 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 History of ANCA vasculitis MPO biopsy-proven 2016 May treated with induction cytoxan and steroids And maintenance therapy with Imuran until March 2018. Extrarenal relapse with epistaxis/generalized myalgia/easy fatigability with positive) titers in the month of October 2018.  Being treated with rituximab by rheumatology  3. 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 HTN   5. 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 Anemia  From vasculitis and EGD proven Gastritis/esophagitis with Chicas's esophagus   7. Post menopausal bleeding - endometrial hyperplasia - S/p DOMO with BSO  8. RA - non deforming  9. MARY  10.  Secondary hyperparathyroidism    PLAN     1. Hd # 2 today  Orders reviewed  2. Continue current medications  3. Arranging for outpatient HD placement at [facility]  4.  [id number] [address]rivedi for [employer] once # 4 achieved    Please do not hesitate to call with questions    This note is created with the assistance of a speech-recognition program. While intending to generate a document that actually reflects the content of the visit, no guarantees can be provided that every mistake has been identified and corrected by editing    Jose A Ruth MD, [facility] Lyudmila Soares), [address]   1/28/2021 9:26 AM  [facility] [place]

## 2021-01-28 NOTE — PROGRESS NOTES
Dialysis Post Treatment Note  Vitals:    01/28/21 1300   BP: 134/72   Pulse: 77   Resp: 17   Temp: 98.1 °F (36.7 °C)   SpO2: 97%     Pre-Weight = 109 kg  Post-weight = Weight: 236 lb 15.9 oz (107.5 kg)  Total Liters Processed = Total Liters Processed (l/min): 56.4 l/min  Rinseback Volume (mL) = Rinseback Volume (ml): 310 ml  Net Removal (mL) = NET Removed (ml): 1500 ml  Patient's dry weight= not established  Type of access used= L AVF  Length of treatment=3 hours    Pt tolerated 2nd HD tx, L AVF worked well w 17  gauge needles, pt seen by Nephrologist, post tx pt denied pain/need, pt went back to her unit in w/c

## 2021-01-28 NOTE — PROGRESS NOTES
[facility]  Office: [address], DO, Milym Ormond, DO, Stephenie Romano, DO, Neelima Albright, DO, Emma Jaeger MD, Ara Clayton MD, Emmett Moreland MD, Ciro Daniel MD, Arvin Lange MD, Olga Little MD, Baldev Fine MD, Sharyle Haff, MD, Cristóbal Langley MD, Yvette Feng DO, Phill Bunch MD, Amy Onofre MD, Keanu Rushing DO, Alma Lewis MD,  Kira Dave DO, Renard Robles MD, Campos Heath MD, Adin Reilly [facility], [facility], CNP, Asiya Culp, CNP, Trae Pascual, CNS, Monet Valencia, CNP, Sofia Morley, CNP, Suhail Olsen, CNP, Marius Spatz, CNP, Deniz Morelos, CNP, Sally Rose PA-C, Emmy Yang, RAMON, Christina Molina, CNP, [place], CNP, Roxy Neves, CNP, Saniya Dow, CNP, Ry Vasquez, CNP         [facility]   [address] [facility]    Progress Note    1/28/2021    7:22 AM    Name:   Joseph Mckeon  MRN:     [id number]     Acct:      [de-identified]   Room:   [address] [place] [address] Day:  2  Admit Date:  1/26/2021 10:04 PM    PCP:   ARISTIDES Viveros CNP  Code Status:  Full Code    Subjective:     C/C: needing dialysis initiation      Interval History Status: improved. Pt was seen and examined this morning  No acute events overnight   Resting in bedside chair at this time        Review of Systems:     12 point ROS performed today and negative for anything other than that what was stated in subjective     Medications:      Allergies:  No Known Allergies    Current Meds:   Scheduled Meds:    atorvastatin  80 mg Oral Daily    carvedilol  12.5 mg Oral BID    dilTIAZem  120 mg Oral Daily    DULoxetine  30 mg Oral Daily    estrogens (conjugated)  0.625 mg Oral Daily    hydrALAZINE  50 mg Oral 3 times per day    losartan  50 mg Oral BID    metoclopramide  5 mg Oral BID    pantoprazole  40 mg Oral QAM AC    sodium chloride flush  10 mL Intravenous 2 times per day    heparin (porcine)  5,000 Units Subcutaneous 3 times per day     Continuous Infusions:   PRN Meds: butalbital-acetaminophen-caffeine, sodium chloride flush, promethazine **OR** ondansetron, polyethylene glycol, acetaminophen **OR** acetaminophen    Data:     Past Medical History:   has a past medical history of [facility] Anemia, Arthritis, Chronic kidney disease, Depression with anxiety, GERD (gastroesophageal reflux disease), H/O echocardiogram, H/O tooth extraction, History of blood transfusion, Hyperlipidemia, Hypertensive crisis, Kidney stones, Sciatica, and Vasculitis ([facility] Social History:   reports that she has never smoked. She has never used smokeless tobacco. She reports that she does not drink alcohol or use drugs. Family History:   Family History   Problem Relation Age of Onset   Asiya Sheriff Cancer Mother     Diabetes Mother     Heart Disease Mother     High Blood Pressure Mother     Cancer Father         prostate    High Blood Pressure Father     Cancer Sister         ovarian    Diabetes Brother     Cancer Sister         ovarian    High Blood Pressure Brother     Diabetes Brother        Vitals:  /75   Pulse 72   Temp 98.6 °F (37 °C) (Oral)   Resp 16   Ht 5' 2\" (1.575 m)   Wt 242 lb 1.6 oz (109.8 kg)   LMP 2016   SpO2 96%   BMI 44.28 kg/m²   Temp (24hrs), Av.7 °F (37.1 °C), Min:98.2 °F (36.8 °C), Max:99.9 °F (37.7 °C)    No results for input(s): POCGLU in the last 72 hours. I/O (24Hr):     Intake/Output Summary (Last 24 hours) at 2021 0722  Last data filed at 2021 1500  Gross per 24 hour   Intake 700 ml   Output 1010 ml   Net -310 ml       Labs:  Hematology:  Recent Labs     21  0834 21  1433 21  0613   WBC 5.8 6.7 4.3   RBC 3.58* 3.46* 3.24*   HGB 10.7* 10.1* 9.4*   HCT 33.5* 32.7* 29.9*   MCV 93.6 94.5 92.3   MCH 29.9 29.2 29.0   MCHC 31.9 30.9 31.4   RDW 13.6 13.8 13.7    251 214   MPV 10.2 10.3 10.7   INR  --   --  0.9     Chemistry:  Recent Labs     01/25/21  1433 01/27/21  0613   * 140   K 4.4 3.9    106   CO2 22 22   GLUCOSE 93 95   BUN 45* 39*   CREATININE 3.90* 3.54*   ANIONGAP 12 12   LABGLOM 12* 13*   GFRAA 15* 16*   CALCIUM 9.3 9.1   No results for input(s): PROT, LABALBU, LABA1C, X1HUTYY, T5TFMCD, FT4, TSH, AST, ALT, LDH, GGT, ALKPHOS, LABGGT, BILITOT, BILIDIR, AMMONIA, AMYLASE, LIPASE, LACTATE, CHOL, HDL, LDLCHOLESTEROL, CHOLHDLRATIO, TRIG, VLDL, KCQ14HA, PHENYTOIN, PHENYF, URICACID, POCGLU in the last 72 hours. ABG:No results found for: POCPH, PHART, PH, POCPCO2, DKU7YBN, PCO2, POCPO2, PO2ART, PO2, POCHCO3, IHS3RKX, HCO3, NBEA, PBEA, BEART, BE, THGBART, THB, SED9QXU, WHHR5ELF, B9SNZPEN, O2SAT, FIO2  Lab Results   Component Value Date/Time    SPECIAL NOT REPORTED 10/28/2019 02:12 PM     Lab Results   Component Value Date/Time    CULTURE ESCHERICHIA COLI >[id number] CFU/ML (A) 10/28/2019 02:12 PM       Radiology:  Xr Chest (single View Frontal)    Result Date: 1/27/2021  Negative chest radiograph. Physical Examination:        General appearance:  alert, cooperative and no distress  Mental Status:  oriented to person, place and time and normal affect  Lungs:  clear to auscultation bilaterally, normal effort  Heart:  regular rate and rhythm, no murmur  Abdomen:  soft, nontender, nondistended, normal bowel sounds, no masses, hepatomegaly, splenomegaly  Extremities:  no edema, redness, tenderness in the calves  Skin:  no gross lesions, rashes, induration    Assessment:        Hospital Problems           Last Modified POA    * (Principal) JESS (acute kidney injury) ([facility] 1/27/2021 Yes    Overview Addendum 8/19/2019 11:05 AM by Kan Carter MD     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 treated with induction cytoxan and steroids And maintenance therapy with Imuran until March 2018.          ANCA-associated vasculitis ([facility] 1/27/2021 Yes    CKD (chronic kidney disease) stage 4, GFR

## 2021-01-28 NOTE — CARE COORDINATION
ANAND received call from [facility] [place], confirmed 2pm chair time beginning tomorrow for MWF schedule. SW will notify RN and patient.
SW spoke with patient to provide reminder of MWF schedule at 2pm to begin tomorrow at [address] Patient reported that physician told her she could take a break tomorrow. ANAND spoke with RN who confirmed with physician that patient will need to attend dialysis tomorrow. SW will notify patient.
RN on 1/27/21 at 12:22 PM EST

## 2021-01-28 NOTE — PROGRESS NOTES
CLINICAL PHARMACY NOTE: MEDS TO [address] Select Patient?: No  Total # of Prescriptions Filled: 1   The following medications were delivered to the patient:  · Lidocaine 5% ointment  Total # of Interventions Completed: 0  Time Spent (min): 0    Additional Documentation: med delivered to patient 1/28 at 3:40pm. Patient in room 324. No co-pay.

## 2021-01-28 NOTE — PLAN OF CARE
Problem: Musculor/Skeletal Functional Status  Goal: Highest potential functional level  Outcome: Ongoing     Problem: Musculor/Skeletal Functional Status  Goal: Highest potential functional level  Outcome: Ongoing     Problem: Musculor/Skeletal Functional Status  Goal: Highest potential functional level  Outcome: Ongoing     Problem: Musculor/Skeletal Functional Status  Goal: Highest potential functional level  Outcome: Ongoing     Problem: Musculor/Skeletal Functional Status  Goal: Highest potential functional level  Outcome: Ongoing

## 2021-03-11 RX ORDER — BUTALBITAL, ACETAMINOPHEN AND CAFFEINE 50; 325; 40 MG/1; MG/1; MG/1
TABLET ORAL
Qty: 120 TABLET | Refills: 0 | Status: SHIPPED | OUTPATIENT
Start: 2021-03-11 | End: 2021-07-09

## 2021-03-11 NOTE — TELEPHONE ENCOUNTER
Health Maintenance   Topic Date Due    HIV screen  Never done    Lipid screen  05/07/2020    COVID-19 Vaccine (2 of 2 - Pfizer series) 03/30/2021    Breast cancer screen  10/06/2021    Potassium monitoring  01/28/2022    Creatinine monitoring  01/28/2022    Pneumococcal 0-64 years Vaccine (3 of 3 - PPSV23) 05/10/2022    Diabetes screen  11/03/2023    Colon cancer screen colonoscopy  05/02/2026    DTaP/Tdap/Td vaccine (2 - Td) 09/06/2028    Flu vaccine  Completed    Shingles Vaccine  Completed    Hepatitis C screen  Completed    Hepatitis A vaccine  Aged Out    Hepatitis B vaccine  Aged Out    Hib vaccine  Aged Out    Meningococcal (ACWY) vaccine  Aged Out             (applicable per patient's age: Cancer Screenings, Depression Screening, Fall Risk Screening, Immunizations)    Hemoglobin A1C (%)   Date Value   11/03/2020 5.6   09/06/2019 5.8   09/06/2018 6.1     Microalb/Crt.  Ratio (mcg/mg creat)   Date Value   11/18/2019 3,897 (H)     LDL Cholesterol (mg/dL)   Date Value   05/07/2019 99     AST (U/L)   Date Value   01/28/2021 12     ALT (U/L)   Date Value   01/28/2021 8     BUN (mg/dL)   Date Value   01/28/2021 30 (H)      (goal A1C is < 7)   (goal LDL is <100) need 30-50% reduction from baseline     BP Readings from Last 3 Encounters:   01/28/21 134/72   01/25/21 (!) 169/89   01/11/21 (!) 119/51    (goal /80)      All Future Testing planned in [facility]:  Lab Frequency Next Occurrence   Transfuse RBC TRANSFUSE 1 UNIT    CBC Auto Differential every 2 weeks        Next Visit Date:  Future Appointments   Date Time Provider Tom Oneil   5/3/2021  3:00 PM ARISTIDES Angelo - CNP [facility] TPP   6/2/2021 11:45 AM Michelle Gomes MD tiff onc spe TP   6/14/2021  9:00 AM SCHEDULE, [facility] MED ONC ROOM 3 [facility] MED ONC [place]            Patient Active Problem List:     JESS (acute kidney injury) ([facility]     Arteritis ([facility]     [facility] stage renal disease ([facility]     ANCA-associated vasculitis ([address]     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)     S/P DOMO-BSO     Endometrial hyperplasia without atypia, simple     Postural vertigo     Morbid obesity (HCC)     Allergic rhinitis     Iron deficiency anemia     Hyperlipidemia     Urinary frequency     MGUS (monoclonal gammopathy of unknown significance)     Anti-cyclic citrullinated peptide antibody positive     Bilateral hip pain     Bilateral knee pain     Cancer of uterus (HCC)     Elevated C-reactive protein (CRP)     Elevated white blood cell count, unspecified     Essential thrombocythemia (HCC)     Greater trochanteric bursitis of both hips     Microscopic polyangiitis (HCC)     Osteoarthritis of right acromioclavicular joint     Osteoarthritis of sacroiliac joint ([address]     Other intervertebral disc degeneration, thoracic region     Patient nonadherence     Pes anserinus bursitis of both knees     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     Episode of recurrent major depressive disorder (HCC)     Anemia in stage 5 chronic kidney disease, not on chronic dialysis (HCC)     Non-surgical abdominal pain     Lumbar pain     Uremia     MARY (obstructive sleep apnea)

## 2021-04-05 DIAGNOSIS — R14.0 BLOATING: ICD-10-CM

## 2021-04-05 RX ORDER — METOCLOPRAMIDE 5 MG/1
5 TABLET ORAL 2 TIMES DAILY
Qty: 180 TABLET | Refills: 0 | Status: SHIPPED | OUTPATIENT
Start: 2021-04-05 | End: 2021-07-09

## 2021-04-05 NOTE — TELEPHONE ENCOUNTER
Health Maintenance   Topic Date Due    HIV screen  Never done    Lipid screen  05/07/2020    Breast cancer screen  10/06/2021    Potassium monitoring  01/28/2022    Creatinine monitoring  01/28/2022    Pneumococcal 0-64 years Vaccine (3 of 3 - PPSV23) 05/10/2022    Diabetes screen  11/03/2023    Colon cancer screen colonoscopy  05/02/2026    DTaP/Tdap/Td vaccine (2 - Td) 09/06/2028    Flu vaccine  Completed    Shingles Vaccine  Completed    COVID-19 Vaccine  Completed    Hepatitis C screen  Completed    Hepatitis A vaccine  Aged Out    Hepatitis B vaccine  Aged Out    Hib vaccine  Aged Out    Meningococcal (ACWY) vaccine  Aged Out             (applicable per patient's age: Cancer Screenings, Depression Screening, Fall Risk Screening, Immunizations)    Hemoglobin A1C (%)   Date Value   11/03/2020 5.6   09/06/2019 5.8   09/06/2018 6.1     Microalb/Crt.  Ratio (mcg/mg creat)   Date Value   11/18/2019 3,897 (H)     LDL Cholesterol (mg/dL)   Date Value   05/07/2019 99     AST (U/L)   Date Value   01/28/2021 12     ALT (U/L)   Date Value   01/28/2021 8     BUN (mg/dL)   Date Value   01/28/2021 30 (H)      (goal A1C is < 7)   (goal LDL is <100) need 30-50% reduction from baseline     BP Readings from Last 3 Encounters:   01/28/21 134/72   01/25/21 (!) 169/89   01/11/21 (!) 119/51    (goal /80)      All Future Testing planned in [facility]:  Lab Frequency Next Occurrence   Transfuse RBC TRANSFUSE 1 UNIT    CBC Auto Differential every 2 weeks        Next Visit Date:  Future Appointments   Date Time Provider Tom Oneil   5/3/2021  3:00 PM ARISTIDES Gill - CNP [facility] TPP   6/2/2021 11:45 AM Jamal Bone MD tiff onc spe TPP   6/14/2021  9:00 AM SCHEDULE, [facility] MED ONC ROOM 3 [facility] MED ONC [place]            Patient Active Problem List:     JESS (acute kidney injury) ([facility]     Arteritis ([facility]     End stage renal disease ([address]     ANCA-associated vasculitis (HCC)     Rectocele Retinal edema of both eyes     CKD (chronic kidney disease) stage 4, GFR 15-29 ml/min (HCC)     Depression with anxiety     Essential hypertension     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)     S/P DOMO-BSO     Endometrial hyperplasia without atypia, simple     Postural vertigo     Morbid obesity (HCC)     Allergic rhinitis     Iron deficiency anemia     Hyperlipidemia     Urinary frequency     MGUS (monoclonal gammopathy of unknown significance)     Anti-cyclic citrullinated peptide antibody positive     Bilateral hip pain     Bilateral knee pain     Cancer of uterus (HCC)     Elevated C-reactive protein (CRP)     Elevated white blood cell count, unspecified     Essential thrombocythemia (HCC)     Greater trochanteric bursitis of both hips     Microscopic polyangiitis (HCC)     Osteoarthritis of right acromioclavicular joint     Osteoarthritis of sacroiliac joint ([address]     Other intervertebral disc degeneration, thoracic region     Patient nonadherence     Pes anserinus bursitis of both knees     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     Episode of recurrent major depressive disorder (HCC)     Anemia in stage 5 chronic kidney disease, not on chronic dialysis (HCC)     Non-surgical abdominal pain     Lumbar pain     Uremia     MARY (obstructive sleep apnea)

## 2021-05-07 DIAGNOSIS — E78.2 MIXED HYPERLIPIDEMIA: ICD-10-CM

## 2021-05-07 RX ORDER — ATORVASTATIN CALCIUM 80 MG/1
80 TABLET, FILM COATED ORAL DAILY
Qty: 90 TABLET | Refills: 1 | Status: SHIPPED | OUTPATIENT
Start: 2021-05-07 | End: 2021-11-09

## 2021-05-07 NOTE — TELEPHONE ENCOUNTER
([address]     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)     S/P DOMO-BSO     Endometrial hyperplasia without atypia, simple     Postural vertigo     Morbid obesity (HCC)     Allergic rhinitis     Iron deficiency anemia     Hyperlipidemia     Urinary frequency     MGUS (monoclonal gammopathy of unknown significance)     Anti-cyclic citrullinated peptide antibody positive     Bilateral hip pain     Bilateral knee pain     Cancer of uterus (HCC)     Elevated C-reactive protein (CRP)     Elevated white blood cell count, unspecified     Essential thrombocythemia (HCC)     Greater trochanteric bursitis of both hips     Microscopic polyangiitis (HCC)     Osteoarthritis of right acromioclavicular joint     Osteoarthritis of sacroiliac joint ([address]     Other intervertebral disc degeneration, thoracic region     Patient nonadherence     Pes anserinus bursitis of both knees     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     Episode of recurrent major depressive disorder (HCC)     Anemia in stage 5 chronic kidney disease, not on chronic dialysis (HCC)     Non-surgical abdominal pain     Lumbar pain     Uremia     MARY (obstructive sleep apnea)

## 2021-05-13 ENCOUNTER — OFFICE VISIT (OUTPATIENT)
Dept: PRIMARY CARE CLINIC | Age: 56
End: 2021-05-13
Payer: MEDICARE

## 2021-05-13 VITALS
RESPIRATION RATE: 18 BRPM | TEMPERATURE: 98.2 F | OXYGEN SATURATION: 96 % | HEIGHT: 62 IN | DIASTOLIC BLOOD PRESSURE: 68 MMHG | WEIGHT: 243 LBS | SYSTOLIC BLOOD PRESSURE: 122 MMHG | HEART RATE: 88 BPM | BODY MASS INDEX: 44.72 KG/M2

## 2021-05-13 DIAGNOSIS — N18.6 END STAGE RENAL DISEASE (HCC): ICD-10-CM

## 2021-05-13 DIAGNOSIS — E78.2 MIXED HYPERLIPIDEMIA: ICD-10-CM

## 2021-05-13 DIAGNOSIS — D47.3 ESSENTIAL THROMBOCYTHEMIA (HCC): ICD-10-CM

## 2021-05-13 DIAGNOSIS — I10 ESSENTIAL HYPERTENSION: Primary | ICD-10-CM

## 2021-05-13 DIAGNOSIS — C55 MALIGNANT NEOPLASM OF UTERUS, UNSPECIFIED SITE (HCC): ICD-10-CM

## 2021-05-13 PROBLEM — N17.9 AKI (ACUTE KIDNEY INJURY) (HCC): Status: ACTIVE | Noted: 2020-08-24

## 2021-05-13 PROCEDURE — 1036F TOBACCO NON-USER: CPT | Performed by: NURSE PRACTITIONER

## 2021-05-13 PROCEDURE — 99214 OFFICE O/P EST MOD 30 MIN: CPT | Performed by: NURSE PRACTITIONER

## 2021-05-13 PROCEDURE — 3017F COLORECTAL CA SCREEN DOC REV: CPT | Performed by: NURSE PRACTITIONER

## 2021-05-13 PROCEDURE — G8427 DOCREV CUR MEDS BY ELIG CLIN: HCPCS | Performed by: NURSE PRACTITIONER

## 2021-05-13 PROCEDURE — G8417 CALC BMI ABV UP PARAM F/U: HCPCS | Performed by: NURSE PRACTITIONER

## 2021-05-13 RX ORDER — VIT B COMP NO.3/FOLIC/C/BIOTIN 1 MG-60 MG
TABLET ORAL
COMMUNITY
Start: 2021-05-08

## 2021-05-13 RX ORDER — NORFLURANE/PENTAFLUOROPROPANE
AEROSOL, SPRAY (ML) TOPICAL
COMMUNITY
Start: 2021-05-07

## 2021-05-13 ASSESSMENT — ENCOUNTER SYMPTOMS
ABDOMINAL DISTENTION: 0
SORE THROAT: 0
DIARRHEA: 0
ABDOMINAL PAIN: 0
VOMITING: 0
WHEEZING: 0
NAUSEA: 0
SHORTNESS OF BREATH: 0
BLURRED VISION: 0
CONSTIPATION: 0
RHINORRHEA: 0
ORTHOPNEA: 0
COUGH: 0

## 2021-05-13 NOTE — PROGRESS NOTES
Name: Justine Landeros  : 1965         Chief Complaint:     Chief Complaint   Patient presents with    Hypertension     Routine office visit. History of Present Illness:      Justine Landeros is a 54 y.o.  female who presents with Hypertension (Routine office visit. )      Navidmike Carissa is here today for a routine office visit. Malignant neoplasm of uterus-  stable, patient follows with oncology    Essential thrombocytopenia- stable, patient follows with oncology      Hypertension  This is a chronic problem. The current episode started more than 1 year ago. The problem has been gradually worsening since onset. The problem is uncontrolled. Associated symptoms include anxiety and peripheral edema. Pertinent negatives include no blurred vision, chest pain, headaches, malaise/fatigue, neck pain, orthopnea, palpitations, PND, shortness of breath or sweats. There are no associated agents to hypertension. Risk factors for coronary artery disease include family history, obesity, post-menopausal state and stress. Past treatments include angiotensin blockers, calcium channel blockers and diuretics. The current treatment provides mild improvement. Compliance problems include diet and exercise. Hypertensive end-organ damage includes kidney disease. There is no history of CAD/MI, CVA or heart failure. Identifiable causes of hypertension include chronic renal disease. Hyperlipidemia  This is a chronic problem. The current episode started more than 1 year ago. The problem is controlled. Recent lipid tests were reviewed and are normal. Exacerbating diseases include chronic renal disease and obesity. She has no history of liver disease. Factors aggravating her hyperlipidemia include fatty foods. Pertinent negatives include no chest pain, leg pain, myalgias or shortness of breath. Current antihyperlipidemic treatment includes statins. The current treatment provides moderate improvement of lipids.  Compliance problems include DIALYSIS FISTULA PRIOR TO CANNULATION 5/7/21  Yes Historical Provider, MD   estrogens, conjugated, (PREMARIN) 0.625 MG tablet Take 1 tablet by mouth once daily 5/10/21  Yes ARISTIDES Power CNM   atorvastatin (LIPITOR) 80 MG tablet Take 1 tablet by mouth daily 5/7/21  Yes ARISTIDES Rios CNP   metoclopramide (REGLAN) 5 MG tablet Take 1 tablet by mouth 2 times daily 4/5/21  Yes ARISTIDES Perez CNP   butalbital-acetaminophen-caffeine (FIORICET, ESGIC) -40 MG per tablet TAKE 1 TABLET BY MOUTH EVERY 6 HOURS AS NEEDED FOR HEADACHE 3/11/21  Yes ARISTIDES Perez CNP   hydrALAZINE (APRESOLINE) 50 MG tablet TAKE 1 TABLET BY MOUTH THREE TIMES DAILY 3/8/21  Yes ARISTIDES Perez CNP   pantoprazole (PROTONIX) 40 MG tablet Take 1 tablet by mouth twice daily 3/2/21  Yes ARISTIDES Perez CNP   losartan (COZAAR) 50 MG tablet TAKE 1 TABLET BY MOUTH TWICE DAILY 11/30/20  Yes Lizzy Prescott MD   calcitRIOL (ROCALTROL) 0.25 MCG capsule Take 1 capsule by mouth daily 11/30/20  Yes iLzzy Prescott MD   carvedilol (COREG) 12.5 MG tablet Take 1 tablet by mouth 2 times daily 10/26/20  Yes Lizzy Prescott MD   meclizine (ANTIVERT) 25 MG tablet Take 1 tablet by mouth 3 times daily as needed for Dizziness 7/22/20  Yes ARISTIDES Perez CNP   DULoxetine (CYMBALTA) 30 MG extended release capsule TAKE 1 CAPSULE BY MOUTH ONCE DAILY 5/4/20  Yes ARISTIDES Perez CNP   potassium chloride (KLOR-CON M) 20 MEQ extended release tablet Take 1 tablet by mouth daily 4/2/20  Yes Sena Barroso MD   dilTIAZem (CARDIZEM CD) 120 MG extended release capsule TAKE 1 CAPSULE BY MOUTH ONCE DAILY 2/21/20  Yes Lizzy Prescott MD   Blood Pressure KIT 1 Units by Does not apply route daily 11/7/19  Yes ARISTIDES Rios CNP   fluticasone (FLONASE) 50 MCG/ACT nasal spray 1 spray by Each Nostril route daily 5/23/19  Yes ARISTIDES Rios CNP   albuterol sulfate HFA (VENTOLIN HFA) 108 (90 Base) MCG/ACT inhaler Inhale 2 puffs into the lungs every 6 hours as needed for Wheezing 4/16/19  Yes ARISTIDES Germain CNP   Incontinence Supply Disposable (PREVAIL ADULT BRIEF LARGE) MISC 3 each by Does not apply route daily 11/20/18  Yes ARISTIDES Germain CNP   saline nasal gel (AYR) GEL by Nasal route 2 times daily  Patient taking differently: by Nasal route as needed  11/6/18  Yes Peggy Drake MD   sodium chloride (ALTAMIST SPRAY) 0.65 % nasal spray 1 spray by Nasal route as needed for Congestion 11/6/18  Yes Peggy Drake MD   vitamin D (CHOLECALCIFEROL) 1000 UNIT TABS tablet Take 2,000 Units by mouth 2 times daily    Yes Historical Provider, MD   Docusate Calcium (STOOL SOFTENER PO) Take 100 mg by mouth as needed    Yes Historical Provider, MD   diphenhydrAMINE (BENADRYL) 25 MG capsule Take 25 mg by mouth every 6 hours as needed for Itching or Allergies Pt takes 2 capsules at HS \"due to hives from it being cold now\". Pt tho states these are NOT helping her this time. Yes Historical Provider, MD   furosemide (LASIX) 20 MG tablet Take 1 tablet by mouth 2 times daily 12/28/20 2/17/21  Candice Ellis MD        Allergies:       Patient has no known allergies. Social History:     Tobacco:    reports that she has never smoked. She has never used smokeless tobacco.  Alcohol:      reports no history of alcohol use. Drug Use:  reports no history of drug use. Family History:     Family History   Problem Relation Age of Onset   Aliya Benitez Cancer Mother     Diabetes Mother     Heart Disease Mother     High Blood Pressure Mother     Cancer Father         prostate    High Blood Pressure Father     Cancer Sister         ovarian    Diabetes Brother     Cancer Sister         ovarian    High Blood Pressure Brother     Diabetes Brother        Review of Systems:     Positive and Negative as described in HPI    Review of Systems   Constitutional: Negative for chills, fatigue, fever and malaise/fatigue.    HENT: Negative for congestion, to person, place, and time. Psychiatric:         Mood and Affect: Mood normal.         Behavior: Behavior normal.         Data:     Lab Results   Component Value Date     01/28/2021    K 3.8 01/28/2021     01/28/2021    CO2 24 01/28/2021    BUN 30 01/28/2021    CREATININE 3.36 01/28/2021    GLUCOSE 185 01/28/2021    PROT 6.2 01/28/2021    LABALBU 3.5 01/28/2021    BILITOT 0.19 01/28/2021    ALKPHOS 66 01/28/2021    AST 12 01/28/2021    ALT 8 01/28/2021     Lab Results   Component Value Date    WBC 4.6 01/28/2021    RBC 3.32 01/28/2021    HGB 9.7 01/28/2021    HCT 30.8 01/28/2021    MCV 92.8 01/28/2021    MCH 29.2 01/28/2021    MCHC 31.5 01/28/2021    RDW 13.6 01/28/2021     01/28/2021    MPV 10.4 01/28/2021     Lab Results   Component Value Date    TSH 2.17 03/25/2019     Lab Results   Component Value Date    CHOL 222 05/07/2019    HDL 61 05/07/2019    LABA1C 5.6 11/03/2020       Assessment/Plan:      Diagnosis Orders   1. Essential hypertension     2. Mixed hyperlipidemia  CBC Auto Differential    ALT    AST    Basic Metabolic Panel    Lipid Panel   3. End stage renal disease ([facility]     4. Essential thrombocythemia ([facility]     5. Malignant neoplasm of uterus, unspecified site [facility])       We will have Carol Houston continue all current medications. Labs are due now. She is encouraged to discuss her dizziness status post dialysis with nephrology at next scheduled dialysis. 1.  Carol Houston received counseling on the following healthy behaviors: nutrition, exercise and medication adherence  2. Patient given educational materials - see patient instructions  3. Was a self-tracking handout given in paper form or via [employer]? No  If yes, see orders or list here. 4.  Discussed use, benefit, and side effects of prescribed medications. Barriers to medication compliance addressed. All patient questions answered. Pt voiced understanding. 5.  Reviewed prior labs and health maintenance  6.   Continue current medications, diet and exercise. Completed Refills   Requested Prescriptions      No prescriptions requested or ordered in this encounter         Return in about 6 months (around 11/13/2021) for Check up.

## 2021-05-13 NOTE — PROGRESS NOTES
normal appearance, without tenderness upon palpation, no deformities, no cervical lymphadenopathy, no masses, no thyroid nodules, Thyroid normal size, no JVD, thyroid nontender 5' 2\" (1.575 m)   Wt 273 lb 3.2 oz (123.9 kg)   LMP 09/09/2016   BMI 49.97 kg/m²     Physical Exam   Constitutional: She is oriented to person, place, and time. She appears well-developed and well-nourished. Non-toxic appearance. She does not appear ill. No distress. HENT:   Right Ear: Tympanic membrane is not erythematous and not bulging. No middle ear effusion. Left Ear: Tympanic membrane is not erythematous and not bulging. No middle ear effusion. Mouth/Throat: No posterior oropharyngeal erythema. Eyes: Pupils are equal, round, and reactive to light. Right eye exhibits no discharge. Left eye exhibits no discharge. Neck: Normal range of motion. Neck supple. Cardiovascular: Normal rate and regular rhythm. No murmur heard. Pulses:       Radial pulses are 2+ on the right side, and 2+ on the left side. Dorsalis pedis pulses are 2+ on the right side, and 2+ on the left side. Pulmonary/Chest: Effort normal and breath sounds normal. No respiratory distress. She has no decreased breath sounds. She has no wheezes. She has no rhonchi. She has no rales. Abdominal: Soft. She exhibits no distension. There is no tenderness. Obese abdomen   Musculoskeletal: She exhibits no edema. Neurological: She is alert and oriented to person, place, and time. Skin: Skin is warm. No rash noted. She is not diaphoretic. No erythema. Psychiatric: She has a normal mood and affect. Nursing note and vitals reviewed.       Data:     Lab Results   Component Value Date     08/27/2019    K 3.9 08/27/2019     08/27/2019    CO2 24 08/27/2019    BUN 34 08/27/2019    CREATININE 2.19 08/27/2019    GLUCOSE 107 08/27/2019    PROT 6.1 08/27/2019    PROT 6.7 08/27/2019    LABALBU 3.5 08/27/2019    BILITOT <0.10 08/27/2019    ALKPHOS 104 08/27/2019    AST 12 08/27/2019    ALT 10 08/27/2019     Lab Results   Component Value Date    WBC 7.7 08/27/2019    RBC 3.86 08/27/2019    HGB 11.5 08/27/2019    HCT 36.3 08/27/2019    MCV 94.0 08/27/2019    MCH 29.8 08/27/2019    MCHC 31.7 08/27/2019    RDW 13.9 08/27/2019     08/27/2019    MPV 10.3 08/27/2019     Lab Results   Component Value Date    TSH 2.17 03/25/2019     Lab Results   Component Value Date    CHOL 222 05/07/2019    HDL 61 05/07/2019    LABA1C 5.8 09/06/2019       Assessment/Plan:      Diagnosis Orders   1. Essential hypertension  Stable. Pressure in office today 127/72. She will continue to follow with nephrology to help titrate blood pressure medications. Continue carvedilol 12.5 mg twice daily, Lasix 20 mg twice daily, Cozaar 50 mg twice daily. She will continue to monitor blood pressures at home. 2. Depression with anxiety  Stable. Continue Cymbalta 30 mg daily. 3. Hyperlipidemia, unspecified hyperlipidemia type   stable. Last LDL 99. Continue Lipitor 80 mg daily. 4. Need for influenza vaccination  INFLUENZA, QUADV, 3 YRS AND OLDER, IM PF, PREFILL SYR OR SDV, 0.5ML (AFLURIA QUADV, PF)   5. Elevated blood sugar  POCT glycosylated hemoglobin (Hb A1C)   6. Epistaxis-there is no bleeding noted today or cuts/lesions in nares. Platelet count within normal limits on 8/27/2018. Advised her to contact the office if nosebleeds worsen or she is unable to get it to stop. Discussed keeping nares moist.   7.  Chronic kidney disease- she will continue to follow with nephrology. Her next appointment is in November. She will continue to follow with oncology for vasculitis. Discussed with her that I would contact nephrologist to see if they are agreeable to her doing a keto diet. She verbalized understanding. 8.  Fatigue- believe her fatigue is due to her chronic diseases. She does also have polyarthralgia and is following with rheumatology. Encourage her to continue follow-up with them. 1.  Jalen Cabrera received counseling on the following healthy behaviors: nutrition, exercise and medication adherence  2.   Patient given educational materials - see patient instructions  3. Was a self-tracking handout given in paper form or via [employer]? No  If yes, see orders or list here. 4.  Discussed use, benefit, and side effects of prescribed medications. Barriers to medication compliance addressed. All patient questions answered. Pt voiced understanding. 5.  Reviewed prior labs and health maintenance  6. Continue current medications, diet and exercise. Completed Refills   Requested Prescriptions      No prescriptions requested or ordered in this encounter         Return in about 3 months (around 12/6/2019) for Check up.

## 2021-05-13 NOTE — PATIENT INSTRUCTIONS
SURVEY:    You may be receiving a survey from [facility] regarding your visit today. Please complete the survey to enable us to provide the highest quality of care to you and your family. If you cannot score us a very good on any question, please call the office to discuss how we could have made your experience a very good one. Thank you.   Samson Vargas, APRN-VANDANA Bright, APRN-CNP  JOE Tran LPN [place], MA Linus Phenes, MA Pam, PCA

## 2021-05-17 ENCOUNTER — TELEPHONE (OUTPATIENT)
Dept: PRIMARY CARE CLINIC | Age: 56
End: 2021-05-17

## 2021-05-17 ENCOUNTER — HOSPITAL ENCOUNTER (OUTPATIENT)
Age: 56
Setting detail: SPECIMEN
Discharge: HOME | End: 2021-05-17
Payer: MEDICARE

## 2021-05-17 DIAGNOSIS — E78.2 MIXED HYPERLIPIDEMIA: ICD-10-CM

## 2021-05-17 LAB
ABSOLUTE EOS #: 0.28 K/UL (ref 0–0.44)
ABSOLUTE IMMATURE GRANULOCYTE: <0.03 K/UL (ref 0–0.3)
ABSOLUTE LYMPH #: 1.03 K/UL (ref 1.1–3.7)
ABSOLUTE MONO #: 0.52 K/UL (ref 0.1–1.2)
ALT SERPL-CCNC: 10 U/L (ref 5–33)
ANION GAP SERPL CALCULATED.3IONS-SCNC: 14 MMOL/L (ref 9–17)
AST SERPL-CCNC: 15 U/L
BASOPHILS # BLD: 1 % (ref 0–2)
BASOPHILS ABSOLUTE: 0.05 K/UL (ref 0–0.2)
BUN BLDV-MCNC: 44 MG/DL (ref 6–20)
BUN/CREAT BLD: 10 (ref 9–20)
CALCIUM SERPL-MCNC: 8.4 MG/DL (ref 8.6–10.4)
CHLORIDE BLD-SCNC: 104 MMOL/L (ref 98–107)
CHOLESTEROL/HDL RATIO: 3.3
CHOLESTEROL: 170 MG/DL
CO2: 19 MMOL/L (ref 20–31)
CREAT SERPL-MCNC: 4.48 MG/DL (ref 0.5–0.9)
DIFFERENTIAL TYPE: ABNORMAL
EOSINOPHILS RELATIVE PERCENT: 4 % (ref 1–4)
GFR AFRICAN AMERICAN: 12 ML/MIN
GFR NON-AFRICAN AMERICAN: 10 ML/MIN
GFR SERPL CREATININE-BSD FRML MDRD: ABNORMAL ML/MIN/{1.73_M2}
GFR SERPL CREATININE-BSD FRML MDRD: ABNORMAL ML/MIN/{1.73_M2}
GLUCOSE BLD-MCNC: 100 MG/DL (ref 70–99)
HCT VFR BLD CALC: 32.5 % (ref 36.3–47.1)
HDLC SERPL-MCNC: 52 MG/DL
HEMOGLOBIN: 10.6 G/DL (ref 11.9–15.1)
IMMATURE GRANULOCYTES: 0 %
LDL CHOLESTEROL: 56 MG/DL (ref 0–130)
LYMPHOCYTES # BLD: 16 % (ref 24–43)
MCH RBC QN AUTO: 32.5 PG (ref 25.2–33.5)
MCHC RBC AUTO-ENTMCNC: 32.6 G/DL (ref 28.4–34.8)
MCV RBC AUTO: 99.7 FL (ref 82.6–102.9)
MONOCYTES # BLD: 8 % (ref 3–12)
NRBC AUTOMATED: 0 PER 100 WBC
PDW BLD-RTO: 13.6 % (ref 11.8–14.4)
PLATELET # BLD: 274 K/UL (ref 138–453)
PLATELET ESTIMATE: ABNORMAL
PMV BLD AUTO: 9.9 FL (ref 8.1–13.5)
POTASSIUM SERPL-SCNC: 4.1 MMOL/L (ref 3.7–5.3)
RBC # BLD: 3.26 M/UL (ref 3.95–5.11)
RBC # BLD: ABNORMAL 10*6/UL
SEG NEUTROPHILS: 71 % (ref 36–65)
SEGMENTED NEUTROPHILS ABSOLUTE COUNT: 4.51 K/UL (ref 1.5–8.1)
SODIUM BLD-SCNC: 137 MMOL/L (ref 135–144)
TRIGL SERPL-MCNC: 309 MG/DL
VLDLC SERPL CALC-MCNC: ABNORMAL MG/DL (ref 1–30)
WBC # BLD: 6.4 K/UL (ref 3.5–11.3)
WBC # BLD: ABNORMAL 10*3/UL

## 2021-05-17 PROCEDURE — 80048 BASIC METABOLIC PNL TOTAL CA: CPT

## 2021-05-17 PROCEDURE — 85025 COMPLETE CBC W/AUTO DIFF WBC: CPT

## 2021-05-17 PROCEDURE — 84460 ALANINE AMINO (ALT) (SGPT): CPT

## 2021-05-17 PROCEDURE — 84450 TRANSFERASE (AST) (SGOT): CPT

## 2021-05-17 PROCEDURE — 80061 LIPID PANEL: CPT

## 2021-05-17 NOTE — TELEPHONE ENCOUNTER
Recommend they wait until tomorrow to see how she is feeling. She would need to be seen to prescribe antibiotics.

## 2021-05-17 NOTE — TELEPHONE ENCOUNTER
Spoke with  who states that Anastacia Horne has a sinus infection and would like to know if an antibiotic could be sent to [facility]. Informed him that patient could be seen in the Walk In clinic. He states that he would like Kwesi Rob to call something in to the pharmacy- that Yadira Carver is very tired and in bed today following dialysis. Health Maintenance   Topic Date Due    HIV screen  Never done    Breast cancer screen  10/06/2021    Lipid screen  05/17/2022    Potassium monitoring  05/17/2022    Creatinine monitoring  05/17/2022    Diabetes screen  11/03/2023    Pneumococcal 0-64 years Vaccine (3 of 4 - PPSV23) 02/22/2026    Colon cancer screen colonoscopy  05/02/2026    DTaP/Tdap/Td vaccine (2 - Td) 09/06/2028    Flu vaccine  Completed    Shingles Vaccine  Completed    COVID-19 Vaccine  Completed    Hepatitis C screen  Completed    Hepatitis A vaccine  Aged Out    Hepatitis B vaccine  Aged Out    Hib vaccine  Aged Out    Meningococcal (ACWY) vaccine  Aged Out             (applicable per patient's age: Cancer Screenings, Depression Screening, Fall Risk Screening, Immunizations)    Hemoglobin A1C (%)   Date Value   11/03/2020 5.6   09/06/2019 5.8   09/06/2018 6.1     Microalb/Crt.  Ratio (mcg/mg creat)   Date Value   11/18/2019 3,897 (H)     LDL Cholesterol (mg/dL)   Date Value   05/17/2021 56     AST (U/L)   Date Value   05/17/2021 15     ALT (U/L)   Date Value   05/17/2021 10     BUN (mg/dL)   Date Value   05/17/2021 44 (H)      (goal A1C is < 7)   (goal LDL is <100) need 30-50% reduction from baseline     BP Readings from Last 3 Encounters:   05/13/21 122/68   01/28/21 134/72   01/25/21 (!) 169/89    (goal /80)      All Future Testing planned in [facility]:  Lab Frequency Next Occurrence   Transfuse RBC TRANSFUSE 1 UNIT    CBC Auto Differential every 2 weeks        Next Visit Date:  Future Appointments   Date Time Provider Tom Oneil   6/2/2021  1:00 PM [facility]   6/8/2021 10:40 AM Jose Elias Chisholm, APRN - CNM [facility]   6/14/2021  9:00 AM SCHEDULE, [facility] MED ONC ROOM 3 [facility] MED ONC [place]   11/18/2021 11:40 AM Harvinder Emery Vargas, APRN - CNP [facility]            Patient Active Problem List:     JESS (acute kidney injury) ([address]     Arteritis ([address]     End stage renal disease ([address]     ANCA-associated vasculitis (HCC)     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)     S/P DOMO-BSO     Endometrial hyperplasia without atypia, simple     Postural vertigo     Morbid obesity (HCC)     Allergic rhinitis     Iron deficiency anemia     Hyperlipidemia     Urinary frequency     MGUS (monoclonal gammopathy of unknown significance)     Anti-cyclic citrullinated peptide antibody positive     Bilateral hip pain     Bilateral knee pain     Cancer of uterus (HCC)     Elevated C-reactive protein (CRP)     Elevated white blood cell count, unspecified     Essential thrombocythemia (HCC)     Greater trochanteric bursitis of both hips     Microscopic polyangiitis (HCC)     Osteoarthritis of right acromioclavicular joint     Osteoarthritis of sacroiliac joint ([address]     Other intervertebral disc degeneration, thoracic region     Patient nonadherence     Pes anserinus bursitis of both knees     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     Episode of recurrent major depressive disorder (HCC)     Anemia in stage 5 chronic kidney disease, not on chronic dialysis (HCC)     Non-surgical abdominal pain     Lumbar pain     Uremia     MARY (obstructive sleep apnea)

## 2021-05-17 NOTE — TELEPHONE ENCOUNTER
----- Message from [address] [place] [address], ARISTIDES - CNP sent at 5/17/2021  4:01 PM EDT -----  Labs are stable. Please continue follow-up with all specialists.

## 2021-05-18 NOTE — TELEPHONE ENCOUNTER
Called patient and left message that she would need to be seen before and antibiotic would be ordered and that she could come to the walk in to bee seen if her sinuses are still bothering here. To call office with any questions.

## 2021-06-02 ENCOUNTER — OFFICE VISIT (OUTPATIENT)
Dept: ONCOLOGY | Age: 56
End: 2021-06-02
Payer: MEDICARE

## 2021-06-02 VITALS
DIASTOLIC BLOOD PRESSURE: 74 MMHG | SYSTOLIC BLOOD PRESSURE: 113 MMHG | HEIGHT: 62 IN | WEIGHT: 239 LBS | BODY MASS INDEX: 43.98 KG/M2 | HEART RATE: 108 BPM | RESPIRATION RATE: 18 BRPM | TEMPERATURE: 97.1 F

## 2021-06-02 DIAGNOSIS — N18.5 ANEMIA IN STAGE 5 CHRONIC KIDNEY DISEASE, NOT ON CHRONIC DIALYSIS (HCC): Primary | ICD-10-CM

## 2021-06-02 DIAGNOSIS — D63.1 ANEMIA IN STAGE 5 CHRONIC KIDNEY DISEASE, NOT ON CHRONIC DIALYSIS (HCC): Primary | ICD-10-CM

## 2021-06-02 DIAGNOSIS — I77.82 ANCA-ASSOCIATED VASCULITIS: ICD-10-CM

## 2021-06-02 PROCEDURE — 99213 OFFICE O/P EST LOW 20 MIN: CPT | Performed by: INTERNAL MEDICINE

## 2021-06-02 PROCEDURE — 3017F COLORECTAL CA SCREEN DOC REV: CPT | Performed by: INTERNAL MEDICINE

## 2021-06-02 PROCEDURE — G8427 DOCREV CUR MEDS BY ELIG CLIN: HCPCS | Performed by: INTERNAL MEDICINE

## 2021-06-02 PROCEDURE — G8417 CALC BMI ABV UP PARAM F/U: HCPCS | Performed by: INTERNAL MEDICINE

## 2021-06-02 PROCEDURE — 1036F TOBACCO NON-USER: CPT | Performed by: INTERNAL MEDICINE

## 2021-06-02 NOTE — PROGRESS NOTES
Chief Complaint   Patient presents with    Follow-up     ANCA vasculitis, MGUS       DIAGNOSIS:       · Anemia, microcytic, Iron deficiency. · Anemia of chronic renal insufficiency  · MARIO  · Elevated kappa light chain immunoglobulins. Bone marrow negative for myeloma. · Vasculitis, ANCA associated necrotizing glomerulonephritis. CURRENT THERAPY:         Status post IV iron dextran June 2016. Received 4 doses of IV rituximab weekly, completed in December/2018  Plan maintenance rituximab every 6 months    BRIEF CASE HISTORY:       Alina Esqueda is a very pleasant 54 y.o. female who is referred to us for anemia and monoclonal protein. The patient has been running between 7 and 90 hemoglobin. White count and platelets are slightly elevated. MCV and MCH are low. However, she has other concerning signs including rising creatinine. Workup showed small monoclonal protein less than 0.1 g..    She is sent to us for a consultation, she is quite concerned about the findings. She has neutrophilic leukocytosis and microcytic anemia. She denies any active bleeding. urine analysis showed +4 proteinuria  The patient was started on Cytoxan induction. Then maintenance Imuran by nephrology. All treatments were stopped in March/2018  In late 2018, she was admitted to the hospital with worsening kidney function and vasculitis, she was seen by rheumatology, the decision was to proceed with rituximab. Because of distance, she decided to see that in our office. She completed 4 weekly doses of rituximab in December/2018. After that, and under rheumatology guidance, the plan was to give maintenance rituximab every 6 months. INTERIM HISTORY:   The patient seen for follow-up anemia . She developed worsening kidney function and fistula was inserted and she is also on hemodialysis. She received LINETTE through dialysis.   She met with the nephrologist who thinks that there is no value of continuing with rituximab as it did not help her kidneys. The patient is interested in stopping the infusion as well. PAST MEDICAL HISTORY: has a past medical history of JESS (acute kidney injury) ([facility] [address] Anemia, Arthritis, Chronic kidney disease, Depression with anxiety, GERD (gastroesophageal reflux disease), H/O echocardiogram, H/O tooth extraction, History of blood transfusion, Hyperlipidemia, Hypertensive crisis, Kidney stones, Sciatica, and Vasculitis ([address] PAST SURGICAL HISTORY: has a past surgical history that includes Cholecystectomy; bone marrow biopsy (5-23-16); Tubal ligation; Colonoscopy (2016); Hysterectomy (11/09/2016); Tooth Extraction (07/26/2017); Upper gastrointestinal endoscopy (09/12/2018); Upper gastrointestinal endoscopy (N/A, 9/12/2018); Esophagus dilation (9/12/2018); and Port Surgery (Left, 10/2020). CURRENT MEDICATIONS:  has a current medication list which includes the following prescription(s): zana-onur rx, gebauers pain ease, estrogens (conjugated), atorvastatin, metoclopramide, butalbital-acetaminophen-caffeine, hydralazine, pantoprazole, losartan, calcitriol, carvedilol, meclizine, duloxetine, potassium chloride, diltiazem, blood pressure, fluticasone, albuterol sulfate hfa, prevail adult brief large, saline nasal gel, sodium chloride, vitamin d, docusate calcium, diphenhydramine, and furosemide. ALLERGIES:  has No Known Allergies. FAMILY HISTORY: Negative for any hematological or oncological conditions. SOCIAL HISTORY:  reports that she has never smoked. She has never used smokeless tobacco. She reports that she does not drink alcohol and does not use drugs. REVIEW OF SYSTEMS:   General: no fever or night sweats, Weight is stable. + fatigue   ENT: No double or blurred vision, no tinnitus or hearing problem, no dysphagia or sore throat   Respiratory: No chest pain, no shortness of breath, no cough or hemoptysis. Cardiovascular: Denies chest pain, PND or orthopnea.  No L E swelling or palpitations. Gastrointestinal:    No nausea or vomiting, abdominal pain, diarrhea or constipation. Genitourinary: Denies dysuria, hematuria, frequency, urgency or incontinence. Neurological: Denies headaches, decreased LOC, no sensory or motor focal deficits. Musculoskeletal:  Diffuse aches and pains. Skin: There are no rashes or bleeding. Psychiatric:  No anxiety, no depression. Endocrine: no diabetes or thyroid disease. Hematologic: no bleeding , no adenopathy. PHYSICAL EXAM: Shows a well appearing 54y.o.-year-old female who is not in pain or distress. Vital Signs: Blood pressure 113/74, pulse 108, temperature 97.1 °F (36.2 °C), temperature source Temporal, resp. rate 18, height 5' 2\" (1.575 m), weight 239 lb (108.4 kg), last menstrual period 09/09/2016, not currently breastfeeding. HEENT: Normocephalic and atraumatic. Pupils are equal, round, reactive to light and accommodation. Extraocular muscles are intact. Neck: Showed no JVD, no carotid bruit . Lungs: Clear to auscultation bilaterally. Heart: Regular without any murmur. Abdomen: Soft, nontender. No hepatosplenomegaly. Extremities: Lower extremities show no edema, clubbing, or cyanosis. Neuro exam: intact cranial nerves bilaterally no motor or sensory deficit, gait is normal. Lymphatic: no adenopathy appreciated in the supraclavicular, axillary, cervical or inguinal area  There is a fistula in   left forearm, functioning well  Review of radiological studies:     Review of laboratory data:   Lab Results   Component Value Date    WBC 6.4 05/17/2021    HGB 10.6 (L) 05/17/2021    HCT 32.5 (L) 05/17/2021    MCV 99.7 05/17/2021     05/17/2021     Lab Results   Component Value Date    IRON 51 12/18/2020    TIBC 284 12/18/2020    FERRITIN 243 (H) 12/18/2020     Results for Chele Orozco (MRN [id number]) as of 9/11/2019 14:15   Ref.  Range 10/6/2016 09:18 3/23/2018 10:28 9/5/2018 09:01 10/31/2018 09:33 8/27/2019 09:44   [place]ard Free Light Chains QNT Latest Ref Range: 0.37 - 1.94 mg/dL  8.78 (H) 11.13 (H)  3.64 (H)   Lambda Free Light Chains QNT Latest Ref Range: 0.57 - 2.63 mg/dL  3.88 (H) 5.40 (H)  3.43 (H)   Free Kappa/Lambda Ratio Latest Ref Range: 0.26 - 1.65   2.26 (H) 2.06 (H)  1.06     Bone marrow test 5/23/16:  Final Diagnosis    BONE MARROW ASPIRATE, CLOT SECTION AND BIOPSY:    MATURING TRILINEAGE HEMATOPOIESIS, NORMAL CELLULARITY. PLASMA CELLS, 5% (POLYCLONAL). Kidney biopsy 5/27/16:  KIDNEY, CORE NEEDLE BIOPSY:    ANCA-ASSOCIATED, PAUCI-IMMUNE NECROTIZING GLOMERULONEPHRITIS   WITH CELLULAR / FIBROCELLULAR CRESCENTS IN 17 OF 33   GLOMERULI (46%). GLOBAL GLOMERULOSCLEROSIS IN 12 GLOMERULI (32%). MILD INTERSTITIAL LYMPHOHISTIOCYTIC INFLAMMATION. MILD INTERSTITIAL FIBROSIS AND TUBULAR ATROPHY (20%). -- Diagnosis Comment --    RENAL BIOPSY DEMONSTRATES ACTIVE NECROTIZING GLOMERULONEPHRITIS,  PAUCI-IMMUNE AND ANCA ASSOCIATED. OF 37 GLOMERULI 5 SHOW  FIBROCELLULAR CRESCENTS, 12 SHOW CELLULAR CRESCENTS AND 12 ARE  END-STAGING GLOBALLY SCLEROTIC. IMPRESSION:   · Anemia, microcytic, Iron deficiency. Corrected with IV iron  · Anemia of chronic renal insufficiency  · ANCA Vasculitis  · Elevated kappa light chain immunoglobulins. Bone marrow negative for myeloma. · Vasculitis, ANCA associated necrotizing glomerulonephritis. Treated with Cytoxan and then Imuran, then finally switched to rituximab in December/2018  Developed end-stage renal disease on hemodialysis    Plan:   I reviewed the labs as above and discussed with the patient. She is started on hemodialysis, she does not see that the rituximab has helped her at all. She wants to stop the infusion, I explained to her that this is a decision that should be made by the rheumatologist.  She wants to stop the infusion she would make an appointment to follow-up with him.     Her anemia is well controlled and she received LINETTE through dialysis  We will be available as needed

## 2021-06-08 ENCOUNTER — OFFICE VISIT (OUTPATIENT)
Dept: OBGYN | Age: 56
End: 2021-06-08
Payer: MEDICARE

## 2021-06-08 VITALS
SYSTOLIC BLOOD PRESSURE: 100 MMHG | WEIGHT: 244 LBS | HEIGHT: 62 IN | DIASTOLIC BLOOD PRESSURE: 58 MMHG | BODY MASS INDEX: 44.9 KG/M2

## 2021-06-08 DIAGNOSIS — N95.1 MENOPAUSAL SYMPTOM: Primary | ICD-10-CM

## 2021-06-08 PROCEDURE — G8427 DOCREV CUR MEDS BY ELIG CLIN: HCPCS | Performed by: ADVANCED PRACTICE MIDWIFE

## 2021-06-08 PROCEDURE — 3017F COLORECTAL CA SCREEN DOC REV: CPT | Performed by: ADVANCED PRACTICE MIDWIFE

## 2021-06-08 PROCEDURE — G8417 CALC BMI ABV UP PARAM F/U: HCPCS | Performed by: ADVANCED PRACTICE MIDWIFE

## 2021-06-08 PROCEDURE — 99213 OFFICE O/P EST LOW 20 MIN: CPT | Performed by: ADVANCED PRACTICE MIDWIFE

## 2021-06-08 PROCEDURE — 1036F TOBACCO NON-USER: CPT | Performed by: ADVANCED PRACTICE MIDWIFE

## 2021-06-08 NOTE — PROGRESS NOTES
PROBLEM VISIT     Date of service: 2021    Jarad Rodriguez  Is a 54 y.o.  female    PT's PCP is: ARISTIDES Sykes CNP     : 1965                                             Subjective:       Patient's last menstrual period was 2016. OB History    Para Term  AB Living   3 3 1 2   3   SAB TAB Ectopic Molar Multiple Live Births             3      # Outcome Date GA Lbr Mati/2nd Weight Sex Delivery Anes PTL Lv   3  89 36w0d  5 lb 2 oz (2.325 kg) F Vag-Spont  Y SANTA   2  86 36w0d  7 lb 14 oz (3.572 kg) M Vag-Spont  N SANTA   1 Term 85 40w0d  8 lb 14 oz (4.026 kg) M Vag-Spont  N SANTA      Complications: Other Excessive Bleeding        Social History     Tobacco Use   Smoking Status Never Smoker   Smokeless Tobacco Never Used        Social History     Substance and Sexual Activity   Alcohol Use No       Social History     Substance and Sexual Activity   Sexual Activity Yes    Partners: Male    Comment: hyst       Allergies: Patient has no known allergies. Chief Complaint   Patient presents with    Menopause     med check premarin. last pap 18 HPV neg. pt states no issues or concerns. pt would like this refilled        Last Yearly:  18    Last pap: 18    Last HPV: 18 neg     Have you had a positive covid test: Yes    Have you had the covid immunization: Yes    PE:  Vital Signs  Blood pressure (!) 100/58, height 5' 2\" (1.575 m), weight 244 lb (110.7 kg), last menstrual period 2016, not currently breastfeeding. Estimated body mass index is 44.63 kg/m² as calculated from the following:    Height as of this encounter: 5' 2\" (1.575 m). Weight as of this encounter: 244 lb (110.7 kg). No data recorded      NURSE: FREDERIC    HPI: client is a 54 y.o. female presenting for a medication check. She denies new concerns and reports taking premarin medication oral daily without complications.  She is on renal dialysis and frequently follows-up with nephrology and her PCP for all other concerns. Yes  PT denies fever, chills, nausea and vomiting       Objective: well groomed, engaged in conversation, laughing appropriately, overall well. Results reviewed today:    No results found for this visit on 06/08/21. Assessment and Plan          Diagnosis Orders   1. Menopausal symptom  estrogens, conjugated, (PREMARIN) 0.625 MG tablet             I am having Kathrin Carmichael maintain her Docusate Calcium (STOOL SOFTENER PO), diphenhydrAMINE, vitamin D, saline nasal gel, sodium chloride, Prevail Adult Brief Large, albuterol sulfate HFA, fluticasone, Blood Pressure, dilTIAZem, potassium chloride, meclizine, carvedilol, losartan, calcitRIOL, furosemide, pantoprazole, hydrALAZINE, butalbital-acetaminophen-caffeine, metoclopramide, atorvastatin, Kiersten-Gerhard Rx, Gebauers Pain Ease, DULoxetine, and estrogens (conjugated). Return in about 1 year (around 6/8/2022) for med check- premarin. She was also counseled on her preventative health maintenance recommendations and follow-up. There are no Patient Instructions on file for this visit.     ARISTIDES Montoya CNM,6/8/2021 11:07 AM

## 2021-07-09 DIAGNOSIS — R14.0 BLOATING: ICD-10-CM

## 2021-07-09 RX ORDER — BUTALBITAL, ACETAMINOPHEN AND CAFFEINE 50; 325; 40 MG/1; MG/1; MG/1
1 TABLET ORAL EVERY 6 HOURS PRN
Qty: 120 TABLET | Refills: 0 | Status: SHIPPED | OUTPATIENT
Start: 2021-07-09 | End: 2021-09-20 | Stop reason: SDUPTHER

## 2021-07-09 RX ORDER — METOCLOPRAMIDE 5 MG/1
5 TABLET ORAL 2 TIMES DAILY
Qty: 180 TABLET | Refills: 0 | Status: SHIPPED | OUTPATIENT
Start: 2021-07-09 | End: 2021-10-12 | Stop reason: SDUPTHER

## 2021-07-09 NOTE — TELEPHONE ENCOUNTER
Health Maintenance   Topic Date Due    Flu vaccine (1) 09/01/2021    Breast cancer screen  10/06/2021    Lipid screen  07/08/2022    Potassium monitoring  07/08/2022    Creatinine monitoring  07/08/2022    Colon cancer screen colonoscopy  05/02/2026    Pneumococcal 0-64 years Vaccine (3 of 4 - PPSV23) 05/14/2026    DTaP/Tdap/Td vaccine (2 - Td or Tdap) 09/06/2028    Shingles Vaccine  Completed    COVID-19 Vaccine  Completed    Hepatitis C screen  Completed    HIV screen  Completed    Hepatitis A vaccine  Aged Out    Hepatitis B vaccine  Aged Out    Hib vaccine  Aged Out    Meningococcal (ACWY) vaccine  Aged Out             (applicable per patient's age: Cancer Screenings, Depression Screening, Fall Risk Screening, Immunizations)    Hemoglobin A1C (%)   Date Value   07/08/2021 5.4   11/03/2020 5.6   09/06/2019 5.8     Microalb/Crt.  Ratio (mcg/mg creat)   Date Value   11/18/2019 3,897 (H)     LDL Cholesterol (mg/dL)   Date Value   07/08/2021 65     AST (IU/L)   Date Value   07/08/2021 16     ALT (IU/L)   Date Value   07/08/2021 10     BUN (mg/dL)   Date Value   07/08/2021 33 (H)      (goal A1C is < 7)   (goal LDL is <100) need 30-50% reduction from baseline     BP Readings from Last 3 Encounters:   06/08/21 (!) 100/58   06/02/21 113/74   05/13/21 122/68    (goal /80)      All Future Testing planned in [facility]:  Lab Frequency Next Occurrence   Transfuse RBC TRANSFUSE 1 UNIT    CBC Auto Differential every 2 weeks        Next Visit Date:  Future Appointments   Date Time Provider Tom Oneil   11/18/2021 11:40 AM Justinamundemetrio Vargas, APRN - CNP [facility]            Patient Active Problem List:     JESS (acute kidney injury) ([facility] [address]     Arteritis ([facility] [address]     End stage renal disease ([facility] [address]     ANCA-associated vasculitis (HCC)     Rectocele     Retinal edema of both eyes     CKD (chronic kidney disease) stage 4, GFR 15-29 ml/min (HCC)     Depression with anxiety     Essential hypertension 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)     S/P DOMO-BSO     Endometrial hyperplasia without atypia, simple     Postural vertigo     Morbid obesity (HCC)     Allergic rhinitis     Iron deficiency anemia     Hyperlipidemia     Urinary frequency     MGUS (monoclonal gammopathy of unknown significance)     Anti-cyclic citrullinated peptide antibody positive     Bilateral hip pain     Bilateral knee pain     Cancer of uterus (HCC)     Elevated C-reactive protein (CRP)     Elevated white blood cell count, unspecified     Essential thrombocythemia (HCC)     Greater trochanteric bursitis of both hips     Microscopic polyangiitis (HCC)     Osteoarthritis of right acromioclavicular joint     Osteoarthritis of sacroiliac joint ([address]     Other intervertebral disc degeneration, thoracic region     Patient nonadherence     Pes anserinus bursitis of both knees     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     Episode of recurrent major depressive disorder (HCC)     Anemia in stage 5 chronic kidney disease, not on chronic dialysis (HCC)     Non-surgical abdominal pain     Lumbar pain     Uremia     MARY (obstructive sleep apnea)

## 2021-09-10 ENCOUNTER — TELEPHONE (OUTPATIENT)
Dept: SURGERY | Age: 56
End: 2021-09-10

## 2021-09-10 NOTE — TELEPHONE ENCOUNTER
Writer spoke to Melyssa Moulton, dietician at "[facility] and she is reviewing the prep instructions for the colonoscopy she is requesting to have done to be on the kidney transplant list.

## 2021-09-10 NOTE — TELEPHONE ENCOUNTER
Ciera glaser returned our call and stated that she reviewed the prep instructions and also talked to the NP there and they approved the prep instructions. They will also speak to Yandel Ramos regarding what they would like for her to use to mix the Miralax solution with.

## 2021-09-16 ENCOUNTER — OFFICE VISIT (OUTPATIENT)
Dept: PRIMARY CARE CLINIC | Age: 56
End: 2021-09-16
Payer: MEDICARE

## 2021-09-16 VITALS
DIASTOLIC BLOOD PRESSURE: 76 MMHG | HEIGHT: 62 IN | WEIGHT: 247.4 LBS | TEMPERATURE: 98.1 F | SYSTOLIC BLOOD PRESSURE: 125 MMHG | OXYGEN SATURATION: 97 % | HEART RATE: 87 BPM | RESPIRATION RATE: 18 BRPM | BODY MASS INDEX: 45.53 KG/M2

## 2021-09-16 DIAGNOSIS — J01.10 ACUTE FRONTAL SINUSITIS, RECURRENCE NOT SPECIFIED: Primary | ICD-10-CM

## 2021-09-16 DIAGNOSIS — J02.9 PHARYNGITIS, UNSPECIFIED ETIOLOGY: ICD-10-CM

## 2021-09-16 LAB — S PYO AG THROAT QL: NORMAL

## 2021-09-16 PROCEDURE — G8427 DOCREV CUR MEDS BY ELIG CLIN: HCPCS | Performed by: NURSE PRACTITIONER

## 2021-09-16 PROCEDURE — 1036F TOBACCO NON-USER: CPT | Performed by: NURSE PRACTITIONER

## 2021-09-16 PROCEDURE — 87880 STREP A ASSAY W/OPTIC: CPT | Performed by: NURSE PRACTITIONER

## 2021-09-16 PROCEDURE — 3017F COLORECTAL CA SCREEN DOC REV: CPT | Performed by: NURSE PRACTITIONER

## 2021-09-16 PROCEDURE — 99213 OFFICE O/P EST LOW 20 MIN: CPT | Performed by: NURSE PRACTITIONER

## 2021-09-16 PROCEDURE — G8417 CALC BMI ABV UP PARAM F/U: HCPCS | Performed by: NURSE PRACTITIONER

## 2021-09-16 RX ORDER — FLUTICASONE PROPIONATE 50 MCG
1 SPRAY, SUSPENSION (ML) NASAL DAILY
Qty: 1 EACH | Refills: 1 | Status: SHIPPED | OUTPATIENT
Start: 2021-09-16 | End: 2022-09-29

## 2021-09-16 RX ORDER — DOXYCYCLINE HYCLATE 100 MG
100 TABLET ORAL 2 TIMES DAILY
Qty: 14 TABLET | Refills: 0 | Status: SHIPPED | OUTPATIENT
Start: 2021-09-16 | End: 2021-09-23

## 2021-09-16 SDOH — ECONOMIC STABILITY: FOOD INSECURITY: WITHIN THE PAST 12 MONTHS, THE FOOD YOU BOUGHT JUST DIDN'T LAST AND YOU DIDN'T HAVE MONEY TO GET MORE.: NEVER TRUE

## 2021-09-16 SDOH — ECONOMIC STABILITY: FOOD INSECURITY: WITHIN THE PAST 12 MONTHS, YOU WORRIED THAT YOUR FOOD WOULD RUN OUT BEFORE YOU GOT MONEY TO BUY MORE.: NEVER TRUE

## 2021-09-16 ASSESSMENT — ENCOUNTER SYMPTOMS
NAUSEA: 0
COUGH: 1
SHORTNESS OF BREATH: 1
RHINORRHEA: 1
DIARRHEA: 0
SORE THROAT: 1
EYES NEGATIVE: 1
GASTROINTESTINAL NEGATIVE: 1

## 2021-09-16 ASSESSMENT — SOCIAL DETERMINANTS OF HEALTH (SDOH): HOW HARD IS IT FOR YOU TO PAY FOR THE VERY BASICS LIKE FOOD, HOUSING, MEDICAL CARE, AND HEATING?: NOT HARD AT ALL

## 2021-09-16 NOTE — PROGRESS NOTES
[address] [facility]  [address] [facility] [address] [facility]  Dept: [phone]  Dept Fax: [phone]    Juan Browne is a 64 y.o. female who presents to the [facility] in Care today for her medical conditions/complaints as noted below. Juan Browne is c/o of Cough (x 3 weeks. c/o dry cough. ), Pharyngitis (x 3 weeks. c/o sore throat), and Congestion (x 3 weeks. )      HPI:    Juan Browne is a 64 y.o. female who presents with  URI   Episode onset: over a week has had cough and sore throat for a week. Has had sinus issues for approximately 3 weeks. The problem has been gradually worsening. There has been no fever. Associated symptoms include coughing (dry), ear pain (itching), headaches, rhinorrhea and a sore throat. Pertinent negatives include no diarrhea or nausea. She has tried nothing for the symptoms. Had Covid test at dialysis yesterday, to get results on Friday. Past Medical History:   Diagnosis Date    JESS [facility] kidney injury) ([facility] [address]     Anemia     Arthritis     Chronic kidney disease     Depression with anxiety 9/8/2016    GERD (gastroesophageal reflux disease)     H/O echocardiogram 08/25/2016    EF 55%. Mild mitral regurgitation.  H/O tooth extraction 07/26/2017    Lower posterior right tooth.  History of blood transfusion     x3.  Last one in May 2016    Hyperlipidemia     Hypertensive crisis 8/24/2016    Kidney stones     Sciatica     Vasculitis (HCC)         Current Outpatient Medications   Medication Sig Dispense Refill    doxycycline hyclate (VIBRA-TABS) 100 MG tablet Take 1 tablet by mouth 2 times daily for 7 days 14 tablet 0    fluticasone (FLONASE) 50 MCG/ACT nasal spray 1 spray by Each Nostril route daily for 10 days 1 each 1    furosemide (LASIX) 20 MG tablet Take 1 tablet by mouth twice daily 60 tablet 0    butalbital-acetaminophen-caffeine (FIORICET, ESGIC) -40 MG per tablet Take 1 tablet by mouth every 6 hours as needed for Headaches or Migraine 120 tablet 0    metoclopramide (REGLAN) 5 MG tablet Take 1 tablet by mouth 2 times daily 180 tablet 0    estrogens, conjugated, (PREMARIN) 0.625 MG tablet Take 1 tablet by mouth once daily 90 tablet 3    DULoxetine (CYMBALTA) 30 MG extended release capsule Take 1 capsule by mouth once daily 90 capsule 1    B Complex-C-Folic Acid (KENDALL-DALY RX) 1 MG TABS TAKE 1 TABLET BY MOUTH ONCE DAILY      GEBAUERS PAIN EASE AERO SPRAY ON DIALYSIS FISTULA PRIOR TO CANNULATION      atorvastatin (LIPITOR) 80 MG tablet Take 1 tablet by mouth daily 90 tablet 1    hydrALAZINE (APRESOLINE) 50 MG tablet TAKE 1 TABLET BY MOUTH THREE TIMES DAILY 270 tablet 3    pantoprazole (PROTONIX) 40 MG tablet Take 1 tablet by mouth twice daily 180 tablet 1    losartan (COZAAR) 50 MG tablet TAKE 1 TABLET BY MOUTH TWICE DAILY 60 tablet 3    calcitRIOL (ROCALTROL) 0.25 MCG capsule Take 1 capsule by mouth daily 30 capsule 5    carvedilol (COREG) 12.5 MG tablet Take 1 tablet by mouth 2 times daily 60 tablet 5    meclizine (ANTIVERT) 25 MG tablet Take 1 tablet by mouth 3 times daily as needed for Dizziness 30 tablet 0    potassium chloride (KLOR-CON M) 20 MEQ extended release tablet Take 1 tablet by mouth daily 30 tablet 11    dilTIAZem (CARDIZEM CD) 120 MG extended release capsule TAKE 1 CAPSULE BY MOUTH ONCE DAILY 30 capsule 0    Blood Pressure KIT 1 Units by Does not apply route daily 1 kit 0    albuterol sulfate HFA (VENTOLIN HFA) 108 (90 Base) MCG/ACT inhaler Inhale 2 puffs into the lungs every 6 hours as needed for Wheezing 1 Inhaler 3    Incontinence Supply Disposable (PREVAIL ADULT BRIEF LARGE) MISC 3 each by Does not apply route daily 300 mL 11    saline nasal gel (AYR) GEL by Nasal route 2 times daily (Patient taking differently: by Nasal route as needed ) 1 Tube 3    sodium chloride (ALTAMIST SPRAY) 0.65 % nasal spray 1 spray by Nasal route as needed for Congestion 1 Bottle 3    vitamin D (CHOLECALCIFEROL) 1000 UNIT TABS tablet Take 2,000 Units by mouth 2 times daily       Docusate Calcium (STOOL SOFTENER PO) Take 100 mg by mouth as needed       diphenhydrAMINE (BENADRYL) 25 MG capsule Take 25 mg by mouth every 6 hours as needed for Itching or Allergies Pt takes 2 capsules at HS \"due to hives from it being cold now\". Pt tho states these are NOT helping her this time. No current facility-administered medications for this visit. No Known Allergies    Subjective:      Review of Systems   Constitutional: Positive for appetite change and fatigue. Negative for fever. HENT: Positive for ear pain (itching), rhinorrhea and sore throat. Eyes: Negative. Respiratory: Positive for cough (dry) and shortness of breath (states a little). Cardiovascular: Negative. Gastrointestinal: Negative. Negative for diarrhea and nausea. Endocrine: Negative. Genitourinary: Negative. Musculoskeletal: Positive for myalgias. Skin: Negative. Allergic/Immunologic: Positive for environmental allergies. Neurological: Positive for headaches. Psychiatric/Behavioral: Negative. Objective:     Physical Exam  Vitals and nursing note reviewed. Constitutional:       Appearance: Normal appearance. HENT:      Head: Normocephalic. Right Ear: Tympanic membrane normal.      Left Ear: Tympanic membrane normal.      Nose: Congestion and rhinorrhea present. Rhinorrhea is clear. Right Turbinates: Swollen. Left Turbinates: Swollen (turbinates boggy). Right Sinus: Frontal sinus tenderness present. Left Sinus: Frontal sinus tenderness present. Mouth/Throat:      Lips: Pink. Mouth: Mucous membranes are moist.      Pharynx: Posterior oropharyngeal erythema present. Tonsils: No tonsillar exudate. 1+ on the right. 1+ on the left. Eyes:      Conjunctiva/sclera: Conjunctivae normal.      Pupils: Pupils are equal, round, and reactive to light. Cardiovascular:      Rate and Rhythm: Normal rate and regular rhythm. Heart sounds: Normal heart sounds. Pulmonary:      Effort: Pulmonary effort is normal.      Breath sounds: Normal breath sounds. Musculoskeletal:         General: Normal range of motion. Cervical back: Normal range of motion. Lymphadenopathy:      Cervical: No cervical adenopathy. Skin:     General: Skin is warm. Capillary Refill: Capillary refill takes less than 2 seconds. Neurological:      General: No focal deficit present. Mental Status: She is alert. Psychiatric:         Mood and Affect: Mood normal.       /76 (Site: Right Upper Arm, Position: Sitting, Cuff Size: Large Adult)   Pulse 87   Temp 98.1 °F (36.7 °C) (Temporal)   Resp 18   Ht 5' 2\" (1.575 m)   Wt 247 lb 6.4 oz (112.2 kg)   LMP 09/09/2016   SpO2 97%   BMI 45.25 kg/m²     Assessment:      Diagnosis Orders   1. Acute frontal sinusitis, recurrence not specified     2. Pharyngitis, unspecified etiology  POCT rapid strep A     Results for orders placed or performed in visit on 09/16/21   POCT rapid strep A   Result Value Ref Range    Strep A Ag None Detected None Detected       Plan:     · Practice meticulous handwashing and cover cough to prevent spread of infection  · Encouraged to increase fluids and rest  · Tylenol/Ibuprofen OTC PRN for pain, discomfort or fever as directed on package  · Warm salt water gargles for sore throat  · Cool mist humidifier  · Hot tea with honey and lemon for cough and sore throat PRN  · Medication as prescribed. · Patient instructions given for upper respiratory infection. · To ER or call 911 if any difficulty breathing, shortness of breath, inability to swallow, hives, rash, facial/tongue swelling or temp greater than 103 degrees. · Follow up with PCP or Walk in Care as needed if symptoms worsen or do not improve. Waiting for Covid test results. No follow-ups on file.     Orders Placed This Encounter   Medications    doxycycline hyclate (VIBRA-TABS) 100 MG tablet     Sig: Take 1 tablet by mouth 2 times daily for 7 days     Dispense:  14 tablet     Refill:  0    fluticasone (FLONASE) 50 MCG/ACT nasal spray     Si spray by Each Nostril route daily for 10 days     Dispense:  1 each     Refill:  1        Electronically signed by ARISTIDES Hillman CNP on 2021 at 9:59 AM

## 2021-09-16 NOTE — PATIENT INSTRUCTIONS
SURVEY:    You may be receiving a survey from [employer] regarding your visit today. Please complete the survey to enable us to provide the highest quality of care to you and your family. If you cannot score us a very good on any question, please call the office to discuss how we could have made your experience a very good one. Thank you. Jaime Vargas, APRN-VANDANA Berg, VANDANA Friend, LPN  Arsenio Severance, LPN  Sharmaine [place]  Filomena, PCA    Patient Education        Sinusitis: Care Instructions  Your Care Instructions     Sinusitis is an infection of the lining of the sinus cavities in your head. Sinusitis often follows a cold. It causes pain and pressure in your head and face. In most cases, sinusitis gets better on its own in 1 to 2 weeks. But some mild symptoms may last for several weeks. Sometimes antibiotics are needed. Follow-up care is a key part of your treatment and safety. Be sure to make and go to all appointments, and call your doctor if you are having problems. It's also a good idea to know your test results and keep a list of the medicines you take. How can you care for yourself at home? · Take an over-the-counter pain medicine, such as acetaminophen (Tylenol), ibuprofen (Advil, Motrin), or naproxen (Aleve). Read and follow all instructions on the label. · If the doctor prescribed antibiotics, take them as directed. Do not stop taking them just because you feel better. You need to take the full course of antibiotics. · Be careful when taking over-the-counter cold or flu medicines and Tylenol at the same time. Many of these medicines have acetaminophen, which is Tylenol. Read the labels to make sure that you are not taking more than the recommended dose. Too much acetaminophen (Tylenol) can be harmful. · Breathe warm, moist air from a steamy shower, a hot bath, or a sink filled with hot water. Avoid cold, dry air. Using a humidifier in your home may help.  Follow the directions for cleaning the machine. · Use saline (saltwater) nasal washes. This can help keep your nasal passages open and wash out mucus and bacteria. You can buy saline nose drops at a grocery store or drugstore. Or you can make your own at home by adding 1 teaspoon of salt and 1 teaspoon of baking soda to 2 cups of distilled water. If you make your own, fill a bulb syringe with the solution, insert the tip into your nostril, and squeeze gently. Wood Alejandra your nose. · Put a hot, wet towel or a warm gel pack on your face 3 or 4 times a day for 5 to 10 minutes each time. · Try a decongestant nasal spray like oxymetazoline (Afrin). Do not use it for more than 3 days in a row. Using it for more than 3 days can make your congestion worse. When should you call for help? Call your doctor now or seek immediate medical care if:    · You have new or worse swelling or redness in your face or around your eyes.     · You have a new or higher fever. Watch closely for changes in your health, and be sure to contact your doctor if:    · You have new or worse facial pain.     · The mucus from your nose becomes thicker (like pus) or has new blood in it.     · You are not getting better as expected. Where can you learn more? Go to https://Har[employer] UniversitypepicDDNeb.PanÃ¨ve. org and sign in to your [employer] account. Enter C789 in the [employer]) box to learn more about \"Sinusitis: Care Instructions. \"     If you do not have an account, please click on the \"Sign Up Now\" link. Current as of: December 2, 2020               Content Version: 12.9  © 2006-2021 [employer]. Care instructions adapted under license by [facility] [employer] [facility] ([facility]). If you have questions about a medical condition or this instruction, always ask your healthcare professional. [place] [id number] any warranty or liability for your use of this information.          Patient Education        Sore Throat: Care Instructions  Your Care Instructions     Infection by bacteria or a virus causes most sore throats. Cigarette smoke, dry air, air pollution, allergies, and yelling can also cause a sore throat. Sore throats can be painful and annoying. Fortunately, most sore throats go away on their own. If you have a bacterial infection, your doctor may prescribe antibiotics. Follow-up care is a key part of your treatment and safety. Be sure to make and go to all appointments, and call your doctor if you are having problems. It's also a good idea to know your test results and keep a list of the medicines you take. How can you care for yourself at home? · If your doctor prescribed antibiotics, take them as directed. Do not stop taking them just because you feel better. You need to take the full course of antibiotics. · Gargle with warm salt water once an hour to help reduce swelling and relieve discomfort. Use 1 teaspoon of salt mixed in 1 cup of warm water. · Take an over-the-counter pain medicine, such as acetaminophen (Tylenol), ibuprofen (Advil, Motrin), or naproxen (Aleve). Read and follow all instructions on the label. · Be careful when taking over-the-counter cold or flu medicines and Tylenol at the same time. Many of these medicines have acetaminophen, which is Tylenol. Read the labels to make sure that you are not taking more than the recommended dose. Too much acetaminophen (Tylenol) can be harmful. · Drink plenty of fluids. Fluids may help soothe an irritated throat. Hot fluids, such as tea or soup, may help decrease throat pain. · Use over-the-counter throat lozenges to soothe pain. Regular cough drops or hard candy may also help. These should not be given to young children because of the risk of choking. · Do not smoke or allow others to smoke around you. If you need help quitting, talk to your doctor about stop-smoking programs and medicines. These can increase your chances of quitting for good. · Use a vaporizer or humidifier to add moisture to your bedroom.  Follow the directions for cleaning the machine. When should you call for help? Call your doctor now or seek immediate medical care if:    · You have new or worse trouble swallowing.     · Your sore throat gets much worse on one side. Watch closely for changes in your health, and be sure to contact your doctor if you do not get better as expected. Where can you learn more? Go to https://Joldit.compepiceweb.[employer]. org and sign in to your [employer] account. Enter M249 in the GetBack box to learn more about \"Sore Throat: Care Instructions. \"     If you do not have an account, please click on the \"Sign Up Now\" link. Current as of: December 2, 2020               Content Version: 12.9  © 2006-2021 Healthwise[employer] Incorporated. Care instructions adapted under license by [facility] ([facility]). If you have questions about a medical condition or this instruction, always ask your healthcare professional. [place] [id number] any warranty or liability for your use of this information.

## 2021-09-20 RX ORDER — PANTOPRAZOLE SODIUM 40 MG/1
TABLET, DELAYED RELEASE ORAL
Qty: 180 TABLET | Refills: 1 | Status: SHIPPED | OUTPATIENT
Start: 2021-09-20 | End: 2022-03-21

## 2021-09-20 RX ORDER — BUTALBITAL, ACETAMINOPHEN AND CAFFEINE 50; 325; 40 MG/1; MG/1; MG/1
1 TABLET ORAL EVERY 6 HOURS PRN
Qty: 120 TABLET | Refills: 0 | Status: SHIPPED | OUTPATIENT
Start: 2021-09-20 | End: 2022-01-17 | Stop reason: SDUPTHER

## 2021-09-20 NOTE — TELEPHONE ENCOUNTER
Health Maintenance   Topic Date Due    Flu vaccine (1) 09/01/2021    Breast cancer screen  10/06/2021    Lipid screen  07/08/2022    Potassium monitoring  07/08/2022    Creatinine monitoring  07/08/2022    Colon cancer screen colonoscopy  05/02/2026    Pneumococcal 0-64 years Vaccine (3 of 4 - PPSV23) 05/14/2026    DTaP/Tdap/Td vaccine (2 - Td or Tdap) 09/06/2028    Shingles Vaccine  Completed    COVID-19 Vaccine  Completed    Hepatitis C screen  Completed    HIV screen  Completed    Hepatitis A vaccine  Aged Out    Hepatitis B vaccine  Aged Out    Hib vaccine  Aged Out    Meningococcal (ACWY) vaccine  Aged Out             (applicable per patient's age: Cancer Screenings, Depression Screening, Fall Risk Screening, Immunizations)    Hemoglobin A1C (%)   Date Value   07/08/2021 5.4   11/03/2020 5.6   09/06/2019 5.8     Microalb/Crt.  Ratio (mcg/mg creat)   Date Value   11/18/2019 3,897 (H)     LDL Cholesterol (mg/dL)   Date Value   07/08/2021 65     AST (IU/L)   Date Value   07/08/2021 16     ALT (IU/L)   Date Value   07/08/2021 10     BUN (mg/dL)   Date Value   07/08/2021 33 (H)      (goal A1C is < 7)   (goal LDL is <100) need 30-50% reduction from baseline     BP Readings from Last 3 Encounters:   09/16/21 125/76   06/08/21 (!) 100/58   06/02/21 113/74    (goal /80)      All Future Testing planned in [facility]:  Lab Frequency Next Occurrence   Transfuse RBC TRANSFUSE 1 UNIT    CBC Auto Differential every 2 weeks        Next Visit Date:  Future Appointments   Date Time Provider Tom Oneil   11/18/2021 11:40 AM Mela Pacer Alicia, APRN - CNP [facility]            Patient Active Problem List:     JESS (acute kidney injury) ([facility] [address]     Arteritis ([facility] [address]     End stage renal disease ([facility] [address]     ANCA-associated vasculitis (HCC)     Rectocele     Retinal edema of both eyes     CKD (chronic kidney disease) stage 4, GFR 15-29 ml/min (HCC)     Depression with anxiety     Essential hypertension 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)     S/P DOMO-BSO     Endometrial hyperplasia without atypia, simple     Postural vertigo     Morbid obesity (HCC)     Allergic rhinitis     Iron deficiency anemia     Hyperlipidemia     Urinary frequency     MGUS (monoclonal gammopathy of unknown significance)     Anti-cyclic citrullinated peptide antibody positive     Bilateral hip pain     Bilateral knee pain     Cancer of uterus (HCC)     Elevated C-reactive protein (CRP)     Elevated white blood cell count, unspecified     Essential thrombocythemia (HCC)     Greater trochanteric bursitis of both hips     Microscopic polyangiitis (HCC)     Osteoarthritis of right acromioclavicular joint     Osteoarthritis of sacroiliac joint ([address]     Other intervertebral disc degeneration, thoracic region     Patient nonadherence     Pes anserinus bursitis of both knees     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     Episode of recurrent major depressive disorder (HCC)     Anemia in stage 5 chronic kidney disease, not on chronic dialysis (HCC)     Non-surgical abdominal pain     Lumbar pain     Uremia     MARY (obstructive sleep apnea)

## 2021-10-04 ENCOUNTER — HOSPITAL ENCOUNTER (OUTPATIENT)
Age: 56
Setting detail: SPECIMEN
Discharge: HOME | End: 2021-10-04
Payer: MEDICARE

## 2021-10-04 LAB
C-REACTIVE PROTEIN: 28 MG/L (ref 0–5)
SEDIMENTATION RATE, ERYTHROCYTE: 78 MM (ref 0–20)

## 2021-10-04 PROCEDURE — 86038 ANTINUCLEAR ANTIBODIES: CPT

## 2021-10-04 PROCEDURE — 83516 IMMUNOASSAY NONANTIBODY: CPT

## 2021-10-04 PROCEDURE — 85652 RBC SED RATE AUTOMATED: CPT

## 2021-10-04 PROCEDURE — 86140 C-REACTIVE PROTEIN: CPT

## 2021-10-04 PROCEDURE — 86225 DNA ANTIBODY NATIVE: CPT

## 2021-10-05 LAB
ANCA MYELOPEROXIDASE: <0.3 AU/ML (ref 0–3.5)
ANCA PROTEINASE 3: <0.7 AU/ML (ref 0–2)
ANTI DNA DOUBLE STRANDED: 0.7 IU/ML
ANTI-NUCLEAR ANTIBODY (ANA): NEGATIVE
ENA ANTIBODIES SCREEN: 0.2 U/ML

## 2021-10-06 LAB — MYELOPEROXIDASE AB: 0 AU/ML (ref 0–19)

## 2021-10-12 DIAGNOSIS — R14.0 BLOATING: ICD-10-CM

## 2021-10-12 RX ORDER — METOCLOPRAMIDE 5 MG/1
5 TABLET ORAL 2 TIMES DAILY
Qty: 180 TABLET | Refills: 0 | Status: SHIPPED | OUTPATIENT
Start: 2021-10-12 | End: 2022-01-10 | Stop reason: SDUPTHER

## 2021-10-12 NOTE — TELEPHONE ENCOUNTER
Health Maintenance   Topic Date Due    Flu vaccine (1) 09/01/2021    Breast cancer screen  10/06/2021    Lipid screen  07/08/2022    Potassium monitoring  07/08/2022    Creatinine monitoring  07/08/2022    Colon cancer screen colonoscopy  05/02/2026    Pneumococcal 0-64 years Vaccine (3 of 4 - PPSV23) 05/14/2026    DTaP/Tdap/Td vaccine (2 - Td or Tdap) 09/06/2028    Shingles Vaccine  Completed    COVID-19 Vaccine  Completed    Hepatitis C screen  Completed    HIV screen  Completed    Hepatitis A vaccine  Aged Out    Hepatitis B vaccine  Aged Out    Hib vaccine  Aged Out    Meningococcal (ACWY) vaccine  Aged Out             (applicable per patient's age: Cancer Screenings, Depression Screening, Fall Risk Screening, Immunizations)    Hemoglobin A1C (%)   Date Value   07/08/2021 5.4   11/03/2020 5.6   09/06/2019 5.8     Microalb/Crt.  Ratio (mcg/mg creat)   Date Value   11/18/2019 3,897 (H)     LDL Cholesterol (mg/dL)   Date Value   07/08/2021 65     AST (IU/L)   Date Value   07/08/2021 16     ALT (IU/L)   Date Value   07/08/2021 10     BUN (mg/dL)   Date Value   07/08/2021 33 (H)      (goal A1C is < 7)   (goal LDL is <100) need 30-50% reduction from baseline     BP Readings from Last 3 Encounters:   09/16/21 125/76   06/08/21 (!) 100/58   06/02/21 113/74    (goal /80)      All Future Testing planned in [facility]:  Lab Frequency Next Occurrence   Transfuse RBC TRANSFUSE 1 UNIT    CBC Auto Differential every 2 weeks        Next Visit Date:  Future Appointments   Date Time Provider Tom Oneil   11/18/2021 11:40 AM Edie Vargas, APRN - CNP [facility]            Patient Active Problem List:     JESS (acute kidney injury) ([facility] [address]     Arteritis ([facility] [address]     End stage renal disease ([facility] [address]     ANCA-associated vasculitis (HCC)     Rectocele     Retinal edema of both eyes     CKD (chronic kidney disease) stage 4, GFR 15-29 ml/min (HCC)     Depression with anxiety     Essential hypertension 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)     S/P DOMO-BSO     Endometrial hyperplasia without atypia, simple     Postural vertigo     Morbid obesity (HCC)     Allergic rhinitis     Iron deficiency anemia     Hyperlipidemia     Urinary frequency     MGUS (monoclonal gammopathy of unknown significance)     Anti-cyclic citrullinated peptide antibody positive     Bilateral hip pain     Bilateral knee pain     Cancer of uterus (HCC)     Elevated C-reactive protein (CRP)     Elevated white blood cell count, unspecified     Essential thrombocythemia (HCC)     Greater trochanteric bursitis of both hips     Microscopic polyangiitis (HCC)     Osteoarthritis of right acromioclavicular joint     Osteoarthritis of sacroiliac joint ([address]     Other intervertebral disc degeneration, thoracic region     Patient nonadherence     Pes anserinus bursitis of both knees     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     Episode of recurrent major depressive disorder (HCC)     Anemia in stage 5 chronic kidney disease, not on chronic dialysis (HCC)     Non-surgical abdominal pain     Lumbar pain     Uremia     MARY (obstructive sleep apnea)

## 2021-10-13 ENCOUNTER — ANESTHESIA EVENT (OUTPATIENT)
Dept: OPERATING ROOM | Age: 56
End: 2021-10-13
Payer: MEDICARE

## 2021-10-13 NOTE — PROGRESS NOTES
Chart review at request of NAYANA DORAN to proceed with planned anesthesia for colonoscopy on 10/14/2021

## 2021-10-14 ENCOUNTER — ANESTHESIA (OUTPATIENT)
Dept: OPERATING ROOM | Age: 56
End: 2021-10-14
Payer: MEDICARE

## 2021-10-14 ENCOUNTER — HOSPITAL ENCOUNTER (OUTPATIENT)
Age: 56
Setting detail: OUTPATIENT SURGERY
Discharge: HOME | End: 2021-10-14
Attending: SURGERY | Admitting: SURGERY
Payer: MEDICARE

## 2021-10-14 VITALS
DIASTOLIC BLOOD PRESSURE: 45 MMHG | WEIGHT: 243 LBS | TEMPERATURE: 96.8 F | HEART RATE: 75 BPM | RESPIRATION RATE: 20 BRPM | OXYGEN SATURATION: 96 % | HEIGHT: 62 IN | SYSTOLIC BLOOD PRESSURE: 102 MMHG | BODY MASS INDEX: 44.72 KG/M2

## 2021-10-14 VITALS
RESPIRATION RATE: 14 BRPM | SYSTOLIC BLOOD PRESSURE: 85 MMHG | DIASTOLIC BLOOD PRESSURE: 36 MMHG | OXYGEN SATURATION: 98 %

## 2021-10-14 PROCEDURE — 7100000011 HC PHASE II RECOVERY - ADDTL 15 MIN: Performed by: SURGERY

## 2021-10-14 PROCEDURE — 88305 TISSUE EXAM BY PATHOLOGIST: CPT

## 2021-10-14 PROCEDURE — 3609009300 HC COLONOSCOPY BIOPSY/STOMA: Performed by: SURGERY

## 2021-10-14 PROCEDURE — 2500000003 HC RX 250 WO HCPCS: Performed by: NURSE ANESTHETIST, CERTIFIED REGISTERED

## 2021-10-14 PROCEDURE — 7100000010 HC PHASE II RECOVERY - FIRST 15 MIN: Performed by: SURGERY

## 2021-10-14 PROCEDURE — 2709999900 HC NON-CHARGEABLE SUPPLY: Performed by: SURGERY

## 2021-10-14 PROCEDURE — 3700000001 HC ADD 15 MINUTES (ANESTHESIA): Performed by: SURGERY

## 2021-10-14 PROCEDURE — 6360000002 HC RX W HCPCS: Performed by: NURSE ANESTHETIST, CERTIFIED REGISTERED

## 2021-10-14 PROCEDURE — 45380 COLONOSCOPY AND BIOPSY: CPT | Performed by: SURGERY

## 2021-10-14 PROCEDURE — 2580000003 HC RX 258: Performed by: NURSE ANESTHETIST, CERTIFIED REGISTERED

## 2021-10-14 PROCEDURE — 3700000000 HC ANESTHESIA ATTENDED CARE: Performed by: SURGERY

## 2021-10-14 RX ORDER — SODIUM CHLORIDE, SODIUM LACTATE, POTASSIUM CHLORIDE, CALCIUM CHLORIDE 600; 310; 30; 20 MG/100ML; MG/100ML; MG/100ML; MG/100ML
INJECTION, SOLUTION INTRAVENOUS CONTINUOUS PRN
Status: DISCONTINUED | OUTPATIENT
Start: 2021-10-14 | End: 2021-10-14 | Stop reason: SDUPTHER

## 2021-10-14 RX ORDER — PHENYLEPHRINE HYDROCHLORIDE 10 MG/ML
INJECTION INTRAVENOUS PRN
Status: DISCONTINUED | OUTPATIENT
Start: 2021-10-14 | End: 2021-10-14 | Stop reason: SDUPTHER

## 2021-10-14 RX ORDER — SODIUM CHLORIDE 9 MG/ML
INJECTION, SOLUTION INTRAVENOUS CONTINUOUS
Status: DISCONTINUED | OUTPATIENT
Start: 2021-10-14 | End: 2021-10-14 | Stop reason: HOSPADM

## 2021-10-14 RX ORDER — LIDOCAINE HYDROCHLORIDE 20 MG/ML
INJECTION, SOLUTION EPIDURAL; INFILTRATION; INTRACAUDAL; PERINEURAL PRN
Status: DISCONTINUED | OUTPATIENT
Start: 2021-10-14 | End: 2021-10-14 | Stop reason: SDUPTHER

## 2021-10-14 RX ORDER — PROPOFOL 10 MG/ML
INJECTION, EMULSION INTRAVENOUS PRN
Status: DISCONTINUED | OUTPATIENT
Start: 2021-10-14 | End: 2021-10-14 | Stop reason: SDUPTHER

## 2021-10-14 RX ORDER — PROPOFOL 10 MG/ML
INJECTION, EMULSION INTRAVENOUS CONTINUOUS PRN
Status: DISCONTINUED | OUTPATIENT
Start: 2021-10-14 | End: 2021-10-14 | Stop reason: SDUPTHER

## 2021-10-14 RX ADMIN — PHENYLEPHRINE HYDROCHLORIDE 100 MCG: 10 INJECTION INTRAVENOUS at 11:05

## 2021-10-14 RX ADMIN — PHENYLEPHRINE HYDROCHLORIDE 100 MCG: 10 INJECTION INTRAVENOUS at 11:07

## 2021-10-14 RX ADMIN — SODIUM CHLORIDE, POTASSIUM CHLORIDE, SODIUM LACTATE AND CALCIUM CHLORIDE: 600; 310; 30; 20 INJECTION, SOLUTION INTRAVENOUS at 11:05

## 2021-10-14 RX ADMIN — PHENYLEPHRINE HYDROCHLORIDE 200 MCG: 10 INJECTION INTRAVENOUS at 11:12

## 2021-10-14 RX ADMIN — LIDOCAINE HYDROCHLORIDE 100 MG: 20 INJECTION, SOLUTION EPIDURAL; INFILTRATION; INTRACAUDAL; PERINEURAL at 11:05

## 2021-10-14 RX ADMIN — PROPOFOL 180 MCG/KG/MIN: 10 INJECTION, EMULSION INTRAVENOUS at 11:05

## 2021-10-14 RX ADMIN — PROPOFOL 50 MG: 10 INJECTION, EMULSION INTRAVENOUS at 11:05

## 2021-10-14 RX ADMIN — PROPOFOL 20 MG: 10 INJECTION, EMULSION INTRAVENOUS at 11:21

## 2021-10-14 RX ADMIN — PROPOFOL 20 MG: 10 INJECTION, EMULSION INTRAVENOUS at 11:18

## 2021-10-14 ASSESSMENT — PAIN SCALES - GENERAL
PAINLEVEL_OUTOF10: 0

## 2021-10-14 ASSESSMENT — ENCOUNTER SYMPTOMS: SHORTNESS OF BREATH: 0

## 2021-10-14 ASSESSMENT — PAIN - FUNCTIONAL ASSESSMENT: PAIN_FUNCTIONAL_ASSESSMENT: 0-10

## 2021-10-14 ASSESSMENT — LIFESTYLE VARIABLES: SMOKING_STATUS: 0

## 2021-10-14 NOTE — ANESTHESIA PRE PROCEDURE
Department of Anesthesiology  Preprocedure Note       Name:  Germaine Carrizales   Age:  64 y.o.  :  1965                                          MRN:  [id number]         Date:  10/14/2021      Surgeon: María Toledo):  Mallory Badillo DO    Procedure: Procedure(s):  COLORECTAL CANCER SCREENING, NOT HIGH RISK    Medications prior to admission:   Prior to Admission medications    Medication Sig Start Date End Date Taking?  Authorizing Provider   metoclopramide (REGLAN) 5 MG tablet Take 1 tablet by mouth 2 times daily 10/12/21  Yes ARISTIDES Juan - CNP   pantoprazole (PROTONIX) 40 MG tablet Take 1 tablet by mouth twice daily 21  Yes ARISTIDES Juan - CNP   furosemide (LASIX) 20 MG tablet Take 1 tablet by mouth twice daily 21  Yes Emmy Downing MD   DULoxetine (CYMBALTA) 30 MG extended release capsule Take 1 capsule by mouth once daily 21  Yes ARISTIDES Juan CNP   B Complex-C-Folic Acid (KENDALL-DALY RX) 1 MG TABS TAKE 1 TABLET BY MOUTH ONCE DAILY 21  Yes Historical Provider, MD   atorvastatin (LIPITOR) 80 MG tablet Take 1 tablet by mouth daily 21  Yes ARISTIDES Rios CNP   hydrALAZINE (APRESOLINE) 50 MG tablet TAKE 1 TABLET BY MOUTH THREE TIMES DAILY 3/8/21  Yes ARISTIDES Juan CNP   losartan (COZAAR) 50 MG tablet TAKE 1 TABLET BY MOUTH TWICE DAILY 20  Yes Emmy Downing MD   calcitRIOL (ROCALTROL) 0.25 MCG capsule Take 1 capsule by mouth daily 20  Yes Emmy Downing MD   meclizine (ANTIVERT) 25 MG tablet Take 1 tablet by mouth 3 times daily as needed for Dizziness 20  Yes ARISTIDES Juan CNP   potassium chloride (KLOR-CON M) 20 MEQ extended release tablet Take 1 tablet by mouth daily 20  Yes Mady Paula MD   vitamin D (CHOLECALCIFEROL) 1000 UNIT TABS tablet Take 2,000 Units by mouth 2 times daily    Yes Historical Provider, MD   butalbital-acetaminophen-caffeine (FIORICET, ESGIC) -40 MG per tablet Take 1 tablet by mouth every 6 hours as needed for Headaches or Migraine 9/20/21   Adaline Massa ARISTIDES Vargas - CNP   fluticasone (FLONASE) 50 MCG/ACT nasal spray 1 spray by Each Nostril route daily for 10 days 9/16/21 9/26/21  ARISTIDES Hall CNP   estrogens, conjugated, (PREMARIN) 0.625 MG tablet Take 1 tablet by mouth once daily 6/8/21   ARISTIDES Montgomery CNM   GEBAFLAVIO PAIN EASE AERO SPRAY ON DIALYSIS FISTULA PRIOR TO CANNULATION 5/7/21   Historical Provider, MD   carvedilol (COREG) 12.5 MG tablet Take 1 tablet by mouth 2 times daily 10/26/20   Qasim Murcia MD   dilTIAZem (CARDIZEM CD) 120 MG extended release capsule TAKE 1 CAPSULE BY MOUTH ONCE DAILY 2/21/20   Dara Macario MD   Blood Pressure KIT 1 Units by Does not apply route daily 11/7/19   ARISTIDES Rios CNP   albuterol sulfate HFA (VENTOLIN HFA) 108 (90 Base) MCG/ACT inhaler Inhale 2 puffs into the lungs every 6 hours as needed for Wheezing 4/16/19   Adaline MassARISTIDES Mena - CNP   Incontinence Supply Disposable (PREVAIL ADULT BRIEF LARGE) MISC 3 each by Does not apply route daily 11/20/18   Adaline Massa Alicia APREDIS - CNP   saline nasal gel (AYR) GEL by Nasal route 2 times daily  Patient taking differently: by Nasal route as needed  11/6/18   Talisha Graff MD   sodium chloride (ALTAMIST SPRAY) 0.65 % nasal spray 1 spray by Nasal route as needed for Congestion 11/6/18   Talisha Graff MD   Docusate Calcium (STOOL SOFTENER PO) Take 100 mg by mouth as needed     Historical Provider, MD   diphenhydrAMINE (BENADRYL) 25 MG capsule Take 25 mg by mouth every 6 hours as needed for Itching or Allergies Pt takes 2 capsules at HS \"due to hives from it being cold now\". Pt tho states these are NOT helping her this time.      Historical Provider, MD       Current medications:    Current Facility-Administered Medications   Medication Dose Route Frequency Provider Last Rate Last Admin    0.9 % sodium chloride infusion   IntraVENous Continuous Linzie ARISTIDES Chance CRNA           Allergies:  No Known Allergies    Problem List:    Patient Active Problem List   Diagnosis Code    JESS (acute kidney injury) ([facility] N17.9    Arteritis ([facility] I77.6    End stage renal disease ([facility] N18.6    ANCA-associated vasculitis ([facility]) I77.6    Rectocele N81.6    Retinal edema of both eyes H35.81    CKD (chronic kidney disease) stage 4, GFR 15-29 ml/min ([facility]) N18.4    Depression with anxiety F41.8    Essential hypertension I10    Rheumatoid arthritis with positive rheumatoid factor ([facility]) M05.9    S/P DOMO-BSO Z90.710, Z90.722, Z90.79    Endometrial hyperplasia without atypia, simple N85.01    Postural vertigo [id number]    Mor[facility] obesity [facility] E66.01    Allergic rhinitis J30.9    Iron deficiency anemia D50.9    Hyperlipidemia E78.5    Urinary frequency R35.0    MGUS (monoclonal gammopathy of unknown significance) N26.3    Anti-cyclic citrullinated peptide antibody positive R76.8    Bilateral hip pain M25.551, M25.552    Bilateral knee pain M25.561, M25.562    Cancer of uterus ([facility]) C55    Elevated C-reactive protein (CRP) R79.82    Elevated white blood cell count, unspecified D72.829    Essential thrombocythemia ([facility]) D47.3    Greater trochanteric bursitis of both hips M70.61, M70.62    Microscopic polyangiitis ([facility]) M31.7    Osteoarthritis of right acromioclavicular joint M19.011    Osteoarthritis of sacroiliac joint ([facility]) M46.1    Other intervertebral disc degeneration, thoracic region M51.34    Patient nonadherence Z91.19    Pes anserinus bursitis of both knees M70.51, M70.52    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 ([facility]) M05.762    Rheumatoid factor positive R76.8    Scl-70 antibody positive R76.8    Urticaria due to cold L50.2    Vitamin D deficiency E55.9    Elevated BUN R79.9    Chicas's esophagus without dysplasia K22.70    Hyperparathyroidism ([facility]) E21.3    Gastroesophageal reflux disease K21.9    Esophageal dysphagia R13.19  History of colon polyps Z86.010    Epistaxis, recurrent R04.0    History of arteritis Z86.79    History of rheumatoid arthritis Z87.39    Noncompliance Z91.19    SOB (shortness of breath) R06.02    Episode of recurrent major depressive disorder (HCC) F33.9    Anemia in stage 5 chronic kidney disease, not on chronic dialysis ([facility] N18.5, D63.1    Non-surgical abdominal pain R10.9    Lumbar pain M54.50    Uremia N19    MARY (obstructive sleep apnea) G47.33       Past Medical History:        Diagnosis Date    JESS (acute kidney injury) ([facility]     Anemia     Arthritis     Chronic kidney disease     Depression with anxiety 9/8/2016    GERD (gastroesophageal reflux disease)     H/O echocardiogram 08/25/2016    EF 55%. Mild mitral regurgitation.  H/O tooth extraction 07/26/2017    Lower posterior right tooth.  History of blood transfusion     x3. Last one in May 2016    Hyperlipidemia     Hypertensive crisis 8/24/2016    Kidney stones     Sciatica     Vasculitis [facility])        Past Surgical History:        Procedure Laterality Date    BONE MARROW BIOPSY  5-23-16    Per Hematologist order @ [address] [place] [address].  CHOLECYSTECTOMY      COLONOSCOPY  2016    ESOPHAGEAL DILATATION  9/12/2018    ESOPHAGEAL DILATATION performed by Moe Valdivia MD at [address] [place] [address]  11/09/2016    with tubes and ovaries    PORT SURGERY Left 10/2020    port placement for dialysis    TOOTH EXTRACTION  07/26/2017    Right posterior lower tooth.  TUBAL LIGATION      UPPER GASTROINTESTINAL ENDOSCOPY  09/12/2018    -dede,bx(Chicas's esophagus,neg H-Pylori)hiatal hernia,grade 2 reflux esophagitis    UPPER GASTROINTESTINAL ENDOSCOPY N/A 9/12/2018    EGD BIOPSY performed by Moe Valdivia MD at [address] [place] [address] History:    Social History     Tobacco Use    Smoking status: Never Smoker    Smokeless tobacco: Never Used   Substance Use Topics    Alcohol use:  No Counseling given: Not Answered      Vital Signs (Current):   Vitals:    10/14/21 0802 10/14/21 0818   BP:  (!) 118/54   Pulse:  91   Resp:  18   Temp:  36.8 °C (98.3 °F)   TempSrc:  Temporal   SpO2:  97%   Weight: 243 lb (110.2 kg)    Height: 5' 2\" (1.575 m)                                               BP Readings from Last 3 Encounters:   10/14/21 (!) 118/54   09/16/21 125/76   06/08/21 (!) 100/58       NPO Status: Time of last liquid consumption: 0030                        Time of last solid consumption: 1830                        Date of last liquid consumption: 10/14/21                        Date of last solid food consumption: 10/12/21    BMI:   Wt Readings from Last 3 Encounters:   10/14/21 243 lb (110.2 kg)   09/16/21 247 lb 6.4 oz (112.2 kg)   06/08/21 244 lb (110.7 kg)     Body mass index is 44.45 kg/m². CBC:   Lab Results   Component Value Date    WBC 8.32 07/08/2021    WBC 6.4 05/17/2021    RBC 0-2 07/08/2021    RBC 3.69 07/08/2021    HGB 12.1 07/08/2021    HCT 36.5 07/08/2021    MCV 98.9 07/08/2021    RDW 13.3 07/08/2021     07/08/2021       CMP:   Lab Results   Component Value Date     07/08/2021    K 4.0 07/08/2021    CL 96 07/08/2021    CO2 25 07/08/2021    BUN 33 07/08/2021    CREATININE 4.72 07/08/2021    CREATININE 263.0 07/08/2021    GFRAA 12 05/17/2021    LABGLOM 10 05/17/2021    GLUCOSE 99 07/08/2021    PROT 7.5 07/08/2021    CALCIUM 8.9 07/08/2021    BILITOT 0.5 07/08/2021    ALKPHOS 107 07/08/2021    AST 16 07/08/2021    ALT 10 07/08/2021       POC Tests: No results for input(s): POCGLU, POCNA, POCK, POCCL, POCBUN, POCHEMO, POCHCT in the last 72 hours. Coags:   Lab Results   Component Value Date    PROTIME 9.9 01/27/2021    INR 0.9 01/27/2021    APTT 27.9 11/02/2018       HCG (If Applicable): No results found for: PREGTESTUR, PREGSERUM, HCG, HCGQUANT     ABGs: No results found for: PHART, PO2ART, ZSK9UKO, WZH8MBY, BEART, W3UAZNVC     Type & Screen (If Applicable):   No results found for: Star Winkler    Drug/Infectious Status (If Applicable):  Lab Results   Component Value Date    HEPCAB NONREACTIVE 07/08/2021       COVID-19 Screening (If Applicable): No results found for: COVID19        Anesthesia Evaluation  Patient summary reviewed and Nursing notes reviewed no history of anesthetic complications:   Airway: Mallampati: III  TM distance: >3 FB   Neck ROM: full  Mouth opening: > = 3 FB Dental:          Pulmonary:normal exam    (+) sleep apnea (uses sometimes): on CPAP and noncompliant,      (-) shortness of breath and not a current smoker                           Cardiovascular:  Exercise tolerance: good (>4 METS),   (+) hypertension:, hyperlipidemia      ECG reviewed  Rhythm: regular  Rate: normal  Echocardiogram reviewed                  Neuro/Psych:   (+) depression/anxiety             GI/Hepatic/Renal:   (+) GERD (denies today): well controlled, renal disease (MWF- last completed 10/13): ESRD, bowel prep,           Endo/Other:    (+) : arthritis: rheumatoid. , malignancy/cancer (uterus). Abdominal:   (+) obese,           Vascular: negative vascular ROS. Other Findings:           Anesthesia Plan      general and TIVA     ASA 4       Induction: intravenous. Anesthetic plan and risks discussed with patient. Plan discussed with CRNA.                   ARISTIDES Tellez - DAINA   10/14/2021

## 2021-10-14 NOTE — H&P
months. -- Diagnosis Comment --    RENAL BIOPSY DEMONSTRATES ACTIVE NECROTIZING GLOMERULONEPHRITIS,  PAUCI-IMMUNE AND ANCA ASSOCIATED. OF 37 GLOMERULI 5 SHOW  FIBROCELLULAR CRESCENTS, 12 SHOW CELLULAR CRESCENTS AND 12 ARE  END-STAGING GLOBALLY SCLEROTIC.      IMPRESSION:   · Anemia, microcytic, Iron deficiency. Corrected with IV iron  · Anemia of chronic renal insufficiency  · ANCA Vasculitis  · Elevated kappa light chain immunoglobulins. Bone marrow negative for myeloma. · Vasculitis, ANCA associated necrotizing glomerulonephritis. Treated with Cytoxan and then Imuran, then finally switched to rituximab in December/2018  Developed end-stage renal disease on hemodialysis     Plan:   I reviewed the labs as above and discussed with the patient. She is started on hemodialysis, she does not see that the rituximab has helped her at all. She wants to stop the infusion, I explained to her that this is a decision that should be made by the rheumatologist.  She wants to stop the infusion she would make an appointment to follow-up with him. Her anemia is well controlled and she received     Past Medical History:  has a past medical history of JESS (acute kidney injury) ([address] Anemia, Arthritis, Chronic kidney disease, Depression with anxiety, GERD (gastroesophageal reflux disease), H/O echocardiogram, H/O tooth extraction, History of blood transfusion, Hyperlipidemia, Hypertensive crisis, Kidney stones, Sciatica, and Vasculitis ([address] Past Surgical History:   Past Surgical History:   Procedure Laterality Date    BONE MARROW BIOPSY  5-23-16    Per H[facility]ologist order @ [facility].  CHOLECYSTECTOMY      COLONOSCOPY  2016    ESOPHAGEAL DILATATION  9/12/2018    ESOPHAGEAL DILATATION [facility] Ld Mo[facility]  11/09/2016    with tubes and ovaries    PORT SURGERY Left 10/2020    port placement for dialysis    TOOTH EXTRACTION  07/26/2017    Right posterior lower tooth.     TUBAL LIGATION      UPPER GASTROINTESTINAL ENDOSCOPY  09/12/2018    -dilation,bx(Chicas's esophagus,neg H-Pylori)hiatal hernia,grade 2 reflux esophagitis    UPPER GASTROINTESTINAL ENDOSCOPY N/A 9/12/2018    EGD BIOPSY performed by Alvarado James MD at [address] [place] [address] History:  reports that she has never smoked. She has never used smokeless tobacco. She reports that she does not drink alcohol and does not use drugs. Family History: family history includes Cancer in her father, mother, sister, and sister; Diabetes in her brother, brother, and mother; Heart Disease in her mother; High Blood Pressure in her brother, father, and mother.     Review of Systems:   General: Completed and, except as mentioned above, was negative or noncontributory  Psychological:  Completed and, except as mentioned above, was negative or noncontributory  Ophthalmic:  Completed and, except as mentioned above, was negative or noncontributory  ENT:  Completed and, except as mentioned above, was negative or noncontributory  Allergy and Immunology:  Completed and, except as mentioned above, was negative or noncontributory  Hematological and Lymphatic:  Completed and, except as mentioned above, was negative or noncontributory  Endocrine: Completed and, except as mentioned above, was negative or noncontributory  Breast:  Completed and, except as mentioned above, was negative or noncontributory  Respiratory:  Completed and, except as mentioned above, was negative or noncontributory  Cardiovascular:  Completed and, except as mentioned above, was negative or noncontributory  Gastrointestinal: Completed and, except as mentioned above, was negative or noncontributory  Genito-Urinary:  Completed and, except as mentioned above, was negative or noncontributory  Musculoskeletal:  Completed and, except as mentioned above, was negative or noncontributory  Neurological:  Completed and, except as mentioned above, was negative or noncontributory  Dermatological:  Completed and, except as mentioned above, was negative or noncontributory    Allergies: Patient has no known allergies. Current Meds:  Current Facility-Administered Medications:     0.9 % sodium chloride infusion, , IntraVENous, Continuous, Kathy Fridge, APRN - DAINA    Physical Exam:  Vital signs and Nurse's note reviewed. BP (!) 118/54   Pulse 91   Temp 98.3 °F (36.8 °C) (Temporal)   Resp 18   Ht 5' 2\" (1.575 m)   Wt 243 lb (110.2 kg)   LMP 09/09/2016   SpO2 97%   Breastfeeding No   BMI 44.45 kg/m²    height is 5' 2\" (1.575 m) and weight is 243 lb (110.2 kg). Her temporal temperature is 98.3 °F (36.8 °C). Her blood pressure is 118/54 (abnormal) and her pulse is 91. Her respiration is 18 and oxygen saturation is 97%. Gen:  A&Ox3, NAD. Pleasant and cooperative.   HEENT: PERRLA, EOMI, no scleral icterus  Neck:  no goiter  CVS: Regular rate and rhythm  Resp: Good bilateral air entry, no active wheezing, no labored breathing  Abd: soft, non-tender, non-distended, bowel sounds present, rectal exam to be done at scope  Ext: Moves all extremities, no gross focal motor deficits  Skin: No erythema or ulcerations     Labs:   Lab Results   Component Value Date    WBC 8.32 07/08/2021    WBC 6.4 05/17/2021    HGB 12.1 07/08/2021    HCT 36.5 07/08/2021    MCV 98.9 07/08/2021     07/08/2021     Lab Results   Component Value Date     07/08/2021    K 4.0 07/08/2021    CL 96 07/08/2021    CO2 25 07/08/2021    BUN 33 07/08/2021    CREATININE 4.72 07/08/2021    CREATININE 263.0 07/08/2021    GLUCOSE 99 07/08/2021    CALCIUM 8.9 07/08/2021     Lab Results   Component Value Date    ALKPHOS 107 07/08/2021    ALT 10 07/08/2021    AST 16 07/08/2021    PROT 7.5 07/08/2021    BILITOT 0.5 07/08/2021    BILIDIR 0.1 07/08/2021    LABALBU 4.1 07/08/2021     Lab Results   Component Value Date    AMYLASE 38 05/19/2016     Lab Results   Component Value Date    LIPASE 24 01/07/2021     Lab Results   Component Value Date    INR 0.9 01/27/2021       Radiologic Studies:      Impressions/Recommendations:     Screening colonoscopy    Dialysis patient  Transplant list    Risks and benefits of colonoscopy have been discussed in detail with the patient. Risks of the procedure include bleeding, bowel perforation, possibly missing smaller lesions if the bowel prep is not adequate. Alternative studies include barium enema and virtual colonoscopy; however, if a lesion is seen, then one would still need to proceed with the gold standard colonoscopy to retrieve or biopsy the lesion. All the patient's questions are answered. Will proceed with colonoscopy under MAC. H&P  General Surgery        Pt Name: Chris Spencer  MRN: [id number]  [place]: 1965  Date of evaluation: 10/14/2021      [x] I have examined the patient and reviewed the H&P/Consult completed, and there are no changes to the patient or plans. [] I have examined the patient and reviewed the H&P/Consult and have noted the following changes: The patient was counseled at length about the risks of amisha Covid-19 during their perioperative period and any recovery window from their procedure. The patient was made aware that amisha Covid-19  may worsen their prognosis for recovering from their procedure  and lend to a higher morbidity and/or mortality risk. All material risks, benefits, and reasonable alternatives including postponing the procedure were discussed. The patient does wish to proceed with the procedure at this time.         Electronically signed by Celia Burton DO  on 10/14/2021 at 10:34 AM

## 2021-10-14 NOTE — OP NOTE
Operative Note        Patient: Jeramie Stephens  YOB: 1965  MRN: [id number]   Ingrid Gonsalez, APRN - CNP      Date of Procedure: 10/14/2021    Pre-Op Diagnosis: screening colonoscopy    Post-Op Diagnosis: Same. 2 polyps       Procedure:    Colonoscopy to cecum with cold forceps polypectomy x 2 (cecum and ascending colon)      Surgeon(s):  Dionna Ford DO    Anesthesia: Monitor Anesthesia Care    Estimated Blood Loss (mL): n/a    Complications: None    Specimens:   ID Type Source Tests Collected by Time Destination   A :  Tissue Cecum SURGICAL PATHOLOGY Dionna Ford,  10/14/2021 1114    B :  Tissue Colon-Ascending SURGICAL PATHOLOGY Dionna Ford DO 10/14/2021 1118        Procedure details:    I do meet with the patient in preoperative holding area. Please see H&P for indications for the above named procedure. Patient was brought to the endoscopy suite and placed under monitored anesthesia. Patient was positioned in left lateral position. Time out called and procedure confirmed. The lubricated variable stiffness pediatric colonoscope was carefully passed under direct vision into the rectum, advanced through the sigmoid colon, transverse colon, ascending colon and the cecum. The ileocecal valve was well visualized. Additional findings:    Findings:     Cecum: normal cecal pouch. IC valve and appendiceal orifice well confirmed. sessile polyp removed via cold forceps polypectomy method and submitted  Ascending: sessile polyp removed via cold forceps polypectomy method and submitted  Transverse: normal  Descending: normal  Sigmoid: normal  Rectum: normal  Rectal exam: no masses, no significant hemorrhoidal disease, no fissure  Bowel prep quality: excellent    At the distal 1/3 of the rectum, the scope was retroflexed and was negative. The patient tolerated the procedure well. The abdomen was soft after the procedure. I spoke with patient afterwards. Next colonoscopy 5 years.  Will call if path report shows any different. Health maintenance tab updated.        Electronically signed by Phil Johnston DO, FACOS, FACS on 10/14/2021 at 11:35 AM

## 2021-10-14 NOTE — BRIEF OP NOTE
Brief Postoperative Note      Patient: Emily Armenta  YOB: 1965  MRN: [id number]   Michelle Mejia, APRN - CNP      Date of Procedure: 10/14/2021    Pre-Op Diagnosis: screening colonoscopy    Post-Op Diagnosis: Same.  2 polyps       Procedure:    Colonoscopy to cecum with cold forceps polypectomy x 2 (cecum and ascending colon)      Surgeon(s):  Concepción Levy DO    Anesthesia: Monitor Anesthesia Care    Estimated Blood Loss (mL): n/a    Complications: None    Specimens:   ID Type Source Tests Collected by Time Destination   A :  Tissue Cecum SURGICAL PATHOLOGY Concepción Levy DO 10/14/2021 1114    B :  Tissue Colon-Ascending SURGICAL PATHOLOGY Concepción Levy DO 10/14/2021 1118            Electronically signed by Concepción Levy DO, FACOS, FACS on 10/14/2021 at 11:35 AM

## 2021-10-14 NOTE — ANESTHESIA POSTPROCEDURE EVALUATION
Department of Anesthesiology  Postprocedure Note    Patient: Chris Spencer  MRN: [id number]  YOB: 1965  Date of evaluation: 10/14/2021  Time:  3:47 PM     Procedure Summary     Date: 10/14/21 Room / Location: [address] [place] [address]    Anesthesia Start: 1059 Anesthesia Stop: 1132    Procedure: COLONOSCOPY BIOPSY/STOMA (N/A ) Diagnosis: (SCREENING)    Surgeons: Celia Burton DO Responsible Provider: Deborah Brooks    Anesthesia Type: general, TIVA ASA Status: 4          Anesthesia Type: general, TIVA    Ovidio Phase I: Ovidio Score: 10    Ovidio Phase II: Ovidio Score: 10    Last vitals: Reviewed and per EMR flowsheets.        Anesthesia Post Evaluation    Patient location during evaluation: PACU  Patient participation: complete - patient participated  Level of consciousness: awake and alert  Airway patency: patent  Nausea & Vomiting: no nausea and no vomiting  Complications: no  Cardiovascular status: blood pressure returned to baseline and hemodynamically stable  Respiratory status: acceptable and room air  Hydration status: euvolemic

## 2021-10-14 NOTE — PROGRESS NOTES

## 2021-10-18 LAB — SURGICAL PATHOLOGY REPORT: NORMAL

## 2021-10-19 NOTE — RESULT ENCOUNTER NOTE
Benign polyps and based on findings, size, number, family history, next colonoscopy 5 years, 2026, already told her this, chart HM tab updated.

## 2021-11-09 DIAGNOSIS — E78.2 MIXED HYPERLIPIDEMIA: ICD-10-CM

## 2021-11-09 RX ORDER — ATORVASTATIN CALCIUM 80 MG/1
80 TABLET, FILM COATED ORAL DAILY
Qty: 90 TABLET | Refills: 3 | Status: SHIPPED | OUTPATIENT
Start: 2021-11-09

## 2021-11-18 ENCOUNTER — OFFICE VISIT (OUTPATIENT)
Dept: PRIMARY CARE CLINIC | Age: 56
End: 2021-11-18
Payer: MEDICARE

## 2021-11-18 VITALS
WEIGHT: 249 LBS | BODY MASS INDEX: 45.54 KG/M2 | SYSTOLIC BLOOD PRESSURE: 110 MMHG | HEART RATE: 84 BPM | OXYGEN SATURATION: 98 % | TEMPERATURE: 97.7 F | DIASTOLIC BLOOD PRESSURE: 68 MMHG | RESPIRATION RATE: 22 BRPM

## 2021-11-18 DIAGNOSIS — I10 ESSENTIAL HYPERTENSION: Primary | ICD-10-CM

## 2021-11-18 DIAGNOSIS — E78.2 MIXED HYPERLIPIDEMIA: ICD-10-CM

## 2021-11-18 DIAGNOSIS — Z12.31 OTHER SCREENING MAMMOGRAM: ICD-10-CM

## 2021-11-18 DIAGNOSIS — N18.6 END STAGE RENAL DISEASE (HCC): ICD-10-CM

## 2021-11-18 PROCEDURE — 3017F COLORECTAL CA SCREEN DOC REV: CPT | Performed by: NURSE PRACTITIONER

## 2021-11-18 PROCEDURE — 1036F TOBACCO NON-USER: CPT | Performed by: NURSE PRACTITIONER

## 2021-11-18 PROCEDURE — G8484 FLU IMMUNIZE NO ADMIN: HCPCS | Performed by: NURSE PRACTITIONER

## 2021-11-18 PROCEDURE — 99214 OFFICE O/P EST MOD 30 MIN: CPT | Performed by: NURSE PRACTITIONER

## 2021-11-18 PROCEDURE — G8417 CALC BMI ABV UP PARAM F/U: HCPCS | Performed by: NURSE PRACTITIONER

## 2021-11-18 PROCEDURE — G8427 DOCREV CUR MEDS BY ELIG CLIN: HCPCS | Performed by: NURSE PRACTITIONER

## 2021-11-18 RX ORDER — CALCIUM ACETATE 667 MG/1
TABLET ORAL
COMMUNITY
Start: 2021-11-05 | End: 2022-07-06 | Stop reason: DRUGHIGH

## 2021-11-18 RX ORDER — LOSARTAN POTASSIUM 25 MG/1
TABLET ORAL
COMMUNITY
Start: 2021-11-15 | End: 2021-11-18 | Stop reason: SDUPTHER

## 2021-11-18 RX ORDER — DIAPER,BRIEF,ADULT, DISPOSABLE
2 EACH MISCELLANEOUS DAILY
Qty: 60 EACH | Refills: 5 | Status: SHIPPED | OUTPATIENT
Start: 2021-11-18

## 2021-11-18 ASSESSMENT — ENCOUNTER SYMPTOMS
BLURRED VISION: 0
NAUSEA: 0
COUGH: 0
VOMITING: 0
ORTHOPNEA: 0
CONSTIPATION: 0
WHEEZING: 0
ABDOMINAL DISTENTION: 0
ABDOMINAL PAIN: 0
SHORTNESS OF BREATH: 0
RHINORRHEA: 0
SORE THROAT: 0
DIARRHEA: 0

## 2021-11-18 NOTE — PATIENT INSTRUCTIONS
SURVEY:    You may be receiving a survey from [employer] regarding your visit today. Please complete the survey to enable us to provide the highest quality of care to you and your family. If you cannot score us a very good on any question, please call the office to discuss how we could have made your experience a very good one. Thank you. Patient Education        Learning About High Blood Pressure  What is high blood pressure? Blood pressure is a measure of how hard the blood pushes against the walls of your arteries. It's normal for blood pressure to go up and down throughout the day. But if it stays up, you have high blood pressure. Another name for high blood pressure is hypertension. Two numbers tell you your blood pressure. The first number is the systolic pressure (top number). It shows how hard the blood pushes when your heart is pumping. The second number is the diastolic pressure (bottom number). It shows how hard the blood pushes between heartbeats, when your heart is relaxed and filling with blood. Your doctor will give you a goal for your blood pressure based on your health and your age. High blood pressure (hypertension) means that the top number stays high, or the bottom number stays high, or both. High blood pressure increases the risk of stroke, heart attack, and other problems. What happens when you have high blood pressure? · Blood flows through your arteries with too much force. Over time, this can damage the heart and the walls of your arteries. But you can't feel it. High blood pressure usually doesn't cause symptoms. · High blood pressure makes your heart work harder. And that can lead to heart failure, which means your heart doesn't pump as much blood as your body needs. · Fat and calcium start to build up in your arteries. This buildup is called hardening of the arteries. It can cause many problems including a heart attack and stroke.   · Arteries also carry blood and oxygen to organs like your eyes, kidneys, and brain. If high blood pressure damages those arteries, it can lead to vision loss, kidney disease, stroke, and a higher risk of dementia. How can you prevent high blood pressure? · Stay at a healthy weight. · Try to limit how much sodium you eat to less than 2,300 milligrams (mg) a day. If you limit your sodium to 1,500 mg a day, you can lower your blood pressure even more. ? Buy foods that are labeled \"unsalted,\" \"sodium-free,\" or \"low-sodium. \" Foods labeled \"reduced-sodium\" and \"light sodium\" may still have too much sodium. ? Flavor your food with garlic, lemon juice, onion, vinegar, herbs, and spices instead of salt. Do not use soy sauce, steak sauce, onion salt, garlic salt, mustard, or ketchup on your food. ? Use less salt (or none) when recipes call for it. You can often use half the salt a recipe calls for without losing flavor. · Be physically active. Get at least 30 minutes of exercise on most days of the week. Walking is a good choice. You also may want to do other activities, such as running, swimming, cycling, or playing tennis or team sports. · Limit alcohol to 2 drinks a day for men and 1 drink a day for women. · Eat plenty of fruits, vegetables, and low-fat dairy products. Eat less saturated and total fats. How is high blood pressure treated? · Your doctor will suggest making lifestyle changes to help your heart. For example, your doctor may ask you to eat healthy foods, quit smoking, lose extra weight, and be more active. · If lifestyle changes don't help enough, your doctor may recommend that you take medicine. · When blood pressure is very high, medicines are needed to lower it. Follow-up care is a key part of your treatment and safety. Be sure to make and go to all appointments, and call your doctor if you are having problems. It's also a good idea to know your test results and keep a list of the medicines you take.   Where can you learn more?  Go to [email or web address] and sign in to your [employer] account. Enter P501 in the [facility] box to learn more about \"Learning About High Blood Pressure. \"     If you do not have an account, please click on the \"Sign Up Now\" link. Current as of: April 29, 2021               Content Version: 13.0  © 6850-6198 Healthwise, Incorporated. Care instructions adapted under license by [facility] ([facility]). If you have questions about a medical condition or this instruction, always ask your healthcare professional. [place] [address] any warranty or liability for your use of this information.

## 2021-11-18 NOTE — PROGRESS NOTES
artery disease include dyslipidemia, family history, obesity, hypertension, stress and post-menopausal.         Past Medical History:     Past Medical History:   Diagnosis Date    [facility]) ([facility] [address]     Anemia     Arthritis     Chronic kidney disease     Depression with anxiety 9/8/2016    GERD (gastroesophageal reflux disease)     H/O echocardiogram 08/25/2016    EF 55%. Mild mitral regurgitation.  H/O tooth extraction 07/26/2017    Lower posterior right tooth.  History of blood transfusion     x3. Last one in May 2016    Hyperlipidemia     Hypertensive crisis 8/24/2016    Kidney stones     Sciatica     Vasculitis ([facility] [address]       Reviewed all health maintenance requirements and ordered appropriate tests  Health Maintenance Due   Topic Date Due    Breast cancer screen  10/06/2021       Past Surgical History:     Past Surgical History:   Procedure Laterality Date    BONE MARROW BIOPSY  5-23-16    [facility].  CHOLECYSTECTOMY      COLONOSCOPY  2016    COLONOSCOPY N/A 10/14/2021    COLONOSCOPY BIOPSY/STOMA performed by Dionna Ford DO at [address]  9/12/2018    ESOPHAGEAL DILATATION performed by Claire Scott MD at [address]  11/09/2016    with tubes and ovaries    PORT SURGERY Left 10/2020    port placement for dialysis    TOOTH EXTRACTION  07/26/2017    Right posterior lower tooth.  TUBAL LIGATION      UPPER GASTROINTESTINAL ENDOSCOPY  09/12/2018    -dilation,bx(Chicas's esophagus,neg H-Pylori)hiatal hernia,grade 2 reflux esophagitis    UPPER GASTROINTESTINAL ENDOSCOPY N/A 9/12/2018    EGD BIOPSY performed by Claire Scott MD at [address]        Medications:       Prior to Admission medications    Medication Sig Start Date End Date Taking?  Authorizing Provider   calcium acetate (PHOSLO) 667 MG TABS  11/5/21  Yes Historical Provider, MD   Incontinence Supply Disposable (PREVAIL FOR WOMEN X-LARGE) MISC 2 each by Does not apply route daily 11/18/21  Yes Mukesh Vargas APRN - CNP   atorvastatin (LIPITOR) 80 MG tablet Take 1 tablet by mouth daily 11/9/21  Yes Mukesh Vargas APRN - CNP   metoclopramide (REGLAN) 5 MG tablet Take 1 tablet by mouth 2 times daily 10/12/21  Yes Mukesh Vargas APRN - VANDANA   butalbital-acetaminophen-caffeine (FIORICET, ESGIC) -40 MG per tablet Take 1 tablet by mouth every 6 hours as needed for Headaches or Migraine 9/20/21  Yes Mukesh Vargas APRN - CNP   pantoprazole (PROTONIX) 40 MG tablet Take 1 tablet by mouth twice daily 9/20/21  Yes Mukesh Vargas APRN - CNP   furosemide (LASIX) 20 MG tablet Take 1 tablet by mouth twice daily 7/12/21  Yes Nuzhat Maloney MD   estrogens, conjugated, (PREMARIN) 0.625 MG tablet Take 1 tablet by mouth once daily 6/8/21  Yes ARISTIDES Mazariegos - CARLOM   DULoxetine (CYMBALTA) 30 MG extended release capsule Take 1 capsule by mouth once daily 6/4/21  Yes ARISTIDES Concepcion - CNP   B Complex-C-Folic Acid (KENDALL-DALY RX) 1 MG TABS TAKE 1 TABLET BY MOUTH ONCE DAILY 5/8/21  Yes Historical Provider, MD JOEL PAIN EASE AERO SPRAY ON DIALYSIS FISTULA PRIOR TO CANNULATION 5/7/21  Yes Historical Provider, MD   hydrALAZINE (APRESOLINE) 50 MG tablet TAKE 1 TABLET BY MOUTH THREE TIMES DAILY 3/8/21  Yes Mukesh Vargas APRN - CNP   losartan (COZAAR) 50 MG tablet TAKE 1 TABLET BY MOUTH TWICE DAILY 11/30/20  Yes Nuzhat Maloney MD   calcitRIOL (ROCALTROL) 0.25 MCG capsule Take 1 capsule by mouth daily 11/30/20  Yes Nuzhat Maloney MD   carvedilol (COREG) 12.5 MG tablet Take 1 tablet by mouth 2 times daily 10/26/20  Yes Nuzhat Maloney MD   meclizine (ANTIVERT) 25 MG tablet Take 1 tablet by mouth 3 times daily as needed for Dizziness 7/22/20  Yes Mukesh Vargas, APRN - CNP   potassium chloride (KLOR-CON M) 20 MEQ extended release tablet Take 1 tablet by mouth daily 4/2/20  Yes Olga Peck MD   dilTIAZem (CARDIZEM CD) 120 MG extended release capsule TAKE 1 CAPSULE BY MOUTH ONCE DAILY 2/21/20  Yes Sofy Greene MD   Blood Pressure KIT 1 Units by Does not apply route daily 11/7/19  Yes ARISTIDES Rios CNP   albuterol sulfate HFA (VENTOLIN HFA) 108 (90 Base) MCG/ACT inhaler Inhale 2 puffs into the lungs every 6 hours as needed for Wheezing 4/16/19  Yes ARISTIDES Castaneda CNP   Incontinence Supply Disposable (PREVAIL ADULT BRIEF LARGE) MISC 3 each by Does not apply route daily 11/20/18  Yes ARISTIDES Castaneda - CNP   vitamin D (CHOLECALCIFEROL) 1000 UNIT TABS tablet Take 2,000 Units by mouth 2 times daily    Yes Historical Provider, MD   Docusate Calcium (STOOL SOFTENER PO) Take 100 mg by mouth as needed    Yes Historical Provider, MD   fluticasone (FLONASE) 50 MCG/ACT nasal spray 1 spray by Each Nostril route daily for 10 days  Patient not taking: Reported on 11/18/2021 9/16/21 9/26/21  ARISTIDES Ba CNP   saline nasal gel (AYR) GEL by Nasal route 2 times daily  Patient not taking: Reported on 11/18/2021 11/6/18   Bernarda Louise MD   sodium chloride (ALTAMIST SPRAY) 0.65 % nasal spray 1 spray by Nasal route as needed for Congestion  Patient not taking: Reported on 11/18/2021 11/6/18   Bernarda Louise MD   diphenhydrAMINE (BENADRYL) 25 MG capsule Take 25 mg by mouth every 6 hours as needed for Itching or Allergies Pt takes 2 capsules at HS \"due to hives from it being cold now\". Pt tho states these are NOT helping her this time. Patient not taking: Reported on 11/18/2021    Historical Provider, MD        Allergies:       Patient has no known allergies. Social History:     Tobacco:    reports that she has never smoked. She has never used smokeless tobacco.  Alcohol:      reports no history of alcohol use. Drug Use:  reports no history of drug use.     Family History:     Family History   Problem Relation Age of Onset   [de-identified] Cancer Mother     Diabetes Mother     Heart Disease Mother     High Blood Pressure Mother     Cancer Father         prostate    High Blood Pressure Father     Cancer Sister         ovarian    Diabetes Brother     Cancer Sister         ovarian    High Blood Pressure Brother     Diabetes Brother        Review of Systems:     Positive and Negative as described in HPI    Review of Systems   Constitutional: Negative for chills, fatigue, fever and malaise/fatigue. HENT: Negative for congestion, rhinorrhea and sore throat. Eyes: Negative for blurred vision and visual disturbance. Respiratory: Negative for cough, shortness of breath and wheezing. Cardiovascular: Negative for chest pain, palpitations, orthopnea and PND. Gastrointestinal: Negative for abdominal distention, abdominal pain, constipation, diarrhea, nausea and vomiting. Genitourinary: Negative for difficulty urinating, dysuria, frequency and hematuria. Musculoskeletal: Negative for arthralgias, gait problem, myalgias, neck pain and neck stiffness. Skin: Negative for rash. Neurological: Positive for dizziness and light-headedness (after dialysis). Negative for syncope and headaches. Physical Exam:   Vitals:  /68 (Position: Sitting)   Pulse 84   Temp 97.7 °F (36.5 °C) (Temporal)   Resp 22   Wt 249 lb (112.9 kg)   LMP 09/09/2016   SpO2 98%   BMI 45.54 kg/m²     Physical Exam  Vitals and nursing note reviewed. Constitutional:       General: She is not in acute distress. Appearance: Normal appearance. She is obese. HENT:      Mouth/Throat:      Mouth: Mucous membranes are moist.   Eyes:      Conjunctiva/sclera: Conjunctivae normal.   Cardiovascular:      Rate and Rhythm: Normal rate and regular rhythm. Pulmonary:      Effort: Pulmonary effort is normal.      Breath sounds: Normal breath sounds. No wheezing or rales. Abdominal:      General: Bowel sounds are normal. There is no distension. Palpations: Abdomen is soft. Tenderness: There is no abdominal tenderness. Musculoskeletal:      Cervical back: Normal range of motion and neck supple. patient questions answered. Pt voiced understanding. 5.  Reviewed prior labs and health maintenance  6. Continue current medications, diet and exercise. Completed Refills   Requested Prescriptions     Signed Prescriptions Disp Refills    Incontinence Supply Disposable (PREVAIL FOR WOMEN X-LARGE) MISC 60 each 5     Si each by Does not apply route daily         Return in about 6 months (around 2022) for Check up.

## 2021-12-07 DIAGNOSIS — F41.8 DEPRESSION WITH ANXIETY: Chronic | ICD-10-CM

## 2021-12-07 RX ORDER — DULOXETIN HYDROCHLORIDE 30 MG/1
CAPSULE, DELAYED RELEASE ORAL
Qty: 90 CAPSULE | Refills: 1 | Status: SHIPPED | OUTPATIENT
Start: 2021-12-07 | End: 2022-06-03

## 2021-12-07 NOTE — TELEPHONE ENCOUNTER
Phone call from  requesting refill of Cymbalta 30mg. Health Maintenance   Topic Date Due    Breast cancer screen  10/06/2021    Flu vaccine (1) 11/18/2022 (Originally 9/1/2021)    COVID-19 Vaccine (3 - Booster for Pfizer series) 11/18/2022 (Originally 9/30/2021)    Lipid screen  07/08/2022    Potassium monitoring  11/09/2022    Creatinine monitoring  11/09/2022    Diabetes screen  07/08/2024    Pneumococcal 0-64 years Vaccine (3 of 4 - PPSV23) 05/14/2026    Colon cancer screen colonoscopy  10/14/2026    DTaP/Tdap/Td vaccine (2 - Td or Tdap) 09/06/2028    Shingles Vaccine  Completed    Hepatitis C screen  Completed    HIV screen  Completed    Hepatitis A vaccine  Aged Out    Hepatitis B vaccine  Aged Out    Hib vaccine  Aged Out    Meningococcal (ACWY) vaccine  Aged Out             (applicable per patient's age: Cancer Screenings, Depression Screening, Fall Risk Screening, Immunizations)    Hemoglobin A1C (%)   Date Value   07/08/2021 5.4   11/03/2020 5.6   09/06/2019 5.8     Microalb/Crt.  Ratio (mcg/mg creat)   Date Value   11/18/2019 3,897 (H)     LDL Cholesterol (mg/dL)   Date Value   07/08/2021 65     AST (IU/L)   Date Value   07/08/2021 16     ALT (IU/L)   Date Value   07/08/2021 10     BUN (mg/dL)   Date Value   11/09/2021 51 (H)      (goal A1C is < 7)   (goal LDL is <100) need 30-50% reduction from baseline     BP Readings from Last 3 Encounters:   11/18/21 110/68   10/14/21 (!) 85/36   10/14/21 (!) 102/45    (goal /80)      All Future Testing planned in [facility]:  Lab Frequency Next Occurrence   LUIS CHAD DIGITAL SCREEN BILATERAL Once 01/20/2022   Transfuse RBC TRANSFUSE 1 UNIT        Next Visit Date:  Future Appointments   Date Time Provider Tom Oneil   12/23/2021  2:10 PM ARISTIDES Perez CNM [facility]   1/20/2022  2:00 PM [facility] MAMMOGRAPHY ROOM AT [address]   5/18/2022 11:20 AM Arpita Vargas, APRN - CNP [facility]            Patient Active Problem List:     JESS (acute kidney injury) ([facility] [address]     Arteritis ([address]     End stage renal disease (HCC)     ANCA-associated vasculitis (HCC)     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)     S/P DOMO-BSO     Endometrial hyperplasia without atypia, simple     Postural vertigo     Morbid obesity (HCC)     Allergic rhinitis     Iron deficiency anemia     Mixed hyperlipidemia     Urinary frequency     MGUS (monoclonal gammopathy of unknown significance)     Anti-cyclic citrullinated peptide antibody positive     Bilateral hip pain     Bilateral knee pain     Cancer of uterus (HCC)     Elevated C-reactive protein (CRP)     Elevated white blood cell count, unspecified     Essential thrombocythemia (HCC)     Greater trochanteric bursitis of both hips     Microscopic polyangiitis (HCC)     Osteoarthritis of right acromioclavicular joint     Osteoarthritis of sacroiliac joint ([address]     Other intervertebral disc degeneration, thoracic region     Patient nonadherence     Pes anserinus bursitis of both knees     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     Episode of recurrent major depressive disorder (HCC)     Anemia in stage 5 chronic kidney disease, not on chronic dialysis (HCC)     Non-surgical abdominal pain     Lumbar pain     Uremia     MARY (obstructive sleep apnea)

## 2021-12-14 ENCOUNTER — TELEPHONE (OUTPATIENT)
Dept: PRIMARY CARE CLINIC | Age: 56
End: 2021-12-14

## 2021-12-23 ENCOUNTER — OFFICE VISIT (OUTPATIENT)
Dept: OBGYN | Age: 56
End: 2021-12-23
Payer: MEDICARE

## 2021-12-23 ENCOUNTER — HOSPITAL ENCOUNTER (OUTPATIENT)
Age: 56
Setting detail: SPECIMEN
Discharge: HOME | End: 2021-12-23
Payer: MEDICARE

## 2021-12-23 VITALS
DIASTOLIC BLOOD PRESSURE: 56 MMHG | BODY MASS INDEX: 46.93 KG/M2 | SYSTOLIC BLOOD PRESSURE: 108 MMHG | HEIGHT: 62 IN | WEIGHT: 255 LBS

## 2021-12-23 DIAGNOSIS — Z01.419 ENCOUNTER FOR ANNUAL ROUTINE GYNECOLOGICAL EXAMINATION: Primary | ICD-10-CM

## 2021-12-23 DIAGNOSIS — Z01.419 ENCOUNTER FOR ANNUAL ROUTINE GYNECOLOGICAL EXAMINATION: ICD-10-CM

## 2021-12-23 PROBLEM — N17.9 ACUTE RENAL FAILURE (HCC): Status: ACTIVE | Noted: 2021-12-23

## 2021-12-23 PROCEDURE — G8484 FLU IMMUNIZE NO ADMIN: HCPCS | Performed by: ADVANCED PRACTICE MIDWIFE

## 2021-12-23 PROCEDURE — G0145 SCR C/V CYTO,THINLAYER,RESCR: HCPCS

## 2021-12-23 PROCEDURE — 99396 PREV VISIT EST AGE 40-64: CPT | Performed by: ADVANCED PRACTICE MIDWIFE

## 2021-12-23 RX ORDER — ASPIRIN 81 MG/1
81 TABLET, CHEWABLE ORAL DAILY
COMMUNITY

## 2021-12-23 NOTE — PROGRESS NOTES
YEARLY PHYSICAL    Date of service: 2021    Patty Mckeon  Is a 64 y.o.   female    PT's PCP is: Holland Vargas, APRN - CNP     : 1965                                             Subjective:       Patient's last menstrual period was 2016. Are your menses regular: not applicable    OB History    Para Term  AB Living   3 3 1 2   3   SAB IAB Ectopic Molar Multiple Live Births             3      # Outcome Date GA Lbr Mati/2nd Weight Sex Delivery Anes PTL Lv   3  89 36w0d  5 lb 2 oz (2.325 kg) F Vag-Spont  Y SANTA   2  86 36w0d  7 lb 14 oz (3.572 kg) M Vag-Spont  N SANTA   1 Term 85 40w0d  8 lb 14 oz (4.026 kg) M Vag-Spont  N SANTA      Complications: Other Excessive Bleeding        Social History     Tobacco Use   Smoking Status Never Smoker   Smokeless Tobacco Never Used        Social History     Substance and Sexual Activity   Alcohol Use No       Family History   Problem Relation Age of Onset    Cancer Mother     Diabetes Mother     Heart Disease Mother     High Blood Pressure Mother     Cancer Father         prostate    High Blood Pressure Father     Cancer Sister         ovarian    Diabetes Brother     Cancer Sister         ovarian    High Blood Pressure Brother     Diabetes Brother        Any family history of breast or ovarian cancer: Yes, ovarian cancer     Any family history of blood clots: No    Have you had a positive covid test: Yes    Have you had the covid immunization: Yes      Allergies: Patient has no known allergies.       Current Outpatient Medications:     aspirin 81 MG chewable tablet, Take 81 mg by mouth daily, Disp: , Rfl:     DULoxetine (CYMBALTA) 30 MG extended release capsule, Take 1 capsule by mouth once daily, Disp: 90 capsule, Rfl: 1    calcium acetate (PHOSLO) 667 MG TABS, , Disp: , Rfl:     Incontinence Supply Disposable (PREVAIL FOR WOMEN X-LARGE) MISC, 2 each by Does not apply route daily, Disp: 60 each, Rfl: 5    atorvastatin (LIPITOR) 80 MG tablet, Take 1 tablet by mouth daily, Disp: 90 tablet, Rfl: 3    metoclopramide (REGLAN) 5 MG tablet, Take 1 tablet by mouth 2 times daily, Disp: 180 tablet, Rfl: 0    pantoprazole (PROTONIX) 40 MG tablet, Take 1 tablet by mouth twice daily, Disp: 180 tablet, Rfl: 1    furosemide (LASIX) 20 MG tablet, Take 1 tablet by mouth twice daily, Disp: 60 tablet, Rfl: 0    estrogens, conjugated, (PREMARIN) 0.625 MG tablet, Take 1 tablet by mouth once daily, Disp: 90 tablet, Rfl: 3    GEBAUERS PAIN EASE AERO, SPRAY ON DIALYSIS FISTULA PRIOR TO CANNULATION, Disp: , Rfl:     hydrALAZINE (APRESOLINE) 50 MG tablet, TAKE 1 TABLET BY MOUTH THREE TIMES DAILY, Disp: 270 tablet, Rfl: 3    losartan (COZAAR) 50 MG tablet, TAKE 1 TABLET BY MOUTH TWICE DAILY, Disp: 60 tablet, Rfl: 3    calcitRIOL (ROCALTROL) 0.25 MCG capsule, Take 1 capsule by mouth daily, Disp: 30 capsule, Rfl: 5    carvedilol (COREG) 12.5 MG tablet, Take 1 tablet by mouth 2 times daily, Disp: 60 tablet, Rfl: 5    potassium chloride (KLOR-CON M) 20 MEQ extended release tablet, Take 1 tablet by mouth daily, Disp: 30 tablet, Rfl: 11    dilTIAZem (CARDIZEM CD) 120 MG extended release capsule, TAKE 1 CAPSULE BY MOUTH ONCE DAILY, Disp: 30 capsule, Rfl: 0    Blood Pressure KIT, 1 Units by Does not apply route daily, Disp: 1 kit, Rfl: 0    Incontinence Supply Disposable (PREVAIL ADULT BRIEF LARGE) MISC, 3 each by Does not apply route daily, Disp: 300 mL, Rfl: 11    vitamin D (CHOLECALCIFEROL) 1000 UNIT TABS tablet, Take 2,000 Units by mouth 2 times daily , Disp: , Rfl:     butalbital-acetaminophen-caffeine (FIORICET, ESGIC) -40 MG per tablet, Take 1 tablet by mouth every 6 hours as needed for Headaches or Migraine (Patient not taking: Reported on 12/23/2021), Disp: 120 tablet, Rfl: 0    fluticasone (FLONASE) 50 MCG/ACT nasal spray, 1 spray by Each Nostril route daily for 10 days (Patient not taking: Reported on 11/18/2021), Disp: 1 each, Rfl: 1    B Complex-C-Folic Acid (KENDALL-DALY RX) 1 MG TABS, TAKE 1 TABLET BY MOUTH ONCE DAILY (Patient not taking: Reported on 12/23/2021), Disp: , Rfl:     meclizine (ANTIVERT) 25 MG tablet, Take 1 tablet by mouth 3 times daily as needed for Dizziness (Patient not taking: Reported on 12/23/2021), Disp: 30 tablet, Rfl: 0    albuterol sulfate HFA (VENTOLIN HFA) 108 (90 Base) MCG/ACT inhaler, Inhale 2 puffs into the lungs every 6 hours as needed for Wheezing (Patient not taking: Reported on 12/23/2021), Disp: 1 Inhaler, Rfl: 3    saline nasal gel (AYR) GEL, by Nasal route 2 times daily (Patient not taking: Reported on 11/18/2021), Disp: 1 Tube, Rfl: 3    sodium chloride (ALTAMIST SPRAY) 0.65 % nasal spray, 1 spray by Nasal route as needed for Congestion (Patient not taking: Reported on 11/18/2021), Disp: 1 Bottle, Rfl: 3    Docusate Calcium (STOOL SOFTENER PO), Take 100 mg by mouth as needed  (Patient not taking: Reported on 12/23/2021), Disp: , Rfl:     diphenhydrAMINE (BENADRYL) 25 MG capsule, Take 25 mg by mouth every 6 hours as needed for Itching or Allergies Pt takes 2 capsules at HS \"due to hives from it being cold now\". Pt tho states these are NOT helping her this time.   (Patient not taking: Reported on 11/18/2021), Disp: , Rfl:     Social History     Substance and Sexual Activity   Sexual Activity Yes    Partners: Male    Comment: hyst       Any bleeding or pain with intercourse: No    Last Yearly:  04/0/19/18    Last pap: 04/19/18    Last HPV: 04/19/18 neg     Last Mammogram: 10/6/19    Last Acacia Joshi yes pt unsure of year     Last colorectal screen- type:colonoscopy *  date 10/14/21    Do you do self breast exams: Yes    Past Medical History:   Diagnosis Date    JESS (acute [address] injury) ([facility] [address]     Anemia     Arthritis     Chronic kidney disease     Depression with anxiety 9/8/2016    GERD (gastroesophageal reflux disease)     H/O echocardiogram 08/25/2016    EF 55%. Mild mitral regurgitation.  H/O tooth extraction 07/26/2017    Lower posterior right tooth.  History of blood transfusion     x3. Last one in May 2016    Hyperlipidemia     Hypertensive crisis 8/24/2016    Kidney stones     Sciatica     Vasculitis [facility])        Past Surgical History:   Procedure Laterality Date    BONE MARROW BIOPSY  5-23-16    Per Hematologist order @ [facility].  CHOLECYSTECTOMY      COLONOSCOPY  2016    COLONOSCOPY N/A 10/14/2021    COLONOSCOPY BIOPSY/STOMA performed by Jonathan Browne DO at [address]  9/12/2018    ESOPHAGEAL DILATATION performed by Ovidio Vines MD at [address]  11/09/2016    with tubes and ovaries    PORT SURGERY Left 10/2020    port placement for dialysis    TOOTH EXTRACTION  07/26/2017    Right posterior lower tooth.  TUBAL LIGATION      UPPER GASTROINTESTINAL ENDOSCOPY  09/12/2018    -dilation,bx(Chicas's esophagus,neg H-Pylori)hiatal hernia,grade 2 reflux esophagitis    UPPER GASTROINTESTINAL ENDOSCOPY N/A 9/12/2018    EGD BIOPSY performed by Ovidio Vines MD at [facility] History   Problem Relation Age of Onset    Cancer Mother     Diabetes Mother     Heart Disease Mother     High Blood Pressure Mother     Cancer Father         prostate    High Blood Pressure Father     Cancer Sister         ovarian    Diabetes Brother     Cancer Sister         ovarian    High Blood Pressure Brother     Diabetes Brother        Chief Complaint   Patient presents with   Fabian Gynecologic Exam     Yearly-PAP. pt had a hysterectomy but unsure if she has a cervix. pt needs to have a pap smear for [facility] transplant clearance. pt states no issues or concerns. last mamm 10/6/19. PE: Vital Signs  Blood pressure (!) 108/56, height 5' 2\" (1.575 m), weight 255 lb (115.7 kg), last menstrual period 09/09/2016, not currently breastfeeding. Rx, Gebauers Pain Ease, estrogens (conjugated), furosemide, fluticasone, butalbital-acetaminophen-caffeine, pantoprazole, metoclopramide, atorvastatin, calcium acetate, Prevail for Women X-Large, DULoxetine, and aspirin. No follow-ups on file. She was also counseled on her preventative health maintenance recommendations and follow-up. There are no Patient Instructions on file for this visit.     ARISTIDES Avina CNM,12/23/2021 2:31 PM

## 2022-01-05 LAB — CYTOLOGY REPORT: NORMAL

## 2022-01-10 DIAGNOSIS — R14.0 BLOATING: ICD-10-CM

## 2022-01-10 DIAGNOSIS — J34.89 NASAL DRAINAGE: Primary | ICD-10-CM

## 2022-01-10 RX ORDER — METOCLOPRAMIDE 5 MG/1
5 TABLET ORAL 2 TIMES DAILY
Qty: 180 TABLET | Refills: 0 | Status: SHIPPED | OUTPATIENT
Start: 2022-01-10 | End: 2022-04-11

## 2022-01-10 RX ORDER — GUAIFENESIN 600 MG/1
600 TABLET, EXTENDED RELEASE ORAL 2 TIMES DAILY
Qty: 30 TABLET | Refills: 0 | Status: SHIPPED | OUTPATIENT
Start: 2022-01-10 | End: 2022-09-02

## 2022-01-10 NOTE — TELEPHONE ENCOUNTER
Patient having sinus drainage,denies any other symptoms. Does not want to be seen would like Mucinex refilled. Health Maintenance   Topic Date Due    Depression Monitoring  Never done    Breast cancer screen  10/06/2021    Flu vaccine (1) 11/18/2022 (Originally 9/1/2021)    COVID-19 Vaccine (3 - Booster for Pfizer series) 11/18/2022 (Originally 9/30/2021)    Lipid screen  07/08/2022    Potassium monitoring  11/09/2022    Creatinine monitoring  11/09/2022    Diabetes screen  07/08/2024    Pneumococcal 0-64 years Vaccine (3 of 4 - PPSV23) 05/14/2026    Colon cancer screen colonoscopy  10/14/2026    DTaP/Tdap/Td vaccine (2 - Td or Tdap) 09/06/2028    Shingles Vaccine  Completed    Hepatitis C screen  Completed    HIV screen  Completed    Hepatitis A vaccine  Aged Out    Hepatitis B vaccine  Aged Out    Hib vaccine  Aged Out    Meningococcal (ACWY) vaccine  Aged Out             (applicable per patient's age: Cancer Screenings, Depression Screening, Fall Risk Screening, Immunizations)    Hemoglobin A1C (%)   Date Value   07/08/2021 5.4   11/03/2020 5.6   09/06/2019 5.8     Microalb/Crt.  Ratio (mcg/mg creat)   Date Value   11/18/2019 3,897 (H)     LDL Cholesterol (mg/dL)   Date Value   07/08/2021 65     AST (IU/L)   Date Value   07/08/2021 16     ALT (IU/L)   Date Value   07/08/2021 10     BUN (mg/dL)   Date Value   11/09/2021 51 (H)      (goal A1C is < 7)   (goal LDL is <100) need 30-50% reduction from baseline     BP Readings from Last 3 Encounters:   12/23/21 (!) 108/56   11/18/21 110/68   10/14/21 (!) 85/36    (goal /80)      All Future Testing planned in [facility]:  Lab Frequency Next Occurrence   LUIS CHAD DIGITAL SCREEN BILATERAL Once 01/20/2022   Transfuse RBC TRANSFUSE 1 UNIT        Next Visit Date:  Future Appointments   Date Time Provider Tom Oneil   1/20/2022  2:00 PM [facility] MAMMOGRAPHY ROOM AT [facility]   5/18/2022 11:20 AM Da Vargas APRN - 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)     S/P DOMO-BSO     Endometrial hyperplasia without atypia, simple     Postural vertigo     Morbid obesity (HCC)     Allergic rhinitis     Iron deficiency anemia     Mixed hyperlipidemia     Urinary frequency     MGUS (monoclonal gammopathy of unknown significance)     Anti-cyclic citrullinated peptide antibody positive     Bilateral hip pain     Bilateral knee pain     Cancer of uterus (HCC)     Elevated C-reactive protein (CRP)     Elevated white blood cell count, unspecified     Essential thrombocythemia (HCC)     Greater trochanteric bursitis of both hips     Microscopic polyangiitis (HCC)     Osteoarthritis of right acromioclavicular joint     Osteoarthritis of sacroiliac joint ([address]     Other intervertebral disc degeneration, thoracic region     Patient nonadherence     Pes anserinus bursitis of both knees     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     Episode of recurrent major depressive disorder (HCC)     Anemia in stage 5 chronic kidney disease, not on chronic dialysis (HCC)     Non-surgical abdominal pain     Lumbar pain Uremia     MARY (obstructive sleep apnea)     Acute renal failure ([facility] [address]

## 2022-01-17 RX ORDER — BUTALBITAL, ACETAMINOPHEN AND CAFFEINE 50; 325; 40 MG/1; MG/1; MG/1
1 TABLET ORAL EVERY 6 HOURS PRN
Qty: 120 TABLET | Refills: 0 | Status: SHIPPED | OUTPATIENT
Start: 2022-01-17 | End: 2022-04-25 | Stop reason: SDUPTHER

## 2022-01-17 NOTE — TELEPHONE ENCOUNTER
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)     S/P DOMO-BSO     Endometrial hyperplasia without atypia, simple     Postural vertigo     Morbid obesity (HCC)     Allergic rhinitis     Iron deficiency anemia     Mixed hyperlipidemia     Urinary frequency     MGUS (monoclonal gammopathy of unknown significance)     Anti-cyclic citrullinated peptide antibody positive     Bilateral hip pain     Bilateral knee pain     Cancer of uterus (HCC)     Elevated C-reactive protein (CRP)     Elevated white blood cell count, unspecified     Essential thrombocythemia (HCC)     Greater trochanteric bursitis of both hips     Microscopic polyangiitis (HCC)     Osteoarthritis of right acromioclavicular joint     Osteoarthritis of sacroiliac joint ([facility] [address]     Other intervertebral disc degeneration, thoracic region     Patient nonadherence     Pes anserinus bursitis of both knees     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     Episode of recurrent major depressive disorder (HCC)     Anemia in stage 5 chronic kidney disease, not on chronic dialysis (HCC)     Non-surgical abdominal pain     Lumbar pain     Uremia     MARY (obstructive sleep apnea)     Acute renal failure ([facility] [address]

## 2022-01-20 ENCOUNTER — HOSPITAL ENCOUNTER (OUTPATIENT)
Dept: WOMENS IMAGING | Age: 57
Discharge: HOME | End: 2022-01-22
Payer: MEDICARE

## 2022-01-20 ENCOUNTER — TELEPHONE (OUTPATIENT)
Dept: PRIMARY CARE CLINIC | Age: 57
End: 2022-01-20

## 2022-01-20 DIAGNOSIS — Z12.31 OTHER SCREENING MAMMOGRAM: ICD-10-CM

## 2022-01-20 PROCEDURE — 77067 SCR MAMMO BI INCL CAD: CPT

## 2022-01-20 NOTE — TELEPHONE ENCOUNTER
Called and spoke with  and informed him that Kathrin's mammogram was normal.  states they need mammogram results faxed due to her being on kidney transplant list and he will call back in approx 45 minutes with number of whereto fax mammogram.

## 2022-02-16 ENCOUNTER — TELEPHONE (OUTPATIENT)
Dept: PRIMARY CARE CLINIC | Age: 57
End: 2022-02-16

## 2022-02-16 NOTE — TELEPHONE ENCOUNTER
Heide Vasquez called as Deepa Jones has been having nose bleeds. He stated that she had one yesterday at dialysis that lasted 3 hours. Heide Vasquez states she does get nose bleeds from time to time. Spoke to ROSA Vargas, advised Deepa Jones can come to walk in clinic today or tomorrow. Also advised Heide Vasquez to notify nephrology. Information relayed to Heide Vasquez, he understood.

## 2022-03-21 RX ORDER — PANTOPRAZOLE SODIUM 40 MG/1
TABLET, DELAYED RELEASE ORAL
Qty: 180 TABLET | Refills: 1 | Status: SHIPPED | OUTPATIENT
Start: 2022-03-21 | End: 2022-09-29 | Stop reason: SDUPTHER

## 2022-03-21 NOTE — TELEPHONE ENCOUNTER
Health Maintenance   Topic Date Due    Depression Monitoring  Never done    Flu vaccine (1) 11/18/2022 (Originally 9/1/2021)    COVID-19 Vaccine (3 - Booster for Pfizer series) 11/18/2022 (Originally 8/31/2021)    Lipid screen  07/08/2022    Potassium monitoring  11/09/2022    Creatinine monitoring  11/09/2022    Breast cancer screen  01/20/2024    Diabetes screen  07/08/2024    Pneumococcal 0-64 years Vaccine (3 of 4 - PPSV23) 05/14/2026    Colorectal Cancer Screen  10/14/2026    DTaP/Tdap/Td vaccine (2 - Td or Tdap) 09/06/2028    Shingles Vaccine  Completed    Hepatitis C screen  Completed    HIV screen  Completed    Hepatitis A vaccine  Aged Out    Hepatitis B vaccine  Aged Out    Hib vaccine  Aged Out    Meningococcal (ACWY) vaccine  Aged Out             (applicable per patient's age: Cancer Screenings, Depression Screening, Fall Risk Screening, Immunizations)    Hemoglobin A1C (%)   Date Value   07/08/2021 5.4   11/03/2020 5.6   09/06/2019 5.8     Microalb/Crt.  Ratio (mcg/mg creat)   Date Value   11/18/2019 3,897 (H)     LDL Cholesterol (mg/dL)   Date Value   07/08/2021 65     AST (IU/L)   Date Value   07/08/2021 16     ALT (IU/L)   Date Value   07/08/2021 10     BUN (mg/dL)   Date Value   11/09/2021 51 (H)      (goal A1C is < 7)   (goal LDL is <100) need 30-50% reduction from baseline     BP Readings from Last 3 Encounters:   12/23/21 (!) 108/56   11/18/21 110/68   10/14/21 (!) 85/36    (goal /80)      All Future Testing planned in [facility]:  Lab Frequency Next Occurrence   Transfuse RBC TRANSFUSE 1 UNIT        Next Visit Date:  Future Appointments   Date Time Provider Tom Oneil   5/18/2022 11:20 AM Stacey Vargas, APRN - CNP [facility]            Patient Active Problem List:     JESS (acute kidney injury) ([address]     Arteritis ([address]     End stage renal disease ([address]     ANCA-associated vasculitis (HCC)     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)     S/P DOMO-BSO     Endometrial hyperplasia without atypia, simple     Postural vertigo     Morbid obesity (HCC)     Allergic rhinitis     Iron deficiency anemia     Mixed hyperlipidemia     Urinary frequency     MGUS (monoclonal gammopathy of unknown significance)     Anti-cyclic citrullinated peptide antibody positive     Bilateral hip pain     Bilateral knee pain     Cancer of uterus (HCC)     Elevated C-reactive protein (CRP)     Elevated white blood cell count, unspecified     Essential thrombocythemia (HCC)     Greater trochanteric bursitis of both hips     Microscopic polyangiitis (HCC)     Osteoarthritis of right acromioclavicular joint     Osteoarthritis of sacroiliac joint ([address]     Other intervertebral disc degeneration, thoracic region     Patient nonadherence     Pes anserinus bursitis of both knees     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     Episode of recurrent major depressive disorder (HCC)     Anemia in stage 5 chronic kidney disease, not on chronic dialysis (HCC)     Non-surgical abdominal pain     Lumbar pain     Uremia     MARY (obstructive sleep apnea)     Acute renal failure ([address]

## 2022-04-11 DIAGNOSIS — R14.0 BLOATING: ICD-10-CM

## 2022-04-11 RX ORDER — METOCLOPRAMIDE 5 MG/1
5 TABLET ORAL 2 TIMES DAILY
Qty: 180 TABLET | Refills: 0 | Status: SHIPPED | OUTPATIENT
Start: 2022-04-11 | End: 2022-07-05

## 2022-04-14 ENCOUNTER — APPOINTMENT (OUTPATIENT)
Dept: GENERAL RADIOLOGY | Age: 57
End: 2022-04-14
Payer: MEDICARE

## 2022-04-14 ENCOUNTER — HOSPITAL ENCOUNTER (OUTPATIENT)
Age: 57
Setting detail: OBSERVATION
Discharge: HOME | End: 2022-04-15
Attending: EMERGENCY MEDICINE | Admitting: STUDENT IN AN ORGANIZED HEALTH CARE EDUCATION/TRAINING PROGRAM
Payer: MEDICARE

## 2022-04-14 ENCOUNTER — APPOINTMENT (OUTPATIENT)
Dept: CT IMAGING | Age: 57
End: 2022-04-14
Payer: MEDICARE

## 2022-04-14 DIAGNOSIS — R55 SYNCOPE AND COLLAPSE: Primary | ICD-10-CM

## 2022-04-14 LAB
-: ABNORMAL
ABSOLUTE EOS #: 0.32 K/UL (ref 0–0.44)
ABSOLUTE IMMATURE GRANULOCYTE: 0 K/UL (ref 0–0.3)
ABSOLUTE LYMPH #: 1.58 K/UL (ref 1.1–3.7)
ABSOLUTE MONO #: 0.74 K/UL (ref 0.1–1.2)
ANION GAP SERPL CALCULATED.3IONS-SCNC: 17 MMOL/L (ref 9–17)
BACTERIA: ABNORMAL
BASOPHILS # BLD: 1 % (ref 0–2)
BASOPHILS ABSOLUTE: 0.11 K/UL (ref 0–0.2)
BILIRUBIN URINE: NEGATIVE
BUN BLDV-MCNC: 51 MG/DL (ref 6–20)
BUN/CREAT BLD: 7 (ref 9–20)
CALCIUM SERPL-MCNC: 8 MG/DL (ref 8.6–10.4)
CHLORIDE BLD-SCNC: 93 MMOL/L (ref 98–107)
CO2: 22 MMOL/L (ref 20–31)
COLOR: YELLOW
CREAT SERPL-MCNC: 7 MG/DL (ref 0.5–0.9)
EKG ATRIAL RATE: 91 BPM
EKG P AXIS: 35 DEGREES
EKG P-R INTERVAL: 212 MS
EKG Q-T INTERVAL: 372 MS
EKG QRS DURATION: 68 MS
EKG QTC CALCULATION (BAZETT): 457 MS
EKG R AXIS: 18 DEGREES
EKG T AXIS: 51 DEGREES
EKG VENTRICULAR RATE: 91 BPM
EOSINOPHILS RELATIVE PERCENT: 3 % (ref 1–4)
EPITHELIAL CELLS UA: ABNORMAL /HPF (ref 0–25)
GFR AFRICAN AMERICAN: 7 ML/MIN
GFR NON-AFRICAN AMERICAN: 6 ML/MIN
GFR SERPL CREATININE-BSD FRML MDRD: ABNORMAL ML/MIN/{1.73_M2}
GFR SERPL CREATININE-BSD FRML MDRD: ABNORMAL ML/MIN/{1.73_M2}
GLUCOSE BLD-MCNC: 123 MG/DL (ref 70–99)
GLUCOSE BLD-MCNC: 123 MG/DL (ref 74–100)
GLUCOSE URINE: NEGATIVE
HCT VFR BLD CALC: 35.6 % (ref 36.3–47.1)
HEMOGLOBIN: 11.4 G/DL (ref 11.9–15.1)
IMMATURE GRANULOCYTES: 0 %
KETONES, URINE: NEGATIVE
LEUKOCYTE ESTERASE, URINE: ABNORMAL
LYMPHOCYTES # BLD: 15 % (ref 24–43)
MCH RBC QN AUTO: 35.3 PG (ref 25.2–33.5)
MCHC RBC AUTO-ENTMCNC: 32 G/DL (ref 28.4–34.8)
MCV RBC AUTO: 110.2 FL (ref 82.6–102.9)
MONOCYTES # BLD: 7 % (ref 3–12)
MORPHOLOGY: ABNORMAL
NITRITE, URINE: NEGATIVE
NRBC AUTOMATED: 0 PER 100 WBC
PDW BLD-RTO: 13.3 % (ref 11.8–14.4)
PH UA: 6.5 (ref 5–9)
PLATELET # BLD: ABNORMAL K/UL (ref 138–453)
PLATELET, FLUORESCENCE: 299 K/UL (ref 138–453)
PLATELET, IMMATURE FRACTION: 1.9 % (ref 1.1–10.3)
POTASSIUM SERPL-SCNC: 4.7 MMOL/L (ref 3.7–5.3)
PROTEIN UA: ABNORMAL
RBC # BLD: 3.23 M/UL (ref 3.95–5.11)
RBC UA: ABNORMAL /HPF (ref 0–2)
REASON FOR REJECTION: NORMAL
SEG NEUTROPHILS: 74 % (ref 36–65)
SEGMENTED NEUTROPHILS ABSOLUTE COUNT: 7.75 K/UL (ref 1.5–8.1)
SODIUM BLD-SCNC: 132 MMOL/L (ref 135–144)
SPECIFIC GRAVITY UA: 1.01 (ref 1.01–1.02)
TROPONIN, HIGH SENSITIVITY: 33 NG/L (ref 0–14)
TROPONIN, HIGH SENSITIVITY: 35 NG/L (ref 0–14)
TURBIDITY: CLEAR
URINE HGB: NEGATIVE
UROBILINOGEN, URINE: NORMAL
WBC # BLD: 10.5 K/UL (ref 3.5–11.3)
WBC UA: ABNORMAL /HPF (ref 0–5)
ZZ NTE CLEAN UP: ORDERED TEST: NORMAL
ZZ NTE WITH NAME CLEAN UP: SPECIMEN SOURCE: NORMAL

## 2022-04-14 PROCEDURE — 80048 BASIC METABOLIC PNL TOTAL CA: CPT

## 2022-04-14 PROCEDURE — 85055 RETICULATED PLATELET ASSAY: CPT

## 2022-04-14 PROCEDURE — 70450 CT HEAD/BRAIN W/O DYE: CPT

## 2022-04-14 PROCEDURE — 6370000000 HC RX 637 (ALT 250 FOR IP): Performed by: STUDENT IN AN ORGANIZED HEALTH CARE EDUCATION/TRAINING PROGRAM

## 2022-04-14 PROCEDURE — 2580000003 HC RX 258: Performed by: STUDENT IN AN ORGANIZED HEALTH CARE EDUCATION/TRAINING PROGRAM

## 2022-04-14 PROCEDURE — G0378 HOSPITAL OBSERVATION PER HR: HCPCS

## 2022-04-14 PROCEDURE — 6360000002 HC RX W HCPCS: Performed by: STUDENT IN AN ORGANIZED HEALTH CARE EDUCATION/TRAINING PROGRAM

## 2022-04-14 PROCEDURE — 93005 ELECTROCARDIOGRAM TRACING: CPT | Performed by: EMERGENCY MEDICINE

## 2022-04-14 PROCEDURE — 99283 EMERGENCY DEPT VISIT LOW MDM: CPT

## 2022-04-14 PROCEDURE — 82947 ASSAY GLUCOSE BLOOD QUANT: CPT

## 2022-04-14 PROCEDURE — 36415 COLL VENOUS BLD VENIPUNCTURE: CPT

## 2022-04-14 PROCEDURE — 81001 URINALYSIS AUTO W/SCOPE: CPT

## 2022-04-14 PROCEDURE — 94761 N-INVAS EAR/PLS OXIMETRY MLT: CPT

## 2022-04-14 PROCEDURE — 93010 ELECTROCARDIOGRAM REPORT: CPT | Performed by: INTERNAL MEDICINE

## 2022-04-14 PROCEDURE — 84484 ASSAY OF TROPONIN QUANT: CPT

## 2022-04-14 PROCEDURE — 96372 THER/PROPH/DIAG INJ SC/IM: CPT

## 2022-04-14 PROCEDURE — 71045 X-RAY EXAM CHEST 1 VIEW: CPT

## 2022-04-14 PROCEDURE — 85025 COMPLETE CBC W/AUTO DIFF WBC: CPT

## 2022-04-14 RX ORDER — BUTALBITAL, ACETAMINOPHEN AND CAFFEINE 50; 325; 40 MG/1; MG/1; MG/1
1 TABLET ORAL EVERY 6 HOURS PRN
Status: DISCONTINUED | OUTPATIENT
Start: 2022-04-14 | End: 2022-04-15 | Stop reason: HOSPADM

## 2022-04-14 RX ORDER — ONDANSETRON 4 MG/1
4 TABLET, ORALLY DISINTEGRATING ORAL EVERY 8 HOURS PRN
Status: DISCONTINUED | OUTPATIENT
Start: 2022-04-14 | End: 2022-04-15 | Stop reason: HOSPADM

## 2022-04-14 RX ORDER — ACETAMINOPHEN 650 MG/1
650 SUPPOSITORY RECTAL EVERY 6 HOURS PRN
Status: DISCONTINUED | OUTPATIENT
Start: 2022-04-14 | End: 2022-04-15 | Stop reason: HOSPADM

## 2022-04-14 RX ORDER — HYDRALAZINE HYDROCHLORIDE 50 MG/1
50 TABLET, FILM COATED ORAL EVERY 8 HOURS SCHEDULED
Status: DISCONTINUED | OUTPATIENT
Start: 2022-04-14 | End: 2022-04-15 | Stop reason: HOSPADM

## 2022-04-14 RX ORDER — VITAMIN B COMPLEX
2000 TABLET ORAL 2 TIMES DAILY
Status: DISCONTINUED | OUTPATIENT
Start: 2022-04-14 | End: 2022-04-15 | Stop reason: HOSPADM

## 2022-04-14 RX ORDER — ACETAMINOPHEN 325 MG/1
650 TABLET ORAL EVERY 6 HOURS PRN
Status: DISCONTINUED | OUTPATIENT
Start: 2022-04-14 | End: 2022-04-15 | Stop reason: HOSPADM

## 2022-04-14 RX ORDER — POLYETHYLENE GLYCOL 3350 17 G/17G
17 POWDER, FOR SOLUTION ORAL DAILY PRN
Status: DISCONTINUED | OUTPATIENT
Start: 2022-04-14 | End: 2022-04-15 | Stop reason: HOSPADM

## 2022-04-14 RX ORDER — CARVEDILOL 12.5 MG/1
12.5 TABLET ORAL 2 TIMES DAILY
Status: DISCONTINUED | OUTPATIENT
Start: 2022-04-14 | End: 2022-04-15 | Stop reason: HOSPADM

## 2022-04-14 RX ORDER — DILTIAZEM HYDROCHLORIDE 120 MG/1
120 CAPSULE, COATED, EXTENDED RELEASE ORAL DAILY
Status: DISCONTINUED | OUTPATIENT
Start: 2022-04-15 | End: 2022-04-15 | Stop reason: HOSPADM

## 2022-04-14 RX ORDER — SODIUM CHLORIDE 0.9 % (FLUSH) 0.9 %
5-40 SYRINGE (ML) INJECTION EVERY 12 HOURS SCHEDULED
Status: DISCONTINUED | OUTPATIENT
Start: 2022-04-14 | End: 2022-04-15 | Stop reason: HOSPADM

## 2022-04-14 RX ORDER — SODIUM CHLORIDE 9 MG/ML
25 INJECTION, SOLUTION INTRAVENOUS PRN
Status: DISCONTINUED | OUTPATIENT
Start: 2022-04-14 | End: 2022-04-15 | Stop reason: HOSPADM

## 2022-04-14 RX ORDER — HEPARIN SODIUM 5000 [USP'U]/ML
5000 INJECTION, SOLUTION INTRAVENOUS; SUBCUTANEOUS EVERY 8 HOURS SCHEDULED
Status: DISCONTINUED | OUTPATIENT
Start: 2022-04-14 | End: 2022-04-15 | Stop reason: HOSPADM

## 2022-04-14 RX ORDER — METOCLOPRAMIDE 5 MG/1
5 TABLET ORAL 2 TIMES DAILY
Status: DISCONTINUED | OUTPATIENT
Start: 2022-04-14 | End: 2022-04-15 | Stop reason: HOSPADM

## 2022-04-14 RX ORDER — DULOXETIN HYDROCHLORIDE 30 MG/1
30 CAPSULE, DELAYED RELEASE ORAL DAILY
Status: DISCONTINUED | OUTPATIENT
Start: 2022-04-15 | End: 2022-04-15 | Stop reason: HOSPADM

## 2022-04-14 RX ORDER — ASPIRIN 81 MG/1
81 TABLET, CHEWABLE ORAL DAILY
Status: DISCONTINUED | OUTPATIENT
Start: 2022-04-15 | End: 2022-04-15 | Stop reason: HOSPADM

## 2022-04-14 RX ORDER — SODIUM CHLORIDE 0.9 % (FLUSH) 0.9 %
5-40 SYRINGE (ML) INJECTION PRN
Status: DISCONTINUED | OUTPATIENT
Start: 2022-04-14 | End: 2022-04-15 | Stop reason: HOSPADM

## 2022-04-14 RX ORDER — PANTOPRAZOLE SODIUM 40 MG/1
40 TABLET, DELAYED RELEASE ORAL
Status: DISCONTINUED | OUTPATIENT
Start: 2022-04-15 | End: 2022-04-15 | Stop reason: HOSPADM

## 2022-04-14 RX ORDER — ATORVASTATIN CALCIUM 40 MG/1
80 TABLET, FILM COATED ORAL DAILY
Status: DISCONTINUED | OUTPATIENT
Start: 2022-04-15 | End: 2022-04-15 | Stop reason: HOSPADM

## 2022-04-14 RX ORDER — CALCITRIOL 0.25 UG/1
0.25 CAPSULE, LIQUID FILLED ORAL DAILY
Status: DISCONTINUED | OUTPATIENT
Start: 2022-04-15 | End: 2022-04-15 | Stop reason: HOSPADM

## 2022-04-14 RX ORDER — ONDANSETRON 2 MG/ML
4 INJECTION INTRAMUSCULAR; INTRAVENOUS EVERY 6 HOURS PRN
Status: DISCONTINUED | OUTPATIENT
Start: 2022-04-14 | End: 2022-04-15 | Stop reason: HOSPADM

## 2022-04-14 RX ORDER — LOSARTAN POTASSIUM 50 MG/1
50 TABLET ORAL 2 TIMES DAILY
Status: DISCONTINUED | OUTPATIENT
Start: 2022-04-14 | End: 2022-04-15 | Stop reason: HOSPADM

## 2022-04-14 RX ADMIN — SODIUM CHLORIDE, PRESERVATIVE FREE 10 ML: 5 INJECTION INTRAVENOUS at 23:20

## 2022-04-14 RX ADMIN — Medication 2000 UNITS: at 23:17

## 2022-04-14 RX ADMIN — HEPARIN SODIUM 5000 UNITS: 5000 INJECTION INTRAVENOUS; SUBCUTANEOUS at 23:17

## 2022-04-14 RX ADMIN — METOCLOPRAMIDE 5 MG: 5 TABLET ORAL at 23:17

## 2022-04-15 ENCOUNTER — APPOINTMENT (OUTPATIENT)
Dept: MRI IMAGING | Age: 57
End: 2022-04-15
Payer: MEDICARE

## 2022-04-15 ENCOUNTER — APPOINTMENT (OUTPATIENT)
Dept: CT IMAGING | Age: 57
End: 2022-04-15
Payer: MEDICARE

## 2022-04-15 ENCOUNTER — APPOINTMENT (OUTPATIENT)
Dept: NON INVASIVE DIAGNOSTICS | Age: 57
End: 2022-04-15
Payer: MEDICARE

## 2022-04-15 VITALS
BODY MASS INDEX: 48.73 KG/M2 | TEMPERATURE: 97.2 F | HEART RATE: 94 BPM | HEIGHT: 62 IN | WEIGHT: 264.8 LBS | RESPIRATION RATE: 18 BRPM | DIASTOLIC BLOOD PRESSURE: 70 MMHG | SYSTOLIC BLOOD PRESSURE: 119 MMHG | OXYGEN SATURATION: 96 %

## 2022-04-15 PROBLEM — Z99.2 DIALYSIS PATIENT (HCC): Status: ACTIVE | Noted: 2022-04-15

## 2022-04-15 LAB
ABSOLUTE EOS #: 0.26 K/UL (ref 0–0.44)
ABSOLUTE IMMATURE GRANULOCYTE: <0.03 K/UL (ref 0–0.3)
ABSOLUTE LYMPH #: 1.74 K/UL (ref 1.1–3.7)
ABSOLUTE MONO #: 0.56 K/UL (ref 0.1–1.2)
ANION GAP SERPL CALCULATED.3IONS-SCNC: 17 MMOL/L (ref 9–17)
BASOPHILS # BLD: 1 % (ref 0–2)
BASOPHILS ABSOLUTE: 0.05 K/UL (ref 0–0.2)
BUN BLDV-MCNC: 58 MG/DL (ref 6–20)
BUN/CREAT BLD: 7 (ref 9–20)
CALCIUM SERPL-MCNC: 7.6 MG/DL (ref 8.6–10.4)
CHLORIDE BLD-SCNC: 94 MMOL/L (ref 98–107)
CO2: 25 MMOL/L (ref 20–31)
CREAT SERPL-MCNC: 8.41 MG/DL (ref 0.5–0.9)
EOSINOPHILS RELATIVE PERCENT: 4 % (ref 1–4)
GFR AFRICAN AMERICAN: 6 ML/MIN
GFR NON-AFRICAN AMERICAN: 5 ML/MIN
GFR SERPL CREATININE-BSD FRML MDRD: ABNORMAL ML/MIN/{1.73_M2}
GFR SERPL CREATININE-BSD FRML MDRD: ABNORMAL ML/MIN/{1.73_M2}
GLUCOSE BLD-MCNC: 95 MG/DL (ref 70–99)
HCT VFR BLD CALC: 30.3 % (ref 36.3–47.1)
HEMOGLOBIN: 9.9 G/DL (ref 11.9–15.1)
IMMATURE GRANULOCYTES: 0 %
LV EF: 60 %
LVEF MODALITY: NORMAL
LYMPHOCYTES # BLD: 25 % (ref 24–43)
MCH RBC QN AUTO: 34.9 PG (ref 25.2–33.5)
MCHC RBC AUTO-ENTMCNC: 32.7 G/DL (ref 28.4–34.8)
MCV RBC AUTO: 106.7 FL (ref 82.6–102.9)
MONOCYTES # BLD: 8 % (ref 3–12)
NRBC AUTOMATED: 0 PER 100 WBC
PDW BLD-RTO: 13.2 % (ref 11.8–14.4)
PLATELET # BLD: 260 K/UL (ref 138–453)
PMV BLD AUTO: 9.6 FL (ref 8.1–13.5)
POTASSIUM SERPL-SCNC: 4.1 MMOL/L (ref 3.7–5.3)
RBC # BLD: 2.84 M/UL (ref 3.95–5.11)
SEG NEUTROPHILS: 62 % (ref 36–65)
SEGMENTED NEUTROPHILS ABSOLUTE COUNT: 4.41 K/UL (ref 1.5–8.1)
SODIUM BLD-SCNC: 136 MMOL/L (ref 135–144)
VITAMIN D 25-HYDROXY: 57.4 NG/ML
WBC # BLD: 7 K/UL (ref 3.5–11.3)

## 2022-04-15 PROCEDURE — 94761 N-INVAS EAR/PLS OXIMETRY MLT: CPT

## 2022-04-15 PROCEDURE — 6370000000 HC RX 637 (ALT 250 FOR IP): Performed by: STUDENT IN AN ORGANIZED HEALTH CARE EDUCATION/TRAINING PROGRAM

## 2022-04-15 PROCEDURE — 80048 BASIC METABOLIC PNL TOTAL CA: CPT

## 2022-04-15 PROCEDURE — 97162 PT EVAL MOD COMPLEX 30 MIN: CPT

## 2022-04-15 PROCEDURE — 70544 MR ANGIOGRAPHY HEAD W/O DYE: CPT

## 2022-04-15 PROCEDURE — 93306 TTE W/DOPPLER COMPLETE: CPT

## 2022-04-15 PROCEDURE — 70551 MRI BRAIN STEM W/O DYE: CPT

## 2022-04-15 PROCEDURE — 82746 ASSAY OF FOLIC ACID SERUM: CPT

## 2022-04-15 PROCEDURE — 2580000003 HC RX 258: Performed by: STUDENT IN AN ORGANIZED HEALTH CARE EDUCATION/TRAINING PROGRAM

## 2022-04-15 PROCEDURE — 70450 CT HEAD/BRAIN W/O DYE: CPT

## 2022-04-15 PROCEDURE — G0378 HOSPITAL OBSERVATION PER HR: HCPCS

## 2022-04-15 PROCEDURE — 6360000002 HC RX W HCPCS: Performed by: STUDENT IN AN ORGANIZED HEALTH CARE EDUCATION/TRAINING PROGRAM

## 2022-04-15 PROCEDURE — 85025 COMPLETE CBC W/AUTO DIFF WBC: CPT

## 2022-04-15 PROCEDURE — 82306 VITAMIN D 25 HYDROXY: CPT

## 2022-04-15 PROCEDURE — 97165 OT EVAL LOW COMPLEX 30 MIN: CPT

## 2022-04-15 PROCEDURE — 36415 COLL VENOUS BLD VENIPUNCTURE: CPT

## 2022-04-15 PROCEDURE — 96372 THER/PROPH/DIAG INJ SC/IM: CPT

## 2022-04-15 PROCEDURE — 82607 VITAMIN B-12: CPT

## 2022-04-15 RX ORDER — LOSARTAN POTASSIUM 50 MG/1
TABLET ORAL
Qty: 60 TABLET | Refills: 3 | Status: SHIPPED | OUTPATIENT
Start: 2022-04-15 | End: 2022-07-06 | Stop reason: DRUGHIGH

## 2022-04-15 RX ORDER — CARVEDILOL 3.12 MG/1
3.12 TABLET ORAL 2 TIMES DAILY
Qty: 60 TABLET | Refills: 3 | Status: SHIPPED | OUTPATIENT
Start: 2022-04-15 | End: 2022-08-02

## 2022-04-15 RX ADMIN — ATORVASTATIN CALCIUM 80 MG: 40 TABLET, FILM COATED ORAL at 08:21

## 2022-04-15 RX ADMIN — PANTOPRAZOLE SODIUM 40 MG: 40 TABLET, DELAYED RELEASE ORAL at 07:22

## 2022-04-15 RX ADMIN — ASPIRIN 81 MG: 81 TABLET, CHEWABLE ORAL at 08:22

## 2022-04-15 RX ADMIN — SODIUM CHLORIDE, PRESERVATIVE FREE 10 ML: 5 INJECTION INTRAVENOUS at 08:21

## 2022-04-15 RX ADMIN — Medication 2000 UNITS: at 16:49

## 2022-04-15 RX ADMIN — HEPARIN SODIUM 5000 UNITS: 5000 INJECTION INTRAVENOUS; SUBCUTANEOUS at 13:13

## 2022-04-15 RX ADMIN — HEPARIN SODIUM 5000 UNITS: 5000 INJECTION INTRAVENOUS; SUBCUTANEOUS at 05:52

## 2022-04-15 RX ADMIN — Medication 2000 UNITS: at 08:22

## 2022-04-15 RX ADMIN — METOCLOPRAMIDE 5 MG: 5 TABLET ORAL at 08:22

## 2022-04-15 RX ADMIN — CALCITRIOL 0.25 MCG: 0.25 CAPSULE ORAL at 08:21

## 2022-04-15 RX ADMIN — DULOXETINE HYDROCHLORIDE 30 MG: 30 CAPSULE, DELAYED RELEASE ORAL at 08:22

## 2022-04-15 NOTE — PROGRESS NOTES
Occupational Therapy   Occupational Therapy Initial Assessment  Date: 4/15/2022   Patient Name: Nicki Ortiz  MRN: [id number]     : 1965    Date of Service: 4/15/2022    Discharge Recommendations: Home independently          Assessment   Assessment: Patient is a 63 y/o female admitted due to syncope and collapse. She presented with mild dizziness with initial standing, but then performed LB dressing and functional mobility in room with mod I. Patient is a baseline functioning at this time, no further OT indicated. Prognosis: Good  Decision Making: Low Complexity  OT Education: OT Role  Barriers to Learning: none  No Skilled OT: At baseline function  REQUIRES OT FOLLOW UP: No  Safety Devices  Safety Devices in place: Yes  Type of devices: Left in chair;Call light within reach           Patient Diagnosis(es): The encounter diagnosis was Syncope and collapse.     has a past medical history of JESS (acute kidney injury) ([facility] [address] Anemia, Arthritis, Chronic kidney disease, Depression with anxiety, GERD (gastroesophageal reflux disease), H/O echocardiogram, H/O tooth extraction, Hemodialysis patient ([facility] [facility] History of blood transfusion, Hyperlipidemia, Hypertensive crisis, Kidney stones, Sciatica, and Vasculitis ([facility] [address] has a past surgical history that includes Cholecystectomy; bone marrow biopsy (16); Tubal ligation; Colonoscopy (); Hysterectomy (2016); Tooth Extraction (2017); Upper gastrointestinal endoscopy (2018); Upper gastrointestinal endoscopy (N/A, 2018); Esophagus dilation (2018); Port Surgery (Left, 10/2020); and Colonoscopy (N/A, 10/14/2021).            Restrictions  Restrictions/Precautions  Restrictions/Precautions: General Precautions,Fall Risk    Subjective   General  Chart Reviewed: Yes  Patient assessed for rehabilitation services?: Yes  Family / Caregiver Present: No  Referring Practitioner: Christopher Lyles MD  Diagnosis: syncope and collapse  Subjective  Subjective: Patient reports no pain during OT evaluation. Social/Functional History  Social/Functional History  Lives With: Spouse  Type of Home: House  Home Layout: One level  Home Access: Level entry  Bathroom Shower/Tub: Tub/Shower unit  ADL Assistance: Independent  Homemaking Assistance: Independent  Homemaking Responsibilities: Yes  Ambulation Assistance: Independent  Transfer Assistance: Independent  Active : Yes  Mode of Transportation: Car  Additional Comments: independent with ADLs prior to admission.        Objective   Vision: Impaired  Vision Exceptions: Wears glasses for reading  Hearing: Within functional limits    Orientation  Overall Orientation Status: Within Functional Limits     Balance  Sitting Balance: Independent  Standing Balance: Modified independent   Functional Mobility  Functional - Mobility Device: No device  Activity: Other  Assist Level: Modified independent   Functional Mobility Comments: mod I with functional mobility within room- stood for ~1 minute prior to walking due to report of some mild dizziness  ADL  Feeding: Modified independent   Grooming: Modified independent   UE Bathing: Modified independent   LE Bathing: Modified independent   UE Dressing: Modified independent   LE Dressing: Modified independent   Toileting: Modified independent   Additional Comments: Noted mod I and G ability to complete ADLs/LB dressing at PLOF                                   LUE AROM (degrees)  LUE AROM : WFL  Left Hand AROM (degrees)  Left Hand AROM: WFL  RUE AROM (degrees)  RUE AROM : WFL  Right Hand AROM (degrees)  Right Hand AROM: WFL  LUE Strength  Gross LUE Strength: WFL  L Hand General: 4/5  RUE Strength  Gross RUE Strength: WFL  R Hand General: 4/5       AM-PAC Score        AM-[facility] Inpatient Daily Activity Raw Score: 24 (04/15/22 1532)  AM-PAC Inpatient ADL T-Scale Score : 57.54 (04/15/22 1532)  ADL Inpatient CMS 0-100% Score: 0 (04/15/22 1532)  ADL Inpatient CMS G-Code Modifier : [facility] (04/15/22 1532)      Therapy Time   Individual Concurrent Group Co-treatment   Time In [address]         Time Out 0904         Minutes [address] [facility], OTR/L

## 2022-04-15 NOTE — PROGRESS NOTES
Comprehensive Nutrition Assessment    Type and Reason for Visit:  Initial    Nutrition Recommendations/Plan:   1. Modify current diet to renal diet, low sodium  2. Offer diet instruction (and offer CHO control) when pt is available/receptive    Nutrition Assessment:  Altered nutrition-related lab values related to endocrine dysfuntion as evidenced by lab values blood glucose: 123. Pt presents with dizziness ad loss of consciousness. Pt is a renal pt receiving dialysis 3x/week. She reports a good appetite, generally good labs for dialysis and has been doing for about a year. Pt reports her dry weight being about 245#. CBW is 264#. Weight gain is likely interdialytic fluid weight gain. Pt is due to receive dialysis, however has not yet. Labs reviewed consistent with renal dysfunction. Note blood sugar is acutely elevated, and trends back to within range. Pt states she understands her renal diet, and currently takes calcium and vitamin D3. Serum calcium and vitamin D are low. Creatinine elevated. Pt is in a CT scan for headache, is at moderate nutrition risk, no malnutrition. Malnutrition Assessment:  Malnutrition Status:  No malnutrition    Context:  Acute Illness     Findings of the 6 clinical characteristics of malnutrition:  Energy Intake:  No significant decrease in energy intake  Weight Loss:  No significant weight loss     Body Fat Loss:  No significant body fat loss     Muscle Mass Loss:  No significant muscle mass loss    Fluid Accumulation:  No significant fluid accumulation     Strength:  Not Performed    Estimated Daily Nutrient Needs:  Energy (kcal):  1719-1339 (12-15); Weight Used for Energy Requirements:  Current     Protein (g):  55-65 (1.1-1.3);  Weight Used for Protein Requirements:  Ideal        Fluid (ml/day):  1800; Method Used for Fluid Requirements:  1 ml/kcal      Nutrition Related Findings:  well nourished appearance, obese     Wounds:  None       Current Nutrition Therapies: ADULT DIET;  Regular    Anthropometric Measures:  · Height: 5' 2\" (157.5 cm)  · Current Body Weight: 264 lb 12.4 oz (120.1 kg)   · Admission Body Weight: 245 lb (111.1 kg)    · Usual Body Weight: 255 lb (115.7 kg) (12/23/2021)     · Ideal Body Weight: 110 lbs; % Ideal Body Weight 240.7 %   · BMI: 48.4  · Adjusted Body Weight:  ; No Adjustment   · BMI Categories: Obese Class 3 (BMI 40.0 or greater)       Nutrition Diagnosis:   · Altered nutrition-related lab values related to endocrine dysfuntion as evidenced by lab values (blood glucose: 123.)  Recent Labs     04/14/22  1915 04/15/22  0615   * 136   K 4.7 4.1   CL 93* 94*   CO2 22 25   BUN 51* 58*   CREATININE 7.00* 8.41*   GLUCOSE 123* 95   GFR                            Lab Results   Component Value Date    LABALBU 4.1 07/08/2021      Nutrition Interventions:   Food and/or Nutrient Delivery:  Continue Current Diet  Nutrition Education/Counseling:  No recommendation at this time,Education initiated   Coordination of Nutrition Care:  Continue to monitor while inpatient,No recommendation at this time    Goals:  PO intake > 75% with good tolerance       Nutrition Monitoring and Evaluation:   Behavioral-Environmental Outcomes:  None Identified   Food/Nutrient Intake Outcomes:  Diet Advancement/Tolerance  Physical Signs/Symptoms Outcomes:  Biochemical Data,Weight     Discharge Planning:    Continue current diet     Electronically signed by Gabe Lombardi RD, EUGENIA on 4/15/22 at 10:31 AM EDT    Contact: [phone]

## 2022-04-15 NOTE — PROGRESS NOTES
Patient admitted to [facility] at this time. Bedside report received by Stephenie Riddle RN. Patient is alert and Oriented x 4. Denies any pain or discomfort at this time. Helpful with admission navigator and home medication reconciliation. Writer provided pt with fresh water and snack. Reminded to use call light when needing to use restroom, due to prior syncope episode. Pt expresses understanding. Denies any additional needs, bed alarm engaged for patient safety and call light placed within reach. Will continue to monitor.

## 2022-04-15 NOTE — PLAN OF CARE
Problem: Falls - Risk of:  Goal: Will remain free from falls  Description: Will remain free from falls  Outcome: Ongoing  Note: Call light in reach at all times, frequent checks, bed in lowest position, wheels of bed and chair locked, non skid footwear on, appropriate transfer techniques, personal items within reach, walkways free of obstructions, fall risk armband and sign displayed, Bowman fall risk score per protocol. No falls this shift, will continue to monitor. Goal: Absence of physical injury  Description: Absence of physical injury  Outcome: Ongoing     Problem: Skin Integrity:  Goal: Demonstration of wound healing without infection will improve  Description: Demonstration of wound healing without infection will improve  Outcome: Ongoing  Note: Clayton scale monitoring per protocol. Inspect skin for breakdown frequently. Encourage pt to make frequent large adjustments in position or assist patient with turning. Document all areas of breakdown. Goal: Complications related to intravenous access or infusion will be avoided or minimized  Description: Complications related to intravenous access or infusion will be avoided or minimized  Outcome: Ongoing     Problem: Pain:  Goal: Pain level will decrease  Description: Pain level will decrease  Outcome: Ongoing  Note: Pain assessed every four hours and as needed using 0-10 pain scale. Pt educated on scale and uses scale appropriately. Encourage pt to notify staff of pain before pain becomes uncontrollable. Correlate periods of heavy activity after pain medication administration.  Use pharmacological and non pharmacological methods for pain relief such as: warm blankets, ice, television, reading, or rest.     Goal: Control of acute pain  Description: Control of acute pain  Outcome: Ongoing  Goal: Control of chronic pain  Description: Control of chronic pain  Outcome: Ongoing     Problem: Safety:  Goal: Ability to remain free from injury will improve  Description: Ability to remain free from injury will improve  Outcome: Ongoing  Note: Needs assessed hourly, pt alert and oriented able to express needs or meet needs independently.     Goal: Ability to correctly utilize injury-preventing devices as appropriate will improve  Description: Ability to correctly utilize injury-preventing devices as appropriate will improve  Outcome: Ongoing

## 2022-04-15 NOTE — PROGRESS NOTES
Physical Therapy    Facility/Department: [facility] AT THE [facility] MED SURG  Initial Assessment    NAME: Wali Hazel  : 1965  MRN: [id number]    Date of Service: 4/15/2022    Discharge Recommendations:  Home independently        Assessment   Assessment: Patient is 64year old female with dx of syncope and collapse who is safe and independent with transfers and ambulation with no use of AD and no LOB or fatigue noted. No further PT required at this time. Prognosis: Good  Decision Making: Medium Complexity  No Skilled PT: Independent with functional mobility   REQUIRES PT FOLLOW UP: No  Activity Tolerance  Activity Tolerance: Patient Tolerated treatment well       Patient Diagnosis(es): The encounter diagnosis was Syncope and collapse.     has a past medical history of JESS (acute kidney injury) ([facility] [address] Anemia, Arthritis, Chronic kidney disease, Depression with anxiety, GERD (gastroesophageal reflux disease), H/O echocardiogram, H/O tooth extraction, Hemodialysis patient ([facility] [address] History of blood transfusion, Hyperlipidemia, Hypertensive crisis, Kidney stones, Sciatica, and Vasculitis ([facility] [address] has a past surgical history that includes Cholecystectomy; bone marrow biopsy (16); Tubal ligation; Colonoscopy (); Hysterectomy (2016); Tooth Extraction (2017); Upper gastrointestinal endoscopy (2018); Upper gastrointestinal endoscopy (N/A, 2018); Esophagus dilation (2018); Port Surgery (Left, 10/2020); and Colonoscopy (N/A, 10/14/2021).     Restrictions  Restrictions/Precautions  Restrictions/Precautions: General Precautions,Fall Risk  Vision/Hearing  Vision: Impaired  Vision Exceptions: Wears glasses for reading  Hearing: Within functional limits     Subjective  General  Chart Reviewed: Yes  Patient assessed for rehabilitation services?: Yes  Additional Pertinent Hx: Pt with 3 year hx of vertigo but denies current dizziness  Response To Previous Treatment: Not applicable  Family / Caregiver Present: No  Referring Practitioner: Nacho Tobar MD  Referral Date : 04/14/22  Diagnosis: Syncope and collapse, R55  Follows Commands: Within Functional Limits  Subjective  Subjective: Pt denies pain and agrees to PT eval.  Pain Screening  Patient Currently in Pain: Denies          Orientation  Orientation  Overall Orientation Status: Within Functional Limits  Social/Functional History  Social/Functional History  Lives With: Spouse  Type of Home: House  Home Layout: One level  Home Access: Level entry  Bathroom Shower/Tub: Tub/Shower unit  ADL Assistance: Independent  Homemaking Assistance: Independent  Homemaking Responsibilities: Yes  Ambulation Assistance: Independent  Transfer Assistance: Independent  Active : Yes  Mode of Transportation: Car  Additional Comments: independent with ADLs prior to admission. Cognition        Objective          AROM RLE (degrees)  RLE AROM: WFL  AROM LLE (degrees)  LLE AROM : WFL  Strength RLE  Comment: 4/5 grossly  Strength LLE  Comment: 4/5 grossly        Bed mobility  Comment: Pt up in chair upon arrival  Transfers  Sit to Stand: Independent  Stand to sit: Independent  Comment: No vc's required for safe technique  Ambulation  Ambulation?: Yes  Ambulation 1  Surface: level tile  Device: No Device  Assistance: Independent  Distance: Pt amb 40ft, no AD Ind with no LOB or fatigue     Balance  Sitting - Static: Good  Sitting - Dynamic: Good  Standing - Static: Good  Standing - Dynamic: Fair;+        Plan   Plan  Times per week: 1  Safety Devices  Type of devices: All fall risk precautions in place,Left in chair,Call light within reach,Nurse notified    Goals  Short term goals  Time Frame for Short term goals: 1 day  Short term goal 1: Patient to be safe and independent with transfers and ambulation to safely return home to [facility].  -met       Therapy Time   Individual Concurrent Group Co-treatment   Time In 1305         Time Out 1320         Minutes 15         Timed Code Treatment Minutes: Jeanine Beckett 92, PT, DPT

## 2022-04-15 NOTE — PROGRESS NOTES
[facility]  Inpatient/Observation/Outpatient Rehabilitation    Date: 4/15/2022  Patient Name: Amilcar Pollack       [x] Inpatient Acute/Observation       []  Outpatient  : 1965       [] Pt no showed for scheduled appointment    [] Pt refused/declined therapy at this time due to:           [x] Pt cancelled due to:  [] No Reason Given   [] Sick/ill   [] Other: PT was getting subjective information from pt when, pt stated she was starting to have numbness on the R side of her face and into her R UE to her fingers. PT notified the nurse, nurse requested PT just get the patient to bed and hold rest of eval, nurse notified Crystal Riddle and Dr. Wali Matta will attempt to see this patient, at our earliest opportunity.        Sahil Gillis, PT Date: 4/15/2022

## 2022-04-15 NOTE — FLOWSHEET NOTE
PT in room. Told PT that she hasn't told anyone, but she has been having right sided face and hand numbness with a headache. Renita Miranda NP made aware of the same.

## 2022-04-15 NOTE — ED PROVIDER NOTES
[address] [facility]      Pt Name: Drea Hernández  MRN: [id number]  [place] 1965  Date of evaluation: 4/14/2022  Provider: Glen Sims MD    [address] [place] [address]       Chief Complaint   Patient presents with    Dizziness    Loss of Consciousness         HISTORY OF PRESENT ILLNESS   (Location/Symptom, Timing/Onset, Context/Setting, Quality, Duration, Modifying Factors, Severity)  Note limiting factors. Drea Hernández is a 64 y.o. female who presents to the emergency department      HPI    Nursing Notes were reviewed. REVIEW OF SYSTEMS    (2-9 systems for level 4, 10 or more for level 5)     Review of Systems   All other systems reviewed and are negative. Except as noted above the remainder of the review of systems was reviewed and negative. PAST MEDICAL HISTORY     Past Medical History:   Diagnosis Date    [facility]     Anemia     Arthritis     Chronic kidney disease     Depression with anxiety 9/8/2016    GERD (gastroesophageal reflux disease)     H/O echocardiogram 08/25/2016    EF 55%. Mild mitral regurgitation.  H/O tooth extraction 07/26/2017    Lower posterior right tooth.  Hemodialysis patient ([address]     History of blood transfusion     x3. Last one in May 2016    Hyperlipidemia     Hypertensive crisis 8/24/2016    Kidney stones     Sciatica     Vasculitis ([facility] [address]          SURGICAL HISTORY       Past Surgical History:   Procedure Laterality Date    BONE MARROW BIOPSY  5-23-16    Per Hematologist order @ [facility].     CHOLECYSTECTOMY      COLONOSCOPY  2016    COLONOSCOPY N/A 10/14/2021    COLONOSCOPY BIOPSY/STOMA performed by Elroy Pond DO at [facility]  9/12/2018    ESOPHAGEAL DILATATION performed by Carlos Del Rosario MD at [facility]  11/09/2016    with tubes and ovaries    PORT SURGERY Left 10/2020    port placement for dialysis    TOOTH EXTRACTION  07/26/2017    Right posterior lower tooth.  TUBAL LIGATION      UPPER GASTROINTESTINAL ENDOSCOPY  09/12/2018    -dilation,bx(Chicas's esophagus,neg H-Pylori)hiatal hernia,grade 2 reflux esophagitis    UPPER GASTROINTESTINAL ENDOSCOPY N/A 9/12/2018    EGD BIOPSY performed by Diaz Abraham MD at [address]       Current Discharge Medication List      CONTINUE these medications which have NOT CHANGED    Details   metoclopramide (REGLAN) 5 MG tablet Take 1 tablet by mouth 2 times daily  Qty: 180 tablet, Refills: 0    Associated Diagnoses: Bloating      pantoprazole (PROTONIX) 40 MG tablet Take 1 tablet by mouth twice daily  Qty: 180 tablet, Refills: 1      butalbital-acetaminophen-caffeine (FIORICET, ESGIC) -40 MG per tablet Take 1 tablet by mouth every 6 hours as needed for Headaches or Migraine  Qty: 120 tablet, Refills: 0      aspirin 81 MG chewable tablet Take 81 mg by mouth daily      DULoxetine (CYMBALTA) 30 MG extended release capsule Take 1 capsule by mouth once daily  Qty: 90 capsule, Refills: 1    Associated Diagnoses: Depression with anxiety      calcium acetate (PHOSLO) 667 MG TABS       !!  Incontinence Supply Disposable (PREVAIL FOR WOMEN X-LARGE) MISC 2 each by Does not apply route daily  Qty: 60 each, Refills: 5      atorvastatin (LIPITOR) 80 MG tablet Take 1 tablet by mouth daily  Qty: 90 tablet, Refills: 3    Associated Diagnoses: Mixed hyperlipidemia      fluticasone (FLONASE) 50 MCG/ACT nasal spray 1 spray by Each Nostril route daily for 10 days  Qty: 1 each, Refills: 1      furosemide (LASIX) 20 MG tablet Take 1 tablet by mouth twice daily  Qty: 60 tablet, Refills: 0      estrogens, conjugated, (PREMARIN) 0.625 MG tablet Take 1 tablet by mouth once daily  Qty: 90 tablet, Refills: 3    Associated Diagnoses: Menopausal symptom      B Complex-C-Folic Acid (KENDALL-DALY RX) 1 MG TABS TAKE 1 TABLET BY MOUTH ONCE DAILY      GEBAUERS PAIN EASE AERO SPRAY ON DIALYSIS FISTULA PRIOR TO CANNULATION      hydrALAZINE (APRESOLINE) 50 MG tablet TAKE 1 TABLET BY MOUTH THREE TIMES DAILY  Qty: 270 tablet, Refills: 3    Associated Diagnoses: Essential hypertension      losartan (COZAAR) 50 MG tablet TAKE 1 TABLET BY MOUTH TWICE DAILY  Qty: 60 tablet, Refills: 3      calcitRIOL (ROCALTROL) 0.25 MCG capsule Take 1 capsule by mouth daily  Qty: 30 capsule, Refills: 5      carvedilol (COREG) 12.5 MG tablet Take 1 tablet by mouth 2 times daily  Qty: 60 tablet, Refills: 5    Comments: Please consider 90 day supplies to promote better adherence      meclizine (ANTIVERT) 25 MG tablet Take 1 tablet by mouth 3 times daily as needed for Dizziness  Qty: 30 tablet, Refills: 0      potassium chloride (KLOR-CON M) 20 MEQ extended release tablet Take 1 tablet by mouth daily  Qty: 30 tablet, Refills: 11      dilTIAZem (CARDIZEM CD) 120 MG extended release capsule TAKE 1 CAPSULE BY MOUTH ONCE DAILY  Qty: 30 capsule, Refills: 0      Blood Pressure KIT 1 Units by Does not apply route daily  Qty: 1 kit, Refills: 0    Associated Diagnoses: Essential hypertension      albuterol sulfate HFA (VENTOLIN HFA) 108 (90 Base) MCG/ACT inhaler Inhale 2 puffs into the lungs every 6 hours as needed for Wheezing  Qty: 1 Inhaler, Refills: 3      !!  Incontinence Supply Disposable (PREVAIL ADULT BRIEF LARGE) MISC 3 each by Does not apply route daily  Qty: 300 mL, Refills: 11    Associated Diagnoses: Urinary incontinence, unspecified type      saline nasal gel (AYR) GEL by Nasal route 2 times daily  Qty: 1 Tube, Refills: 3      sodium chloride (ALTAMIST SPRAY) 0.65 % nasal spray 1 spray by Nasal route as needed for Congestion  Qty: 1 Bottle, Refills: 3      vitamin D (CHOLECALCIFEROL) 1000 UNIT TABS tablet Take 2,000 Units by mouth 2 times daily       Docusate Calcium (STOOL SOFTENER PO) Take 100 mg by mouth as needed       diphenhydrAMINE (BENADRYL) 25 MG capsule Take 25 mg by mouth every 6 hours as needed for Itching or Allergies Pt takes 2 capsules at HS \"due to hives from it being cold now\". Pt tho states these are NOT helping her this time. !! - Potential duplicate medications found. Please discuss with provider. ALLERGIES     Patient has no known allergies. FAMILY HISTORY       Family History   Problem Relation Age of Onset    Cancer Mother     Diabetes Mother     Heart Disease Mother     High Blood Pressure Mother     Cancer Father         prostate    High Blood Pressure Father     Cancer Sister         ovarian    Diabetes Brother     Cancer Sister         ovarian    High Blood Pressure Brother     Diabetes Brother           SOCIAL HISTORY       Social History     Socioeconomic History    Marital status:      Spouse name: None    Number of children: None    Years of education: None    Highest education level: None   Occupational History    None   Tobacco Use    Smoking status: Never Smoker    Smokeless tobacco: Never Used   Vaping Use    Vaping Use: Never used   Substance and Sexual Activity    Alcohol use: No    Drug use: No    Sexual activity: Yes     Partners: Male     Comment: hyst   Other Topics Concern    None   Social History Narrative    None     Social Determinants of Health     Financial Resource Strain: Low Risk     Difficulty of Paying Living Expenses: Not hard at all   Food Insecurity: No Food Insecurity    Worried About Running Out of Food in the Last Year: Never true    [place]amar of Food in the Last Year: Never true   Transportation Needs:     Lack of Transportation (Medical): Not on file    Lack of Transportation (Non-Medical):  Not on file   Physical Activity:     Days of Exercise per Week: Not on file    Minutes of Exercise per Session: Not on file   Stress:     Feeling of Stress : Not on file   Social Connections:     Frequency of Communication with Friends and Family: Not on file    Frequency of Social Gatherings with Friends and Family: Not on file    Attends [employer] Services: Not on file    Active Member of Clubs or Organizations: Not on file    Attends Club or Organization Meetings: Not on file    Marital Status: Not on file   Intimate Partner Violence:     Fear of Current or Ex-Partner: Not on file    Emotionally Abused: Not on file    Physically Abused: Not on file    Sexually Abused: Not on file   Housing Stability:     Unable to Pay for Housing in the Last Year: Not on file    Number of [place] in the Last Year: Not on file    Unstable Housing in the Last Year: Not on file       SCREENINGS         Fany Coma Scale  Eye Opening: Spontaneous  Best Verbal Response: Oriented  Best Motor Response: Obeys commands  Fany Coma Scale Score: 15                     CIWA Assessment  BP: 107/66  Pulse: 91                 PHYSICAL EXAM    (up to 7 for level 4, 8 or more for level 5)     ED Triage Vitals [04/14/22 1856]   BP Temp Temp Source Pulse Resp SpO2 Height Weight   (!) 101/30 97.5 °F (36.4 °C) Tympanic 94 20 99 % -- 245 lb (111.1 kg)       Physical Exam  Constitutional:       General: She is not in acute distress. Appearance: She is well-developed. She is not diaphoretic. HENT:      Head: Normocephalic and atraumatic. Eyes:      General:         Right eye: No discharge. Left eye: No discharge. Neck:      Trachea: No tracheal deviation. Cardiovascular:      Rate and Rhythm: Normal rate and regular rhythm. Heart sounds: No murmur heard. No friction rub. Pulmonary:      Effort: Pulmonary effort is normal. No respiratory distress. Breath sounds: No stridor. No wheezing or rales. Chest:      Chest wall: No tenderness. Abdominal:      General: There is no distension. Palpations: Abdomen is soft. There is no mass. Tenderness: There is no abdominal tenderness. There is no guarding or rebound. Musculoskeletal:         General: No tenderness or deformity. Normal range of motion. Cervical back: Normal range of motion. Skin:     General: Skin is warm. Findings: No erythema or rash. Neurological:      Mental Status: She is alert and oriented to person, place, and time. Psychiatric:         Behavior: Behavior normal.         DIAGNOSTIC RESULTS     EKG: All EKG's are interpreted by the Emergency Department Physician who either signs or Co-signs this chart in the absence of a cardiologist.        RADIOLOGY:   Non-plain film images such as CT, Ultrasound and MRI are read by the radiologist. Plain radiographic images are visualized and preliminarily interpreted by the emergency physician with the below findings:        Interpretation per the Radiologist below, if available at the time of this note:    CT Head WO Contrast   Final Result   No acute intracranial abnormality. XR CHEST PORTABLE   Final Result   No evidence of acute cardiopulmonary disease.                ED BEDSIDE ULTRASOUND:   Performed by ED Physician - none    LABS:  Labs Reviewed   BASIC METABOLIC PANEL - Abnormal; Notable for the following components:       Result Value    Glucose 123 (*)     BUN 51 (*)     CREATININE 7.00 (*)     Bun/Cre Ratio 7 (*)     Calcium 8.0 (*)     Sodium 132 (*)     Chloride 93 (*)     GFR Non- 6 (*)     GFR  7 (*)     All other components within normal limits   CBC WITH AUTO DIFFERENTIAL - Abnormal; Notable for the following components:    RBC 3.23 (*)     Hemoglobin 11.4 (*)     Hematocrit 35.6 (*)     .2 (*)     MCH 35.3 (*)     Seg Neutrophils 74 (*)     Lymphocytes 15 (*)     All other components within normal limits   TROPONIN - Abnormal; Notable for the following components:    Troponin, High Sensitivity 35 (*)     All other components within normal limits   URINALYSIS WITH REFLEX TO CULTURE - Abnormal; Notable for the following components:    Protein, UA 1+ (*)     Leukocyte Esterase, Urine TRACE (*)     All other components within normal limits   GLUCOSE, WHOLE BLOOD - Abnormal; Notable for the following components:    POC Glucose 123 (*)     All other components within normal limits   TROPONIN - Abnormal; Notable for the following components:    Troponin, High Sensitivity 33 (*)     All other components within normal limits   MICROSCOPIC URINALYSIS - Abnormal; Notable for the following components:    Bacteria, UA 2+ (*)     All other components within normal limits   IMMATURE PLATELET FRACTION   SPECIMEN REJECTION   BASIC METABOLIC PANEL W/ REFLEX TO MG FOR LOW K   CBC WITH AUTO DIFFERENTIAL       All other labs were within normal range or not returned as of this dictation. EMERGENCY DEPARTMENT COURSE and DIFFERENTIAL DIAGNOSIS/MDM:   Vitals:    Vitals:    04/14/22 1856 04/14/22 1900 04/14/22 1920 04/14/22 2215   BP: (!) 101/30 (!) 101/30 107/66    Pulse: 94 90 91    Resp: 20 19 19    Temp: 97.5 °F (36.4 °C)      TempSrc: Tympanic      SpO2: 99% 98% 98%    Weight: 245 lb (111.1 kg)   260 lb 8 oz (118.2 kg)           MDM  Number of Diagnoses or Management Options  Syncope and collapse  Diagnosis management comments: 31-year-old female presented with concern for syncope. Initial assessment patient was hemodynamically stable patient was a dialysis patient and due to unknown etiology of her syncope she was admitted for observation. Patient did have dialysis scheduled for in the morning at 7:45 AM therefore we had a conversation that she should call dialysis institution in the morning to see if she can reschedule it or worst-case have it scheduled for Saturday. Patient preferred this option as opposed to going home. At the time admitted patient was hemodynamically stable. Amount and/or Complexity of Data Reviewed  Decide to obtain previous medical records or to obtain history from someone other than the patient: yes          [facility]   Total Critical Care time was  minutes, excluding separately reportable procedures.   There was a high probability of clinically significant/life threatening deterioration in the patient's condition which required my urgent intervention. CONSULTS:  IP CONSULT TO CASE MANAGEMENT    PROCEDURES:  Unless otherwise noted below, none     Procedures        FINAL IMPRESSION      1. Syncope and collapse          DISPOSITION/PLAN   DISPOSITION Admitted 04/14/2022 08:56:33 PM      PATIENT REFERRED TO:  No follow-up provider specified. DISCHARGE MEDICATIONS:  Current Discharge Medication List        Controlled Substances Monitoring:     No flowsheet data found.     (Please note that portions of this note were completed with a voice recognition program.  Efforts were made to edit the dictations but occasionally words are mis-transcribed.)    Valentino Dukes, MD (electronically signed)  Attending Emergency Physician            Valentino Dukes, MD  04/14/22 1124

## 2022-04-15 NOTE — DISCHARGE SUMMARY
Discharge Summary    Esau Sierra  :  1965  MRN:  [id number]    Admit date:  2022      Discharge date: 4/15/2022     Admitting Physician:  Angelita Elizalde MD    Discharge Diagnoses:    Principal Problem:    Syncope and collapse  Active Problems:    ANCA-associated vasculitis (HCC)    CKD (chronic kidney disease) stage 4, GFR 15-29 ml/min (HCC)    Essential hypertension    Dialysis patient [facility])  Resolved Problems:    * No resolved hospital problems. *      Hospital Course:   Esau Sierra is a 64 y.o. female admitted with PE and collapse. She presented to the emergency room with complaints of syncope and collapse. Patient complained of dizziness with right-sided face and right arm numbness and tingling prior to collapse. She does have a history of vasculitis affecting her renal vasculature ultimately resulting in renal failure and currently undergoes dialysis on . Patient denied recent illness. She denied history of stroke. Patient stated she has been getting the symptoms recently. She denied headache. She denied visual changes. During patient's admission orthostatics were completed and were positive. She did not have dizziness or syncope at that time. CT of her head was negative for an acute process. During her hospitalization patient developed another episode of right facial numbness and tingling as well as the right arm. Her NIH was 0. I did review the case with neurology at [facility]. [facility] and an MRI and an MRA were completed without contrast.  No ischemic changes or an acute infarct were seen but patient did have a 1 mm outpouching concerning for an infundibulum versus aneurysm. I again reviewed the case with Dr. Cisco Alexandra at [facility]. [facility] for neurology who recommended outpatient follow-up and was provided the number for vascular neurology for evaluation as an outpatient.   Also I did stop her hydralazine and decreased her Coreg to 3.125 mg twice daily and changed her losartan to 25 mg daily. I encouraged her to follow-up with her PCP to monitor her blood pressure for further adjustment of medications. I also called the vascular neurology facility and left information for them to follow-up with the patient but I also gave the information to the patient to call up there on Monday or Tuesday to set up follow-up. I encouraged her to return if symptoms worsened. Patient will be discharged today and will do dialysis tomorrow morning. She is agreeable to this plan of care. Consultants:  none    Procedures: none    Complications: none    Discharge Condition: fair    Exam:  GEN:    Awake, alert and oriented x3. EYES:   EOMI, pupils equal   NECK: Supple. No lymphadenopathy. No carotid bruit  CVS:     regular rate and rhythm, no audible murmur  PULM:  CTA, no wheezes, rales or rhonchi, no acute respiratory distress  ABD:     Bowels sounds normal.  Abdomen is soft. No distention. no tenderness to palpation. EXT:     no edema bilaterally . No calf tenderness. NEURO: Moves all extremities. Motor and sensory are grossly intact. NIH-0  SKIN:    No rashes. No skin lesions.       Significant Diagnostic Studies:   Lab Results   Component Value Date    WBC 7.0 04/15/2022    HGB 9.9 (L) 04/15/2022     04/15/2022       Lab Results   Component Value Date    BUN 58 (H) 04/15/2022    CREATININE 8.41 (HH) 04/15/2022     04/15/2022    K 4.1 04/15/2022    CALCIUM 7.6 (L) 04/15/2022    CL 94 (L) 04/15/2022    CO2 25 04/15/2022    LABGLOM 5 (L) 04/15/2022       Lab Results   Component Value Date    WBCUA 0 TO 2 04/14/2022    RBCUA 0 TO 2 04/14/2022    EPITHUA 2 TO 5 04/14/2022    LEUKOCYTESUR TRACE (A) 04/14/2022    SPECGRAV 1.015 04/14/2022    GLUCOSEU NEGATIVE 04/14/2022    KETUA NEGATIVE 04/14/2022    PROTEINU 1+ (A) 04/14/2022    HGBUR NEGATIVE 04/14/2022    CASTUA NOT REPORTED 01/07/2021    CRYSTUA NOT REPORTED 01/07/2021    BACTERIA 2+ (A) 04/14/2022 YEAST NOT REPORTED 01/07/2021       Echocardiogram complete 2D with doppler with color    Result Date: 4/15/2022  [address] [place] [address] Transthoracic Echocardiography Report (TTE)  Patient Name VINICIO      Date of Study               04/15/2022               ROSALIE ARANGO   Date of      1965  Gender                      Female  Birth   Age          64 year(s)  Race                        Other   Room Number  9367        Height:                     62 inch, 157.48 cm   Corporate ID [id number]    Weight:                     264 pounds, 119.7 kg  #   Patient Acct [de-identified]   BSA:          2.15 m^2      BMI:      48.29  #                                                              kg/m^2   MR #         [id number]      Sonographer                 Work,[employer]vy   Accession #  [id number]  Interpreting Physician      Roslyn Lopez   Fellow                   Referring Nurse                           Practitioner   Interpreting             Referring Physician         Cooper Youngblood MD  Fellow  Type of Study   TTE procedure:2D Echocardiogram, M-Mode, Doppler, Color Doppler. Procedure Date Date: 04/15/2022 Start: 09:41 AM Study Location: Located within [facility] Indications:Syncope. History / Tech. Comments: Dx: syncope Hx: CKD, HTN, hyperlipidemia Patient Status: Inpatient Height: 62 inches Weight: 264 pounds BSA: 2.15 m^2 BMI: 48.29 kg/m^2 BP: 130/68 mmHg CONCLUSIONS Summary Global left ventricular systolic function appears preserved with an estimated ejection fraction of 60%. The left ventricular cavity size is within normal limits and the left ventricular wall thickness is mildly increased. No definite specific wall motion abnormalities were identified. No significant valvular abnormalities. No clear evidence of diastolic dysfunction was identified. No significant structural cause of syncope was identified from this study.  Signature ----------------------------------------------------------------------------  Electronically signed by Ivy Greenfield(Sonographer) on 04/15/2022 12:26 PM ---------------------------------------------------------------------------- ----------------------------------------------------------------------------  Electronically signed by Roslyn Lopez(Interpreting physician) on 04/15/2022  01:03 PM ---------------------------------------------------------------------------- FINDINGS Left Atrium Left atrium is normal in size. Left Ventricle Global left ventricular systolic function appears preserved with an estimated ejection fraction of 60%. The left ventricular cavity size is within normal limits and the left ventricular wall thickness is mildly increased. No definite specific wall motion abnormalities were identified. Right Atrium Right atrium is normal in size. Right Ventricle Normal right ventricular size and function. Mitral Valve Normal mitral valve structure and function. Aortic Valve Normal aortic valve structure and function without stenosis or regurgitation. Tricuspid Valve Normal tricuspid valve structure with trivial tricuspid regurgitation. Pulmonic Valve The pulmonic valve is normal in structure. Pericardial Effusion No significant pericardial effusion is seen. Miscellaneous No clear evidence of diastolic dysfunction was identified. Normal aortic root dimension.  M-mode / 2D Measurements & Calculations:   LVIDd:4.85 cm(3.7 - 5.6 cm)      Diastolic VTTZXE:04.5325 ml  LVIDs:3.42 cm(2.2 - 4.0 cm)      Systolic PFHJOK:29.18 ml  IVSd:1.17 cm(0.6 - 1.1 cm)       Aortic Root:2.98 cm(2.0 - 3.7 cm)  LVPWd:1.07 cm(0.6 - 1.1 cm)      LA Dimension: 3.44 cm(1.9 - 4.0 cm)  Fractional Shortenin.48 %    LA volume/Index: 41 ml /19m^2  Calculated LVEF (%): 59.78 %     AV Cusp Separation: 1.96 cm   Mitral:                                 Aortic   Valve Area (P1/2-Time): 4.48 cm^2       Peak Velocity: 1.75 m/s  Peak E-Wave: 0.97 m/s                   Mean Velocity: 1.33 m/s  Peak A-Wave: 0.92 m/s                   Peak Gradient: 12.22 mmHg  E/A Ratio: 1.05                         Mean Gradient: 7.62 mmHg  Peak Gradient: 3.73 mmHg                                          Acceleration Time: 66.3 msec  P1/2t: 49.14 msec                                          AV VTI: 35.95 cm   Tricuspid:   Peak TR Velocity: 2.26 m/s  Peak TR Gradient: 20.39857 mmHg  Diastology / Tissue Doppler Lateral Wall E' velocity:0.17 m/s Lateral Wall E/E':6.63    CT HEAD WO CONTRAST    Result Date: 4/15/2022  EXAMINATION: CT OF THE HEAD WITHOUT CONTRAST  4/15/2022 11:03 am TECHNIQUE: CT of the head was performed without the administration of intravenous contrast. Dose modulation, iterative reconstruction, and/or weight based adjustment of the mA/kV was utilized to reduce the radiation dose to as low as reasonably achievable. COMPARISON: 04/14/2022 7:45 p.m. HISTORY: ORDERING SYSTEM PROVIDED HISTORY: n/t right face and arm TECHNOLOGIST PROVIDED HISTORY: n/t right face and arm FINDINGS: BRAIN/VENTRICLES: There is no acute intracranial hemorrhage, mass effect or midline shift. No abnormal extra-axial fluid collection. The gray-white differentiation is maintained without evidence of an acute infarct. There is no evidence of hydrocephalus. ORBITS: The visualized portion of the orbits demonstrate no acute abnormality. SINUSES: The visualized paranasal sinuses and mastoid air cells demonstrate no acute abnormality. SOFT TISSUES/SKULL:  No acute abnormality of the visualized skull or soft tissues. No acute intracranial abnormality. There is no change from prior examination. CT Head WO Contrast    Result Date: 4/14/2022  EXAMINATION: CT OF THE HEAD WITHOUT CONTRAST  4/14/2022 4:44 pm TECHNIQUE: CT of the head was performed without the administration of intravenous contrast. Dose modulation, iterative reconstruction, and/or weight based adjustment of the mA/kV was utilized to reduce the radiation dose to as low as reasonably achievable.  COMPARISON: CT scan dated September 18, 2019, prior MRI of the brain dated October 16, 2020 HISTORY: ORDERING SYSTEM PROVIDED HISTORY: trauma TECHNOLOGIST PROVIDED HISTORY: trauma Decision Support Exception - unselect if not a suspected or confirmed emergency medical condition->Emergency Medical Condition (MA) FINDINGS: BRAIN/VENTRICLES: There is no acute intracranial hemorrhage, mass effect or midline shift. No abnormal extra-axial fluid collection. The gray-white differentiation is maintained without evidence of an acute infarct. There is no evidence of hydrocephalus. ORBITS: The visualized portion of the orbits demonstrate no acute abnormality. SINUSES: The visualized paranasal sinuses and mastoid air cells demonstrate no acute abnormality. SOFT TISSUES/SKULL:  No acute abnormality of the visualized skull or soft tissues. No acute intracranial abnormality. MRA HEAD WO CONTRAST    Result Date: 4/15/2022  EXAMINATION: MRI OF THE BRAIN WITHOUT CONTRAST; MRA OF THE HEAD WITHOUT CONTRAST 4/15/2022 2:59 pm: TECHNIQUE: Multiplanar multisequence MRI of the brain was performed without the administration of intravenous contrast.; MRA of the head was performed utilizing time-of-flight imaging with MIP images. No intravenous contrast was administered. COMPARISON: None. HISTORY: ORDERING SYSTEM PROVIDED HISTORY: syncope and collapse TECHNOLOGIST PROVIDED HISTORY: syncope and collapse What is the sedation requirement?->None; ORDERING SYSTEM PROVIDED HISTORY: syncope and collapse TECHNOLOGIST PROVIDED HISTORY: syncope and collapse Initial evaluation. FINDINGS: MRI BRAIN: INTRACRANIAL STRUCTURES/VENTRICLES: There is no acute infarct. No mass effect or midline shift. No evidence of an acute intracranial hemorrhage. Areas of T2 FLAIR hyperintensity are seen in the periventricular and subcortical white matter, which are nonspecific, but may represent chronic microvascular ischemic change. There is minimal global parenchymal volume loss. Otherwise, the ventricles and sulci are normal in size and configuration. An empty sella is noted. The normal signal voids within the major intracranial vessels appear maintained. ORBITS: The visualized portion of the orbits demonstrate no acute abnormality. SINUSES: There is scattered mucosal thickening of the left ethmoid sinus. Trace right mastoid effusion. BONES/SOFT TISSUES: The bone marrow signal intensity appears normal. The soft tissues demonstrate no acute abnormality. MRA HEAD: ANTERIOR CIRCULATION: No significant stenosis of the intracranial internal carotid, anterior cerebral, or middle cerebral arteries. POSTERIOR CIRCULATION: No significant stenosis of the vertebral, basilar, or posterior cerebral arteries. There is question of a 1 mm outpouching arising from the right A2 segment (time-of-flight series 5, image 20). 1. No acute intracranial abnormality. No acute infarct. 2. Minimal global parenchymal volume loss with mild chronic microvascular ischemic changes. 3. Trace right mastoid effusion. 4. No significant stenosis of the kbnjeg-wd-Gmztvy. 5. A 1 mm outpouching is seen arising from the right A2 segment. This may represent an infundibulum versus a tiny aneurysm. XR CHEST PORTABLE    Result Date: 4/14/2022  EXAMINATION: ONE XRAY VIEW OF THE CHEST 4/14/2022 4:02 pm COMPARISON: January 27, 2021 HISTORY: ORDERING SYSTEM PROVIDED HISTORY: syncope TECHNOLOGIST PROVIDED HISTORY: syncope FINDINGS: The lungs are without acute focal process. There is no effusion or pneumothorax. The cardiomediastinal silhouette is without acute process. The osseous structures are without acute process. No evidence of acute cardiopulmonary disease.      MRI BRAIN WO CONTRAST    Result Date: 4/15/2022  EXAMINATION: MRI OF THE BRAIN WITHOUT CONTRAST; MRA OF THE HEAD WITHOUT CONTRAST 4/15/2022 2:59 pm: TECHNIQUE: Multiplanar multisequence MRI of the brain was performed without the administration of intravenous contrast.; MRA of the head was performed utilizing time-of-flight imaging with MIP images. No intravenous contrast was administered. COMPARISON: None. HISTORY: ORDERING SYSTEM PROVIDED HISTORY: syncope and collapse TECHNOLOGIST PROVIDED HISTORY: syncope and collapse What is the sedation requirement?->None; ORDERING SYSTEM PROVIDED HISTORY: syncope and collapse TECHNOLOGIST PROVIDED HISTORY: syncope and collapse Initial evaluation. FINDINGS: MRI BRAIN: INTRACRANIAL STRUCTURES/VENTRICLES: There is no acute infarct. No mass effect or midline shift. No evidence of an acute intracranial hemorrhage. Areas of T2 FLAIR hyperintensity are seen in the periventricular and subcortical white matter, which are nonspecific, but may represent chronic microvascular ischemic change. There is minimal global parenchymal volume loss. Otherwise, the ventricles and sulci are normal in size and configuration. An empty sella is noted. The normal signal voids within the major intracranial vessels appear maintained. ORBITS: The visualized portion of the orbits demonstrate no acute abnormality. SINUSES: There is scattered mucosal thickening of the left ethmoid sinus. Trace right mastoid effusion. BONES/SOFT TISSUES: The bone marrow signal intensity appears normal. The soft tissues demonstrate no acute abnormality. MRA HEAD: ANTERIOR CIRCULATION: No significant stenosis of the intracranial internal carotid, anterior cerebral, or middle cerebral arteries. POSTERIOR CIRCULATION: No significant stenosis of the vertebral, basilar, or posterior cerebral arteries. There is question of a 1 mm outpouching arising from the right A2 segment (time-of-flight series 5, image 20). 1. No acute intracranial abnormality. No acute infarct. 2. Minimal global parenchymal volume loss with mild chronic microvascular ischemic changes. 3. Trace right mastoid effusion. 4. No significant stenosis of the ocwjcf-oc-Saizbx.  5. A 1 mm outpouching is seen arising from the right A2 segment. This may represent an infundibulum versus a tiny aneurysm.        Assessment and Plan:  Patient Active Problem List    Diagnosis Date Noted    Dialysis patient [facility]) 04/15/2022    Syncope and collapse 04/14/2022    Acute renal failure ([address] 12/23/2021    MARY (obstructive sleep apnea) 01/27/2021    Uremia 01/26/2021    Non-surgical abdominal pain 01/07/2021    Lumbar pain 01/07/2021    Anemia in stage 5 chronic kidney disease, not on chronic dialysis ([address] 12/02/2020    JESS (acute kidney injury) ([address] 08/24/2020    Episode of recurrent major depressive disorder ([address] 03/10/2020    Epistaxis, recurrent 11/01/2018    History of arteritis 10/26/2018    History of rheumatoid arthritis 10/26/2018    Noncompliance 10/26/2018    SOB (shortness of breath) 10/26/2018    Gastroesophageal reflux disease 09/11/2018    Esophageal dysphagia 09/11/2018    History of colon polyps 09/11/2018    Hyperparathyroidism ([address] 06/27/2018    Elevated BUN 05/15/2018    Chicas's esophagus without dysplasia 05/15/2018    Cancer of uterus ([address] 04/19/2018    Elevated C-reactive protein (CRP) 04/19/2018    Elevated white blood cell count, unspecified 04/19/2018    Essential thrombocythemia ([address] 04/19/2018    Microscopic polyangiitis ([address] 04/19/2018    Other intervertebral disc degeneration, thoracic region 04/19/2018    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 ([address] 04/19/2018    Urticaria due to cold 04/19/2018    Vitamin D deficiency 45/88/2754    Anti-cyclic citrullinated peptide antibody positive 09/27/2017    Bilateral hip pain 09/27/2017    Bilateral knee pain 09/27/2017    Greater trochanteric bursitis of both hips 09/27/2017    Osteoarthritis of right acromioclavicular joint 09/27/2017    Osteoarthritis of sacroiliac joint ([address] 09/27/2017    Patient nonadherence 09/27/2017    Pes anserinus bursitis of both knees 09/27/2017    Rheumatoid factor positive 09/27/2017    Scl-70 antibody positive 09/27/2017    MGUS (monoclonal gammopathy of unknown significance) 09/22/2017    Urinary frequency 05/23/2017    Mixed hyperlipidemia 05/10/2017    Iron deficiency anemia 04/07/2017    Allergic rhinitis 02/10/2017    Morbid obesity ([facility] 02/07/2017    Postural vertigo 02/06/2017    S/P DOMO-BSO     Endometrial hyperplasia without atypia, simple     [facility] positive rheumatoid factor [facility] 10/26/2016    Essential hypertension 09/28/2016    Depression with anxiety 09/08/2016    CKD (chronic kidney disease) stage 4, GFR 15-29 ml/min ([facility]) 08/25/2016    Retinal edema of both eyes     Rectocele 08/15/2016    ANCA-associated vasculitis ([facility] 06/23/2016    End stage renal disease ([facility]     Arteritis ([facility] 05/25/2016        Discharge Medications:         Medication List      CHANGE how you take these medications    carvedilol 3.125 MG tablet  Commonly known as: Coreg  Take 1 tablet by mouth 2 times daily  What changed:   · medication strength  · how much to take     losartan 50 MG tablet  Commonly known as: COZAAR  TAKE 1/2 (25mg) TABLET BY MOUTH TWICE DAILY  What changed: additional instructions        CONTINUE taking these medications    albuterol sulfate  (90 Base) MCG/ACT inhaler  Commonly known as: Ventolin HFA  Inhale 2 puffs into the lungs every 6 hours as needed for Wheezing     aspirin 81 MG chewable tablet     atorvastatin 80 MG tablet  Commonly known as: LIPITOR  Take 1 tablet by mouth daily     Blood Pressure Kit  1 Units by Does not apply route daily     butalbital-acetaminophen-caffeine -40 MG per tablet  Commonly known as: FIORICET, ESGIC  Take 1 tablet by mouth every 6 hours as needed for Headaches or Migraine     calcitRIOL 0.25 MCG capsule  Commonly known as: Rocaltrol  Take 1 capsule by mouth daily     calcium acetate 667 MG Tabs  Commonly known as: PHOSLO     dilTIAZem 120 MG extended release capsule  Commonly known as: CARDIZEM CD  TAKE 1 CAPSULE BY MOUTH ONCE DAILY     diphenhydrAMINE 25 MG capsule  Commonly known as: BENADRYL     DULoxetine 30 MG extended release capsule  Commonly known as: CYMBALTA  Take 1 capsule by mouth once daily     estrogens (conjugated) 0.625 MG tablet  Commonly known as: Premarin  Take 1 tablet by mouth once daily     fluticasone 50 MCG/ACT nasal spray  Commonly known as: FLONASE  1 spray by Each Nostril route daily for 10 days     furosemide 20 MG tablet  Commonly known as: LASIX  Take 1 tablet by mouth twice daily     Gebauers Pain Ease Aero  Generic drug: pentafluoroprop-tetrafluoroeth     meclizine 25 MG tablet  Commonly known as: ANTIVERT  Take 1 tablet by mouth 3 times daily as needed for Dizziness     metoclopramide 5 MG tablet  Commonly known as: Reglan  Take 1 tablet by mouth 2 times daily     pantoprazole 40 MG tablet  Commonly known as: PROTONIX  Take 1 tablet by mouth twice daily     potassium chloride 20 MEQ extended release tablet  Commonly known as: KLOR-CON M  Take 1 tablet by mouth daily     * Prevail Adult Brief Large Misc  3 each by Does not apply route daily     * Prevail for Women X-Large Misc  2 each by Does not apply route daily     Kiersten-Gerhard Rx 1 MG Tabs     * saline nasal gel Gel  by Nasal route 2 times daily     * sodium chloride 0.65 % nasal spray  Commonly known as: Altamist Spray  1 spray by Nasal route as needed for Congestion     STOOL SOFTENER PO     vitamin D 1000 UNIT Tabs tablet  Commonly known as: CHOLECALCIFEROL         * This list has 4 medication(s) that are the same as other medications prescribed for you. Read the directions carefully, and ask your doctor or other care provider to review them with you.             STOP taking these medications    hydrALAZINE 50 MG tablet  Commonly known as: APRESOLINE           Where to Get Your Medications      These medications were sent to [address] [place] [address] [address] [facility]  [address] [place] [address]    Phone: [phone]   · carvedilol 3.125 MG tablet  · losartan 50 MG tablet         Patient Instructions:    Activity: activity as tolerated  Diet: renal diet  Wound Care: none needed  Other: None    Disposition:   Discharge to Home    Follow up:  Patient will be followed by ARISTIDES Mcgarry CNP in 1-2 weeks    CORE MEASURES on Discharge (if applicable)  ACE/ARB in CHF: NA  Statin in MI: NA  ASA in MI: NA  Statin in CVA: NA  Antiplatelet in CVA: NA    Total time spent on discharge services: 40 minutes    Including the following activities:  Evaluation and Management of patient  Discussion with patient and/or surrogate about current care plan  Coordination with Case Management and/or   Coordination of care with Consultants (if applicable)   Coordination of care with Receiving Facility Physician (if applicable)  Completion of DME forms (if applicable)  Preparation of Discharge Summary  Preparation of Medication Reconciliation  Preparation of Discharge Prescriptions    Signed:  ARISTIDES Kraus CNP, ARISTIDES, NP-C  4/15/2022, 4:25 PM

## 2022-04-15 NOTE — DISCHARGE INSTR - DIET

## 2022-04-15 NOTE — H&P
History and Physical    Patient:  Esau Sierra  MRN: [id number]    Chief Complaint: Syncope and collapse    History Obtained From:  patient, electronic medical record    PCP: ARISTIDES Bauman CNP    History of Present Illness: The patient is a 64 y.o. female who presented to the emergency room with complaints of syncope and collapse. Patient complained of dizziness with right-sided face and right arm numbness and tingling prior to collapse. 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 Patient denied recent illness. She denied history of stroke. Patient stated she has been getting the symptoms recently. She denied headache. She denied visual changes. Past Medical History:        Diagnosis Date    [facility]) ([facility]     Anemia     Arthritis     Chronic kidney disease     Depression with anxiety 9/8/2016    Dialysis patient ([facility] 4/15/2022    GERD (gastroesophageal reflux disease)     H/O echocardiogram 08/25/2016    EF 55%. Mild mitral regurgitation.  H/O tooth extraction 07/26/2017    Lower posterior right tooth.  Hemodialysis patient ([facility]     History of blood transfusion     x3. Last one in May 2016    Hyperlipidemia     Hypertensive crisis 8/24/2016    Kidney stones     Sciatica     Vasculitis [facility])        Past Surgical History:        Procedure Laterality Date    BONE MARROW BIOPSY  5-23-16    Per Hematologist order @ [address] [place] [address].     CHOLECYSTECTOMY      COLONOSCOPY  2016    COLONOSCOPY N/A 10/14/2021    COLONOSCOPY BIOPSY/STOMA performed by Roxy Wagner DO at [address]  9/12/2018    ESOPHAGEAL DILATATION performed by David Mcqueen MD at [address]  11/09/2016    with tubes and ovaries    PORT SURGERY Left 10/2020    port placement for dialysis    TOOTH EXTRACTION  07/26/2017    Right posterior lower tooth.    TUBAL LIGATION      UPPER GASTROINTESTINAL ENDOSCOPY  09/12/2018    -dilation,bx(Chicas's esophagus,neg H-Pylori)hiatal hernia,grade 2 reflux esophagitis    UPPER GASTROINTESTINAL ENDOSCOPY N/A 9/12/2018    EGD BIOPSY performed by Sony Santiago MD at [address]       Medications Prior to Admission:    Prior to Admission medications    Medication Sig Start Date End Date Taking?  Authorizing Provider   metoclopramide (REGLAN) 5 MG tablet Take 1 tablet by mouth 2 times daily 4/11/22   Doree Cassette Might, APRN - CNP   pantoprazole (PROTONIX) 40 MG tablet Take 1 tablet by mouth twice daily 3/21/22   Doree Cassette Might, APRN - CNP   butalbital-acetaminophen-caffeine (FIORICET, ESGIC) -40 MG per tablet Take 1 tablet by mouth every 6 hours as needed for Headaches or Migraine 1/17/22   Doree Cassette Might, APRN - CNP   aspirin 81 MG chewable tablet Take 81 mg by mouth daily    Historical Provider, MD   DULoxetine (CYMBALTA) 30 MG extended release capsule Take 1 capsule by mouth once daily 12/7/21   Doree Cassette Might, APRN - CNP   calcium acetate (PHOSLO) 667 MG TABS  11/5/21   Historical Provider, MD   Incontinence Supply Disposable (PREVAIL FOR WOMEN X-LARGE) MISC 2 each by Does not apply route daily 11/18/21   Doree Cassette Might, APRN - CNP   atorvastatin (LIPITOR) 80 MG tablet Take 1 tablet by mouth daily 11/9/21   Doree Cassette Might, APRN - CNP   fluticasone (FLONASE) 50 MCG/ACT nasal spray 1 spray by Each Nostril route daily for 10 days  Patient not taking: Reported on 11/18/2021 9/16/21 9/26/21  ARISTIDES Wallis CNP   furosemide (LASIX) 20 MG tablet Take 1 tablet by mouth twice daily 7/12/21   Roberta Carrillo MD   estrogens, conjugated, (PREMARIN) 0.625 MG tablet Take 1 tablet by mouth once daily 6/8/21   ARISTIDES Hodges CNM   B Complex-C-Folic Acid (KENDALL-DALY RX) 1 MG TABS TAKE 1 TABLET BY MOUTH ONCE DAILY  Patient not taking: Reported on 12/23/2021 5/8/21   Historical Provider, MD Anurag Vera AERO SPRAY ON DIALYSIS FISTULA PRIOR TO CANNULATION 5/7/21   Historical Provider, MD   hydrALAZINE (APRESOLINE) 50 MG tablet TAKE 1 TABLET BY MOUTH THREE TIMES DAILY 3/8/21   ARISTIDES Bauman CNP   losartan (COZAAR) 50 MG tablet TAKE 1 TABLET BY MOUTH TWICE DAILY 11/30/20   Rola Champagne MD   calcitRIOL (ROCALTROL) 0.25 MCG capsule Take 1 capsule by mouth daily 11/30/20   Qasim Ochoa MD   carvedilol (COREG) 12.5 MG tablet Take 1 tablet by mouth 2 times daily 10/26/20   Rola Champagne MD   meclizine (ANTIVERT) 25 MG tablet Take 1 tablet by mouth 3 times daily as needed for Dizziness  Patient not taking: Reported on 12/23/2021 7/22/20   ARISTIDES Bauman CNP   potassium chloride (KLOR-CON M) 20 MEQ extended release tablet Take 1 tablet by mouth daily 4/2/20   [place] [address], MD   dilTIAZem (CARDIZEM CD) 120 MG extended release capsule TAKE 1 CAPSULE BY MOUTH ONCE DAILY 2/21/20   Rola Champagne MD   Blood Pressure KIT 1 Units by Does not apply route daily 11/7/19   ARISTIDES Rios CNP   albuterol sulfate HFA (VENTOLIN HFA) 108 (90 Base) MCG/ACT inhaler Inhale 2 puffs into the lungs every 6 hours as needed for Wheezing  Patient not taking: Reported on 12/23/2021 4/16/19   ARISTIDES Bauman CNP   Incontinence Supply Disposable (PREVAIL ADULT BRIEF LARGE) [facility] 3 each by Does not apply route daily 11/20/18   ARISTIDES Bauman CNP   saline nasal gel (AYR) GEL by Nasal route 2 times daily  Patient not taking: Reported on 11/18/2021 11/6/18   Selina Fagan MD   sodium chloride (ALTAMIST SPRAY) 0.65 % nasal spray 1 spray by Nasal route as needed for Congestion  Patient not taking: Reported on 11/18/2021 11/6/18   Selina Fagan MD   vitamin D (CHOLECALCIFEROL) 1000 UNIT TABS tablet Take 2,000 Units by mouth 2 times daily     Historical Provider, MD   Docusate Calcium (STOOL SOFTENER PO) Take 100 mg by mouth as needed   Patient not taking: Reported on 12/23/2021    Historical Provider, MD diphenhydrAMINE (BENADRYL) 25 MG capsule Take 25 mg by mouth every 6 hours as needed for Itching or Allergies Pt takes 2 capsules at HS \"due to hives from it being cold now\". Pt tho states these are NOT helping her this time. Patient not taking: Reported on 11/18/2021    Historical Provider, MD       Allergies:  Patient has no known allergies. Social History:   TOBACCO:   reports that she has never smoked. She has never used smokeless tobacco.  ETOH:   reports no history of alcohol use. Family History:       Problem Relation Age of Onset    Cancer Mother     Diabetes Mother     Heart Disease Mother     High Blood Pressure Mother     Cancer Father         prostate    High Blood Pressure Father     Cancer Sister         ovarian    Diabetes Brother     Cancer Sister         ovarian    High Blood Pressure Brother     Diabetes Brother        Allergies:  Patient has no known allergies. Medications Prior to Admission:    Prior to Admission medications    Medication Sig Start Date End Date Taking?  Authorizing Provider   metoclopramide (REGLAN) 5 MG tablet Take 1 tablet by mouth 2 times daily 4/11/22   ARISTIDES Lopez CNP   pantoprazole (PROTONIX) 40 MG tablet Take 1 tablet by mouth twice daily 3/21/22   ARISTIDES Lopez CNP   butalbital-acetaminophen-caffeine (FIORICET, ESGIC) -40 MG per tablet Take 1 tablet by mouth every 6 hours as needed for Headaches or Migraine 1/17/22   ARISTIDES Lopez CNP   aspirin 81 MG chewable tablet Take 81 mg by mouth daily    Historical Provider, MD   DULoxetine (CYMBALTA) 30 MG extended release capsule Take 1 capsule by mouth once daily 12/7/21   ARISTIDES Lopez CNP   calcium acetate (PHOSLO) 667 MG TABS  11/5/21   Historical Provider, MD   Incontinence Supply Disposable (PREVAIL FOR WOMEN X-LARGE) MISC 2 each by Does not apply route daily 11/18/21   ARISTIDES Lopez CNP   atorvastatin (LIPITOR) 80 MG tablet Take 1 tablet by mouth daily 11/9/21   ARISTIDES Otero CNP   fluticasone (FLONASE) 50 MCG/ACT nasal spray 1 spray by Each Nostril route daily for 10 days  Patient not taking: Reported on 11/18/2021 9/16/21 9/26/21  ARISTIDES Mckeon CNP   furosemide (LASIX) 20 MG tablet Take 1 tablet by mouth twice daily 7/12/21   Lizet Royal MD   estrogens, conjugated, (PREMARIN) 0.625 MG tablet Take 1 tablet by mouth once daily 6/8/21   ARISTIDES Burr CNSOREN   B Complex-C-Folic Acid (KENDALL-DALY RX) 1 MG TABS TAKE 1 TABLET BY MOUTH ONCE DAILY  Patient not taking: Reported on 12/23/2021 5/8/21   Historical Provider, MD JOEL PAIN EASE AERO SPRAY ON DIALYSIS FISTULA PRIOR TO CANNULATION 5/7/21   Historical Provider, MD   hydrALAZINE (APRESOLINE) 50 MG tablet TAKE 1 TABLET BY MOUTH THREE TIMES DAILY 3/8/21   ARISTIDES Otero CNP   losartan (COZAAR) 50 MG tablet TAKE 1 TABLET BY MOUTH TWICE DAILY 11/30/20   Lizet Royal MD   calcitRIOL (ROCALTROL) 0.25 MCG capsule Take 1 capsule by mouth daily 11/30/20   Lizet Royal MD   carvedilol (COREG) 12.5 MG tablet Take 1 tablet by mouth 2 times daily 10/26/20   Lizet Royal MD   meclizine (ANTIVERT) 25 MG tablet Take 1 tablet by mouth 3 times daily as needed for Dizziness  Patient not taking: Reported on 12/23/2021 7/22/20   ARISTIDES Otero CNP   potassium chloride (KLOR-CON M) 20 MEQ extended release tablet Take 1 tablet by mouth daily 4/2/20   Henna Chatterjee MD   dilTIAZem (CARDIZEM CD) 120 MG extended release capsule TAKE 1 CAPSULE BY MOUTH ONCE DAILY 2/21/20   Lizet Royal MD   Blood Pressure KIT 1 Units by Does not apply route daily 11/7/19   ARISTIDES Rios CNP   albuterol sulfate HFA (VENTOLIN HFA) 108 (90 Base) MCG/ACT inhaler Inhale 2 puffs into the lungs every 6 hours as needed for Wheezing  Patient not taking: Reported on 12/23/2021 4/16/19   ARISTIDES Otero - CNP   Incontinence Supply Disposable (PREVAIL ADULT BRIEF LARGE) MISC 3 each by Does not apply route daily 11/20/18   Justinamundemetrio Vargas, APRN - CNP   saline nasal gel (AYR) GEL by Nasal route 2 times daily  Patient not taking: Reported on 11/18/2021 11/6/18   Daina Mcnair MD   sodium chloride (ALTAMIST SPRAY) 0.65 % nasal spray 1 spray by Nasal route as needed for Congestion  Patient not taking: Reported on 11/18/2021 11/6/18   Daina Mcnair MD   vitamin D (CHOLECALCIFEROL) 1000 UNIT TABS tablet Take 2,000 Units by mouth 2 times daily     Historical Provider, MD   Docusate Calcium (STOOL SOFTENER PO) Take 100 mg by mouth as needed   Patient not taking: Reported on 12/23/2021    Historical Provider, MD   diphenhydrAMINE (BENADRYL) 25 MG capsule Take 25 mg by mouth every 6 hours as needed for Itching or Allergies Pt takes 2 capsules at HS \"due to hives from it being cold now\". Pt tho states these are NOT helping her this time. Patient not taking: Reported on 11/18/2021    Historical Provider, MD       Review of Systems:  Constitutional:negative  for fevers, and negative for chills. Eyes: negative for visual disturbance   ENT: negative for sore throat, negative nasal congestion, and negative for earache  Respiratory: negative for shortness of breath, negative for cough, and negative for wheezing  Cardiovascular: negative for chest pain, negative for palpitations, and negative for syncope  Gastrointestinal: negative for abdominal pain, negative for nausea,negative for vomiting, negative for diarrhea, negative for constipation, and negative for hematochezia or melena  Genitourinary: negative for dysuria, negative for urinary urgency, negative for urinary frequency, and negative for hematuria  Skin: negative for skin rash, and negative for skin lesions  Neurological: positive for unilateral weakness, numbness or tingling.   Positive numbness tingling to right face and right arm    Physical Exam:    Vitals:   Temp: 97.2 °F (36.2 °C)  BP: 119/70  Resp: 18  Pulse: 94  SpO2: 96 %  24HR INTAKE/OUTPUT:      Intake/Output Summary (Last 24 hours) at 4/15/2022 1540  Last data filed at 4/15/2022 1309  Gross per 24 hour   Intake 600 ml   Output 300 ml   Net 300 ml       Weight    Body mass index is 48.43 kg/m². Exam:  GEN:    Awake, alert and oriented x3. EYES:  EOMI, pupils equal   NECK: Supple. No lymphadenopathy. No carotid bruit  CVS:    regular rate and rhythm, no audible murmur  PULM:  CTA, no wheezes, rales or rhonchi, no acute respiratory distress  ABD:    Bowels sounds normal.  Abdomen is soft. No distention. no tenderness to palpation. EXT:   no edema bilaterally . No calf tenderness. NEURO: Moves all extremities. Motor and sensory are grossly intact. NIH-0  SKIN:  No rashes. No skin lesions. [facility] Data of Health Stroke Scale (NIHSS)      Administer stroke scale items in the order listed. Record performance in each category after each subscale exam. Do not go back and change scores. Follow directions provided for each exam technique. Scores should reflect what the patient does, not what the clinician thinks the patient can do. The clinician should record answers while administering the exam and work quickly. Except where indicated, the patient should not be coached (ie, repeated requests to patient to make a special effort). Instructions Scale definition Score   1a. Level of consciousness: The investigator must choose a response if a full evaluation is prevented by such obstacles as an endotracheal tube, language barrier, orotracheal trauma/bandages. A 3 is scored only if the patient makes no movement (other than reflexive posturing) in response to noxious stimulation. 0 = Alert; keenly responsive. 1 = Not alert; but arousable by minor stimulation to obey, answer, or respond. 2 = Not alert; requires repeated stimulation to attend, or is obtunded and requires strong or painful stimulation to make movements (not stereotyped).   3 = Responds only with reflex motor or autonomic effects or totally unresponsive, flaccid, and areflexic. ___0__   1b. Level of consciousness questions: The patient is asked the month and his/her age. The answer must be correct - there is no partial credit for being close. Aphasic and stuporous patients who do not comprehend the questions will score 2. Patients unable to speak because of endotracheal intubation, orotracheal trauma, severe dysarthria from any cause, language barrier, or any other problem not secondary to aphasia are given a 1. It is important that only the initial answer be graded and that the examiner not \"help\" the patient with verbal or non-verbal cues. 0 = Answers both questions correctly. 1 = Answers one question correctly. 2 = Answers neither question correctly. __0___   1c. Level of consciousness commands: The patient is asked to open and close the eyes and then to  and release the non-paretic hand. Substitute another one step command if the hands cannot be used. Credit is given if an unequivocal attempt is made but not completed due to weakness. If the patient does not respond to command, the task should be demonstrated to him or her (pantomime), and the result scored (ie, follows none, one or two commands). Patients with trauma, amputation, or other physical impediments should be given suitable one-step commands. Only the first attempt is scored. 0 = Performs both tasks correctly. 1 = Performs one task correctly. 2 = Performs neither task correctly. __0___   2. Best gaze: Only horizontal eye movements will be tested. Voluntary or reflexive (oculocephalic) eye movements will be scored, but caloric testing is not done. If the patient has a conjugate deviation of the eyes that can be overcome by voluntary or reflexive activity, the score will be 1. If a patient has an isolated peripheral nerve paresis (cranial nerves III, IV or VI), score a 1. Gaze is testable in all aphasic patients.  Patients with ocular trauma, bandages, pre-existing blindness, or other disorder of visual acuity or fields should be tested with reflexive movements, and a choice made by the investigator. Establishing eye contact and then moving about the patient from side to side will occasionally clarify the presence of a partial gaze palsy. 0 = Normal.  1 = Partial gaze palsy; gaze is abnormal in one or both eyes, but forced deviation or total gaze paresis is not present. 2 = Forced deviation, or total gaze paresis not overcome by the oculocephalic maneuver. ___9__   3. Visual: Visual fields (upper and lower quadrants) are tested by confrontation, using finger counting or visual threat, as appropriate. Patients may be encouraged, but if they look at the side of the moving fingers appropriately, this can be scored as normal. If there is unilateral blindness or enucleation, visual fields in the remaining eye are scored. Score 1 only if a clear-cut asymmetry, including quadrantanopia, is found. If patient is blind from any cause, score 3. Double simultaneous stimulation is performed at this point. If there is extinction, patient receives a 1, and the results are used to respond to item 11. 0 = No visual loss. 1 = Partial hemianopia. 2 = Complete hemianopia. 3 = Bilateral hemianopia (blind including cortical blindness). __0___   4. Facial palsy: Ask - or use pantomime to encourage - the patient to show teeth or raise eyebrows and close eyes. Score symmetry of grimace in response to noxious stimuli in the poorly responsive or non-comprehending patient. If facial trauma/bandages, orotracheal tube, tape or other physical barriers obscure the face, these should be removed to the extent possible. 0 = Normal symmetrical movements. 1 = Minor paralysis (flattened nasolabial fold, asymmetry on smiling). 2 = Partial paralysis (total or near-total paralysis of lower face). 3 = Complete paralysis of one or both sides (absence of facial movement in the upper and lower face). __0___   5. Motor arm:  The limb is placed in the appropriate position: extend the arms (palms down) 90 degrees (if sitting) or 45 degrees (if supine). Drift is scored if the arm falls before 10 seconds. The aphasic patient is encouraged using urgency in the voice and pantomime, but not noxious stimulation. Each limb is tested in turn, beginning with the non-paretic arm. Only in the case of amputation or joint fusion at the shoulder, the examiner should record the score as untestable (UN), and clearly write the explanation for this choice. 0 = No drift; limb holds 90 (or 45) degrees for full 10 seconds. 1 = Drift; limb holds 90 (or 45) degrees, but drifts down before full 10 seconds; does not hit bed or other support. 2 = Some effort against gravity; limb cannot get to or maintain (if cued) 90 (or 45) degrees, drifts down to bed, but has some effort against gravity. 3 = No effort against gravity; limb falls. 4 = No movement. UN = Amputation or joint fusion, explain:________________  5a. Left arm  5b. Right arm __0___   6. Motor leg: The limb is placed in the appropriate position: hold the leg at 30 degrees (always tested supine). Drift is scored if the leg falls before 5 seconds. The aphasic patient is encouraged using urgency in the voice and pantomime, but not noxious stimulation. Each limb is tested in turn, beginning with the non-paretic leg. Only in the case of amputation or joint fusion at the hip, the examiner should record the score as untestable (UN), and clearly write the explanation for this choice. 0 = No drift; leg holds 30-degree position for full 5 seconds. 1 = Drift; leg falls by the end of the 5-second period but does not hit bed. 2 = Some effort against gravity; leg falls to bed by 5 seconds, but has some effort against gravity. 3 = No effort against gravity; leg falls to bed immediately. 4 = No movement. UN = Amputation or joint fusion, explain:________________  6a. Left leg  6b. Right leg __0___   7.  Limb ataxia: This item is aimed at finding evidence of a unilateral cerebellar lesion. Test with eyes open. In case of visual defect, ensure testing is done in intact visual field. The finger-nose-finger and heel-shin tests are performed on both sides, and ataxia is scored only if present out of proportion to weakness. Ataxia is absent in the patient who cannot understand or is paralyzed. Only in the case of amputation or joint fusion, the examiner should record the score as untestable (Meagan Quintanilla), and clearly write the explanation for this choice. In case of blindness, test by having the patient touch nose from extended arm position. 0 = Absent. 1 = Present in one limb. 2 = Present in two limbs. UN = Amputation or joint fusion, explain:________________ __0___   8. Sensory: Sensation or grimace to pinprick when tested, or withdrawal from noxious stimulus in the obtunded or aphasic patient. Only sensory loss attributed to stroke is scored as abnormal and the examiner should test as many body areas (arms [not hands], legs, trunk, face) as needed to accurately check for hemisensory loss. A score of 2, \"severe or total sensory loss,\" should only be given when a severe or total loss of sensation can be clearly demonstrated. Stuporous and aphasic patients will, therefore, probably score 1 or 0. The patient with brainstem stroke who has bilateral loss of sensation is scored 2. If the patient does not respond and is quadriplegic, score 2. Patients in a coma (item 1a=3) are automatically given a 2 on this item. 0 = Normal; no sensory loss. 1 = Mild-to-moderate sensory loss; patient feels pinprick is less sharp or is dull on the affected side; or there is a loss of superficial pain with pinprick, but patient is aware of being touched. 2 = Severe to total sensory loss; patient is not aware of being touched in the face, arm, and leg. ___0__   9.  Best language: A great deal of information about comprehension will be obtained during the preceding sections of the examination. For this scale item, the patient is asked to describe what is happening in the attached picture, to name the items on the attached naming sheet and to read from the attached list of sentences. Comprehension is judged from responses here, as well as to all of the commands in the preceding general neurological exam. If visual loss interferes with the tests, ask the patient to identify objects placed in the hand, repeat, and produce speech. The intubated patient should be asked to write. The patient in a coma (item 1a=3) will automatically score 3 on this item. The examiner must choose a score for the patient with stupor or limited cooperation, but a score of 3 should be used only if the patient is mute and follows no one-step commands. 0 = No aphasia; normal.  1 = Mild-to-moderate aphasia; some obvious loss of fluency or facility of comprehension, without significant limitation on ideas expressed or form of expression. Reduction of speech and/or comprehension, however, makes conversation about provided materials difficult or impossible. For example, in conversation about provided materials, examiner can identify picture or naming card content from patient's response. 2 = Severe aphasia; all communication is through fragmentary expression; great need for inference, questioning, and guessing by the listener. Range of information that can be exchanged is limited; listener carries burden of communication. Examiner cannot identify materials provided from patient response. 3 = Mute, global aphasia; no usable speech or auditory comprehension. _0____   10. Dysarthria: If patient is thought to be normal, an adequate sample of speech must be obtained by asking patient to read or repeat words from the attached list. If the patient has severe aphasia, the clarity of articulation of spontaneous speech can be rated.  Only if the patient is intubated or has other physical barriers to producing speech, the examiner should record the score as untestable (Maxine Mix), and clearly write an explanation for this choice. Do not tell the patient why he or she is being tested. 0 = Normal.  1 = Mild-to-moderate dysarthria; patient slurs at least some words and, at worst, can be understood with some difficulty. 2 = Severe dysarthria; patient's speech is so slurred as to be unintelligible in the absence of or out of proportion to any dysphasia, or is mute/anarthric.  UN = Intubated or other physical barrier, explain:________________ __0___   11. Extinction and inattention (formerly neglect): Sufficient information to identify neglect may be obtained during the prior testing. If the patient has a severe visual loss preventing visual double simultaneous stimulation, and the cutaneous stimuli are normal, the score is normal. If the patient has aphasia but does appear to attend to both sides, the score is normal. The presence of visual spatial neglect or anosognosia may also be taken as evidence of abnormality. Since the abnormality is scored only if present, the item is never untestable. 0 = No abnormality. 1 = Visual, tactile, auditory, spatial, or personal inattention or extinction to bilateral simultaneous stimulation in one of the sensory modalities. 2 = Profound lenny-inattention or extinction to more than one modality; does not recognize own hand or orients to only one side of space.  __0___      TOTAL __0___         ARISTIDES Titus CNP , APRN-CNP  4/15/2022  3:46 PM        -----------------------------------------------------------------  Diagnostic Data:     DATA:    CBC:   Lab Results   Component Value Date    WBC 7.0 04/15/2022    RBC 2.84 (L) 04/15/2022    HGB 9.9 (L) 04/15/2022    HCT 30.3 (L) 04/15/2022    .7 (H) 04/15/2022     04/15/2022        CMP:   Lab Results   Component Value Date    GLUCOSE 95 04/15/2022    BUN 58 (H) 04/15/2022    CREATININE 8.41 (HH) 04/15/2022     04/15/2022 K 4.1 04/15/2022    CALCIUM 7.6 (L) 04/15/2022    CL 94 (L) 04/15/2022    CO2 25 04/15/2022    PROT 7.5 07/08/2021    LABALBU 4.1 07/08/2021    BILITOT 0.5 07/08/2021    ALKPHOS 107 (H) 07/08/2021    ALT 10 07/08/2021    AST 16 07/08/2021       UA:   Lab Results   Component Value Date    COLORU Yellow 04/14/2022    SPECGRAV 1.015 04/14/2022    WBCUA 0 TO 2 04/14/2022    RBCUA 0 TO 2 04/14/2022    EPITHUA 2 TO 5 04/14/2022    LEUKOCYTESUR TRACE (A) 04/14/2022    GLUCOSEU NEGATIVE 04/14/2022    BLOODU NEGATIVE 07/08/2021    KETUA NEGATIVE 04/14/2022    PROTEINU 1+ (A) 04/14/2022    HGBUR NEGATIVE 04/14/2022    CASTUA NOT REPORTED 01/07/2021    CRYSTUA NOT REPORTED 01/07/2021    BACTERIA 2+ (A) 04/14/2022    YEAST NOT REPORTED 01/07/2021       Lactic Acid:   Lab Results   Component Value Date    LACTA 0.7 01/07/2021       D-Dimer:  Lab Results   Component Value Date    DDIMER 0.39 08/24/2016       PT/INR:  Lab Results   Component Value Date    PROTIME 9.9 01/27/2021    INR 0.9 01/27/2021       High Sensitivity Troponin:  Recent Labs     04/14/22 1915 04/14/22 2050   TROPHS 35* 33*       ABGs:   Lab Results   Component Value Date    PH 5.0 07/08/2021           MRA HEAD WO CONTRAST   Final Result   1. No acute intracranial abnormality. No acute infarct. 2. Minimal global parenchymal volume loss with mild chronic microvascular   ischemic changes. 3. Trace right mastoid effusion. 4. No significant stenosis of the qubxcs-pa-Vwognt. 5. A 1 mm outpouching is seen arising from the right A2 segment. This may   represent an infundibulum versus a tiny aneurysm. MRI BRAIN WO CONTRAST   Final Result   1. No acute intracranial abnormality. No acute infarct. 2. Minimal global parenchymal volume loss with mild chronic microvascular   ischemic changes. 3. Trace right mastoid effusion. 4. No significant stenosis of the akofwj-nz-Scwdtg. 5. A 1 mm outpouching is seen arising from the right A2 segment. This may   represent an infundibulum versus a tiny aneurysm. CT HEAD WO CONTRAST   Final Result   No acute intracranial abnormality. There is no change from prior examination. CT Head WO Contrast   Final Result   No acute intracranial abnormality. XR CHEST PORTABLE   Final Result   No evidence of acute cardiopulmonary disease. EKG reviewed    Assessment:    Principal Problem:    Syncope and collapse  Active Problems:    ANCA-associated vasculitis (HCC)    CKD (chronic kidney disease) stage 4, GFR 15-29 ml/min (HCC)    Essential hypertension    Dialysis patient [facility])  Resolved Problems:    * No resolved hospital problems.  *      Patient Active Problem List    Diagnosis Date Noted    Dialysis patient [facility]) 04/15/2022    Syncope and collapse 04/14/2022    Acute renal failure ([address] 12/23/2021    MARY (obstructive sleep apnea) 01/27/2021    Uremia 01/26/2021    Non-surgical abdominal pain 01/07/2021    Lumbar pain 01/07/2021    Anemia in stage 5 chronic kidney disease, not on chronic dialysis ([address] 12/02/2020    JESS (acute kidney injury) ([address] 08/24/2020    Episode of recurrent major depressive disorder ([address] 03/10/2020    Epistaxis, recurrent 11/01/2018    History of arteritis 10/26/2018    History of rheumatoid arthritis 10/26/2018    Noncompliance 10/26/2018    SOB (shortness of breath) 10/26/2018    Gastroesophageal reflux disease 09/11/2018    Esophageal dysphagia 09/11/2018    History of colon polyps 09/11/2018    Hyperparathyroidism ([address] 06/27/2018    Elevated BUN 05/15/2018    Chicas's esophagus without dysplasia 05/15/2018    Cancer of uterus ([address] 04/19/2018    Elevated C-reactive protein (CRP) 04/19/2018    Elevated white blood cell count, unspecified 04/19/2018    Essential thrombocythemia ([address] 04/19/2018    Microscopic polyangiitis ([address] 04/19/2018    Other intervertebral disc degeneration, thoracic region 04/19/2018    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 ([facility] 04/19/2018    Urticaria due to cold 04/19/2018    Vitamin D deficiency 51/46/3066    Anti-cyclic citrullinated peptide antibody positive 09/27/2017    Bilateral hip pain 09/27/2017    Bilateral knee pain 09/27/2017    Greater trochanteric bursitis of both hips 09/27/2017    Osteoarthritis of right acromioclavicular joint 09/27/2017    Osteoarthritis of sacroiliac joint ([address] 09/27/2017    Patient nonadherence 09/27/2017    Pes anserinus bursitis of both knees 09/27/2017    Rheumatoid factor positive 09/27/2017    Scl-70 antibody positive 09/27/2017    MGUS (monoclonal gammopathy of unknown significance) 09/22/2017    Urinary frequency 05/23/2017    Mixed hyperlipidemia 05/10/2017    Iron deficiency anemia 04/07/2017    Allergic rhinitis 02/10/2017    Morbid obesity ([address] 02/07/2017    Postural vertigo 02/06/2017    S/P DOMO-BSO     Endometrial hyperplasia without atypia, simple     Rheumatoid arthritis with positive rheumatoid factor ([facility] 10/26/2016    Essential hypertension 09/28/2016    Depression with anxiety 09/08/2016    CKD (chronic kidney disease) stage 4, GFR 15-29 ml/min ([facility]) 08/25/2016    Retinal edema of both eyes     Rectocele 08/15/2016    ANCA-associated vasculitis ([address] 06/23/2016    End stage renal disease ([address]     Arteritis ([address] 05/25/2016       Plan:     · This patient requires overnight observation because of syncope and collapse  · Factors affecting the medical complexity of this patient include hypertension, vasculitis history, dialysis patient  · Estimated length of stay is 2 days  · Syncope and collapse  · Fall risk  · CT brain negative  · Echocardiogram-EF 60% remainder normal.  · MRI/MRA of brain without contrast-No acute intracranial abnormality.  No acute infarct. 2. Minimal global parenchymal volume loss with mild chronic microvascular   ischemic changes.    3. Trace right mastoid effusion. 4. No significant stenosis of the mgthrd-oe-Pthftn. 5. A 1 mm outpouching is seen arising from the right A2 segment.  This may   represent an infundibulum versus a tiny aneurysm. · Hypertension  · Hold Coreg, Cardizem CD, losartan, Apresoline  · Dialysis patient  · Patient of Dr. Lexii Barker  · Treatments Monday Wednesday Friday  · DVT prophylaxis: Heparin  · Peptic ulcer prophylaxis: Pepcid  · High risk medications: none  · Social Service and Case Management consults for DC planning  · Dietician consult initiated    CORE MEASURES  DVT prophylaxis: Heparin  Decubitus ulcer present on admission: No  CODE STATUS: FULL CODE  Nutrition Status: good   Physical therapy: No   Old Charts reviewed: Yes  EKG Reviewed:  Yes  Advance Directive Addressed: Yes    ARISTIDES Whitley - CNP, APRN, NP-C  4/15/2022, 3:40 PM

## 2022-04-15 NOTE — PROGRESS NOTES
Patient reassessed at this time. Denies any additional needs for toileting, discomfort, or pain management. Patient is alert and oriented x4, respirations are unlabored and regular. Sinus Rhythm on telemetry. Water refreshed, bed alarm engaged for patient safety, call light within reach. Will continue to monitor.

## 2022-04-16 LAB
FOLATE: >20 NG/ML
VITAMIN B-12: 623 PG/ML (ref 232–1245)

## 2022-04-18 ENCOUNTER — TELEPHONE (OUTPATIENT)
Dept: PRIMARY CARE CLINIC | Age: 57
End: 2022-04-18

## 2022-04-18 NOTE — TELEPHONE ENCOUNTER
[facility] Trans[facility] Initial Follow Up Call    Outreach made within 2 business days of discharge: Yes    Patient: Wanda Campbell Patient : 1965   MRN: [id number]  Reason for Admission: There are no discharge diagnoses documented for the most recent discharge. Discharge Date: 4/15/22       Spoke with: MARCIA 10:21 am      Discharge department/facility: [facility] ER    TCM Interactive Patient Contact:  Was patient able to fill all prescriptions: Yes  Was patient instructed to bring all medications to the follow-up visit: Yes  Is patient taking all medications as directed in the discharge summary?  Yes  Does patient understand their discharge instructions: Yes  Does patient have questions or concerns that need addressed prior to 7-14 day follow up office visit: no    Scheduled appointment with PCP within 7-14 days    Follow Up  Future Appointments   Date Time Provider Tom Oneil   2022  9:00 AM Rubbie Closs, MD Neuro End[facility]   2022 11:20 AM Omar Vargas, APRN - CNP [facility]       Katelynn Cedeño

## 2022-04-25 RX ORDER — BUTALBITAL, ACETAMINOPHEN AND CAFFEINE 50; 325; 40 MG/1; MG/1; MG/1
1 TABLET ORAL EVERY 6 HOURS PRN
Qty: 120 TABLET | Refills: 0 | Status: SHIPPED | OUTPATIENT
Start: 2022-04-25 | End: 2022-08-01 | Stop reason: SDUPTHER

## 2022-04-25 NOTE — TELEPHONE ENCOUNTER
Health Maintenance   Topic Date Due    Hepatitis B vaccine (4 of 4 - Risk Engerix-B 4-dose series) 10/21/2021    Depression Monitoring  11/02/2021    Flu vaccine (Season Ended) 11/18/2022 (Originally 9/1/2022)    COVID-19 Vaccine (3 - Booster for Pfizer series) 11/18/2022 (Originally 8/31/2021)    Lipids  07/08/2022    Potassium  04/15/2023    Creatinine  04/15/2023    Breast cancer screen  01/20/2024    Pneumococcal 0-64 years Vaccine (3 - PPSV23 or PCV20) 05/14/2026    Colorectal Cancer Screen  10/14/2026    DTaP/Tdap/Td vaccine (2 - Td or Tdap) 09/06/2028    Shingles Vaccine  Completed    Hepatitis C screen  Completed    HIV screen  Completed    Hepatitis A vaccine  Aged Out    Hib vaccine  Aged Out    Meningococcal (ACWY) vaccine  Aged Out             (applicable per patient's age: Cancer Screenings, Depression Screening, Fall Risk Screening, Immunizations)    Hemoglobin A1C (%)   Date Value   07/08/2021 5.4   11/03/2020 5.6   09/06/2019 5.8     Microalb/Crt.  Ratio (mcg/mg creat)   Date Value   11/18/2019 3,897 (H)     LDL Cholesterol (mg/dL)   Date Value   07/08/2021 65     AST (IU/L)   Date Value   07/08/2021 16     ALT (IU/L)   Date Value   07/08/2021 10     BUN (mg/dL)   Date Value   04/15/2022 58 (H)      (goal A1C is < 7)   (goal LDL is <100) need 30-50% reduction from baseline     BP Readings from Last 3 Encounters:   04/15/22 119/70   12/23/21 (!) 108/56   11/18/21 110/68    (goal /80)      All Future Testing planned in [facility]:  Lab Frequency Next Occurrence   Transfuse RBC TRANSFUSE 1 UNIT        Next Visit Date:  Future Appointments   Date Time Provider Tom Oneil   4/28/2022 10:40 AM ARISTIDES Leyva CNP[facility]   5/6/2022  9:00 AM Amando Jade MD Neuro Endo [facility]   5/18/2022 11:20 AM Ann Marie Hum Might, APRN - CNP [facility]            Patient Active Problem List:     JESS (acute kidney injury) ([facility]     Arteritis ([facility]     End stage renal disease ([facility] [address]     ANCA-associated vasculitis (HCC)     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)     S/P DOMO-BSO     Endometrial hyperplasia without atypia, simple     Postural vertigo     Morbid obesity (HCC)     Allergic rhinitis     Iron deficiency anemia     Mixed hyperlipidemia     Urinary frequency     MGUS (monoclonal gammopathy of unknown significance)     Anti-cyclic citrullinated peptide antibody positive     Bilateral hip pain     Bilateral knee pain     Cancer of uterus (HCC)     Elevated C-reactive protein (CRP)     Elevated white blood cell count, unspecified     Essential thrombocythemia (HCC)     Greater trochanteric bursitis of both hips     Microscopic polyangiitis (HCC)     Osteoarthritis of right acromioclavicular joint     Osteoarthritis of sacroiliac joint ([facility] [address]     Other intervertebral disc degeneration, thoracic region     Patient nonadherence     Pes anserinus bursitis of both knees     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     Episode of recurrent major depressive disorder (HCC)     Anemia in stage 5 chronic kidney disease, not on chronic dialysis (HCC)     Non-surgical abdominal pain     Lumbar pain     Uremia     MARY (obstructive sleep apnea)     Acute renal failure ([address]     Syncope and collapse     Dialysis patient ([facility] [address]

## 2022-04-28 ENCOUNTER — OFFICE VISIT (OUTPATIENT)
Dept: PRIMARY CARE CLINIC | Age: 57
End: 2022-04-28
Payer: MEDICARE

## 2022-04-28 VITALS
BODY MASS INDEX: 46.61 KG/M2 | HEIGHT: 62 IN | DIASTOLIC BLOOD PRESSURE: 70 MMHG | HEART RATE: 84 BPM | OXYGEN SATURATION: 98 % | TEMPERATURE: 97.7 F | SYSTOLIC BLOOD PRESSURE: 122 MMHG | WEIGHT: 253.3 LBS | RESPIRATION RATE: 22 BRPM

## 2022-04-28 DIAGNOSIS — R55 SYNCOPE AND COLLAPSE: Primary | ICD-10-CM

## 2022-04-28 DIAGNOSIS — Z09 HOSPITAL DISCHARGE FOLLOW-UP: ICD-10-CM

## 2022-04-28 PROCEDURE — 1111F DSCHRG MED/CURRENT MED MERGE: CPT | Performed by: NURSE PRACTITIONER

## 2022-04-28 PROCEDURE — 99214 OFFICE O/P EST MOD 30 MIN: CPT | Performed by: NURSE PRACTITIONER

## 2022-04-28 ASSESSMENT — PATIENT HEALTH QUESTIONNAIRE - PHQ9
SUM OF ALL RESPONSES TO PHQ QUESTIONS 1-9: 0
4. FEELING TIRED OR HAVING LITTLE ENERGY: 0
8. MOVING OR SPEAKING SO SLOWLY THAT OTHER PEOPLE COULD HAVE NOTICED. OR THE OPPOSITE, BEING SO FIGETY OR RESTLESS THAT YOU HAVE BEEN MOVING AROUND A LOT MORE THAN USUAL: 0
5. POOR APPETITE OR OVEREATING: 0
2. FEELING DOWN, DEPRESSED OR HOPELESS: 0
SUM OF ALL RESPONSES TO PHQ QUESTIONS 1-9: 0
SUM OF ALL RESPONSES TO PHQ QUESTIONS 1-9: 0
10. IF YOU CHECKED OFF ANY PROBLEMS, HOW DIFFICULT HAVE THESE PROBLEMS MADE IT FOR YOU TO DO YOUR WORK, TAKE CARE OF THINGS AT HOME, OR GET ALONG WITH OTHER PEOPLE: 0
3. TROUBLE FALLING OR STAYING ASLEEP: 0
7. TROUBLE CONCENTRATING ON THINGS, SUCH AS READING THE NEWSPAPER OR WATCHING TELEVISION: 0
9. THOUGHTS THAT YOU WOULD BE BETTER OFF DEAD, OR OF HURTING YOURSELF: 0
SUM OF ALL RESPONSES TO PHQ QUESTIONS 1-9: 0
6. FEELING BAD ABOUT YOURSELF - OR THAT YOU ARE A FAILURE OR HAVE LET YOURSELF OR YOUR FAMILY DOWN: 0

## 2022-04-28 NOTE — PATIENT INSTRUCTIONS
SURVEY:     You may be receiving a survey from [employer] regarding your visit today. Please complete the survey to enable us to provide the highest quality of care to you and your family. If you cannot score us a very good on any question, please call the office to discuss how we could have made your experience a very good one. Thank you.   [place]caryn Vargas, APRN-VANDANA Espinosa, CNP  Rosario Connell, LPN  Tracey Salinas, LPN  Castillo Velasquez, CMA  Bacilio Merritt, QUINCY Bland, CMA  Filomena, PCA

## 2022-04-28 NOTE — PROGRESS NOTES
Post-Discharge Transitional Care  Follow Up      Aryan Dubois   YOB: 1965    Date of Office Visit:  4/28/2022  Date of Hospital Admission: 4/14/22  Date of Hospital Discharge: 4/15/22  Risk of hospital readmission (high >=14%. Medium >=10%) :No data recorded    Care management risk score Rising risk (score 2-5) and Complex Care (Scores >=6): 5     Non face to face  following discharge, date last encounter closed (first attempt may have been earlier): 4/18/2022  2:44 PM    Call initiated 2 business days of discharge: Yes    ASSESSMENT/PLAN:   Syncope and collapse  Hospital discharge follow-up  -     HI DISCHARGE MEDS RECONCILED W/ CURRENT OUTPATIENT MED LIST      Medical Decision Making: moderate complexity  Return if symptoms worsen or fail to improve. On this date 4/28/2022 I have spent 52 minutes reviewing previous notes, test results and face to face with the patient discussing the diagnosis and importance of compliance with the treatment plan as well as documenting on the day of the visit. Subjective:   HPI:  Follow up of Hospital problems/diagnosis(es):     Hospital Course:   Aryan Dubois is a 64 y.o. female admitted with PE and collapse. She presented to the emergency room with complaints of syncope and collapse.  Patient complained of dizziness with right-sided face and right arm numbness and tingling prior to collapse.  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  Patient denied recent illness.  She denied history of stroke.  Patient stated she has been getting the symptoms recently. Don Morning denied headache.  She denied visual changes. During patient's admission orthostatics were completed and were positive. She did not have dizziness or syncope at that time. CT of her head was negative for an acute process.   During her hospitalization patient developed another episode of right facial numbness and tingling as well as the right arm. Her NIH was 0. I did review the case with neurology at [facility]. [facility] and an MRI and an MRA were completed without contrast.  No ischemic changes or an acute infarct were seen but patient did have a 1 mm outpouching concerning for an infundibulum versus aneurysm. I again reviewed the case with Dr. Chaparro Garcia at [facility]. [facility] for neurology who recommended outpatient follow-up and was provided the number for vascular neurology for evaluation as an outpatient. Also I did stop her hydralazine and decreased her Coreg to 3.125 mg twice daily and changed her losartan to 25 mg daily. I encouraged her to follow-up with her PCP to monitor her blood pressure for further adjustment of medications. I also called the vascular neurology facility and left information for them to follow-up with the patient but I also gave the information to the patient to call up there on Monday or Tuesday to set up follow-up. I encouraged her to return if symptoms worsened. Patient will be discharged today and will do dialysis tomorrow morning. She is agreeable to this plan of care. Inpatient course: Discharge summary reviewed- see chart. Interval history/Current status:     Uday January presents today feeling well. She states she really has no recollection of her syncopal episode other than waking up on the kitchen floor with pancakes and coffee spilled. She states her 1year-old grandson and dog were in the other room watching television. She states she was able to drive her grandson home and take her self to the emergency department. She was admitted for 2 days have the above testing and was referred to neurovascular for evaluation next week. She does have a 1 mm abnormality that could be representative of an aneurysm. No other acute changes on MRI or MRI of the brain. She continues with dialysis.   She states her blood pressure medications were adjusted and lowered.     Patient Active Problem List   Diagnosis    [facility]    Arteritis ([facility] [facility]    End stage renal disease ([facility]    ANCA-associated vasculitis (HCC)    Rectocele    Retinal edema of both eyes    CKD (chronic kidney disease) stage 4, GFR 15-29 ml/min (HCC)    Depression with anxiety    Essential hypertension    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)    S/P DOMO-BSO    Endometrial hyperplasia without atypia, simple    Postural vertigo    Morbid obesity (HCC)    Allergic rhinitis    Iron deficiency anemia    Mixed hyperlipidemia    Urinary frequency    MGUS (monoclonal gammopathy of unknown significance)    Anti-cyclic citrullinated peptide antibody positive    Bilateral hip pain    Bilateral knee pain    Cancer of uterus (HCC)    Elevated C-reactive protein (CRP)    Elevated white blood cell count, unspecified    Essential thrombocythemia (HCC)    Greater trochanteric bursitis of both hips    Microscopic polyangiitis (HCC)    Osteoarthritis of right acromioclavicular joint    Osteoarthritis of sacroiliac joint ([facility] [facility]    Other intervertebral disc degeneration, thoracic region    Patient nonadherence    Pes anserinus bursitis of both knees    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)    Rheumatoid factor positive    Scl-70 antibody positive    Urticaria due to cold    Vitamin D deficiency    Elevated BUN    Chicas's esophagus without dysplasia    Hyperparathyroidism (HCC)    Gastroesophageal reflux disease    Esophageal dysphagia    History of colon polyps    Epistaxis, recurrent    History of arteritis    History of rheumatoid arthritis    Noncompliance    SOB (shortness of breath)    Episode of recurrent major depressive disorder (HCC)    Anemia in stage 5 chronic kidney disease, not on chronic dialysis (HCC)    Non-surgical abdominal pain  Lumbar pain    Uremia    MARY (obstructive sleep apnea)    Acute renal failure (HCC)    Syncope and collapse    Dialysis patient [facility])       Medications listed as ordered at the time of discharge from hospital     Medication List          Accurate as of April 28, 2022 10:52 AM. If you have any questions, ask your nurse or doctor. CHANGE how you take these medications    Prevail for Women X-Large Misc  2 each by Does not apply route daily  What changed: Another medication with the same name was removed. Continue taking this medication, and follow the directions you see here.   Changed by: ARISTIDES Lee - CNP        CONTINUE taking these medications    albuterol sulfate  (90 Base) MCG/ACT inhaler  Commonly known as: Ventolin HFA  Inhale 2 puffs into the lungs every 6 hours as needed for Wheezing     aspirin 81 MG chewable tablet     atorvastatin 80 MG tablet  Commonly known as: LIPITOR  Take 1 tablet by mouth daily     Blood Pressure Kit  1 Units by Does not apply route daily     butalbital-acetaminophen-caffeine -40 MG per tablet  Commonly known as: FIORICET, ESGIC  Take 1 tablet by mouth every 6 hours as needed for Headaches or Migraine     calcitRIOL 0.25 MCG capsule  Commonly known as: Rocaltrol  Take 1 capsule by mouth daily     calcium acetate 667 MG Tabs  Commonly known as: PHOSLO     carvedilol 3.125 MG tablet  Commonly known as: Coreg  Take 1 tablet by mouth 2 times daily     dilTIAZem 120 MG extended release capsule  Commonly known as: CARDIZEM CD  TAKE 1 CAPSULE BY MOUTH ONCE DAILY     diphenhydrAMINE 25 MG capsule  Commonly known as: BENADRYL     DULoxetine 30 MG extended release capsule  Commonly known as: CYMBALTA  Take 1 capsule by mouth once daily     estrogens (conjugated) 0.625 MG tablet  Commonly known as: Premarin  Take 1 tablet by mouth once daily     fluticasone 50 MCG/ACT nasal spray  Commonly known as: FLONASE  1 spray by Each Nostril route daily for 10 days     furosemide 20 MG tablet  Commonly known as: LASIX  Take 1 tablet by mouth twice daily     Gebauers Pain Ease Aero  Generic drug: pentafluoroprop-tetrafluoroeth     losartan 50 MG tablet  Commonly known as: COZAAR  TAKE 1/2 (25mg) TABLET BY MOUTH TWICE DAILY     meclizine 25 MG tablet  Commonly known as: ANTIVERT  Take 1 tablet by mouth 3 times daily as needed for Dizziness     metoclopramide 5 MG tablet  Commonly known as: Reglan  Take 1 tablet by mouth 2 times daily     pantoprazole 40 MG tablet  Commonly known as: PROTONIX  Take 1 tablet by mouth twice daily     potassium chloride 20 MEQ extended release tablet  Commonly known as: KLOR-CON M  Take 1 tablet by mouth daily     Kiersten-Gerhard Rx 1 MG Tabs     * saline nasal gel Gel  by Nasal route 2 times daily     * sodium chloride 0.65 % nasal spray  Commonly known as: Altamist Spray  1 spray by Nasal route as needed for Congestion     STOOL SOFTENER PO     vitamin D 1000 UNIT Tabs tablet  Commonly known as: CHOLECALCIFEROL         * This list has 2 medication(s) that are the same as other medications prescribed for you. Read the directions carefully, and ask your doctor or other care provider to review them with you.                   Medications marked \"taking\" at this time  Outpatient Medications Marked as Taking for the 4/28/22 encounter (Office Visit) with ARISTIDES Lopez - CNP   Medication Sig Dispense Refill    butalbital-acetaminophen-caffeine (FIORICET, ESGIC) -40 MG per tablet Take 1 tablet by mouth every 6 hours as needed for Headaches or Migraine 120 tablet 0    losartan (COZAAR) 50 MG tablet TAKE 1/2 (25mg) TABLET BY MOUTH TWICE DAILY 60 tablet 3    carvedilol (COREG) 3.125 MG tablet Take 1 tablet by mouth 2 times daily 60 tablet 3    metoclopramide (REGLAN) 5 MG tablet Take 1 tablet by mouth 2 times daily 180 tablet 0    pantoprazole (PROTONIX) 40 MG tablet Take 1 tablet by mouth twice daily 180 tablet 1    aspirin 81 MG chewable tablet Take 81 mg by mouth daily      DULoxetine (CYMBALTA) 30 MG extended release capsule Take 1 capsule by mouth once daily 90 capsule 1    calcium acetate (PHOSLO) 667 MG TABS       Incontinence Supply Disposable (PREVAIL FOR WOMEN X-LARGE) MISC 2 each by Does not apply route daily 60 each 5    atorvastatin (LIPITOR) 80 MG tablet Take 1 tablet by mouth daily 90 tablet 3    furosemide (LASIX) 20 MG tablet Take 1 tablet by mouth twice daily 60 tablet 0    estrogens, conjugated, (PREMARIN) 0.625 MG tablet Take 1 tablet by mouth once daily 90 tablet 3    GEBAUERS PAIN EASE AERO SPRAY ON DIALYSIS FISTULA PRIOR TO CANNULATION      calcitRIOL (ROCALTROL) 0.25 MCG capsule Take 1 capsule by mouth daily 30 capsule 5    meclizine (ANTIVERT) 25 MG tablet Take 1 tablet by mouth 3 times daily as needed for Dizziness 30 tablet 0    potassium chloride (KLOR-CON M) 20 MEQ extended release tablet Take 1 tablet by mouth daily 30 tablet 11    dilTIAZem (CARDIZEM CD) 120 MG extended release capsule TAKE 1 CAPSULE BY MOUTH ONCE DAILY 30 capsule 0    Blood Pressure KIT 1 Units by Does not apply route daily 1 kit 0    vitamin D (CHOLECALCIFEROL) 1000 UNIT TABS tablet Take 2,000 Units by mouth 2 times daily           Medications patient taking as of now reconciled against medications ordered at time of hospital discharge: Yes    A comprehensive review of systems was negative except for what was noted in the HPI.     Objective:    /70 (Position: Sitting)   Pulse 84   Temp 97.7 °F (36.5 °C) (Temporal)   Resp 22   Ht 5' 2\" (1.575 m)   Wt 253 lb 4.8 oz (114.9 kg)   LMP 09/09/2016   SpO2 98%   BMI 46.33 kg/m²   General Appearance: alert and oriented to person, place and time and obese  Skin: warm and dry  Head: normocephalic and atraumatic  Eyes: pupils equal, round, and reactive to light, conjunctivae normal and sclera anicteric  ENT: oropharynx clear and moist with normal mucous membranes  Neck: neck supple and non tender without mass   Pulmonary/Chest: clear to auscultation bilaterally- no wheezes, rales or rhonchi, normal air movement, no respiratory distress  Cardiovascular: normal rate, regular rhythm and no carotid bruits  Abdomen: soft, non-tender  Extremities: no edema  Musculoskeletal: normal range of motion, no joint swelling, deformity or tenderness  Neurologic: gait and coordination normal and speech normal      An electronic signature was used to authenticate this note.   --ARISTIDES Otero - CNP

## 2022-05-06 ENCOUNTER — OFFICE VISIT (OUTPATIENT)
Dept: NEUROLOGY | Age: 57
End: 2022-05-06
Payer: MEDICARE

## 2022-05-06 VITALS
HEART RATE: 94 BPM | TEMPERATURE: 96.8 F | BODY MASS INDEX: 48.25 KG/M2 | WEIGHT: 262.2 LBS | SYSTOLIC BLOOD PRESSURE: 108 MMHG | HEIGHT: 62 IN | DIASTOLIC BLOOD PRESSURE: 68 MMHG | OXYGEN SATURATION: 97 %

## 2022-05-06 DIAGNOSIS — I67.1 CEREBRAL ANEURYSM: ICD-10-CM

## 2022-05-06 PROCEDURE — 3017F COLORECTAL CA SCREEN DOC REV: CPT | Performed by: PSYCHIATRY & NEUROLOGY

## 2022-05-06 PROCEDURE — 1036F TOBACCO NON-USER: CPT | Performed by: PSYCHIATRY & NEUROLOGY

## 2022-05-06 PROCEDURE — G8417 CALC BMI ABV UP PARAM F/U: HCPCS | Performed by: PSYCHIATRY & NEUROLOGY

## 2022-05-06 PROCEDURE — 99214 OFFICE O/P EST MOD 30 MIN: CPT | Performed by: PSYCHIATRY & NEUROLOGY

## 2022-05-06 PROCEDURE — G8427 DOCREV CUR MEDS BY ELIG CLIN: HCPCS | Performed by: PSYCHIATRY & NEUROLOGY

## 2022-05-06 NOTE — PROGRESS NOTES
Endovascular Neurosurgery Clinic Note      Reason for evaluation: new pt, incidental R A2 aneurysm vs infundibulum    SUBJECTIVE:   History of Chief Complaint:    64yo female with complex pmh including ANCA vasculitis, ESRD 2/2 above on HD MWF, [place], htn. Hld, gerd, luna esophagus, RA, hyperparathyroid, MGUS. Pt had syncope vs sz 4/14/2022, pt referred for incidental R A2 1mm aneurysm vs infundibulum found on workup. On 4/14/2022 pt was at home after HD, was in the kitchen holding cookies. Pt started shaking UE b/l (w/o loss of awareness) then collapsed with LOC. episode lasted ~5min, pt did not realize event had happened until she saw the items she dropped on the ground. No incontinence, no tongue bite. No prodromal dizziness or tunnel vision. This has not happened before. Pt denies any focal neuro symptoms, no headache, no fhx of aneurysm, never smoker. Allergies  has No Known Allergies. Medications  Prior to Admission medications    Medication Sig Start Date End Date Taking?  Authorizing Provider   butalbital-acetaminophen-caffeine (FIORICET, ESGIC) -24 MG per tablet Take 1 tablet by mouth every 6 hours as needed for Headaches or Migraine 4/25/22  Yes ARISTIDES Franco CNP   losartan (COZAAR) 50 MG tablet TAKE 1/2 (25mg) TABLET BY MOUTH TWICE DAILY 4/15/22  Yes ARISTIDES Frank CNP   carvedilol (COREG) 3.125 MG tablet Take 1 tablet by mouth 2 times daily 4/15/22  Yes ARISTIDES Frank CNP   metoclopramide (REGLAN) 5 MG tablet Take 1 tablet by mouth 2 times daily 4/11/22  Yes ARISTIDES Franco CNP   pantoprazole (PROTONIX) 40 MG tablet Take 1 tablet by mouth twice daily 3/21/22  Yes ARISTIDES Franco CNP   aspirin 81 MG chewable tablet Take 81 mg by mouth daily   Yes Historical Provider, MD   DULoxetine (CYMBALTA) 30 MG extended release capsule Take 1 capsule by mouth once daily 12/7/21  Yes ARISTIDES Franco CNP   calcium acetate (PHOSLO) 667 MG TABS  11/5/21 Yes Historical Provider, MD   Incontinence Supply Disposable (PREVAIL FOR WOMEN X-LARGE) MISC 2 each by Does not apply route daily 11/18/21  Yes ARISTIDES Meza CNP   atorvastatin (LIPITOR) 80 MG tablet Take 1 tablet by mouth daily 11/9/21  Yes ARISTIDES Meza CNP   furosemide (LASIX) 20 MG tablet Take 1 tablet by mouth twice daily 7/12/21  Yes Jazmine Mendez MD   estrogens, conjugated, (PREMARIN) 0.625 MG tablet Take 1 tablet by mouth once daily 6/8/21  Yes ARISTIDES Hernandez CNM   GEBAUERS PAIN EASE AERO SPRAY ON DIALYSIS FISTULA PRIOR  [address] 5/7/21  Yes Historical Provider, MD   calcitRIOL (ROCALTROL) 0.25 MCG capsule Take 1 capsule by mouth daily 11/30/20  Yes Jazmine Mendez MD   meclizine (ANTIVERT) 25 MG tablet Take 1 tablet by mouth 3 times daily as needed for Dizziness 7/22/20  Yes ARISTIDES Meza CNP   potassium chloride (KLOR-CON M) 20 MEQ extended release tablet Take 1 tablet by mouth daily 4/2/20  Yes Connie Judd MD   dilTIAZem (CARDIZEM CD) 120 MG extended release capsule TAKE 1 CAPSULE BY MOUTH ONCE DAILY 2/21/20  Yes Jazmine Mendez MD   Blood Pressure KIT 1 Units by Does not apply route daily 11/7/19  Yes ARISTIDES Rios CNP   vitamin D (CHOLECALCIFEROL) 1000 UNIT TABS tablet Take 2,000 Units by mouth 2 times daily    Yes Historical Provider, MD   fluticasone (FLONASE) 50 MCG/ACT nasal spray 1 spray by Each Nostril route daily for 10 days  Patient not taking: Reported on 11/18/2021 9/16/21 9/26/21  ARISTIDES Ríos CNP   B Complex-C-Folic Acid (KENDALL-DALY RX) 1 MG TABS TAKE 1 TABLET BY MOUTH ONCE DAILY  Patient not taking: Reported on 12/23/2021 5/8/21   Historical Provider, MD   albuterol sulfate HFA (VENTOLIN HFA) 108 (90 Base) MCG/ACT inhaler Inhale 2 puffs into the lungs every 6 hours as needed for Wheezing  Patient not taking: Reported on 12/23/2021 4/16/19   Roger Aung Might, APRN - CNP   saline nasal gel (AYR) GEL by Nasal route 2 times daily  Patient not taking: Reported on 11/18/2021 11/6/18   Felicia Hawkins MD   sodium chloride (ALTAMIST SPRAY) 0.65 % nasal spray 1 spray by Nasal route as needed for Congestion  Patient not taking: Reported on 11/18/2021 11/6/18   Felicia Hawkins MD   Docusate Calcium (STOOL SOFTENER PO) Take 100 mg by mouth as needed   Patient not taking: Reported on 12/23/2021    Historical Provider, MD   diphenhydrAMINE (BENADRYL) 25 MG capsule Take 25 mg by mouth every 6 hours as needed for Itching or Allergies Pt takes 2 capsules at HS \"due to hives from it being cold now\". Pt tho states these are NOT helping her this time. Patient not taking: Reported on 11/18/2021    Historical Provider, MD    Scheduled Meds:  Continuous Infusions:  PRN Meds:.  Past Medical History   has a past medical history of JESS (acute kidney injury) ([facility] [address] Anemia, Arthritis, Chronic kidney disease, Depression with anxiety, Dialysis patient ([facility] [address] GERD (gastroesophageal reflux disease), H/O echocardiogram, H/O tooth extraction, Hemodialysis patient ([facility] [address] History of blood transfusion, Hyperlipidemia, Hypertensive crisis, Kidney stones, Sciatica, and Vasculitis ([facility] [address] Past Surgical History   has a past surgical history that includes Cholecystectomy; bone marrow biopsy (5-23-16); Tubal ligation; Colonoscopy (2016); Hysterectomy (11/09/2016); Tooth Extraction (07/26/2017); Upper gastrointestinal endoscopy (09/12/2018); Upper gastrointestinal endoscopy (N/A, 9/12/2018); Esophagus dilation (9/12/2018); Port Surgery (Left, 10/2020); and Colonoscopy (N/A, 10/14/2021). Social History   reports that she has never smoked. She has never used smokeless tobacco.   reports no history of alcohol use. reports no history of drug use. Family History  family history includes Cancer in her father, mother, sister, and sister; Diabetes in her brother, brother, and mother; Heart Disease in her mother; High Blood Pressure in her brother, father, and mother.     Review of Systems:  CONSTITUTIONAL:  negative for fevers, chills, fatigue and malaise    EYES:  negative for double vision, blurred vision and photophobia     HEENT:  negative for tinnitus, epistaxis and sore throat    RESPIRATORY:  negative for cough, shortness of breath, wheezing    CARDIOVASCULAR:  negative for chest pain, palpitations, syncope, edema    GASTROINTESTINAL:  negative for nausea, vomiting    GENITOURINARY:  negative for incontinence    MUSCULOSKELETAL:  negative for neck or back pain    NEUROLOGICAL:  Negative for weakness and tingling  negative for headaches and dizziness    PSYCHIATRIC:  negative for anxiety      Review of systems otherwise negative. OBJECTIVE:     Vitals:    22 0925   BP: 108/68   Pulse: 94   Temp: 96.8 °F (36 °C)   SpO2: 97%        General:  Gen: obese habitus, NAD  HEENT: NCAT, mucosa moist  Cvs: RRR, S1 S2 normal  Resp: symmetric unlabored breathing  Abd: s/nd/nt  Ext: no edema. L lower arm fistula with bruit. Skin: no lesions seen, warm and dry    Neuro:  Gen: awake and alert, oriented x3. Lang/speech: no aphasia or dysarthria. Follows commands. CN: PERRL, EOMI, VFF, V1-3 intact, face symmetric, hearing intact, shoulder shrug symmetric, tongue midline  Motor: grossly 5/5 UE and LE b/l  Sense: LT intact in all 4 ext. Coord: FTN and HTS intact b/l  DTR: deferred  Gait: narrow base gait    NIH Stroke Scale:   1a  Level of consciousness: 0 - alert; keenly responsive   1b. LOC questions:  0 - answers both questions correctly   1c. LOC commands: 0 - performs both tasks correctly   2. Best Gaze: 0 - normal   3. Visual: 0 - no visual loss   4. Facial Palsy: 0 - normal symmetric movement   5a. Motor left arm: 0 - no drift, limb holds 90 (or 45) degrees for full 10 seconds   5b. Motor right arm: 0 - no drift, limb holds 90 (or 45) degrees for full 10 seconds   6a.  Motor left le - no drift; leg holds 30 degree position for full 5 seconds   6b  Motor right le - no drift; leg holds 30 degree position for full 5 seconds   7. Limb Ataxia: 0 - absent   8. Sensory: 0 - normal; no sensory loss   9. Best Language:  0 - no aphasia, normal   10. Dysarthria: 0 - normal   11. Extinction and Inattention: 0 - no abnormality         Total:   0     MRS: 0      LABS:   Reviewed. Lab Results   Component Value Date    HGB 9.9 (L) 04/15/2022    WBC 7.0 04/15/2022     04/15/2022     04/15/2022    BUN 58 (H) 04/15/2022    CREATININE 8.41 (HH) 04/15/2022    AST 16 07/08/2021    ALT 10 07/08/2021    MG 2.6 01/28/2021    APTT 27.9 11/02/2018    INR 0.9 01/27/2021      Lab Results   Component Value Date    COVID19 NEGATIVE 11/09/2021       RADIOLOGY:   Images were personally reviewed including:  MRA head wo con 4/15/2022  1. No acute intracranial abnormality.  No acute infarct. 2. Minimal global parenchymal volume loss with mild chronic microvascular   ischemic changes. 3. Trace right mastoid effusion. 4. No significant stenosis of the [id number]. 5. A 1 mm outpouching is seen arising from the right A2 segment.  This may   represent an infundibulum versus a tiny aneurysm. ASSESSMENT:   64yo female with complex pmh including ANCA vasculitis, ESRD 2/2 above on HD MWF, [place], htn. Hld, gerd, luna esophagus, RA, hyperparathyroid, MGUS. Pt had syncope vs sz 4/14/2022, pt referred for incidental R A2 1mm aneurysm vs infundibulum found on workup. Never smoker, no fhx aneurysm. PHASES score 5, 1.3% 5y risk. Dx angio indicated to further eval.    PLAN:   --pt wishes to defer dx angio, readdress next visit. Pt will need to coordinate HD with procedure. --pt instructed to f/u with her neuro and cardio MD to further eval syncopal episode  --repeat mra head wo con prior to dr. Mary Leon appt  --f/u dr Mary Leon in 3 mo    Case discussed with Dr. Walt Monroe attending.  Pt is followed by dr. Cheri Jeter MD, PhD  Stroke, [facility] Stroke [address]  Electronically signed 5/6/2022 at 9:59 AM

## 2022-06-03 DIAGNOSIS — N95.1 MENOPAUSAL SYMPTOM: ICD-10-CM

## 2022-06-03 DIAGNOSIS — F41.8 DEPRESSION WITH ANXIETY: Chronic | ICD-10-CM

## 2022-06-03 RX ORDER — DULOXETIN HYDROCHLORIDE 30 MG/1
CAPSULE, DELAYED RELEASE ORAL
Qty: 90 CAPSULE | Refills: 1 | Status: SHIPPED | OUTPATIENT
Start: 2022-06-03

## 2022-06-06 DIAGNOSIS — N95.1 MENOPAUSAL SYMPTOM: ICD-10-CM

## 2022-07-05 DIAGNOSIS — R14.0 BLOATING: ICD-10-CM

## 2022-07-06 RX ORDER — METOCLOPRAMIDE 5 MG/1
5 TABLET ORAL 2 TIMES DAILY
Qty: 180 TABLET | Refills: 1 | Status: SHIPPED | OUTPATIENT
Start: 2022-07-06

## 2022-07-06 RX ORDER — LOSARTAN POTASSIUM 25 MG/1
TABLET ORAL
COMMUNITY
Start: 2022-07-05

## 2022-07-06 RX ORDER — CALCIUM ACETATE 667 MG/1
CAPSULE ORAL
COMMUNITY
Start: 2022-06-28

## 2022-07-06 NOTE — TELEPHONE ENCOUNTER
Health Maintenance   Topic Date Due    Lipids  07/08/2022    COVID-19 Vaccine (3 - Booster for Pfizer series) 11/18/2022 (Originally 8/31/2021)    Hepatitis B vaccine (4 of 4 - Risk Engerix-B 4-dose series) 04/28/2023 (Originally 10/21/2021)    Flu vaccine (1) 09/01/2022    Depression Monitoring  04/28/2023    Breast cancer screen  01/20/2024    Pneumococcal 0-64 years Vaccine (3 - PPSV23 or PCV20) 05/14/2026    Colorectal Cancer Screen  10/14/2026    DTaP/Tdap/Td vaccine (2 - Td or Tdap) 09/06/2028    Shingles vaccine  Completed    Hepatitis C screen  Completed    HIV screen  Completed    Hepatitis A vaccine  Aged Out    Hib vaccine  Aged Out    Meningococcal (ACWY) vaccine  Aged Out             (applicable per patient's age: Cancer Screenings, Depression Screening, Fall Risk Screening, Immunizations)    Hemoglobin A1C (%)   Date Value   07/08/2021 5.4   11/03/2020 5.6   09/06/2019 5.8     Microalb/Crt.  Ratio (mcg/mg creat)   Date Value   11/18/2019 3,897 (H)     LDL Cholesterol (mg/dL)   Date Value   07/08/2021 65     AST (IU/L)   Date Value   07/08/2021 16     ALT (IU/L)   Date Value   07/08/2021 10     BUN (mg/dL)   Date Value   04/15/2022 58 (H)      (goal A1C is < 7)   (goal LDL is <100) need 30-50% reduction from baseline     BP Readings from Last 3 Encounters:   05/06/22 108/68   04/28/22 122/70   04/15/22 119/70    (goal /80)      All Future Testing planned in [facility]:  Lab Frequency Next Occurrence   MRA HEAD WO CONTRAST Once 08/01/2022   Transfuse RBC TRANSFUSE 1 UNIT        Next Visit Date:  Future Appointments   Date Time Provider Department Center   8/11/2022  9:45 AM [facility] MRI SCANNER [facility] MRI [facility] Rad   8/22/2022  1:00 PM Ramone Obrien MD Neuro Endo [facility]            Patient Active Problem List:     JESS (acute kidney injury) ([facility] [address]     Arteritis ([address]     End stage renal disease ([facility] [address]     ANCA-associated vasculitis (HCC)     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)     S/P DOMO-BSO     Endometrial hyperplasia without atypia, simple     Postural vertigo     Morbid obesity (HCC)     Allergic rhinitis     Iron deficiency anemia     Mixed hyperlipidemia     Urinary frequency     MGUS (monoclonal gammopathy of unknown significance)     Anti-cyclic citrullinated peptide antibody positive     Bilateral hip pain     Bilateral knee pain     Cancer of uterus (HCC)     Elevated C-reactive protein (CRP)     Elevated white blood cell count, unspecified     Essential thrombocythemia (HCC)     Greater trochanteric bursitis of both hips     Microscopic polyangiitis (HCC)     Osteoarthritis of right acromioclavicular joint     Osteoarthritis of sacroiliac joint ([address]     Other intervertebral disc degeneration, thoracic region     Patient nonadherence     Pes anserinus bursitis of both knees     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     Episode of recurrent major depressive disorder (HCC)     Anemia in stage 5 chronic kidney disease, not on chronic dialysis (HCC)     Non-surgical abdominal pain     Lumbar pain     Uremia     MARY (obstructive sleep apnea)     Acute renal failure (HCC)     Syncope and collapse     Dialysis patient [facility])     possible right A2 Cerebral aneurysm

## 2022-08-01 RX ORDER — BUTALBITAL, ACETAMINOPHEN AND CAFFEINE 50; 325; 40 MG/1; MG/1; MG/1
1 TABLET ORAL EVERY 6 HOURS PRN
Qty: 120 TABLET | Refills: 0 | Status: SHIPPED | OUTPATIENT
Start: 2022-08-01

## 2022-08-01 RX ORDER — POTASSIUM CHLORIDE 20 MEQ/1
20 TABLET, EXTENDED RELEASE ORAL DAILY
Qty: 90 TABLET | Refills: 3 | Status: SHIPPED | OUTPATIENT
Start: 2022-08-01

## 2022-08-01 NOTE — TELEPHONE ENCOUNTER
Health Maintenance   Topic Date Due    Lipids  07/08/2022    COVID-19 Vaccine (3 - Booster for Pfizer series) 11/18/2022 (Originally 8/31/2021)    Hepatitis B vaccine (4 of 4 - Risk Engerix-B 4-dose series) 04/28/2023 (Originally 10/21/2021)    Flu vaccine (1) 09/01/2022    Depression Monitoring  04/28/2023    Breast cancer screen  01/20/2024    Pneumococcal 0-64 years Vaccine (3 - PPSV23 or PCV20) 05/14/2026    Colorectal Cancer Screen  10/14/2026    DTaP/Tdap/Td vaccine (2 - Td or Tdap) 09/06/2028    Shingles vaccine  Completed    Hepatitis C screen  Completed    HIV screen  Completed    Hepatitis A vaccine  Aged Out    Hib vaccine  Aged Out    Meningococcal (ACWY) vaccine  Aged Out             (applicable per patient's age: Cancer Screenings, Depression Screening, Fall Risk Screening, Immunizations)    Hemoglobin A1C (%)   Date Value   07/08/2021 5.4   11/03/2020 5.6   09/06/2019 5.8     Microalb/Crt.  Ratio (mcg/mg creat)   Date Value   11/18/2019 3,897 (H)     LDL Cholesterol (mg/dL)   Date Value   07/08/2021 65     AST (IU/L)   Date Value   07/08/2021 16     ALT (IU/L)   Date Value   07/08/2021 10     BUN (mg/dL)   Date Value   04/15/2022 58 (H)      (goal A1C is < 7)   (goal LDL is <100) need 30-50% reduction from baseline     BP Readings from Last 3 Encounters:   05/06/22 108/68   04/28/22 122/70   04/15/22 119/70    (goal /80)      All Future Testing planned in [facility]:  Lab Frequency Next Occurrence   MRA HEAD WO CONTRAST Once 08/01/2022   Transfuse RBC TRANSFUSE 1 UNIT        Next Visit Date:  Future Appointments   Date Time Provider [place] One[place]   8/11/2022  9:45 AM [facility] Rad   8/22/2022  1:00 PM Tiffanie Pineda MD Neuro Endo [facility]            Patient Active Problem List:     JESS (acute kidney injury) ([address]     Arteritis ([address]     End stage renal disease ([facility]     ANCA-associated vasculitis (HCC)     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)     S/P DOMO-BSO     Endometrial hyperplasia without atypia, simple     Postural vertigo     Morbid obesity (HCC)     Allergic rhinitis     Iron deficiency anemia     Mixed hyperlipidemia     Urinary frequency     MGUS (monoclonal gammopathy of unknown significance)     Anti-cyclic citrullinated peptide antibody positive     Bilateral hip pain     Bilateral knee pain     Cancer of uterus (HCC)     Elevated C-reactive protein (CRP)     Elevated white blood cell count, unspecified     Essential thrombocythemia (HCC)     Greater trochanteric bursitis of both hips     Microscopic polyangiitis (HCC)     Osteoarthritis of right acromioclavicular joint     Osteoarthritis of sacroiliac joint ([address]     Other intervertebral disc degeneration, thoracic region     Patient nonadherence     Pes anserinus bursitis of both knees     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     Episode of recurrent major depressive disorder (HCC)     Anemia in stage 5 chronic kidney disease, not on chronic dialysis (HCC)     Non-surgical abdominal pain     Lumbar pain     Uremia     MARY (obstructive sleep apnea)     Acute renal failure (HCC)     Syncope and collapse     Dialysis patient [facility])     possible right A2 Cerebral aneurysm

## 2022-08-02 DIAGNOSIS — I10 ESSENTIAL HYPERTENSION: Primary | ICD-10-CM

## 2022-08-02 RX ORDER — CARVEDILOL 3.12 MG/1
3.12 TABLET ORAL 2 TIMES DAILY
Qty: 180 TABLET | Refills: 3 | Status: SHIPPED | OUTPATIENT
Start: 2022-08-02

## 2022-08-02 NOTE — TELEPHONE ENCOUNTER
Health Maintenance   Topic Date Due    Lipids  07/08/2022    COVID-19 Vaccine (3 - Booster for Pfizer series) 11/18/2022 (Originally 8/31/2021)    Hepatitis B vaccine (4 of 4 - Risk Engerix-B 4-dose series) 04/28/2023 (Originally 10/21/2021)    Flu vaccine (1) 09/01/2022    Depression Monitoring  04/28/2023    Breast cancer screen  01/20/2024    Pneumococcal 0-64 years Vaccine (3 - PPSV23 or PCV20) 05/14/2026    Colorectal Cancer Screen  10/14/2026    DTaP/Tdap/Td vaccine (2 - Td or Tdap) 09/06/2028    Shingles vaccine  Completed    Hepatitis C screen  Completed    HIV screen  Completed    Hepatitis A vaccine  Aged Out    Hib vaccine  Aged Out    Meningococcal (ACWY) vaccine  Aged Out             (applicable per patient's age: Cancer Screenings, Depression Screening, Fall Risk Screening, Immunizations)    Hemoglobin A1C (%)   Date Value   07/08/2021 5.4   11/03/2020 5.6   09/06/2019 5.8     Microalb/Crt.  Ratio (mcg/mg creat)   Date Value   11/18/2019 3,897 (H)     LDL Cholesterol (mg/dL)   Date Value   07/08/2021 65     AST (IU/L)   Date Value   07/08/2021 16     ALT (IU/L)   Date Value   07/08/2021 10     BUN (mg/dL)   Date Value   04/15/2022 58 (H)      (goal A1C is < 7)   (goal LDL is <100) need 30-50% reduction from baseline     BP Readings from Last 3 Encounters:   05/06/22 108/68   04/28/22 122/70   04/15/22 119/70    (goal /80)      All Future Testing planned in [facility]:  Lab Frequency Next Occurrence   MRA HEAD WO CONTRAST Once 08/01/2022   Transfuse RBC TRANSFUSE 1 UNIT        Next Visit Date:  Future Appointments   Date Time Provider Tom Oneil   8/11/2022  9:45 AM [facility] Rad   8/22/2022  1:00 PM Don Kerr MD Neuro Endo [facility]            Patient Active Problem List:     JESS (acute kidney injury) ([address]     Arteritis ([address]     End stage renal disease ([facility] [address]     ANCA-associated vasculitis (HCC)     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)     S/P DOMO-BSO     Endometrial hyperplasia without atypia, simple     Postural vertigo     Morbid obesity (HCC)     Allergic rhinitis     Iron deficiency anemia     Mixed hyperlipidemia     Urinary frequency     MGUS (monoclonal gammopathy of unknown significance)     Anti-cyclic citrullinated peptide antibody positive     Bilateral hip pain     Bilateral knee pain     Cancer of uterus (HCC)     Elevated C-reactive protein (CRP)     Elevated white blood cell count, unspecified     Essential thrombocythemia (HCC)     Greater trochanteric bursitis of both hips     Microscopic polyangiitis (HCC)     Osteoarthritis of right acromioclavicular joint     Osteoarthritis of sacroiliac joint ([address]     Other intervertebral disc degeneration, thoracic region     Patient nonadherence     Pes anserinus bursitis of both knees     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     Episode of recurrent major depressive disorder (HCC)     Anemia in stage 5 chronic kidney disease, not on chronic dialysis (HCC)     Non-surgical abdominal pain     Lumbar pain     Uremia     MARY (obstructive sleep apnea)     Acute renal failure (HCC)     Syncope and collapse     Dialysis patient [facility])     possible right A2 Cerebral aneurysm

## 2022-08-10 ENCOUNTER — TELEPHONE (OUTPATIENT)
Dept: PRIMARY CARE CLINIC | Age: 57
End: 2022-08-10

## 2022-08-10 NOTE — TELEPHONE ENCOUNTER
LVM to Manisha Sawant from [facility] ENT office letting her know to contact Nephrology to check if they can be prescribed Dyazide.

## 2022-08-10 NOTE — TELEPHONE ENCOUNTER
Anjelica from [facility] office called the office wanting to ask Cary Moon if it would be okay for Nico Gonsales to take Dyazide for possible Menieres disease due to her past medical history. Please advise.

## 2022-08-11 ENCOUNTER — HOSPITAL ENCOUNTER (OUTPATIENT)
Dept: MRI IMAGING | Age: 57
Discharge: HOME | End: 2022-08-13
Payer: MEDICARE

## 2022-08-11 DIAGNOSIS — I67.1 CEREBRAL ANEURYSM: ICD-10-CM

## 2022-08-11 PROCEDURE — 70544 MR ANGIOGRAPHY HEAD W/O DYE: CPT

## 2022-08-16 ENCOUNTER — TELEMEDICINE (OUTPATIENT)
Dept: NEUROLOGY | Age: 57
End: 2022-08-16
Payer: MEDICARE

## 2022-08-16 DIAGNOSIS — I67.1 CEREBRAL ANEURYSM: Primary | ICD-10-CM

## 2022-08-16 PROCEDURE — 99215 OFFICE O/P EST HI 40 MIN: CPT | Performed by: PSYCHIATRY & NEUROLOGY

## 2022-08-16 PROCEDURE — 3017F COLORECTAL CA SCREEN DOC REV: CPT | Performed by: PSYCHIATRY & NEUROLOGY

## 2022-08-16 PROCEDURE — 1036F TOBACCO NON-USER: CPT | Performed by: PSYCHIATRY & NEUROLOGY

## 2022-08-16 PROCEDURE — G8428 CUR MEDS NOT DOCUMENT: HCPCS | Performed by: PSYCHIATRY & NEUROLOGY

## 2022-08-16 PROCEDURE — G8417 CALC BMI ABV UP PARAM F/U: HCPCS | Performed by: PSYCHIATRY & NEUROLOGY

## 2022-08-16 NOTE — PROGRESS NOTES
Neurocritical Care, Stroke & Neurointerventional Note    [address]   [address], [address] [facility], [address] [place] [address]   P: [phone]    Endovascular Neurosurgery VIRTUAL CLINIC TELEPHONIC VISIT    Reason for evaluation: new pt, incidental R A2 aneurysm vs infundibulum    SUBJECTIVE:   History of Chief Complaint:    64yo female with complex pmh including ANCA vasculitis, ESRD 2/2 above on HD MWF, [place], htn. Hld, gerd, luna esophagus, RA, hyperparathyroid, MGUS. Pt had syncope vs sz 4/14/2022, pt referred for incidental R A2 1mm aneurysm vs infundibulum found on workup. On 4/14/2022 pt was at home after HD, was in the kitchen holding cookies. Pt started shaking UE b/l (w/o loss of awareness) then collapsed with LOC. episode lasted ~5min, pt did not realize event had happened until she saw the items she dropped on the ground. No incontinence, no tongue bite. No prodromal dizziness or tunnel vision. This has not happened before. Pt denies any focal neuro symptoms, no headache, no fhx of aneurysm, never smoker. Allergies  has No Known Allergies. Medications  Prior to Admission medications    Medication Sig Start Date End Date Taking? Authorizing Provider   carvedilol (COREG) 3.125 MG tablet Take 1 tablet by mouth in the morning and 1 tablet before bedtime. 8/2/22   Elhoward Welsh, APRN - CNP   potassium chloride (KLOR-CON M) 20 MEQ extended release tablet Take 1 tablet by mouth in the morning.  8/1/22   Reymundo Vargas APRN - CNP   butalbital-acetaminophen-caffeine (FIORICET, ESGIC) -92 MG per tablet Take 1 tablet by mouth every 6 hours as needed for Headaches or Migraine 8/1/22   Reymundo Vargas APRN - CNP   metoclopramide (REGLAN) 5 MG tablet Take 1 tablet by mouth 2 times daily 7/6/22   Reymundo Vargas, APRN - CNP   losartan (COZAAR) 25 MG tablet  7/5/22   Lindsey Green, APRN - CNP   calcium acetate (PHOSLO) 667 MG CAPS capsule  6/28/22   Madeline Holden MD   estrogens, conjugated, (PREMARIN) 0.625 MG tablet Take 1 tablet by mouth once daily 6/6/22   ARISTIDES Zeng CNM   DULoxetine (CYMBALTA) 30 MG extended release capsule Take 1 capsule by mouth once daily 6/3/22   ARISTIDES Virgen - CNP   pantoprazole (PROTONIX) 40 MG tablet Take 1 tablet by mouth twice daily 3/21/22   ARISTIDES Virgen CNP   aspirin 81 MG chewable tablet Take 81 mg by mouth daily    Historical Provider, MD   Incontinence Supply Disposable (PREVAIL FOR WOMEN X-LARGE) MISC 2 each by Does not apply route daily 11/18/21   ARISTIDES Virgen CNP   atorvastatin (LIPITOR) 80 MG tablet Take 1 tablet by mouth daily 11/9/21   ARISTIDES Virgen CNP   fluticasone (FLONASE) 50 MCG/ACT nasal spray 1 spray by Each Nostril route daily for 10 days  Patient not taking: Reported on 11/18/2021 9/16/21 9/26/21  ARISTIDES Jameson CNP   furosemide (LASIX) 20 MG tablet Take 1 tablet by mouth twice daily 7/12/21   Naldo Campos MD   B Complex-C-Folic Acid (KENDALL-DALY RX) 1 MG TABS TAKE 1 TABLET BY MOUTH ONCE DAILY  Patient not taking: Reported on 12/23/2021 5/8/21   Historical Provider, MD JOEL PAIN EASE AERO SPRAY ON DIALYSIS FISTULA PRIOR TO CANNULATION 5/7/21   Historical Provider, MD   calcitRIOL (ROCALTROL) 0.25 MCG capsule Take 1 capsule by mouth daily 11/30/20   Naldo Campos MD   meclizine (ANTIVERT) 25 MG tablet Take 1 tablet by mouth 3 times daily as needed for Dizziness 7/22/20   ARISTIDES Virgen CNP   dilTIAZem (CARDIZEM CD) 120 MG extended release capsule TAKE 1 CAPSULE BY MOUTH ONCE DAILY 2/21/20   Naldo Campos MD   Blood Pressure KIT 1 Units by Does not apply route daily 11/7/19   ARISTIDES Rios CNP   albuterol sulfate HFA (VENTOLIN HFA) 108 (90 Base) MCG/ACT inhaler Inhale 2 puffs into the lungs every 6 hours as needed for Wheezing  Patient not taking: Reported on 12/23/2021 4/16/19   ARISTIDES Virgen CNP   saline nasal gel (AYR) GEL by Nasal route 2 times daily  Patient not taking: Reported on 11/18/2021 11/6/18   Lorette Bloch, MD   sodium chloride (ALTAMIST SPRAY) 0.65 % nasal spray 1 spray by Nasal route as needed for Congestion  Patient not taking: Reported on 11/18/2021 11/6/18   Lorette Bloch, MD   vitamin D (CHOLECALCIFEROL) 1000 UNIT TABS tablet Take 2,000 Units by mouth 2 times daily     Historical Provider, MD   Docusate Calcium (STOOL SOFTENER PO) Take 100 mg by mouth as needed   Patient not taking: Reported on 12/23/2021    Historical Provider, MD   diphenhydrAMINE (BENADRYL) 25 MG capsule Take 25 mg by mouth every 6 hours as needed for Itching or Allergies Pt takes 2 capsules at HS \"due to hives from it being cold now\". Pt tho states these are NOT helping her this time. Patient not taking: Reported on 11/18/2021    Historical Provider, MD    Scheduled Meds:  Continuous Infusions:  PRN Meds:.  Past Medical History   has a past medical history of JESS (acute kidney injury) ([facility] [address] Anemia, Arthritis, Chronic kidney disease, Depression with anxiety, Dialysis patient ([facility] [address] GERD (gastroesophageal reflux disease), H/O echocardiogram, H/O tooth extraction, Hemodialysis patient ([facility] [address] History of blood transfusion, Hyperlipidemia, Hypertensive crisis, Kidney stones, Sciatica, and Vasculitis ([facility] [address] Past Surgical History   has a past surgical history that includes Cholecystectomy; bone marrow biopsy (5-23-16); Tubal ligation; Colonoscopy (2016); Hysterectomy (11/09/2016); Tooth Extraction (07/26/2017); Upper gastrointestinal endoscopy (09/12/2018); Upper gastrointestinal endoscopy (N/A, 9/12/2018); Esophagus dilation (9/12/2018); Port Surgery (Left, 10/2020); and Colonoscopy (N/A, 10/14/2021). Social History   reports that she has never smoked. She has never used smokeless tobacco.   reports no history of alcohol use. reports no history of drug use.   Family History  family history includes Cancer in her father, mother, sister, and sister; Diabetes in her brother, brother, and mother; Heart Disease in her mother; High Blood Pressure in her brother, father, and mother. Review of Systems:  CONSTITUTIONAL:  negative for fevers, chills, fatigue and malaise    EYES:  negative for double vision, blurred vision and photophobia     HEENT:  negative for tinnitus, epistaxis and sore throat    RESPIRATORY:  negative for cough, shortness of breath, wheezing    CARDIOVASCULAR:  negative for chest pain, palpitations, syncope, edema    GASTROINTESTINAL:  negative for nausea, vomiting    GENITOURINARY:  negative for incontinence    MUSCULOSKELETAL:  negative for neck or back pain    NEUROLOGICAL:  Negative for weakness and tingling  negative for headaches and dizziness    PSYCHIATRIC:  negative for anxiety      Review of systems otherwise negative. OBJECTIVE:     There were no vitals filed for this visit. This is virtual visit, speaking and no issues with slurred speech, good memory with knowing the date and day of the week. Strength normal    At the port of the hemodialysis she has some numbness    Her balance is good and doesn't need cane or walker       LABS:   Reviewed. Lab Results   Component Value Date    HGB 9.9 (L) 04/15/2022    WBC 7.0 04/15/2022     04/15/2022     04/15/2022    BUN 58 (H) 04/15/2022    CREATININE 8.41 (HH) 04/15/2022    AST 16 07/08/2021    ALT 10 07/08/2021    MG 2.6 01/28/2021    APTT 27.9 11/02/2018    INR 0.9 01/27/2021      Lab Results   Component Value Date/Time    COVID19 NEGATIVE 11/09/2021 11:05 AM       RADIOLOGY:   Images were personally reviewed including:  MRA head wo con 4/15/2022  1. No acute intracranial abnormality. No acute infarct. 2. Minimal global parenchymal volume loss with mild chronic microvascular   ischemic changes. 3. Trace right mastoid effusion. 4. No significant stenosis of the [id number]. 5. A 1 mm outpouching is seen arising from the right A2 segment.   This may   represent an infundibulum versus a tiny aneurysm. MRA 8/11/2022  Impression   1. No significant stenosis of the visualized [id number]. 2. No significant change in the 1 mm outpouching arising from the right A2   segment, which may represent an infundibulum versus a tiny aneurysm. ASSESSMENT:   64yo female with complex pmh including ANCA vasculitis, ESRD 2/2 above on HD MWF, [place], htn. Hld, gerd, luna esophagus, RA, hyperparathyroid, MGUS. Pt had syncope vs sz 4/14/2022, pt referred for incidental R A2 1mm aneurysm vs infundibulum found on workup. Never smoker, no fhx aneurysm. PHASES score 5, 1.3% 5y risk. Dx angio indicated to further eval.    PLAN:   Annual MRA to monitor this small outpouching  Should not be of any concern for her kidney transplant   RTC in one year    Consultation Virtual Telephonic Visit Time:  40 minutes       The findings and the plan discussed with the patient and all her questions were answered. Thank you very much for your referral, please do not hesitate to contact me with any questions.       Tiffanie Pineda MD, MS  Stroke, [facility]  [id number]  Electronically signed 8/16/2022 at 3:00 PM

## 2022-08-24 RX ORDER — ONDANSETRON 4 MG/1
4 TABLET, FILM COATED ORAL EVERY 8 HOURS PRN
Qty: 15 TABLET | Refills: 0 | Status: SHIPPED | OUTPATIENT
Start: 2022-08-24 | End: 2022-08-29

## 2022-08-24 RX ORDER — MECLIZINE HYDROCHLORIDE 25 MG/1
25 TABLET ORAL 3 TIMES DAILY PRN
Qty: 30 TABLET | Refills: 0 | Status: SHIPPED | OUTPATIENT
Start: 2022-08-24

## 2022-08-24 NOTE — TELEPHONE ENCOUNTER
Health Maintenance   Topic Date Due    Lipids  07/08/2022    COVID-19 Vaccine (3 - Booster for Pfizer series) 11/18/2022 (Originally 8/31/2021)    Hepatitis B vaccine (4 of 4 - Risk Engerix-B 4-dose series) 04/28/2023 (Originally 10/21/2021)    Flu vaccine (1) 09/01/2022    Depression Monitoring  04/28/2023    Breast cancer screen  01/20/2024    Pneumococcal 0-64 years Vaccine (3 - PPSV23 or PCV20) 05/14/2026    Colorectal Cancer Screen  10/14/2026    DTaP/Tdap/Td vaccine (2 - Td or Tdap) 09/06/2028    Shingles vaccine  Completed    Hepatitis C screen  Completed    HIV screen  Completed    Hepatitis A vaccine  Aged Out    Hib vaccine  Aged Out    Meningococcal (ACWY) vaccine  Aged Out             (applicable per patient's age: Cancer Screenings, Depression Screening, Fall Risk Screening, Immunizations)    Hemoglobin A1C (%)   Date Value   07/08/2021 5.4   11/03/2020 5.6   09/06/2019 5.8     Microalb/Crt. Ratio (mcg/mg creat)   Date Value   11/18/2019 3,897 (H)     LDL Cholesterol (mg/dL)   Date Value   07/08/2021 65     AST (IU/L)   Date Value   07/08/2021 16     ALT (IU/L)   Date Value   07/08/2021 10     BUN (mg/dL)   Date Value   04/15/2022 58 (H)      (goal A1C is < 7)   (goal LDL is <100) need 30-50% reduction from baseline     BP Readings from Last 3 Encounters:   05/06/22 108/68   04/28/22 122/70   04/15/22 119/70    (goal /80)      All Future Testing planned in [facility]:  Lab Frequency Next Occurrence   MRA HEAD WO CONTRAST Once 08/16/2022   Transfuse RBC TRANSFUSE 1 UNIT        Next Visit Date:  No future appointments.          Patient Active Problem List:     JESS (acute kidney injury) ([facility] [address]     Arteritis ([facility] [address]     End stage renal disease (HCC)     ANCA-associated vasculitis (HCC)     Rectocele     Retinal edema of both eyes     CKD (chronic kidney disease) stage 4, GFR 15-29 ml/min (HCC)     Depression with anxiety     Essential hypertension     Rheumatoid arthritis with positive rheumatoid factor ([facility] [address]     S/P DOMO-BSO     Endometrial hyperplasia without atypia, simple     Postural vertigo     Morbid obesity (HCC)     Allergic rhinitis     Iron deficiency anemia     Mixed hyperlipidemia     Urinary frequency     MGUS (monoclonal gammopathy of unknown significance)     Anti-cyclic citrullinated peptide antibody positive     Bilateral hip pain     Bilateral knee pain     Cancer of uterus (HCC)     Elevated C-reactive protein (CRP)     Elevated white blood cell count, unspecified     Essential thrombocythemia (HCC)     Greater trochanteric bursitis of both hips     Microscopic polyangiitis (HCC)     Osteoarthritis of right acromioclavicular joint     Osteoarthritis of sacroiliac joint ([facility] [address]     Other intervertebral disc degeneration, thoracic region     Patient nonadherence     Pes anserinus bursitis of both knees     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     Episode of recurrent major depressive disorder (HCC)     Anemia in stage 5 chronic kidney disease, not on chronic dialysis (HCC)     Non-surgical abdominal pain     Lumbar pain     Uremia     MARY (obstructive sleep apnea)     Acute renal failure (HCC)     Syncope and collapse     Dialysis patient [facility])     possible right A2 Cerebral aneurysm

## 2022-08-24 NOTE — TELEPHONE ENCOUNTER
Health Maintenance   Topic Date Due    Lipids  07/08/2022    COVID-19 Vaccine (3 - Booster for Pfizer series) 11/18/2022 (Originally 8/31/2021)    Hepatitis B vaccine (4 of 4 - Risk Engerix-B 4-dose series) 04/28/2023 (Originally 10/21/2021)    Flu vaccine (1) 09/01/2022    Depression Monitoring  04/28/2023    Breast cancer screen  01/20/2024    Pneumococcal 0-64 years Vaccine (3 - PPSV23 or PCV20) 05/14/2026    Colorectal Cancer Screen  10/14/2026    DTaP/Tdap/Td vaccine (2 - Td or Tdap) 09/06/2028    Shingles vaccine  Completed    Hepatitis C screen  Completed    HIV screen  Completed    Hepatitis A vaccine  Aged Out    Hib vaccine  Aged Out    Meningococcal (ACWY) vaccine  Aged Out             (applicable per patient's age: Cancer Screenings, Depression Screening, Fall Risk Screening, Immunizations)    Hemoglobin A1C (%)   Date Value   07/08/2021 5.4   11/03/2020 5.6   09/06/2019 5.8     Microalb/Crt. Ratio (mcg/mg creat)   Date Value   11/18/2019 3,897 (H)     LDL Cholesterol (mg/dL)   Date Value   07/08/2021 65     AST (IU/L)   Date Value   07/08/2021 16     ALT (IU/L)   Date Value   07/08/2021 10     BUN (mg/dL)   Date Value   04/15/2022 58 (H)      (goal A1C is < 7)   (goal LDL is <100) need 30-50% reduction from baseline     BP Readings from Last 3 Encounters:   05/06/22 108/68   04/28/22 122/70   04/15/22 119/70    (goal /80)      All Future Testing planned in [facility]:  Lab Frequency Next Occurrence   MRA HEAD WO CONTRAST Once 08/16/2022   Transfuse RBC TRANSFUSE 1 UNIT        Next Visit Date:  No future appointments.          Patient Active Problem List:     JESS (acute kidney injury) ([facility] [address]     Arteritis ([facility] [address]     End stage renal disease (HCC)     ANCA-associated vasculitis (HCC)     Rectocele     Retinal edema of both eyes     CKD (chronic kidney disease) stage 4, GFR 15-29 ml/min (HCC)     Depression with anxiety     Essential hypertension     Rheumatoid arthritis with positive rheumatoid factor ([facility] [address]     S/P DOMO-BSO     Endometrial hyperplasia without atypia, simple     Postural vertigo     Morbid obesity (HCC)     Allergic rhinitis     Iron deficiency anemia     Mixed hyperlipidemia     Urinary frequency     MGUS (monoclonal gammopathy of unknown significance)     Anti-cyclic citrullinated peptide antibody positive     Bilateral hip pain     Bilateral knee pain     Cancer of uterus (HCC)     Elevated C-reactive protein (CRP)     Elevated white blood cell count, unspecified     Essential thrombocythemia (HCC)     Greater trochanteric bursitis of both hips     Microscopic polyangiitis (HCC)     Osteoarthritis of right acromioclavicular joint     Osteoarthritis of sacroiliac joint ([facility] [address]     Other intervertebral disc degeneration, thoracic region     Patient nonadherence     Pes anserinus bursitis of both knees     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     Episode of recurrent major depressive disorder (HCC)     Anemia in stage 5 chronic kidney disease, not on chronic dialysis (HCC)     Non-surgical abdominal pain     Lumbar pain     Uremia     MARY (obstructive sleep apnea)     Acute renal failure (HCC)     Syncope and collapse     Dialysis patient [facility])     possible right A2 Cerebral aneurysm

## 2022-09-02 DIAGNOSIS — J34.89 NASAL DRAINAGE: ICD-10-CM

## 2022-09-02 RX ORDER — GUAIFENESIN 600 MG/1
600 TABLET, EXTENDED RELEASE ORAL 2 TIMES DAILY
Qty: 30 TABLET | Refills: 0 | Status: SHIPPED | OUTPATIENT
Start: 2022-09-02 | End: 2022-09-17

## 2022-09-20 RX ORDER — PANTOPRAZOLE SODIUM 40 MG/1
TABLET, DELAYED RELEASE ORAL
Qty: 180 TABLET | Refills: 3 | OUTPATIENT
Start: 2022-09-20

## 2022-09-22 DIAGNOSIS — D47.2 MGUS (MONOCLONAL GAMMOPATHY OF UNKNOWN SIGNIFICANCE): Primary | ICD-10-CM

## 2022-09-23 ENCOUNTER — HOSPITAL ENCOUNTER (OUTPATIENT)
Age: 57
Setting detail: SPECIMEN
Discharge: HOME | End: 2022-09-23
Payer: MEDICARE

## 2022-09-23 DIAGNOSIS — D47.2 MGUS (MONOCLONAL GAMMOPATHY OF UNKNOWN SIGNIFICANCE): ICD-10-CM

## 2022-09-23 LAB
ABSOLUTE EOS #: 0.52 K/UL (ref 0–0.44)
ABSOLUTE IMMATURE GRANULOCYTE: 0.03 K/UL (ref 0–0.3)
ABSOLUTE LYMPH #: 1.29 K/UL (ref 1.1–3.7)
ABSOLUTE MONO #: 0.62 K/UL (ref 0.1–1.2)
ALBUMIN SERPL-MCNC: 3.6 G/DL (ref 3.5–5.2)
ALBUMIN/GLOBULIN RATIO: 1.2 (ref 1–2.5)
ALP BLD-CCNC: 106 U/L (ref 35–104)
ALT SERPL-CCNC: 17 U/L (ref 5–33)
ANION GAP SERPL CALCULATED.3IONS-SCNC: 18 MMOL/L (ref 9–17)
AST SERPL-CCNC: 20 U/L
BASOPHILS # BLD: 1 % (ref 0–2)
BASOPHILS ABSOLUTE: 0.07 K/UL (ref 0–0.2)
BILIRUB SERPL-MCNC: 0.3 MG/DL (ref 0.3–1.2)
BUN BLDV-MCNC: 57 MG/DL (ref 6–20)
BUN/CREAT BLD: 10 (ref 9–20)
CALCIUM SERPL-MCNC: 8.4 MG/DL (ref 8.6–10.4)
CHLORIDE BLD-SCNC: 93 MMOL/L (ref 98–107)
CO2: 22 MMOL/L (ref 20–31)
CREAT SERPL-MCNC: 5.84 MG/DL (ref 0.5–0.9)
EOSINOPHILS RELATIVE PERCENT: 5 % (ref 1–4)
FREE KAPPA/LAMBDA RATIO: 1.65 (ref 0.26–1.65)
GFR AFRICAN AMERICAN: 9 ML/MIN
GFR NON-AFRICAN AMERICAN: 7 ML/MIN
GFR SERPL CREATININE-BSD FRML MDRD: ABNORMAL ML/MIN/{1.73_M2}
GFR SERPL CREATININE-BSD FRML MDRD: ABNORMAL ML/MIN/{1.73_M2}
GLUCOSE BLD-MCNC: 133 MG/DL (ref 70–99)
HCT VFR BLD CALC: 32.6 % (ref 36.3–47.1)
HEMOGLOBIN: 11.2 G/DL (ref 11.9–15.1)
IMMATURE GRANULOCYTES: 0 %
KAPPA FREE LIGHT CHAINS QNT: 13.2 MG/DL (ref 0.37–1.94)
LAMBDA FREE LIGHT CHAINS QNT: 7.99 MG/DL (ref 0.57–2.63)
LYMPHOCYTES # BLD: 13 % (ref 24–43)
MCH RBC QN AUTO: 35.9 PG (ref 25.2–33.5)
MCHC RBC AUTO-ENTMCNC: 34.4 G/DL (ref 28.4–34.8)
MCV RBC AUTO: 104.5 FL (ref 82.6–102.9)
MONOCYTES # BLD: 6 % (ref 3–12)
NRBC AUTOMATED: 0 PER 100 WBC
PDW BLD-RTO: 13.3 % (ref 11.8–14.4)
PLATELET # BLD: 283 K/UL (ref 138–453)
PMV BLD AUTO: 9.6 FL (ref 8.1–13.5)
POTASSIUM SERPL-SCNC: 3.5 MMOL/L (ref 3.7–5.3)
RBC # BLD: 3.12 M/UL (ref 3.95–5.11)
SEG NEUTROPHILS: 75 % (ref 36–65)
SEGMENTED NEUTROPHILS ABSOLUTE COUNT: 7.61 K/UL (ref 1.5–8.1)
SODIUM BLD-SCNC: 133 MMOL/L (ref 135–144)
TOTAL PROTEIN: 6.7 G/DL (ref 6.4–8.3)
WBC # BLD: 10.1 K/UL (ref 3.5–11.3)

## 2022-09-23 PROCEDURE — 83521 IG LIGHT CHAINS FREE EACH: CPT

## 2022-09-23 PROCEDURE — 85025 COMPLETE CBC W/AUTO DIFF WBC: CPT

## 2022-09-23 PROCEDURE — 84155 ASSAY OF PROTEIN SERUM: CPT

## 2022-09-23 PROCEDURE — 84165 PROTEIN E-PHORESIS SERUM: CPT

## 2022-09-23 PROCEDURE — 80053 COMPREHEN METABOLIC PANEL: CPT

## 2022-09-26 LAB
ALBUMIN (CALCULATED): 3.5 G/DL (ref 3.2–5.2)
ALBUMIN PERCENT: 56 % (ref 45–65)
ALPHA 1 PERCENT: 3 % (ref 3–6)
ALPHA 2 PERCENT: 14 % (ref 6–13)
ALPHA-1-GLOBULIN: 0.2 G/DL (ref 0.1–0.4)
ALPHA-2-GLOBULIN: 0.9 G/DL (ref 0.5–0.9)
BETA GLOBULIN: 0.7 G/DL (ref 0.5–1.1)
BETA PERCENT: 11 % (ref 11–19)
GAMMA GLOBULIN %: 15 % (ref 9–20)
GAMMA GLOBULIN: 1 G/DL (ref 0.5–1.5)
PATHOLOGIST: ABNORMAL
PROTEIN ELECTROPHORESIS, SERUM: ABNORMAL
TOTAL PROT. SUM,%: 99 % (ref 98–102)
TOTAL PROT. SUM: 6.3 G/DL (ref 6.3–8.2)
TOTAL PROTEIN: 6.3 G/DL (ref 6.4–8.3)

## 2022-09-29 ENCOUNTER — OFFICE VISIT (OUTPATIENT)
Dept: PRIMARY CARE CLINIC | Age: 57
End: 2022-09-29
Payer: MEDICARE

## 2022-09-29 VITALS
OXYGEN SATURATION: 98 % | DIASTOLIC BLOOD PRESSURE: 74 MMHG | RESPIRATION RATE: 18 BRPM | HEART RATE: 101 BPM | HEIGHT: 62 IN | SYSTOLIC BLOOD PRESSURE: 134 MMHG | BODY MASS INDEX: 47.37 KG/M2 | TEMPERATURE: 97.4 F | WEIGHT: 257.4 LBS

## 2022-09-29 DIAGNOSIS — K21.00 GASTROESOPHAGEAL REFLUX DISEASE WITH ESOPHAGITIS WITHOUT HEMORRHAGE: ICD-10-CM

## 2022-09-29 DIAGNOSIS — E78.2 MIXED HYPERLIPIDEMIA: ICD-10-CM

## 2022-09-29 DIAGNOSIS — I10 ESSENTIAL HYPERTENSION: Primary | ICD-10-CM

## 2022-09-29 PROCEDURE — G8417 CALC BMI ABV UP PARAM F/U: HCPCS | Performed by: NURSE PRACTITIONER

## 2022-09-29 PROCEDURE — 1036F TOBACCO NON-USER: CPT | Performed by: NURSE PRACTITIONER

## 2022-09-29 PROCEDURE — 99214 OFFICE O/P EST MOD 30 MIN: CPT | Performed by: NURSE PRACTITIONER

## 2022-09-29 PROCEDURE — G8427 DOCREV CUR MEDS BY ELIG CLIN: HCPCS | Performed by: NURSE PRACTITIONER

## 2022-09-29 PROCEDURE — 3017F COLORECTAL CA SCREEN DOC REV: CPT | Performed by: NURSE PRACTITIONER

## 2022-09-29 RX ORDER — SEVELAMER CARBONATE 800 MG/1
TABLET, FILM COATED ORAL
COMMUNITY
Start: 2022-08-24

## 2022-09-29 RX ORDER — PANTOPRAZOLE SODIUM 40 MG/1
TABLET, DELAYED RELEASE ORAL
Qty: 180 TABLET | Refills: 1 | Status: SHIPPED | OUTPATIENT
Start: 2022-09-29

## 2022-09-29 SDOH — ECONOMIC STABILITY: FOOD INSECURITY: WITHIN THE PAST 12 MONTHS, THE FOOD YOU BOUGHT JUST DIDN'T LAST AND YOU DIDN'T HAVE MONEY TO GET MORE.: NEVER TRUE

## 2022-09-29 SDOH — ECONOMIC STABILITY: FOOD INSECURITY: WITHIN THE PAST 12 MONTHS, YOU WORRIED THAT YOUR FOOD WOULD RUN OUT BEFORE YOU GOT MONEY TO BUY MORE.: NEVER TRUE

## 2022-09-29 ASSESSMENT — ENCOUNTER SYMPTOMS
DIARRHEA: 0
SORE THROAT: 0
COUGH: 0
BELCHING: 1
HEARTBURN: 1
ABDOMINAL DISTENTION: 0
ORTHOPNEA: 0
VOMITING: 0
ABDOMINAL PAIN: 0
CONSTIPATION: 0
WHEEZING: 0
WATER BRASH: 0
NAUSEA: 0
BLURRED VISION: 0
GLOBUS SENSATION: 0
RHINORRHEA: 0
SHORTNESS OF BREATH: 0
CHOKING: 0

## 2022-09-29 ASSESSMENT — PATIENT HEALTH QUESTIONNAIRE - PHQ9
SUM OF ALL RESPONSES TO PHQ QUESTIONS 1-9: 0
5. POOR APPETITE OR OVEREATING: 0
4. FEELING TIRED OR HAVING LITTLE ENERGY: 0
8. MOVING OR SPEAKING SO SLOWLY THAT OTHER PEOPLE COULD HAVE NOTICED. OR THE OPPOSITE, BEING SO FIGETY OR RESTLESS THAT YOU HAVE BEEN MOVING AROUND A LOT MORE THAN USUAL: 0
6. FEELING BAD ABOUT YOURSELF - OR THAT YOU ARE A FAILURE OR HAVE LET YOURSELF OR YOUR FAMILY DOWN: 0
SUM OF ALL RESPONSES TO PHQ9 QUESTIONS 1 & 2: 0
10. IF YOU CHECKED OFF ANY PROBLEMS, HOW DIFFICULT HAVE THESE PROBLEMS MADE IT FOR YOU TO DO YOUR WORK, TAKE CARE OF THINGS AT HOME, OR GET ALONG WITH OTHER PEOPLE: 0
7. TROUBLE CONCENTRATING ON THINGS, SUCH AS READING THE NEWSPAPER OR WATCHING TELEVISION: 0
1. LITTLE INTEREST OR PLEASURE IN DOING THINGS: 0
9. THOUGHTS THAT YOU WOULD BE BETTER OFF DEAD, OR OF HURTING YOURSELF: 0
SUM OF ALL RESPONSES TO PHQ QUESTIONS 1-9: 0
3. TROUBLE FALLING OR STAYING ASLEEP: 0
2. FEELING DOWN, DEPRESSED OR HOPELESS: 0

## 2022-09-29 ASSESSMENT — SOCIAL DETERMINANTS OF HEALTH (SDOH): HOW HARD IS IT FOR YOU TO PAY FOR THE VERY BASICS LIKE FOOD, HOUSING, MEDICAL CARE, AND HEATING?: NOT HARD AT ALL

## 2022-09-29 NOTE — PATIENT INSTRUCTIONS
SURVEY:     You may be receiving a survey from [employer] regarding your visit today. Please complete the survey to enable us to provide the highest quality of care to you and your family. If you cannot score us a very good on any question, please call the office to discuss how we could have made your experience a very good one.      Thank you,    Cary Vargas, APRN-VANDANA Sosa, APRN-CNP  JOE Mars, QUINCY Daly, QUINCY Bland, CMA  Filomena, PCA  Mildred, PM

## 2022-09-29 NOTE — PROGRESS NOTES
Name: Lupe Maddox  : 1965         Chief Complaint:     Chief Complaint   Patient presents with    Hypertension     Routine no concerns     Hyperlipidemia       History of Present Illness:      Lupe Maddox is a 62 y.o.  female who presents with Hypertension (Routine no concerns ) and Hyperlipidemia      Hardeep is here today for a routine office visit. ESRD-stable, patient is on transplant list and continues hemodialysis at this point. Hypertension  This is a chronic problem. The current episode started more than 1 year ago. The problem has been gradually worsening since onset. The problem is uncontrolled. Associated symptoms include anxiety and peripheral edema. Pertinent negatives include no blurred vision, chest pain, headaches, malaise/fatigue, neck pain, orthopnea, palpitations, PND, shortness of breath or sweats. There are no associated agents to hypertension. Risk factors for coronary artery disease include family history, obesity, post-menopausal state and stress. Past treatments include angiotensin blockers, calcium channel blockers and diuretics. The current treatment provides mild improvement. Compliance problems include diet and exercise. Hypertensive end-organ damage includes kidney disease. There is no history of CAD/MI, CVA or heart failure. Identifiable causes of hypertension include chronic renal disease. Hyperlipidemia  This is a chronic problem. The current episode started more than 1 year ago. The problem is controlled. Recent lipid tests were reviewed and are normal. Exacerbating diseases include chronic renal disease and obesity. She has no history of liver disease. Factors aggravating her hyperlipidemia include fatty foods. Pertinent negatives include no chest pain, leg pain, myalgias or shortness of breath. Current antihyperlipidemic treatment includes statins. The current treatment provides moderate improvement of lipids.  Compliance problems include adherence to exercise Topic Date Due    Lipids  07/08/2022       Past Surgical History:     Past Surgical History:   Procedure Laterality Date    BONE MARROW BIOPSY  5-23-16    Per Hematologist order @ [facility]. CHOLECYSTECTOMY      COLONOSCOPY  2016    COLONOSCOPY N/A 10/14/2021    COLONOSCOPY BIOPSY/STOMA performed by Jose Antonio Oropeza DO at [address]  9/12/2018    ESOPHAGEAL DILATATION performed by Marleen Esqueda MD at [address] ([facility])  11/09/2016    with tubes and ovaries    PORT SURGERY Left 10/2020    port placement for dialysis    TOOTH EXTRACTION  07/26/2017    Right posterior lower tooth. TUBAL LIGATION      UPPER GASTROINTESTINAL ENDOSCOPY  09/12/2018    -dede,bx(Chicas's esophagus,neg H-Pylori)hiatal hernia,grade 2 reflux esophagitis    UPPER GASTROINTESTINAL ENDOSCOPY N/A 9/12/2018    EGD BIOPSY performed by Marleen Esqueda MD at [address]        Medications:       Prior to Admission medications    Medication Sig Start Date End Date Taking? Authorizing Provider   sevelamer (RENVELA) 800 MG tablet  8/24/22  Yes Historical Provider, MD   pantoprazole (PROTONIX) 40 MG tablet Take 1 tablet by mouth twice daily 9/29/22  Yes Juan Antonio Vargas APRN - CNP   meclizine (ANTIVERT) 25 MG tablet Take 1 tablet by mouth 3 times daily as needed for Dizziness 8/24/22  Yes Juan Antonio Vargas APRN - CNP   carvedilol (COREG) 3.125 MG tablet Take 1 tablet by mouth in the morning and 1 tablet before bedtime. 8/2/22  Yes Inez Ferro APRN - CNP   potassium chloride (KLOR-CON M) 20 MEQ extended release tablet Take 1 tablet by mouth in the morning.  8/1/22  Yes Juan Antonio Vargas, APRN - VANDANA   butalbital-acetaminophen-caffeine (FIORICET, ESGIC) -40 MG per tablet Take 1 tablet by mouth every 6 hours as needed for Headaches or Migraine 8/1/22  Yes Juan Antonio Vargas APRN - CNP   metoclopramide (REGLAN) 5 MG tablet Take 1 tablet by mouth 2 times daily 7/6/22  Yes Juan Antonio Vargas, APRN - VANDANA losartan (COZAAR) 25 MG tablet  7/5/22  Yes Claudell Lander, APRN - CNP   calcium acetate (PHOSLO) 667 MG CAPS capsule  6/28/22  Yes Naldo Campos MD   estrogens, conjugated, (PREMARIN) 0.625 MG tablet Take 1 tablet by mouth once daily 6/6/22  Yes ARISTIDES Zeng CNSOREN   DULoxetine (CYMBALTA) 30 MG extended release capsule Take 1 capsule by mouth once daily 6/3/22  Yes ARISTIDES Virgen CNP   aspirin 81 MG chewable tablet Take 81 mg by mouth daily   Yes Historical Provider, MD   Incontinence Supply Disposable (PREVAIL FOR WOMEN X-LARGE) MISC 2 each by Does not apply route daily 11/18/21  Yes ARISTIDES Virgen CNP   atorvastatin (LIPITOR) 80 MG tablet Take 1 tablet by mouth daily 11/9/21  Yes ARISTIDES Virgen CNP   fluticasone (FLONASE) 50 MCG/ACT nasal spray 1 spray by Each Nostril route daily for 10 days 9/16/21 9/29/22 Yes ARISTIDES Jameson CNP   furosemide (LASIX) 20 MG tablet Take 1 tablet by mouth twice daily 7/12/21  Yes Naldo Campos MD   B Complex-C-Folic Acid (KENDALL-DALY RX) 1 MG TABS TAKE 1 TABLET BY MOUTH ONCE DAILY 5/8/21  Yes Historical Provider, MD JOEL PAIN EASE AERO SPRAY ON DIALYSIS FISTULA PRIOR TO CANNULATION 5/7/21  Yes Historical Provider, MD   calcitRIOL (ROCALTROL) 0.25 MCG capsule Take 1 capsule by mouth daily 11/30/20  Yes Naldo Campos MD   dilTIAZem (CARDIZEM CD) 120 MG extended release capsule TAKE 1 CAPSULE BY MOUTH ONCE DAILY 2/21/20  Yes Naldo Campos MD   Blood Pressure KIT 1 Units by Does not apply route daily 11/7/19  Yes ARISTIDES Rios CNP   albuterol sulfate HFA (VENTOLIN HFA) 108 (90 Base) MCG/ACT inhaler Inhale 2 puffs into the lungs every 6 hours as needed for Wheezing 4/16/19  Yes ARISTIDES Virgen CNP   saline nasal gel (AYR) GEL by Nasal route 2 times daily 11/6/18  Yes Lala Gordillo MD   sodium chloride (ALTAMIST SPRAY) 0.65 % nasal spray 1 spray by Nasal route as needed for Congestion 11/6/18  Yes Lala Gordillo MD   vitamin D Positive for dizziness and light-headedness (after dialysis). Negative for syncope and headaches. Physical Exam:   Vitals:  /74 (Site: Left Upper Arm, Position: Sitting)   Pulse (!) 101   Temp 97.4 °F (36.3 °C) (Temporal)   Resp 18   Ht 5' 2\" (1.575 m)   Wt 257 lb 6.4 oz (116.8 kg)   LMP 09/09/2016   SpO2 98%   BMI 47.08 kg/m²     Physical Exam  Vitals and nursing note reviewed. Constitutional:       General: She is not in acute distress. Appearance: Normal appearance. She is obese. HENT:      Mouth/Throat:      Mouth: Mucous membranes are moist.   Eyes:      Conjunctiva/sclera: Conjunctivae normal.   Cardiovascular:      Rate and Rhythm: Normal rate and regular rhythm. Pulmonary:      Effort: Pulmonary effort is normal.      Breath sounds: Normal breath sounds. No wheezing or rales. Abdominal:      General: Bowel sounds are normal. There is no distension. Palpations: Abdomen is soft. Tenderness: There is no abdominal tenderness. Musculoskeletal:      Cervical back: Normal range of motion and neck supple. Right lower leg: Edema (trace) present. Left lower leg: Edema (trace) present. Lymphadenopathy:      Cervical: No cervical adenopathy. Skin:     General: Skin is warm and dry. Findings: No rash. Neurological:      Mental Status: She is alert and oriented to person, place, and time.    Psychiatric:         Mood and Affect: Mood normal.         Behavior: Behavior normal.       Data:     Lab Results   Component Value Date/Time     09/23/2022 07:50 AM    K 3.5 09/23/2022 07:50 AM    CL 93 09/23/2022 07:50 AM    CO2 22 09/23/2022 07:50 AM    BUN 57 09/23/2022 07:50 AM    CREATININE 5.84 09/23/2022 07:50 AM    GLUCOSE 133 09/23/2022 07:50 AM    GLUCOSE 103 11/09/2021 11:00 AM    PROT 6.3 09/23/2022 07:50 AM    PROT 6.7 09/23/2022 07:50 AM    PROT 7.5 07/08/2021 12:37 PM    LABALBU 3.6 09/23/2022 07:50 AM    LABALBU 4.1 07/08/2021 12:37 PM    BILITOT 0.3 09/23/2022 07:50 AM    ALKPHOS 106 09/23/2022 07:50 AM    AST 20 09/23/2022 07:50 AM    ALT 17 09/23/2022 07:50 AM     Lab Results   Component Value Date/Time    WBC 10.1 09/23/2022 07:50 AM    RBC 3.12 09/23/2022 07:50 AM    RBC 2.99 11/09/2021 11:00 AM    HGB 11.2 09/23/2022 07:50 AM    HCT 32.6 09/23/2022 07:50 AM    .5 09/23/2022 07:50 AM    MCH 35.9 09/23/2022 07:50 AM    MCHC 34.4 09/23/2022 07:50 AM    RDW 13.3 09/23/2022 07:50 AM     09/23/2022 07:50 AM    MPV 9.6 09/23/2022 07:50 AM     Lab Results   Component Value Date/Time    TSH 2.17 03/25/2019 09:13 AM     Lab Results   Component Value Date/Time    CHOL 189 07/08/2021 12:37 PM    HDL 54 07/08/2021 12:37 PM    LABA1C 5.4 07/08/2021 12:37 PM       Assessment/Plan:      Diagnosis Orders   1. Essential hypertension        2. Mixed hyperlipidemia        3. Gastroesophageal reflux disease with esophagitis without hemorrhage  pantoprazole (PROTONIX) 40 MG tablet        We will continue all current medications. Blood pressure is well controlled. Labs are stable. She will continue with dialysis. We will see her back in 6 months for routine visit, sooner if any issues. 1.  Lauren Kendallr received counseling on the following healthy behaviors: nutrition, exercise, and medication adherence  2. Patient given educational materials - see patient instructions  3. Was a self-tracking handout given in paper form or via [email or web address]? No  If yes, see orders or list here. 4.  Discussed use, benefit, and side effects of prescribed medications. Barriers to medication compliance addressed. All patient questions answered. Pt voiced understanding. 5.  Reviewed prior labs and health maintenance  6. Continue current medications, diet and exercise.     Completed Refills   Requested Prescriptions     Signed Prescriptions Disp Refills    pantoprazole (PROTONIX) 40 MG tablet 180 tablet 1     Sig: Take 1 tablet by mouth twice daily         Return in about 6 months (around 3/29/2023) for Check up.

## 2022-10-05 NOTE — FLOWSHEET NOTE
To xray for a CT of her head Post-Care Instructions: I reviewed with the patient in detail post-care instructions. Patient is to wear sunprotection, and avoid picking at any of the treated lesions. Pt may apply Vaseline to crusted or scabbing areas. Add 52 Modifier (Optional): no Duration Of Freeze Thaw-Cycle (Seconds): 5-10 Medical Necessity Information: It is in your best interest to select a reason for this procedure from the list below. All of these items fulfill various CMS LCD requirements except the new and changing color options. Consent: The patient's consent was obtained including but not limited to risks of crusting, scabbing, blistering, scarring, darker or lighter pigmentary change, recurrence, incomplete removal and infection. Medical Necessity Clause: This procedure was medically necessary because the lesions that were treated were: Show Applicator Variable?: Yes Number Of Freeze-Thaw Cycles: 2 freeze-thaw cycles Detail Level: Detailed Spray Paint Text: The liquid nitrogen was applied to the skin utilizing a spray paint frosting technique.

## 2022-11-14 DIAGNOSIS — E78.2 MIXED HYPERLIPIDEMIA: ICD-10-CM

## 2022-11-14 RX ORDER — ATORVASTATIN CALCIUM 80 MG/1
80 TABLET, FILM COATED ORAL DAILY
Qty: 90 TABLET | Refills: 3 | Status: SHIPPED | OUTPATIENT
Start: 2022-11-14

## 2022-11-14 NOTE — TELEPHONE ENCOUNTER
Health Maintenance   Topic Date Due    Lipids  07/08/2022    COVID-19 Vaccine (5 - Booster) 11/18/2022 (Originally 5/26/2021)    Flu vaccine (1) 09/29/2023 (Originally 8/1/2022)    Hepatitis B vaccine (5 of 5 - Risk Dialysis 4-dose series) 07/18/2023    Depression Monitoring  09/29/2023    Breast cancer screen  01/20/2024    Diabetes screen  07/08/2024    Pneumococcal 0-64 years Vaccine (3 - PPSV23 if available, else PCV20) 05/14/2026    Colorectal Cancer Screen  10/14/2026    DTaP/Tdap/Td vaccine (2 - Td or Tdap) 09/06/2028    Shingles vaccine  Completed    Hepatitis C screen  Completed    HIV screen  Completed    Hepatitis A vaccine  Aged Out    Hib vaccine  Aged Out    Meningococcal (ACWY) vaccine  Aged Out             (applicable per patient's age: Cancer Screenings, Depression Screening, Fall Risk Screening, Immunizations)    Hemoglobin A1C (%)   Date Value   07/08/2021 5.4   11/03/2020 5.6   09/06/2019 5.8     Microalb/Crt.  Ratio (mcg/mg creat)   Date Value   11/18/2019 3,897 (H)     LDL Cholesterol (mg/dL)   Date Value   07/08/2021 65     AST (U/L)   Date Value   09/23/2022 20     ALT (U/L)   Date Value   09/23/2022 17     BUN (mg/dL)   Date Value   09/23/2022 57 (H)      (goal A1C is < 7)   (goal LDL is <100) need 30-50% reduction from baseline     BP Readings from Last 3 Encounters:   09/29/22 134/74   05/06/22 108/68   04/28/22 122/70    (goal /80)      All Future Testing planned in [facility]:  Lab Frequency Next Occurrence   MRA HEAD WO CONTRAST Once 08/16/2022   Transfuse RBC TRANSFUSE 1 UNIT        Next Visit Date:  Future Appointments   Date Time Provider Tom Oneil   3/30/2023  9:40 AM Kiesha Vargas, APRN - CNP [facility]            Patient Active Problem List:     JESS (acute kidney injury) ([facility] [address]     Arteritis ([facility] [address]     End stage renal disease ([facility] [address]     ANCA-associated vasculitis     Rectocele     Retinal edema of both eyes     CKD (chronic kidney disease) stage 4, GFR 15-29 ml/min [facility])     Depression with anxiety     Essential hypertension     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)     S/P DOMO-BSO     Endometrial hyperplasia without atypia, simple     Postural vertigo     Morbid obesity (HCC)     Allergic rhinitis     Iron deficiency anemia     Mixed hyperlipidemia     Urinary frequency     MGUS (monoclonal gammopathy of unknown significance)     Anti-cyclic citrullinated peptide antibody positive     Bilateral hip pain     Bilateral knee pain     Cancer of uterus (HCC)     Elevated C-reactive protein (CRP)     Elevated white blood cell count, unspecified     Essential thrombocythemia (HCC)     Greater trochanteric bursitis of both hips     Microscopic polyangiitis (HCC)     Osteoarthritis of right acromioclavicular joint     Osteoarthritis of sacroiliac joint ([address]     Other intervertebral disc degeneration, thoracic region     Patient nonadherence     Pes anserinus bursitis of both knees     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     Episode of recurrent major depressive disorder (HCC)     Anemia in stage 5 chronic kidney disease, not on chronic dialysis (HCC)     Non-surgical abdominal pain     Lumbar pain     Uremia     MARY (obstructive sleep apnea)     Acute renal failure (HCC)     Syncope and collapse     Dialysis patient [facility])     possible right A2 Cerebral aneurysm

## 2022-11-22 RX ORDER — BUTALBITAL, ACETAMINOPHEN AND CAFFEINE 50; 325; 40 MG/1; MG/1; MG/1
1 TABLET ORAL EVERY 6 HOURS PRN
Qty: 120 TABLET | Refills: 0 | Status: SHIPPED | OUTPATIENT
Start: 2022-11-22

## 2022-11-22 NOTE — TELEPHONE ENCOUNTER
Health Maintenance   Topic Date Due    COVID-19 Vaccine (5 - Booster) 05/26/2021    Flu vaccine (1) 09/29/2023 (Originally 8/1/2022)    Hepatitis B vaccine (5 of 5 - Risk Dialysis 4-dose series) 07/18/2023    Depression Monitoring  09/29/2023    Lipids  11/17/2023    Breast cancer screen  01/20/2024    Diabetes screen  07/08/2024    Pneumococcal 0-64 years Vaccine (3 - PPSV23 if available, else PCV20) 05/14/2026    Colorectal Cancer Screen  10/14/2026    DTaP/Tdap/Td vaccine (2 - Td or Tdap) 09/06/2028    Shingles vaccine  Completed    Hepatitis C screen  Completed    HIV screen  Completed    Hepatitis A vaccine  Aged Out    Hib vaccine  Aged Out    Meningococcal (ACWY) vaccine  Aged Out             (applicable per patient's age: Cancer Screenings, Depression Screening, Fall Risk Screening, Immunizations)    Hemoglobin A1C (%)   Date Value   07/08/2021 5.4   11/03/2020 5.6   09/06/2019 5.8     Microalb/Crt.  Ratio (mcg/mg creat)   Date Value   11/18/2019 3,897 (H)     LDL Cholesterol (mg/dL)   Date Value   07/08/2021 65     AST (U/L)   Date Value   09/23/2022 20     ALT (U/L)   Date Value   09/23/2022 17     BUN (mg/dL)   Date Value   09/23/2022 57 (H)      (goal A1C is < 7)   (goal LDL is <100) need 30-50% reduction from baseline     BP Readings from Last 3 Encounters:   09/29/22 134/74   05/06/22 108/68   04/28/22 122/70    (goal /80)      All Future Testing planned in [facility]:  Lab Frequency Next Occurrence   MRA HEAD WO CONTRAST Once 08/16/2022   Transfuse RBC TRANSFUSE 1 UNIT        Next Visit Date:  Future Appointments   Date Time Provider Tom Oneil   3/30/2023  9:40 AM Franky Vargas, APRN - CNP [facility]            Patient Active Problem List:     JESS (acute kidney injury) ([facility] [address]     Arteritis ([address]     End stage renal disease ([facility] [address]     ANCA-associated vasculitis     Rectocele     Retinal edema of both eyes     CKD (chronic kidney disease) stage 4, GFR 15-29 ml/min ([facility] [address]     Depression with anxiety     Essential hypertension     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)     S/P DOMO-BSO     Endometrial hyperplasia without atypia, simple     Postural vertigo     Morbid obesity (HCC)     Allergic rhinitis     Iron deficiency anemia     Mixed hyperlipidemia     Urinary frequency     MGUS (monoclonal gammopathy of unknown significance)     Anti-cyclic citrullinated peptide antibody positive     Bilateral hip pain     Bilateral knee pain     Cancer of uterus (HCC)     Elevated C-reactive protein (CRP)     Elevated white blood cell count, unspecified     Essential thrombocythemia (HCC)     Greater trochanteric bursitis of both hips     Microscopic polyangiitis (HCC)     Osteoarthritis of right acromioclavicular joint     Osteoarthritis of sacroiliac joint ([address]     Other intervertebral disc degeneration, thoracic region     Patient nonadherence     Pes anserinus bursitis of both knees     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     Episode of recurrent major depressive disorder (HCC)     Anemia in stage 5 chronic kidney disease, not on chronic dialysis (HCC)     Non-surgical abdominal pain     Lumbar pain     Uremia     MARY (obstructive sleep apnea)     Acute renal failure (HCC)     Syncope and collapse     Dialysis patient [facility])     possible right A2 Cerebral aneurysm

## 2022-11-29 ENCOUNTER — OFFICE VISIT (OUTPATIENT)
Dept: PRIMARY CARE CLINIC | Age: 57
End: 2022-11-29
Payer: MEDICARE

## 2022-11-29 VITALS
OXYGEN SATURATION: 96 % | HEART RATE: 88 BPM | RESPIRATION RATE: 18 BRPM | BODY MASS INDEX: 47.08 KG/M2 | DIASTOLIC BLOOD PRESSURE: 82 MMHG | SYSTOLIC BLOOD PRESSURE: 128 MMHG | TEMPERATURE: 97.3 F | WEIGHT: 257.4 LBS

## 2022-11-29 DIAGNOSIS — J20.9 BRONCHITIS WITH BRONCHOSPASM: Primary | ICD-10-CM

## 2022-11-29 DIAGNOSIS — N18.6 END STAGE RENAL DISEASE (HCC): ICD-10-CM

## 2022-11-29 PROCEDURE — G8484 FLU IMMUNIZE NO ADMIN: HCPCS | Performed by: NURSE PRACTITIONER

## 2022-11-29 PROCEDURE — 99214 OFFICE O/P EST MOD 30 MIN: CPT | Performed by: NURSE PRACTITIONER

## 2022-11-29 PROCEDURE — 3017F COLORECTAL CA SCREEN DOC REV: CPT | Performed by: NURSE PRACTITIONER

## 2022-11-29 PROCEDURE — G8417 CALC BMI ABV UP PARAM F/U: HCPCS | Performed by: NURSE PRACTITIONER

## 2022-11-29 PROCEDURE — 3074F SYST BP LT 130 MM HG: CPT | Performed by: NURSE PRACTITIONER

## 2022-11-29 PROCEDURE — 1036F TOBACCO NON-USER: CPT | Performed by: NURSE PRACTITIONER

## 2022-11-29 PROCEDURE — 3078F DIAST BP <80 MM HG: CPT | Performed by: NURSE PRACTITIONER

## 2022-11-29 PROCEDURE — G8427 DOCREV CUR MEDS BY ELIG CLIN: HCPCS | Performed by: NURSE PRACTITIONER

## 2022-11-29 RX ORDER — DOXYCYCLINE HYCLATE 100 MG
100 TABLET ORAL 2 TIMES DAILY
Qty: 20 TABLET | Refills: 0 | Status: SHIPPED | OUTPATIENT
Start: 2022-11-29 | End: 2022-11-30 | Stop reason: ALTCHOICE

## 2022-11-29 RX ORDER — GUAIFENESIN AND CODEINE PHOSPHATE 100; 10 MG/5ML; MG/5ML
5 SOLUTION ORAL 4 TIMES DAILY PRN
Qty: 120 ML | Refills: 0 | Status: SHIPPED | OUTPATIENT
Start: 2022-11-29 | End: 2022-12-06

## 2022-11-29 ASSESSMENT — PATIENT HEALTH QUESTIONNAIRE - PHQ9
7. TROUBLE CONCENTRATING ON THINGS, SUCH AS READING THE NEWSPAPER OR WATCHING TELEVISION: 0
8. MOVING OR SPEAKING SO SLOWLY THAT OTHER PEOPLE COULD HAVE NOTICED. OR THE OPPOSITE, BEING SO FIGETY OR RESTLESS THAT YOU HAVE BEEN MOVING AROUND A LOT MORE THAN USUAL: 0
6. FEELING BAD ABOUT YOURSELF - OR THAT YOU ARE A FAILURE OR HAVE LET YOURSELF OR YOUR FAMILY DOWN: 0
9. THOUGHTS THAT YOU WOULD BE BETTER OFF DEAD, OR OF HURTING YOURSELF: 0
2. FEELING DOWN, DEPRESSED OR HOPELESS: 0
SUM OF ALL RESPONSES TO PHQ QUESTIONS 1-9: 0
4. FEELING TIRED OR HAVING LITTLE ENERGY: 0
1. LITTLE INTEREST OR PLEASURE IN DOING THINGS: 0
3. TROUBLE FALLING OR STAYING ASLEEP: 0
SUM OF ALL RESPONSES TO PHQ QUESTIONS 1-9: 0
SUM OF ALL RESPONSES TO PHQ9 QUESTIONS 1 & 2: 0
5. POOR APPETITE OR OVEREATING: 0
SUM OF ALL RESPONSES TO PHQ QUESTIONS 1-9: 0
10. IF YOU CHECKED OFF ANY PROBLEMS, HOW DIFFICULT HAVE THESE PROBLEMS MADE IT FOR YOU TO DO YOUR WORK, TAKE CARE OF THINGS AT HOME, OR GET ALONG WITH OTHER PEOPLE: 0
SUM OF ALL RESPONSES TO PHQ QUESTIONS 1-9: 0

## 2022-11-29 ASSESSMENT — ENCOUNTER SYMPTOMS
VOMITING: 1
ALLERGIC/IMMUNOLOGIC NEGATIVE: 1
COUGH: 1
WHEEZING: 1
SORE THROAT: 1
SHORTNESS OF BREATH: 1
SINUS PRESSURE: 1
EYES NEGATIVE: 1
NAUSEA: 1
RHINORRHEA: 1

## 2022-11-29 NOTE — PATIENT INSTRUCTIONS
SURVEY:     You may be receiving a survey from [employer] regarding your visit today. Please complete the survey to enable us to provide the highest quality of care to you and your family. If you cannot score us a very good on any question, please call the office to discuss how we could have made your experience a very good one.      Thank you,    Alexey Vargas, APRN-CNP  Radu Gomes, APRN-CNP  Justine Reyes, JOE Herron, QUINCY Walter, QUINCY Bland, CMA  Filomena, PCA  Mildred, PM

## 2022-11-29 NOTE — PROGRESS NOTES
[facility]YS[facility]  [place], [address] [facility] PRIMARY [facility]ARE  [address]  Dept: [phone]  Dept Fax: [phone]      Name: Zeina Lamar  : 1965         Chief Complaint:     Chief Complaint   Patient presents with    Generalized Body Aches     X 4 weeks. Pharyngitis     X 4 weeks. Headache     X 4 weeks. Cough     X 4 weeks. Congestion     X 4 weeks. History of Present Illness:      Zeina Lamar is a 62 y.o.  female who presents with Generalized Body Aches (X 4 weeks. ), Pharyngitis (X 4 weeks. //), Headache (X 4 weeks. //), Cough (X 4 weeks. //), and Congestion (X 4 weeks. )    Malik Barahona is here today for complaints of not feeling well for 4 weeks. She states it wasn't that bad in the beginning and treated it with OTC medications. She has had more intense body aches with worsening of cough. She is having a purulent productive cough that keeps her awake at night. She hasn't had a fever but has had some chills. She has a headache, rhinorrhea, congestion, and sore throat. She has had some wheezing and shortness of breath. More wheezing at night. Denies ear pain. She states that 2 days ago she started vomiting. She hasn't ate since but has been drinking and states she vomited 3 times yesterday. She is having nausea. She has taken Nyquil with no improvement. She states she can not sleep at night due to her cough and has been sleeping in the chair. She does think her body aches are contributed to her coughing so much and sleeping in chair. She currently has Dialysis 3 times a week. Past Medical History:     Past Medical History:   Diagnosis Date    JESS (acute kidney injury) ([facility] [address]     Anemia     Arthritis     Chronic kidney disease     Depression with anxiety 2016    Dialysis patient ([facility] [address] 4/15/2022    GERD (gastroesophageal reflux disease)     H/O echocardiogram 2016    EF 55%.  Mild mitral regurgitation. H/O tooth extraction 07/26/2017    Lower posterior right tooth. Hemodialysis patient [facility])     History of blood transfusion     x3. Last one in May 2016    Hyperlipidemia     Hypertensive crisis 8/24/2016    Kidney stones     Sciatica     Vasculitis ([facility] [address]       Reviewed all health maintenance requirements and ordered appropriate tests  Health Maintenance Due   Topic Date Due    COVID-19 Vaccine (5 - Booster) 05/26/2021       Past Surgical History:     Past Surgical History:   Procedure Laterality Date    BONE MARROW BIOPSY  5-23-16    Per Hematologist order @ [address] [place] [address]. CHOLECYSTECTOMY      COLONOSCOPY  2016    COLONOSCOPY N/A 10/14/2021    COLONOSCOPY BIOPSY/STOMA performed by Olga Cooley DO at [address]  9/12/2018    ESOPHAGEAL DILATATION performed by Kaylynn Roberts MD at [address] ([address] [place] [address])  11/09/2016    with tubes and ovaries    PORT SURGERY Left 10/2020    port placement for dialysis    TOOTH EXTRACTION  07/26/2017    Right posterior lower tooth. TUBAL LIGATION      UPPER GASTROINTESTINAL ENDOSCOPY  09/12/2018    -dede,bx(Chicas's esophagus,neg H-Pylori)hiatal hernia,grade 2 reflux esophagitis    UPPER GASTROINTESTINAL ENDOSCOPY N/A 9/12/2018    EGD BIOPSY performed by Kaylynn Roberts MD at [address]        Medications:       Prior to Admission medications    Medication Sig Start Date End Date Taking? Authorizing Provider   doxycycline hyclate (VIBRA-TABS) 100 MG tablet Take 1 tablet by mouth 2 times daily for 10 days 11/29/22 12/9/22 Yes Sydell Mcardle, APRN - CNP   guaiFENesin-codeine (TUSSI-ORGANIDIN NR) 100-10 MG/5ML syrup Take 5 mLs by mouth 4 times daily as needed for Cough or Congestion (May cause drowsiness.) for up to 7 days.  11/29/22 12/6/22 Yes Sydell Mcardle, APRN - CNP   butalbital-acetaminophen-caffeine (FIORICET, ESGIC) -40 MG per tablet Take 1 tablet by mouth every 6 hours as needed for Headaches or Migraine 11/22/22  Yes Cannon Goldmann Might, APRN - CNP   atorvastatin (LIPITOR) 80 MG tablet Take 1 tablet by mouth daily 11/14/22  Yes Cannon Goldmann Might, APRN - CNP   sevelamer (RENVELA) 800 MG tablet  8/24/22  Yes Historical Provider, MD   pantoprazole (PROTONIX) 40 MG tablet Take 1 tablet by mouth twice daily 9/29/22  Yes Cannon Goldmann Might, APRN - CNP   meclizine (ANTIVERT) 25 MG tablet Take 1 tablet by mouth 3 times daily as needed for Dizziness 8/24/22  Yes Cannon Goldmann Might, APRN - CNP   carvedilol (COREG) 3.125 MG tablet Take 1 tablet by mouth in the morning and 1 tablet before bedtime. 8/2/22  Yes ARISTIDES Mederos CNP   potassium chloride (KLOR-CON M) 20 MEQ extended release tablet Take 1 tablet by mouth in the morning.  8/1/22  Yes Cannon Goldmann Might, APRN - CNP   metoclopramide (REGLAN) 5 MG tablet Take 1 tablet by mouth 2 times daily 7/6/22  Yes Cannon Goldmann Might, APRN - CNP   losartan (COZAAR) 25 MG tablet  7/5/22  Yes ARISTIDES Poole CNP   calcium acetate (PHOSLO) 667 MG CAPS capsule  6/28/22  Yes Glenn Severino MD   estrogens, conjugated, (PREMARIN) 0.625 MG tablet Take 1 tablet by mouth once daily 6/6/22  Yes Cristela Leventhal, APRN - CNM   DULoxetine (CYMBALTA) 30 MG extended release capsule Take 1 capsule by mouth once daily 6/3/22  Yes Cannon Goldmann Might, APRN - CNP   aspirin 81 MG chewable tablet Take 81 mg by mouth daily   Yes Historical Provider, MD   Incontinence Supply Disposable (PREVAIL FOR WOMEN X-LARGE) MISC 2 each by Does not apply route daily 11/18/21  Yes Cannon Goldmann Might, APRN - CNP   fluticasone (FLONASE) 50 MCG/ACT nasal spray 1 spray by Each Nostril route daily for 10 days 9/16/21 11/29/22 Yes ARISTIDES Mederos CNP   furosemide (LASIX) 20 MG tablet Take 1 tablet by mouth twice daily 7/12/21  Yes MD BROOKE Correia Complex-C-Folic Acid (KENDALL-DALY RX) 1 MG TABS TAKE 1 TABLET BY MOUTH ONCE DAILY 5/8/21  Yes Historical Provider, MD JOEL PAIN EASE AERO SPRAY ON DIALYSIS FISTULA PRIOR TO CANNULATION 5/7/21  Yes Historical Provider, MD   calcitRIOL (ROCALTROL) 0.25 MCG capsule Take 1 capsule by mouth daily 11/30/20  Yes Lucio Kerns MD   dilTIAZem (CARDIZEM CD) 120 MG extended release capsule TAKE 1 CAPSULE BY MOUTH ONCE DAILY 2/21/20  Yes Lucio Kerns MD   Blood Pressure KIT 1 Units by Does not apply route daily 11/7/19  Yes ARISTIDES Rios CNP   albuterol sulfate HFA (VENTOLIN HFA) 108 (90 Base) MCG/ACT inhaler Inhale 2 puffs into the lungs every 6 hours as needed for Wheezing 4/16/19  Yes ARISTIDES Burkett CNP   saline nasal gel (AYR) GEL by Nasal route 2 times daily 11/6/18  Yes Debby Green MD   sodium chloride (ALTAMIST SPRAY) 0.65 % nasal spray 1 spray by Nasal route as needed for Congestion 11/6/18  Yes Debby Green MD   vitamin D (CHOLECALCIFEROL) 1000 UNIT TABS tablet Take 2,000 Units by mouth 2 times daily    Yes Historical Provider, MD   Docusate Calcium (STOOL SOFTENER PO) Take 100 mg by mouth as needed   Yes Historical Provider, MD   diphenhydrAMINE (BENADRYL) 25 MG capsule Take 25 mg by mouth every 6 hours as needed for Itching or Allergies Pt takes 2 capsules at HS \"due to hives from it being cold now\". Pt tho states these are NOT helping her this time. Yes Historical Provider, MD        Allergies:       Patient has no known allergies. Social History:     Tobacco:    reports that she has never smoked. She has never used smokeless tobacco.  Alcohol:      reports no history of alcohol use. Drug Use:  reports no history of drug use.     Family History:     Family History   Problem Relation Age of Onset    Cancer Mother     Diabetes Mother     Heart Disease Mother     High Blood Pressure Mother     Cancer Father         prostate    High Blood Pressure Father     Cancer Sister         ovarian    Diabetes Brother     Cancer Sister         ovarian    High Blood Pressure Brother     Diabetes Brother        Review of Systems:     Positive and Negative as described in HPI    Review of Systems   Constitutional:  Positive for appetite change, chills and fatigue. Negative for fever. HENT:  Positive for congestion, rhinorrhea, sinus pressure and sore throat. Negative for ear pain. Eyes: Negative. Respiratory:  Positive for cough, shortness of breath and wheezing. Cardiovascular: Negative. Gastrointestinal:  Positive for nausea and vomiting. Endocrine: Negative. Genitourinary: Negative. Musculoskeletal:  Positive for arthralgias and myalgias. Skin: Negative. Allergic/Immunologic: Negative. Neurological:  Positive for headaches. Hematological: Negative. Psychiatric/Behavioral: Negative. Physical Exam:   Vitals:  /82   Pulse 88   Temp 97.3 °F (36.3 °C) (Temporal)   Resp 18   Wt 257 lb 6.4 oz (116.8 kg)   LMP 09/09/2016   SpO2 96%   BMI 47.08 kg/m²     Physical Exam  Vitals and nursing note reviewed. Constitutional:       Appearance: Normal appearance. HENT:      Head: Normocephalic. Right Ear: Tympanic membrane normal.      Left Ear: Tympanic membrane normal.      Nose: Mucosal edema, congestion and rhinorrhea present. Rhinorrhea is clear. Right Turbinates: Swollen. Left Turbinates: Swollen (turbinates boggy). Mouth/Throat:      Lips: Pink. Mouth: Mucous membranes are moist.      Pharynx: Posterior oropharyngeal erythema present. Tonsils: No tonsillar exudate. Eyes:      Conjunctiva/sclera: Conjunctivae normal.      Pupils: Pupils are equal, round, and reactive to light. Cardiovascular:      Rate and Rhythm: Normal rate and regular rhythm. Heart sounds: Normal heart sounds. Pulmonary:      Effort: Pulmonary effort is normal.      Breath sounds: Examination of the right-lower field reveals decreased breath sounds. Decreased breath sounds present. No wheezing. Abdominal:      General: Bowel sounds are normal.      Palpations: Abdomen is soft.    Musculoskeletal:         General: Normal range of motion. Cervical back: Normal range of motion. Lymphadenopathy:      Cervical: Cervical adenopathy (anterior) present. Skin:     General: Skin is warm. Capillary Refill: Capillary refill takes less than 2 seconds. Neurological:      General: No focal deficit present. Mental Status: She is alert. Psychiatric:         Mood and Affect: Mood normal.         Behavior: Behavior normal.       Data:     Lab Results   Component Value Date/Time     09/23/2022 07:50 AM    K 3.5 09/23/2022 07:50 AM    CL 93 09/23/2022 07:50 AM    CO2 22 09/23/2022 07:50 AM    BUN 57 09/23/2022 07:50 AM    CREATININE 5.84 09/23/2022 07:50 AM    GLUCOSE 133 09/23/2022 07:50 AM    GLUCOSE 103 11/09/2021 11:00 AM    PROT 6.3 09/23/2022 07:50 AM    PROT 6.7 09/23/2022 07:50 AM    PROT 7.5 07/08/2021 12:37 PM    LABALBU 3.6 09/23/2022 07:50 AM    LABALBU 4.1 07/08/2021 12:37 PM    BILITOT 0.3 09/23/2022 07:50 AM    ALKPHOS 106 09/23/2022 07:50 AM    AST 20 09/23/2022 07:50 AM    ALT 17 09/23/2022 07:50 AM     Lab Results   Component Value Date/Time    WBC 10.1 09/23/2022 07:50 AM    RBC 3.12 09/23/2022 07:50 AM    RBC 2.99 11/09/2021 11:00 AM    HGB 11.2 09/23/2022 07:50 AM    HCT 32.6 09/23/2022 07:50 AM    .5 09/23/2022 07:50 AM    MCH 35.9 09/23/2022 07:50 AM    MCHC 34.4 09/23/2022 07:50 AM    RDW 13.3 09/23/2022 07:50 AM     09/23/2022 07:50 AM    MPV 9.6 09/23/2022 07:50 AM     Lab Results   Component Value Date/Time    TSH 2.17 03/25/2019 09:13 AM     Lab Results   Component Value Date/Time    CHOL 189 07/08/2021 12:37 PM    HDL 54 07/08/2021 12:37 PM    LABA1C 5.4 07/08/2021 12:37 PM       Assessment/Plan:      Diagnosis Orders   1. Bronchitis with bronchospasm  doxycycline hyclate (VIBRA-TABS) 100 MG tablet    guaiFENesin-codeine (TUSSI-ORGANIDIN NR) 100-10 MG/5ML syrup      2.  End stage renal disease [facility])          Practice meticulous handwashing and cover cough to prevent spread of infection  Encouraged to increase fluids and rest  Tylenol OTC PRN for pain, discomfort or fever as directed on package  Warm salt water gargles for sore throat  Cool mist humidifier  Hot tea with honey and lemon for cough and sore throat PRN  Start Doxycycline as prescribed. Cheritussin prescribed for increased cough. May use her Reglan she has prescribed for nausea. Slowly resume bland foods back into diet. Controlled Substance Monitoring:    Acute and Chronic Pain Monitoring:   RX Monitoring 11/29/2022   Periodic Controlled Substance Monitoring No signs of potential drug abuse or diversion identified. ;Assessed functional status. 1.  Cesario Colbert received counseling on the following healthy behaviors: nutrition, exercise, and medication adherence  2. Patient given educational materials - see patient instructions  3. Was a self-tracking handout given in paper form or via [employer]t? No  If yes, see orders or list here. 4.  Discussed use, benefit, and side effects of prescribed medications. Barriers to medication compliance addressed. All patient questions answered. Pt voiced understanding. 5.  Reviewed prior labs and health maintenance  6. Continue current medications, diet and exercise. Completed Refills   Requested Prescriptions     Signed Prescriptions Disp Refills    doxycycline hyclate (VIBRA-TABS) 100 MG tablet 20 tablet 0     Sig: Take 1 tablet by mouth 2 times daily for 10 days    guaiFENesin-codeine (TUSSI-ORGANIDIN NR) 100-10 MG/5ML syrup 120 mL 0     Sig: Take 5 mLs by mouth 4 times daily as needed for Cough or Congestion (May cause drowsiness.) for up to 7 days. No follow-ups on file.

## 2022-11-30 ENCOUNTER — TELEPHONE (OUTPATIENT)
Dept: PRIMARY CARE CLINIC | Age: 57
End: 2022-11-30

## 2022-11-30 DIAGNOSIS — J20.9 BRONCHITIS WITH BRONCHOSPASM: Primary | ICD-10-CM

## 2022-11-30 RX ORDER — AZITHROMYCIN 250 MG/1
250 TABLET, FILM COATED ORAL SEE ADMIN INSTRUCTIONS
Qty: 6 TABLET | Refills: 0 | Status: SHIPPED | OUTPATIENT
Start: 2022-11-30 | End: 2022-12-05

## 2022-11-30 NOTE — TELEPHONE ENCOUNTER
Patient's  contacted the office in regards to patient taking two doses of the Doxycycline and experiencing stomach cramping. Patient is wanting to try a different medication. Patient did take medication with food and water. Please advise.

## 2022-12-19 DIAGNOSIS — F41.8 DEPRESSION WITH ANXIETY: Chronic | ICD-10-CM

## 2022-12-19 DIAGNOSIS — N95.1 MENOPAUSAL SYMPTOM: ICD-10-CM

## 2022-12-19 RX ORDER — DULOXETIN HYDROCHLORIDE 30 MG/1
CAPSULE, DELAYED RELEASE ORAL
Qty: 90 CAPSULE | Refills: 1 | Status: SHIPPED | OUTPATIENT
Start: 2022-12-19

## 2022-12-19 NOTE — TELEPHONE ENCOUNTER
Health Maintenance   Topic Date Due    COVID-19 Vaccine (5 - Booster) 05/26/2021    Flu vaccine (1) 09/29/2023 (Originally 8/1/2022)    Hepatitis B vaccine (6 of 6 - Risk Dialysis 4-dose series) 07/18/2023    Lipids  11/17/2023    Depression Monitoring  11/29/2023    Breast cancer screen  01/20/2024    Diabetes screen  07/08/2024    Pneumococcal 0-64 years Vaccine (3 - PPSV23 if available, else PCV20) 05/14/2026    Colorectal Cancer Screen  10/14/2026    DTaP/Tdap/Td vaccine (2 - Td or Tdap) 09/06/2028    Shingles vaccine  Completed    Hepatitis C screen  Completed    HIV screen  Completed    Hepatitis A vaccine  Aged Out    Hib vaccine  Aged Out    Meningococcal (ACWY) vaccine  Aged Out             (applicable per patient's age: Cancer Screenings, Depression Screening, Fall Risk Screening, Immunizations)    Hemoglobin A1C (%)   Date Value   07/08/2021 5.4   11/03/2020 5.6   09/06/2019 5.8     Microalb/Crt.  Ratio (mcg/mg creat)   Date Value   11/18/2019 3,897 (H)     LDL Cholesterol (mg/dL)   Date Value   07/08/2021 65     AST (U/L)   Date Value   09/23/2022 20     ALT (U/L)   Date Value   09/23/2022 17     BUN (mg/dL)   Date Value   09/23/2022 57 (H)      (goal A1C is < 7)   (goal LDL is <100) need 30-50% reduction from baseline     BP Readings from Last 3 Encounters:   11/29/22 128/82   09/29/22 134/74   05/06/22 108/68    (goal /80)      All Future Testing planned in [facility]:  Lab Frequency Next Occurrence   MRA HEAD WO CONTRAST Once 08/16/2022   Transfuse RBC TRANSFUSE 1 UNIT        Next Visit Date:  Future Appointments   Date Time Provider Tom Oneil   3/21/2023 11:20 AM ARISTIDES Westbrook - CNM [facility]   3/30/2023  9:40 AM Franky Vargas APRN - CNP [facility]            Patient Active Problem List:     JESS (acute kidney injury) ([facility] [address]     Arteritis ([address]     End stage renal disease ([facility] [address]     ANCA-associated vasculitis     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)     S/P DOMO-BSO     Endometrial hyperplasia without atypia, simple     Postural vertigo     Morbid obesity (HCC)     Allergic rhinitis     Iron deficiency anemia     Mixed hyperlipidemia     Urinary frequency     MGUS (monoclonal gammopathy of unknown significance)     Anti-cyclic citrullinated peptide antibody positive     Bilateral hip pain     Bilateral knee pain     Cancer of uterus (HCC)     Elevated C-reactive protein (CRP)     Elevated white blood cell count, unspecified     Essential thrombocythemia (HCC)     Greater trochanteric bursitis of both hips     Microscopic polyangiitis (HCC)     Osteoarthritis of right acromioclavicular joint     Osteoarthritis of sacroiliac joint ([address]     Other intervertebral disc degeneration, thoracic region     Patient nonadherence     Pes anserinus bursitis of both knees     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     Episode of recurrent major depressive disorder (HCC)     Anemia in stage 5 chronic kidney disease, not on chronic dialysis (HCC)     Non-surgical abdominal pain     Lumbar pain     Uremia     MARY (obstructive sleep apnea)     Acute renal failure (HCC)     Syncope and collapse     Dialysis patient [facility])     possible right A2 Cerebral aneurysm

## 2023-01-04 RX ORDER — ONDANSETRON 4 MG/1
4 TABLET, FILM COATED ORAL EVERY 8 HOURS PRN
Qty: 15 TABLET | Refills: 0 | Status: SHIPPED | OUTPATIENT
Start: 2023-01-04 | End: 2023-01-09

## 2023-01-04 RX ORDER — MECLIZINE HYDROCHLORIDE 25 MG/1
25 TABLET ORAL 3 TIMES DAILY PRN
Qty: 30 TABLET | Refills: 0 | Status: SHIPPED | OUTPATIENT
Start: 2023-01-04

## 2023-01-04 NOTE — TELEPHONE ENCOUNTER
Health Maintenance   Topic Date Due    COVID-19 Vaccine (5 - Booster) 05/26/2021    Flu vaccine (1) 09/29/2023 (Originally 8/1/2022)    Hepatitis B vaccine (6 of 6 - Risk Dialysis 4-dose series) 07/18/2023    Lipids  11/17/2023    Depression Monitoring  11/29/2023    Breast cancer screen  01/20/2024    Diabetes screen  07/08/2024    Pneumococcal 0-64 years Vaccine (3 - PPSV23 if available, else PCV20) 05/14/2026    Colorectal Cancer Screen  10/14/2026    DTaP/Tdap/Td vaccine (2 - Td or Tdap) 09/06/2028    Shingles vaccine  Completed    Hepatitis C screen  Completed    HIV screen  Completed    Hepatitis A vaccine  Aged Out    Hib vaccine  Aged Out    Meningococcal (ACWY) vaccine  Aged Out             (applicable per patient's age: Cancer Screenings, Depression Screening, Fall Risk Screening, Immunizations)    Hemoglobin A1C (%)   Date Value   07/08/2021 5.4   11/03/2020 5.6   09/06/2019 5.8     Microalb/Crt.  Ratio (mcg/mg creat)   Date Value   11/18/2019 3,897 (H)     LDL Cholesterol (mg/dL)   Date Value   07/08/2021 65     AST (U/L)   Date Value   09/23/2022 20     ALT (U/L)   Date Value   09/23/2022 17     BUN (mg/dL)   Date Value   09/23/2022 57 (H)      (goal A1C is < 7)   (goal LDL is <100) need 30-50% reduction from baseline     BP Readings from Last 3 Encounters:   11/29/22 128/82   09/29/22 134/74   05/06/22 108/68    (goal /80)      All Future Testing planned in [facility]:  Lab Frequency Next Occurrence   MRA HEAD WO CONTRAST Once 08/16/2022   Transfuse RBC TRANSFUSE 1 UNIT        Next Visit Date:  Future Appointments   Date Time Provider Tom Oneil   3/21/2023 11:20 AM Rocío File, APRN - CNM [facility]   3/30/2023  9:40 AM Mitzy Drilling Might, APRN - CNP [facility]            Patient Active Problem List:     JESS (acute kidney injury) ([facility] [address]     Arteritis ([address]     End stage renal disease ([facility] [address]     ANCA-associated vasculitis     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)     S/P DOMO-BSO     Endometrial hyperplasia without atypia, simple     Postural vertigo     Morbid obesity (HCC)     Allergic rhinitis     Iron deficiency anemia     Mixed hyperlipidemia     Urinary frequency     MGUS (monoclonal gammopathy of unknown significance)     Anti-cyclic citrullinated peptide antibody positive     Bilateral hip pain     Bilateral knee pain     Cancer of uterus (HCC)     Elevated C-reactive protein (CRP)     Elevated white blood cell count, unspecified     Essential thrombocythemia (HCC)     Greater trochanteric bursitis of both hips     Microscopic polyangiitis (HCC)     Osteoarthritis of right acromioclavicular joint     Osteoarthritis of sacroiliac joint ([address]     Other intervertebral disc degeneration, thoracic region     Patient nonadherence     Pes anserinus bursitis of both knees     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     Episode of recurrent major depressive disorder (HCC)     Anemia in stage 5 chronic kidney disease, not on chronic dialysis (HCC)     Non-surgical abdominal pain     Lumbar pain     Uremia     MARY (obstructive sleep apnea)     Acute renal failure (HCC)     Syncope and collapse     Dialysis patient [facility])     possible right A2 Cerebral aneurysm

## 2023-01-13 DIAGNOSIS — R14.0 BLOATING: ICD-10-CM

## 2023-01-13 RX ORDER — METOCLOPRAMIDE 5 MG/1
5 TABLET ORAL 2 TIMES DAILY
Qty: 180 TABLET | Refills: 1 | Status: SHIPPED | OUTPATIENT
Start: 2023-01-13

## 2023-01-13 NOTE — TELEPHONE ENCOUNTER
Health Maintenance   Topic Date Due    COVID-19 Vaccine (5 - Booster) 05/26/2021    Flu vaccine (1) 09/29/2023 (Originally 8/1/2022)    Hepatitis B vaccine (6 of 6 - Risk Dialysis 4-dose series) 07/18/2023    Lipids  11/17/2023    Depression Monitoring  11/29/2023    Breast cancer screen  01/20/2024    Diabetes screen  07/08/2024    Pneumococcal 0-64 years Vaccine (3 - PPSV23 if available, else PCV20) 05/14/2026    Colorectal Cancer Screen  10/14/2026    DTaP/Tdap/Td vaccine (2 - Td or Tdap) 09/06/2028    Shingles vaccine  Completed    Hepatitis C screen  Completed    HIV screen  Completed    Hepatitis A vaccine  Aged Out    Hib vaccine  Aged Out    Meningococcal (ACWY) vaccine  Aged Out             (applicable per patient's age: Cancer Screenings, Depression Screening, Fall Risk Screening, Immunizations)    Hemoglobin A1C (%)   Date Value   07/08/2021 5.4   11/03/2020 5.6   09/06/2019 5.8     Microalb/Crt. Ratio (mcg/mg creat)   Date Value   11/18/2019 3,897 (H)     LDL Cholesterol (mg/dL)   Date Value   07/08/2021 65     AST (U/L)   Date Value   09/23/2022 20     ALT (U/L)   Date Value   09/23/2022 17     BUN (mg/dL)   Date Value   09/23/2022 57 (H)      (goal A1C is < 7)   (goal LDL is <100) need 30-50% reduction from baseline     BP Readings from Last 3 Encounters:   11/29/22 128/82   09/29/22 134/74   05/06/22 108/68    (goal /80)      All Future Testing planned in [facility]:  Lab Frequency Next Occurrence   MRA HEAD WO CONTRAST Once 08/16/2022   Transfuse RBC TRANSFUSE 1 UNIT        Next Visit Date:  Future Appointments   Date Time Provider Department Center   2/17/2023  3:30 PM [facility] MAMMOGRAPHY ROOM AT [facility]   3/21/2023 11:20 AM ARISTIDES Garcia - SHELLIE [facility]   3/30/2023  9:40 AM ARISTIDES Strickland - CNP [facility]            Patient Active Problem List:     JESS (acute kidney injury) (HCC)     Arteritis (HCC)     End stage renal disease (HCC)     ANCA-associated  vasculitis     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)     S/P DOMO-BSO     Endometrial hyperplasia without atypia, simple     Postural vertigo     Morbid obesity (HCC)     Allergic rhinitis     Iron deficiency anemia     Mixed hyperlipidemia     Urinary frequency     MGUS (monoclonal gammopathy of unknown significance)     Anti-cyclic citrullinated peptide antibody positive     Bilateral hip pain     Bilateral knee pain     Cancer of uterus (HCC)     Elevated C-reactive protein (CRP)     Elevated white blood cell count, unspecified     Essential thrombocythemia (HCC)     Greater trochanteric bursitis of both hips     Microscopic polyangiitis (HCC)     Osteoarthritis of right acromioclavicular joint     Osteoarthritis of sacroiliac joint ([address]     Other intervertebral disc degeneration, thoracic region     Patient nonadherence     Pes anserinus bursitis of both knees     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     Episode of recurrent major depressive disorder (HCC)     Anemia in stage 5 chronic kidney disease, not on chronic dialysis (HCC)     Non-surgical abdominal pain     Lumbar pain     Uremia     MARY (obstructive sleep apnea)     Acute renal failure (HCC)     Syncope and collapse     Dialysis patient [facility])     possible right A2 Cerebral aneurysm

## 2023-01-19 ENCOUNTER — OFFICE VISIT (OUTPATIENT)
Dept: PRIMARY CARE CLINIC | Age: 58
End: 2023-01-19
Payer: MEDICARE

## 2023-01-19 VITALS
WEIGHT: 262.6 LBS | SYSTOLIC BLOOD PRESSURE: 132 MMHG | RESPIRATION RATE: 18 BRPM | TEMPERATURE: 97.9 F | OXYGEN SATURATION: 95 % | DIASTOLIC BLOOD PRESSURE: 84 MMHG | BODY MASS INDEX: 48.03 KG/M2 | HEART RATE: 97 BPM

## 2023-01-19 DIAGNOSIS — J20.9 BRONCHITIS WITH BRONCHOSPASM: ICD-10-CM

## 2023-01-19 DIAGNOSIS — H66.001 NON-RECURRENT ACUTE SUPPURATIVE OTITIS MEDIA OF RIGHT EAR WITHOUT SPONTANEOUS RUPTURE OF TYMPANIC MEMBRANE: Primary | ICD-10-CM

## 2023-01-19 DIAGNOSIS — J06.9 VIRAL URI WITH COUGH: ICD-10-CM

## 2023-01-19 PROCEDURE — G8484 FLU IMMUNIZE NO ADMIN: HCPCS | Performed by: NURSE PRACTITIONER

## 2023-01-19 PROCEDURE — 3017F COLORECTAL CA SCREEN DOC REV: CPT | Performed by: NURSE PRACTITIONER

## 2023-01-19 PROCEDURE — 1036F TOBACCO NON-USER: CPT | Performed by: NURSE PRACTITIONER

## 2023-01-19 PROCEDURE — G8417 CALC BMI ABV UP PARAM F/U: HCPCS | Performed by: NURSE PRACTITIONER

## 2023-01-19 PROCEDURE — 99214 OFFICE O/P EST MOD 30 MIN: CPT | Performed by: NURSE PRACTITIONER

## 2023-01-19 PROCEDURE — G8427 DOCREV CUR MEDS BY ELIG CLIN: HCPCS | Performed by: NURSE PRACTITIONER

## 2023-01-19 PROCEDURE — 3078F DIAST BP <80 MM HG: CPT | Performed by: NURSE PRACTITIONER

## 2023-01-19 PROCEDURE — 3074F SYST BP LT 130 MM HG: CPT | Performed by: NURSE PRACTITIONER

## 2023-01-19 RX ORDER — AMOXICILLIN 500 MG/1
500 CAPSULE ORAL 2 TIMES DAILY
Qty: 20 CAPSULE | Refills: 0 | Status: SHIPPED | OUTPATIENT
Start: 2023-01-19 | End: 2023-01-29

## 2023-01-19 RX ORDER — GUAIFENESIN AND CODEINE PHOSPHATE 100; 10 MG/5ML; MG/5ML
5 SOLUTION ORAL 4 TIMES DAILY PRN
Qty: 120 ML | Refills: 0 | Status: SHIPPED | OUTPATIENT
Start: 2023-01-19 | End: 2023-01-26

## 2023-01-19 ASSESSMENT — PATIENT HEALTH QUESTIONNAIRE - PHQ9
10. IF YOU CHECKED OFF ANY PROBLEMS, HOW DIFFICULT HAVE THESE PROBLEMS MADE IT FOR YOU TO DO YOUR WORK, TAKE CARE OF THINGS AT HOME, OR GET ALONG WITH OTHER PEOPLE: 0
9. THOUGHTS THAT YOU WOULD BE BETTER OFF DEAD, OR OF HURTING YOURSELF: 0
SUM OF ALL RESPONSES TO PHQ QUESTIONS 1-9: 0
3. TROUBLE FALLING OR STAYING ASLEEP: 0
SUM OF ALL RESPONSES TO PHQ QUESTIONS 1-9: 0
SUM OF ALL RESPONSES TO PHQ QUESTIONS 1-9: 0
1. LITTLE INTEREST OR PLEASURE IN DOING THINGS: 0
5. POOR APPETITE OR OVEREATING: 0
6. FEELING BAD ABOUT YOURSELF - OR THAT YOU ARE A FAILURE OR HAVE LET YOURSELF OR YOUR FAMILY DOWN: 0
8. MOVING OR SPEAKING SO SLOWLY THAT OTHER PEOPLE COULD HAVE NOTICED. OR THE OPPOSITE, BEING SO FIGETY OR RESTLESS THAT YOU HAVE BEEN MOVING AROUND A LOT MORE THAN USUAL: 0
7. TROUBLE CONCENTRATING ON THINGS, SUCH AS READING THE NEWSPAPER OR WATCHING TELEVISION: 0
SUM OF ALL RESPONSES TO PHQ QUESTIONS 1-9: 0
4. FEELING TIRED OR HAVING LITTLE ENERGY: 0
SUM OF ALL RESPONSES TO PHQ9 QUESTIONS 1 & 2: 0
2. FEELING DOWN, DEPRESSED OR HOPELESS: 0

## 2023-01-19 NOTE — PROGRESS NOTES
[facility] PHYSICIANS  Ashanti Lino, [address] [facility] PRIMARY [facility]ARE  [address]  Dept: [phone]  Dept Fax: [phone]      Name: Aditya Levi  : 1965         Chief Complaint:     Chief Complaint   Patient presents with    Pharyngitis     X 1 week. Otalgia     X 1 week. C/o right ear pain. History of Present Illness:      Aditya Levi is a 62 y.o.  female who presents with Pharyngitis (X 1 week. ) and Otalgia (X 1 week. C/o right ear pain. )    Ann-Marie Sierra is here today for a sick visit and has been sick for a week. She states that she is feeling worse today. She has right sided ear pain that has worsened. She has a sore throat. She has rhinorrhea and congestion. She has a dry cough that worsens at night. She denies a fever. She has a lot of nausea. She has been taking her reglan for the nausea that helps. She has taken the cheritussin she was prescribed in 22 and states that it does help her. Past Medical History:     Past Medical History:   Diagnosis Date    JESS (acute kidney injury) ([facility] [address]     Anemia     Arthritis     Chronic kidney disease     Depression with anxiety 2016    Dialysis patient ([facility] [address] 4/15/2022    GERD (gastroesophageal reflux disease)     H/O echocardiogram 2016    EF 55%. Mild mitral regurgitation. H/O tooth extraction 2017    Lower posterior right tooth. Hemodialysis patient [facility])     History of blood transfusion     x3. Last one in May 2016    Hyperlipidemia     Hypertensive crisis 2016    Kidney stones     Sciatica     Vasculitis ([facility] [address]       Reviewed all health maintenance requirements and ordered appropriate tests  Health Maintenance Due   Topic Date Due    COVID-19 Vaccine (5 - Booster) 2021       Past Surgical History:     Past Surgical History:   Procedure Laterality Date    BONE MARROW BIOPSY  16    Per Hematologist order @ [facility].     CHOLECYSTECTOMY      COLONOSCOPY   COLONOSCOPY N/A 10/14/2021    COLONOSCOPY BIOPSY/STOMA performed by Dee Dee Sanders DO at [address]  9/12/2018    ESOPHAGEAL DILATATION performed by Risa Mancilla MD at [address] [facility] ([facility])  11/09/2016    with tubes and ovaries    PORT SURGERY Left 10/2020    port placement for dialysis    TOOTH EXTRACTION  07/26/2017    Right posterior lower tooth. TUBAL LIGATION      UPPER GASTROINTESTINAL ENDOSCOPY  09/12/2018    -dilation,bx(Chicas's esophagus,neg H-Pylori)hiatal hernia,grade 2 reflux esophagitis    UPPER GASTROINTESTINAL ENDOSCOPY N/A 9/12/2018    EGD BIOPSY performed by Risa Mancilla MD at [address]        Medications:       Prior to Admission medications    Medication Sig Start Date End Date Taking? Authorizing Provider   amoxicillin (AMOXIL) 500 MG capsule Take 1 capsule by mouth 2 times daily for 10 days 1/19/23 1/29/23 Yes Emily Mancilla, APRN - CNP   guaiFENesin-codeine (TUSSI-ORGANIDIN NR) 100-10 MG/5ML syrup Take 5 mLs by mouth 4 times daily as needed for Cough or Congestion (May cause drowsiness.) for up to 7 days.  1/19/23 1/26/23 Yes Emily Mancilla, APRN - CNP   metoclopramide (REGLAN) 5 MG tablet Take 1 tablet by mouth 2 times daily 1/13/23  Yes Manolo Vargas, APRN - VANDANA   meclizine (ANTIVERT) 25 MG tablet Take 1 tablet by mouth 3 times daily as needed for Dizziness 1/4/23  Yes Manolo Vargas, APRN - CNP   DULoxetine (CYMBALTA) 30 MG extended release capsule Take 1 capsule by mouth once daily 12/19/22  Yes Manolo Vargas, APRN - CNP   estrogens, conjugated, (PREMARIN) 0.625 MG tablet Take 1 tablet by mouth once daily 12/19/22  Yes Atng Bulgarian, APRN - CNM   butalbital-acetaminophen-caffeine (FIORICET, ESGIC) -44 MG per tablet Take 1 tablet by mouth every 6 hours as needed for Headaches or Migraine 11/22/22  Yes Manolo Vargas, APRN - CNP   atorvastatin (LIPITOR) 80 MG tablet Take 1 tablet by mouth daily 11/14/22  Yes Alfred Esposito ARISTIDES Cooper CNP   sevelamer (RENVELA) 800 MG tablet  8/24/22  Yes Historical Provider, MD   pantoprazole (PROTONIX) 40 MG tablet Take 1 tablet by mouth twice daily 9/29/22  Yes ARISTIDES Franco CNP   carvedilol (COREG) 3.125 MG tablet Take 1 tablet by mouth in the morning and 1 tablet before bedtime. 8/2/22  Yes ARISTIDES Duque CNP   potassium chloride (KLOR-CON M) 20 MEQ extended release tablet Take 1 tablet by mouth in the morning.  8/1/22  Yes ARISTIDES Franco CNP   losartan (COZAAR) 25 MG tablet  7/5/22  Yes ARISTIDES Hunter CNP   calcium acetate (PHOSLO) 667 MG CAPS capsule  6/28/22  Yes Renaldo Scruggs MD   aspirin 81 MG chewable tablet Take 81 mg by mouth daily   Yes Historical Provider, MD   Incontinence Supply Disposable (PREVAIL FOR WOMEN X-LARGE) MISC 2 each by Does not apply route daily 11/18/21  Yes ARISTIDES Franco CNP   fluticasone (FLONASE) 50 MCG/ACT nasal spray 1 spray by Each Nostril route daily for 10 days 9/16/21 1/19/23 Yes ARISTIEDS Duque CNP   furosemide (LASIX) 20 MG tablet Take 1 tablet by mouth twice daily 7/12/21  Yes Renaldo Scruggs MD   B Complex-C-Folic Acid (KENDALL-DALY RX) 1 MG TABS TAKE 1 TABLET BY MOUTH ONCE DAILY 5/8/21  Yes Historical Provider, MD JOEL PAIN EASE AERO SPRAY ON DIALYSIS FISTULA PRIOR TO CANNULATION 5/7/21  Yes Historical Provider, MD   calcitRIOL (ROCALTROL) 0.25 MCG capsule Take 1 capsule by mouth daily 11/30/20  Yes Renaldo Scruggs MD   dilTIAZem (CARDIZEM CD) 120 MG extended release capsule TAKE 1 CAPSULE BY MOUTH ONCE DAILY 2/21/20  Yes Renaldo Scruggs MD   Blood Pressure KIT 1 Units by Does not apply route daily 11/7/19  Yes ARISTIDES Rios CNP   albuterol sulfate HFA (VENTOLIN HFA) 108 (90 Base) MCG/ACT inhaler Inhale 2 puffs into the lungs every 6 hours as needed for Wheezing 4/16/19  Yes ARISTIDES Franco - CNP   saline nasal gel (AYR) GEL by Nasal route 2 times daily 11/6/18  Yes Magali Cui MD   sodium chloride (ALTAMIST SPRAY) 0.65 % nasal spray 1 spray by Nasal route as needed for Congestion 11/6/18  Yes Magali Ciu MD   vitamin D (CHOLECALCIFEROL) 1000 UNIT TABS tablet Take 2,000 Units by mouth 2 times daily    Yes Historical Provider, MD   Docusate Calcium (STOOL SOFTENER PO) Take 100 mg by mouth as needed   Yes Historical Provider, MD   diphenhydrAMINE (BENADRYL) 25 MG capsule Take 25 mg by mouth every 6 hours as needed for Itching or Allergies Pt takes 2 capsules at HS \"due to hives from it being cold now\". Pt tho states these are NOT helping her this time. Patient not taking: Reported on 1/19/2023    Historical Provider, MD        Allergies:       Patient has no known allergies. Social History:     Tobacco:    reports that she has never smoked. She has never used smokeless tobacco.  Alcohol:      reports no history of alcohol use. Drug Use:  reports no history of drug use. Family History:     Family History   Problem Relation Age of Onset    Cancer Mother     Diabetes Mother     Heart Disease Mother     High Blood Pressure Mother     Cancer Father         prostate    High Blood Pressure Father     Cancer Sister         ovarian    Diabetes Brother     Cancer Sister         ovarian    High Blood Pressure Brother     Diabetes Brother        Review of Systems:     Positive and Negative as described in HPI    Review of Systems   Constitutional:  Positive for fatigue. HENT:  Positive for congestion, ear pain, rhinorrhea, sinus pressure and sore throat. Negative for sinus pain. Eyes: Negative. Respiratory:  Positive for cough. Cardiovascular: Negative. Gastrointestinal:  Positive for nausea. Endocrine: Negative. Genitourinary: Negative. Musculoskeletal:  Positive for arthralgias. Allergic/Immunologic: Negative. Neurological:  Positive for headaches. Psychiatric/Behavioral: Negative.        Physical Exam:   Vitals:  /84   Pulse 97   Temp 97.9 °F (36.6 °C) (Temporal) Resp 18   Wt 262 lb 9.6 oz (119.1 kg)   LMP 09/09/2016   SpO2 95%   BMI 48.03 kg/m²     Physical Exam  Vitals and nursing note reviewed. Constitutional:       Appearance: Normal appearance. She is ill-appearing. HENT:      Head: Normocephalic. Right Ear: Tympanic membrane is injected, erythematous and bulging. Left Ear: Tympanic membrane is erythematous. Nose: Mucosal edema, congestion and rhinorrhea present. Right Sinus: No maxillary sinus tenderness or frontal sinus tenderness. Left Sinus: No maxillary sinus tenderness or frontal sinus tenderness. Mouth/Throat:      Lips: Pink. Pharynx: Posterior oropharyngeal erythema present. Tonsils: Tonsillar exudate (right side) present. 2+ on the right. 2+ on the left. Eyes:      Conjunctiva/sclera: Conjunctivae normal.      Pupils: Pupils are equal, round, and reactive to light. Cardiovascular:      Rate and Rhythm: Normal rate and regular rhythm. Heart sounds: Normal heart sounds. Pulmonary:      Effort: Pulmonary effort is normal.      Breath sounds: Normal breath sounds. Musculoskeletal:         General: Normal range of motion. Cervical back: Normal range of motion. Lymphadenopathy:      Cervical: Cervical adenopathy (anterior) present. Skin:     General: Skin is warm. Capillary Refill: Capillary refill takes less than 2 seconds. Neurological:      General: No focal deficit present. Mental Status: She is alert.    Psychiatric:         Mood and Affect: Mood normal.         Behavior: Behavior normal.       Data:     Lab Results   Component Value Date/Time     09/23/2022 07:50 AM    K 3.5 09/23/2022 07:50 AM    CL 93 09/23/2022 07:50 AM    CO2 22 09/23/2022 07:50 AM    BUN 57 09/23/2022 07:50 AM    CREATININE 5.84 09/23/2022 07:50 AM    GLUCOSE 133 09/23/2022 07:50 AM    GLUCOSE 103 11/09/2021 11:00 AM    PROT 6.3 09/23/2022 07:50 AM    PROT 6.7 09/23/2022 07:50 AM    PROT 7.5 07/08/2021 12:37 PM    LABALBU 3.6 09/23/2022 07:50 AM    LABALBU 4.1 07/08/2021 12:37 PM    BILITOT 0.3 09/23/2022 07:50 AM    ALKPHOS 106 09/23/2022 07:50 AM    AST 20 09/23/2022 07:50 AM    ALT 17 09/23/2022 07:50 AM     Lab Results   Component Value Date/Time    WBC 10.1 09/23/2022 07:50 AM    RBC 3.12 09/23/2022 07:50 AM    RBC 2.99 11/09/2021 11:00 AM    HGB 11.2 09/23/2022 07:50 AM    HCT 32.6 09/23/2022 07:50 AM    .5 09/23/2022 07:50 AM    MCH 35.9 09/23/2022 07:50 AM    MCHC 34.4 09/23/2022 07:50 AM    RDW 13.3 09/23/2022 07:50 AM     09/23/2022 07:50 AM    MPV 9.6 09/23/2022 07:50 AM     Lab Results   Component Value Date/Time    TSH 2.17 03/25/2019 09:13 AM     Lab Results   Component Value Date/Time    CHOL 189 07/08/2021 12:37 PM    HDL 54 07/08/2021 12:37 PM    LABA1C 5.4 07/08/2021 12:37 PM       Assessment/Plan:      Diagnosis Orders   1. Non-recurrent acute suppurative otitis media of right ear without spontaneous rupture of tympanic membrane  amoxicillin (AMOXIL) 500 MG capsule      2. Viral URI with cough        3. Bronchitis with bronchospasm  guaiFENesin-codeine (TUSSI-ORGANIDIN NR) 100-10 MG/5ML syrup        Practice meticulous handwashing and cover cough to prevent spread of infection  Encouraged to increase fluids and rest  Tylenol/Ibuprofen OTC PRN for pain, discomfort or fever as directed on package  Warm salt water gargles for sore throat  Cool mist humidifier  Hot tea with honey and lemon for cough and sore throat PRN  Start Amoxicillin 500 mg BID for 10 days. May continue to use cheritussin as needed for increased cough. Controlled Substance Monitoring:    Acute and Chronic Pain Monitoring:   RX Monitoring 11/29/2022   Periodic Controlled Substance Monitoring No signs of potential drug abuse or diversion identified. ;Assessed functional status. 1.  Serene Christensen received counseling on the following healthy behaviors: nutrition, exercise, and medication adherence  2.   Patient given educational materials - see patient instructions  3. Was a self-tracking handout given in paper form or via [employer]? No  If yes, see orders or list here. 4.  Discussed use, benefit, and side effects of prescribed medications. Barriers to medication compliance addressed. All patient questions answered. Pt voiced understanding. 5.  Reviewed prior labs and health maintenance  6. Continue current medications, diet and exercise. Completed Refills   Requested Prescriptions     Signed Prescriptions Disp Refills    amoxicillin (AMOXIL) 500 MG capsule 20 capsule 0     Sig: Take 1 capsule by mouth 2 times daily for 10 days    guaiFENesin-codeine (TUSSI-ORGANIDIN NR) 100-10 MG/5ML syrup 120 mL 0     Sig: Take 5 mLs by mouth 4 times daily as needed for Cough or Congestion (May cause drowsiness.) for up to 7 days. No follow-ups on file.

## 2023-01-19 NOTE — PATIENT INSTRUCTIONS
SURVEY:     You may be receiving a survey from [employer] regarding your visit today. Please complete the survey to enable us to provide the highest quality of care to you and your family. If you cannot score us a very good on any question, please call the office to discuss how we could have made your experience a very good one.      Thank you,    Tanya Vargas, APRN-CNP  Marco Medrano, APRN-CNP  Saloni Diamond, JOE Schaffer, CMA  Jesus Guadalupe, CMA  Edwina, CMA  Filomena, PCA  Mildred, PM

## 2023-01-20 ASSESSMENT — ENCOUNTER SYMPTOMS
SINUS PAIN: 0
RHINORRHEA: 1
ALLERGIC/IMMUNOLOGIC NEGATIVE: 1
COUGH: 1
SINUS PRESSURE: 1
EYES NEGATIVE: 1
SORE THROAT: 1
NAUSEA: 1

## 2023-02-17 ENCOUNTER — HOSPITAL ENCOUNTER (OUTPATIENT)
Dept: WOMENS IMAGING | Age: 58
Discharge: HOME | End: 2023-02-17
Payer: COMMERCIAL

## 2023-02-17 DIAGNOSIS — Z12.31 BREAST CANCER SCREENING BY MAMMOGRAM: ICD-10-CM

## 2023-02-17 PROCEDURE — 77063 BREAST TOMOSYNTHESIS BI: CPT

## 2023-03-06 RX ORDER — BUTALBITAL, ACETAMINOPHEN AND CAFFEINE 50; 325; 40 MG/1; MG/1; MG/1
1 TABLET ORAL EVERY 6 HOURS PRN
Qty: 120 TABLET | Refills: 0 | Status: SHIPPED | OUTPATIENT
Start: 2023-03-06

## 2023-03-06 NOTE — TELEPHONE ENCOUNTER
Health Maintenance   Topic Date Due    COVID-19 Vaccine (5 - Booster) 05/26/2021    Flu vaccine (1) 09/29/2023 (Originally 8/1/2022)    Hepatitis B vaccine (6 of 6 - Risk Dialysis 4-dose series) 07/18/2023    Lipids  11/17/2023    Depression Monitoring  01/19/2024    Diabetes screen  07/08/2024    Breast cancer screen  02/17/2025    Pneumococcal 0-64 years Vaccine (3 - PPSV23 if available, else PCV20) 05/14/2026    Colorectal Cancer Screen  10/14/2026    DTaP/Tdap/Td vaccine (2 - Td or Tdap) 09/06/2028    Shingles vaccine  Completed    Hepatitis C screen  Completed    HIV screen  Completed    Hepatitis A vaccine  Aged Out    Hib vaccine  Aged Out    Meningococcal (ACWY) vaccine  Aged Out             (applicable per patient's age: Cancer Screenings, Depression Screening, Fall Risk Screening, Immunizations)    Hemoglobin A1C (%)   Date Value   07/08/2021 5.4   11/03/2020 5.6   09/06/2019 5.8     Microalb/Crt.  Ratio (mcg/mg creat)   Date Value   11/18/2019 3,897 (H)     LDL Cholesterol (mg/dL)   Date Value   07/08/2021 65     AST (U/L)   Date Value   09/23/2022 20     ALT (U/L)   Date Value   09/23/2022 17     BUN (mg/dL)   Date Value   09/23/2022 57 (H)      (goal A1C is < 7)   (goal LDL is <100) need 30-50% reduction from baseline     BP Readings from Last 3 Encounters:   01/19/23 132/84   11/29/22 128/82   09/29/22 134/74    (goal /80)      All Future Testing planned in [facility]:  Lab Frequency Next Occurrence   MRA HEAD WO CONTRAST Once 08/16/2022   Transfuse RBC TRANSFUSE 1 UNIT        Next Visit Date:  Future Appointments   Date Time Provider Tom Oneil   3/21/2023 11:20 AM ARISTIDES Laguna - CNM [facility]   3/30/2023  9:40 AM Jennifer Vargas APRN - CNP [facility]            Patient Active Problem List:     JESS (acute kidney injury) ([facility] [address]     Arteritis ([facility] [address]     End stage renal disease ([facility] [address]     ANCA-associated vasculitis     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)     S/P DOMO-BSO     Endometrial hyperplasia without atypia, simple     Postural vertigo     Morbid obesity (HCC)     Allergic rhinitis     Iron deficiency anemia     Mixed hyperlipidemia     Urinary frequency     MGUS (monoclonal gammopathy of unknown significance)     Anti-cyclic citrullinated peptide antibody positive     Bilateral hip pain     Bilateral knee pain     Cancer of uterus (HCC)     Elevated C-reactive protein (CRP)     Elevated white blood cell count, unspecified     Essential thrombocythemia (HCC)     Greater trochanteric bursitis of both hips     Microscopic polyangiitis (HCC)     Osteoarthritis of right acromioclavicular joint     Osteoarthritis of sacroiliac joint ([address]     Other intervertebral disc degeneration, thoracic region     Patient nonadherence     Pes anserinus bursitis of both knees     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     Episode of recurrent major depressive disorder (HCC)     Anemia in stage 5 chronic kidney disease, not on chronic dialysis (HCC)     Non-surgical abdominal pain     Lumbar pain     Uremia     MARY (obstructive sleep apnea)     Acute renal failure (HCC)     Syncope and collapse     Dialysis patient [facility])     possible right A2 Cerebral aneurysm

## 2023-03-14 DIAGNOSIS — K21.00 GASTROESOPHAGEAL REFLUX DISEASE WITH ESOPHAGITIS WITHOUT HEMORRHAGE: ICD-10-CM

## 2023-03-14 RX ORDER — PANTOPRAZOLE SODIUM 40 MG/1
TABLET, DELAYED RELEASE ORAL
Qty: 180 TABLET | Refills: 1 | Status: SHIPPED | OUTPATIENT
Start: 2023-03-14

## 2023-03-14 NOTE — TELEPHONE ENCOUNTER
Health Maintenance   Topic Date Due    COVID-19 Vaccine (5 - Booster) 05/26/2021    Flu vaccine (1) 09/29/2023 (Originally 8/1/2022)    Hepatitis B vaccine (6 of 6 - Risk Dialysis 4-dose series) 07/18/2023    Lipids  11/17/2023    Depression Monitoring  01/19/2024    Diabetes screen  07/08/2024    Breast cancer screen  02/17/2025    Pneumococcal 0-64 years Vaccine (3 - PPSV23 if available, else PCV20) 05/14/2026    Colorectal Cancer Screen  10/14/2026    DTaP/Tdap/Td vaccine (2 - Td or Tdap) 09/06/2028    Shingles vaccine  Completed    Hepatitis C screen  Completed    HIV screen  Completed    Hepatitis A vaccine  Aged Out    Hib vaccine  Aged Out    Meningococcal (ACWY) vaccine  Aged Out             (applicable per patient's age: Cancer Screenings, Depression Screening, Fall Risk Screening, Immunizations)    Hemoglobin A1C (%)   Date Value   07/08/2021 5.4   11/03/2020 5.6   09/06/2019 5.8     Microalb/Crt.  Ratio (mcg/mg creat)   Date Value   11/18/2019 3,897 (H)     LDL Cholesterol (mg/dL)   Date Value   07/08/2021 65     AST (U/L)   Date Value   09/23/2022 20     ALT (U/L)   Date Value   09/23/2022 17     BUN (mg/dL)   Date Value   09/23/2022 57 (H)      (goal A1C is < 7)   (goal LDL is <100) need 30-50% reduction from baseline     BP Readings from Last 3 Encounters:   01/19/23 132/84   11/29/22 128/82   09/29/22 134/74    (goal /80)      All Future Testing planned in [facility]:  Lab Frequency Next Occurrence   MRA HEAD WO CONTRAST Once 08/16/2022   Transfuse RBC TRANSFUSE 1 UNIT        Next Visit Date:  Future Appointments   Date Time Provider Tom Oneil   3/21/2023 11:20 AM ARISTIDES AbelM [facility]   3/30/2023  9:40 AM Margret Vargas APRN - CNP [facility]            Patient Active Problem List:     JESS (acute kidney injury) ([address]     Arteritis ([facility] [address]     End stage renal disease ([facility] [address]     ANCA-associated vasculitis     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)     S/P DOMO-BSO     Endometrial hyperplasia without atypia, simple     Postural vertigo     Morbid obesity (HCC)     Allergic rhinitis     Iron deficiency anemia     Mixed hyperlipidemia     Urinary frequency     MGUS (monoclonal gammopathy of unknown significance)     Anti-cyclic citrullinated peptide antibody positive     Bilateral hip pain     Bilateral knee pain     Cancer of uterus (HCC)     Elevated C-reactive protein (CRP)     Elevated white blood cell count, unspecified     Essential thrombocythemia (HCC)     Greater trochanteric bursitis of both hips     Microscopic polyangiitis (HCC)     Osteoarthritis of right acromioclavicular joint     Osteoarthritis of sacroiliac joint ([address]     Other intervertebral disc degeneration, thoracic region     Patient nonadherence     Pes anserinus bursitis of both knees     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     Episode of recurrent major depressive disorder (HCC)     Anemia in stage 5 chronic kidney disease, not on chronic dialysis (HCC)     Non-surgical abdominal pain     Lumbar pain     Uremia     MARY (obstructive sleep apnea)     Acute renal failure (HCC)     Syncope and collapse     Dialysis patient [facility])     possible right A2 Cerebral aneurysm

## 2023-03-21 ENCOUNTER — OFFICE VISIT (OUTPATIENT)
Dept: OBGYN | Age: 58
End: 2023-03-21
Payer: COMMERCIAL

## 2023-03-21 ENCOUNTER — HOSPITAL ENCOUNTER (OUTPATIENT)
Age: 58
Setting detail: SPECIMEN
Discharge: HOME | End: 2023-03-21
Payer: COMMERCIAL

## 2023-03-21 VITALS
SYSTOLIC BLOOD PRESSURE: 120 MMHG | BODY MASS INDEX: 47.29 KG/M2 | DIASTOLIC BLOOD PRESSURE: 78 MMHG | WEIGHT: 257 LBS | HEIGHT: 62 IN

## 2023-03-21 DIAGNOSIS — Z11.51 ENCOUNTER FOR SCREENING FOR HUMAN PAPILLOMAVIRUS (HPV): ICD-10-CM

## 2023-03-21 DIAGNOSIS — Z01.419 ENCOUNTER FOR ANNUAL ROUTINE GYNECOLOGICAL EXAMINATION: Primary | ICD-10-CM

## 2023-03-21 DIAGNOSIS — Z01.419 ENCOUNTER FOR ANNUAL ROUTINE GYNECOLOGICAL EXAMINATION: ICD-10-CM

## 2023-03-21 PROCEDURE — 99396 PREV VISIT EST AGE 40-64: CPT | Performed by: ADVANCED PRACTICE MIDWIFE

## 2023-03-21 PROCEDURE — 87624 HPV HI-RISK TYP POOLED RSLT: CPT

## 2023-03-21 PROCEDURE — G0145 SCR C/V CYTO,THINLAYER,RESCR: HCPCS

## 2023-03-21 PROCEDURE — 3074F SYST BP LT 130 MM HG: CPT | Performed by: ADVANCED PRACTICE MIDWIFE

## 2023-03-21 PROCEDURE — 3078F DIAST BP <80 MM HG: CPT | Performed by: ADVANCED PRACTICE MIDWIFE

## 2023-03-21 RX ORDER — ASCORBIC ACID, THIAMINE, RIBOFLAVIN, NIACINAMIDE, PYRIDOXINE, FOLIC ACID, COBALAMIN, BIOTIN, PANTOTHENIC ACID, ZINC 100; 1.5; 1.7; 20; 10; 1; 6; 300; 10; 5 MG/1; MG/1; MG/1; MG/1; MG/1; MG/1; UG/1; UG/1; MG/1; MG/1
1 TABLET, COATED ORAL DAILY
COMMUNITY
Start: 2023-03-15

## 2023-03-21 SDOH — ECONOMIC STABILITY: HOUSING INSECURITY
IN THE LAST 12 MONTHS, WAS THERE A TIME WHEN YOU DID NOT HAVE A STEADY PLACE TO SLEEP OR SLEPT IN A SHELTER (INCLUDING NOW)?: NO

## 2023-03-21 SDOH — ECONOMIC STABILITY: INCOME INSECURITY: HOW HARD IS IT FOR YOU TO PAY FOR THE VERY BASICS LIKE FOOD, HOUSING, MEDICAL CARE, AND HEATING?: NOT HARD AT ALL

## 2023-03-21 SDOH — ECONOMIC STABILITY: FOOD INSECURITY: WITHIN THE PAST 12 MONTHS, YOU WORRIED THAT YOUR FOOD WOULD RUN OUT BEFORE YOU GOT MONEY TO BUY MORE.: NEVER TRUE

## 2023-03-21 SDOH — ECONOMIC STABILITY: FOOD INSECURITY: WITHIN THE PAST 12 MONTHS, THE FOOD YOU BOUGHT JUST DIDN'T LAST AND YOU DIDN'T HAVE MONEY TO GET MORE.: NEVER TRUE

## 2023-03-21 NOTE — PROGRESS NOTES
yearly. She was also counseled on her preventative health maintenance recommendations and follow-up. There are no Patient Instructions on file for this visit.     ARISTIDES Way CNM,3/21/2023 11:42 AM

## 2023-03-23 LAB
HPV SAMPLE: NORMAL
HPV, GENOTYPE 16: NOT DETECTED
HPV, GENOTYPE 18: NOT DETECTED
HPV, HIGH RISK OTHER: NOT DETECTED
HPV, INTERPRETATION: NORMAL
SPECIMEN DESCRIPTION: NORMAL

## 2023-03-29 LAB — CYTOLOGY REPORT: NORMAL

## 2023-03-30 ENCOUNTER — TELEPHONE (OUTPATIENT)
Dept: OBGYN | Age: 58
End: 2023-03-30

## 2023-03-30 ENCOUNTER — OFFICE VISIT (OUTPATIENT)
Dept: PRIMARY CARE CLINIC | Age: 58
End: 2023-03-30
Payer: COMMERCIAL

## 2023-03-30 VITALS
BODY MASS INDEX: 47.15 KG/M2 | OXYGEN SATURATION: 97 % | SYSTOLIC BLOOD PRESSURE: 128 MMHG | HEART RATE: 88 BPM | WEIGHT: 257.8 LBS | RESPIRATION RATE: 18 BRPM | DIASTOLIC BLOOD PRESSURE: 74 MMHG | TEMPERATURE: 97.7 F

## 2023-03-30 DIAGNOSIS — N18.6 END STAGE RENAL DISEASE (HCC): ICD-10-CM

## 2023-03-30 DIAGNOSIS — K21.00 GASTROESOPHAGEAL REFLUX DISEASE WITH ESOPHAGITIS WITHOUT HEMORRHAGE: ICD-10-CM

## 2023-03-30 DIAGNOSIS — E78.2 MIXED HYPERLIPIDEMIA: ICD-10-CM

## 2023-03-30 DIAGNOSIS — I10 ESSENTIAL HYPERTENSION: Primary | ICD-10-CM

## 2023-03-30 PROCEDURE — 3074F SYST BP LT 130 MM HG: CPT | Performed by: NURSE PRACTITIONER

## 2023-03-30 PROCEDURE — 3078F DIAST BP <80 MM HG: CPT | Performed by: NURSE PRACTITIONER

## 2023-03-30 PROCEDURE — 99214 OFFICE O/P EST MOD 30 MIN: CPT | Performed by: NURSE PRACTITIONER

## 2023-03-30 RX ORDER — MULTIVITAMIN WITH IRON
250 TABLET ORAL DAILY
COMMUNITY

## 2023-03-30 SDOH — ECONOMIC STABILITY: FOOD INSECURITY: WITHIN THE PAST 12 MONTHS, THE FOOD YOU BOUGHT JUST DIDN'T LAST AND YOU DIDN'T HAVE MONEY TO GET MORE.: PATIENT DECLINED

## 2023-03-30 SDOH — ECONOMIC STABILITY: FOOD INSECURITY: WITHIN THE PAST 12 MONTHS, YOU WORRIED THAT YOUR FOOD WOULD RUN OUT BEFORE YOU GOT MONEY TO BUY MORE.: PATIENT DECLINED

## 2023-03-30 SDOH — ECONOMIC STABILITY: INCOME INSECURITY: HOW HARD IS IT FOR YOU TO PAY FOR THE VERY BASICS LIKE FOOD, HOUSING, MEDICAL CARE, AND HEATING?: PATIENT DECLINED

## 2023-03-30 SDOH — ECONOMIC STABILITY: HOUSING INSECURITY
IN THE LAST 12 MONTHS, WAS THERE A TIME WHEN YOU DID NOT HAVE A STEADY PLACE TO SLEEP OR SLEPT IN A SHELTER (INCLUDING NOW)?: PATIENT REFUSED

## 2023-03-30 ASSESSMENT — PATIENT HEALTH QUESTIONNAIRE - PHQ9
5. POOR APPETITE OR OVEREATING: 0
3. TROUBLE FALLING OR STAYING ASLEEP: 0
8. MOVING OR SPEAKING SO SLOWLY THAT OTHER PEOPLE COULD HAVE NOTICED. OR THE OPPOSITE, BEING SO FIGETY OR RESTLESS THAT YOU HAVE BEEN MOVING AROUND A LOT MORE THAN USUAL: 0
7. TROUBLE CONCENTRATING ON THINGS, SUCH AS READING THE NEWSPAPER OR WATCHING TELEVISION: 0
SUM OF ALL RESPONSES TO PHQ QUESTIONS 1-9: 0
6. FEELING BAD ABOUT YOURSELF - OR THAT YOU ARE A FAILURE OR HAVE LET YOURSELF OR YOUR FAMILY DOWN: 0
2. FEELING DOWN, DEPRESSED OR HOPELESS: 0
1. LITTLE INTEREST OR PLEASURE IN DOING THINGS: 0
SUM OF ALL RESPONSES TO PHQ QUESTIONS 1-9: 0
10. IF YOU CHECKED OFF ANY PROBLEMS, HOW DIFFICULT HAVE THESE PROBLEMS MADE IT FOR YOU TO DO YOUR WORK, TAKE CARE OF THINGS AT HOME, OR GET ALONG WITH OTHER PEOPLE: 0
SUM OF ALL RESPONSES TO PHQ9 QUESTIONS 1 & 2: 0
SUM OF ALL RESPONSES TO PHQ QUESTIONS 1-9: 0
4. FEELING TIRED OR HAVING LITTLE ENERGY: 0
SUM OF ALL RESPONSES TO PHQ QUESTIONS 1-9: 0
9. THOUGHTS THAT YOU WOULD BE BETTER OFF DEAD, OR OF HURTING YOURSELF: 0

## 2023-03-30 ASSESSMENT — ENCOUNTER SYMPTOMS
DIARRHEA: 0
HEARTBURN: 1
ABDOMINAL DISTENTION: 0
ORTHOPNEA: 0
CHOKING: 0
VOMITING: 0
NAUSEA: 0
RHINORRHEA: 0
SORE THROAT: 0
WATER BRASH: 0
CONSTIPATION: 0
ABDOMINAL PAIN: 0
SHORTNESS OF BREATH: 0
BELCHING: 1
COUGH: 0
WHEEZING: 0
BLURRED VISION: 0
GLOBUS SENSATION: 0

## 2023-03-30 NOTE — PROGRESS NOTES
for congestion, rhinorrhea and sore throat. Eyes:  Negative for blurred vision and visual disturbance. Respiratory:  Negative for cough, choking, shortness of breath and wheezing. Cardiovascular:  Negative for chest pain, palpitations, orthopnea and PND. Gastrointestinal:  Positive for heartburn. Negative for abdominal distention, abdominal pain, constipation, diarrhea, dysphagia, melena, nausea and vomiting. Genitourinary:  Negative for difficulty urinating, dysuria, frequency and hematuria. Musculoskeletal:  Negative for arthralgias, gait problem, myalgias, neck pain and neck stiffness. Skin:  Negative for rash. Neurological:  Negative for dizziness, syncope, light-headedness and headaches. Physical Exam:   Vitals:  /74 (Position: Sitting)   Pulse 88   Temp 97.7 °F (36.5 °C)   Resp 18   Wt 257 lb 12.8 oz (116.9 kg)   LMP 09/09/2016   SpO2 97%   BMI 47.15 kg/m²     Physical Exam  Vitals and nursing note reviewed. Constitutional:       General: She is not in acute distress. Appearance: Normal appearance. She is obese. HENT:      Mouth/Throat:      Mouth: Mucous membranes are moist.      Pharynx: Oropharynx is clear. Eyes:      General: No scleral icterus. Conjunctiva/sclera: Conjunctivae normal.   Cardiovascular:      Rate and Rhythm: Normal rate and regular rhythm. Pulmonary:      Effort: Pulmonary effort is normal.      Breath sounds: Normal breath sounds. No wheezing or rales. Abdominal:      General: Bowel sounds are normal. There is no distension. Palpations: Abdomen is soft. Tenderness: There is no abdominal tenderness. Musculoskeletal:      Cervical back: Normal range of motion and neck supple. Right lower leg: Edema (trace) present. Left lower leg: Edema (trace) present. Lymphadenopathy:      Cervical: No cervical adenopathy. Skin:     General: Skin is warm and dry. Findings: No rash.    Neurological:      Mental Status: She

## 2023-03-30 NOTE — TELEPHONE ENCOUNTER
Fax pap results from 2023 to [facility] Attn:Deion Soriano Ashia Hadley @ [phone]. Confirmation scanned into media.

## 2023-03-30 NOTE — PATIENT INSTRUCTIONS
SURVEY:     You may be receiving a survey from [employer] regarding your visit today. Please complete the survey to enable us to provide the highest quality of care to you and your family. If you cannot score us a very good on any question, please call the office to discuss how we could have made your experience a very good one.      Thank you,    Sharon Vargas, APRN-CNP  Yina Woo, APRN-CNP  Shanita Coughlin, LPN  Stacy Urbina, QUINCY Linn, QUINCY Bland, CMA  Filomena, PCA  Midlred, PM

## 2023-04-03 ENCOUNTER — TELEPHONE (OUTPATIENT)
Dept: PRIMARY CARE CLINIC | Age: 58
End: 2023-04-03

## 2023-04-03 ENCOUNTER — HOSPITAL ENCOUNTER (OUTPATIENT)
Age: 58
Setting detail: SPECIMEN
Discharge: HOME | End: 2023-04-03
Payer: COMMERCIAL

## 2023-04-03 DIAGNOSIS — I10 ESSENTIAL HYPERTENSION: ICD-10-CM

## 2023-04-03 DIAGNOSIS — E78.2 MIXED HYPERLIPIDEMIA: ICD-10-CM

## 2023-04-03 LAB
ABSOLUTE EOS #: 0.34 K/UL (ref 0–0.44)
ABSOLUTE IMMATURE GRANULOCYTE: 0.04 K/UL (ref 0–0.3)
ABSOLUTE LYMPH #: 1.52 K/UL (ref 1.1–3.7)
ABSOLUTE MONO #: 0.56 K/UL (ref 0.1–1.2)
ALBUMIN SERPL-MCNC: 3.7 G/DL (ref 3.5–5.2)
ALBUMIN/GLOBULIN RATIO: 1 (ref 1–2.5)
ALP SERPL-CCNC: 107 U/L (ref 35–104)
ALT SERPL-CCNC: 20 U/L (ref 5–33)
ANION GAP SERPL CALCULATED.3IONS-SCNC: 17 MMOL/L (ref 9–17)
AST SERPL-CCNC: 22 U/L
BASOPHILS # BLD: 1 % (ref 0–2)
BASOPHILS ABSOLUTE: 0.07 K/UL (ref 0–0.2)
BILIRUB SERPL-MCNC: 0.4 MG/DL (ref 0.3–1.2)
BUN SERPL-MCNC: 65 MG/DL (ref 6–20)
BUN/CREAT BLD: 12 (ref 9–20)
CALCIUM SERPL-MCNC: 9.1 MG/DL (ref 8.6–10.4)
CHLORIDE SERPL-SCNC: 100 MMOL/L (ref 98–107)
CHOLEST SERPL-MCNC: 175 MG/DL
CHOLESTEROL/HDL RATIO: 4.2
CO2 SERPL-SCNC: 21 MMOL/L (ref 20–31)
CREAT SERPL-MCNC: 5.5 MG/DL (ref 0.5–0.9)
EOSINOPHILS RELATIVE PERCENT: 4 % (ref 1–4)
GFR SERPL CREATININE-BSD FRML MDRD: 8 ML/MIN/1.73M2
GLUCOSE SERPL-MCNC: 130 MG/DL (ref 70–99)
HCT VFR BLD AUTO: 33.4 % (ref 36.3–47.1)
HDLC SERPL-MCNC: 42 MG/DL
HGB BLD-MCNC: 11.3 G/DL (ref 11.9–15.1)
IMMATURE GRANULOCYTES: 0 %
LDLC SERPL CALC-MCNC: 64 MG/DL (ref 0–130)
LYMPHOCYTES # BLD: 17 % (ref 24–43)
MCH RBC QN AUTO: 34.7 PG (ref 25.2–33.5)
MCHC RBC AUTO-ENTMCNC: 33.8 G/DL (ref 28.4–34.8)
MCV RBC AUTO: 102.5 FL (ref 82.6–102.9)
MONOCYTES # BLD: 6 % (ref 3–12)
NRBC AUTOMATED: 0 PER 100 WBC
PDW BLD-RTO: 13.3 % (ref 11.8–14.4)
PLATELET # BLD AUTO: 259 K/UL (ref 138–453)
PMV BLD AUTO: 10.3 FL (ref 8.1–13.5)
POTASSIUM SERPL-SCNC: 3.8 MMOL/L (ref 3.7–5.3)
PROT SERPL-MCNC: 7.4 G/DL (ref 6.4–8.3)
RBC # BLD: 3.26 M/UL (ref 3.95–5.11)
SEG NEUTROPHILS: 72 % (ref 36–65)
SEGMENTED NEUTROPHILS ABSOLUTE COUNT: 6.57 K/UL (ref 1.5–8.1)
SODIUM SERPL-SCNC: 138 MMOL/L (ref 135–144)
TRIGL SERPL-MCNC: 345 MG/DL
WBC # BLD AUTO: 9.1 K/UL (ref 3.5–11.3)

## 2023-04-03 PROCEDURE — 80053 COMPREHEN METABOLIC PANEL: CPT

## 2023-04-03 PROCEDURE — 80061 LIPID PANEL: CPT

## 2023-04-03 PROCEDURE — 85025 COMPLETE CBC W/AUTO DIFF WBC: CPT

## 2023-04-03 NOTE — TELEPHONE ENCOUNTER
Merry from [facility] called to alert you of a critical value:    Creatnine 5.5

## 2023-04-04 ENCOUNTER — TELEPHONE (OUTPATIENT)
Dept: PRIMARY CARE CLINIC | Age: 58
End: 2023-04-04

## 2023-04-04 NOTE — TELEPHONE ENCOUNTER
----- Message from [address], APRN - CNP sent at 4/3/2023  6:54 PM EDT -----  Labs are stable.

## 2023-04-24 RX ORDER — ONDANSETRON 4 MG/1
4 TABLET, FILM COATED ORAL EVERY 8 HOURS PRN
Qty: 15 TABLET | Refills: 0 | Status: SHIPPED | OUTPATIENT
Start: 2023-04-24 | End: 2023-04-29

## 2023-05-17 ENCOUNTER — HOSPITAL ENCOUNTER (OUTPATIENT)
Age: 58
Discharge: HOME | End: 2023-05-17
Payer: MEDICAID

## 2023-05-17 LAB — BNP SERPL-MCNC: 198 PG/ML

## 2023-05-17 PROCEDURE — 36415 COLL VENOUS BLD VENIPUNCTURE: CPT

## 2023-05-17 PROCEDURE — 83880 ASSAY OF NATRIURETIC PEPTIDE: CPT

## 2023-06-05 NOTE — FLOWSHEET NOTE
June 5, 2023     Heather Sena, [facility]    Patient: Ignacio Choi   YOB: 1957   Date of Visit: 6/5/2023       Dear Dr Brynda Moritz: Thank you for referring Ignacio Choi to me for evaluation  Below are my notes for this consultation  If you have questions, please do not hesitate to call me  I look forward to following your patient along with you  Sincerely,        Valdez Martinez MD        CC: No Recipients    Valdez Martinez MD  6/5/2023 12:01 PM  Sign when Signing Visit  Assessment/Plan:    Epidermoid cyst of skin of left breast  Patient presents with a symptomatic left breast epidermoid cyst at the 10 o'clock position for which definitive treatment by excisional biopsy is now indicated  The technical details of the procedure as well as the benefits and risks reviewed and informed verbal consent obtained to proceed  Subjective:     Patient ID: Ignacio Choi is a 77 y o  female  Patient came in today for a lesion on her left breast  Patient states she has had this lesion for about 6 months and it was being monitor by pcp but now its hurting and it causing an annoyance  Patient also has a lump on both feet located by the pinky toe  Patient denies drainage, fevers/chills or med hx or family hx of skin cancer  Preet KEE MA      The following portions of the patient's history were reviewed and updated as appropriate: allergies, current medications, past family history, past medical history, past surgical history and problem list     Review of Systems   Constitutional: Negative for chills and fever  HENT: Negative for ear pain and sore throat  Eyes: Negative for pain and visual disturbance  Respiratory: Negative for cough and shortness of breath  Cardiovascular: Negative for chest pain and palpitations  Gastrointestinal: Negative for abdominal pain and vomiting  Genitourinary: Negative for dysuria and hematuria  Resting in bed with eyes closed, awake for vials and assessment. Denies dizziness at rest. Up to bathroom with assist. Dizzy when first up. To chair at bedside. Call light within reach.  Continue to monitor "  Musculoskeletal: Negative for arthralgias and back pain  Skin: Negative for color change and rash  Neurological: Negative for seizures and syncope  All other systems reviewed and are negative  Objective:      /80 (BP Location: Left arm, Patient Position: Sitting, Cuff Size: Standard)   Pulse 68   Temp 97 9 °F (36 6 °C) (Temporal)   Resp 16   Ht 5' 3\" (1 6 m)   Wt 65 3 kg (144 lb)   SpO2 100%   BMI 25 51 kg/m²           Physical Exam  Constitutional:       Appearance: She is well-developed  HENT:      Head: Normocephalic and atraumatic  Eyes:      Conjunctiva/sclera: Conjunctivae normal       Pupils: Pupils are equal, round, and reactive to light  Cardiovascular:      Rate and Rhythm: Normal rate and regular rhythm  Pulmonary:      Effort: Pulmonary effort is normal       Breath sounds: Normal breath sounds  Abdominal:      General: Bowel sounds are normal       Palpations: Abdomen is soft  Musculoskeletal:         General: Normal range of motion  Cervical back: Normal range of motion and neck supple  Skin:     General: Skin is warm and dry  Comments: 8 mm left breast epidermoid cyst   Neurological:      Mental Status: She is alert and oriented to person, place, and time  Psychiatric:         Behavior: Behavior normal          Thought Content: Thought content normal          Judgment: Judgment normal       Skin excision    Date/Time: 6/5/2023 11:00 AM    Performed by: French Stevenson MD  Authorized by: French Stevenson MD  Universal Protocol:  Consent: Verbal consent obtained  Risks and benefits: risks, benefits and alternatives were discussed  Consent given by: patient  Time out: Immediately prior to procedure a \"time out\" was called to verify the correct patient, procedure, equipment, support staff and site/side marked as required    Timeout called at: 6/5/2023 12:00 PM   Patient understanding: patient states understanding of the procedure being " performed  Patient consent: the patient's understanding of the procedure matches consent given  Site marked: the operative site was marked  Patient identity confirmed: verbally with patient      Procedure Details - Skin Excision:     Number of Lesions:  1  Lesion 1:     Body area:  Trunk    Trunk location:  L breast    Malignancy: benign lesion            Final defect size (mm):  8    Repair type:  Linear closure    Closure complexity: simple       Repair Comments: Procedure note:  With informed verbal consent under sterile conditions using aseptic technique and 0 75% bupivacaine the left breast epidermoid cyst was sharply excised with a 15 blade and the wound closed primarily with 3 vertical mattress sutures of 3-0 nylon

## 2023-06-26 RX ORDER — ONDANSETRON 4 MG/1
4 TABLET, ORALLY DISINTEGRATING ORAL EVERY 4 HOURS PRN
Qty: 10 TABLET | Refills: 0 | Status: SHIPPED | OUTPATIENT
Start: 2023-06-26

## 2023-06-26 RX ORDER — BUTALBITAL, ACETAMINOPHEN AND CAFFEINE 50; 325; 40 MG/1; MG/1; MG/1
1 TABLET ORAL EVERY 6 HOURS PRN
Qty: 120 TABLET | Refills: 0 | Status: SHIPPED | OUTPATIENT
Start: 2023-06-26

## 2023-06-26 RX ORDER — BENZOCAINE, BUTAMBEN, AND TETRACAINE HYDROCHLORIDE .028; .004; .004 G/.2G; G/.2G; G/.2G
AEROSOL, SPRAY TOPICAL
COMMUNITY
Start: 2023-04-11

## 2023-07-13 DIAGNOSIS — R14.0 BLOATING: ICD-10-CM

## 2023-07-13 RX ORDER — METOCLOPRAMIDE 5 MG/1
5 TABLET ORAL 2 TIMES DAILY
Qty: 180 TABLET | Refills: 1 | Status: SHIPPED | OUTPATIENT
Start: 2023-07-13

## 2023-07-13 RX ORDER — METOCLOPRAMIDE 5 MG/1
TABLET ORAL
Qty: 180 TABLET | Refills: 0 | OUTPATIENT
Start: 2023-07-13

## 2023-07-13 NOTE — TELEPHONE ENCOUNTER
Health Maintenance   Topic Date Due    COVID-19 Vaccine (5 - Booster) 03/30/2024 (Originally 5/26/2021)    Hepatitis B vaccine (6 of 6 - Risk Dialysis 4-dose series) 07/18/2023    Flu vaccine (1) 08/01/2023    Depression Monitoring  03/30/2024    Lipids  04/03/2024    Diabetes screen  07/08/2024    Breast cancer screen  02/17/2025    Pneumococcal 0-64 years Vaccine (3 - PPSV23 if available, else PCV20) 05/14/2026    Colorectal Cancer Screen  10/14/2026    DTaP/Tdap/Td vaccine (2 - Td or Tdap) 09/06/2028    Shingles vaccine  Completed    Hepatitis C screen  Completed    HIV screen  Completed    Hepatitis A vaccine  Aged Out    Hib vaccine  Aged Out    Meningococcal (ACWY) vaccine  Aged Out    A1C test (Diabetic or Prediabetic)  Discontinued    Cervical cancer screen  Discontinued             (applicable per patient's age: Cancer Screenings, Depression Screening, Fall Risk Screening, Immunizations)    Hemoglobin A1C (%)   Date Value   07/08/2021 5.4   11/03/2020 5.6   09/06/2019 5.8     Microalb/Crt.  Ratio (mcg/mg creat)   Date Value   11/18/2019 3,897 (H)     LDL Cholesterol (mg/dL)   Date Value   04/03/2023 64     AST (U/L)   Date Value   04/03/2023 22     ALT (U/L)   Date Value   04/03/2023 20     BUN (mg/dL)   Date Value   04/03/2023 65 (H)      (goal A1C is < 7)   (goal LDL is <100) need 30-50% reduction from baseline     BP Readings from Last 3 Encounters:   03/30/23 128/74   03/21/23 120/78   01/19/23 132/84    (goal /80)      All Future Testing planned in [facility]:  Lab Frequency Next Occurrence   MRA HEAD WO CONTRAST Once 08/16/2022   Transfuse RBC TRANSFUSE 1 UNIT        Next Visit Date:  Future Appointments   Date Time Provider [address]   10/3/2023  9:40 AM ARISTIDES Maxwell - CNP [facility]   3/25/2024 10:30 AM ARISTIDES Kat CNM [facility]            Patient Active Problem List:     JESS (acute kidney injury) ([address])     Arteritis ([address])     End stage renal disease

## 2023-07-26 RX ORDER — MECLIZINE HYDROCHLORIDE 25 MG/1
25 TABLET ORAL 3 TIMES DAILY PRN
Qty: 30 TABLET | Refills: 0 | Status: SHIPPED | OUTPATIENT
Start: 2023-07-26

## 2023-07-26 RX ORDER — ONDANSETRON 4 MG/1
4 TABLET, FILM COATED ORAL 2 TIMES DAILY PRN
COMMUNITY
Start: 2023-06-29

## 2023-07-26 RX ORDER — GABAPENTIN 100 MG/1
100 CAPSULE ORAL 2 TIMES DAILY
COMMUNITY
Start: 2023-07-10

## 2023-07-26 NOTE — TELEPHONE ENCOUNTER
both eyes     CKD (chronic kidney disease) stage 4, GFR 15-29 ml/min (HCC)     Depression with anxiety     Essential hypertension     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)     S/P DOMO-BSO     Endometrial hyperplasia without atypia, simple     Postural vertigo     Morbid obesity (HCC)     Allergic rhinitis     Iron deficiency anemia     Mixed hyperlipidemia     Urinary frequency     MGUS (monoclonal gammopathy of unknown significance)     Anti-cyclic citrullinated peptide antibody positive     Bilateral hip pain     Bilateral knee pain     Cancer of uterus (HCC)     Elevated C-reactive protein (CRP)     Elevated white blood cell count, unspecified     Essential thrombocythemia (HCC)     Greater trochanteric bursitis of both hips     Microscopic polyangiitis (HCC)     Osteoarthritis of right acromioclavicular joint     Osteoarthritis of sacroiliac joint ([address])     Other intervertebral disc degeneration, thoracic region     Patient nonadherence     Pes anserinus bursitis of both knees     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     Episode of recurrent major depressive disorder (HCC)     Anemia in stage 5 chronic kidney disease, not on chronic dialysis (HCC)     Non-surgical abdominal pain     Lumbar pain     Uremia     MARY (obstructive sleep apnea)     Acute renal failure (HCC)     Syncope and collapse     Dialysis patient [facility])     possible right A2 Cerebral aneurysm

## 2023-07-31 DIAGNOSIS — I10 ESSENTIAL HYPERTENSION: ICD-10-CM

## 2023-07-31 RX ORDER — POTASSIUM CHLORIDE 20 MEQ/1
TABLET, EXTENDED RELEASE ORAL
Qty: 90 TABLET | Refills: 3 | Status: SHIPPED | OUTPATIENT
Start: 2023-07-31

## 2023-07-31 RX ORDER — CARVEDILOL 3.12 MG/1
TABLET ORAL
Qty: 180 TABLET | Refills: 3 | Status: SHIPPED | OUTPATIENT
Start: 2023-07-31

## 2023-08-07 RX ORDER — POTASSIUM CHLORIDE 20 MEQ/1
20 TABLET, EXTENDED RELEASE ORAL EVERY MORNING
Qty: 90 TABLET | Refills: 3 | OUTPATIENT
Start: 2023-08-07

## 2023-09-04 DIAGNOSIS — K21.00 GASTROESOPHAGEAL REFLUX DISEASE WITH ESOPHAGITIS WITHOUT HEMORRHAGE: ICD-10-CM

## 2023-09-04 RX ORDER — PANTOPRAZOLE SODIUM 40 MG/1
TABLET, DELAYED RELEASE ORAL
Qty: 180 TABLET | Refills: 1 | Status: SHIPPED | OUTPATIENT
Start: 2023-09-04

## 2023-09-26 RX ORDER — FUROSEMIDE 80 MG
80 TABLET ORAL 2 TIMES DAILY
COMMUNITY
Start: 2023-09-05

## 2023-09-26 RX ORDER — BUTALBITAL, ACETAMINOPHEN AND CAFFEINE 50; 325; 40 MG/1; MG/1; MG/1
1 TABLET ORAL EVERY 6 HOURS PRN
Qty: 120 TABLET | Refills: 0 | Status: SHIPPED | OUTPATIENT
Start: 2023-09-26

## 2023-09-26 NOTE — TELEPHONE ENCOUNTER
Health Maintenance   Topic Date Due    Hepatitis B vaccine (6 of 6 - Risk Dialysis Recombivax 3-dose series) 07/18/2023    Flu vaccine (1) 08/01/2023    COVID-19 Vaccine (5 - Mixed Product series) 03/30/2024 (Originally 5/26/2021)    Depression Monitoring  03/30/2024    Lipids  04/03/2024    Diabetes screen  07/08/2024    Breast cancer screen  02/17/2025    Pneumococcal 0-64 years Vaccine (3 - PPSV23 or PCV20) 05/14/2026    Colorectal Cancer Screen  10/14/2026    DTaP/Tdap/Td vaccine (2 - Td or Tdap) 09/06/2028    Shingles vaccine  Completed    Hepatitis C screen  Completed    HIV screen  Completed    Hepatitis A vaccine  Aged Out    Hib vaccine  Aged Out    Meningococcal (ACWY) vaccine  Aged Out    A1C test (Diabetic or Prediabetic)  Discontinued    Cervical cancer screen  Discontinued             (applicable per patient's age: Cancer Screenings, Depression Screening, Fall Risk Screening, Immunizations)    Hemoglobin A1C (%)   Date Value   07/08/2021 5.4   11/03/2020 5.6   09/06/2019 5.8     LDL Cholesterol (mg/dL)   Date Value   04/03/2023 64     AST (U/L)   Date Value   04/03/2023 22     ALT (U/L)   Date Value   04/03/2023 20     BUN (mg/dL)   Date Value   04/03/2023 65 (H)      (goal A1C is < 7)   (goal LDL is <100) need 30-50% reduction from baseline     BP Readings from Last 3 Encounters:   03/30/23 128/74   03/21/23 120/78   01/19/23 132/84    (goal /80)      All Future Testing planned in [facility]:  Lab Frequency Next Occurrence   Transfuse RBC TRANSFUSE 1 UNIT        Next Visit Date:  Future Appointments   Date Time Provider [address]   10/3/2023  9:40 AM [facility]IDES Powell [facility]   3/25/2024 10:30 AM ARISTIDES Vigil [facility]            Patient Active Problem List:     JESS (acute kidney injury) ([address])     Arteritis ([address])     End stage renal disease ([address])     ANCA-associated vasculitis (HCC)     Rectocele     Retinal edema of both eyes     CKD (chronic

## 2023-10-09 ENCOUNTER — TELEPHONE (OUTPATIENT)
Dept: PRIMARY CARE CLINIC | Age: 58
End: 2023-10-09

## 2023-10-09 NOTE — TELEPHONE ENCOUNTER
Patient currently admitted @ [facility] for kidney transplant per Jero,patient .

## 2023-11-20 ENCOUNTER — HOSPITAL ENCOUNTER (OUTPATIENT)
Age: 58
Discharge: HOME | End: 2023-11-20
Payer: MEDICARE

## 2023-11-20 LAB
ALBUMIN SERPL-MCNC: 4.2 G/DL (ref 3.5–5.2)
ALBUMIN/GLOB SERPL: 1.6 {RATIO} (ref 1–2.5)
ALP SERPL-CCNC: 108 U/L (ref 35–104)
ALT SERPL-CCNC: 30 U/L (ref 5–33)
ANION GAP SERPL CALCULATED.3IONS-SCNC: 10 MMOL/L (ref 9–17)
AST SERPL-CCNC: 25 U/L
BASOPHILS # BLD: 0 K/UL (ref 0–0.2)
BASOPHILS NFR BLD: 0 % (ref 0–2)
BILIRUB DIRECT SERPL-MCNC: <0.1 MG/DL
BILIRUB INDIRECT SERPL-MCNC: ABNORMAL MG/DL (ref 0–1)
BILIRUB SERPL-MCNC: 0.3 MG/DL (ref 0.3–1.2)
BUN SERPL-MCNC: 27 MG/DL (ref 6–20)
CALCIUM SERPL-MCNC: 9.7 MG/DL (ref 8.6–10.4)
CHLORIDE SERPL-SCNC: 103 MMOL/L (ref 98–107)
CHOLEST SERPL-MCNC: 271 MG/DL (ref 0–199)
CHOLESTEROL/HDL RATIO: 6
CO2 SERPL-SCNC: 27 MMOL/L (ref 20–31)
CREAT SERPL-MCNC: 1.5 MG/DL (ref 0.5–0.9)
EOSINOPHIL # BLD: 0.09 K/UL (ref 0–0.44)
EOSINOPHILS RELATIVE PERCENT: 1 % (ref 1–4)
ERYTHROCYTE [DISTWIDTH] IN BLOOD BY AUTOMATED COUNT: 14.3 % (ref 11.8–14.4)
EST. AVERAGE GLUCOSE BLD GHB EST-MCNC: 97 MG/DL
GFR SERPL CREATININE-BSD FRML MDRD: 40 ML/MIN/1.73M2
GLUCOSE SERPL-MCNC: 125 MG/DL (ref 70–99)
HBA1C MFR BLD: 5 % (ref 4–6)
HCT VFR BLD AUTO: 33 % (ref 36.3–47.1)
HDLC SERPL-MCNC: 45 MG/DL
HGB BLD-MCNC: 10.9 G/DL (ref 11.9–15.1)
IMM GRANULOCYTES # BLD AUTO: 0 K/UL (ref 0–0.3)
IMM GRANULOCYTES NFR BLD: 0 %
LDLC SERPL CALC-MCNC: ABNORMAL MG/DL (ref 0–100)
LDLC SERPL DIRECT ASSAY-MCNC: 107 MG/DL
LYMPHOCYTES NFR BLD: 0.17 K/UL (ref 1.1–3.7)
LYMPHOCYTES RELATIVE PERCENT: 2 % (ref 24–43)
MAGNESIUM SERPL-MCNC: 1.5 MG/DL (ref 1.6–2.6)
MCH RBC QN AUTO: 34.4 PG (ref 25.2–33.5)
MCHC RBC AUTO-ENTMCNC: 33 G/DL (ref 28.4–34.8)
MCV RBC AUTO: 104.1 FL (ref 82.6–102.9)
MONOCYTES NFR BLD: 0.86 K/UL (ref 0.1–1.2)
MONOCYTES NFR BLD: 10 % (ref 3–12)
MORPHOLOGY: NORMAL
NEUTROPHILS NFR BLD: 87 % (ref 36–65)
NEUTS SEG NFR BLD: 7.48 K/UL (ref 1.5–8.1)
NRBC BLD-RTO: 0 PER 100 WBC
PHOSPHATE SERPL-MCNC: 2.3 MG/DL (ref 2.6–4.5)
PLATELET # BLD AUTO: 230 K/UL (ref 138–453)
PMV BLD AUTO: 9.9 FL (ref 8.1–13.5)
POTASSIUM SERPL-SCNC: 4.6 MMOL/L (ref 3.7–5.3)
PROT SERPL-MCNC: 6.9 G/DL (ref 6.4–8.3)
RBC # BLD AUTO: 3.17 M/UL (ref 3.95–5.11)
SODIUM SERPL-SCNC: 140 MMOL/L (ref 135–144)
TRIGL SERPL-MCNC: 722 MG/DL (ref 0–149)
URATE SERPL-MCNC: 6.7 MG/DL (ref 2.4–5.7)
VLDLC SERPL CALC-MCNC: ABNORMAL MG/DL
WBC OTHER # BLD: 8.6 K/UL (ref 3.5–11.3)

## 2023-11-20 PROCEDURE — 84550 ASSAY OF BLOOD/URIC ACID: CPT

## 2023-11-20 PROCEDURE — 83721 ASSAY OF BLOOD LIPOPROTEIN: CPT

## 2023-11-20 PROCEDURE — 87799 DETECT AGENT NOS DNA QUANT: CPT

## 2023-11-20 PROCEDURE — 84100 ASSAY OF PHOSPHORUS: CPT

## 2023-11-20 PROCEDURE — 80061 LIPID PANEL: CPT

## 2023-11-20 PROCEDURE — 85025 COMPLETE CBC W/AUTO DIFF WBC: CPT

## 2023-11-20 PROCEDURE — 80053 COMPREHEN METABOLIC PANEL: CPT

## 2023-11-20 PROCEDURE — 83036 HEMOGLOBIN GLYCOSYLATED A1C: CPT

## 2023-11-20 PROCEDURE — 83735 ASSAY OF MAGNESIUM: CPT

## 2023-11-20 PROCEDURE — 82248 BILIRUBIN DIRECT: CPT

## 2023-11-20 PROCEDURE — 36415 COLL VENOUS BLD VENIPUNCTURE: CPT

## 2023-11-20 PROCEDURE — 80197 ASSAY OF TACROLIMUS: CPT

## 2023-11-22 LAB
BK QNT BY NAAT, PLASMA INTERP: NOT DETECTED
BK QNT BY NAAT, PLASMA IU/ML: NOT DETECTED IU/ML
BK QNT BY NAAT, PLASMA LOG IU/ML: NOT DETECTED LOG IU/ML

## 2023-11-28 LAB — PROGRAF: 7.1 NG/ML (ref 5–20)

## 2023-11-29 ENCOUNTER — OFFICE VISIT (OUTPATIENT)
Dept: PRIMARY CARE CLINIC | Age: 58
End: 2023-11-29
Payer: MEDICARE

## 2023-11-29 VITALS
OXYGEN SATURATION: 98 % | SYSTOLIC BLOOD PRESSURE: 134 MMHG | RESPIRATION RATE: 16 BRPM | TEMPERATURE: 97.3 F | WEIGHT: 259 LBS | BODY MASS INDEX: 47.37 KG/M2 | DIASTOLIC BLOOD PRESSURE: 82 MMHG | HEART RATE: 90 BPM

## 2023-11-29 DIAGNOSIS — J02.9 PHARYNGITIS, UNSPECIFIED ETIOLOGY: ICD-10-CM

## 2023-11-29 DIAGNOSIS — I50.22 CHRONIC SYSTOLIC (CONGESTIVE) HEART FAILURE (HCC): ICD-10-CM

## 2023-11-29 DIAGNOSIS — H66.90 ACUTE OTITIS MEDIA, UNSPECIFIED OTITIS MEDIA TYPE: Primary | ICD-10-CM

## 2023-11-29 LAB — S PYO AG THROAT QL: NORMAL

## 2023-11-29 PROCEDURE — 1036F TOBACCO NON-USER: CPT | Performed by: NURSE PRACTITIONER

## 2023-11-29 PROCEDURE — 3079F DIAST BP 80-89 MM HG: CPT | Performed by: NURSE PRACTITIONER

## 2023-11-29 PROCEDURE — G8427 DOCREV CUR MEDS BY ELIG CLIN: HCPCS | Performed by: NURSE PRACTITIONER

## 2023-11-29 PROCEDURE — 87880 STREP A ASSAY W/OPTIC: CPT | Performed by: NURSE PRACTITIONER

## 2023-11-29 PROCEDURE — 3075F SYST BP GE 130 - 139MM HG: CPT | Performed by: NURSE PRACTITIONER

## 2023-11-29 PROCEDURE — G8484 FLU IMMUNIZE NO ADMIN: HCPCS | Performed by: NURSE PRACTITIONER

## 2023-11-29 PROCEDURE — 99213 OFFICE O/P EST LOW 20 MIN: CPT | Performed by: NURSE PRACTITIONER

## 2023-11-29 PROCEDURE — 3017F COLORECTAL CA SCREEN DOC REV: CPT | Performed by: NURSE PRACTITIONER

## 2023-11-29 PROCEDURE — G8417 CALC BMI ABV UP PARAM F/U: HCPCS | Performed by: NURSE PRACTITIONER

## 2023-11-29 RX ORDER — MYCOPHENOLIC ACID 180 MG/1
720 TABLET, DELAYED RELEASE ORAL 2 TIMES DAILY
COMMUNITY

## 2023-11-29 RX ORDER — AMOXICILLIN 500 MG/1
500 CAPSULE ORAL 2 TIMES DAILY
Qty: 14 CAPSULE | Refills: 0 | Status: SHIPPED | OUTPATIENT
Start: 2023-11-29 | End: 2023-12-06

## 2023-11-29 RX ORDER — PREDNISONE 10 MG/1
10 TABLET ORAL DAILY
COMMUNITY

## 2023-11-29 RX ORDER — TACROLIMUS 1 MG/1
3 CAPSULE ORAL 2 TIMES DAILY
COMMUNITY

## 2023-11-29 RX ORDER — ACYCLOVIR 400 MG/1
400 TABLET ORAL 2 TIMES DAILY
COMMUNITY

## 2023-11-29 ASSESSMENT — ENCOUNTER SYMPTOMS
ALLERGIC/IMMUNOLOGIC NEGATIVE: 1
GASTROINTESTINAL NEGATIVE: 1
EYES NEGATIVE: 1
SORE THROAT: 1
COUGH: 1

## 2023-11-29 ASSESSMENT — COLUMBIA-SUICIDE SEVERITY RATING SCALE - C-SSRS
4. HAVE YOU HAD THESE THOUGHTS AND HAD SOME INTENTION OF ACTING ON THEM?: NO
7. DID THIS OCCUR IN THE LAST THREE MONTHS: NO
3. HAVE YOU BEEN THINKING ABOUT HOW YOU MIGHT KILL YOURSELF?: NO
5. HAVE YOU STARTED TO WORK OUT OR WORKED OUT THE DETAILS OF HOW TO KILL YOURSELF? DO YOU INTEND TO CARRY OUT THIS PLAN?: NO

## 2023-11-29 ASSESSMENT — PATIENT HEALTH QUESTIONNAIRE - PHQ9
5. POOR APPETITE OR OVEREATING: 0
6. FEELING BAD ABOUT YOURSELF - OR THAT YOU ARE A FAILURE OR HAVE LET YOURSELF OR YOUR FAMILY DOWN: 0
10. IF YOU CHECKED OFF ANY PROBLEMS, HOW DIFFICULT HAVE THESE PROBLEMS MADE IT FOR YOU TO DO YOUR WORK, TAKE CARE OF THINGS AT HOME, OR GET ALONG WITH OTHER PEOPLE: 0
2. FEELING DOWN, DEPRESSED OR HOPELESS: 0
8. MOVING OR SPEAKING SO SLOWLY THAT OTHER PEOPLE COULD HAVE NOTICED. OR THE OPPOSITE, BEING SO FIGETY OR RESTLESS THAT YOU HAVE BEEN MOVING AROUND A LOT MORE THAN USUAL: 0
3. TROUBLE FALLING OR STAYING ASLEEP: 0
SUM OF ALL RESPONSES TO PHQ QUESTIONS 1-9: 0
SUM OF ALL RESPONSES TO PHQ QUESTIONS 1-9: 0
SUM OF ALL RESPONSES TO PHQ9 QUESTIONS 1 & 2: 0
9. THOUGHTS THAT YOU WOULD BE BETTER OFF DEAD, OR OF HURTING YOURSELF: 0
4. FEELING TIRED OR HAVING LITTLE ENERGY: 0
7. TROUBLE CONCENTRATING ON THINGS, SUCH AS READING THE NEWSPAPER OR WATCHING TELEVISION: 0
SUM OF ALL RESPONSES TO PHQ QUESTIONS 1-9: 0
1. LITTLE INTEREST OR PLEASURE IN DOING THINGS: 0
SUM OF ALL RESPONSES TO PHQ QUESTIONS 1-9: 0

## 2023-11-29 NOTE — PATIENT INSTRUCTIONS
SURVEY:     You may be receiving a survey from [employer] regarding your visit today. Please complete the survey to enable us to provide the highest quality of care to you and your family. If you cannot score us a very good on any question, please call the office to discuss how we could have made your experience a very good one.      Thank you,    Balaji Vargas, APRN-CNP  Torin Villatoro, APRN-CNP  Luis Manuel Jorgensen, ELENAN  Mendoza Maddox, CMA  Ayde Valente, CMA  Edwina, CMA  Filomena, PCA  Mildred, PM

## 2023-11-29 NOTE — PROGRESS NOTES
[facility]  Asiya William, [address] PRIMARY CARE  [address] [facility] [address] [facility]  Dept: [phone]  Dept Fax: [phone]      Name: Timothy Pierson  : 1965         Chief Complaint:     Chief Complaint   Patient presents with    Sore Throat     X 4 days. Otalgia     X 4 days. Patient c/o bilateral ear pain. Cough     X 4 days. History of Present Illness:      Timothy Pierson is a 62 y.o.  female who presents with Sore Throat (X 4 days. ), Otalgia (X 4 days. Patient c/o bilateral ear pain. ), and Cough (X 4 days. )      HPI  Jade Dietz is here today for complaints of sore throat for 4 days that is worsening. She has a dry cough that keeps her awake at night. She has had bilateral ear pain. No rhinorrhea or congestion. No vomiting. She has had a headache on and off. No OTC medications. She did get a new kidney a month ago and is no longer on dialysis. Past Medical History:     Past Medical History:   Diagnosis Date    JESS (acute kidney injury) ([facility])     Anemia     Arthritis     Chronic kidney disease     Depression with anxiety 2016    Dialysis patient ([facility]) 4/15/2022    GERD (gastroesophageal reflux disease)     H/O echocardiogram 2016    EF 55%. Mild mitral regurgitation. H/O tooth extraction 2017    Lower posterior right tooth. Hemodialysis patient [facility])     History of blood transfusion     x3. Last one in May 2016    Hyperlipidemia     Hypertensive crisis 2016    Kidney stones     Sciatica     Vasculitis ([facility])       Reviewed all health maintenance requirements and ordered appropriate tests  Health Maintenance Due   Topic Date Due    COVID-19 Vaccine ( season) 2023    Annual Wellness Visit (AWV)  Never done       Past Surgical History:     Past Surgical History:   Procedure Laterality Date    BONE MARROW BIOPSY  16    Per Hematologist order @ [facility].     CHOLECYSTECTOMY      COLONOSCOPY      COLONOSCOPY

## 2023-12-27 DIAGNOSIS — F41.8 DEPRESSION WITH ANXIETY: Chronic | ICD-10-CM

## 2023-12-27 DIAGNOSIS — E78.2 MIXED HYPERLIPIDEMIA: ICD-10-CM

## 2023-12-27 RX ORDER — CLOTRIMAZOLE 10 MG/1
LOZENGE ORAL; TOPICAL
COMMUNITY
Start: 2023-12-01

## 2023-12-27 RX ORDER — DOCUSATE SODIUM 100 MG/1
100 CAPSULE, LIQUID FILLED ORAL 2 TIMES DAILY
COMMUNITY
Start: 2023-12-01

## 2023-12-27 RX ORDER — DULOXETIN HYDROCHLORIDE 30 MG/1
CAPSULE, DELAYED RELEASE ORAL
Qty: 90 CAPSULE | Refills: 1 | Status: SHIPPED | OUTPATIENT
Start: 2023-12-27

## 2023-12-27 RX ORDER — ATORVASTATIN CALCIUM 80 MG/1
80 TABLET, FILM COATED ORAL DAILY
Qty: 90 TABLET | Refills: 1 | Status: SHIPPED | OUTPATIENT
Start: 2023-12-27

## 2023-12-27 RX ORDER — MAGNESIUM OXIDE 400 MG/1
TABLET ORAL
COMMUNITY
Start: 2023-12-15

## 2023-12-27 NOTE — TELEPHONE ENCOUNTER
Health Maintenance   Topic Date Due    COVID-19 Vaccine (5 - 2023-24 season) 09/01/2023    Annual Wellness Visit (AWV)  Never done    Hepatitis B vaccine (6 of 6 - Risk Dialysis Recombivax 3-dose series) 11/29/2024 (Originally 7/18/2023)    Flu vaccine (1) 11/29/2024 (Originally 8/1/2023)    Lipids  11/20/2024    Depression Monitoring  11/29/2024    Breast cancer screen  02/17/2025    Pneumococcal 0-64 years Vaccine (3 - PPSV23 or PCV20) 05/14/2026    Colorectal Cancer Screen  10/14/2026    Diabetes screen  11/20/2026    DTaP/Tdap/Td vaccine (2 - Td or Tdap) 09/06/2028    Shingles vaccine  Completed    Hepatitis C screen  Completed    HIV screen  Completed    Hepatitis A vaccine  Aged Out    Hib vaccine  Aged Out    Polio vaccine  Aged Out    Meningococcal (ACWY) vaccine  Aged Out    A1C test (Diabetic or Prediabetic)  Discontinued    Cervical cancer screen  Discontinued             (applicable per patient's age: Cancer Screenings, Depression Screening, Fall Risk Screening, Immunizations)    Hemoglobin A1C (%)   Date Value   11/20/2023 5.0   07/08/2021 5.4   11/03/2020 5.6     LDL Cholesterol (mg/dL)   Date Value   11/20/2023 Can not be calculated     AST (U/L)   Date Value   11/20/2023 25     ALT (U/L)   Date Value   11/20/2023 30     BUN (mg/dL)   Date Value   11/20/2023 27 (H)      (goal A1C is < 7)   (goal LDL is <100) need 30-50% reduction from baseline     BP Readings from Last 3 Encounters:   11/29/23 134/82   03/30/23 128/74   03/21/23 120/78    (goal /80)      All Future Testing planned in [facility]:  Lab Frequency Next Occurrence   Transfuse RBC TRANSFUSE 1 UNIT        Next Visit Date:  Future Appointments   Date Time Provider [address]   3/25/2024 10:30 AM Sam Steward [address] [facility]            Patient Active Problem List:     JESS (acute kidney injury) ([address])     Arteritis ([address])     End stage renal disease ([address])     ANCA-associated vasculitis ([address])     Rectocele     Retinal

## 2024-01-04 ENCOUNTER — APPOINTMENT (OUTPATIENT)
Dept: CT IMAGING | Age: 59
End: 2024-01-04
Payer: MEDICARE

## 2024-01-04 ENCOUNTER — HOSPITAL ENCOUNTER (EMERGENCY)
Age: 59
Discharge: HOME | End: 2024-01-04
Attending: STUDENT IN AN ORGANIZED HEALTH CARE EDUCATION/TRAINING PROGRAM
Payer: MEDICARE

## 2024-01-04 VITALS
WEIGHT: 260 LBS | SYSTOLIC BLOOD PRESSURE: 155 MMHG | HEIGHT: 62 IN | TEMPERATURE: 99.2 F | OXYGEN SATURATION: 95 % | HEART RATE: 90 BPM | DIASTOLIC BLOOD PRESSURE: 65 MMHG | RESPIRATION RATE: 16 BRPM | BODY MASS INDEX: 47.84 KG/M2

## 2024-01-04 DIAGNOSIS — G43.009 MIGRAINE WITHOUT AURA AND RESPONSIVE TO TREATMENT: ICD-10-CM

## 2024-01-04 DIAGNOSIS — R10.84 GENERALIZED ABDOMINAL PAIN: Primary | ICD-10-CM

## 2024-01-04 LAB
ALBUMIN SERPL-MCNC: 4.2 G/DL (ref 3.5–5.2)
ALBUMIN/GLOB SERPL: 1.5 {RATIO} (ref 1–2.5)
ALP SERPL-CCNC: 115 U/L (ref 35–104)
ALT SERPL-CCNC: 50 U/L (ref 5–33)
ANION GAP SERPL CALCULATED.3IONS-SCNC: 13 MMOL/L (ref 9–17)
AST SERPL-CCNC: 36 U/L
BACTERIA URNS QL MICRO: ABNORMAL
BASOPHILS # BLD: 0.18 K/UL (ref 0–0.2)
BASOPHILS NFR BLD: 2 % (ref 0–2)
BILIRUB DIRECT SERPL-MCNC: <0.1 MG/DL
BILIRUB INDIRECT SERPL-MCNC: ABNORMAL MG/DL (ref 0–1)
BILIRUB SERPL-MCNC: 0.3 MG/DL (ref 0.3–1.2)
BILIRUB UR QL STRIP: NEGATIVE
BUN SERPL-MCNC: 21 MG/DL (ref 6–20)
BUN/CREAT SERPL: 18 (ref 9–20)
CALCIUM SERPL-MCNC: 9.4 MG/DL (ref 8.6–10.4)
CHLORIDE SERPL-SCNC: 100 MMOL/L (ref 98–107)
CLARITY UR: CLEAR
CO2 SERPL-SCNC: 26 MMOL/L (ref 20–31)
COLOR UR: YELLOW
CREAT SERPL-MCNC: 1.2 MG/DL (ref 0.5–0.9)
CREAT UR-MCNC: 144.4 MG/DL (ref 28–217)
EOSINOPHIL # BLD: 0.09 K/UL (ref 0–0.44)
EOSINOPHILS RELATIVE PERCENT: 1 % (ref 1–4)
EPI CELLS #/AREA URNS HPF: ABNORMAL /HPF (ref 0–25)
ERYTHROCYTE [DISTWIDTH] IN BLOOD BY AUTOMATED COUNT: 13.4 % (ref 11.8–14.4)
FLUAV AG SPEC QL: NEGATIVE
FLUBV AG SPEC QL: NEGATIVE
GFR SERPL CREATININE-BSD FRML MDRD: 52 ML/MIN/1.73M2
GLUCOSE SERPL-MCNC: 137 MG/DL (ref 70–99)
GLUCOSE UR STRIP-MCNC: NEGATIVE MG/DL
HCT VFR BLD AUTO: 35 % (ref 36.3–47.1)
HGB BLD-MCNC: 11.8 G/DL (ref 11.9–15.1)
HGB UR QL STRIP.AUTO: NEGATIVE
IMM GRANULOCYTES # BLD AUTO: 0 K/UL (ref 0–0.3)
IMM GRANULOCYTES NFR BLD: 0 %
KETONES UR STRIP-MCNC: NEGATIVE MG/DL
LACTATE BLDV-SCNC: 2.1 MMOL/L (ref 0.5–2.2)
LEUKOCYTE ESTERASE UR QL STRIP: NEGATIVE
LYMPHOCYTES NFR BLD: 0.53 K/UL (ref 1.1–3.7)
LYMPHOCYTES RELATIVE PERCENT: 6 % (ref 24–43)
MAGNESIUM SERPL-MCNC: 1.7 MG/DL (ref 1.6–2.6)
MCH RBC QN AUTO: 33.9 PG (ref 25.2–33.5)
MCHC RBC AUTO-ENTMCNC: 33.7 G/DL (ref 28.4–34.8)
MCV RBC AUTO: 100.6 FL (ref 82.6–102.9)
MONOCYTES NFR BLD: 0.18 K/UL (ref 0.1–1.2)
MONOCYTES NFR BLD: 2 % (ref 3–12)
MORPHOLOGY: ABNORMAL
NEUTROPHILS NFR BLD: 89 % (ref 36–65)
NEUTS SEG NFR BLD: 7.92 K/UL (ref 1.5–8.1)
NITRITE UR QL STRIP: NEGATIVE
NRBC BLD-RTO: 0 PER 100 WBC
PH UR STRIP: 6.5 [PH] (ref 5–9)
PLATELET # BLD AUTO: 273 K/UL (ref 138–453)
PMV BLD AUTO: 10 FL (ref 8.1–13.5)
POTASSIUM SERPL-SCNC: 4 MMOL/L (ref 3.7–5.3)
PROT SERPL-MCNC: 7 G/DL (ref 6.4–8.3)
PROT UR STRIP-MCNC: ABNORMAL MG/DL
RBC # BLD AUTO: 3.48 M/UL (ref 3.95–5.11)
RBC #/AREA URNS HPF: ABNORMAL /HPF (ref 0–2)
SARS-COV-2 RDRP RESP QL NAA+PROBE: NOT DETECTED
SODIUM SERPL-SCNC: 139 MMOL/L (ref 135–144)
SODIUM UR-SCNC: 114 MMOL/L
SP GR UR STRIP: 1.02 (ref 1.01–1.02)
SPECIMEN DESCRIPTION: NORMAL
TOTAL PROTEIN, URINE: 41 MG/DL
URINE TOTAL PROTEIN CREATININE RATIO: 0.28 (ref 0–0.2)
UROBILINOGEN UR STRIP-ACNC: NORMAL EU/DL (ref 0–1)
WBC #/AREA URNS HPF: ABNORMAL /HPF (ref 0–5)
WBC OTHER # BLD: 8.9 K/UL (ref 3.5–11.3)
YEAST URNS QL MICRO: ABNORMAL

## 2024-01-04 PROCEDURE — 96376 TX/PRO/DX INJ SAME DRUG ADON: CPT

## 2024-01-04 PROCEDURE — 83605 ASSAY OF LACTIC ACID: CPT

## 2024-01-04 PROCEDURE — 82570 ASSAY OF URINE CREATININE: CPT

## 2024-01-04 PROCEDURE — 6370000000 HC RX 637 (ALT 250 FOR IP): Performed by: STUDENT IN AN ORGANIZED HEALTH CARE EDUCATION/TRAINING PROGRAM

## 2024-01-04 PROCEDURE — 81001 URINALYSIS AUTO W/SCOPE: CPT

## 2024-01-04 PROCEDURE — 99284 EMERGENCY DEPT VISIT MOD MDM: CPT

## 2024-01-04 PROCEDURE — 2580000003 HC RX 258: Performed by: STUDENT IN AN ORGANIZED HEALTH CARE EDUCATION/TRAINING PROGRAM

## 2024-01-04 PROCEDURE — 84300 ASSAY OF URINE SODIUM: CPT

## 2024-01-04 PROCEDURE — 96374 THER/PROPH/DIAG INJ IV PUSH: CPT

## 2024-01-04 PROCEDURE — 85025 COMPLETE CBC W/AUTO DIFF WBC: CPT

## 2024-01-04 PROCEDURE — 36415 COLL VENOUS BLD VENIPUNCTURE: CPT

## 2024-01-04 PROCEDURE — 87635 SARS-COV-2 COVID-19 AMP PRB: CPT

## 2024-01-04 PROCEDURE — 83735 ASSAY OF MAGNESIUM: CPT

## 2024-01-04 PROCEDURE — 84156 ASSAY OF PROTEIN URINE: CPT

## 2024-01-04 PROCEDURE — 87804 INFLUENZA ASSAY W/OPTIC: CPT

## 2024-01-04 PROCEDURE — 83935 ASSAY OF URINE OSMOLALITY: CPT

## 2024-01-04 PROCEDURE — 80048 BASIC METABOLIC PNL TOTAL CA: CPT

## 2024-01-04 PROCEDURE — 96375 TX/PRO/DX INJ NEW DRUG ADDON: CPT

## 2024-01-04 PROCEDURE — 6360000002 HC RX W HCPCS: Performed by: STUDENT IN AN ORGANIZED HEALTH CARE EDUCATION/TRAINING PROGRAM

## 2024-01-04 PROCEDURE — 70450 CT HEAD/BRAIN W/O DYE: CPT

## 2024-01-04 PROCEDURE — 87086 URINE CULTURE/COLONY COUNT: CPT

## 2024-01-04 PROCEDURE — 80076 HEPATIC FUNCTION PANEL: CPT

## 2024-01-04 RX ORDER — FLUCONAZOLE 100 MG/1
200 TABLET ORAL ONCE
Status: COMPLETED | OUTPATIENT
Start: 2024-01-04 | End: 2024-01-04

## 2024-01-04 RX ORDER — 0.9 % SODIUM CHLORIDE 0.9 %
1000 INTRAVENOUS SOLUTION INTRAVENOUS ONCE
Status: COMPLETED | OUTPATIENT
Start: 2024-01-04 | End: 2024-01-04

## 2024-01-04 RX ORDER — ONDANSETRON 2 MG/ML
4 INJECTION INTRAMUSCULAR; INTRAVENOUS PRN
Status: DISCONTINUED | OUTPATIENT
Start: 2024-01-04 | End: 2024-01-05 | Stop reason: HOSPADM

## 2024-01-04 RX ORDER — PROCHLORPERAZINE EDISYLATE 5 MG/ML
10 INJECTION INTRAMUSCULAR; INTRAVENOUS ONCE
Status: COMPLETED | OUTPATIENT
Start: 2024-01-04 | End: 2024-01-04

## 2024-01-04 RX ORDER — LABETALOL HYDROCHLORIDE 5 MG/ML
10 INJECTION, SOLUTION INTRAVENOUS ONCE
Status: COMPLETED | OUTPATIENT
Start: 2024-01-04 | End: 2024-01-04

## 2024-01-04 RX ORDER — ONDANSETRON 2 MG/ML
4 INJECTION INTRAMUSCULAR; INTRAVENOUS ONCE
Status: COMPLETED | OUTPATIENT
Start: 2024-01-04 | End: 2024-01-04

## 2024-01-04 RX ORDER — MORPHINE SULFATE 4 MG/ML
4 INJECTION, SOLUTION INTRAMUSCULAR; INTRAVENOUS ONCE
Status: COMPLETED | OUTPATIENT
Start: 2024-01-04 | End: 2024-01-04

## 2024-01-04 RX ORDER — LORAZEPAM 2 MG/ML
1 INJECTION INTRAMUSCULAR ONCE
Status: COMPLETED | OUTPATIENT
Start: 2024-01-04 | End: 2024-01-04

## 2024-01-04 RX ORDER — ONDANSETRON 4 MG/1
4 TABLET, FILM COATED ORAL EVERY 8 HOURS PRN
Qty: 20 TABLET | Refills: 0 | Status: SHIPPED | OUTPATIENT
Start: 2024-01-04

## 2024-01-04 RX ADMIN — FLUCONAZOLE 200 MG: 100 TABLET ORAL at 22:19

## 2024-01-04 RX ADMIN — MORPHINE SULFATE 4 MG: 4 INJECTION, SOLUTION INTRAMUSCULAR; INTRAVENOUS at 20:26

## 2024-01-04 RX ADMIN — SODIUM CHLORIDE 1000 ML: 9 INJECTION, SOLUTION INTRAVENOUS at 22:26

## 2024-01-04 RX ADMIN — LABETALOL HYDROCHLORIDE 10 MG: 5 INJECTION INTRAVENOUS at 20:28

## 2024-01-04 RX ADMIN — ONDANSETRON 4 MG: 2 INJECTION INTRAMUSCULAR; INTRAVENOUS at 20:18

## 2024-01-04 RX ADMIN — ONDANSETRON 4 MG: 2 INJECTION INTRAMUSCULAR; INTRAVENOUS at 20:35

## 2024-01-04 RX ADMIN — SODIUM CHLORIDE 1000 ML: 9 INJECTION, SOLUTION INTRAVENOUS at 20:26

## 2024-01-04 RX ADMIN — LORAZEPAM 1 MG: 2 INJECTION, SOLUTION INTRAMUSCULAR; INTRAVENOUS at 20:29

## 2024-01-04 RX ADMIN — PROCHLORPERAZINE EDISYLATE 10 MG: 5 INJECTION INTRAMUSCULAR; INTRAVENOUS at 20:54

## 2024-01-04 ASSESSMENT — ENCOUNTER SYMPTOMS
APNEA: 0
SORE THROAT: 0
ABDOMINAL PAIN: 1
TROUBLE SWALLOWING: 0
NAUSEA: 1
VOICE CHANGE: 0
EYES NEGATIVE: 1
VOMITING: 1

## 2024-01-04 ASSESSMENT — PAIN DESCRIPTION - LOCATION: LOCATION: ABDOMEN

## 2024-01-04 ASSESSMENT — PAIN - FUNCTIONAL ASSESSMENT: PAIN_FUNCTIONAL_ASSESSMENT: 0-10

## 2024-01-04 ASSESSMENT — PAIN DESCRIPTION - ORIENTATION: ORIENTATION: RIGHT

## 2024-01-04 ASSESSMENT — PAIN SCALES - GENERAL
PAINLEVEL_OUTOF10: 10
PAINLEVEL_OUTOF10: 10
PAINLEVEL_OUTOF10: 8

## 2024-01-05 ENCOUNTER — TELEPHONE (OUTPATIENT)
Dept: PRIMARY CARE CLINIC | Age: 59
End: 2024-01-05

## 2024-01-05 LAB — OSMOLALITY UR: 715 MOSM/KG (ref 80–1300)

## 2024-01-05 NOTE — TELEPHONE ENCOUNTER
[facility] [place] Transitions Initial Follow Up Call    Outreach made within 2 business days of discharge: Yes    Patient: Kathrin Carmichael Patient : 1965   MRN: [id number]  Reason for Admission: There are no discharge diagnoses documented for the most recent discharge.  Discharge Date: 24       Spoke with: Kathrin Trinidad    Discharge department/facility: [facility]     TCM Interactive Patient Contact:  Was patient able to fill all prescriptions: Yes  Was patient instructed to bring all medications to the follow-up visit: Yes  Is patient taking all medications as directed in the discharge summary? Yes  Does patient understand their discharge instructions: Yes  Does patient have questions or concerns that need addressed prior to 7-14 day follow up office visit: no    Scheduled appointment with PCP within 7-14 days    Follow Up  Future Appointments   Date Time Provider Department Center   3/25/2024 10:30 AM Deena Steward APRN - CNM [facility]       Edwina Hicks MA

## 2024-01-05 NOTE — DISCHARGE INSTRUCTIONS
If given narcotics (opiates) during this Emergency Department visit, please do not drink, drive or operate any machinery for at least 4 - 6 hours.    Avoid eating any spicy food, milk type products or drinks that have caffeine in it.  Take all medications as prescribed.  For pain use ibuprofen (Motrin) or acetaminophen (Tylenol), unless prescribed medications that have acetaminophen in it.  You can take over the counter acetaminophen tablets (1 - 2 tablets of the 500-mg strength every 6 hours) or ibuprofen tablets (2 tablets every 4 hours).    PLEASE RETURN TO THE EMERGENCY DEPARTMENT IMMEDIATELY for worsening symptoms, or if you develop any concerning symptoms such as: high fever not relieved by acetaminophen (Tylenol) and/or ibuprofen (Motrin), chills, shortness of breath, chest pain, persistent nausea and/or vomiting, numbness, weakness or tingling in the arms or legs or change in color of the extremities, changes in mental status, persistent headache, blurry vision.    Return within 8 - 12 hours if you have any of the following: worsening of pain in your abdomen, no food sounds good to you, you continue to vomit, pain goes to your back, have pain in the abdomen when going over a bump in the car or when you jump up and down, develop vaginal bleeding or discharge, inability to urinate, unable to follow up with your physician, or other any other care or concern.

## 2024-01-05 NOTE — ED PROVIDER NOTES
[facility] ED  Emergency Department Encounter  Emergency Medicine Attending     Pt Name:Kathrin Carmichael  MRN: [id number]  Birthdate 1965  Date of evaluation: 1/4/24  PCP:  Demetrius Vargas APRN - CNP  Note Started: 8:23 PM EST      CHIEF COMPLAINT       Chief Complaint   Patient presents with    Abdominal Pain     Abdominal pain began 2 weeks PTA with nausea and vomiting beginning today, kidney transplant 3 months PTA.  Taking meds as prescribed.       HISTORY OF PRESENT ILLNESS  (Location/Symptom, Timing/Onset, Context/Setting, Quality, Duration, Modifying Factors, Severity.)      Kathrin Carmichael is a 58 y.o. female who presents with 2 weeks of abdominal pain, frequent bleeding with urination, diffuse abdominal spasms and today nausea and vomiting.  Patient had a transplant of the right kidney completed about 3 months ago and has been appropriately worked up and managed by the group at [facility] in [facility].  Patient was worried that she may be a rejection but did not want to admitted to herself which is why she is now admitting that she been having the symptoms for so long did not seek treatment.  Patient today called out for severe abdominal pain with nausea and emesis.  Patient is very tearful in the room concerned that she may be in full rejection at this point.  Patient states however that she has been taking all her medications as prescribed to her and has not missed a single dose    PAST MEDICAL / SURGICAL / SOCIAL / FAMILY HISTORY      has a past medical history of JESS (acute kidney injury) ([facility]), Anemia, Arthritis, Chronic kidney disease, Depression with anxiety, Dialysis patient ([facility]), GERD (gastroesophageal reflux disease), H/O echocardiogram, H/O tooth extraction, Hemodialysis patient ([facility]), History of blood transfusion, Hyperlipidemia, Hypertensive crisis, Kidney stones, Sciatica, and Vasculitis ([facility]).       has a past surgical history that includes Cholecystectomy; bone marrow biopsy

## 2024-01-05 NOTE — ED NOTES
Patient had one episode of emesis at this time, provider aware. Pt reports she does feel better after vomiting

## 2024-01-06 LAB
MICROORGANISM SPEC CULT: NORMAL
SPECIMEN DESCRIPTION: NORMAL

## 2024-01-22 DIAGNOSIS — R14.0 BLOATING: ICD-10-CM

## 2024-01-22 RX ORDER — BUTALBITAL, ACETAMINOPHEN AND CAFFEINE 50; 325; 40 MG/1; MG/1; MG/1
1 TABLET ORAL EVERY 6 HOURS PRN
Qty: 30 TABLET | Refills: 0 | Status: SHIPPED | OUTPATIENT
Start: 2024-01-22

## 2024-01-22 RX ORDER — DILTIAZEM HYDROCHLORIDE 120 MG/1
CAPSULE, EXTENDED RELEASE ORAL
COMMUNITY
Start: 2024-01-04

## 2024-01-22 RX ORDER — METOCLOPRAMIDE 5 MG/1
5 TABLET ORAL 2 TIMES DAILY
Qty: 180 TABLET | Refills: 1 | Status: SHIPPED | OUTPATIENT
Start: 2024-01-22

## 2024-01-22 NOTE — TELEPHONE ENCOUNTER
Health Maintenance   Topic Date Due    COVID-19 Vaccine (5 - 2023-24 season) 09/01/2023    Annual Wellness Visit (Medicare)  Never done    Hepatitis B vaccine (6 of 6 - Risk Dialysis Recombivax 3-dose series) 11/29/2024 (Originally 7/18/2023)    Flu vaccine (1) 11/29/2024 (Originally 8/1/2023)    Lipids  11/20/2024    Depression Monitoring  11/29/2024    Breast cancer screen  02/17/2025    Pneumococcal 0-64 years Vaccine (3 - PPSV23 or PCV20) 05/14/2026    Colorectal Cancer Screen  10/14/2026    Diabetes screen  11/20/2026    DTaP/Tdap/Td vaccine (2 - Td or Tdap) 09/06/2028    Shingles vaccine  Completed    Hepatitis C screen  Completed    HIV screen  Completed    Hepatitis A vaccine  Aged Out    Hib vaccine  Aged Out    Polio vaccine  Aged Out    Meningococcal (ACWY) vaccine  Aged Out    A1C test (Diabetic or Prediabetic)  Discontinued    Cervical cancer screen  Discontinued             (applicable per patient's age: Cancer Screenings, Depression Screening, Fall Risk Screening, Immunizations)    Hemoglobin A1C (%)   Date Value   11/20/2023 5.0   07/08/2021 5.4   11/03/2020 5.6     LDL Cholesterol (mg/dL)   Date Value   11/20/2023 Can not be calculated     AST (U/L)   Date Value   01/04/2024 36 (H)     ALT (U/L)   Date Value   01/04/2024 50 (H)     BUN (mg/dL)   Date Value   01/04/2024 21 (H)      (goal A1C is < 7)   (goal LDL is <100) need 30-50% reduction from baseline     BP Readings from Last 3 Encounters:   01/04/24 (!) 155/65   11/29/23 134/82   03/30/23 128/74    (goal /80)      All Future Testing planned in [facility]:  Lab Frequency Next Occurrence   Transfuse RBC TRANSFUSE 1 UNIT        Next Visit Date:  Future Appointments   Date Time Provider Department Center   3/25/2024 10:30 AM Deena Steward APRN - CNM [facility]            Patient Active Problem List:     JESS (acute kidney injury) (HCC)     Arteritis (HCC)     End stage renal disease (HCC)     ANCA-associated vasculitis (HCC)

## 2024-01-26 ENCOUNTER — HOSPITAL ENCOUNTER (OUTPATIENT)
Age: 59
Discharge: HOME | End: 2024-01-26
Payer: MEDICARE

## 2024-01-26 LAB
ALBUMIN SERPL-MCNC: 4 G/DL (ref 3.5–5.2)
ALBUMIN/GLOB SERPL: 1.4 {RATIO} (ref 1–2.5)
ALP SERPL-CCNC: 122 U/L (ref 35–104)
ALT SERPL-CCNC: 55 U/L (ref 5–33)
ANION GAP SERPL CALCULATED.3IONS-SCNC: 11 MMOL/L (ref 9–17)
AST SERPL-CCNC: 37 U/L
BASOPHILS # BLD: 0 K/UL (ref 0–0.2)
BASOPHILS NFR BLD: 0 % (ref 0–2)
BILIRUB DIRECT SERPL-MCNC: <0.1 MG/DL
BILIRUB INDIRECT SERPL-MCNC: ABNORMAL MG/DL (ref 0–1)
BILIRUB SERPL-MCNC: 0.3 MG/DL (ref 0.3–1.2)
BUN SERPL-MCNC: 23 MG/DL (ref 6–20)
CALCIUM SERPL-MCNC: 9.6 MG/DL (ref 8.6–10.4)
CHLORIDE SERPL-SCNC: 100 MMOL/L (ref 98–107)
CO2 SERPL-SCNC: 24 MMOL/L (ref 20–31)
CREAT SERPL-MCNC: 1.1 MG/DL (ref 0.5–0.9)
EOSINOPHIL # BLD: 0.21 K/UL (ref 0–0.44)
EOSINOPHILS RELATIVE PERCENT: 2 % (ref 1–4)
ERYTHROCYTE [DISTWIDTH] IN BLOOD BY AUTOMATED COUNT: 13.2 % (ref 11.8–14.4)
GFR SERPL CREATININE-BSD FRML MDRD: 58 ML/MIN/1.73M2
GLUCOSE SERPL-MCNC: 155 MG/DL (ref 70–99)
HCT VFR BLD AUTO: 34.6 % (ref 36.3–47.1)
HGB BLD-MCNC: 11.7 G/DL (ref 11.9–15.1)
IMM GRANULOCYTES # BLD AUTO: 0 K/UL (ref 0–0.3)
IMM GRANULOCYTES NFR BLD: 0 %
LYMPHOCYTES NFR BLD: 0.75 K/UL (ref 1.1–3.7)
LYMPHOCYTES RELATIVE PERCENT: 7 % (ref 24–43)
MAGNESIUM SERPL-MCNC: 1.6 MG/DL (ref 1.6–2.6)
MCH RBC QN AUTO: 33.5 PG (ref 25.2–33.5)
MCHC RBC AUTO-ENTMCNC: 33.8 G/DL (ref 28.4–34.8)
MCV RBC AUTO: 99.1 FL (ref 82.6–102.9)
MONOCYTES NFR BLD: 0.21 K/UL (ref 0.1–1.2)
MONOCYTES NFR BLD: 2 % (ref 3–12)
MORPHOLOGY: NORMAL
NEUTROPHILS NFR BLD: 89 % (ref 36–65)
NEUTS SEG NFR BLD: 9.53 K/UL (ref 1.5–8.1)
NRBC BLD-RTO: 0 PER 100 WBC
PHOSPHATE SERPL-MCNC: 2.1 MG/DL (ref 2.6–4.5)
PLATELET # BLD AUTO: 253 K/UL (ref 138–453)
PMV BLD AUTO: 10.3 FL (ref 8.1–13.5)
POTASSIUM SERPL-SCNC: 4.6 MMOL/L (ref 3.7–5.3)
PROT SERPL-MCNC: 6.9 G/DL (ref 6.4–8.3)
RBC # BLD AUTO: 3.49 M/UL (ref 3.95–5.11)
SODIUM SERPL-SCNC: 135 MMOL/L (ref 135–144)
URATE SERPL-MCNC: 6.2 MG/DL (ref 2.4–5.7)
WBC OTHER # BLD: 10.7 K/UL (ref 3.5–11.3)

## 2024-01-26 PROCEDURE — 80061 LIPID PANEL: CPT

## 2024-01-26 PROCEDURE — 84550 ASSAY OF BLOOD/URIC ACID: CPT

## 2024-01-26 PROCEDURE — 80053 COMPREHEN METABOLIC PANEL: CPT

## 2024-01-26 PROCEDURE — 36415 COLL VENOUS BLD VENIPUNCTURE: CPT

## 2024-01-26 PROCEDURE — 83721 ASSAY OF BLOOD LIPOPROTEIN: CPT

## 2024-01-26 PROCEDURE — 84100 ASSAY OF PHOSPHORUS: CPT

## 2024-01-26 PROCEDURE — 80197 ASSAY OF TACROLIMUS: CPT

## 2024-01-26 PROCEDURE — 87799 DETECT AGENT NOS DNA QUANT: CPT

## 2024-01-26 PROCEDURE — 82248 BILIRUBIN DIRECT: CPT

## 2024-01-26 PROCEDURE — 85025 COMPLETE CBC W/AUTO DIFF WBC: CPT

## 2024-01-26 PROCEDURE — 83735 ASSAY OF MAGNESIUM: CPT

## 2024-01-26 PROCEDURE — 83036 HEMOGLOBIN GLYCOSYLATED A1C: CPT

## 2024-01-27 LAB
CHOLEST SERPL-MCNC: 244 MG/DL
CHOLESTEROL/HDL RATIO: 6.1
HDLC SERPL-MCNC: 40 MG/DL
LDLC SERPL CALC-MCNC: ABNORMAL MG/DL (ref 0–130)
LDLC SERPL DIRECT ASSAY-MCNC: 110 MG/DL
TRIGL SERPL-MCNC: 673 MG/DL

## 2024-01-28 LAB
EST. AVERAGE GLUCOSE BLD GHB EST-MCNC: 134 MG/DL
HBA1C MFR BLD: 6.3 % (ref 4–6)

## 2024-01-31 ENCOUNTER — TELEPHONE (OUTPATIENT)
Dept: PRIMARY CARE CLINIC | Age: 59
End: 2024-01-31

## 2024-02-05 RX ORDER — AMILORIDE HYDROCHLORIDE 5 MG/1
5 TABLET ORAL EVERY MORNING
COMMUNITY
Start: 2024-02-02

## 2024-02-05 RX ORDER — BUTALBITAL, ACETAMINOPHEN AND CAFFEINE 50; 325; 40 MG/1; MG/1; MG/1
TABLET ORAL
Qty: 120 TABLET | Refills: 0 | Status: SHIPPED | OUTPATIENT
Start: 2024-02-05

## 2024-02-05 NOTE — TELEPHONE ENCOUNTER
Health Maintenance   Topic Date Due    COVID-19 Vaccine (5 - 2023-24 season) 09/01/2023    Annual Wellness Visit (Medicare)  Never done    Hepatitis B vaccine (6 of 6 - Risk Dialysis Recombivax 3-dose series) 11/29/2024 (Originally 7/18/2023)    Flu vaccine (1) 11/29/2024 (Originally 8/1/2023)    Depression Monitoring  11/29/2024    A1C test (Diabetic or Prediabetic)  01/26/2025    Lipids  01/26/2025    Breast cancer screen  02/17/2025    Pneumococcal 0-64 years Vaccine (3 - PPSV23 or PCV20) 05/14/2026    Colorectal Cancer Screen  10/14/2026    DTaP/Tdap/Td vaccine (2 - Td or Tdap) 09/06/2028    Shingles vaccine  Completed    Hepatitis C screen  Completed    HIV screen  Completed    Hepatitis A vaccine  Aged Out    Hib vaccine  Aged Out    Polio vaccine  Aged Out    Meningococcal (ACWY) vaccine  Aged Out    Diabetes screen  Discontinued    Cervical cancer screen  Discontinued             (applicable per patient's age: Cancer Screenings, Depression Screening, Fall Risk Screening, Immunizations)    Hemoglobin A1C (%)   Date Value   01/26/2024 6.3 (H)   11/20/2023 5.0   07/08/2021 5.4     LDL Cholesterol (mg/dL)   Date Value   01/26/2024          AST (U/L)   Date Value   01/26/2024 37 (H)     ALT (U/L)   Date Value   01/26/2024 55 (H)     BUN (mg/dL)   Date Value   01/26/2024 23 (H)      (goal A1C is < 7)   (goal LDL is <100) need 30-50% reduction from baseline     BP Readings from Last 3 Encounters:   01/04/24 (!) 155/65   11/29/23 134/82   03/30/23 128/74    (goal /80)      All Future Testing planned in [facility]:  Lab Frequency Next Occurrence   Transfuse RBC TRANSFUSE 1 UNIT        Next Visit Date:  Future Appointments   Date Time Provider Department Center   3/25/2024 10:30 AM Deena Steward APRN - CNM [facility]            Patient Active Problem List:     JESS (acute kidney injury) (HCC)     Arteritis (HCC)     End stage renal disease (HCC)     ANCA-associated vasculitis (HCC)     Rectocele

## 2024-02-15 ENCOUNTER — APPOINTMENT (OUTPATIENT)
Dept: GENERAL RADIOLOGY | Age: 59
End: 2024-02-15
Payer: MEDICARE

## 2024-02-15 ENCOUNTER — HOSPITAL ENCOUNTER (OUTPATIENT)
Age: 59
Setting detail: OBSERVATION
Discharge: HOME | End: 2024-02-17
Attending: STUDENT IN AN ORGANIZED HEALTH CARE EDUCATION/TRAINING PROGRAM | Admitting: STUDENT IN AN ORGANIZED HEALTH CARE EDUCATION/TRAINING PROGRAM
Payer: MEDICARE

## 2024-02-15 DIAGNOSIS — I10 ESSENTIAL HYPERTENSION: ICD-10-CM

## 2024-02-15 DIAGNOSIS — R07.9 CHEST PAIN, UNSPECIFIED TYPE: Primary | ICD-10-CM

## 2024-02-15 DIAGNOSIS — A49.9 UTI (URINARY TRACT INFECTION), BACTERIAL: ICD-10-CM

## 2024-02-15 DIAGNOSIS — N39.0 UTI (URINARY TRACT INFECTION), BACTERIAL: ICD-10-CM

## 2024-02-15 DIAGNOSIS — R07.9 CHEST PAIN IN ADULT: ICD-10-CM

## 2024-02-15 LAB
ALBUMIN SERPL-MCNC: 4.1 G/DL (ref 3.5–5.2)
ALBUMIN/GLOB SERPL: 1.6 {RATIO} (ref 1–2.5)
ALP SERPL-CCNC: 129 U/L (ref 35–104)
ALT SERPL-CCNC: 54 U/L (ref 5–33)
ANION GAP SERPL CALCULATED.3IONS-SCNC: 14 MMOL/L (ref 9–17)
AST SERPL-CCNC: 32 U/L
BACTERIA URNS QL MICRO: ABNORMAL
BASOPHILS # BLD: 0.03 K/UL (ref 0–0.2)
BASOPHILS NFR BLD: 0 % (ref 0–2)
BILIRUB DIRECT SERPL-MCNC: <0.1 MG/DL
BILIRUB INDIRECT SERPL-MCNC: ABNORMAL MG/DL (ref 0–1)
BILIRUB SERPL-MCNC: 0.4 MG/DL (ref 0.3–1.2)
BILIRUB UR QL STRIP: NEGATIVE
BNP SERPL-MCNC: 462 PG/ML
BUN SERPL-MCNC: 21 MG/DL (ref 6–20)
BUN/CREAT SERPL: 18 (ref 9–20)
CALCIUM SERPL-MCNC: 9.3 MG/DL (ref 8.6–10.4)
CHLORIDE SERPL-SCNC: 101 MMOL/L (ref 98–107)
CLARITY UR: CLEAR
CO2 SERPL-SCNC: 23 MMOL/L (ref 20–31)
COLOR UR: YELLOW
CREAT SERPL-MCNC: 1.2 MG/DL (ref 0.5–0.9)
CRP SERPL HS-MCNC: 38 MG/L (ref 0–5)
EKG ATRIAL RATE: 90 BPM
EKG Q-T INTERVAL: 358 MS
EKG QRS DURATION: 74 MS
EKG QTC CALCULATION (BAZETT): 435 MS
EKG R AXIS: -3 DEGREES
EKG T AXIS: 46 DEGREES
EKG VENTRICULAR RATE: 89 BPM
EOSINOPHIL # BLD: 0.07 K/UL (ref 0–0.44)
EOSINOPHILS RELATIVE PERCENT: 1 % (ref 1–4)
EPI CELLS #/AREA URNS HPF: ABNORMAL /HPF (ref 0–25)
ERYTHROCYTE [DISTWIDTH] IN BLOOD BY AUTOMATED COUNT: 12.8 % (ref 11.8–14.4)
GFR SERPL CREATININE-BSD FRML MDRD: 52 ML/MIN/1.73M2
GLUCOSE SERPL-MCNC: 145 MG/DL (ref 70–99)
GLUCOSE UR STRIP-MCNC: NEGATIVE MG/DL
HCT VFR BLD AUTO: 37.5 % (ref 36.3–47.1)
HGB BLD-MCNC: 12.2 G/DL (ref 11.9–15.1)
HGB UR QL STRIP.AUTO: NEGATIVE
IMM GRANULOCYTES # BLD AUTO: 0.06 K/UL (ref 0–0.3)
IMM GRANULOCYTES NFR BLD: 1 %
KETONES UR STRIP-MCNC: NEGATIVE MG/DL
LEUKOCYTE ESTERASE UR QL STRIP: NEGATIVE
LIPASE SERPL-CCNC: 14 U/L (ref 13–60)
LYMPHOCYTES NFR BLD: 0.56 K/UL (ref 1.1–3.7)
LYMPHOCYTES RELATIVE PERCENT: 4 % (ref 24–43)
MCH RBC QN AUTO: 32.3 PG (ref 25.2–33.5)
MCHC RBC AUTO-ENTMCNC: 32.5 G/DL (ref 28.4–34.8)
MCV RBC AUTO: 99.2 FL (ref 82.6–102.9)
MONOCYTES NFR BLD: 0.62 K/UL (ref 0.1–1.2)
MONOCYTES NFR BLD: 5 % (ref 3–12)
NEUTROPHILS NFR BLD: 89 % (ref 36–65)
NEUTS SEG NFR BLD: 11.57 K/UL (ref 1.5–8.1)
NITRITE UR QL STRIP: NEGATIVE
NRBC BLD-RTO: 0 PER 100 WBC
PH UR STRIP: 6 [PH] (ref 5–9)
PLATELET # BLD AUTO: 285 K/UL (ref 138–453)
PMV BLD AUTO: 10 FL (ref 8.1–13.5)
POTASSIUM SERPL-SCNC: 4.5 MMOL/L (ref 3.7–5.3)
PROT SERPL-MCNC: 6.6 G/DL (ref 6.4–8.3)
PROT UR STRIP-MCNC: NEGATIVE MG/DL
RBC # BLD AUTO: 3.78 M/UL (ref 3.95–5.11)
RBC #/AREA URNS HPF: ABNORMAL /HPF (ref 0–2)
SODIUM SERPL-SCNC: 138 MMOL/L (ref 135–144)
SP GR UR STRIP: 1.02 (ref 1.01–1.02)
TROPONIN I SERPL HS-MCNC: 16 NG/L (ref 0–14)
TROPONIN I SERPL HS-MCNC: 16 NG/L (ref 0–14)
UROBILINOGEN UR STRIP-ACNC: NORMAL EU/DL (ref 0–1)
WBC #/AREA URNS HPF: ABNORMAL /HPF (ref 0–5)
WBC OTHER # BLD: 12.9 K/UL (ref 3.5–11.3)

## 2024-02-15 PROCEDURE — 93010 ELECTROCARDIOGRAM REPORT: CPT | Performed by: INTERNAL MEDICINE

## 2024-02-15 PROCEDURE — 99285 EMERGENCY DEPT VISIT HI MDM: CPT

## 2024-02-15 PROCEDURE — 96365 THER/PROPH/DIAG IV INF INIT: CPT

## 2024-02-15 PROCEDURE — 6360000002 HC RX W HCPCS: Performed by: NURSE PRACTITIONER

## 2024-02-15 PROCEDURE — 6370000000 HC RX 637 (ALT 250 FOR IP): Performed by: NURSE PRACTITIONER

## 2024-02-15 PROCEDURE — 80076 HEPATIC FUNCTION PANEL: CPT

## 2024-02-15 PROCEDURE — 96361 HYDRATE IV INFUSION ADD-ON: CPT

## 2024-02-15 PROCEDURE — 83880 ASSAY OF NATRIURETIC PEPTIDE: CPT

## 2024-02-15 PROCEDURE — 80048 BASIC METABOLIC PNL TOTAL CA: CPT

## 2024-02-15 PROCEDURE — 86140 C-REACTIVE PROTEIN: CPT

## 2024-02-15 PROCEDURE — 71045 X-RAY EXAM CHEST 1 VIEW: CPT

## 2024-02-15 PROCEDURE — 83690 ASSAY OF LIPASE: CPT

## 2024-02-15 PROCEDURE — 81001 URINALYSIS AUTO W/SCOPE: CPT

## 2024-02-15 PROCEDURE — 93005 ELECTROCARDIOGRAM TRACING: CPT | Performed by: STUDENT IN AN ORGANIZED HEALTH CARE EDUCATION/TRAINING PROGRAM

## 2024-02-15 PROCEDURE — 85025 COMPLETE CBC W/AUTO DIFF WBC: CPT

## 2024-02-15 PROCEDURE — 2580000003 HC RX 258: Performed by: NURSE PRACTITIONER

## 2024-02-15 PROCEDURE — 36415 COLL VENOUS BLD VENIPUNCTURE: CPT

## 2024-02-15 PROCEDURE — 84484 ASSAY OF TROPONIN QUANT: CPT

## 2024-02-15 PROCEDURE — G0378 HOSPITAL OBSERVATION PER HR: HCPCS

## 2024-02-15 PROCEDURE — 87086 URINE CULTURE/COLONY COUNT: CPT

## 2024-02-15 RX ORDER — SULFAMETHOXAZOLE AND TRIMETHOPRIM 800; 160 MG/1; MG/1
1 TABLET ORAL
COMMUNITY

## 2024-02-15 RX ORDER — 0.9 % SODIUM CHLORIDE 0.9 %
500 INTRAVENOUS SOLUTION INTRAVENOUS ONCE
Status: COMPLETED | OUTPATIENT
Start: 2024-02-15 | End: 2024-02-15

## 2024-02-15 RX ORDER — HYDROCODONE BITARTRATE AND ACETAMINOPHEN 5; 325 MG/1; MG/1
1 TABLET ORAL ONCE
Status: COMPLETED | OUTPATIENT
Start: 2024-02-15 | End: 2024-02-15

## 2024-02-15 RX ADMIN — CEFTRIAXONE SODIUM 1000 MG: 1 INJECTION, POWDER, FOR SOLUTION INTRAMUSCULAR; INTRAVENOUS at 20:48

## 2024-02-15 RX ADMIN — HYDROCODONE BITARTRATE AND ACETAMINOPHEN 1 TABLET: 5; 325 TABLET ORAL at 22:58

## 2024-02-15 RX ADMIN — SODIUM CHLORIDE 500 ML: 9 INJECTION, SOLUTION INTRAVENOUS at 19:30

## 2024-02-15 ASSESSMENT — LIFESTYLE VARIABLES
HOW OFTEN DO YOU HAVE A DRINK CONTAINING ALCOHOL: NEVER
HOW MANY STANDARD DRINKS CONTAINING ALCOHOL DO YOU HAVE ON A TYPICAL DAY: PATIENT DOES NOT DRINK

## 2024-02-15 ASSESSMENT — PAIN DESCRIPTION - DESCRIPTORS: DESCRIPTORS: SHARP

## 2024-02-15 ASSESSMENT — PAIN DESCRIPTION - LOCATION
LOCATION: CHEST
LOCATION: CHEST

## 2024-02-15 ASSESSMENT — HEART SCORE: ECG: 1

## 2024-02-15 ASSESSMENT — PAIN - FUNCTIONAL ASSESSMENT: PAIN_FUNCTIONAL_ASSESSMENT: 0-10

## 2024-02-15 ASSESSMENT — PAIN SCALES - GENERAL
PAINLEVEL_OUTOF10: 10
PAINLEVEL_OUTOF10: 9

## 2024-02-15 ASSESSMENT — PAIN DESCRIPTION - PAIN TYPE: TYPE: ACUTE PAIN

## 2024-02-16 ENCOUNTER — HOSPITAL ENCOUNTER (OUTPATIENT)
Age: 59
Setting detail: OBSERVATION
End: 2024-02-16
Attending: STUDENT IN AN ORGANIZED HEALTH CARE EDUCATION/TRAINING PROGRAM
Payer: MEDICARE

## 2024-02-16 ENCOUNTER — APPOINTMENT (OUTPATIENT)
Age: 59
End: 2024-02-16
Payer: MEDICARE

## 2024-02-16 ENCOUNTER — APPOINTMENT (OUTPATIENT)
Dept: NUCLEAR MEDICINE | Age: 59
End: 2024-02-16
Payer: MEDICARE

## 2024-02-16 VITALS
SYSTOLIC BLOOD PRESSURE: 158 MMHG | HEIGHT: 63 IN | WEIGHT: 262.57 LBS | BODY MASS INDEX: 46.52 KG/M2 | DIASTOLIC BLOOD PRESSURE: 94 MMHG

## 2024-02-16 PROBLEM — Z79.899 IMMUNOCOMPROMISED STATE DUE TO DRUG THERAPY (HCC): Status: ACTIVE | Noted: 2024-02-16

## 2024-02-16 PROBLEM — D84.821 IMMUNOCOMPROMISED STATE DUE TO DRUG THERAPY (HCC): Status: ACTIVE | Noted: 2024-02-16

## 2024-02-16 PROBLEM — Z94.0 STATUS POST KIDNEY TRANSPLANT: Status: ACTIVE | Noted: 2024-02-16

## 2024-02-16 LAB
ANION GAP SERPL CALCULATED.3IONS-SCNC: 13 MMOL/L (ref 9–17)
BUN SERPL-MCNC: 20 MG/DL (ref 6–20)
BUN/CREAT SERPL: 15 (ref 9–20)
CALCIUM SERPL-MCNC: 9.4 MG/DL (ref 8.6–10.4)
CHLORIDE SERPL-SCNC: 101 MMOL/L (ref 98–107)
CHOLEST SERPL-MCNC: 232 MG/DL (ref 0–199)
CHOLESTEROL/HDL RATIO: 6
CO2 SERPL-SCNC: 24 MMOL/L (ref 20–31)
CREAT SERPL-MCNC: 1.3 MG/DL (ref 0.5–0.9)
ECHO AO ROOT DIAM: 2.8 CM
ECHO AO ROOT INDEX: 1.29 CM/M2
ECHO AV ACCELERATION TIME: 70.27 MS
ECHO AV CUSP MM: 1.5 CM
ECHO AV MEAN GRADIENT: 10 MMHG
ECHO AV MEAN VELOCITY: 1.5 M/S
ECHO AV PEAK VELOCITY: 2.2 M/S
ECHO AV VTI: 37.7 CM
ECHO BSA: 2.3 M2
ECHO BSA: 2.3 M2
ECHO LA DIAMETER INDEX: 1.98 CM/M2
ECHO LA DIAMETER: 4.3 CM
ECHO LA MAJOR AXIS: 5.8 CM
ECHO LA TO AORTIC ROOT RATIO: 1.54
ECHO LA VOL BP: 38 ML (ref 22–52)
ECHO LA VOL MOD A2C: 37 ML (ref 22–52)
ECHO LA VOL MOD A4C: 38 ML (ref 22–52)
ECHO LA VOL/BSA BIPLANE: 18 ML/M2 (ref 16–34)
ECHO LA VOLUME AREA LENGTH: 40 ML
ECHO LA VOLUME INDEX AREA LENGTH: 18 ML/M2 (ref 16–34)
ECHO LA VOLUME INDEX MOD A2C: 17 ML/M2 (ref 16–34)
ECHO LA VOLUME INDEX MOD A4C: 18 ML/M2 (ref 16–34)
ECHO LV E' LATERAL VELOCITY: 12 CM/S
ECHO LV EDV A2C: 77 ML
ECHO LV EDV A4C: 84 ML
ECHO LV EDV BP: 80 ML (ref 56–104)
ECHO LV EDV INDEX A4C: 39 ML/M2
ECHO LV EDV INDEX BP: 37 ML/M2
ECHO LV EDV NDEX A2C: 35 ML/M2
ECHO LV EJECTION FRACTION BIPLANE: 58 % (ref 55–100)
ECHO LV ESV A2C: 29 ML
ECHO LV ESV A4C: 38 ML
ECHO LV ESV BP: 34 ML (ref 19–49)
ECHO LV ESV INDEX A2C: 13 ML/M2
ECHO LV ESV INDEX A4C: 18 ML/M2
ECHO LV ESV INDEX BP: 16 ML/M2
ECHO LV FRACTIONAL SHORTENING: 27 % (ref 28–44)
ECHO LV INTERNAL DIMENSION DIASTOLE INDEX: 2.26 CM/M2
ECHO LV INTERNAL DIMENSION DIASTOLIC: 4.9 CM (ref 3.9–5.3)
ECHO LV INTERNAL DIMENSION SYSTOLIC INDEX: 1.66 CM/M2
ECHO LV INTERNAL DIMENSION SYSTOLIC: 3.6 CM
ECHO LV IVSD: 1.3 CM (ref 0.6–0.9)
ECHO LV IVSS: 1.6 CM
ECHO LV MASS 2D: 253.7 G (ref 67–162)
ECHO LV MASS INDEX 2D: 116.9 G/M2 (ref 43–95)
ECHO LV POSTERIOR WALL DIASTOLIC: 1.3 CM (ref 0.6–0.9)
ECHO LV POSTERIOR WALL SYSTOLIC: 1.5 CM
ECHO LV RELATIVE WALL THICKNESS RATIO: 0.53
ECHO LVOT AV VTI INDEX: 0.56
ECHO LVOT MEAN GRADIENT: 3 MMHG
ECHO LVOT VTI: 21 CM
ECHO MV A VELOCITY: 1.07 M/S
ECHO MV E DECELERATION TIME (DT): 149.2 MS
ECHO MV E VELOCITY: 0.81 M/S
ECHO MV E/A RATIO: 0.76
ECHO MV E/E' LATERAL: 6.75
ECHO PV MAX VELOCITY: 1.4 M/S
ERYTHROCYTE [DISTWIDTH] IN BLOOD BY AUTOMATED COUNT: 12.9 % (ref 11.8–14.4)
EST. AVERAGE GLUCOSE BLD GHB EST-MCNC: 137 MG/DL
GFR SERPL CREATININE-BSD FRML MDRD: 48 ML/MIN/1.73M2
GLUCOSE SERPL-MCNC: 137 MG/DL (ref 70–99)
HBA1C MFR BLD: 6.4 % (ref 4–6)
HCT VFR BLD AUTO: 36.9 % (ref 36.3–47.1)
HDLC SERPL-MCNC: 40 MG/DL
HGB BLD-MCNC: 12 G/DL (ref 11.9–15.1)
LDLC SERPL CALC-MCNC: ABNORMAL MG/DL (ref 0–100)
LDLC SERPL DIRECT ASSAY-MCNC: 107 MG/DL
MAGNESIUM SERPL-MCNC: 1.5 MG/DL (ref 1.6–2.6)
MCH RBC QN AUTO: 32.4 PG (ref 25.2–33.5)
MCHC RBC AUTO-ENTMCNC: 32.5 G/DL (ref 28.4–34.8)
MCV RBC AUTO: 99.7 FL (ref 82.6–102.9)
NRBC BLD-RTO: 0 PER 100 WBC
NUC STRESS EJECTION FRACTION: 56 %
PLATELET # BLD AUTO: 308 K/UL (ref 138–453)
PMV BLD AUTO: 10.1 FL (ref 8.1–13.5)
POTASSIUM SERPL-SCNC: 4.5 MMOL/L (ref 3.7–5.3)
RBC # BLD AUTO: 3.7 M/UL (ref 3.95–5.11)
SODIUM SERPL-SCNC: 138 MMOL/L (ref 135–144)
STRESS BASELINE DIAS BP: 94 MMHG
STRESS BASELINE HR: 108 BPM
STRESS BASELINE ST DEPRESSION: 0 MM
STRESS BASELINE SYS BP: 162 MMHG
STRESS PEAK DIAS BP: 96 MMHG
STRESS PEAK SYS BP: 188 MMHG
STRESS PERCENT HR ACHIEVED: 88 %
STRESS POST PEAK HR: 142 BPM
STRESS RATE PRESSURE PRODUCT: NORMAL BPM*MMHG
STRESS STAGE RECOVERY 1 BP: NORMAL MMHG
STRESS STAGE RECOVERY 1 DURATION: 0 MIN:SEC
STRESS STAGE RECOVERY 1 HR: 134 BPM
STRESS STAGE RECOVERY 2 DURATION: 1 MIN:SEC
STRESS STAGE RECOVERY 2 HR: 142 BPM
STRESS STAGE RECOVERY 3 BP: NORMAL MMHG
STRESS STAGE RECOVERY 3 DURATION: 3 MIN:SEC
STRESS STAGE RECOVERY 3 HR: 127 BPM
STRESS STAGE RECOVERY 4 BP: NORMAL MMHG
STRESS STAGE RECOVERY 4 DURATION: 5 MIN:SEC
STRESS STAGE RECOVERY 4 HR: 117 BPM
STRESS TARGET HR: 162 BPM
TID: 1.02
TRIGL SERPL-MCNC: 553 MG/DL
TROPONIN I SERPL HS-MCNC: 21 NG/L (ref 0–14)
VLDLC SERPL CALC-MCNC: ABNORMAL MG/DL
WBC OTHER # BLD: 12.1 K/UL (ref 3.5–11.3)

## 2024-02-16 PROCEDURE — 93016 CV STRESS TEST SUPVJ ONLY: CPT | Performed by: INTERNAL MEDICINE

## 2024-02-16 PROCEDURE — G0378 HOSPITAL OBSERVATION PER HR: HCPCS

## 2024-02-16 PROCEDURE — 36415 COLL VENOUS BLD VENIPUNCTURE: CPT

## 2024-02-16 PROCEDURE — 94761 N-INVAS EAR/PLS OXIMETRY MLT: CPT

## 2024-02-16 PROCEDURE — 6360000002 HC RX W HCPCS

## 2024-02-16 PROCEDURE — 6360000002 HC RX W HCPCS: Performed by: INTERNAL MEDICINE

## 2024-02-16 PROCEDURE — 6370000000 HC RX 637 (ALT 250 FOR IP)

## 2024-02-16 PROCEDURE — 96375 TX/PRO/DX INJ NEW DRUG ADDON: CPT

## 2024-02-16 PROCEDURE — 96376 TX/PRO/DX INJ SAME DRUG ADON: CPT

## 2024-02-16 PROCEDURE — 6370000000 HC RX 637 (ALT 250 FOR IP): Performed by: STUDENT IN AN ORGANIZED HEALTH CARE EDUCATION/TRAINING PROGRAM

## 2024-02-16 PROCEDURE — 2580000003 HC RX 258

## 2024-02-16 PROCEDURE — 80048 BASIC METABOLIC PNL TOTAL CA: CPT

## 2024-02-16 PROCEDURE — 83036 HEMOGLOBIN GLYCOSYLATED A1C: CPT

## 2024-02-16 PROCEDURE — 85027 COMPLETE CBC AUTOMATED: CPT

## 2024-02-16 PROCEDURE — 93306 TTE W/DOPPLER COMPLETE: CPT

## 2024-02-16 PROCEDURE — 83735 ASSAY OF MAGNESIUM: CPT

## 2024-02-16 PROCEDURE — 93306 TTE W/DOPPLER COMPLETE: CPT | Performed by: INTERNAL MEDICINE

## 2024-02-16 PROCEDURE — 3430000000 HC RX DIAGNOSTIC RADIOPHARMACEUTICAL: Performed by: STUDENT IN AN ORGANIZED HEALTH CARE EDUCATION/TRAINING PROGRAM

## 2024-02-16 PROCEDURE — 6360000002 HC RX W HCPCS: Performed by: STUDENT IN AN ORGANIZED HEALTH CARE EDUCATION/TRAINING PROGRAM

## 2024-02-16 PROCEDURE — 99214 OFFICE O/P EST MOD 30 MIN: CPT | Performed by: PHYSICIAN ASSISTANT

## 2024-02-16 PROCEDURE — 78452 HT MUSCLE IMAGE SPECT MULT: CPT | Performed by: INTERNAL MEDICINE

## 2024-02-16 PROCEDURE — 93018 CV STRESS TEST I&R ONLY: CPT | Performed by: INTERNAL MEDICINE

## 2024-02-16 PROCEDURE — 78452 HT MUSCLE IMAGE SPECT MULT: CPT

## 2024-02-16 PROCEDURE — 83721 ASSAY OF BLOOD LIPOPROTEIN: CPT

## 2024-02-16 PROCEDURE — A9500 TC99M SESTAMIBI: HCPCS | Performed by: STUDENT IN AN ORGANIZED HEALTH CARE EDUCATION/TRAINING PROGRAM

## 2024-02-16 PROCEDURE — 80061 LIPID PANEL: CPT

## 2024-02-16 PROCEDURE — 84484 ASSAY OF TROPONIN QUANT: CPT

## 2024-02-16 RX ORDER — ASPIRIN 81 MG/1
81 TABLET, CHEWABLE ORAL DAILY
Status: DISCONTINUED | OUTPATIENT
Start: 2024-02-16 | End: 2024-02-17 | Stop reason: HOSPADM

## 2024-02-16 RX ORDER — MORPHINE SULFATE 4 MG/ML
4 INJECTION, SOLUTION INTRAMUSCULAR; INTRAVENOUS
Status: DISCONTINUED | OUTPATIENT
Start: 2024-02-16 | End: 2024-02-17 | Stop reason: HOSPADM

## 2024-02-16 RX ORDER — ACETAMINOPHEN 325 MG/1
650 TABLET ORAL EVERY 6 HOURS PRN
Status: DISCONTINUED | OUTPATIENT
Start: 2024-02-16 | End: 2024-02-17 | Stop reason: HOSPADM

## 2024-02-16 RX ORDER — PREDNISONE 10 MG/1
10 TABLET ORAL DAILY
Status: DISCONTINUED | OUTPATIENT
Start: 2024-02-16 | End: 2024-02-17 | Stop reason: HOSPADM

## 2024-02-16 RX ORDER — NITROGLYCERIN 0.4 MG/1
0.4 TABLET SUBLINGUAL EVERY 5 MIN PRN
Status: DISCONTINUED | OUTPATIENT
Start: 2024-02-16 | End: 2024-02-17 | Stop reason: HOSPADM

## 2024-02-16 RX ORDER — SULFAMETHOXAZOLE AND TRIMETHOPRIM 800; 160 MG/1; MG/1
1 TABLET ORAL
Status: DISCONTINUED | OUTPATIENT
Start: 2024-02-16 | End: 2024-02-17 | Stop reason: HOSPADM

## 2024-02-16 RX ORDER — ONDANSETRON 2 MG/ML
4 INJECTION INTRAMUSCULAR; INTRAVENOUS EVERY 6 HOURS PRN
Status: DISCONTINUED | OUTPATIENT
Start: 2024-02-16 | End: 2024-02-17 | Stop reason: HOSPADM

## 2024-02-16 RX ORDER — MAGNESIUM SULFATE 1 G/100ML
1000 INJECTION INTRAVENOUS PRN
Status: DISCONTINUED | OUTPATIENT
Start: 2024-02-16 | End: 2024-02-17 | Stop reason: HOSPADM

## 2024-02-16 RX ORDER — MORPHINE SULFATE 4 MG/ML
4 INJECTION, SOLUTION INTRAMUSCULAR; INTRAVENOUS ONCE
Status: COMPLETED | OUTPATIENT
Start: 2024-02-16 | End: 2024-02-16

## 2024-02-16 RX ORDER — PANTOPRAZOLE SODIUM 40 MG/1
40 TABLET, DELAYED RELEASE ORAL 2 TIMES DAILY
Status: DISCONTINUED | OUTPATIENT
Start: 2024-02-16 | End: 2024-02-17 | Stop reason: HOSPADM

## 2024-02-16 RX ORDER — POTASSIUM CHLORIDE 20 MEQ/1
40 TABLET, EXTENDED RELEASE ORAL PRN
Status: DISCONTINUED | OUTPATIENT
Start: 2024-02-16 | End: 2024-02-17 | Stop reason: HOSPADM

## 2024-02-16 RX ORDER — ATORVASTATIN CALCIUM 40 MG/1
80 TABLET, FILM COATED ORAL DAILY
Status: DISCONTINUED | OUTPATIENT
Start: 2024-02-16 | End: 2024-02-17 | Stop reason: HOSPADM

## 2024-02-16 RX ORDER — SODIUM CHLORIDE 9 MG/ML
INJECTION, SOLUTION INTRAVENOUS PRN
Status: DISCONTINUED | OUTPATIENT
Start: 2024-02-16 | End: 2024-02-17 | Stop reason: HOSPADM

## 2024-02-16 RX ORDER — VITAMIN B COMPLEX
2000 TABLET ORAL 2 TIMES DAILY
Status: DISCONTINUED | OUTPATIENT
Start: 2024-02-16 | End: 2024-02-17 | Stop reason: HOSPADM

## 2024-02-16 RX ORDER — POTASSIUM CHLORIDE 7.45 MG/ML
10 INJECTION INTRAVENOUS PRN
Status: DISCONTINUED | OUTPATIENT
Start: 2024-02-16 | End: 2024-02-17 | Stop reason: HOSPADM

## 2024-02-16 RX ORDER — ASCORBIC ACID, THIAMINE, RIBOFLAVIN, NIACINAMIDE, PYRIDOXINE, FOLIC ACID, COBALAMIN, BIOTIN, PANTOTHENIC ACID, ZINC 100; 1.5; 1.7; 20; 10; 1; 6; 300; 10; 5 MG/1; MG/1; MG/1; MG/1; MG/1; MG/1; UG/1; UG/1; MG/1; MG/1
1 TABLET, COATED ORAL DAILY
Status: DISCONTINUED | OUTPATIENT
Start: 2024-02-16 | End: 2024-02-17 | Stop reason: HOSPADM

## 2024-02-16 RX ORDER — TETRAKIS(2-METHOXYISOBUTYLISOCYANIDE)COPPER(I) TETRAFLUOROBORATE 1 MG/ML
10 INJECTION, POWDER, LYOPHILIZED, FOR SOLUTION INTRAVENOUS
Status: COMPLETED | OUTPATIENT
Start: 2024-02-16 | End: 2024-02-16

## 2024-02-16 RX ORDER — DULOXETIN HYDROCHLORIDE 30 MG/1
30 CAPSULE, DELAYED RELEASE ORAL DAILY
Status: DISCONTINUED | OUTPATIENT
Start: 2024-02-16 | End: 2024-02-17 | Stop reason: HOSPADM

## 2024-02-16 RX ORDER — POLYETHYLENE GLYCOL 3350 17 G/17G
17 POWDER, FOR SOLUTION ORAL DAILY PRN
Status: DISCONTINUED | OUTPATIENT
Start: 2024-02-16 | End: 2024-02-17 | Stop reason: HOSPADM

## 2024-02-16 RX ORDER — ACETAMINOPHEN 650 MG/1
650 SUPPOSITORY RECTAL EVERY 6 HOURS PRN
Status: DISCONTINUED | OUTPATIENT
Start: 2024-02-16 | End: 2024-02-17 | Stop reason: HOSPADM

## 2024-02-16 RX ORDER — MYCOPHENOLIC ACID 180 MG/1
720 TABLET, DELAYED RELEASE ORAL 2 TIMES DAILY
Status: DISCONTINUED | OUTPATIENT
Start: 2024-02-16 | End: 2024-02-17 | Stop reason: HOSPADM

## 2024-02-16 RX ORDER — SODIUM CHLORIDE 0.9 % (FLUSH) 0.9 %
5-40 SYRINGE (ML) INJECTION PRN
Status: DISCONTINUED | OUTPATIENT
Start: 2024-02-16 | End: 2024-02-17 | Stop reason: HOSPADM

## 2024-02-16 RX ORDER — AMILORIDE HYDROCHLORIDE 5 MG/1
5 TABLET ORAL EVERY MORNING
Status: DISCONTINUED | OUTPATIENT
Start: 2024-02-16 | End: 2024-02-17 | Stop reason: HOSPADM

## 2024-02-16 RX ORDER — TETRAKIS(2-METHOXYISOBUTYLISOCYANIDE)COPPER(I) TETRAFLUOROBORATE 1 MG/ML
30 INJECTION, POWDER, LYOPHILIZED, FOR SOLUTION INTRAVENOUS
Status: COMPLETED | OUTPATIENT
Start: 2024-02-16 | End: 2024-02-16

## 2024-02-16 RX ORDER — LANOLIN ALCOHOL/MO/W.PET/CERES
800 CREAM (GRAM) TOPICAL 3 TIMES DAILY
Status: DISCONTINUED | OUTPATIENT
Start: 2024-02-16 | End: 2024-02-17 | Stop reason: HOSPADM

## 2024-02-16 RX ORDER — ONDANSETRON 4 MG/1
4 TABLET, ORALLY DISINTEGRATING ORAL EVERY 8 HOURS PRN
Status: DISCONTINUED | OUTPATIENT
Start: 2024-02-16 | End: 2024-02-17 | Stop reason: HOSPADM

## 2024-02-16 RX ORDER — REGADENOSON 0.08 MG/ML
0.4 INJECTION, SOLUTION INTRAVENOUS
Status: COMPLETED | OUTPATIENT
Start: 2024-02-16 | End: 2024-02-16

## 2024-02-16 RX ORDER — SODIUM CHLORIDE 0.9 % (FLUSH) 0.9 %
5-40 SYRINGE (ML) INJECTION EVERY 12 HOURS SCHEDULED
Status: DISCONTINUED | OUTPATIENT
Start: 2024-02-16 | End: 2024-02-17 | Stop reason: HOSPADM

## 2024-02-16 RX ORDER — MORPHINE SULFATE 2 MG/ML
2 INJECTION, SOLUTION INTRAMUSCULAR; INTRAVENOUS
Status: DISCONTINUED | OUTPATIENT
Start: 2024-02-16 | End: 2024-02-17 | Stop reason: HOSPADM

## 2024-02-16 RX ADMIN — MORPHINE SULFATE 4 MG: 4 INJECTION, SOLUTION INTRAMUSCULAR; INTRAVENOUS at 09:00

## 2024-02-16 RX ADMIN — MYCOPHENOLIC ACID 720 MG: 180 TABLET, DELAYED RELEASE ORAL at 12:38

## 2024-02-16 RX ADMIN — DULOXETINE HYDROCHLORIDE 30 MG: 30 CAPSULE, DELAYED RELEASE ORAL at 09:00

## 2024-02-16 RX ADMIN — CALCIUM CARBONATE-VITAMIN D TAB 500 MG-200 UNIT 1 TABLET: 500-200 TAB at 20:27

## 2024-02-16 RX ADMIN — MORPHINE SULFATE 4 MG: 4 INJECTION, SOLUTION INTRAMUSCULAR; INTRAVENOUS at 03:56

## 2024-02-16 RX ADMIN — ALUMINUM HYDROXIDE, MAGNESIUM HYDROXIDE, AND SIMETHICONE: 200; 200; 20 SUSPENSION ORAL at 08:59

## 2024-02-16 RX ADMIN — PANTOPRAZOLE SODIUM 40 MG: 40 TABLET, DELAYED RELEASE ORAL at 09:00

## 2024-02-16 RX ADMIN — SULFAMETHOXAZOLE AND TRIMETHOPRIM 1 TABLET: 800; 160 TABLET ORAL at 21:07

## 2024-02-16 RX ADMIN — PANTOPRAZOLE SODIUM 40 MG: 40 TABLET, DELAYED RELEASE ORAL at 20:27

## 2024-02-16 RX ADMIN — PANTOPRAZOLE SODIUM 40 MG: 40 TABLET, DELAYED RELEASE ORAL at 01:23

## 2024-02-16 RX ADMIN — MORPHINE SULFATE 4 MG: 4 INJECTION, SOLUTION INTRAMUSCULAR; INTRAVENOUS at 11:44

## 2024-02-16 RX ADMIN — Medication 10 MILLICURIE: at 13:05

## 2024-02-16 RX ADMIN — Medication 800 MG: at 20:27

## 2024-02-16 RX ADMIN — Medication 30 MILLICURIE: at 13:05

## 2024-02-16 RX ADMIN — PREDNISONE 10 MG: 10 TABLET ORAL at 09:00

## 2024-02-16 RX ADMIN — ASCORBIC ACID, THIAMINE, RIBOFLAVIN, NIACINAMIDE, PYRIDOXINE, FOLIC ACID, COBALAMIN, BIOTIN, PANTOTHENIC ACID, ZINC 1 TABLET: 100; 1.5; 1.7; 20; 10; 1; 6; 300; 10; 5 TABLET, COATED ORAL at 12:37

## 2024-02-16 RX ADMIN — Medication 800 MG: at 17:00

## 2024-02-16 RX ADMIN — MYCOPHENOLIC ACID 720 MG: 180 TABLET, DELAYED RELEASE ORAL at 20:28

## 2024-02-16 RX ADMIN — ATORVASTATIN CALCIUM 80 MG: 40 TABLET, FILM COATED ORAL at 09:00

## 2024-02-16 RX ADMIN — CALCIUM CARBONATE-VITAMIN D TAB 500 MG-200 UNIT 1 TABLET: 500-200 TAB at 08:59

## 2024-02-16 RX ADMIN — ASPIRIN 81 MG: 81 TABLET, CHEWABLE ORAL at 09:00

## 2024-02-16 RX ADMIN — Medication 2000 UNITS: at 08:59

## 2024-02-16 RX ADMIN — Medication 2000 UNITS: at 17:00

## 2024-02-16 RX ADMIN — ACETAMINOPHEN 650 MG: 325 TABLET ORAL at 18:58

## 2024-02-16 RX ADMIN — REGADENOSON 0.4 MG: 0.08 INJECTION, SOLUTION INTRAVENOUS at 13:32

## 2024-02-16 RX ADMIN — CALCIUM CARBONATE-VITAMIN D TAB 500 MG-200 UNIT 1 TABLET: 500-200 TAB at 01:23

## 2024-02-16 RX ADMIN — MORPHINE SULFATE 4 MG: 4 INJECTION, SOLUTION INTRAMUSCULAR; INTRAVENOUS at 15:18

## 2024-02-16 RX ADMIN — SODIUM CHLORIDE, PRESERVATIVE FREE 10 ML: 5 INJECTION INTRAVENOUS at 20:27

## 2024-02-16 RX ADMIN — MORPHINE SULFATE 4 MG: 4 INJECTION, SOLUTION INTRAMUSCULAR; INTRAVENOUS at 01:22

## 2024-02-16 RX ADMIN — AMILORIDE HYDROCHLORIDE 5 MG: 5 TABLET ORAL at 12:36

## 2024-02-16 RX ADMIN — Medication 800 MG: at 09:00

## 2024-02-16 RX ADMIN — SODIUM CHLORIDE, PRESERVATIVE FREE 10 ML: 5 INJECTION INTRAVENOUS at 09:01

## 2024-02-16 ASSESSMENT — PAIN SCALES - GENERAL
PAINLEVEL_OUTOF10: 9
PAINLEVEL_OUTOF10: 9
PAINLEVEL_OUTOF10: 8
PAINLEVEL_OUTOF10: 9
PAINLEVEL_OUTOF10: 8
PAINLEVEL_OUTOF10: 9
PAINLEVEL_OUTOF10: 7
PAINLEVEL_OUTOF10: 9
PAINLEVEL_OUTOF10: 9
PAINLEVEL_OUTOF10: 8
PAINLEVEL_OUTOF10: 9
PAINLEVEL_OUTOF10: 9
PAINLEVEL_OUTOF10: 7
PAINLEVEL_OUTOF10: 9

## 2024-02-16 ASSESSMENT — PAIN DESCRIPTION - LOCATION
LOCATION: CHEST

## 2024-02-16 ASSESSMENT — PAIN DESCRIPTION - DESCRIPTORS
DESCRIPTORS: SHOOTING;SHARP
DESCRIPTORS: SHARP
DESCRIPTORS: ACHING
DESCRIPTORS: SHARP
DESCRIPTORS: SHARP
DESCRIPTORS: ACHING;PRESSURE;SHARP
DESCRIPTORS: SHARP
DESCRIPTORS: SHARP
DESCRIPTORS: TIGHTNESS

## 2024-02-16 ASSESSMENT — PAIN DESCRIPTION - FREQUENCY
FREQUENCY: CONTINUOUS
FREQUENCY: CONTINUOUS
FREQUENCY: INTERMITTENT
FREQUENCY: CONTINUOUS

## 2024-02-16 ASSESSMENT — PAIN DESCRIPTION - ORIENTATION
ORIENTATION: RIGHT;UPPER
ORIENTATION: MID

## 2024-02-16 ASSESSMENT — PAIN - FUNCTIONAL ASSESSMENT
PAIN_FUNCTIONAL_ASSESSMENT: ACTIVITIES ARE NOT PREVENTED

## 2024-02-16 ASSESSMENT — PAIN DESCRIPTION - PAIN TYPE
TYPE: ACUTE PAIN

## 2024-02-16 ASSESSMENT — PAIN DESCRIPTION - ONSET
ONSET: ON-GOING

## 2024-02-16 NOTE — PROGRESS NOTES
[address] , [place], [place], [address]    Progress Note    Date:   2/16/2024  Patient name:  Kathrin Carmichael  Date of admission:  2/15/2024  6:39 PM  MRN:   [id number]  YOB: 1965    SUBJECTIVE/Last 24 hours update:     Patient seen and examined at the bed side , no new acute events overnight and  no new complains this AM. She continues to have chest all pain. Notes from nursing staff and Consults had been reviewed, and the overnight progress had been checked with the nursing staff as well.    REVIEW OF SYSTEMS:      CONSTITUTIONAL:  no fevers, no headcahes  EYES: negative for blury vision  HEENT: No headaches, No nasal congestion, no difficulty swallowing  RESPIRATORY:negative for dyspnea, no wheezing, no Cough  CARDIOVASCULAR: positive for some chest pain, no palpitations  GASTROINTESTINAL: no nausea, no vomiting, no change in bowel habits, no abdominal pain   GENITOURINARY: negative for dysuria, no hematuria   MUSCULOSKELETAL: no joint pains, no muscle aches, no swelling of joints or extremities  NEUROLOGICAL: No  Weakness or numbness      PAST MEDICAL HISTORY:      has a past medical history of JESS (acute kidney injury) ([facility]), Anemia, Arthritis, Chronic kidney disease, Depression with anxiety, Dialysis patient ([facility]), GERD (gastroesophageal reflux disease), H/O echocardiogram, H/O tooth extraction, Hemodialysis patient ([facility]), History of blood transfusion, Hyperlipidemia, Hypertensive crisis, Kidney stones, Sciatica, and Vasculitis ([facility]).    PAST SURGICAL HISTORY:      has a past surgical history that includes Cholecystectomy; bone marrow biopsy (05/23/2016); Tubal ligation; Colonoscopy (2016); Hysterectomy (11/09/2016); Tooth Extraction (07/26/2017); Upper gastrointestinal endoscopy (09/12/2018); Upper gastrointestinal endoscopy (N/A, 09/12/2018); Esophagus dilation (09/12/2018); Port Surgery (Left, 10/2020); Colonoscopy (N/A, 10/14/2021); and Kidney transplant (Right).  Bilateral equal air entry , no wheezes, rales or rhonchi, aeration good  Cardiovascular - Heart sounds are normal.  Regular rhythm, normal rate without murmur, gallop or rub.  Abdomen - Soft, nontender, nondistended, no masses or organomegaly  Neurologic - There are no new focal motor or sensory deficits  Skin - No bruising or bleeding on exposed skin area  Extremities - No cyanosis, clubbing or edema      DIAGNOSTICS:     Laboratory Testing:    See [facility] EMR for lab data    Recent Results (from the past 24 hour(s))   EKG 12 Lead    Collection Time: 02/15/24  6:14 PM   Result Value Ref Range    Ventricular Rate 89 BPM    Atrial Rate 90 BPM    QRS Duration 74 ms    Q-T Interval 358 ms    QTc Calculation (Bazett) 435 ms    R Axis -3 degrees    T Axis 46 degrees   Basic Metabolic Panel    Collection Time: 02/15/24  6:35 PM   Result Value Ref Range    Sodium 138 135 - 144 mmol/L    Potassium 4.5 3.7 - 5.3 mmol/L    Chloride 101 98 - 107 mmol/L    CO2 23 20 - 31 mmol/L    Anion Gap 14 9 - 17 mmol/L    Glucose 145 (H) 70 - 99 mg/dL    BUN 21 (H) 6 - 20 mg/dL    Creatinine 1.2 (H) 0.5 - 0.9 mg/dL    Est, Glom Filt Rate 52 (L) >60 mL/min/1.73m2    Bun/Cre Ratio 18 9 - 20    Calcium 9.3 8.6 - 10.4 mg/dL   CBC with Auto Differential    Collection Time: 02/15/24  6:35 PM   Result Value Ref Range    WBC 12.9 (H) 3.5 - 11.3 k/uL    RBC 3.78 (L) 3.95 - 5.11 m/uL    Hemoglobin 12.2 11.9 - 15.1 g/dL    Hematocrit 37.5 36.3 - 47.1 %    MCV 99.2 82.6 - 102.9 fL    MCH 32.3 25.2 - 33.5 pg    MCHC 32.5 28.4 - 34.8 g/dL    RDW 12.8 11.8 - 14.4 %    Platelets 285 138 - 453 k/uL    MPV 10.0 8.1 - 13.5 fL    NRBC Automated 0.0 0.0 per 100 WBC    Neutrophils % 89 (H) 36 - 65 %    Lymphocytes % 4 (L) 24 - 43 %    Monocytes % 5 3 - 12 %    Eosinophils % 1 1 - 4 %    Basophils % 0 0 - 2 %    Immature Granulocytes 1 (H) 0 %    Neutrophils Absolute 11.57 (H) 1.50 - 8.10 k/uL    Lymphocytes Absolute 0.56 (L) 1.10 - 3.70 k/uL    Monocytes Absolute

## 2024-02-16 NOTE — ED PROVIDER NOTES
[facility] ED  EMERGENCY DEPARTMENT ENCOUNTER      Pt Name: Kathrin Carmichael  MRN: [id number]  Birthdate 1965  Date of evaluation: 2/15/2024  Provider: ARISTIDES Rivera CNP  11:01 PM    CHIEF COMPLAINT       Chief Complaint   Patient presents with    Shortness of Breath     Starting this AM.    Chest Pain     Starting in front radiating around to back. Starting This am          HISTORY OF PRESENT ILLNESS        Kathrin Carmichael 57 yo female presents from home to ED with c/o SOB and right and left anterior chest pain beginning this am. Epigastric pain. Decreased urine output. Hx of kidney transplant 10/9/23 due to ESRD from vasculitis in [place]. No new medications or suspect foods.No fever or chills. .     The history is provided by the patient. No  was used.       Nursing Notes were reviewed.    REVIEW OF SYSTEMS       Review of Systems   Respiratory:  Positive for shortness of breath.    Cardiovascular:  Positive for chest pain. Negative for leg swelling.   Gastrointestinal:  Positive for abdominal pain.        Epigastric   Genitourinary:         Decreased urinary output.   All other systems reviewed and are negative.      Except as noted above the remainder of the review of systems was reviewed and negative.       PAST MEDICAL HISTORY     Past Medical History:   Diagnosis Date    JESS (acute kidney injury) ([facility])     Anemia     Arthritis     Chronic kidney disease     Depression with anxiety 9/8/2016    Dialysis patient ([facility]) 4/15/2022    GERD (gastroesophageal reflux disease)     H/O echocardiogram 08/25/2016    EF 55%. Mild mitral regurgitation.    H/O tooth extraction 07/26/2017    Lower posterior right tooth.    Hemodialysis patient ([facility])     History of blood transfusion     x3. Last one in May 2016    Hyperlipidemia     Hypertensive crisis 8/24/2016    Kidney stones     Sciatica     Vasculitis ([facility])          SURGICAL HISTORY       Past Surgical History:   Procedure Laterality  Date    BONE MARROW BIOPSY  05/23/2016    Per Hematologist order @ [facility].    CHOLECYSTECTOMY      COLONOSCOPY  2016    COLONOSCOPY N/A 10/14/2021    COLONOSCOPY BIOPSY/STOMA performed by Sonal Tavarez DO at [facility] OR    ESOPHAGEAL DILATATION  09/12/2018    ESOPHAGEAL DILATATION performed by Carmelo Vega MD at [facility] OR    HYSTERECTOMY (CERVIX STATUS UNKNOWN)  11/09/2016    with tubes and ovaries    KIDNEY TRANSPLANT Right     PORT SURGERY Left 10/2020    port placement for dialysis    TOOTH EXTRACTION  07/26/2017    Right posterior lower tooth.    TUBAL LIGATION      UPPER GASTROINTESTINAL ENDOSCOPY  09/12/2018    -dilation,bx(Chicas's esophagus,neg H-Pylori)hiatal hernia,grade 2 reflux esophagitis    UPPER GASTROINTESTINAL ENDOSCOPY N/A 09/12/2018    EGD BIOPSY performed by Carmelo Vega MD at [facility] OR         CURRENT MEDICATIONS       Previous Medications    ACYCLOVIR (ZOVIRAX) 400 MG TABLET    Take 1 tablet by mouth 2 times daily    ALBUTEROL SULFATE HFA (VENTOLIN HFA) 108 (90 BASE) MCG/ACT INHALER    Inhale 2 puffs into the lungs every 6 hours as needed for Wheezing    AMILORIDE (MIDAMOR) 5 MG TABLET    Take 1 tablet by mouth every morning    ASPIRIN 81 MG CHEWABLE TABLET    Take 1 tablet by mouth daily    ATORVASTATIN (LIPITOR) 80 MG TABLET    Take 1 tablet by mouth daily    B COMPLEX-C-FOLIC ACID (KENDALL-DAYL RX) 1 MG TABS    TAKE 1 TABLET BY MOUTH ONCE DAILY    B COMPLEX-C-ZN-FOLIC ACID (DIALYVITE/ZINC) TABS    Take 1 tablet by mouth daily    BLOOD PRESSURE KIT    1 Units by Does not apply route daily    BUTALBITAL-ACETAMINOPHEN-CAFFEINE (FIORICET, ESGIC) -40 MG PER TABLET    TAKE 1 TABLET BY MOUTH EVERY 6 HOURS AS NEEDED FOR HEADACHE OR  MIGRAINE.    CALCITRIOL (ROCALTROL) 0.25 MCG CAPSULE    Take 1 capsule by mouth daily    CALCIUM ACETATE (PHOSLO) 667 MG CAPS CAPSULE        CALCIUM CARB-CHOLECALCIFEROL (OYSTER SHELL CALCIUM W/D) 500-5 MG-MCG TABS TABLET    TAKE 1 TABLET BY MOUTH IN THE

## 2024-02-16 NOTE — PROGRESS NOTES
Writer to bedside to complete morning assessment. Upon entry to room, pt awake and sitting in the chair, respirations even and unlabored while on room air. Vitals obtained and assessment completed, see flow sheet for details. Pt denies needs from writer at this time. Call light in reach. Care ongoing.

## 2024-02-16 NOTE — H&P
History and Physical    Patient:  Kathrin Carmichael  MRN: [id number]    Chief Complaint: Chest pain    History Obtained From:  patient, electronic medical record    PCP: Demetrius Vargas APRN - CNP    History of Present Illness:   The patient is a 58 y.o. female who presents with chest pain.  Patient states the pain started upon waking this morning.  Pain is left-sided and she states it radiated into both sides of her neck.  She denies any nausea, shortness of breath, or cough.  Patient states she has been very stressed recently due to health and family problems.  Patient is nervous appearing.  Patient has history of a kidney transplant in October for ESRD.  Other past medical history includes vasculitis, Chicas's esophagus, uterine cancer, CHF, hypertension, RA and anemia.  She denies any sick exposure.  BUN 21, creatinine 1.2, CRP 38, proBNP 462, troponin 16 and 16, WBC 12.9 with left shift.  Chest x-ray showed no acute process.  Urinalysis showed 2+ bacteria but no leukocyte esterase or nitrites.  EKG showed sinus rhythm with first-degree AV block.    Past Medical History:        Diagnosis Date    JESS (acute kidney injury) ([facility])     Anemia     Arthritis     Chronic kidney disease     Depression with anxiety 9/8/2016    Dialysis patient ([facility]) 4/15/2022    GERD (gastroesophageal reflux disease)     H/O echocardiogram 08/25/2016    EF 55%. Mild mitral regurgitation.    H/O tooth extraction 07/26/2017    Lower posterior right tooth.    Hemodialysis patient ([facility])     History of blood transfusion     x3. Last one in May 2016    Hyperlipidemia     Hypertensive crisis 8/24/2016    Kidney stones     Sciatica     Vasculitis ([facility])        Past Surgical History:        Procedure Laterality Date    BONE MARROW BIOPSY  05/23/2016    Per Hematologist order @ [facility].    CHOLECYSTECTOMY      COLONOSCOPY  2016    COLONOSCOPY N/A 10/14/2021    COLONOSCOPY BIOPSY/STOMA performed by Sonal Tavarez DO at [facility] OR    ESOPHAGEAL DILATATION   09/12/2018    ESOPHAGEAL DILATATION performed by Carmelo Vega MD at [facility] OR    HYSTERECTOMY (CERVIX STATUS UNKNOWN)  11/09/2016    with tubes and ovaries    KIDNEY TRANSPLANT Right     PORT SURGERY Left 10/2020    port placement for dialysis    TOOTH EXTRACTION  07/26/2017    Right posterior lower tooth.    TUBAL LIGATION      UPPER GASTROINTESTINAL ENDOSCOPY  09/12/2018    -dilation,bx(Chicas's esophagus,neg H-Pylori)hiatal hernia,grade 2 reflux esophagitis    UPPER GASTROINTESTINAL ENDOSCOPY N/A 09/12/2018    EGD BIOPSY performed by Carmelo Vega MD at [facility] OR       Medications Prior to Admission:    Prior to Admission medications    Medication Sig Start Date End Date Taking? Authorizing Provider   tacrolimus ER (ENVARSUS XR) 0.75 MG tablet Take 2 tablets by mouth daily   Yes Dottie Webber MD   sulfamethoxazole-trimethoprim (BACTRIM DS;SEPTRA DS) 800-160 MG per tablet Take 1 tablet by mouth three times a week   Yes Dottie Webber MD   aMILoride (MIDAMOR) 5 MG tablet Take 1 tablet by mouth every morning 2/2/24   Dottie Webber MD   dilTIAZem (TIAZAC) 120 MG extended release capsule  1/4/24   Qasim Sykes MD   DULoxetine (CYMBALTA) 30 MG extended release capsule Take 1 capsule by mouth once daily 12/27/23   Demetrius Vargas APRN - CNP   atorvastatin (LIPITOR) 80 MG tablet Take 1 tablet by mouth daily 12/27/23   Demetrius Vargas APRN - CNP   clotrimazole (MYCELEX) 10 MG jerrell Take 1 tablet by mouth 4 times daily  Patient not taking: Reported on 2/15/2024 12/1/23   Misha Nowak MD   Calcium Carb-Cholecalciferol (OYSTER SHELL CALCIUM W/D) 500-5 MG-MCG TABS tablet TAKE 1 TABLET BY MOUTH IN THE MORNING AND 1 AT BEDTIME 12/2/23   Misha Nowak MD   magnesium oxide (MAG-OX) 400 MG tablet TAKE 2 TABLETS BY MOUTH THREE TIMES DAILY IN THE MORNING, AT NOON, AND AT BEDTIME 12/15/23   Misha Nowak MD   mycophenolate (MYFORTIC) 180 MG DR tablet Take 4 tablets by mouth 2 times daily Take

## 2024-02-16 NOTE — CONSULTS
Patient: Kathrin Carmichael  : 1965  Date of Admission: 2/15/2024  Primary Care Physician: Demetrius Vargas  Today's Date: 2024    REASON FOR CONSULTATION/CC: Chest discomfort     HPI: I had the pleasure of seeing Ms. Carmichael today who is a 58 y.o. female with a history of intermittent chest discomfort leading to this consultation. She saw a cardiologist in the past about 5 years ago prior to having her kidney transplant. She has a history of hypertension, she had been on dialysis for 4 years. She has history of arteritis. She has never had a stroke or a heart attack. Her mother has a history of heart disease. She has history of a right kidney transplant.    Stress echo done 2020: Overall, based on the results of this study, the patient is at a low risk for significant reversible ischemia.     Echo done 4/15/2022: Global left ventricular systolic function appears preserved with an estimated ejection fraction of 60%. The left ventricular cavity size is within normal limits and the left ventricular wall thickness is mildly increased. No definite specific wall motion abnormalities were identified.   No significant valvular abnormalities. No clear evidence of diastolic dysfunction was identified.     Echo done 3/16/2023: Unremarkable, EF 51%    Stress test done today 2024: Low risk stress test    Echo done today 2024: Unremarkable    Ms. Carmichael came to the hospital and was admitted for chest pain that was lasting all day. She took tylenol and it did not help. Throughout the day he pain stayed the same. The pain was sharp and moved across her chest and started to go over to her shoulder. She was also having palpitations. No nausea or vomiting. She was having some shortness of breath as well. No light headed/dizziness. She still is having chest pain while laying down. No abdominal pain. No blood in her urine or stool.     She denied any current chest pain, shortness of breath, abdominal pain,  09/27/2017    Bilateral knee pain 09/27/2017    Greater trochanteric bursitis of both hips 09/27/2017    Osteoarthritis of right acromioclavicular joint 09/27/2017    Osteoarthritis of sacroiliac joint (HCC) 09/27/2017    Patient nonadherence 09/27/2017    Pes anserinus bursitis of both knees 09/27/2017    Rheumatoid factor positive 09/27/2017    Scl-70 antibody positive 09/27/2017    MGUS (monoclonal gammopathy of unknown significance) 09/22/2017    Urinary frequency 05/23/2017    Mixed hyperlipidemia 05/10/2017    Iron deficiency anemia 04/07/2017    Allergic rhinitis 02/10/2017    Morbid obesity ([facility]) 02/07/2017    Postural vertigo 02/06/2017    S/P DOMO-BSO     Endometrial hyperplasia without atypia, simple     Rheumatoid arthritis with positive rheumatoid factor ([facility]) 10/26/2016    Essential hypertension 09/28/2016    Depression with anxiety 09/08/2016    CKD (chronic kidney disease) stage 4, GFR 15-29 ml/min ([facility]) 08/25/2016    Retinal edema of both eyes     Rectocele 08/15/2016    ANCA-associated vasculitis ([facility]) 06/23/2016    End stage renal disease ([facility])     Arteritis ([facility]) 05/25/2016        PLAN:    Atypical chest pain but still suspicious for possible myocardial ischemia: She had chest pain that was sharp that started yesterday and moved across her chest and to her shoulder.   Stress test was low risk, echo unremarkable.  Pain is most likely musculoskeletal.  Medical Management for suspected coronary artery disease  Antiplatelet Agent: Continue Aspirin 81 mg daily.   Beta Blocker: Not indicated at this time.   Anti-anginal medications: Not indicated at this time.  Cholesterol Reduction Therapy: Continue Atorvastatin (Lipitor) 80 mg daily.  . Direct LDL was 107.  Additional Testing List: None  Counseling: I advised Ms. Carmichael to call our office or go to the emergency room if she develops worsening or persistent chest pain or shortness of breath as this could be life threatening.     Paroxysmal Recurrent

## 2024-02-16 NOTE — PROGRESS NOTES
Patient admitted to [facility] room 325 at this time. Received report from ASPEN Jacques. Patient brought up via wheelchair. Patient transferred from wheelchair to bed independently. Weight obtained. Navigator completed. Med list completed. Patient oriented to room and call light system. Patient educated on physician's orders and medication to be given tonight. Patient encouraged to ask questions. Telemetry placed on patient.    Vitals taken and documented. See flow sheet for details. Assessment completed and documented. Patient alert, oriented x4. Calm, pleasant. Speech clear. Patient complains of chronic numbness and tingling in bilateral hands and feet. Lung sounds clear throughout. No cough noted. Abdomen round, soft, non tender to palpation. Bowel sounds active in all four quadrants. Non-pitting edema noted in bilateral lower extremities. Scattered ecchymosis noted. Patient denies needs or concerns at this time. Call light and over bed table in reach. Side rails up times two.

## 2024-02-16 NOTE — PLAN OF CARE
Problem: Discharge Planning  Goal: Discharge to home or other facility with appropriate resources  Outcome: Progressing  Flowsheets (Taken 2/16/2024 0419)  Discharge to home or other facility with appropriate resources: Identify barriers to discharge with patient and caregiver     Problem: Pain  Goal: Verbalizes/displays adequate comfort level or baseline comfort level  Outcome: Progressing  Flowsheets (Taken 2/16/2024 0419)  Verbalizes/displays adequate comfort level or baseline comfort level:   Encourage patient to monitor pain and request assistance   Administer analgesics based on type and severity of pain and evaluate response   Consider cultural and social influences on pain and pain management   Assess pain using appropriate pain scale   Implement non-pharmacological measures as appropriate and evaluate response   Notify Licensed Independent Practitioner if interventions unsuccessful or patient reports new pain     Problem: Safety - Adult  Goal: Free from fall injury  Outcome: Progressing  Flowsheets (Taken 2/16/2024 0419)  Free From Fall Injury: Instruct family/caregiver on patient safety

## 2024-02-16 NOTE — PROGRESS NOTES
Patient resting comfortably at this time. Patient states pain is tolerable at this time and denies any other needs at this time. Patient alert & oriented x4 and able to answer all questions appropriately and follow commands. Assessment and vitals as charted. Bed locked, gripper socks on, call light within reach and able to use appropriately. Will continue to monitor this shift.

## 2024-02-16 NOTE — PROGRESS NOTES
Comprehensive Nutrition Assessment    Type and Reason for Visit:  Initial    Nutrition Recommendations/Plan:   Continue NPO diet.   Progress to 2 gm sodium, low microbial diet when medically appropriate.      Malnutrition Assessment:  Malnutrition Status:  At risk for malnutrition (Comment) (02/16/24 1039)    Context:  Acute Illness     Findings of the 6 clinical characteristics of malnutrition:  Energy Intake:  Unable to assess  Weight Loss:  No significant weight loss     Body Fat Loss:  No significant body fat loss     Muscle Mass Loss:  No significant muscle mass loss    Fluid Accumulation:  Mild Extremities (non pitting BLE)   Strength:  Not Performed    Nutrition Assessment:    Overweight/obesity r/t excess energy intakes aeb BMI 46.5. Pt prefers not to speak with me at this time, visit deferred. Pt had kidney transplant in October. Progress to low microbial diet as medically appropriate. Glucose is 137, Cr is 1.3, magnesium 1.5.    Nutrition Related Findings:    non pitting BLE edema Wound Type: None       Current Nutrition Intake & Therapies:    Average Meal Intake: NPO  Average Supplements Intake: NPO  Diet NPO    Anthropometric Measures:  Height: 160 cm (5' 3\")  Ideal Body Weight (IBW): 115 lbs (52 kg)    Admission Body Weight: 119.1 kg (262 lb 8 oz)  Current Body Weight: 119.1 kg (262 lb 9.1 oz), 228.3 % IBW. Weight Source: Bed Scale  Current BMI (kg/m2): 46.5  Usual Body Weight: 116.6 kg (257 lb)  % Weight Change (Calculated): 2.2  Weight Adjustment For: No Adjustment                 BMI Categories: Obese Class 3 (BMI 40.0 or greater)    Estimated Daily Nutrient Needs:  Energy Requirements Based On: Kcal/kg  Weight Used for Energy Requirements: Current  Energy (kcal/day): 7828-1900 (11-14/kg)  Weight Used for Protein Requirements: Ideal  Protein (g/day): 104-109g (2-2.1g/kg)  Method Used for Fluid Requirements: 1 ml/kcal  Fluid (ml/day): 2,300 ml  Hematology:  Recent Labs     02/15/24  3188

## 2024-02-17 VITALS
HEART RATE: 104 BPM | TEMPERATURE: 97.9 F | RESPIRATION RATE: 18 BRPM | WEIGHT: 264.4 LBS | OXYGEN SATURATION: 95 % | DIASTOLIC BLOOD PRESSURE: 99 MMHG | HEIGHT: 63 IN | BODY MASS INDEX: 46.85 KG/M2 | SYSTOLIC BLOOD PRESSURE: 161 MMHG

## 2024-02-17 LAB
ANION GAP SERPL CALCULATED.3IONS-SCNC: 13 MMOL/L (ref 9–17)
BUN SERPL-MCNC: 19 MG/DL (ref 6–20)
BUN/CREAT SERPL: 17 (ref 9–20)
CALCIUM SERPL-MCNC: 9.4 MG/DL (ref 8.6–10.4)
CHLORIDE SERPL-SCNC: 98 MMOL/L (ref 98–107)
CO2 SERPL-SCNC: 24 MMOL/L (ref 20–31)
CREAT SERPL-MCNC: 1.1 MG/DL (ref 0.5–0.9)
ERYTHROCYTE [DISTWIDTH] IN BLOOD BY AUTOMATED COUNT: 13 % (ref 11.8–14.4)
GFR SERPL CREATININE-BSD FRML MDRD: 58 ML/MIN/1.73M2
GLUCOSE SERPL-MCNC: 145 MG/DL (ref 70–99)
HCT VFR BLD AUTO: 35.1 % (ref 36.3–47.1)
HGB BLD-MCNC: 11.9 G/DL (ref 11.9–15.1)
MCH RBC QN AUTO: 33.2 PG (ref 25.2–33.5)
MCHC RBC AUTO-ENTMCNC: 33.9 G/DL (ref 28.4–34.8)
MCV RBC AUTO: 98 FL (ref 82.6–102.9)
MICROORGANISM SPEC CULT: NORMAL
NRBC BLD-RTO: 0 PER 100 WBC
PLATELET # BLD AUTO: 223 K/UL (ref 138–453)
PMV BLD AUTO: 9.9 FL (ref 8.1–13.5)
POTASSIUM SERPL-SCNC: 4.2 MMOL/L (ref 3.7–5.3)
RBC # BLD AUTO: 3.58 M/UL (ref 3.95–5.11)
SODIUM SERPL-SCNC: 135 MMOL/L (ref 135–144)
SPECIMEN DESCRIPTION: NORMAL
WBC OTHER # BLD: 11.5 K/UL (ref 3.5–11.3)

## 2024-02-17 PROCEDURE — 6370000000 HC RX 637 (ALT 250 FOR IP)

## 2024-02-17 PROCEDURE — 80048 BASIC METABOLIC PNL TOTAL CA: CPT

## 2024-02-17 PROCEDURE — 94761 N-INVAS EAR/PLS OXIMETRY MLT: CPT

## 2024-02-17 PROCEDURE — 2580000003 HC RX 258

## 2024-02-17 PROCEDURE — G0378 HOSPITAL OBSERVATION PER HR: HCPCS

## 2024-02-17 PROCEDURE — 36415 COLL VENOUS BLD VENIPUNCTURE: CPT

## 2024-02-17 PROCEDURE — 85027 COMPLETE CBC AUTOMATED: CPT

## 2024-02-17 RX ORDER — CARVEDILOL 12.5 MG/1
12.5 TABLET ORAL 2 TIMES DAILY
Qty: 60 TABLET | Refills: 0 | Status: SHIPPED | OUTPATIENT
Start: 2024-02-17

## 2024-02-17 RX ORDER — MAGNESIUM OXIDE 400 MG/1
800 TABLET ORAL 2 TIMES DAILY
Qty: 120 TABLET | Refills: 0 | Status: SHIPPED | OUTPATIENT
Start: 2024-02-17

## 2024-02-17 RX ORDER — DILTIAZEM HYDROCHLORIDE 120 MG/1
120 CAPSULE, COATED, EXTENDED RELEASE ORAL DAILY
Qty: 30 CAPSULE | Refills: 0 | Status: SHIPPED | OUTPATIENT
Start: 2024-02-17

## 2024-02-17 RX ORDER — TACROLIMUS 1 MG/1
3 CAPSULE ORAL EVERY MORNING
Qty: 90 CAPSULE | Refills: 0 | Status: SHIPPED | OUTPATIENT
Start: 2024-02-17

## 2024-02-17 RX ADMIN — PREDNISONE 10 MG: 10 TABLET ORAL at 09:05

## 2024-02-17 RX ADMIN — AMILORIDE HYDROCHLORIDE 5 MG: 5 TABLET ORAL at 09:09

## 2024-02-17 RX ADMIN — ATORVASTATIN CALCIUM 80 MG: 40 TABLET, FILM COATED ORAL at 09:06

## 2024-02-17 RX ADMIN — ASPIRIN 81 MG: 81 TABLET, CHEWABLE ORAL at 09:05

## 2024-02-17 RX ADMIN — CALCIUM CARBONATE-VITAMIN D TAB 500 MG-200 UNIT 1 TABLET: 500-200 TAB at 09:05

## 2024-02-17 RX ADMIN — ASCORBIC ACID, THIAMINE, RIBOFLAVIN, NIACINAMIDE, PYRIDOXINE, FOLIC ACID, COBALAMIN, BIOTIN, PANTOTHENIC ACID, ZINC 1 TABLET: 100; 1.5; 1.7; 20; 10; 1; 6; 300; 10; 5 TABLET, COATED ORAL at 09:08

## 2024-02-17 RX ADMIN — SODIUM CHLORIDE, PRESERVATIVE FREE 10 ML: 5 INJECTION INTRAVENOUS at 09:11

## 2024-02-17 RX ADMIN — MYCOPHENOLIC ACID 720 MG: 180 TABLET, DELAYED RELEASE ORAL at 09:10

## 2024-02-17 RX ADMIN — Medication 800 MG: at 09:05

## 2024-02-17 RX ADMIN — PANTOPRAZOLE SODIUM 40 MG: 40 TABLET, DELAYED RELEASE ORAL at 09:06

## 2024-02-17 RX ADMIN — Medication 800 MG: at 14:06

## 2024-02-17 RX ADMIN — DULOXETINE HYDROCHLORIDE 30 MG: 30 CAPSULE, DELAYED RELEASE ORAL at 09:05

## 2024-02-17 RX ADMIN — Medication 2000 UNITS: at 09:05

## 2024-02-17 ASSESSMENT — PAIN DESCRIPTION - LOCATION: LOCATION: CHEST;NECK

## 2024-02-17 ASSESSMENT — PAIN DESCRIPTION - ORIENTATION: ORIENTATION: MID

## 2024-02-17 ASSESSMENT — PAIN - FUNCTIONAL ASSESSMENT: PAIN_FUNCTIONAL_ASSESSMENT: ACTIVITIES ARE NOT PREVENTED

## 2024-02-17 ASSESSMENT — PAIN DESCRIPTION - FREQUENCY: FREQUENCY: INTERMITTENT

## 2024-02-17 ASSESSMENT — PAIN DESCRIPTION - DESCRIPTORS: DESCRIPTORS: SHARP

## 2024-02-17 ASSESSMENT — PAIN SCALES - GENERAL: PAINLEVEL_OUTOF10: 9

## 2024-02-17 ASSESSMENT — PAIN DESCRIPTION - ONSET: ONSET: ON-GOING

## 2024-02-17 NOTE — PROGRESS NOTES
soft.  No distention.  no tenderness to palpation.   EXT:   no edema bilaterally .  No calf tenderness.   NEURO: Moves all extremities.  Motor and sensory are grossly intact  SKIN:  No rashes.  No skin lesions.    -----------------------------------------------------------------  DATA:  Complete Blood Count:   Recent Labs     02/15/24  1835 02/16/24  0545 02/17/24  0620   WBC 12.9* 12.1* 11.5*   RBC 3.78* 3.70* 3.58*   HGB 12.2 12.0 11.9   HCT 37.5 36.9 35.1*   MCV 99.2 99.7 98.0   MCH 32.3 32.4 33.2   MCHC 32.5 32.5 33.9   RDW 12.8 12.9 13.0    308 223   MPV 10.0 10.1 9.9        Recent Blood Glucose levels:   Recent Labs     02/15/24  1835 02/16/24  0545 02/17/24  0620   GLUCOSE 145* 137* 145*        Comprehensive Metabolic Profile:   Recent Labs     02/15/24  1835 02/16/24  0545 02/17/24  0620    138 135   K 4.5 4.5 4.2    101 98   CO2 23 24 24   BUN 21* 20 19   CREATININE 1.2* 1.3* 1.1*   GLUCOSE 145* 137* 145*   CALCIUM 9.3 9.4 9.4   PROT 6.6  --   --    LABALBU 4.1  --   --    BILITOT 0.4  --   --    ALKPHOS 129*  --   --    AST 32*  --   --    ALT 54*  --   --         Urinalysis:   Lab Results   Component Value Date    COLORU Yellow 02/15/2024    SPECGRAV 1.025 (H) 02/15/2024    WBCUA None 02/15/2024    RBCUA None 02/15/2024    EPITHUA 5 TO 10 02/15/2024    LEUKOCYTESUR NEGATIVE 02/15/2024    NITRU NEGATIVE 02/15/2024    BACTERIA 2+ (A) 02/15/2024    GLUCOSEU NEGATIVE 02/15/2024    BLOODU NEGATIVE 07/08/2021    KETUA NEGATIVE 02/15/2024    PROTEINU NEGATIVE 02/15/2024    HGBUR NEGATIVE 02/15/2024    CASTUA NOT REPORTED 01/07/2021    CRYSTUA NOT REPORTED 01/07/2021    YEAST PRESENCE NOTED (A) 01/04/2024       HgBA1c:    Lab Results   Component Value Date/Time    LABA1C 6.4 02/16/2024 05:45 AM       High Sensitivity Troponin:  Recent Labs     02/15/24  1835 02/15/24  2135 02/16/24  0545   TROPHS 16* 16* 21*       Microbiology / Cultures:  Results       Procedure Component Value Units Date/Time  calculated Summed Stress Score is zero. Gated SPECT showed normal left ventricular cavity size, wall motion and with calculated ejection fraction of 56%. No evidence of stress induced transient ischemic dilatation. Giving atypical symptoms and low risk stress for inducible ischemia and since acute coronary syndrome is ruled out, further ischemic work up can be done as outpatient if needed.     Echo (TTE) complete (PRN contrast/bubble/strain/3D)    Result Date: 2/16/2024    Left Ventricle: Normal left ventricular systolic function with a visually estimated EF of 60 - 65%. Left ventricle size is normal. Mildly increased wall thickness. Normal wall motion. Grade I diastolic dysfunction with normal LAP. When compared with the study on 4/15/2022; no significant changes noted.     XR CHEST PORTABLE    Result Date: 2/15/2024  EXAMINATION: ONE XRAY VIEW OF THE CHEST 2/15/2024 7:08 pm COMPARISON: 04/14/2022 radiograph HISTORY: ORDERING SYSTEM PROVIDED HISTORY: CP, SOB TECHNOLOGIST PROVIDED HISTORY: CP, SOB FINDINGS: The heart, mediastinum and pulmonary vascularity are normal.  Lungs are well-expanded and clear. No skeletal abnormalities are present in the chest.     No significant findings in the chest.         MEDICAL DECISION MAKING:  Primary Problem(s): Chest pain in adult  Condition is improving  Treatment plan:   Cardiology consult appreciated: Chely Shrama CNP note reviewed  Stress test = low risk  Echo = normal EF, normal wall motion, no change from 4/22  Imaging:   no further imaging studies ordered today  Medications:   Continue ASA 81 mg, Atorvastatin   Medication Monitoring / High Risk Medications: none       Nutrition status:   At risk for malnutrition  Dietician consult appreciated     Hospital Prophylaxis:   DVT: Lovenox   Stress Ulcer: H2 Blocker     MDM Data:   Test interpretation:  My independent EKG interpretation: Normal sinus rhythm with first degree AV block  My independent X-ray interpretation:  CXR

## 2024-02-17 NOTE — DISCHARGE SUMMARY
Kleber Malone M.D.  Internal Medicine Discharge Summary    Patient ID:  Kathrin Carmichael  [id number]  1965    Admission date: 2/15/2024    Discharge date: 2/17/2024     Admitting Physician: Eagle Grider MD     Primary Care Physician: Demetrius Vargas, APRN - CNP     Primary Discharge Diagnoses:   Principal Problem:    Chest pain in adult  Active Problems:    Arteritis ([facility])    Essential hypertension    Mixed hyperlipidemia    Gastroesophageal reflux disease    Noncompliance    Status post kidney transplant    Immunocompromised state due to drug therapy ([facility])  Resolved Problems:    * No resolved hospital problems. *       Additional Diagnoses:       Diagnosis Date    JESS (acute kidney injury) ([facility])     Anemia     Arthritis     Chronic kidney disease     Depression with anxiety 9/8/2016    Dialysis patient ([facility]) 4/15/2022    GERD (gastroesophageal reflux disease)     H/O echocardiogram 08/25/2016    EF 55%. Mild mitral regurgitation.    H/O tooth extraction 07/26/2017    Lower posterior right tooth.    Hemodialysis patient ([facility])     History of blood transfusion     x3. Last one in May 2016    Hyperlipidemia     Hypertensive crisis 8/24/2016    Kidney stones     Sciatica     Vasculitis ([facility])         Hospital Course:   Kathrin Carmichael is a 58 y.o. female who was admitted for atypical chest pain radiating across her chest and through to her back.  Symptoms have resolved on their own.  Stress test was low risk and echo was unremarkable.  Troponins borderline but stable.  She is pain free except for some mild reproducible tenderness over the sternum / costochondral margin.  Cardiology saw her in consultation following her testing and did not recommend any medication changes  She was deemed medically stable for discharge and was amenable to the discharge plan.      Discharge Exam:  GEN:    Awake, alert and oriented x3.   EYES:  EOMI, pupils equal   NECK: Supple. No lymphadenopathy.  No carotid bruit  CVS:    regular rate

## 2024-02-17 NOTE — PROGRESS NOTES
Writer to bedside to complete morning assessment. Upon entry to room, pt awake and sitting in chair, respirations even and unlabored while on room air. Vitals obtained and assessment completed, see flow sheet for details. Pt denies needs from writer at this time. Call light in reach. Care ongoing.

## 2024-02-17 NOTE — DISCHARGE INSTR - ACTIVITY
Patient Instructions:   Activity: Activity as tolerated without restrictions  Diet: regular diet  Wound Care: none needed  Follow up with Demetrius Vargas APRN - CNP in 1-2 weeks

## 2024-02-17 NOTE — PLAN OF CARE
Problem: Pain  Goal: Verbalizes/displays adequate comfort level or baseline comfort level  Outcome: Progressing  Flowsheets  Taken 2/16/2024 1945  Verbalizes/displays adequate comfort level or baseline comfort level:   Encourage patient to monitor pain and request assistance   Assess pain using appropriate pain scale   Implement non-pharmacological measures as appropriate and evaluate response   Administer analgesics based on type and severity of pain and evaluate response  Taken 2/16/2024 1846  Verbalizes/displays adequate comfort level or baseline comfort level: Encourage patient to monitor pain and request assistance  Note: Patient able to communicate when in pain. Will continue to monitor this shift.     Problem: Safety - Adult  Goal: Free from fall injury  Outcome: Progressing  Note: Chair locked, gripper socks on, call light within reach and able to use appropriately. Will continue to monitor this shift.     Problem: Nutrition Deficit:  Goal: Optimize nutritional status  2/16/2024 1945 by Nabila Antoine, RN  Outcome: Progressing  Flowsheets (Taken 2/16/2024 1945)  Nutrient intake appropriate for improving, restoring, or maintaining nutritional needs: Assess nutritional status and recommend course of action  Note: Encouraging foods and liquids as tolerated. Will continue to monitor this shift.

## 2024-02-17 NOTE — PROGRESS NOTES
Spiritual Services Interventions  0325/0325-01   2/17/2024  Roel Lopez    Kathrin Carmichael  58 y.o. year old female    Encounter Summary  Encounter Overview/Reason : (P) Initial Encounter  Service Provided For:: (P) Patient  Referral/Consult From:: (P) Rounding  Last Encounter : (P) 02/17/24  Complexity of Encounter: (P) Moderate  Begin Time: (P) 1330  End Time : (P) 1345  Total Time Calculated: (P) 15 min     Spiritual/Emotional needs  Type: (P) Spiritual Support                    Assessment/Intervention/Outcome  Assessment: (P) Calm  Intervention: (P) Discussed belief system/Taoist practices/clemencia, Discussed illness injury and it’s impact  Outcome: (P) Encouraged, Engaged in conversation

## 2024-02-17 NOTE — DISCHARGE INSTR - DIET

## 2024-02-17 NOTE — PROGRESS NOTES
Discharge instructions reviewed with pt. All questions answered. Will escort pt to private car shortly.

## 2024-02-19 ENCOUNTER — TELEPHONE (OUTPATIENT)
Dept: PRIMARY CARE CLINIC | Age: 59
End: 2024-02-19

## 2024-02-19 NOTE — TELEPHONE ENCOUNTER
[facility] Transitions Initial Follow Up Call    Outreach made within 2 business days of discharge: Yes    Patient: Kathrin Carmichael Patient : 1965   MRN: [id number]  Reason for Admission: There are no discharge diagnoses documented for the most recent discharge.  Discharge Date: 24       Spoke with: Kathrin     Discharge department/facility: [facility]     TCM Interactive Patient Contact:  Was patient able to fill all prescriptions: Yes  Was patient instructed to bring all medications to the follow-up visit: Yes  Is patient taking all medications as directed in the discharge summary? Yes  Does patient understand their discharge instructions: Yes  Does patient have questions or concerns that need addressed prior to 7-14 day follow up office visit: no    Scheduled appointment with PCP within 7-14 days    Follow Up  Future Appointments   Date Time Provider Department Center   2024 11:40 AM Demetrius Vargas APRN - CNP [facility]   3/13/2024 10:30 AM Chely Sharma PA-C [facility]   3/25/2024 10:30 AM Deena Steward APRN - CNM [facility]       Edwina Hicks MA

## 2024-02-22 ENCOUNTER — OFFICE VISIT (OUTPATIENT)
Dept: PRIMARY CARE CLINIC | Age: 59
End: 2024-02-22

## 2024-02-22 VITALS
HEART RATE: 82 BPM | BODY MASS INDEX: 46.03 KG/M2 | SYSTOLIC BLOOD PRESSURE: 128 MMHG | DIASTOLIC BLOOD PRESSURE: 80 MMHG | OXYGEN SATURATION: 97 % | RESPIRATION RATE: 20 BRPM | TEMPERATURE: 98.5 F | WEIGHT: 259.8 LBS

## 2024-02-22 DIAGNOSIS — I73.9 INTERMITTENT CLAUDICATION (HCC): ICD-10-CM

## 2024-02-22 DIAGNOSIS — R60.0 LOWER LEG EDEMA: ICD-10-CM

## 2024-02-22 DIAGNOSIS — I77.82 ANCA-ASSOCIATED VASCULITIS (HCC): ICD-10-CM

## 2024-02-22 DIAGNOSIS — Z09 HOSPITAL DISCHARGE FOLLOW-UP: ICD-10-CM

## 2024-02-22 DIAGNOSIS — E21.3 HYPERPARATHYROIDISM (HCC): ICD-10-CM

## 2024-02-22 DIAGNOSIS — R07.9 CHEST PAIN, UNSPECIFIED TYPE: Primary | ICD-10-CM

## 2024-02-22 DIAGNOSIS — I50.22 CHRONIC SYSTOLIC (CONGESTIVE) HEART FAILURE (HCC): ICD-10-CM

## 2024-02-22 PROBLEM — Z99.2 DIALYSIS PATIENT (HCC): Status: RESOLVED | Noted: 2022-04-15 | Resolved: 2024-02-22

## 2024-02-22 PROBLEM — D63.1 ANEMIA IN STAGE 5 CHRONIC KIDNEY DISEASE, NOT ON CHRONIC DIALYSIS (HCC): Status: RESOLVED | Noted: 2020-12-02 | Resolved: 2024-02-22

## 2024-02-22 PROBLEM — C55 CANCER OF UTERUS (HCC): Status: RESOLVED | Noted: 2018-04-19 | Resolved: 2024-02-22

## 2024-02-22 PROBLEM — D47.3 ESSENTIAL THROMBOCYTHEMIA (HCC): Status: RESOLVED | Noted: 2018-04-19 | Resolved: 2024-02-22

## 2024-02-22 PROBLEM — F33.9 EPISODE OF RECURRENT MAJOR DEPRESSIVE DISORDER (HCC): Status: RESOLVED | Noted: 2020-03-10 | Resolved: 2024-02-22

## 2024-02-22 PROBLEM — N18.5 ANEMIA IN STAGE 5 CHRONIC KIDNEY DISEASE, NOT ON CHRONIC DIALYSIS (HCC): Status: RESOLVED | Noted: 2020-12-02 | Resolved: 2024-02-22

## 2024-02-22 RX ORDER — OMEGA-3-ACID ETHYL ESTERS 1 G/1
CAPSULE, LIQUID FILLED ORAL
COMMUNITY
Start: 2024-02-09

## 2024-02-22 ASSESSMENT — PATIENT HEALTH QUESTIONNAIRE - PHQ9
6. FEELING BAD ABOUT YOURSELF - OR THAT YOU ARE A FAILURE OR HAVE LET YOURSELF OR YOUR FAMILY DOWN: 0
9. THOUGHTS THAT YOU WOULD BE BETTER OFF DEAD, OR OF HURTING YOURSELF: 0
3. TROUBLE FALLING OR STAYING ASLEEP: 0
10. IF YOU CHECKED OFF ANY PROBLEMS, HOW DIFFICULT HAVE THESE PROBLEMS MADE IT FOR YOU TO DO YOUR WORK, TAKE CARE OF THINGS AT HOME, OR GET ALONG WITH OTHER PEOPLE: 0
2. FEELING DOWN, DEPRESSED OR HOPELESS: 0
7. TROUBLE CONCENTRATING ON THINGS, SUCH AS READING THE NEWSPAPER OR WATCHING TELEVISION: 0
SUM OF ALL RESPONSES TO PHQ QUESTIONS 1-9: 0
SUM OF ALL RESPONSES TO PHQ QUESTIONS 1-9: 0
8. MOVING OR SPEAKING SO SLOWLY THAT OTHER PEOPLE COULD HAVE NOTICED. OR THE OPPOSITE, BEING SO FIGETY OR RESTLESS THAT YOU HAVE BEEN MOVING AROUND A LOT MORE THAN USUAL: 0
5. POOR APPETITE OR OVEREATING: 0
SUM OF ALL RESPONSES TO PHQ QUESTIONS 1-9: 0
4. FEELING TIRED OR HAVING LITTLE ENERGY: 0
SUM OF ALL RESPONSES TO PHQ9 QUESTIONS 1 & 2: 0
SUM OF ALL RESPONSES TO PHQ QUESTIONS 1-9: 0
1. LITTLE INTEREST OR PLEASURE IN DOING THINGS: 0

## 2024-02-22 NOTE — PROGRESS NOTES
tablet  Commonly known as: DELTASONE     Prevail for Women X-Large Misc  2 each by Does not apply route daily     sulfamethoxazole-trimethoprim 800-160 MG per tablet  Commonly known as: BACTRIM DS;SEPTRA DS     tacrolimus 1 MG capsule  Commonly known as: PROGRAF  Take 3 capsules by mouth every morning     vitamin D 1000 UNIT Tabs tablet  Commonly known as: CHOLECALCIFEROL                Medications marked \"taking\" at this time  Outpatient Medications Marked as Taking for the 2/22/24 encounter (Office Visit) with Demetrius Vargas APRN - CNP   Medication Sig Dispense Refill    omega-3 acid ethyl esters (LOVAZA) 1 g capsule TAKE 1 CAPSULE BY MOUTH IN THE MORNING AND THEN 1 AT BEDTIME      carvedilol (COREG) 12.5 MG tablet Take 1 tablet by mouth 2 times daily 60 tablet 0    dilTIAZem (CARDIZEM CD) 120 MG extended release capsule Take 1 capsule by mouth daily 30 capsule 0    tacrolimus (PROGRAF) 1 MG capsule Take 3 capsules by mouth every morning 90 capsule 0    magnesium oxide (MAG-OX) 400 MG tablet Take 2 tablets by mouth 2 times daily 120 tablet 0    sulfamethoxazole-trimethoprim (BACTRIM DS;SEPTRA DS) 800-160 MG per tablet Take 1 tablet by mouth three times a week      aMILoride (MIDAMOR) 5 MG tablet Take 1 tablet by mouth every morning      DULoxetine (CYMBALTA) 30 MG extended release capsule Take 1 capsule by mouth once daily 90 capsule 1    atorvastatin (LIPITOR) 80 MG tablet Take 1 tablet by mouth daily 90 tablet 1    Calcium Carb-Cholecalciferol (OYSTER SHELL CALCIUM W/D) 500-5 MG-MCG TABS tablet TAKE 1 TABLET BY MOUTH IN THE MORNING AND 1 AT BEDTIME      mycophenolate (MYFORTIC) 180 MG DR tablet Take 4 tablets by mouth 2 times daily Take 4 tablets by mouth in the morning and at bedtime.      predniSONE (DELTASONE) 10 MG tablet Take 1 tablet by mouth daily      pantoprazole (PROTONIX) 40 MG tablet Take 1 tablet by mouth twice daily (Patient taking differently: 1 tablet in the morning and at bedtime Take 1 tablet

## 2024-03-07 ENCOUNTER — HOSPITAL ENCOUNTER (OUTPATIENT)
Dept: VASCULAR LAB | Age: 59
Discharge: HOME | End: 2024-03-09
Payer: MEDICARE

## 2024-03-07 DIAGNOSIS — I73.9 INTERMITTENT CLAUDICATION (HCC): ICD-10-CM

## 2024-03-07 DIAGNOSIS — R60.0 LOWER LEG EDEMA: ICD-10-CM

## 2024-03-07 LAB
VAS LEFT ABI: 1.1
VAS LEFT DORSALIS PEDIS BP: 175 MMHG
VAS LEFT PTA BP: 173 MMHG
VAS RIGHT ABI: 1.09
VAS RIGHT ARM BP: 159 MMHG
VAS RIGHT DORSALIS PEDIS BP: 172 MMHG
VAS RIGHT PTA BP: 174 MMHG

## 2024-03-07 PROCEDURE — 93970 EXTREMITY STUDY: CPT

## 2024-03-07 PROCEDURE — 93923 UPR/LXTR ART STDY 3+ LVLS: CPT

## 2024-03-08 ENCOUNTER — TELEPHONE (OUTPATIENT)
Dept: PRIMARY CARE CLINIC | Age: 59
End: 2024-03-08

## 2024-03-08 DIAGNOSIS — R60.0 LOWER LEG EDEMA: ICD-10-CM

## 2024-03-08 DIAGNOSIS — I73.9 INTERMITTENT CLAUDICATION (HCC): Primary | ICD-10-CM

## 2024-03-08 NOTE — TELEPHONE ENCOUNTER
----- Message from ARISTIDES Strickland CNP sent at 3/8/2024  8:21 AM EST -----  Ultrasounds are normal.  However would like her to see vascular for evaluation.  Referral placed.  Thank you.

## 2024-03-25 ENCOUNTER — OFFICE VISIT (OUTPATIENT)
Dept: OBGYN | Age: 59
End: 2024-03-25
Payer: MEDICARE

## 2024-03-25 VITALS
BODY MASS INDEX: 46.56 KG/M2 | HEIGHT: 62 IN | WEIGHT: 253 LBS | SYSTOLIC BLOOD PRESSURE: 120 MMHG | DIASTOLIC BLOOD PRESSURE: 78 MMHG

## 2024-03-25 DIAGNOSIS — Z01.419 WELL WOMAN EXAM WITH ROUTINE GYNECOLOGICAL EXAM: Primary | ICD-10-CM

## 2024-03-25 DIAGNOSIS — Z12.31 ENCOUNTER FOR SCREENING MAMMOGRAM FOR MALIGNANT NEOPLASM OF BREAST: ICD-10-CM

## 2024-03-25 PROCEDURE — 3078F DIAST BP <80 MM HG: CPT | Performed by: ADVANCED PRACTICE MIDWIFE

## 2024-03-25 PROCEDURE — G8484 FLU IMMUNIZE NO ADMIN: HCPCS | Performed by: ADVANCED PRACTICE MIDWIFE

## 2024-03-25 PROCEDURE — 3074F SYST BP LT 130 MM HG: CPT | Performed by: ADVANCED PRACTICE MIDWIFE

## 2024-03-25 PROCEDURE — G0101 CA SCREEN;PELVIC/BREAST EXAM: HCPCS | Performed by: ADVANCED PRACTICE MIDWIFE

## 2024-03-25 RX ORDER — CLOTRIMAZOLE 10 MG/1
LOZENGE ORAL; TOPICAL
COMMUNITY
Start: 2024-03-08

## 2024-03-25 NOTE — PROGRESS NOTES
0    dilTIAZem (CARDIZEM CD) 120 MG extended release capsule, Take 1 capsule by mouth daily, Disp: 30 capsule, Rfl: 0    tacrolimus (PROGRAF) 1 MG capsule, Take 3 capsules by mouth every morning, Disp: 90 capsule, Rfl: 0    magnesium oxide (MAG-OX) 400 MG tablet, Take 2 tablets by mouth 2 times daily, Disp: 120 tablet, Rfl: 0    sulfamethoxazole-trimethoprim (BACTRIM DS;SEPTRA DS) 800-160 MG per tablet, Take 1 tablet by mouth three times a week, Disp: , Rfl:     aMILoride (MIDAMOR) 5 MG tablet, Take 1 tablet by mouth every morning, Disp: , Rfl:     DULoxetine (CYMBALTA) 30 MG extended release capsule, Take 1 capsule by mouth once daily, Disp: 90 capsule, Rfl: 1    atorvastatin (LIPITOR) 80 MG tablet, Take 1 tablet by mouth daily, Disp: 90 tablet, Rfl: 1    Calcium Carb-Cholecalciferol (OYSTER SHELL CALCIUM W/D) 500-5 MG-MCG TABS tablet, TAKE 1 TABLET BY MOUTH IN THE MORNING AND 1 AT BEDTIME, Disp: , Rfl:     mycophenolate (MYFORTIC) 180 MG DR tablet, Take 4 tablets by mouth 2 times daily Take 4 tablets by mouth in the morning and at bedtime., Disp: , Rfl:     predniSONE (DELTASONE) 10 MG tablet, Take 1 tablet by mouth daily, Disp: , Rfl:     pantoprazole (PROTONIX) 40 MG tablet, Take 1 tablet by mouth twice daily (Patient taking differently: 1 tablet in the morning and at bedtime Take 1 tablet by mouth twice daily), Disp: 180 tablet, Rfl: 1    B Complex-C-Zn-Folic Acid (DIALYVITE/ZINC) TABS, Take 1 tablet by mouth daily, Disp: , Rfl:     aspirin 81 MG chewable tablet, Take 1 tablet by mouth daily, Disp: , Rfl:     Incontinence Supply Disposable (PREVAIL FOR WOMEN X-LARGE) MISC, 2 each by Does not apply route daily, Disp: 60 each, Rfl: 5    vitamin D (CHOLECALCIFEROL) 1000 UNIT TABS tablet, Take 2 tablets by mouth in the morning and 2 tablets in the evening., Disp: , Rfl:     Social History     Substance and Sexual Activity   Sexual Activity Yes    Partners: Male    Comment: hyst       Any bleeding or pain with

## 2024-03-25 NOTE — PATIENT INSTRUCTIONS
SURVEY:    You may be receiving a survey regarding your visit today.    Please complete the survey to enable us to provide the highest quality of care to you and your family.    We strive for all 5's - thank you    If you cannot score us a very good (5) on any question, please call the office to discuss this with Beatriz (our ). We would like to discuss how we could of made your experience a very good one.    Beatriz: [phone]    Thank you.

## 2024-03-25 NOTE — ADDENDUM NOTE
Addended by: FREDDY AMBROSE on: 3/25/2024 11:12 AM     Modules accepted: Orders, Level of Service

## 2024-04-03 ENCOUNTER — OFFICE VISIT (OUTPATIENT)
Dept: VASCULAR SURGERY | Age: 59
End: 2024-04-03
Payer: MEDICARE

## 2024-04-03 VITALS
DIASTOLIC BLOOD PRESSURE: 75 MMHG | HEIGHT: 62 IN | WEIGHT: 253.3 LBS | HEART RATE: 81 BPM | SYSTOLIC BLOOD PRESSURE: 138 MMHG | BODY MASS INDEX: 46.61 KG/M2 | RESPIRATION RATE: 18 BRPM | TEMPERATURE: 96.9 F

## 2024-04-03 DIAGNOSIS — M79.672 BILATERAL FOOT PAIN: Primary | ICD-10-CM

## 2024-04-03 DIAGNOSIS — M79.671 BILATERAL FOOT PAIN: Primary | ICD-10-CM

## 2024-04-03 DIAGNOSIS — I77.82 ANCA-ASSOCIATED VASCULITIS (HCC): ICD-10-CM

## 2024-04-03 PROCEDURE — 99214 OFFICE O/P EST MOD 30 MIN: CPT | Performed by: INTERNAL MEDICINE

## 2024-04-03 PROCEDURE — 99205 OFFICE O/P NEW HI 60 MIN: CPT | Performed by: INTERNAL MEDICINE

## 2024-04-03 PROCEDURE — 1036F TOBACCO NON-USER: CPT | Performed by: INTERNAL MEDICINE

## 2024-04-03 PROCEDURE — G8417 CALC BMI ABV UP PARAM F/U: HCPCS | Performed by: INTERNAL MEDICINE

## 2024-04-03 PROCEDURE — 3078F DIAST BP <80 MM HG: CPT | Performed by: INTERNAL MEDICINE

## 2024-04-03 PROCEDURE — G8427 DOCREV CUR MEDS BY ELIG CLIN: HCPCS | Performed by: INTERNAL MEDICINE

## 2024-04-03 PROCEDURE — 3017F COLORECTAL CA SCREEN DOC REV: CPT | Performed by: INTERNAL MEDICINE

## 2024-04-03 PROCEDURE — 3075F SYST BP GE 130 - 139MM HG: CPT | Performed by: INTERNAL MEDICINE

## 2024-04-03 RX ORDER — METOCLOPRAMIDE 5 MG/1
5 TABLET ORAL 2 TIMES DAILY
COMMUNITY

## 2024-04-03 RX ORDER — FLUTICASONE PROPIONATE 50 MCG
1 SPRAY, SUSPENSION (ML) NASAL PRN
COMMUNITY

## 2024-04-03 NOTE — PATIENT INSTRUCTIONS
SURVEY:    Thank you for allowing us to care for you today.    You may be receiving a survey from [facility] regarding your visit today- electronically or via mail.      Please help us by completing the survey as this will provide the needed feedback to ensure we are providing the very best care for you and your family.    If you cannot score us a very good on any question, please call the office to discuss how we could have made your experience a very good one.    Thank you.       STAFF:    Lidya Day      CLINICAL STAFF:    Elsy Zafar CMA

## 2024-04-03 NOTE — PROGRESS NOTES
Kathrin Carmichael was seen on 4/3/2024 for   Chief Complaint   Patient presents with    Foot Pain     New patient-here for pain, swelling, spasms, numbness and coldness in feet. Onset around 5 months ago post kidney transplant.   .                            REVIEW OF SYSTEMS    Constitutional Weight: absent, A, Fatigue: absent Fever: absent   HEENT Ears: normal,Visual disturbance: absent Sore throat: absent,    Respiratory Shortness of breath: absent, Cough: absent;, Snoring: absent   Cardivascular Chest pain: absent,  Leg pain: present-feet,Leg swelling:present-feet, Non-healing wound:absent    GI Diarrhea: absent, Abdominal Pain: absent    Urinary frequency: absent, Urinary incontinence: absent   Musculoskeletal Neck pain: absent, Back pain: absent, Restless Leg:absent     Dermatological Hair loss: absent, Skin changes: absent   Neurological  Sudden Loss of Vision in one eye:absent, Slurred Speech:absent, Weakness on one side of body: absent,Headache: absent   Psychiatric Anxiety: absent, Depression: absent   Hematologic Abnormal bleeding: absent,          
(REGLAN) 5 MG tablet Take 1 tablet by mouth in the morning and at bedtime      clotrimazole (MYCELEX) 10 MG jerrell DISSOLVE 1 TABLET BY MOUTH ONCE DAILY IN THE MORNING AND 1 AT NOON AND 1 IN THE EVENING AND 1 AT BEDTIME      omega-3 acid ethyl esters (LOVAZA) 1 g capsule TAKE 1 CAPSULE BY MOUTH IN THE MORNING AND THEN 1 AT BEDTIME      carvedilol (COREG) 12.5 MG tablet Take 1 tablet by mouth 2 times daily 60 tablet 0    dilTIAZem (CARDIZEM CD) 120 MG extended release capsule Take 1 capsule by mouth daily 30 capsule 0    tacrolimus (PROGRAF) 1 MG capsule Take 3 capsules by mouth every morning 90 capsule 0    magnesium oxide (MAG-OX) 400 MG tablet Take 2 tablets by mouth 2 times daily 120 tablet 0    sulfamethoxazole-trimethoprim (BACTRIM DS;SEPTRA DS) 800-160 MG per tablet Take 1 tablet by mouth three times a week      aMILoride (MIDAMOR) 5 MG tablet Take 1 tablet by mouth every morning      DULoxetine (CYMBALTA) 30 MG extended release capsule Take 1 capsule by mouth once daily 90 capsule 1    atorvastatin (LIPITOR) 80 MG tablet Take 1 tablet by mouth daily 90 tablet 1    Calcium Carb-Cholecalciferol (OYSTER SHELL CALCIUM W/D) 500-5 MG-MCG TABS tablet TAKE 1 TABLET BY MOUTH IN THE MORNING AND 1 AT BEDTIME      mycophenolate (MYFORTIC) 180 MG DR tablet Take 4 tablets by mouth 2 times daily Take 4 tablets by mouth in the morning and at bedtime.      predniSONE (DELTASONE) 10 MG tablet Take 1 tablet by mouth daily      pantoprazole (PROTONIX) 40 MG tablet Take 1 tablet by mouth twice daily (Patient taking differently: 1 tablet in the morning and at bedtime Take 1 tablet by mouth twice daily) 180 tablet 1    B Complex-C-Zn-Folic Acid (DIALYVITE/ZINC) TABS Take 1 tablet by mouth daily      aspirin 81 MG chewable tablet Take 1 tablet by mouth daily      vitamin D (CHOLECALCIFEROL) 1000 UNIT TABS tablet Take 2 tablets by mouth in the morning and 2 tablets in the evening.      Incontinence Supply Disposable (PREVAIL FOR

## 2024-04-04 DIAGNOSIS — K21.00 GASTROESOPHAGEAL REFLUX DISEASE WITH ESOPHAGITIS WITHOUT HEMORRHAGE: ICD-10-CM

## 2024-04-04 RX ORDER — PANTOPRAZOLE SODIUM 40 MG/1
40 TABLET, DELAYED RELEASE ORAL 2 TIMES DAILY
Qty: 180 TABLET | Refills: 1 | Status: SHIPPED | OUTPATIENT
Start: 2024-04-04

## 2024-04-04 NOTE — TELEPHONE ENCOUNTER
Health Maintenance   Topic Date Due    Annual Wellness Visit (Medicare)  Never done    Hepatitis B vaccine (6 of 6 - Risk Dialysis Recombivax 3-dose series) 11/29/2024 (Originally 7/18/2023)    Flu vaccine (Season Ended) 11/29/2024 (Originally 8/1/2024)    COVID-19 Vaccine (5 - 2023-24 season) 02/22/2025 (Originally 9/1/2023)    A1C test (Diabetic or Prediabetic)  02/16/2025    Lipids  02/16/2025    Breast cancer screen  02/17/2025    Depression Monitoring  02/22/2025    Pneumococcal 0-64 years Vaccine (3 of 3 - PPSV23 or PCV20) 05/14/2026    Colorectal Cancer Screen  10/14/2026    DTaP/Tdap/Td vaccine (2 - Td or Tdap) 09/06/2028    Shingles vaccine  Completed    Hepatitis C screen  Completed    HIV screen  Completed    Hepatitis A vaccine  Aged Out    Hib vaccine  Aged Out    Polio vaccine  Aged Out    Meningococcal (ACWY) vaccine  Aged Out    Diabetes screen  Discontinued    Cervical cancer screen  Discontinued             (applicable per patient's age: Cancer Screenings, Depression Screening, Fall Risk Screening, Immunizations)    Hemoglobin A1C (%)   Date Value   02/16/2024 6.4 (H)   01/26/2024 6.3 (H)   11/20/2023 5.0     LDL Cholesterol (mg/dL)   Date Value   02/16/2024 Can not be calculated     AST (U/L)   Date Value   02/15/2024 32 (H)     ALT (U/L)   Date Value   02/15/2024 54 (H)     BUN (mg/dL)   Date Value   02/17/2024 19      (goal A1C is < 7)   (goal LDL is <100) need 30-50% reduction from baseline     BP Readings from Last 3 Encounters:   04/03/24 138/75   03/25/24 120/78   02/22/24 128/80    (goal /80)      All Future Testing planned in [facility]:  Lab Frequency Next Occurrence   LUIS CHAD DIGITAL SCREEN BILATERAL Once 03/25/2024   Transfuse RBC TRANSFUSE 1 UNIT    EKG 12 lead - PRN for Chest Pain or symptoms of ACS PRN        Next Visit Date:  Future Appointments   Date Time Provider Department Center   5/6/2024 10:00 AM [facility] MAMMOGRAPHY ROOM AT [facility] Rad            Patient Active

## 2024-04-10 ENCOUNTER — HOSPITAL ENCOUNTER (OUTPATIENT)
Age: 59
Discharge: HOME | End: 2024-04-10
Payer: MEDICARE

## 2024-04-10 LAB
ALBUMIN SERPL-MCNC: 4.2 G/DL (ref 3.5–5.2)
ALBUMIN/GLOB SERPL: 1.5 {RATIO} (ref 1–2.5)
ALP SERPL-CCNC: 94 U/L (ref 35–104)
ALT SERPL-CCNC: 29 U/L (ref 5–33)
ANION GAP SERPL CALCULATED.3IONS-SCNC: 15 MMOL/L (ref 9–17)
AST SERPL-CCNC: 21 U/L
BASOPHILS # BLD: 0.05 K/UL (ref 0–0.2)
BASOPHILS NFR BLD: 1 % (ref 0–2)
BILIRUB DIRECT SERPL-MCNC: <0.1 MG/DL
BILIRUB INDIRECT SERPL-MCNC: ABNORMAL MG/DL (ref 0–1)
BILIRUB SERPL-MCNC: 0.5 MG/DL (ref 0.3–1.2)
BUN SERPL-MCNC: 29 MG/DL (ref 6–20)
CALCIUM SERPL-MCNC: 9.9 MG/DL (ref 8.6–10.4)
CHLORIDE SERPL-SCNC: 104 MMOL/L (ref 98–107)
CHOLEST SERPL-MCNC: 230 MG/DL (ref 0–199)
CHOLESTEROL/HDL RATIO: 5
CO2 SERPL-SCNC: 23 MMOL/L (ref 20–31)
CREAT SERPL-MCNC: 1.5 MG/DL (ref 0.5–0.9)
EOSINOPHIL # BLD: 0.13 K/UL (ref 0–0.44)
EOSINOPHILS RELATIVE PERCENT: 2 % (ref 1–4)
ERYTHROCYTE [DISTWIDTH] IN BLOOD BY AUTOMATED COUNT: 13.2 % (ref 11.8–14.4)
EST. AVERAGE GLUCOSE BLD GHB EST-MCNC: 143 MG/DL
GFR SERPL CREATININE-BSD FRML MDRD: 40 ML/MIN/1.73M2
GLUCOSE SERPL-MCNC: 157 MG/DL (ref 70–99)
HBA1C MFR BLD: 6.6 % (ref 4–6)
HCT VFR BLD AUTO: 37.2 % (ref 36.3–47.1)
HDLC SERPL-MCNC: 46 MG/DL
HGB BLD-MCNC: 11.9 G/DL (ref 11.9–15.1)
IMM GRANULOCYTES # BLD AUTO: 0.04 K/UL (ref 0–0.3)
IMM GRANULOCYTES NFR BLD: 1 %
LDLC SERPL CALC-MCNC: 113 MG/DL (ref 0–100)
LYMPHOCYTES NFR BLD: 0.55 K/UL (ref 1.1–3.7)
LYMPHOCYTES RELATIVE PERCENT: 7 % (ref 24–43)
MAGNESIUM SERPL-MCNC: 1.6 MG/DL (ref 1.6–2.6)
MCH RBC QN AUTO: 31.3 PG (ref 25.2–33.5)
MCHC RBC AUTO-ENTMCNC: 32 G/DL (ref 28.4–34.8)
MCV RBC AUTO: 97.9 FL (ref 82.6–102.9)
MONOCYTES NFR BLD: 0.53 K/UL (ref 0.1–1.2)
MONOCYTES NFR BLD: 7 % (ref 3–12)
NEUTROPHILS NFR BLD: 82 % (ref 36–65)
NEUTS SEG NFR BLD: 6.76 K/UL (ref 1.5–8.1)
NRBC BLD-RTO: 0 PER 100 WBC
PHOSPHATE SERPL-MCNC: 3.3 MG/DL (ref 2.6–4.5)
PLATELET # BLD AUTO: 257 K/UL (ref 138–453)
PMV BLD AUTO: 10.2 FL (ref 8.1–13.5)
POTASSIUM SERPL-SCNC: 4.2 MMOL/L (ref 3.7–5.3)
PROT SERPL-MCNC: 7 G/DL (ref 6.4–8.3)
RBC # BLD AUTO: 3.8 M/UL (ref 3.95–5.11)
SODIUM SERPL-SCNC: 142 MMOL/L (ref 135–144)
TRIGL SERPL-MCNC: 358 MG/DL
URATE SERPL-MCNC: 6.6 MG/DL (ref 2.4–5.7)
VLDLC SERPL CALC-MCNC: 72 MG/DL
WBC OTHER # BLD: 8.1 K/UL (ref 3.5–11.3)

## 2024-04-10 PROCEDURE — 80197 ASSAY OF TACROLIMUS: CPT

## 2024-04-10 PROCEDURE — 83735 ASSAY OF MAGNESIUM: CPT

## 2024-04-10 PROCEDURE — 83036 HEMOGLOBIN GLYCOSYLATED A1C: CPT

## 2024-04-10 PROCEDURE — 84550 ASSAY OF BLOOD/URIC ACID: CPT

## 2024-04-10 PROCEDURE — 80061 LIPID PANEL: CPT

## 2024-04-10 PROCEDURE — 84100 ASSAY OF PHOSPHORUS: CPT

## 2024-04-10 PROCEDURE — 36415 COLL VENOUS BLD VENIPUNCTURE: CPT

## 2024-04-10 PROCEDURE — 82248 BILIRUBIN DIRECT: CPT

## 2024-04-10 PROCEDURE — 85025 COMPLETE CBC W/AUTO DIFF WBC: CPT

## 2024-04-10 PROCEDURE — 80053 COMPREHEN METABOLIC PANEL: CPT

## 2024-04-10 PROCEDURE — 87799 DETECT AGENT NOS DNA QUANT: CPT

## 2024-04-12 LAB
BK QNT BY NAAT, PLASMA INTERP: NOT DETECTED
BK QNT BY NAAT, PLASMA IU/ML: NOT DETECTED IU/ML
BK QNT BY NAAT, PLASMA LOG IU/ML: NOT DETECTED LOG IU/ML
TACROLIMUS BLD-MCNC: 8.4 NG/ML

## 2024-04-22 RX ORDER — METOCLOPRAMIDE 5 MG/1
5 TABLET ORAL 2 TIMES DAILY
Qty: 180 TABLET | Refills: 0 | Status: SHIPPED | OUTPATIENT
Start: 2024-04-22

## 2024-04-22 NOTE — TELEPHONE ENCOUNTER
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)     S/P DOMO-BSO     Endometrial hyperplasia without atypia, simple     Postural vertigo     Morbid obesity (HCC)     Allergic rhinitis     Iron deficiency anemia     Mixed hyperlipidemia     Urinary frequency     MGUS (monoclonal gammopathy of unknown significance)     Anti-cyclic citrullinated peptide antibody positive     Bilateral hip pain     Bilateral knee pain     Elevated C-reactive protein (CRP)     Elevated white blood cell count, unspecified     Greater trochanteric bursitis of both hips     Microscopic polyangiitis (HCC)     Osteoarthritis of right acromioclavicular joint     Osteoarthritis of sacroiliac joint (HCC)     Other intervertebral disc degeneration, thoracic region     Patient nonadherence     Pes anserinus bursitis of both knees     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     Non-surgical abdominal pain     Lumbar pain     Uremia     MARY (obstructive sleep apnea)     Acute renal failure (HCC)     Syncope and collapse     possible right A2 Cerebral aneurysm     Chronic systolic (congestive) heart failure     Chest pain in adult     Status post kidney transplant     Immunocompromised state due to drug therapy (HCC)

## 2024-05-06 ENCOUNTER — HOSPITAL ENCOUNTER (OUTPATIENT)
Dept: WOMENS IMAGING | Age: 59
Discharge: HOME | End: 2024-05-08
Attending: ADVANCED PRACTICE MIDWIFE
Payer: MEDICARE

## 2024-05-06 VITALS — BODY MASS INDEX: 45.18 KG/M2 | WEIGHT: 247 LBS

## 2024-05-06 DIAGNOSIS — Z12.31 ENCOUNTER FOR SCREENING MAMMOGRAM FOR MALIGNANT NEOPLASM OF BREAST: ICD-10-CM

## 2024-05-06 PROCEDURE — 77063 BREAST TOMOSYNTHESIS BI: CPT

## 2024-05-10 ENCOUNTER — TELEPHONE (OUTPATIENT)
Dept: PRIMARY CARE CLINIC | Age: 59
End: 2024-05-10

## 2024-05-23 ENCOUNTER — OFFICE VISIT (OUTPATIENT)
Dept: PRIMARY CARE CLINIC | Age: 59
End: 2024-05-23
Payer: MEDICARE

## 2024-05-23 VITALS
RESPIRATION RATE: 20 BRPM | HEIGHT: 62 IN | BODY MASS INDEX: 45.01 KG/M2 | SYSTOLIC BLOOD PRESSURE: 134 MMHG | WEIGHT: 244.6 LBS | DIASTOLIC BLOOD PRESSURE: 80 MMHG | HEART RATE: 78 BPM | OXYGEN SATURATION: 97 % | TEMPERATURE: 98.5 F

## 2024-05-23 DIAGNOSIS — K21.9 GASTROESOPHAGEAL REFLUX DISEASE WITHOUT ESOPHAGITIS: ICD-10-CM

## 2024-05-23 DIAGNOSIS — E78.5 DYSLIPIDEMIA: ICD-10-CM

## 2024-05-23 DIAGNOSIS — Z00.00 INITIAL MEDICARE ANNUAL WELLNESS VISIT: Primary | ICD-10-CM

## 2024-05-23 DIAGNOSIS — I10 ESSENTIAL HYPERTENSION: Chronic | ICD-10-CM

## 2024-05-23 PROCEDURE — 3075F SYST BP GE 130 - 139MM HG: CPT | Performed by: NURSE PRACTITIONER

## 2024-05-23 PROCEDURE — G0438 PPPS, INITIAL VISIT: HCPCS | Performed by: NURSE PRACTITIONER

## 2024-05-23 PROCEDURE — 3079F DIAST BP 80-89 MM HG: CPT | Performed by: NURSE PRACTITIONER

## 2024-05-23 PROCEDURE — 3017F COLORECTAL CA SCREEN DOC REV: CPT | Performed by: NURSE PRACTITIONER

## 2024-05-23 RX ORDER — GABAPENTIN 100 MG/1
100 CAPSULE ORAL 2 TIMES DAILY
COMMUNITY
Start: 2024-04-26

## 2024-05-23 RX ORDER — BUTALBITAL, ACETAMINOPHEN AND CAFFEINE 50; 325; 40 MG/1; MG/1; MG/1
TABLET ORAL
Qty: 20 TABLET | Refills: 0 | Status: SHIPPED | OUTPATIENT
Start: 2024-05-23

## 2024-05-23 RX ORDER — DAPAGLIFLOZIN 5 MG/1
5 TABLET, FILM COATED ORAL DAILY
COMMUNITY
Start: 2024-04-26 | End: 2025-04-26

## 2024-05-23 RX ORDER — DILTIAZEM HYDROCHLORIDE 120 MG/1
120 CAPSULE, EXTENDED RELEASE ORAL DAILY
COMMUNITY
Start: 2024-04-14

## 2024-05-23 SDOH — ECONOMIC STABILITY: HOUSING INSECURITY
IN THE LAST 12 MONTHS, WAS THERE A TIME WHEN YOU DID NOT HAVE A STEADY PLACE TO SLEEP OR SLEPT IN A SHELTER (INCLUDING NOW)?: PATIENT DECLINED

## 2024-05-23 SDOH — ECONOMIC STABILITY: INCOME INSECURITY: HOW HARD IS IT FOR YOU TO PAY FOR THE VERY BASICS LIKE FOOD, HOUSING, MEDICAL CARE, AND HEATING?: PATIENT DECLINED

## 2024-05-23 SDOH — ECONOMIC STABILITY: FOOD INSECURITY: WITHIN THE PAST 12 MONTHS, THE FOOD YOU BOUGHT JUST DIDN'T LAST AND YOU DIDN'T HAVE MONEY TO GET MORE.: PATIENT DECLINED

## 2024-05-23 SDOH — ECONOMIC STABILITY: FOOD INSECURITY: WITHIN THE PAST 12 MONTHS, YOU WORRIED THAT YOUR FOOD WOULD RUN OUT BEFORE YOU GOT MONEY TO BUY MORE.: PATIENT DECLINED

## 2024-05-23 ASSESSMENT — PATIENT HEALTH QUESTIONNAIRE - PHQ9
10. IF YOU CHECKED OFF ANY PROBLEMS, HOW DIFFICULT HAVE THESE PROBLEMS MADE IT FOR YOU TO DO YOUR WORK, TAKE CARE OF THINGS AT HOME, OR GET ALONG WITH OTHER PEOPLE: NOT DIFFICULT AT ALL
9. THOUGHTS THAT YOU WOULD BE BETTER OFF DEAD, OR OF HURTING YOURSELF: NOT AT ALL
1. LITTLE INTEREST OR PLEASURE IN DOING THINGS: NOT AT ALL
5. POOR APPETITE OR OVEREATING: NOT AT ALL
3. TROUBLE FALLING OR STAYING ASLEEP: NOT AT ALL
7. TROUBLE CONCENTRATING ON THINGS, SUCH AS READING THE NEWSPAPER OR WATCHING TELEVISION: NOT AT ALL
SUM OF ALL RESPONSES TO PHQ QUESTIONS 1-9: 0
SUM OF ALL RESPONSES TO PHQ QUESTIONS 1-9: 0
6. FEELING BAD ABOUT YOURSELF - OR THAT YOU ARE A FAILURE OR HAVE LET YOURSELF OR YOUR FAMILY DOWN: NOT AT ALL
8. MOVING OR SPEAKING SO SLOWLY THAT OTHER PEOPLE COULD HAVE NOTICED. OR THE OPPOSITE, BEING SO FIGETY OR RESTLESS THAT YOU HAVE BEEN MOVING AROUND A LOT MORE THAN USUAL: NOT AT ALL
SUM OF ALL RESPONSES TO PHQ9 QUESTIONS 1 & 2: 0
SUM OF ALL RESPONSES TO PHQ QUESTIONS 1-9: 0
2. FEELING DOWN, DEPRESSED OR HOPELESS: NOT AT ALL
4. FEELING TIRED OR HAVING LITTLE ENERGY: NOT AT ALL
SUM OF ALL RESPONSES TO PHQ QUESTIONS 1-9: 0

## 2024-05-23 ASSESSMENT — ENCOUNTER SYMPTOMS
WATER BRASH: 0
SORE THROAT: 0
GLOBUS SENSATION: 0
WHEEZING: 0
DIARRHEA: 0
HEARTBURN: 1
NAUSEA: 0
ABDOMINAL DISTENTION: 0
BELCHING: 0
CHOKING: 0
COUGH: 0
SHORTNESS OF BREATH: 0
VOMITING: 0
BLURRED VISION: 0
ORTHOPNEA: 0
CONSTIPATION: 0
RHINORRHEA: 0
ABDOMINAL PAIN: 0

## 2024-05-23 ASSESSMENT — LIFESTYLE VARIABLES: HOW OFTEN DO YOU HAVE A DRINK CONTAINING ALCOHOL: NEVER

## 2024-05-23 NOTE — PATIENT INSTRUCTIONS
SURVEY:     You may be receiving a survey from [employer] regarding your visit today.     Please complete the survey to enable us to provide the highest quality of care to you and your family.     If you cannot score us a very good on any question, please call the office to discuss how we could have made your experience a very good one.     Thank you,    Demetrius Vargas, APRN-CNP  Cony Chen, APRN-CNP  Corinne, LPN  Eva, CMA  Zelalem, CMA  Edwina, CMA  Filomena, PCA  Lindsey, CMA  Mildred, PM         Learning About Vision Tests  What are vision tests?     The four most common vision tests are visual acuity tests, refraction, visual field tests, and color vision tests.  Visual acuity (sharpness) tests  These tests are used:  To see if you need glasses or contact lenses.  To monitor an eye problem.  To check an eye injury.  Visual acuity tests are done as part of routine exams. You may also have this test when you get your 's license or apply for some types of jobs.  Visual field tests  These tests are used:  To check for vision loss in any area of your range of vision.  To screen for certain eye diseases.  To look for nerve damage after a stroke, head injury, or other problem that could reduce blood flow to the brain.  Refraction and color tests  A refraction test is done to find the right prescription for glasses and contact lenses.  A color vision test is done to check for color blindness.  Color vision is often tested as part of a routine exam. You may also have this test when you apply for a job where recognizing different colors is important, such as , electronics, or the .  How are vision tests done?  Visual acuity test   You cover one eye at a time.  You read aloud from a wall chart across the room.  You read aloud from a small card that you hold in your hand.  Refraction   You look into a special device.  The device puts lenses of different strengths in front of each eye to see how

## 2024-05-23 NOTE — TELEPHONE ENCOUNTER
Health Maintenance   Topic Date Due    Flu vaccine (Season Ended) 11/29/2024 (Originally 8/1/2024)    COVID-19 Vaccine (5 - 2023-24 season) 02/22/2025 (Originally 9/1/2023)    A1C test (Diabetic or Prediabetic)  07/10/2024    Depression Monitoring  02/22/2025    Lipids  04/10/2025    Annual Wellness Visit (Medicare)  05/24/2025    Breast cancer screen  05/06/2026    Pneumococcal 0-64 years Vaccine (3 of 3 - PPSV23 or PCV20) 05/14/2026    Colorectal Cancer Screen  10/14/2026    DTaP/Tdap/Td vaccine (2 - Td or Tdap) 09/06/2028    Hepatitis B vaccine  Completed    Shingles vaccine  Completed    Hepatitis C screen  Completed    HIV screen  Completed    Hepatitis A vaccine  Aged Out    Hib vaccine  Aged Out    Polio vaccine  Aged Out    Meningococcal (ACWY) vaccine  Aged Out    Diabetes screen  Discontinued    Cervical cancer screen  Discontinued             (applicable per patient's age: Cancer Screenings, Depression Screening, Fall Risk Screening, Immunizations)    Hemoglobin A1C (%)   Date Value   04/10/2024 6.6 (H)   02/16/2024 6.4 (H)   01/26/2024 6.3 (H)     AST (U/L)   Date Value   04/10/2024 21     ALT (U/L)   Date Value   04/10/2024 29     BUN (mg/dL)   Date Value   04/10/2024 29 (H)      (goal A1C is < 7)   (goal LDL is <100) need 30-50% reduction from baseline     BP Readings from Last 3 Encounters:   05/23/24 134/80   04/03/24 138/75   03/25/24 120/78    (goal /80)      All Future Testing planned in [facility]:  Lab Frequency Next Occurrence   Transfuse RBC TRANSFUSE 1 UNIT    EKG 12 lead - PRN for Chest Pain or symptoms of ACS PRN        Next Visit Date:  Future Appointments   Date Time Provider Department Center   11/25/2024  2:00 PM Demetrius Vargas APRN - CNP [facility]   5/27/2025 10:40 AM Demetrius Vargas APRN - CNP [facility]            Patient Active Problem List:     JESS (acute kidney injury) (HCC)     Arteritis (HCC)     ANCA-associated vasculitis (HCC)     Rectocele     Retinal

## 2024-05-23 NOTE — PROGRESS NOTES
Take 1 tablet by mouth three times a week Yes Dottie Webber MD   aMILoride (MIDAMOR) 5 MG tablet Take 1 tablet by mouth every morning Yes Dottie Webber MD   DULoxetine (CYMBALTA) 30 MG extended release capsule Take 1 capsule by mouth once daily Yes Demetrius Vargas APRN - CNP   atorvastatin (LIPITOR) 80 MG tablet Take 1 tablet by mouth daily Yes Demetrius Vargas APRN - CNP   Calcium Carb-Cholecalciferol (OYSTER SHELL CALCIUM W/D) 500-5 MG-MCG TABS tablet TAKE 1 TABLET BY MOUTH IN THE MORNING AND 1 AT BEDTIME Yes Misha Nowak MD   mycophenolate (MYFORTIC) 180 MG DR tablet Take 4 tablets by mouth 2 times daily Take 4 tablets by mouth in the morning and at bedtime. Yes Dottie Webber MD   predniSONE (DELTASONE) 10 MG tablet Take 1 tablet by mouth daily Yes ProviderDottie MD   B Complex-C-Zn-Folic Acid (DIALYVITE/ZINC) TABS Take 1 tablet by mouth daily Yes ProviderDottie MD   aspirin 81 MG chewable tablet Take 1 tablet by mouth daily Yes ProviderDottie MD   vitamin D (CHOLECALCIFEROL) 1000 UNIT TABS tablet Take 2 tablets by mouth in the morning and 2 tablets in the evening. Yes ProviderDottie MD   Incontinence Supply Disposable (PREVAIL FOR WOMEN X-LARGE) MISC 2 each by Does not apply route daily  Patient not taking: Reported on 5/23/2024  Demetrius Vargas APRN - CNP       CareTeam (Including outside providers/suppliers regularly involved in providing care):   Patient Care Team:  Demetrius Vargas APRN - CNP as PCP - General (Family Nurse Practitioner)  Demetrius Vargas APRN - CNP as PCP - Empaneled Provider  Qasim Sykes MD as Consulting Physician (Nephrology)  Mauricio Tipton DO as Physician (Rheumatology)     Reviewed and updated this visit:  Tobacco  Allergies  Meds  Problems  Med Hx  Surg Hx  Soc Hx  Fam Hx

## 2024-07-01 DIAGNOSIS — F41.8 DEPRESSION WITH ANXIETY: Chronic | ICD-10-CM

## 2024-07-01 RX ORDER — DULOXETIN HYDROCHLORIDE 30 MG/1
CAPSULE, DELAYED RELEASE ORAL
Qty: 90 CAPSULE | Refills: 1 | Status: SHIPPED | OUTPATIENT
Start: 2024-07-01

## 2024-07-01 NOTE — TELEPHONE ENCOUNTER
Health Maintenance   Topic Date Due    A1C test (Diabetic or Prediabetic)  07/10/2024    COVID-19 Vaccine (5 - 2023-24 season) 02/22/2025 (Originally 9/1/2023)    Flu vaccine (1) 08/01/2024    Lipids  04/10/2025    Depression Monitoring  05/23/2025    Annual Wellness Visit (Medicare)  05/24/2025    Breast cancer screen  05/06/2026    Pneumococcal 0-64 years Vaccine (3 of 3 - PPSV23 or PCV20) 05/14/2026    Colorectal Cancer Screen  10/14/2026    DTaP/Tdap/Td vaccine (2 - Td or Tdap) 09/06/2028    Hepatitis B vaccine  Completed    Shingles vaccine  Completed    Hepatitis C screen  Completed    HIV screen  Completed    Hepatitis A vaccine  Aged Out    Hib vaccine  Aged Out    Polio vaccine  Aged Out    Meningococcal (ACWY) vaccine  Aged Out    Diabetes screen  Discontinued    Cervical cancer screen  Discontinued             (applicable per patient's age: Cancer Screenings, Depression Screening, Fall Risk Screening, Immunizations)    Hemoglobin A1C (%)   Date Value   04/10/2024 6.6 (H)   02/16/2024 6.4 (H)   01/26/2024 6.3 (H)     AST (U/L)   Date Value   04/10/2024 21     ALT (U/L)   Date Value   04/10/2024 29     BUN (mg/dL)   Date Value   04/10/2024 29 (H)      (goal A1C is < 7)   (goal LDL is <100) need 30-50% reduction from baseline     BP Readings from Last 3 Encounters:   05/23/24 134/80   04/03/24 138/75   03/25/24 120/78    (goal /80)      All Future Testing planned in [facility]:  Lab Frequency Next Occurrence   Transfuse RBC TRANSFUSE 1 UNIT    EKG 12 lead - PRN for Chest Pain or symptoms of ACS PRN        Next Visit Date:  Future Appointments   Date Time Provider Department Center   11/25/2024  2:00 PM [facility]   5/27/2025 10:40 AM Demetrius Vargas APRN - CNP [facility]            Patient Active Problem List:     JESS (acute kidney injury) (HCC)     Arteritis (HCC)     ANCA-associated vasculitis (HCC)     Rectocele     Retinal edema of both eyes     Depression

## 2024-07-22 DIAGNOSIS — K21.9 GASTROESOPHAGEAL REFLUX DISEASE WITHOUT ESOPHAGITIS: Primary | ICD-10-CM

## 2024-07-22 RX ORDER — METOCLOPRAMIDE 5 MG/1
5 TABLET ORAL 2 TIMES DAILY
Qty: 180 TABLET | Refills: 0 | Status: SHIPPED | OUTPATIENT
Start: 2024-07-22

## 2024-07-22 NOTE — TELEPHONE ENCOUNTER
Health Maintenance   Topic Date Due    A1C test (Diabetic or Prediabetic)  07/10/2024    COVID-19 Vaccine (5 - 2023-24 season) 02/22/2025 (Originally 9/1/2023)    Flu vaccine (1) 08/01/2024    Lipids  04/10/2025    Depression Monitoring  05/23/2025    Annual Wellness Visit (Medicare)  05/24/2025    Breast cancer screen  05/06/2026    Pneumococcal 0-64 years Vaccine (3 of 3 - PPSV23 or PCV20) 05/14/2026    Colorectal Cancer Screen  10/14/2026    DTaP/Tdap/Td vaccine (2 - Td or Tdap) 09/06/2028    Hepatitis B vaccine  Completed    Shingles vaccine  Completed    Hepatitis C screen  Completed    HIV screen  Completed    Hepatitis A vaccine  Aged Out    Hib vaccine  Aged Out    Polio vaccine  Aged Out    Meningococcal (ACWY) vaccine  Aged Out    Diabetes screen  Discontinued    Cervical cancer screen  Discontinued             (applicable per patient's age: Cancer Screenings, Depression Screening, Fall Risk Screening, Immunizations)    Hemoglobin A1C (%)   Date Value   04/10/2024 6.6 (H)   02/16/2024 6.4 (H)   01/26/2024 6.3 (H)     AST (U/L)   Date Value   04/10/2024 21     ALT (U/L)   Date Value   04/10/2024 29     BUN (mg/dL)   Date Value   04/10/2024 29 (H)      (goal A1C is < 7)   (goal LDL is <100) need 30-50% reduction from baseline     BP Readings from Last 3 Encounters:   05/23/24 134/80   04/03/24 138/75   03/25/24 120/78    (goal /80)      All Future Testing planned in [facility]:  Lab Frequency Next Occurrence   Transfuse RBC TRANSFUSE 1 UNIT    EKG 12 lead - PRN for Chest Pain or symptoms of ACS PRN        Next Visit Date:  Future Appointments   Date Time Provider Department Center   11/25/2024  2:00 PM [facility]   5/27/2025 10:40 AM Demetrius Vargas APRN - CNP [facility]            Patient Active Problem List:     JESS (acute kidney injury) (HCC)     Arteritis (HCC)     ANCA-associated vasculitis (HCC)     Rectocele     Retinal edema of both eyes     Depression

## 2024-07-23 ENCOUNTER — HOSPITAL ENCOUNTER (OUTPATIENT)
Age: 59
Discharge: HOME | End: 2024-07-23
Payer: MEDICARE

## 2024-07-23 LAB
ALBUMIN SERPL-MCNC: 4.1 G/DL (ref 3.5–5.2)
ALBUMIN/GLOB SERPL: 1.4 {RATIO} (ref 1–2.5)
ALP SERPL-CCNC: 115 U/L (ref 35–104)
ALT SERPL-CCNC: 20 U/L (ref 5–33)
ANION GAP SERPL CALCULATED.3IONS-SCNC: 13 MMOL/L (ref 9–17)
AST SERPL-CCNC: 26 U/L
BASOPHILS # BLD: 0.11 K/UL (ref 0–0.2)
BASOPHILS NFR BLD: 1 % (ref 0–2)
BILIRUB DIRECT SERPL-MCNC: <0.1 MG/DL
BILIRUB INDIRECT SERPL-MCNC: ABNORMAL MG/DL (ref 0–1)
BILIRUB SERPL-MCNC: 0.4 MG/DL (ref 0.3–1.2)
BUN SERPL-MCNC: 35 MG/DL (ref 6–20)
CALCIUM SERPL-MCNC: 10 MG/DL (ref 8.6–10.4)
CHLORIDE SERPL-SCNC: 101 MMOL/L (ref 98–107)
CO2 SERPL-SCNC: 24 MMOL/L (ref 20–31)
CREAT SERPL-MCNC: 1.7 MG/DL (ref 0.5–0.9)
EOSINOPHIL # BLD: 0 K/UL (ref 0–0.44)
EOSINOPHILS RELATIVE PERCENT: 0 % (ref 1–4)
ERYTHROCYTE [DISTWIDTH] IN BLOOD BY AUTOMATED COUNT: 13.5 % (ref 11.8–14.4)
EST. AVERAGE GLUCOSE BLD GHB EST-MCNC: 128 MG/DL
GFR, ESTIMATED: 35 ML/MIN/1.73M2
GLUCOSE SERPL-MCNC: 227 MG/DL (ref 70–99)
HBA1C MFR BLD: 6.1 % (ref 4–6)
HCT VFR BLD AUTO: 39.3 % (ref 36.3–47.1)
HGB BLD-MCNC: 12.8 G/DL (ref 11.9–15.1)
IMM GRANULOCYTES # BLD AUTO: 0 K/UL (ref 0–0.3)
IMM GRANULOCYTES NFR BLD: 0 %
LYMPHOCYTES NFR BLD: 0.43 K/UL (ref 1.1–3.7)
LYMPHOCYTES RELATIVE PERCENT: 4 % (ref 24–43)
MAGNESIUM SERPL-MCNC: 1.8 MG/DL (ref 1.6–2.6)
MCH RBC QN AUTO: 30.9 PG (ref 25.2–33.5)
MCHC RBC AUTO-ENTMCNC: 32.6 G/DL (ref 28.4–34.8)
MCV RBC AUTO: 94.9 FL (ref 82.6–102.9)
MONOCYTES NFR BLD: 0.22 K/UL (ref 0.1–1.2)
MONOCYTES NFR BLD: 2 % (ref 3–12)
MORPHOLOGY: NORMAL
NEUTROPHILS NFR BLD: 93 % (ref 36–65)
NEUTS SEG NFR BLD: 10.04 K/UL (ref 1.5–8.1)
NRBC BLD-RTO: 0 PER 100 WBC
PHOSPHATE SERPL-MCNC: 2.5 MG/DL (ref 2.6–4.5)
PLATELET # BLD AUTO: 262 K/UL (ref 138–453)
PMV BLD AUTO: 10.1 FL (ref 8.1–13.5)
POTASSIUM SERPL-SCNC: 5 MMOL/L (ref 3.7–5.3)
PROT SERPL-MCNC: 7 G/DL (ref 6.4–8.3)
RBC # BLD AUTO: 4.14 M/UL (ref 3.95–5.11)
SODIUM SERPL-SCNC: 138 MMOL/L (ref 135–144)
URATE SERPL-MCNC: 6.6 MG/DL (ref 2.4–5.7)
WBC OTHER # BLD: 10.8 K/UL (ref 3.5–11.3)

## 2024-07-23 PROCEDURE — 85025 COMPLETE CBC W/AUTO DIFF WBC: CPT

## 2024-07-23 PROCEDURE — 87799 DETECT AGENT NOS DNA QUANT: CPT

## 2024-07-23 PROCEDURE — 80053 COMPREHEN METABOLIC PANEL: CPT

## 2024-07-23 PROCEDURE — 80061 LIPID PANEL: CPT

## 2024-07-23 PROCEDURE — 36415 COLL VENOUS BLD VENIPUNCTURE: CPT

## 2024-07-23 PROCEDURE — 83735 ASSAY OF MAGNESIUM: CPT

## 2024-07-23 PROCEDURE — 83036 HEMOGLOBIN GLYCOSYLATED A1C: CPT

## 2024-07-23 PROCEDURE — 84100 ASSAY OF PHOSPHORUS: CPT

## 2024-07-23 PROCEDURE — 80197 ASSAY OF TACROLIMUS: CPT

## 2024-07-23 PROCEDURE — 84550 ASSAY OF BLOOD/URIC ACID: CPT

## 2024-07-23 PROCEDURE — 82248 BILIRUBIN DIRECT: CPT

## 2024-07-24 LAB
CHOLEST SERPL-MCNC: 225 MG/DL (ref 0–199)
CHOLESTEROL/HDL RATIO: 5
HDLC SERPL-MCNC: 43 MG/DL
LDLC SERPL CALC-MCNC: 102 MG/DL (ref 0–100)
TRIGL SERPL-MCNC: 400 MG/DL
VLDLC SERPL CALC-MCNC: 80 MG/DL

## 2024-07-25 LAB
BK QNT BY NAAT, PLASMA INTERP: NOT DETECTED
BK QNT BY NAAT, PLASMA IU/ML: NOT DETECTED IU/ML
BK QNT BY NAAT, PLASMA LOG IU/ML: NOT DETECTED LOG IU/ML
TACROLIMUS BLD-MCNC: 13.6 NG/ML

## 2024-07-31 ENCOUNTER — TELEPHONE (OUTPATIENT)
Dept: PRIMARY CARE CLINIC | Age: 59
End: 2024-07-31

## 2024-07-31 RX ORDER — METFORMIN HYDROCHLORIDE 500 MG/1
500 TABLET, EXTENDED RELEASE ORAL
Qty: 90 TABLET | Refills: 1 | Status: SHIPPED | OUTPATIENT
Start: 2024-07-31

## 2024-07-31 NOTE — TELEPHONE ENCOUNTER
Patient  contacted the office in regards to patient being started on Metformin from doctor at [facility] on 7/26/24 for her being in the prediabetic range. Patient A1c is 6.1. Patient  said that Kathrin took 3 days of it and then threw the rest out because she was having diarrhea and abdominal cramping. Patient  and patient wondering if Demetrius could put her on something different.     Please advise.

## 2024-07-31 NOTE — TELEPHONE ENCOUNTER
I would recommend her trying the extended release metformin.  I can send a prescription for that if she is willing.  Otherwise there really is no medication for prediabetes.  Thank you.

## 2024-08-01 ENCOUNTER — TELEPHONE (OUTPATIENT)
Dept: PRIMARY CARE CLINIC | Age: 59
End: 2024-08-01

## 2024-08-01 DIAGNOSIS — R73.03 PRE-DIABETES: Primary | ICD-10-CM

## 2024-08-01 RX ORDER — DILTIAZEM HYDROCHLORIDE 120 MG/1
120 CAPSULE, COATED, EXTENDED RELEASE ORAL EVERY MORNING
COMMUNITY
Start: 2024-07-05

## 2024-08-01 RX ORDER — BLOOD-GLUCOSE METER
1 KIT MISCELLANEOUS DAILY
Qty: 1 KIT | Refills: 0 | Status: SHIPPED | OUTPATIENT
Start: 2024-08-01

## 2024-08-01 RX ORDER — LANCETS 30 GAUGE
1 EACH MISCELLANEOUS DAILY
Qty: 100 EACH | Refills: 3 | Status: SHIPPED | OUTPATIENT
Start: 2024-08-01

## 2024-08-01 RX ORDER — GLUCOSAMINE HCL/CHONDROITIN SU 500-400 MG
1 CAPSULE ORAL DAILY
Qty: 100 STRIP | Refills: 3 | Status: SHIPPED | OUTPATIENT
Start: 2024-08-01

## 2024-09-26 DIAGNOSIS — K21.00 GASTROESOPHAGEAL REFLUX DISEASE WITH ESOPHAGITIS WITHOUT HEMORRHAGE: ICD-10-CM

## 2024-09-26 RX ORDER — PANTOPRAZOLE SODIUM 40 MG/1
40 TABLET, DELAYED RELEASE ORAL 2 TIMES DAILY
Qty: 180 TABLET | Refills: 3 | Status: SHIPPED | OUTPATIENT
Start: 2024-09-26

## 2024-10-18 ENCOUNTER — HOSPITAL ENCOUNTER (OUTPATIENT)
Age: 59
Discharge: HOME | End: 2024-10-18
Payer: MEDICARE

## 2024-10-18 LAB
ALBUMIN SERPL-MCNC: 4.1 G/DL (ref 3.5–5.2)
ALBUMIN/GLOB SERPL: 1.5 {RATIO} (ref 1–2.5)
ALP SERPL-CCNC: 141 U/L (ref 35–104)
ALT SERPL-CCNC: 16 U/L (ref 10–35)
ANION GAP SERPL CALCULATED.3IONS-SCNC: 11 MMOL/L (ref 9–16)
AST SERPL-CCNC: 19 U/L (ref 10–35)
BASOPHILS # BLD: 0.05 K/UL (ref 0–0.2)
BASOPHILS NFR BLD: 1 % (ref 0–2)
BILIRUB DIRECT SERPL-MCNC: <0.2 MG/DL (ref 0–0.3)
BILIRUB INDIRECT SERPL-MCNC: ABNORMAL MG/DL (ref 0–1)
BILIRUB SERPL-MCNC: 0.4 MG/DL (ref 0–1.2)
BUN SERPL-MCNC: 27 MG/DL (ref 6–20)
CALCIUM SERPL-MCNC: 9.7 MG/DL (ref 8.6–10.4)
CHLORIDE SERPL-SCNC: 102 MMOL/L (ref 98–107)
CHOLEST SERPL-MCNC: 247 MG/DL (ref 0–199)
CHOLESTEROL/HDL RATIO: 5
CO2 SERPL-SCNC: 27 MMOL/L (ref 20–31)
CREAT SERPL-MCNC: 1.6 MG/DL (ref 0.5–0.9)
EOSINOPHIL # BLD: 0.1 K/UL (ref 0–0.44)
EOSINOPHILS RELATIVE PERCENT: 1 % (ref 1–4)
ERYTHROCYTE [DISTWIDTH] IN BLOOD BY AUTOMATED COUNT: 13 % (ref 11.8–14.4)
EST. AVERAGE GLUCOSE BLD GHB EST-MCNC: 128 MG/DL
GFR, ESTIMATED: 37 ML/MIN/1.73M2
GLUCOSE SERPL-MCNC: 142 MG/DL (ref 74–99)
HBA1C MFR BLD: 6.1 % (ref 4–6)
HCT VFR BLD AUTO: 42.5 % (ref 36.3–47.1)
HDLC SERPL-MCNC: 53 MG/DL
HGB BLD-MCNC: 13.7 G/DL (ref 11.9–15.1)
IMM GRANULOCYTES # BLD AUTO: <0.03 K/UL (ref 0–0.3)
IMM GRANULOCYTES NFR BLD: 0 %
LDLC SERPL CALC-MCNC: ABNORMAL MG/DL (ref 0–100)
LDLC SERPL DIRECT ASSAY-MCNC: 149 MG/DL
LYMPHOCYTES NFR BLD: 0.64 K/UL (ref 1.1–3.7)
LYMPHOCYTES RELATIVE PERCENT: 8 % (ref 24–43)
MAGNESIUM SERPL-MCNC: 1.7 MG/DL (ref 1.6–2.6)
MCH RBC QN AUTO: 31.1 PG (ref 25.2–33.5)
MCHC RBC AUTO-ENTMCNC: 32.2 G/DL (ref 28.4–34.8)
MCV RBC AUTO: 96.6 FL (ref 82.6–102.9)
MONOCYTES NFR BLD: 0.56 K/UL (ref 0.1–1.2)
MONOCYTES NFR BLD: 7 % (ref 3–12)
NEUTROPHILS NFR BLD: 83 % (ref 36–65)
NEUTS SEG NFR BLD: 6.48 K/UL (ref 1.5–8.1)
NRBC BLD-RTO: 0 PER 100 WBC
PHOSPHATE SERPL-MCNC: 3.2 MG/DL (ref 2.5–4.5)
PLATELET # BLD AUTO: 279 K/UL (ref 138–453)
PMV BLD AUTO: 10.1 FL (ref 8.1–13.5)
POTASSIUM SERPL-SCNC: 4.6 MMOL/L (ref 3.7–5.3)
PROT SERPL-MCNC: 6.8 G/DL (ref 6.6–8.7)
RBC # BLD AUTO: 4.4 M/UL (ref 3.95–5.11)
SODIUM SERPL-SCNC: 140 MMOL/L (ref 136–145)
TRIGL SERPL-MCNC: 403 MG/DL
URATE SERPL-MCNC: 5.3 MG/DL (ref 2.4–5.7)
VLDLC SERPL CALC-MCNC: ABNORMAL MG/DL
WBC OTHER # BLD: 7.9 K/UL (ref 3.5–11.3)

## 2024-10-18 PROCEDURE — 83721 ASSAY OF BLOOD LIPOPROTEIN: CPT

## 2024-10-18 PROCEDURE — 82248 BILIRUBIN DIRECT: CPT

## 2024-10-18 PROCEDURE — 83735 ASSAY OF MAGNESIUM: CPT

## 2024-10-18 PROCEDURE — 36415 COLL VENOUS BLD VENIPUNCTURE: CPT

## 2024-10-18 PROCEDURE — 80053 COMPREHEN METABOLIC PANEL: CPT

## 2024-10-18 PROCEDURE — 80061 LIPID PANEL: CPT

## 2024-10-18 PROCEDURE — 80197 ASSAY OF TACROLIMUS: CPT

## 2024-10-18 PROCEDURE — 85025 COMPLETE CBC W/AUTO DIFF WBC: CPT

## 2024-10-18 PROCEDURE — 84100 ASSAY OF PHOSPHORUS: CPT

## 2024-10-18 PROCEDURE — 84550 ASSAY OF BLOOD/URIC ACID: CPT

## 2024-10-18 PROCEDURE — 83036 HEMOGLOBIN GLYCOSYLATED A1C: CPT

## 2024-10-20 LAB — TACROLIMUS BLD-MCNC: 7 NG/ML

## 2024-10-21 DIAGNOSIS — K21.9 GASTROESOPHAGEAL REFLUX DISEASE WITHOUT ESOPHAGITIS: ICD-10-CM

## 2024-10-21 RX ORDER — METOCLOPRAMIDE 5 MG/1
5 TABLET ORAL 2 TIMES DAILY
Qty: 180 TABLET | Refills: 0 | Status: SHIPPED | OUTPATIENT
Start: 2024-10-21

## 2024-10-21 RX ORDER — GABAPENTIN 300 MG/1
CAPSULE ORAL
COMMUNITY
Start: 2024-09-28

## 2024-10-21 NOTE — TELEPHONE ENCOUNTER
Health Maintenance   Topic Date Due    Flu vaccine (1) 08/01/2024    COVID-19 Vaccine (5 - 2023-24 season) 09/01/2024    Depression Monitoring  05/23/2025    Annual Wellness Visit (Medicare)  05/24/2025    A1C test (Diabetic or Prediabetic)  10/18/2025    Lipids  10/18/2025    Breast cancer screen  05/06/2026    Pneumococcal 0-64 years Vaccine (3 of 3 - PPSV23 or PCV20) 05/14/2026    Colorectal Cancer Screen  10/14/2026    DTaP/Tdap/Td vaccine (2 - Td or Tdap) 09/06/2028    Hepatitis B vaccine  Completed    Shingles vaccine  Completed    Hepatitis C screen  Completed    HIV screen  Completed    Hepatitis A vaccine  Aged Out    Hib vaccine  Aged Out    Polio vaccine  Aged Out    Meningococcal (ACWY) vaccine  Aged Out    Diabetes screen  Discontinued    Cervical cancer screen  Discontinued             (applicable per patient's age: Cancer Screenings, Depression Screening, Fall Risk Screening, Immunizations)    Hemoglobin A1C (%)   Date Value   10/18/2024 6.1 (H)   07/23/2024 6.1 (H)   04/10/2024 6.6 (H)     AST (U/L)   Date Value   10/18/2024 19     ALT (U/L)   Date Value   10/18/2024 16     BUN (mg/dL)   Date Value   10/18/2024 27 (H)      (goal A1C is < 7)   (goal LDL is <100) need 30-50% reduction from baseline     BP Readings from Last 3 Encounters:   05/23/24 134/80   04/03/24 138/75   03/25/24 120/78    (goal /80)      All Future Testing planned in [facility]:  Lab Frequency Next Occurrence   Transfuse RBC TRANSFUSE 1 UNIT    EKG 12 lead - PRN for Chest Pain or symptoms of ACS PRN        Next Visit Date:  Future Appointments   Date Time Provider Department Center   11/25/2024  2:00 PM Demetrius Vargas APRN - CNP [facility] ECC DEP   5/27/2025 10:40 AM Demetrius Vargas APRN - CNP [facility] DEP            Patient Active Problem List:     JESS (acute kidney injury) (HCC)     Arteritis (HCC)     ANCA-associated vasculitis (HCC)     Rectocele     Retinal edema of both eyes     Depression with

## 2024-12-04 ENCOUNTER — HOSPITAL ENCOUNTER (OUTPATIENT)
Age: 59
Discharge: HOME | End: 2024-12-04
Payer: MEDICARE

## 2024-12-04 LAB
ALBUMIN SERPL-MCNC: 4 G/DL (ref 3.5–5.2)
ALBUMIN/GLOB SERPL: 1.5 {RATIO} (ref 1–2.5)
ALP SERPL-CCNC: 135 U/L (ref 35–104)
ALT SERPL-CCNC: 14 U/L (ref 10–35)
ANION GAP SERPL CALCULATED.3IONS-SCNC: 10 MMOL/L (ref 9–16)
AST SERPL-CCNC: 15 U/L (ref 10–35)
BASOPHILS # BLD: 0.05 K/UL (ref 0–0.2)
BASOPHILS NFR BLD: 1 % (ref 0–2)
BILIRUB DIRECT SERPL-MCNC: <0.2 MG/DL (ref 0–0.3)
BILIRUB INDIRECT SERPL-MCNC: ABNORMAL MG/DL (ref 0–1)
BILIRUB SERPL-MCNC: 0.4 MG/DL (ref 0–1.2)
BUN SERPL-MCNC: 25 MG/DL (ref 6–20)
CALCIUM SERPL-MCNC: 9.7 MG/DL (ref 8.6–10.4)
CHLORIDE SERPL-SCNC: 102 MMOL/L (ref 98–107)
CHOLEST SERPL-MCNC: 230 MG/DL (ref 0–199)
CHOLESTEROL/HDL RATIO: 4.3
CO2 SERPL-SCNC: 28 MMOL/L (ref 20–31)
CREAT SERPL-MCNC: 1.3 MG/DL (ref 0.5–0.9)
EOSINOPHIL # BLD: 0.11 K/UL (ref 0–0.44)
EOSINOPHILS RELATIVE PERCENT: 1 % (ref 1–4)
ERYTHROCYTE [DISTWIDTH] IN BLOOD BY AUTOMATED COUNT: 13.2 % (ref 11.8–14.4)
EST. AVERAGE GLUCOSE BLD GHB EST-MCNC: 128 MG/DL
GFR, ESTIMATED: 50 ML/MIN/1.73M2
GLUCOSE SERPL-MCNC: 115 MG/DL (ref 74–99)
HBA1C MFR BLD: 6.1 % (ref 4–6)
HCT VFR BLD AUTO: 38.3 % (ref 36.3–47.1)
HDLC SERPL-MCNC: 54 MG/DL
HGB BLD-MCNC: 12.2 G/DL (ref 11.9–15.1)
IMM GRANULOCYTES # BLD AUTO: 0.04 K/UL (ref 0–0.3)
IMM GRANULOCYTES NFR BLD: 1 %
LDLC SERPL CALC-MCNC: 104 MG/DL (ref 0–100)
LYMPHOCYTES NFR BLD: 0.67 K/UL (ref 1.1–3.7)
LYMPHOCYTES RELATIVE PERCENT: 8 % (ref 24–43)
MCH RBC QN AUTO: 30 PG (ref 25.2–33.5)
MCHC RBC AUTO-ENTMCNC: 31.9 G/DL (ref 28.4–34.8)
MCV RBC AUTO: 94.1 FL (ref 82.6–102.9)
MONOCYTES NFR BLD: 0.72 K/UL (ref 0.1–1.2)
MONOCYTES NFR BLD: 9 % (ref 3–12)
NEUTROPHILS NFR BLD: 80 % (ref 36–65)
NEUTS SEG NFR BLD: 6.79 K/UL (ref 1.5–8.1)
NRBC BLD-RTO: 0 PER 100 WBC
PLATELET # BLD AUTO: 257 K/UL (ref 138–453)
PMV BLD AUTO: 9.5 FL (ref 8.1–13.5)
POTASSIUM SERPL-SCNC: 4.7 MMOL/L (ref 3.7–5.3)
PROT SERPL-MCNC: 6.6 G/DL (ref 6.6–8.7)
RBC # BLD AUTO: 4.07 M/UL (ref 3.95–5.11)
SODIUM SERPL-SCNC: 140 MMOL/L (ref 136–145)
TRIGL SERPL-MCNC: 362 MG/DL
URATE SERPL-MCNC: 4.8 MG/DL (ref 2.4–5.7)
VLDLC SERPL CALC-MCNC: 72 MG/DL (ref 1–30)
WBC OTHER # BLD: 8.4 K/UL (ref 3.5–11.3)

## 2024-12-04 PROCEDURE — 80053 COMPREHEN METABOLIC PANEL: CPT

## 2024-12-04 PROCEDURE — 80197 ASSAY OF TACROLIMUS: CPT

## 2024-12-04 PROCEDURE — 80061 LIPID PANEL: CPT

## 2024-12-04 PROCEDURE — 84550 ASSAY OF BLOOD/URIC ACID: CPT

## 2024-12-04 PROCEDURE — 36415 COLL VENOUS BLD VENIPUNCTURE: CPT

## 2024-12-04 PROCEDURE — 85025 COMPLETE CBC W/AUTO DIFF WBC: CPT

## 2024-12-04 PROCEDURE — 83036 HEMOGLOBIN GLYCOSYLATED A1C: CPT

## 2024-12-04 PROCEDURE — 82248 BILIRUBIN DIRECT: CPT

## 2024-12-06 LAB — TACROLIMUS BLD-MCNC: 7.6 NG/ML

## 2024-12-12 ENCOUNTER — OFFICE VISIT (OUTPATIENT)
Dept: PRIMARY CARE CLINIC | Age: 59
End: 2024-12-12
Payer: MEDICARE

## 2024-12-12 VITALS
BODY MASS INDEX: 45.07 KG/M2 | HEART RATE: 108 BPM | DIASTOLIC BLOOD PRESSURE: 74 MMHG | OXYGEN SATURATION: 98 % | WEIGHT: 246.5 LBS | TEMPERATURE: 97.5 F | SYSTOLIC BLOOD PRESSURE: 128 MMHG

## 2024-12-12 DIAGNOSIS — J20.9 ACUTE BRONCHITIS, UNSPECIFIED ORGANISM: ICD-10-CM

## 2024-12-12 DIAGNOSIS — J01.40 ACUTE NON-RECURRENT PANSINUSITIS: Primary | ICD-10-CM

## 2024-12-12 LAB
INFLUENZA A ANTIGEN, POC: NEGATIVE
INFLUENZA B ANTIGEN, POC: NEGATIVE
LOT NUMBER POC: NORMAL
SARS-COV-2 RNA POC - COV: NORMAL
VALID INTERNAL CONTROL, POC: PRESENT
VENDOR AND KIT NAME POC: NORMAL

## 2024-12-12 PROCEDURE — 1036F TOBACCO NON-USER: CPT | Performed by: NURSE PRACTITIONER

## 2024-12-12 PROCEDURE — G8417 CALC BMI ABV UP PARAM F/U: HCPCS | Performed by: NURSE PRACTITIONER

## 2024-12-12 PROCEDURE — G8484 FLU IMMUNIZE NO ADMIN: HCPCS | Performed by: NURSE PRACTITIONER

## 2024-12-12 PROCEDURE — 99214 OFFICE O/P EST MOD 30 MIN: CPT | Performed by: NURSE PRACTITIONER

## 2024-12-12 PROCEDURE — 3074F SYST BP LT 130 MM HG: CPT | Performed by: NURSE PRACTITIONER

## 2024-12-12 PROCEDURE — G8427 DOCREV CUR MEDS BY ELIG CLIN: HCPCS | Performed by: NURSE PRACTITIONER

## 2024-12-12 PROCEDURE — 3078F DIAST BP <80 MM HG: CPT | Performed by: NURSE PRACTITIONER

## 2024-12-12 PROCEDURE — 3017F COLORECTAL CA SCREEN DOC REV: CPT | Performed by: NURSE PRACTITIONER

## 2024-12-12 RX ORDER — CODEINE PHOSPHATE AND GUAIFENESIN 10; 100 MG/5ML; MG/5ML
10 SOLUTION ORAL 4 TIMES DAILY PRN
Qty: 120 ML | Refills: 0 | Status: SHIPPED | OUTPATIENT
Start: 2024-12-12 | End: 2024-12-15

## 2024-12-12 RX ORDER — PREDNISONE 20 MG/1
40 TABLET ORAL
Qty: 10 TABLET | Refills: 0 | Status: SHIPPED | OUTPATIENT
Start: 2024-12-12 | End: 2024-12-17

## 2024-12-12 RX ORDER — DOXYCYCLINE HYCLATE 100 MG
100 TABLET ORAL 2 TIMES DAILY
Qty: 20 TABLET | Refills: 0 | Status: SHIPPED | OUTPATIENT
Start: 2024-12-12 | End: 2024-12-22

## 2024-12-12 ASSESSMENT — ENCOUNTER SYMPTOMS
DIARRHEA: 0
HEMOPTYSIS: 0
SHORTNESS OF BREATH: 0
VOMITING: 0
RHINORRHEA: 1
ABDOMINAL PAIN: 0
COUGH: 1
HEARTBURN: 0
SINUS PAIN: 1
TROUBLE SWALLOWING: 0
SORE THROAT: 1
SINUS PRESSURE: 1
SWOLLEN GLANDS: 0
NAUSEA: 0
WHEEZING: 0

## 2024-12-12 NOTE — PROGRESS NOTES
MD Dottie   clotrimazole (MYCELEX) 10 MG jerrell DISSOLVE 1 TABLET BY MOUTH ONCE DAILY IN THE MORNING AND 1 AT NOON AND 1 IN THE EVENING AND 1 AT BEDTIME 3/8/24  Yes Dottie Webber MD   omega-3 acid ethyl esters (LOVAZA) 1 g capsule TAKE 1 CAPSULE BY MOUTH IN THE MORNING AND THEN 1 AT BEDTIME 2/9/24  Yes Dottie Webber MD   carvedilol (COREG) 12.5 MG tablet Take 1 tablet by mouth 2 times daily 2/17/24  Yes Kleber Malone MD   tacrolimus (PROGRAF) 1 MG capsule Take 3 capsules by mouth every morning 2/17/24  Yes Kleber Malone MD   magnesium oxide (MAG-OX) 400 MG tablet Take 2 tablets by mouth 2 times daily 2/17/24  Yes Kleber Malone MD   sulfamethoxazole-trimethoprim (BACTRIM DS;SEPTRA DS) 800-160 MG per tablet Take 1 tablet by mouth three times a week   Yes ProviderDottie MD   aMILoride (MIDAMOR) 5 MG tablet Take 1 tablet by mouth every morning 2/2/24  Yes ProviderDottie MD   atorvastatin (LIPITOR) 80 MG tablet Take 1 tablet by mouth daily 12/27/23  Yes Demetrius Vargas APRN - CNP   Calcium Carb-Cholecalciferol (OYSTER SHELL CALCIUM W/D) 500-5 MG-MCG TABS tablet TAKE 1 TABLET BY MOUTH IN THE MORNING AND 1 AT BEDTIME 12/2/23  Yes Misha Nowak MD   mycophenolate (MYFORTIC) 180 MG DR tablet Take 4 tablets by mouth 2 times daily Take 4 tablets by mouth in the morning and at bedtime.   Yes Dottie Webber MD   predniSONE (DELTASONE) 10 MG tablet Take 1 tablet by mouth daily   Yes ProviderDotite MD   B Complex-C-Zn-Folic Acid (DIALYVITE/ZINC) TABS Take 1 tablet by mouth daily 3/15/23  Yes Dottie Webber MD   aspirin 81 MG chewable tablet Take 1 tablet by mouth daily   Yes Dottie Webber MD   vitamin D (CHOLECALCIFEROL) 1000 UNIT TABS tablet Take 2 tablets by mouth in the morning and 2 tablets in the evening.   Yes Dottie Webber MD   Incontinence Supply Disposable (PREVAIL FOR WOMEN X-LARGE) MISC 2 each by Does not apply route

## 2024-12-12 NOTE — PATIENT INSTRUCTIONS
SURVEY:     You may be receiving a survey from [facility] [employer] regarding your visit today.     Please complete the survey to enable us to provide the highest quality of care to you and your family.     If you cannot score us a very good on any question, please call the office to discuss how we could have made your experience a very good one.     Thank you,    Demetrius Vargas, APRN-CNP  Cony Chen, APRN-CNP  Corinne, LPN  Eva, CMA  Zelalem, CMA  Edwina, CMA  Filomena, PCA  Lindsey, CMA  Mildred, PM

## 2024-12-23 DIAGNOSIS — F41.8 DEPRESSION WITH ANXIETY: Chronic | ICD-10-CM

## 2024-12-23 DIAGNOSIS — E78.2 MIXED HYPERLIPIDEMIA: ICD-10-CM

## 2024-12-23 RX ORDER — ATORVASTATIN CALCIUM 80 MG/1
80 TABLET, FILM COATED ORAL DAILY
Qty: 90 TABLET | Refills: 3 | Status: SHIPPED | OUTPATIENT
Start: 2024-12-23

## 2024-12-23 RX ORDER — DULOXETIN HYDROCHLORIDE 30 MG/1
CAPSULE, DELAYED RELEASE ORAL
Qty: 90 CAPSULE | Refills: 3 | Status: SHIPPED | OUTPATIENT
Start: 2024-12-23

## 2024-12-23 RX ORDER — GABAPENTIN 600 MG/1
TABLET ORAL
COMMUNITY
Start: 2024-11-25

## 2024-12-23 RX ORDER — MAGNESIUM OXIDE TAB 400 MG (241.3 MG ELEMENTAL MG) 400 (241.3 MG) MG
800 TAB ORAL 2 TIMES DAILY
COMMUNITY
Start: 2024-12-09

## 2024-12-23 NOTE — TELEPHONE ENCOUNTER
Health Maintenance   Topic Date Due    Flu vaccine (1) 06/30/2025 (Originally 8/1/2024)    COVID-19 Vaccine (5 - 2023-24 season) 12/12/2025 (Originally 9/1/2024)    Depression Monitoring  05/23/2025    Annual Wellness Visit (Medicare)  05/24/2025    A1C test (Diabetic or Prediabetic)  12/04/2025    Lipids  12/04/2025    Breast cancer screen  05/06/2026    Pneumococcal 0-64 years Vaccine (3 of 3 - PPSV23 or PCV20) 05/14/2026    Colorectal Cancer Screen  10/14/2026    DTaP/Tdap/Td vaccine (2 - Td or Tdap) 09/06/2028    Hepatitis B vaccine  Completed    Shingles vaccine  Completed    Hepatitis C screen  Completed    HIV screen  Completed    Hepatitis A vaccine  Aged Out    Hib vaccine  Aged Out    Polio vaccine  Aged Out    Meningococcal (ACWY) vaccine  Aged Out    Diabetes screen  Discontinued    Cervical cancer screen  Discontinued             (applicable per patient's age: Cancer Screenings, Depression Screening, Fall Risk Screening, Immunizations)    Hemoglobin A1C (%)   Date Value   12/04/2024 6.1 (H)   10/18/2024 6.1 (H)   07/23/2024 6.1 (H)     AST (U/L)   Date Value   12/04/2024 15     ALT (U/L)   Date Value   12/04/2024 14     BUN (mg/dL)   Date Value   12/04/2024 25 (H)      (goal A1C is < 7)   (goal LDL is <100) need 30-50% reduction from baseline     BP Readings from Last 3 Encounters:   12/12/24 128/74   05/23/24 134/80   04/03/24 138/75    (goal /80)      All Future Testing planned in [facility]:  Lab Frequency Next Occurrence   Transfuse RBC TRANSFUSE 1 UNIT    EKG 12 lead - PRN for Chest Pain or symptoms of ACS PRN        Next Visit Date:  Future Appointments   Date Time Provider Department Center   5/27/2025 10:40 AM Demetrius Vargas, APRN - CNP [facility] DEP            Patient Active Problem List:     JESS (acute kidney injury) (HCC)     Arteritis (HCC)     ANCA-associated vasculitis (HCC)     Rectocele     Retinal edema of both eyes     Depression with anxiety     Essential

## 2025-01-09 RX ORDER — CEPHALEXIN 500 MG/1
500 CAPSULE ORAL 3 TIMES DAILY
COMMUNITY
Start: 2024-12-30

## 2025-01-09 RX ORDER — METFORMIN HYDROCHLORIDE 500 MG/1
500 TABLET, EXTENDED RELEASE ORAL
Qty: 90 TABLET | Refills: 1 | Status: SHIPPED | OUTPATIENT
Start: 2025-01-09

## 2025-01-14 RX ORDER — ONDANSETRON 4 MG/1
4 TABLET, FILM COATED ORAL EVERY 8 HOURS PRN
Qty: 15 TABLET | Refills: 0 | Status: SHIPPED | OUTPATIENT
Start: 2025-01-14 | End: 2025-01-19

## 2025-01-15 ENCOUNTER — HOSPITAL ENCOUNTER (OUTPATIENT)
Age: 60
Discharge: HOME | End: 2025-01-15
Payer: MEDICARE

## 2025-01-15 LAB
ALBUMIN SERPL-MCNC: 4 G/DL (ref 3.5–5.2)
ALBUMIN/GLOB SERPL: 1.6 {RATIO} (ref 1–2.5)
ALP SERPL-CCNC: 102 U/L (ref 35–104)
ALT SERPL-CCNC: 20 U/L (ref 10–35)
ANION GAP SERPL CALCULATED.3IONS-SCNC: 12 MMOL/L (ref 9–16)
AST SERPL-CCNC: 23 U/L (ref 10–35)
BASOPHILS # BLD: <0.03 K/UL (ref 0–0.2)
BASOPHILS NFR BLD: 0 % (ref 0–2)
BILIRUB DIRECT SERPL-MCNC: <0.2 MG/DL (ref 0–0.3)
BILIRUB INDIRECT SERPL-MCNC: ABNORMAL MG/DL (ref 0–1)
BILIRUB SERPL-MCNC: 0.4 MG/DL (ref 0–1.2)
BUN SERPL-MCNC: 26 MG/DL (ref 6–20)
CALCIUM SERPL-MCNC: 9 MG/DL (ref 8.6–10.4)
CHLORIDE SERPL-SCNC: 103 MMOL/L (ref 98–107)
CO2 SERPL-SCNC: 23 MMOL/L (ref 20–31)
CREAT SERPL-MCNC: 1.5 MG/DL (ref 0.5–0.9)
EOSINOPHIL # BLD: <0.03 K/UL (ref 0–0.44)
EOSINOPHILS RELATIVE PERCENT: 0 % (ref 1–4)
ERYTHROCYTE [DISTWIDTH] IN BLOOD BY AUTOMATED COUNT: 13.2 % (ref 11.8–14.4)
GFR, ESTIMATED: 39 ML/MIN/1.73M2
GLUCOSE SERPL-MCNC: 128 MG/DL (ref 74–99)
HCT VFR BLD AUTO: 40.6 % (ref 36.3–47.1)
HGB BLD-MCNC: 13.1 G/DL (ref 11.9–15.1)
IMM GRANULOCYTES # BLD AUTO: 0.05 K/UL (ref 0–0.3)
IMM GRANULOCYTES NFR BLD: 1 %
LYMPHOCYTES NFR BLD: 0.56 K/UL (ref 1.1–3.7)
LYMPHOCYTES RELATIVE PERCENT: 8 % (ref 24–43)
MAGNESIUM SERPL-MCNC: 1.9 MG/DL (ref 1.6–2.6)
MCH RBC QN AUTO: 30 PG (ref 25.2–33.5)
MCHC RBC AUTO-ENTMCNC: 32.3 G/DL (ref 28.4–34.8)
MCV RBC AUTO: 92.9 FL (ref 82.6–102.9)
MONOCYTES NFR BLD: 0.35 K/UL (ref 0.1–1.2)
MONOCYTES NFR BLD: 5 % (ref 3–12)
NEUTROPHILS NFR BLD: 86 % (ref 36–65)
NEUTS SEG NFR BLD: 5.67 K/UL (ref 1.5–8.1)
NRBC BLD-RTO: 0 PER 100 WBC
PHOSPHATE SERPL-MCNC: 3 MG/DL (ref 2.5–4.5)
PLATELET # BLD AUTO: 273 K/UL (ref 138–453)
PMV BLD AUTO: 9.8 FL (ref 8.1–13.5)
POTASSIUM SERPL-SCNC: 4.6 MMOL/L (ref 3.7–5.3)
PROT SERPL-MCNC: 6.5 G/DL (ref 6.6–8.7)
RBC # BLD AUTO: 4.37 M/UL (ref 3.95–5.11)
SODIUM SERPL-SCNC: 138 MMOL/L (ref 136–145)
URATE SERPL-MCNC: 6.6 MG/DL (ref 2.4–5.7)
WBC OTHER # BLD: 6.7 K/UL (ref 3.5–11.3)

## 2025-01-15 PROCEDURE — 83735 ASSAY OF MAGNESIUM: CPT

## 2025-01-15 PROCEDURE — 36415 COLL VENOUS BLD VENIPUNCTURE: CPT

## 2025-01-15 PROCEDURE — 85025 COMPLETE CBC W/AUTO DIFF WBC: CPT

## 2025-01-15 PROCEDURE — 80197 ASSAY OF TACROLIMUS: CPT

## 2025-01-15 PROCEDURE — 84100 ASSAY OF PHOSPHORUS: CPT

## 2025-01-15 PROCEDURE — 84550 ASSAY OF BLOOD/URIC ACID: CPT

## 2025-01-15 PROCEDURE — 82248 BILIRUBIN DIRECT: CPT

## 2025-01-15 PROCEDURE — 80053 COMPREHEN METABOLIC PANEL: CPT

## 2025-01-18 LAB — TACROLIMUS BLD-MCNC: 18.2 NG/ML

## 2025-01-19 ENCOUNTER — HOSPITAL ENCOUNTER (EMERGENCY)
Age: 60
Discharge: HOME | End: 2025-01-19
Payer: MEDICARE

## 2025-01-19 VITALS
TEMPERATURE: 97.6 F | OXYGEN SATURATION: 97 % | HEART RATE: 90 BPM | SYSTOLIC BLOOD PRESSURE: 129 MMHG | DIASTOLIC BLOOD PRESSURE: 56 MMHG | RESPIRATION RATE: 18 BRPM

## 2025-01-19 DIAGNOSIS — R19.7 VOMITING AND DIARRHEA: Primary | ICD-10-CM

## 2025-01-19 DIAGNOSIS — R11.10 VOMITING AND DIARRHEA: Primary | ICD-10-CM

## 2025-01-19 LAB
ALBUMIN SERPL-MCNC: 4.1 G/DL (ref 3.5–5.2)
ALBUMIN/GLOB SERPL: 1.5 {RATIO} (ref 1–2.5)
ALP SERPL-CCNC: 106 U/L (ref 35–104)
ALT SERPL-CCNC: 18 U/L (ref 10–35)
ANION GAP SERPL CALCULATED.3IONS-SCNC: 13 MMOL/L (ref 9–16)
AST SERPL-CCNC: 22 U/L (ref 10–35)
BASOPHILS # BLD: 0.05 K/UL (ref 0–0.2)
BASOPHILS NFR BLD: 0 % (ref 0–2)
BILIRUB SERPL-MCNC: 0.7 MG/DL (ref 0–1.2)
BILIRUB UR QL STRIP: NEGATIVE
BUN SERPL-MCNC: 23 MG/DL (ref 6–20)
BUN/CREAT SERPL: 14 (ref 9–20)
CALCIUM SERPL-MCNC: 9.5 MG/DL (ref 8.6–10.4)
CHLORIDE SERPL-SCNC: 103 MMOL/L (ref 98–107)
CLARITY UR: CLEAR
CO2 SERPL-SCNC: 25 MMOL/L (ref 20–31)
COLOR UR: YELLOW
CREAT SERPL-MCNC: 1.6 MG/DL (ref 0.5–0.9)
EOSINOPHIL # BLD: 0.06 K/UL (ref 0–0.44)
EOSINOPHILS RELATIVE PERCENT: 1 % (ref 1–4)
EPI CELLS #/AREA URNS HPF: NORMAL /HPF (ref 0–25)
ERYTHROCYTE [DISTWIDTH] IN BLOOD BY AUTOMATED COUNT: 12.8 % (ref 11.8–14.4)
GFR, ESTIMATED: 36 ML/MIN/1.73M2
GLUCOSE SERPL-MCNC: 139 MG/DL (ref 74–99)
GLUCOSE UR STRIP-MCNC: ABNORMAL MG/DL
HCT VFR BLD AUTO: 39.9 % (ref 36.3–47.1)
HGB BLD-MCNC: 13.3 G/DL (ref 11.9–15.1)
HGB UR QL STRIP.AUTO: NEGATIVE
IMM GRANULOCYTES # BLD AUTO: 0.08 K/UL (ref 0–0.3)
IMM GRANULOCYTES NFR BLD: 1 %
KETONES UR STRIP-MCNC: NEGATIVE MG/DL
LEUKOCYTE ESTERASE UR QL STRIP: NEGATIVE
LIPASE SERPL-CCNC: 20 U/L (ref 13–60)
LYMPHOCYTES NFR BLD: 0.56 K/UL (ref 1.1–3.7)
LYMPHOCYTES RELATIVE PERCENT: 4 % (ref 24–43)
MCH RBC QN AUTO: 30.6 PG (ref 25.2–33.5)
MCHC RBC AUTO-ENTMCNC: 33.3 G/DL (ref 28.4–34.8)
MCV RBC AUTO: 91.9 FL (ref 82.6–102.9)
MONOCYTES NFR BLD: 0.51 K/UL (ref 0.1–1.2)
MONOCYTES NFR BLD: 4 % (ref 3–12)
NEUTROPHILS NFR BLD: 90 % (ref 36–65)
NEUTS SEG NFR BLD: 11.89 K/UL (ref 1.5–8.1)
NITRITE UR QL STRIP: NEGATIVE
NRBC BLD-RTO: 0 PER 100 WBC
PH UR STRIP: 5.5 [PH] (ref 5–9)
PLATELET # BLD AUTO: 313 K/UL (ref 138–453)
PMV BLD AUTO: 9.7 FL (ref 8.1–13.5)
POTASSIUM SERPL-SCNC: 4.7 MMOL/L (ref 3.7–5.3)
PROT SERPL-MCNC: 6.8 G/DL (ref 6.6–8.7)
PROT UR STRIP-MCNC: NEGATIVE MG/DL
RBC # BLD AUTO: 4.34 M/UL (ref 3.95–5.11)
RBC #/AREA URNS HPF: NORMAL /HPF (ref 0–2)
SODIUM SERPL-SCNC: 141 MMOL/L (ref 136–145)
SP GR UR STRIP: 1.02 (ref 1.01–1.02)
UROBILINOGEN UR STRIP-ACNC: NORMAL EU/DL (ref 0–1)
WBC #/AREA URNS HPF: NORMAL /HPF (ref 0–5)
WBC OTHER # BLD: 13.2 K/UL (ref 3.5–11.3)

## 2025-01-19 PROCEDURE — 99284 EMERGENCY DEPT VISIT MOD MDM: CPT

## 2025-01-19 PROCEDURE — 6360000002 HC RX W HCPCS: Performed by: PHYSICIAN ASSISTANT

## 2025-01-19 PROCEDURE — 80053 COMPREHEN METABOLIC PANEL: CPT

## 2025-01-19 PROCEDURE — 96374 THER/PROPH/DIAG INJ IV PUSH: CPT

## 2025-01-19 PROCEDURE — 85025 COMPLETE CBC W/AUTO DIFF WBC: CPT

## 2025-01-19 PROCEDURE — 81001 URINALYSIS AUTO W/SCOPE: CPT

## 2025-01-19 PROCEDURE — 6370000000 HC RX 637 (ALT 250 FOR IP): Performed by: PHYSICIAN ASSISTANT

## 2025-01-19 PROCEDURE — 2580000003 HC RX 258: Performed by: PHYSICIAN ASSISTANT

## 2025-01-19 PROCEDURE — 83690 ASSAY OF LIPASE: CPT

## 2025-01-19 RX ORDER — LOPERAMIDE HYDROCHLORIDE 2 MG/1
4 CAPSULE ORAL ONCE
Status: COMPLETED | OUTPATIENT
Start: 2025-01-19 | End: 2025-01-19

## 2025-01-19 RX ORDER — ONDANSETRON 2 MG/ML
4 INJECTION INTRAMUSCULAR; INTRAVENOUS ONCE
Status: COMPLETED | OUTPATIENT
Start: 2025-01-19 | End: 2025-01-19

## 2025-01-19 RX ORDER — 0.9 % SODIUM CHLORIDE 0.9 %
1000 INTRAVENOUS SOLUTION INTRAVENOUS ONCE
Status: COMPLETED | OUTPATIENT
Start: 2025-01-19 | End: 2025-01-19

## 2025-01-19 RX ORDER — ONDANSETRON 4 MG/1
4 TABLET, ORALLY DISINTEGRATING ORAL 3 TIMES DAILY PRN
Qty: 21 TABLET | Refills: 0 | Status: SHIPPED | OUTPATIENT
Start: 2025-01-19

## 2025-01-19 RX ADMIN — SODIUM CHLORIDE 1000 ML: 9 INJECTION, SOLUTION INTRAVENOUS at 14:54

## 2025-01-19 RX ADMIN — ONDANSETRON 4 MG: 2 INJECTION, SOLUTION INTRAMUSCULAR; INTRAVENOUS at 14:54

## 2025-01-19 RX ADMIN — LOPERAMIDE HYDROCHLORIDE 4 MG: 2 CAPSULE ORAL at 15:12

## 2025-01-19 ASSESSMENT — PAIN - FUNCTIONAL ASSESSMENT: PAIN_FUNCTIONAL_ASSESSMENT: 0-10

## 2025-01-19 ASSESSMENT — PAIN DESCRIPTION - DESCRIPTORS: DESCRIPTORS: CRAMPING

## 2025-01-19 ASSESSMENT — PAIN SCALES - GENERAL: PAINLEVEL_OUTOF10: 7

## 2025-01-19 ASSESSMENT — PAIN DESCRIPTION - LOCATION: LOCATION: ABDOMEN

## 2025-01-19 NOTE — ED PROVIDER NOTES
(10/10/2023)    [facility], [facility], [facility]    [place] Safety & Environment     Within the last year, have you been afraid of your partner or ex-partner?: No     Emotionally Abused: Not on file     Physically Abused: Not on file     Sexually Abused: Not on file     Physically or Sexually Abused: Not on file   Housing Stability: Unknown (5/23/2024)    Housing Stability Vital Sign     Unable to Pay for Housing in the Last Year: No     Number of Places Lived in the Last Year: 1     Unstable Housing in the Last Year: Patient declined        Vital Signs  BP (!) 129/56   Pulse 90   Resp 18   LMP 09/09/2016   SpO2 97%      Physical Exam     Alert and oriented x 3, no apparent distress.  Head atraumatic.  Facies symmetrical.  Pupils equal and round, EOM's grossly intact.  Neck supple, trachea midline.  Mouth appears dry but has good skin turgor  Good respiratory effort without distress.  Lungs clear to auscultation  Heart regular rate and rhythm  Abdomen soft nontender nondistended no organomegaly or masses  Skin without obvious rashes or lesions.  Extremities with 1+ bilateral edema, no calf tender  Good affect, pleasant patient.           Labs  Labs Reviewed   CBC WITH AUTO DIFFERENTIAL - Abnormal; Notable for the following components:       Result Value    WBC 13.2 (*)     Neutrophils % 90 (*)     Lymphocytes % 4 (*)     Immature Granulocytes % 1 (*)     Neutrophils Absolute 11.89 (*)     Lymphocytes Absolute 0.56 (*)     All other components within normal limits   COMPREHENSIVE METABOLIC PANEL - Abnormal; Notable for the following components:    Glucose 139 (*)     BUN 23 (*)     Creatinine 1.6 (*)     Est, Glom Filt Rate 36 (*)     Alkaline Phosphatase 106 (*)     All other components within normal limits   URINALYSIS - Abnormal; Notable for the following components:    Glucose, Ur 3+ (*)     All other components within normal limits   LIPASE   MICROSCOPIC

## 2025-01-21 DIAGNOSIS — K21.9 GASTROESOPHAGEAL REFLUX DISEASE WITHOUT ESOPHAGITIS: ICD-10-CM

## 2025-01-21 RX ORDER — METOCLOPRAMIDE 5 MG/1
5 TABLET ORAL 2 TIMES DAILY
Qty: 180 TABLET | Refills: 0 | Status: SHIPPED | OUTPATIENT
Start: 2025-01-21

## 2025-01-21 NOTE — TELEPHONE ENCOUNTER
Health Maintenance   Topic Date Due    Flu vaccine (1) 06/30/2025 (Originally 8/1/2024)    COVID-19 Vaccine (5 - 2023-24 season) 12/12/2025 (Originally 9/1/2024)    Depression Monitoring  05/23/2025    Annual Wellness Visit (Medicare)  05/24/2025    A1C test (Diabetic or Prediabetic)  12/04/2025    Lipids  12/04/2025    Breast cancer screen  05/06/2026    Pneumococcal 0-64 years Vaccine (3 of 3 - PPSV23 or PCV20) 05/14/2026    Colorectal Cancer Screen  10/14/2026    DTaP/Tdap/Td vaccine (2 - Td or Tdap) 09/06/2028    Hepatitis B vaccine  Completed    Shingles vaccine  Completed    Hepatitis C screen  Completed    HIV screen  Completed    Hepatitis A vaccine  Aged Out    Hib vaccine  Aged Out    Polio vaccine  Aged Out    Meningococcal (ACWY) vaccine  Aged Out    Diabetes screen  Discontinued    Cervical cancer screen  Discontinued             (applicable per patient's age: Cancer Screenings, Depression Screening, Fall Risk Screening, Immunizations)    Hemoglobin A1C (%)   Date Value   12/04/2024 6.1 (H)   10/18/2024 6.1 (H)   07/23/2024 6.1 (H)     AST (U/L)   Date Value   01/19/2025 22     ALT (U/L)   Date Value   01/19/2025 18     BUN (mg/dL)   Date Value   01/19/2025 23 (H)      (goal A1C is < 7)   (goal LDL is <100) need 30-50% reduction from baseline     BP Readings from Last 3 Encounters:   01/19/25 (!) 129/56   12/12/24 128/74   05/23/24 134/80    (goal /80)      All Future Testing planned in [facility]:  Lab Frequency Next Occurrence   Transfuse RBC TRANSFUSE 1 UNIT    EKG 12 lead - PRN for Chest Pain or symptoms of ACS PRN        Next Visit Date:  Future Appointments   Date Time Provider Department Center   5/27/2025 10:40 AM Demetrius Vargas, APRN - CNP [facility] DEP            Patient Active Problem List:     JESS (acute kidney injury) (HCC)     Arteritis (HCC)     ANCA-associated vasculitis (HCC)     Rectocele     Retinal edema of both eyes     Depression with anxiety     Essential

## 2025-01-31 ENCOUNTER — HOSPITAL ENCOUNTER (EMERGENCY)
Age: 60
Discharge: HOME | End: 2025-01-31
Attending: EMERGENCY MEDICINE
Payer: MEDICARE

## 2025-01-31 VITALS
HEART RATE: 87 BPM | HEIGHT: 62 IN | WEIGHT: 143 LBS | RESPIRATION RATE: 16 BRPM | TEMPERATURE: 98.4 F | OXYGEN SATURATION: 97 % | DIASTOLIC BLOOD PRESSURE: 75 MMHG | SYSTOLIC BLOOD PRESSURE: 148 MMHG | BODY MASS INDEX: 26.31 KG/M2

## 2025-01-31 DIAGNOSIS — R19.7 DIARRHEA, UNSPECIFIED TYPE: Primary | ICD-10-CM

## 2025-01-31 LAB
ALBUMIN SERPL-MCNC: 4 G/DL (ref 3.5–5.2)
ALBUMIN/GLOB SERPL: 1.8 {RATIO} (ref 1–2.5)
ALP SERPL-CCNC: 115 U/L (ref 35–104)
ALT SERPL-CCNC: 32 U/L (ref 10–35)
ANION GAP SERPL CALCULATED.3IONS-SCNC: 11 MMOL/L (ref 9–16)
AST SERPL-CCNC: 24 U/L (ref 10–35)
BACTERIA URNS QL MICRO: ABNORMAL
BASOPHILS # BLD: 0 K/UL (ref 0–0.2)
BASOPHILS NFR BLD: 0 % (ref 0–2)
BILIRUB SERPL-MCNC: 0.4 MG/DL (ref 0–1.2)
BILIRUB UR QL STRIP: NEGATIVE
BUN SERPL-MCNC: 19 MG/DL (ref 6–20)
BUN/CREAT SERPL: 13 (ref 9–20)
CALCIUM SERPL-MCNC: 8.7 MG/DL (ref 8.6–10.4)
CHLORIDE SERPL-SCNC: 102 MMOL/L (ref 98–107)
CLARITY UR: CLEAR
CO2 SERPL-SCNC: 24 MMOL/L (ref 20–31)
COLOR UR: YELLOW
CREAT SERPL-MCNC: 1.5 MG/DL (ref 0.5–0.9)
EOSINOPHIL # BLD: 0 K/UL (ref 0–0.44)
EOSINOPHILS RELATIVE PERCENT: 0 % (ref 1–4)
EPI CELLS #/AREA URNS HPF: ABNORMAL /HPF (ref 0–25)
ERYTHROCYTE [DISTWIDTH] IN BLOOD BY AUTOMATED COUNT: 13.1 % (ref 11.8–14.4)
GFR, ESTIMATED: 40 ML/MIN/1.73M2
GLUCOSE SERPL-MCNC: 144 MG/DL (ref 74–99)
GLUCOSE UR STRIP-MCNC: ABNORMAL MG/DL
HCT VFR BLD AUTO: 37 % (ref 36.3–47.1)
HGB BLD-MCNC: 12.2 G/DL (ref 11.9–15.1)
HGB UR QL STRIP.AUTO: NEGATIVE
IMM GRANULOCYTES # BLD AUTO: 0 K/UL (ref 0–0.3)
IMM GRANULOCYTES NFR BLD: 0 %
KETONES UR STRIP-MCNC: NEGATIVE MG/DL
LEUKOCYTE ESTERASE UR QL STRIP: NEGATIVE
LYMPHOCYTES NFR BLD: 0.5 K/UL (ref 1.1–3.7)
LYMPHOCYTES RELATIVE PERCENT: 5 % (ref 24–43)
MCH RBC QN AUTO: 30.5 PG (ref 25.2–33.5)
MCHC RBC AUTO-ENTMCNC: 33 G/DL (ref 28.4–34.8)
MCV RBC AUTO: 92.5 FL (ref 82.6–102.9)
MONOCYTES NFR BLD: 0.5 K/UL (ref 0.1–1.2)
MONOCYTES NFR BLD: 5 % (ref 3–12)
MORPHOLOGY: NORMAL
NEUTROPHILS NFR BLD: 90 % (ref 36–65)
NEUTS SEG NFR BLD: 8.9 K/UL (ref 1.5–8.1)
NITRITE UR QL STRIP: NEGATIVE
NRBC BLD-RTO: 0 PER 100 WBC
PH UR STRIP: 6 [PH] (ref 5–9)
PLATELET # BLD AUTO: 254 K/UL (ref 138–453)
PMV BLD AUTO: 9.9 FL (ref 8.1–13.5)
POTASSIUM SERPL-SCNC: 4.6 MMOL/L (ref 3.7–5.3)
PROT SERPL-MCNC: 6.3 G/DL (ref 6.6–8.7)
PROT UR STRIP-MCNC: NEGATIVE MG/DL
RBC # BLD AUTO: 4 M/UL (ref 3.95–5.11)
RBC #/AREA URNS HPF: ABNORMAL /HPF (ref 0–2)
SODIUM SERPL-SCNC: 137 MMOL/L (ref 136–145)
SP GR UR STRIP: 1.02 (ref 1.01–1.02)
UROBILINOGEN UR STRIP-ACNC: NORMAL EU/DL (ref 0–1)
WBC #/AREA URNS HPF: ABNORMAL /HPF (ref 0–5)
WBC OTHER # BLD: 9.9 K/UL (ref 3.5–11.3)

## 2025-01-31 PROCEDURE — 36415 COLL VENOUS BLD VENIPUNCTURE: CPT

## 2025-01-31 PROCEDURE — 96375 TX/PRO/DX INJ NEW DRUG ADDON: CPT

## 2025-01-31 PROCEDURE — 85025 COMPLETE CBC W/AUTO DIFF WBC: CPT

## 2025-01-31 PROCEDURE — 2580000003 HC RX 258: Performed by: EMERGENCY MEDICINE

## 2025-01-31 PROCEDURE — 81001 URINALYSIS AUTO W/SCOPE: CPT

## 2025-01-31 PROCEDURE — 99284 EMERGENCY DEPT VISIT MOD MDM: CPT

## 2025-01-31 PROCEDURE — 6360000002 HC RX W HCPCS: Performed by: EMERGENCY MEDICINE

## 2025-01-31 PROCEDURE — 96374 THER/PROPH/DIAG INJ IV PUSH: CPT

## 2025-01-31 PROCEDURE — 2500000003 HC RX 250 WO HCPCS: Performed by: EMERGENCY MEDICINE

## 2025-01-31 PROCEDURE — 80053 COMPREHEN METABOLIC PANEL: CPT

## 2025-01-31 RX ORDER — 0.9 % SODIUM CHLORIDE 0.9 %
1000 INTRAVENOUS SOLUTION INTRAVENOUS ONCE
Status: COMPLETED | OUTPATIENT
Start: 2025-01-31 | End: 2025-01-31

## 2025-01-31 RX ORDER — ONDANSETRON 2 MG/ML
4 INJECTION INTRAMUSCULAR; INTRAVENOUS ONCE
Status: COMPLETED | OUTPATIENT
Start: 2025-01-31 | End: 2025-01-31

## 2025-01-31 RX ADMIN — SODIUM CHLORIDE 1000 ML: 9 INJECTION, SOLUTION INTRAVENOUS at 20:12

## 2025-01-31 RX ADMIN — ONDANSETRON 4 MG: 2 INJECTION, SOLUTION INTRAMUSCULAR; INTRAVENOUS at 20:13

## 2025-01-31 RX ADMIN — FAMOTIDINE 20 MG: 10 INJECTION, SOLUTION INTRAVENOUS at 20:14

## 2025-01-31 ASSESSMENT — LIFESTYLE VARIABLES
HOW MANY STANDARD DRINKS CONTAINING ALCOHOL DO YOU HAVE ON A TYPICAL DAY: PATIENT DOES NOT DRINK
HOW OFTEN DO YOU HAVE A DRINK CONTAINING ALCOHOL: NEVER

## 2025-02-01 NOTE — ED PROVIDER NOTES
PATIENT REFERRED TO:  Demetrius Vargas, APRN - CNP  [address] [facility] [address]  [place] [address]  [phone]    Schedule an appointment as soon as possible for a visit in 3 days        DISCHARGE MEDICATIONS:  Discharge Medication List as of 1/31/2025  9:36 PM          Trinity Leone DO  Emergency Medicine     (Please note that portions of thisnote were completed with a voice recognition program.  Efforts were made to edit the dictations but occasionally words are mis-transcribed.)        Trinity Leone DO  02/01/25 0213

## 2025-02-01 NOTE — DISCHARGE INSTRUCTIONS
Please follow-up with your nephrologist next week, as well as your primary care provider given your ongoing diarrhea and may be beneficial to have some stool studies checked.  Return to the ER for additional concerns.  Take medication as prescribed

## 2025-02-03 ENCOUNTER — TELEPHONE (OUTPATIENT)
Dept: PRIMARY CARE CLINIC | Age: 60
End: 2025-02-03

## 2025-02-03 DIAGNOSIS — R19.7 ACUTE DIARRHEA: Primary | ICD-10-CM

## 2025-02-03 RX ORDER — DIPHENOXYLATE HYDROCHLORIDE AND ATROPINE SULFATE 2.5; .025 MG/1; MG/1
1 TABLET ORAL 4 TIMES DAILY PRN
Qty: 8 TABLET | Refills: 0 | Status: SHIPPED | OUTPATIENT
Start: 2025-02-03 | End: 2025-02-05

## 2025-02-03 NOTE — TELEPHONE ENCOUNTER
Patient contacted the office in regards to being seen at the hospital on 1/31. Patient states she was doing better and then Saturday she started back up on the diarrhea, patient is requesting something strong to help with the diarrhea.         Please advise.

## 2025-02-05 ENCOUNTER — HOSPITAL ENCOUNTER (OUTPATIENT)
Age: 60
Discharge: HOME | End: 2025-02-05
Payer: MEDICARE

## 2025-02-05 LAB
ALBUMIN SERPL-MCNC: 4 G/DL (ref 3.5–5.2)
ALBUMIN/GLOB SERPL: 1.8 {RATIO} (ref 1–2.5)
ALP SERPL-CCNC: 106 U/L (ref 35–104)
ALT SERPL-CCNC: 61 U/L (ref 10–35)
ANION GAP SERPL CALCULATED.3IONS-SCNC: 11 MMOL/L (ref 9–16)
AST SERPL-CCNC: 35 U/L (ref 10–35)
BASOPHILS # BLD: 0 K/UL (ref 0–0.2)
BASOPHILS NFR BLD: 0 % (ref 0–2)
BILIRUB DIRECT SERPL-MCNC: <0.2 MG/DL (ref 0–0.3)
BILIRUB INDIRECT SERPL-MCNC: ABNORMAL MG/DL (ref 0–1)
BILIRUB SERPL-MCNC: 0.4 MG/DL (ref 0–1.2)
BUN SERPL-MCNC: 21 MG/DL (ref 6–20)
CALCIUM SERPL-MCNC: 9.1 MG/DL (ref 8.6–10.4)
CHLORIDE SERPL-SCNC: 104 MMOL/L (ref 98–107)
CO2 SERPL-SCNC: 24 MMOL/L (ref 20–31)
CREAT SERPL-MCNC: 1.9 MG/DL (ref 0.5–0.9)
EOSINOPHIL # BLD: 0.1 K/UL (ref 0–0.44)
EOSINOPHILS RELATIVE PERCENT: 1 % (ref 1–4)
ERYTHROCYTE [DISTWIDTH] IN BLOOD BY AUTOMATED COUNT: 13.3 % (ref 11.8–14.4)
GFR, ESTIMATED: 30 ML/MIN/1.73M2
GLUCOSE SERPL-MCNC: 99 MG/DL (ref 74–99)
HCT VFR BLD AUTO: 39.1 % (ref 36.3–47.1)
HGB BLD-MCNC: 12.5 G/DL (ref 11.9–15.1)
IMM GRANULOCYTES # BLD AUTO: 0.78 K/UL (ref 0–0.3)
IMM GRANULOCYTES NFR BLD: 8 %
LYMPHOCYTES NFR BLD: 0.69 K/UL (ref 1.1–3.7)
LYMPHOCYTES RELATIVE PERCENT: 7 % (ref 24–43)
MAGNESIUM SERPL-MCNC: 1.5 MG/DL (ref 1.6–2.6)
MCH RBC QN AUTO: 29.6 PG (ref 25.2–33.5)
MCHC RBC AUTO-ENTMCNC: 32 G/DL (ref 28.4–34.8)
MCV RBC AUTO: 92.7 FL (ref 82.6–102.9)
MONOCYTES NFR BLD: 0.69 K/UL (ref 0.1–1.2)
MONOCYTES NFR BLD: 7 % (ref 3–12)
NEUTROPHILS NFR BLD: 77 % (ref 36–65)
NEUTS SEG NFR BLD: 7.54 K/UL (ref 1.5–8.1)
NRBC BLD-RTO: 0 PER 100 WBC
PHOSPHATE SERPL-MCNC: 3.8 MG/DL (ref 2.5–4.5)
PLATELET # BLD AUTO: 281 K/UL (ref 138–453)
PMV BLD AUTO: 10.3 FL (ref 8.1–13.5)
POTASSIUM SERPL-SCNC: 4.1 MMOL/L (ref 3.7–5.3)
PROT SERPL-MCNC: 6.2 G/DL (ref 6.6–8.7)
RBC # BLD AUTO: 4.22 M/UL (ref 3.95–5.11)
SODIUM SERPL-SCNC: 139 MMOL/L (ref 136–145)
URATE SERPL-MCNC: 5.7 MG/DL (ref 2.4–5.7)
WBC OTHER # BLD: 9.8 K/UL (ref 3.5–11.3)

## 2025-02-05 PROCEDURE — 80053 COMPREHEN METABOLIC PANEL: CPT

## 2025-02-05 PROCEDURE — 84100 ASSAY OF PHOSPHORUS: CPT

## 2025-02-05 PROCEDURE — 84550 ASSAY OF BLOOD/URIC ACID: CPT

## 2025-02-05 PROCEDURE — 82248 BILIRUBIN DIRECT: CPT

## 2025-02-05 PROCEDURE — 36415 COLL VENOUS BLD VENIPUNCTURE: CPT

## 2025-02-05 PROCEDURE — 85025 COMPLETE CBC W/AUTO DIFF WBC: CPT

## 2025-02-05 PROCEDURE — 83735 ASSAY OF MAGNESIUM: CPT

## 2025-02-05 PROCEDURE — 80197 ASSAY OF TACROLIMUS: CPT

## 2025-02-06 ENCOUNTER — TELEPHONE (OUTPATIENT)
Dept: PRIMARY CARE CLINIC | Age: 60
End: 2025-02-06

## 2025-02-06 ENCOUNTER — HOSPITAL ENCOUNTER (OUTPATIENT)
Age: 60
Setting detail: SPECIMEN
Discharge: HOME | End: 2025-02-06
Payer: MEDICARE

## 2025-02-06 LAB
C DIFF GDH + TOXINS A+B STL QL IA.RAPID: NEGATIVE
SPECIMEN DESCRIPTION: NORMAL

## 2025-02-06 PROCEDURE — 87324 CLOSTRIDIUM AG IA: CPT

## 2025-02-06 PROCEDURE — 87449 NOS EACH ORGANISM AG IA: CPT

## 2025-02-06 NOTE — TELEPHONE ENCOUNTER
Patients  contacted the office to notify ORSA Vargas that Kathrin had been tested for C. Diff and it had came back negative.

## 2025-02-08 LAB — TACROLIMUS BLD-MCNC: 14 NG/ML

## 2025-02-17 ENCOUNTER — HOSPITAL ENCOUNTER (OUTPATIENT)
Age: 60
Discharge: HOME | End: 2025-02-17
Payer: MEDICARE

## 2025-02-17 PROCEDURE — 36415 COLL VENOUS BLD VENIPUNCTURE: CPT

## 2025-02-17 PROCEDURE — 80197 ASSAY OF TACROLIMUS: CPT

## 2025-02-19 LAB — TACROLIMUS BLD-MCNC: 10.1 NG/ML

## 2025-02-21 ENCOUNTER — HOSPITAL ENCOUNTER (OUTPATIENT)
Age: 60
Discharge: HOME | End: 2025-02-21
Payer: MEDICARE

## 2025-02-21 LAB
ALBUMIN SERPL-MCNC: 3.9 G/DL (ref 3.5–5.2)
ALBUMIN/GLOB SERPL: 2 {RATIO} (ref 1–2.5)
ALP SERPL-CCNC: 171 U/L (ref 35–104)
ALT SERPL-CCNC: 40 U/L (ref 10–35)
ANION GAP SERPL CALCULATED.3IONS-SCNC: 9 MMOL/L (ref 9–16)
AST SERPL-CCNC: 28 U/L (ref 10–35)
BASOPHILS # BLD: 0.1 K/UL (ref 0–0.2)
BASOPHILS NFR BLD: 1 % (ref 0–2)
BILIRUB DIRECT SERPL-MCNC: <0.2 MG/DL (ref 0–0.3)
BILIRUB INDIRECT SERPL-MCNC: ABNORMAL MG/DL (ref 0–1)
BILIRUB SERPL-MCNC: 0.4 MG/DL (ref 0–1.2)
BUN SERPL-MCNC: 19 MG/DL (ref 6–20)
CALCIUM SERPL-MCNC: 9.4 MG/DL (ref 8.6–10.4)
CHLORIDE SERPL-SCNC: 103 MMOL/L (ref 98–107)
CHOLEST SERPL-MCNC: 134 MG/DL (ref 0–199)
CHOLESTEROL/HDL RATIO: 3.1
CO2 SERPL-SCNC: 29 MMOL/L (ref 20–31)
CREAT SERPL-MCNC: 1.6 MG/DL (ref 0.5–0.9)
EOSINOPHIL # BLD: 0.1 K/UL (ref 0–0.44)
EOSINOPHILS RELATIVE PERCENT: 1 % (ref 1–4)
ERYTHROCYTE [DISTWIDTH] IN BLOOD BY AUTOMATED COUNT: 13.4 % (ref 11.8–14.4)
EST. AVERAGE GLUCOSE BLD GHB EST-MCNC: 120 MG/DL
GFR, ESTIMATED: 38 ML/MIN/1.73M2
GLUCOSE SERPL-MCNC: 106 MG/DL (ref 74–99)
HBA1C MFR BLD: 5.8 % (ref 4–6)
HCT VFR BLD AUTO: 38.8 % (ref 36.3–47.1)
HDLC SERPL-MCNC: 43 MG/DL
HGB BLD-MCNC: 12.2 G/DL (ref 11.9–15.1)
IMM GRANULOCYTES # BLD AUTO: 0.29 K/UL (ref 0–0.3)
IMM GRANULOCYTES NFR BLD: 3 %
LDLC SERPL CALC-MCNC: 62 MG/DL (ref 0–100)
LYMPHOCYTES NFR BLD: 0.78 K/UL (ref 1.1–3.7)
LYMPHOCYTES RELATIVE PERCENT: 8 % (ref 24–43)
MAGNESIUM SERPL-MCNC: 2 MG/DL (ref 1.6–2.6)
MCH RBC QN AUTO: 29.8 PG (ref 25.2–33.5)
MCHC RBC AUTO-ENTMCNC: 31.4 G/DL (ref 28.4–34.8)
MCV RBC AUTO: 94.6 FL (ref 82.6–102.9)
MONOCYTES NFR BLD: 0.49 K/UL (ref 0.1–1.2)
MONOCYTES NFR BLD: 5 % (ref 3–12)
MORPHOLOGY: NORMAL
NEUTROPHILS NFR BLD: 82 % (ref 36–65)
NEUTS SEG NFR BLD: 7.94 K/UL (ref 1.5–8.1)
NRBC BLD-RTO: 0 PER 100 WBC
PHOSPHATE SERPL-MCNC: 4.4 MG/DL (ref 2.5–4.5)
PLATELET # BLD AUTO: 249 K/UL (ref 138–453)
PMV BLD AUTO: 9.8 FL (ref 8.1–13.5)
POTASSIUM SERPL-SCNC: 5.1 MMOL/L (ref 3.7–5.3)
PROT SERPL-MCNC: 5.9 G/DL (ref 6.6–8.7)
RBC # BLD AUTO: 4.1 M/UL (ref 3.95–5.11)
SODIUM SERPL-SCNC: 141 MMOL/L (ref 136–145)
TRIGL SERPL-MCNC: 146 MG/DL
URATE SERPL-MCNC: 5.3 MG/DL (ref 2.4–5.7)
VLDLC SERPL CALC-MCNC: 29 MG/DL (ref 1–30)
WBC OTHER # BLD: 9.7 K/UL (ref 3.5–11.3)

## 2025-02-21 PROCEDURE — 82248 BILIRUBIN DIRECT: CPT

## 2025-02-21 PROCEDURE — 83036 HEMOGLOBIN GLYCOSYLATED A1C: CPT

## 2025-02-21 PROCEDURE — 83735 ASSAY OF MAGNESIUM: CPT

## 2025-02-21 PROCEDURE — 80061 LIPID PANEL: CPT

## 2025-02-21 PROCEDURE — 36415 COLL VENOUS BLD VENIPUNCTURE: CPT

## 2025-02-21 PROCEDURE — 85025 COMPLETE CBC W/AUTO DIFF WBC: CPT

## 2025-02-21 PROCEDURE — 80053 COMPREHEN METABOLIC PANEL: CPT

## 2025-02-21 PROCEDURE — 84550 ASSAY OF BLOOD/URIC ACID: CPT

## 2025-02-21 PROCEDURE — 84100 ASSAY OF PHOSPHORUS: CPT

## 2025-02-21 PROCEDURE — 80197 ASSAY OF TACROLIMUS: CPT

## 2025-02-23 LAB — TACROLIMUS BLD-MCNC: 10.4 NG/ML

## 2025-02-24 ENCOUNTER — OFFICE VISIT (OUTPATIENT)
Dept: PRIMARY CARE CLINIC | Age: 60
End: 2025-02-24

## 2025-02-24 VITALS
TEMPERATURE: 97.6 F | HEART RATE: 73 BPM | BODY MASS INDEX: 45.58 KG/M2 | SYSTOLIC BLOOD PRESSURE: 106 MMHG | OXYGEN SATURATION: 97 % | WEIGHT: 249.2 LBS | RESPIRATION RATE: 16 BRPM | DIASTOLIC BLOOD PRESSURE: 64 MMHG

## 2025-02-24 DIAGNOSIS — J02.9 PHARYNGITIS, UNSPECIFIED ETIOLOGY: ICD-10-CM

## 2025-02-24 DIAGNOSIS — H66.002 NON-RECURRENT ACUTE SUPPURATIVE OTITIS MEDIA OF LEFT EAR WITHOUT SPONTANEOUS RUPTURE OF TYMPANIC MEMBRANE: Primary | ICD-10-CM

## 2025-02-24 DIAGNOSIS — H92.03 EAR PAIN, BILATERAL: ICD-10-CM

## 2025-02-24 DIAGNOSIS — H61.23 BILATERAL IMPACTED CERUMEN: ICD-10-CM

## 2025-02-24 RX ORDER — AZITHROMYCIN 250 MG/1
TABLET, FILM COATED ORAL
Qty: 6 TABLET | Refills: 0 | Status: SHIPPED | OUTPATIENT
Start: 2025-02-24 | End: 2025-03-06

## 2025-02-24 SDOH — ECONOMIC STABILITY: FOOD INSECURITY: WITHIN THE PAST 12 MONTHS, YOU WORRIED THAT YOUR FOOD WOULD RUN OUT BEFORE YOU GOT MONEY TO BUY MORE.: NEVER TRUE

## 2025-02-24 SDOH — ECONOMIC STABILITY: FOOD INSECURITY: WITHIN THE PAST 12 MONTHS, THE FOOD YOU BOUGHT JUST DIDN'T LAST AND YOU DIDN'T HAVE MONEY TO GET MORE.: NEVER TRUE

## 2025-02-24 ASSESSMENT — PATIENT HEALTH QUESTIONNAIRE - PHQ9
7. TROUBLE CONCENTRATING ON THINGS, SUCH AS READING THE NEWSPAPER OR WATCHING TELEVISION: NOT AT ALL
2. FEELING DOWN, DEPRESSED OR HOPELESS: NOT AT ALL
SUM OF ALL RESPONSES TO PHQ QUESTIONS 1-9: 0
3. TROUBLE FALLING OR STAYING ASLEEP: NOT AT ALL
10. IF YOU CHECKED OFF ANY PROBLEMS, HOW DIFFICULT HAVE THESE PROBLEMS MADE IT FOR YOU TO DO YOUR WORK, TAKE CARE OF THINGS AT HOME, OR GET ALONG WITH OTHER PEOPLE: NOT DIFFICULT AT ALL
SUM OF ALL RESPONSES TO PHQ QUESTIONS 1-9: 0
SUM OF ALL RESPONSES TO PHQ QUESTIONS 1-9: 0
8. MOVING OR SPEAKING SO SLOWLY THAT OTHER PEOPLE COULD HAVE NOTICED. OR THE OPPOSITE, BEING SO FIGETY OR RESTLESS THAT YOU HAVE BEEN MOVING AROUND A LOT MORE THAN USUAL: NOT AT ALL
4. FEELING TIRED OR HAVING LITTLE ENERGY: NOT AT ALL
SUM OF ALL RESPONSES TO PHQ QUESTIONS 1-9: 0
5. POOR APPETITE OR OVEREATING: NOT AT ALL
1. LITTLE INTEREST OR PLEASURE IN DOING THINGS: NOT AT ALL
6. FEELING BAD ABOUT YOURSELF - OR THAT YOU ARE A FAILURE OR HAVE LET YOURSELF OR YOUR FAMILY DOWN: NOT AT ALL
9. THOUGHTS THAT YOU WOULD BE BETTER OFF DEAD, OR OF HURTING YOURSELF: NOT AT ALL
SUM OF ALL RESPONSES TO PHQ9 QUESTIONS 1 & 2: 0

## 2025-02-24 ASSESSMENT — ENCOUNTER SYMPTOMS
SORE THROAT: 1
EYES NEGATIVE: 1
ALLERGIC/IMMUNOLOGIC NEGATIVE: 1
COUGH: 1

## 2025-02-24 NOTE — PATIENT INSTRUCTIONS
SURVEY:     You may be receiving a survey from [facility] [employer] regarding your visit today.     Please complete the survey to enable us to provide the highest quality of care to you and your family.     If you cannot score us a very good on any question, please call the office to discuss how we could have made your experience a very good one.     Thank you,    Demetrius Vargas, APRN-CNP  Cony Chen, APRN-CNP  Corinne, LPN  Eva, CMA  Zelalem, CMA  Edwina, CMA  Filomena, PCA  Lindsey, CMA  Mildred, PM

## 2025-02-24 NOTE — PROGRESS NOTES
[facility]  CURLY VELA CNP  [facility] PRIMARY CARE  [address] [place] [address]  Dept: [phone]  Dept Fax: [phone]      Name: Kathrin Carmichael  : 1965         Chief Complaint:     Chief Complaint   Patient presents with    Sore Throat     X 2 days.     Generalized Body Aches     X 2 days.     Ear Pain     X 2 days.        History of Present Illness:      Kathrin Carmichael is a 59 y.o.  female who presents with Sore Throat (X 2 days. ), Generalized Body Aches (X 2 days. ), and Ear Pain (X 2 days. )      HPI  Kathrin is here today for a sick visit.  Symptoms started 2 days ago and feeling worse today.  She has body aches and sore throat.  She has fatigue.  Denies a temperature at home.  She has rhinorrhea and bilateral ear pain.  She has a dry cough at night.  She states her symptoms are worse at night.  She has had headaches for the last 3 days.  She has taken tylenol at home with minimal improvement.      Past Medical History:     Past Medical History:   Diagnosis Date    JESS (acute kidney injury)     Anemia     Arthritis     Chronic kidney disease     Depression with anxiety 2016    Dialysis patient 4/15/2022    GERD (gastroesophageal reflux disease)     H/O echocardiogram 2016    EF 55%. Mild mitral regurgitation.    H/O tooth extraction 2017    Lower posterior right tooth.    Hemodialysis patient     History of blood transfusion     x3. Last one in May 2016    Hyperlipidemia     Hypertensive crisis 2016    Kidney stones     Sciatica     Vasculitis       Reviewed all health maintenance requirements and ordered appropriate tests  There are no preventive care reminders to display for this patient.    Past Surgical History:     Past Surgical History:   Procedure Laterality Date    BONE MARROW BIOPSY  2016    Per Hematologist order @ [facility].    CHOLECYSTECTOMY      COLONOSCOPY  2016    COLONOSCOPY N/A 10/14/2021    COLONOSCOPY BIOPSY/STOMA performed by

## 2025-03-03 ENCOUNTER — HOSPITAL ENCOUNTER (OUTPATIENT)
Age: 60
Discharge: HOME | End: 2025-03-03
Payer: MEDICARE

## 2025-03-03 LAB
ALBUMIN SERPL-MCNC: 3.8 G/DL (ref 3.5–5.2)
ALBUMIN/GLOB SERPL: 1.8 {RATIO} (ref 1–2.5)
ALP SERPL-CCNC: 144 U/L (ref 35–104)
ALT SERPL-CCNC: 33 U/L (ref 10–35)
ANION GAP SERPL CALCULATED.3IONS-SCNC: 9 MMOL/L (ref 9–16)
AST SERPL-CCNC: 36 U/L (ref 10–35)
BASOPHILS # BLD: 0.1 K/UL (ref 0–0.2)
BASOPHILS NFR BLD: 1 % (ref 0–2)
BILIRUB DIRECT SERPL-MCNC: <0.2 MG/DL (ref 0–0.3)
BILIRUB INDIRECT SERPL-MCNC: ABNORMAL MG/DL (ref 0–1)
BILIRUB SERPL-MCNC: 0.3 MG/DL (ref 0–1.2)
BUN SERPL-MCNC: 22 MG/DL (ref 6–20)
CALCIUM SERPL-MCNC: 9.1 MG/DL (ref 8.6–10.4)
CHLORIDE SERPL-SCNC: 101 MMOL/L (ref 98–107)
CO2 SERPL-SCNC: 29 MMOL/L (ref 20–31)
CREAT SERPL-MCNC: 1.5 MG/DL (ref 0.5–0.9)
EOSINOPHIL # BLD: 0.1 K/UL (ref 0–0.44)
EOSINOPHILS RELATIVE PERCENT: 1 % (ref 1–4)
ERYTHROCYTE [DISTWIDTH] IN BLOOD BY AUTOMATED COUNT: 13.1 % (ref 11.8–14.4)
GFR, ESTIMATED: 39 ML/MIN/1.73M2
GLUCOSE SERPL-MCNC: 113 MG/DL (ref 74–99)
HCT VFR BLD AUTO: 38.5 % (ref 36.3–47.1)
HGB BLD-MCNC: 12.2 G/DL (ref 11.9–15.1)
IMM GRANULOCYTES # BLD AUTO: 0.67 K/UL (ref 0–0.3)
IMM GRANULOCYTES NFR BLD: 7 %
LYMPHOCYTES NFR BLD: 0.86 K/UL (ref 1.1–3.7)
LYMPHOCYTES RELATIVE PERCENT: 9 % (ref 24–43)
MAGNESIUM SERPL-MCNC: 1.7 MG/DL (ref 1.6–2.6)
MCH RBC QN AUTO: 30 PG (ref 25.2–33.5)
MCHC RBC AUTO-ENTMCNC: 31.7 G/DL (ref 28.4–34.8)
MCV RBC AUTO: 94.6 FL (ref 82.6–102.9)
MONOCYTES NFR BLD: 0.29 K/UL (ref 0.1–1.2)
MONOCYTES NFR BLD: 3 % (ref 3–12)
MORPHOLOGY: NORMAL
NEUTROPHILS NFR BLD: 79 % (ref 36–65)
NEUTS SEG NFR BLD: 7.58 K/UL (ref 1.5–8.1)
NRBC BLD-RTO: 0 PER 100 WBC
PHOSPHATE SERPL-MCNC: 3.2 MG/DL (ref 2.5–4.5)
PLATELET # BLD AUTO: 251 K/UL (ref 138–453)
PMV BLD AUTO: 9.8 FL (ref 8.1–13.5)
POTASSIUM SERPL-SCNC: 4.3 MMOL/L (ref 3.7–5.3)
PROT SERPL-MCNC: 6 G/DL (ref 6.6–8.7)
RBC # BLD AUTO: 4.07 M/UL (ref 3.95–5.11)
SODIUM SERPL-SCNC: 139 MMOL/L (ref 136–145)
URATE SERPL-MCNC: 5.2 MG/DL (ref 2.4–5.7)
WBC OTHER # BLD: 9.6 K/UL (ref 3.5–11.3)

## 2025-03-03 PROCEDURE — 85025 COMPLETE CBC W/AUTO DIFF WBC: CPT

## 2025-03-03 PROCEDURE — 82248 BILIRUBIN DIRECT: CPT

## 2025-03-03 PROCEDURE — 80197 ASSAY OF TACROLIMUS: CPT

## 2025-03-03 PROCEDURE — 36415 COLL VENOUS BLD VENIPUNCTURE: CPT

## 2025-03-03 PROCEDURE — 80053 COMPREHEN METABOLIC PANEL: CPT

## 2025-03-03 PROCEDURE — 84100 ASSAY OF PHOSPHORUS: CPT

## 2025-03-03 PROCEDURE — 83735 ASSAY OF MAGNESIUM: CPT

## 2025-03-03 PROCEDURE — 84550 ASSAY OF BLOOD/URIC ACID: CPT

## 2025-03-05 LAB — TACROLIMUS BLD-MCNC: 7.3 NG/ML

## 2025-03-17 ENCOUNTER — HOSPITAL ENCOUNTER (OUTPATIENT)
Age: 60
Discharge: HOME | End: 2025-03-17
Payer: MEDICARE

## 2025-03-17 LAB
ALBUMIN SERPL-MCNC: 3.7 G/DL (ref 3.5–5.2)
ALBUMIN/GLOB SERPL: 1.5 {RATIO} (ref 1–2.5)
ALP SERPL-CCNC: 148 U/L (ref 35–104)
ALT SERPL-CCNC: 20 U/L (ref 10–35)
ANION GAP SERPL CALCULATED.3IONS-SCNC: 11 MMOL/L (ref 9–16)
AST SERPL-CCNC: 19 U/L (ref 10–35)
BASOPHILS # BLD: 0.06 K/UL (ref 0–0.2)
BASOPHILS NFR BLD: 1 % (ref 0–2)
BILIRUB DIRECT SERPL-MCNC: <0.2 MG/DL (ref 0–0.3)
BILIRUB INDIRECT SERPL-MCNC: ABNORMAL MG/DL (ref 0–1)
BILIRUB SERPL-MCNC: 0.5 MG/DL (ref 0–1.2)
BUN SERPL-MCNC: 22 MG/DL (ref 6–20)
CALCIUM SERPL-MCNC: 9.3 MG/DL (ref 8.6–10.4)
CHLORIDE SERPL-SCNC: 100 MMOL/L (ref 98–107)
CO2 SERPL-SCNC: 29 MMOL/L (ref 20–31)
CREAT SERPL-MCNC: 1.2 MG/DL (ref 0.5–0.9)
EOSINOPHIL # BLD: 0.11 K/UL (ref 0–0.44)
EOSINOPHILS RELATIVE PERCENT: 1 % (ref 1–4)
ERYTHROCYTE [DISTWIDTH] IN BLOOD BY AUTOMATED COUNT: 14 % (ref 11.8–14.4)
GFR, ESTIMATED: 51 ML/MIN/1.73M2
GLUCOSE SERPL-MCNC: 127 MG/DL (ref 74–99)
HCT VFR BLD AUTO: 39.8 % (ref 36.3–47.1)
HGB BLD-MCNC: 12.6 G/DL (ref 11.9–15.1)
IMM GRANULOCYTES # BLD AUTO: 0.05 K/UL (ref 0–0.3)
IMM GRANULOCYTES NFR BLD: 1 %
LYMPHOCYTES NFR BLD: 0.79 K/UL (ref 1.1–3.7)
LYMPHOCYTES RELATIVE PERCENT: 8 % (ref 24–43)
MAGNESIUM SERPL-MCNC: 1.5 MG/DL (ref 1.6–2.6)
MCH RBC QN AUTO: 30.1 PG (ref 25.2–33.5)
MCHC RBC AUTO-ENTMCNC: 31.7 G/DL (ref 28.4–34.8)
MCV RBC AUTO: 95 FL (ref 82.6–102.9)
MONOCYTES NFR BLD: 0.68 K/UL (ref 0.1–1.2)
MONOCYTES NFR BLD: 6 % (ref 3–12)
NEUTROPHILS NFR BLD: 83 % (ref 36–65)
NEUTS SEG NFR BLD: 8.88 K/UL (ref 1.5–8.1)
NRBC BLD-RTO: 0 PER 100 WBC
PHOSPHATE SERPL-MCNC: 4.2 MG/DL (ref 2.5–4.5)
PLATELET # BLD AUTO: 273 K/UL (ref 138–453)
PMV BLD AUTO: 9.9 FL (ref 8.1–13.5)
POTASSIUM SERPL-SCNC: 4.1 MMOL/L (ref 3.7–5.3)
PROT SERPL-MCNC: 6.1 G/DL (ref 6.6–8.7)
RBC # BLD AUTO: 4.19 M/UL (ref 3.95–5.11)
SODIUM SERPL-SCNC: 140 MMOL/L (ref 136–145)
URATE SERPL-MCNC: 5.4 MG/DL (ref 2.4–5.7)
WBC OTHER # BLD: 10.6 K/UL (ref 3.5–11.3)

## 2025-03-17 PROCEDURE — 84100 ASSAY OF PHOSPHORUS: CPT

## 2025-03-17 PROCEDURE — 83735 ASSAY OF MAGNESIUM: CPT

## 2025-03-17 PROCEDURE — 85025 COMPLETE CBC W/AUTO DIFF WBC: CPT

## 2025-03-17 PROCEDURE — 80053 COMPREHEN METABOLIC PANEL: CPT

## 2025-03-17 PROCEDURE — 84550 ASSAY OF BLOOD/URIC ACID: CPT

## 2025-03-17 PROCEDURE — 80197 ASSAY OF TACROLIMUS: CPT

## 2025-03-17 PROCEDURE — 82248 BILIRUBIN DIRECT: CPT

## 2025-03-17 PROCEDURE — 36415 COLL VENOUS BLD VENIPUNCTURE: CPT

## 2025-03-19 LAB — TACROLIMUS BLD-MCNC: 8.1 NG/ML

## 2025-04-01 ENCOUNTER — HOSPITAL ENCOUNTER (OUTPATIENT)
Age: 60
Discharge: HOME | End: 2025-04-01
Payer: MEDICARE

## 2025-04-01 LAB
ALBUMIN SERPL-MCNC: 4 G/DL (ref 3.5–5.2)
ALBUMIN/GLOB SERPL: 1.7 {RATIO} (ref 1–2.5)
ALP SERPL-CCNC: 131 U/L (ref 35–104)
ALT SERPL-CCNC: 20 U/L (ref 10–35)
ANION GAP SERPL CALCULATED.3IONS-SCNC: 13 MMOL/L (ref 9–16)
AST SERPL-CCNC: 17 U/L (ref 10–35)
BASOPHILS # BLD: 0.04 K/UL (ref 0–0.2)
BASOPHILS NFR BLD: 1 % (ref 0–2)
BILIRUB DIRECT SERPL-MCNC: <0.2 MG/DL (ref 0–0.3)
BILIRUB INDIRECT SERPL-MCNC: ABNORMAL MG/DL (ref 0–1)
BILIRUB SERPL-MCNC: 0.5 MG/DL (ref 0–1.2)
BUN SERPL-MCNC: 38 MG/DL (ref 6–20)
CALCIUM SERPL-MCNC: 9.4 MG/DL (ref 8.6–10.4)
CHLORIDE SERPL-SCNC: 102 MMOL/L (ref 98–107)
CHOLEST SERPL-MCNC: 217 MG/DL (ref 0–199)
CHOLESTEROL/HDL RATIO: 3.5
CO2 SERPL-SCNC: 26 MMOL/L (ref 20–31)
CREAT SERPL-MCNC: 1.4 MG/DL (ref 0.5–0.9)
EOSINOPHIL # BLD: 0.07 K/UL (ref 0–0.44)
EOSINOPHILS RELATIVE PERCENT: 1 % (ref 1–4)
ERYTHROCYTE [DISTWIDTH] IN BLOOD BY AUTOMATED COUNT: 14 % (ref 11.8–14.4)
EST. AVERAGE GLUCOSE BLD GHB EST-MCNC: 134 MG/DL
GFR, ESTIMATED: 43 ML/MIN/1.73M2
GLUCOSE SERPL-MCNC: 129 MG/DL (ref 74–99)
HBA1C MFR BLD: 6.3 % (ref 4–6)
HCT VFR BLD AUTO: 40.8 % (ref 36.3–47.1)
HDLC SERPL-MCNC: 62 MG/DL
HGB BLD-MCNC: 13.3 G/DL (ref 11.9–15.1)
IMM GRANULOCYTES # BLD AUTO: 0.03 K/UL (ref 0–0.3)
IMM GRANULOCYTES NFR BLD: 0 %
LDLC SERPL CALC-MCNC: 103 MG/DL (ref 0–100)
LYMPHOCYTES NFR BLD: 0.71 K/UL (ref 1.1–3.7)
LYMPHOCYTES RELATIVE PERCENT: 9 % (ref 24–43)
MAGNESIUM SERPL-MCNC: 2 MG/DL (ref 1.6–2.6)
MCH RBC QN AUTO: 30.8 PG (ref 25.2–33.5)
MCHC RBC AUTO-ENTMCNC: 32.6 G/DL (ref 28.4–34.8)
MCV RBC AUTO: 94.4 FL (ref 82.6–102.9)
MONOCYTES NFR BLD: 0.6 K/UL (ref 0.1–1.2)
MONOCYTES NFR BLD: 7 % (ref 3–12)
NEUTROPHILS NFR BLD: 82 % (ref 36–65)
NEUTS SEG NFR BLD: 6.74 K/UL (ref 1.5–8.1)
NRBC BLD-RTO: 0 PER 100 WBC
PHOSPHATE SERPL-MCNC: 4.3 MG/DL (ref 2.5–4.5)
PLATELET # BLD AUTO: 238 K/UL (ref 138–453)
PMV BLD AUTO: 9.6 FL (ref 8.1–13.5)
POTASSIUM SERPL-SCNC: 4.4 MMOL/L (ref 3.7–5.3)
PROT SERPL-MCNC: 6.3 G/DL (ref 6.6–8.7)
RBC # BLD AUTO: 4.32 M/UL (ref 3.95–5.11)
SODIUM SERPL-SCNC: 141 MMOL/L (ref 136–145)
TRIGL SERPL-MCNC: 262 MG/DL
URATE SERPL-MCNC: 5.9 MG/DL (ref 2.4–5.7)
VLDLC SERPL CALC-MCNC: 52 MG/DL (ref 1–30)
WBC OTHER # BLD: 8.2 K/UL (ref 3.5–11.3)

## 2025-04-01 PROCEDURE — 84100 ASSAY OF PHOSPHORUS: CPT

## 2025-04-01 PROCEDURE — 36415 COLL VENOUS BLD VENIPUNCTURE: CPT

## 2025-04-01 PROCEDURE — 84550 ASSAY OF BLOOD/URIC ACID: CPT

## 2025-04-01 PROCEDURE — 85025 COMPLETE CBC W/AUTO DIFF WBC: CPT

## 2025-04-01 PROCEDURE — 80197 ASSAY OF TACROLIMUS: CPT

## 2025-04-01 PROCEDURE — 80061 LIPID PANEL: CPT

## 2025-04-01 PROCEDURE — 80053 COMPREHEN METABOLIC PANEL: CPT

## 2025-04-01 PROCEDURE — 83036 HEMOGLOBIN GLYCOSYLATED A1C: CPT

## 2025-04-01 PROCEDURE — 83735 ASSAY OF MAGNESIUM: CPT

## 2025-04-01 PROCEDURE — 82248 BILIRUBIN DIRECT: CPT

## 2025-04-03 LAB — TACROLIMUS BLD-MCNC: 9.8 NG/ML

## 2025-04-15 ENCOUNTER — HOSPITAL ENCOUNTER (OUTPATIENT)
Age: 60
Discharge: HOME | End: 2025-04-15
Payer: MEDICARE

## 2025-04-15 LAB
ALBUMIN SERPL-MCNC: 3.8 G/DL (ref 3.5–5.2)
ALBUMIN/GLOB SERPL: 1.5 {RATIO} (ref 1–2.5)
ALP SERPL-CCNC: 115 U/L (ref 35–104)
ALT SERPL-CCNC: 18 U/L (ref 10–35)
ANION GAP SERPL CALCULATED.3IONS-SCNC: 10 MMOL/L (ref 9–16)
AST SERPL-CCNC: 19 U/L (ref 10–35)
BASOPHILS # BLD: 0.05 K/UL (ref 0–0.2)
BASOPHILS NFR BLD: 1 % (ref 0–2)
BILIRUB DIRECT SERPL-MCNC: <0.2 MG/DL (ref 0–0.3)
BILIRUB INDIRECT SERPL-MCNC: ABNORMAL MG/DL (ref 0–1)
BILIRUB SERPL-MCNC: 0.3 MG/DL (ref 0–1.2)
BUN SERPL-MCNC: 17 MG/DL (ref 6–20)
CALCIUM SERPL-MCNC: 10 MG/DL (ref 8.6–10.4)
CHLORIDE SERPL-SCNC: 104 MMOL/L (ref 98–107)
CO2 SERPL-SCNC: 30 MMOL/L (ref 20–31)
CREAT SERPL-MCNC: 1.4 MG/DL (ref 0.5–0.9)
EOSINOPHIL # BLD: 0.18 K/UL (ref 0–0.44)
EOSINOPHILS RELATIVE PERCENT: 3 % (ref 1–4)
ERYTHROCYTE [DISTWIDTH] IN BLOOD BY AUTOMATED COUNT: 13.8 % (ref 11.8–14.4)
GFR, ESTIMATED: 42 ML/MIN/1.73M2
GLUCOSE SERPL-MCNC: 138 MG/DL (ref 74–99)
HCT VFR BLD AUTO: 40.3 % (ref 36.3–47.1)
HGB BLD-MCNC: 13 G/DL (ref 11.9–15.1)
IMM GRANULOCYTES # BLD AUTO: 0.03 K/UL (ref 0–0.3)
IMM GRANULOCYTES NFR BLD: 0 %
LYMPHOCYTES NFR BLD: 0.68 K/UL (ref 1.1–3.7)
LYMPHOCYTES RELATIVE PERCENT: 9 % (ref 24–43)
MAGNESIUM SERPL-MCNC: 1.6 MG/DL (ref 1.6–2.6)
MCH RBC QN AUTO: 31.1 PG (ref 25.2–33.5)
MCHC RBC AUTO-ENTMCNC: 32.3 G/DL (ref 28.4–34.8)
MCV RBC AUTO: 96.4 FL (ref 82.6–102.9)
MONOCYTES NFR BLD: 0.73 K/UL (ref 0.1–1.2)
MONOCYTES NFR BLD: 10 % (ref 3–12)
NEUTROPHILS NFR BLD: 77 % (ref 36–65)
NEUTS SEG NFR BLD: 5.55 K/UL (ref 1.5–8.1)
NRBC BLD-RTO: 0 PER 100 WBC
PHOSPHATE SERPL-MCNC: 4 MG/DL (ref 2.5–4.5)
PLATELET # BLD AUTO: 254 K/UL (ref 138–453)
PMV BLD AUTO: 10.3 FL (ref 8.1–13.5)
POTASSIUM SERPL-SCNC: 5 MMOL/L (ref 3.7–5.3)
PROT SERPL-MCNC: 6.3 G/DL (ref 6.6–8.7)
RBC # BLD AUTO: 4.18 M/UL (ref 3.95–5.11)
SODIUM SERPL-SCNC: 144 MMOL/L (ref 136–145)
URATE SERPL-MCNC: 6.6 MG/DL (ref 2.4–5.7)
WBC OTHER # BLD: 7.2 K/UL (ref 3.5–11.3)

## 2025-04-15 PROCEDURE — 85025 COMPLETE CBC W/AUTO DIFF WBC: CPT

## 2025-04-15 PROCEDURE — 80053 COMPREHEN METABOLIC PANEL: CPT

## 2025-04-15 PROCEDURE — 82248 BILIRUBIN DIRECT: CPT

## 2025-04-15 PROCEDURE — 84550 ASSAY OF BLOOD/URIC ACID: CPT

## 2025-04-15 PROCEDURE — 80197 ASSAY OF TACROLIMUS: CPT

## 2025-04-15 PROCEDURE — 83735 ASSAY OF MAGNESIUM: CPT

## 2025-04-15 PROCEDURE — 36415 COLL VENOUS BLD VENIPUNCTURE: CPT

## 2025-04-15 PROCEDURE — 84100 ASSAY OF PHOSPHORUS: CPT

## 2025-04-17 LAB — TACROLIMUS BLD-MCNC: 8.1 NG/ML

## 2025-04-25 DIAGNOSIS — K21.9 GASTROESOPHAGEAL REFLUX DISEASE WITHOUT ESOPHAGITIS: ICD-10-CM

## 2025-04-25 RX ORDER — METOCLOPRAMIDE 5 MG/1
5 TABLET ORAL 2 TIMES DAILY
Qty: 180 TABLET | Refills: 0 | Status: SHIPPED | OUTPATIENT
Start: 2025-04-25

## 2025-04-25 RX ORDER — AZATHIOPRINE 50 MG/1
100 TABLET ORAL EVERY MORNING
COMMUNITY
Start: 2025-03-14

## 2025-04-25 RX ORDER — CARVEDILOL 25 MG/1
25 TABLET ORAL 2 TIMES DAILY
COMMUNITY
Start: 2025-04-13

## 2025-04-25 RX ORDER — PREDNISONE 20 MG/1
20 TABLET ORAL EVERY MORNING
COMMUNITY
Start: 2025-03-25

## 2025-04-25 NOTE — TELEPHONE ENCOUNTER
Health Maintenance   Topic Date Due    COVID-19 Vaccine (5 - 2024-25 season) 12/12/2025 (Originally 9/1/2024)    Annual Wellness Visit (Medicare)  05/24/2025    Flu vaccine (Season Ended) 08/01/2025    Depression Monitoring  02/24/2026    A1C test (Diabetic or Prediabetic)  04/01/2026    Lipids  04/01/2026    Breast cancer screen  05/06/2026    Pneumococcal 50+ years Vaccine (3 of 3 - PPSV23, PCV20 or PCV21) 05/14/2026    Colorectal Cancer Screen  10/14/2026    DTaP/Tdap/Td vaccine (2 - Td or Tdap) 09/06/2028    Hepatitis B vaccine  Completed    Shingles vaccine  Completed    Hepatitis C screen  Completed    HIV screen  Completed    Hepatitis A vaccine  Aged Out    Hib vaccine  Aged Out    Polio vaccine  Aged Out    Meningococcal (ACWY) vaccine  Aged Out    Meningococcal B vaccine  Aged Out    Pneumococcal 0-49 years Vaccine  Discontinued    Diabetes screen  Discontinued    Cervical cancer screen  Discontinued             (applicable per patient's age: Cancer Screenings, Depression Screening, Fall Risk Screening, Immunizations)    Hemoglobin A1C (%)   Date Value   04/01/2025 6.3 (H)   02/21/2025 5.8   12/04/2024 6.1 (H)     AST (U/L)   Date Value   04/15/2025 19     ALT (U/L)   Date Value   04/15/2025 18     BUN (mg/dL)   Date Value   04/15/2025 17      (goal A1C is < 7)   (goal LDL is <100) need 30-50% reduction from baseline     BP Readings from Last 3 Encounters:   02/24/25 106/64   01/31/25 (!) 148/75   01/19/25 (!) 129/56    (goal /80)      All Future Testing planned in [facility]:  Lab Frequency Next Occurrence   Transfuse RBC TRANSFUSE 1 UNIT    EKG 12 lead - PRN for Chest Pain or symptoms of ACS PRN        Next Visit Date:  Future Appointments   Date Time Provider Department Center   5/19/2025 11:20 AM Braulio Goodwin MD [facility] -   5/27/2025 10:40 AM Demetrius Vargas, APRN - CNP [facility] E[facility] DEP            Patient Active Problem List:     JESS (acute kidney injury)

## 2025-04-29 ENCOUNTER — HOSPITAL ENCOUNTER (OUTPATIENT)
Age: 60
Discharge: HOME | End: 2025-04-29
Payer: MEDICARE

## 2025-04-29 LAB
ALBUMIN SERPL-MCNC: 3.8 G/DL (ref 3.5–5.2)
ALBUMIN/GLOB SERPL: 1.5 {RATIO} (ref 1–2.5)
ALP SERPL-CCNC: 122 U/L (ref 35–104)
ALT SERPL-CCNC: 16 U/L (ref 10–35)
ANION GAP SERPL CALCULATED.3IONS-SCNC: 11 MMOL/L (ref 9–16)
AST SERPL-CCNC: 20 U/L (ref 10–35)
BASOPHILS # BLD: 0.07 K/UL (ref 0–0.2)
BASOPHILS NFR BLD: 1 % (ref 0–2)
BILIRUB DIRECT SERPL-MCNC: <0.2 MG/DL (ref 0–0.3)
BILIRUB INDIRECT SERPL-MCNC: ABNORMAL MG/DL (ref 0–1)
BILIRUB SERPL-MCNC: 0.4 MG/DL (ref 0–1.2)
BUN SERPL-MCNC: 19 MG/DL (ref 6–20)
CALCIUM SERPL-MCNC: 9.5 MG/DL (ref 8.6–10.4)
CHLORIDE SERPL-SCNC: 105 MMOL/L (ref 98–107)
CO2 SERPL-SCNC: 28 MMOL/L (ref 20–31)
CREAT SERPL-MCNC: 1.3 MG/DL (ref 0.5–0.9)
EOSINOPHIL # BLD: 0.1 K/UL (ref 0–0.44)
EOSINOPHILS RELATIVE PERCENT: 1 % (ref 1–4)
ERYTHROCYTE [DISTWIDTH] IN BLOOD BY AUTOMATED COUNT: 13.9 % (ref 11.8–14.4)
GFR, ESTIMATED: 48 ML/MIN/1.73M2
GLUCOSE SERPL-MCNC: 136 MG/DL (ref 74–99)
HCT VFR BLD AUTO: 38.3 % (ref 36.3–47.1)
HGB BLD-MCNC: 12.4 G/DL (ref 11.9–15.1)
IMM GRANULOCYTES # BLD AUTO: 0.04 K/UL (ref 0–0.3)
IMM GRANULOCYTES NFR BLD: 1 %
LYMPHOCYTES NFR BLD: 0.7 K/UL (ref 1.1–3.7)
LYMPHOCYTES RELATIVE PERCENT: 10 % (ref 24–43)
MAGNESIUM SERPL-MCNC: 1.6 MG/DL (ref 1.6–2.6)
MCH RBC QN AUTO: 31.3 PG (ref 25.2–33.5)
MCHC RBC AUTO-ENTMCNC: 32.4 G/DL (ref 28.4–34.8)
MCV RBC AUTO: 96.7 FL (ref 82.6–102.9)
MONOCYTES NFR BLD: 0.6 K/UL (ref 0.1–1.2)
MONOCYTES NFR BLD: 8 % (ref 3–12)
NEUTROPHILS NFR BLD: 79 % (ref 36–65)
NEUTS SEG NFR BLD: 5.65 K/UL (ref 1.5–8.1)
NRBC BLD-RTO: 0 PER 100 WBC
PHOSPHATE SERPL-MCNC: 4 MG/DL (ref 2.5–4.5)
PLATELET # BLD AUTO: 260 K/UL (ref 138–453)
PMV BLD AUTO: 10 FL (ref 8.1–13.5)
POTASSIUM SERPL-SCNC: 4.4 MMOL/L (ref 3.7–5.3)
PROT SERPL-MCNC: 6.4 G/DL (ref 6.6–8.7)
RBC # BLD AUTO: 3.96 M/UL (ref 3.95–5.11)
SODIUM SERPL-SCNC: 144 MMOL/L (ref 136–145)
URATE SERPL-MCNC: 6.4 MG/DL (ref 2.4–5.7)
WBC OTHER # BLD: 7.2 K/UL (ref 3.5–11.3)

## 2025-04-29 PROCEDURE — 82248 BILIRUBIN DIRECT: CPT

## 2025-04-29 PROCEDURE — 84550 ASSAY OF BLOOD/URIC ACID: CPT

## 2025-04-29 PROCEDURE — 85025 COMPLETE CBC W/AUTO DIFF WBC: CPT

## 2025-04-29 PROCEDURE — 80197 ASSAY OF TACROLIMUS: CPT

## 2025-04-29 PROCEDURE — 83735 ASSAY OF MAGNESIUM: CPT

## 2025-04-29 PROCEDURE — 84100 ASSAY OF PHOSPHORUS: CPT

## 2025-04-29 PROCEDURE — 80053 COMPREHEN METABOLIC PANEL: CPT

## 2025-04-29 PROCEDURE — 36415 COLL VENOUS BLD VENIPUNCTURE: CPT

## 2025-05-01 ENCOUNTER — HOSPITAL ENCOUNTER (OUTPATIENT)
Age: 60
Discharge: HOME | End: 2025-05-01
Payer: MEDICARE

## 2025-05-01 ENCOUNTER — OFFICE VISIT (OUTPATIENT)
Dept: NEUROLOGY | Age: 60
End: 2025-05-01
Payer: MEDICARE

## 2025-05-01 VITALS
HEIGHT: 62 IN | SYSTOLIC BLOOD PRESSURE: 134 MMHG | BODY MASS INDEX: 47.7 KG/M2 | DIASTOLIC BLOOD PRESSURE: 64 MMHG | RESPIRATION RATE: 20 BRPM | HEART RATE: 83 BPM | WEIGHT: 259.2 LBS | TEMPERATURE: 95.6 F

## 2025-05-01 DIAGNOSIS — R41.3 MEMORY CHANGES: ICD-10-CM

## 2025-05-01 DIAGNOSIS — I67.1 ANEURYSM OF ANTERIOR CEREBRAL ARTERY: Primary | ICD-10-CM

## 2025-05-01 LAB
PTH-INTACT SERPL-MCNC: 90 PG/ML (ref 17.9–58.6)
TACROLIMUS BLD-MCNC: 8.1 NG/ML

## 2025-05-01 PROCEDURE — G8427 DOCREV CUR MEDS BY ELIG CLIN: HCPCS | Performed by: NEUROMUSCULOSKELETAL MEDICINE, SPORTS MEDICINE

## 2025-05-01 PROCEDURE — 1036F TOBACCO NON-USER: CPT | Performed by: NEUROMUSCULOSKELETAL MEDICINE, SPORTS MEDICINE

## 2025-05-01 PROCEDURE — 36415 COLL VENOUS BLD VENIPUNCTURE: CPT

## 2025-05-01 PROCEDURE — 3078F DIAST BP <80 MM HG: CPT | Performed by: NEUROMUSCULOSKELETAL MEDICINE, SPORTS MEDICINE

## 2025-05-01 PROCEDURE — 3017F COLORECTAL CA SCREEN DOC REV: CPT | Performed by: NEUROMUSCULOSKELETAL MEDICINE, SPORTS MEDICINE

## 2025-05-01 PROCEDURE — 99204 OFFICE O/P NEW MOD 45 MIN: CPT | Performed by: NEUROMUSCULOSKELETAL MEDICINE, SPORTS MEDICINE

## 2025-05-01 PROCEDURE — G8417 CALC BMI ABV UP PARAM F/U: HCPCS | Performed by: NEUROMUSCULOSKELETAL MEDICINE, SPORTS MEDICINE

## 2025-05-01 PROCEDURE — 83970 ASSAY OF PARATHORMONE: CPT

## 2025-05-01 PROCEDURE — 3075F SYST BP GE 130 - 139MM HG: CPT | Performed by: NEUROMUSCULOSKELETAL MEDICINE, SPORTS MEDICINE

## 2025-05-01 RX ORDER — PREDNISONE 10 MG/1
10 TABLET ORAL DAILY
COMMUNITY

## 2025-05-01 NOTE — PATIENT INSTRUCTIONS
SURVEY:    Thank you for allowing us to care for you today.    You may be receiving a survey from [facility] regarding your visit today- electronically or via mail.      Please help us by completing the survey as this will provide the needed feedback to ensure we are providing the very best care for you and your family.    If you cannot score us a very good on any question, please call the office to discuss how we could have made your experience a very good one.    Thank you.       STAFF:    Sylvia Alfred, Lidya MATHEW      CLINICAL STAFF:    Daisy DIAZ, Debo MATHEW, Elsy MATHEW, Damaris DIAZ

## 2025-05-01 NOTE — PROGRESS NOTES
81 MG chewable tablet Take 1 tablet by mouth daily      Incontinence Supply Disposable (PREVAIL FOR WOMEN X-LARGE) MISC 2 each by Does not apply route daily 60 each 5    vitamin D (CHOLECALCIFEROL) 1000 UNIT TABS tablet Take 2 tablets by mouth in the morning and 2 tablets in the evening.       No current facility-administered medications for this visit.      DATA:  Lab Results   Component Value Date    WBC 7.2 04/29/2025    HGB 12.4 04/29/2025     04/29/2025    CHOL 217 (H) 04/01/2025    TRIG 262 (H) 04/01/2025    HDL 62 04/01/2025    LDLDIRECT 149 10/18/2024    ALT 16 04/29/2025    AST 20 04/29/2025     04/29/2025    K 4.4 04/29/2025     04/29/2025    CREATININE 1.3 (H) 04/29/2025    BUN 19 04/29/2025    CO2 28 04/29/2025    TSH 0.876 08/08/2024    INR 0.9 01/27/2021    LABA1C 6.3 (H) 04/01/2025       /64 (BP Site: Left Upper Arm, Patient Position: Sitting, BP Cuff Size: Large Adult)   Pulse 83   Temp (!) 95.6 °F (35.3 °C) (Temporal)   Resp 20   Ht 1.575 m (5' 2\")   Wt 117.6 kg (259 lb 3.2 oz)   LMP 09/09/2016   BMI 47.41 kg/m²     NEUROLOGICAL EXAMINATION:     MENTAL STATUS: Normal    CRANIAL NERVES: Pupils are equal and reactive.  EOMS are complete  No abnormal eye movements.  No visual field defect.  Facial sensation is normal.  No facial weakness.  Hearing is normal.  Palate and tongue movements are normal. Shoulder shrug is symmetrical    MOTOR EXAMINATION: Muscle tone is normal .Strength is 5/5 in both upper limbs.  No lower extremities there is weakness of foot dorsiflexion and weakness in the extensor hallucis muscles bilaterally.  She is unable to walk or stand on her heels.  Mild postural tremors in both upper extremities.      SENSORY EXAMINATION: Stocking decreased sensation in both lower extremities.  Vibration and position sensation impaired bilaterally.    STRETCH REFLEXES: Symmetrical in both the upper and lower limbs.      GAIT: No ataxia    Romberg test is positive.

## 2025-05-20 ENCOUNTER — HOSPITAL ENCOUNTER (OUTPATIENT)
Age: 60
Discharge: HOME | End: 2025-05-20
Payer: MEDICARE

## 2025-05-20 LAB
ALBUMIN SERPL-MCNC: 4 G/DL (ref 3.5–5.2)
ALBUMIN/GLOB SERPL: 1.5 {RATIO} (ref 1–2.5)
ALP SERPL-CCNC: 118 U/L (ref 35–104)
ALT SERPL-CCNC: 15 U/L (ref 10–35)
ANION GAP SERPL CALCULATED.3IONS-SCNC: 11 MMOL/L (ref 9–16)
AST SERPL-CCNC: 20 U/L (ref 10–35)
BASOPHILS # BLD: 0.06 K/UL (ref 0–0.2)
BASOPHILS NFR BLD: 1 % (ref 0–2)
BILIRUB DIRECT SERPL-MCNC: <0.2 MG/DL (ref 0–0.3)
BILIRUB INDIRECT SERPL-MCNC: ABNORMAL MG/DL (ref 0–1)
BILIRUB SERPL-MCNC: 0.4 MG/DL (ref 0–1.2)
BUN SERPL-MCNC: 28 MG/DL (ref 6–20)
CALCIUM SERPL-MCNC: 9.6 MG/DL (ref 8.6–10.4)
CHLORIDE SERPL-SCNC: 104 MMOL/L (ref 98–107)
CO2 SERPL-SCNC: 28 MMOL/L (ref 20–31)
CREAT SERPL-MCNC: 1.5 MG/DL (ref 0.5–0.9)
EOSINOPHIL # BLD: 0.14 K/UL (ref 0–0.44)
EOSINOPHILS RELATIVE PERCENT: 2 % (ref 1–4)
ERYTHROCYTE [DISTWIDTH] IN BLOOD BY AUTOMATED COUNT: 13.7 % (ref 11.8–14.4)
EST. AVERAGE GLUCOSE BLD GHB EST-MCNC: 134 MG/DL
GFR, ESTIMATED: 39 ML/MIN/1.73M2
GLUCOSE SERPL-MCNC: 137 MG/DL (ref 74–99)
HBA1C MFR BLD: 6.3 % (ref 4–6)
HCT VFR BLD AUTO: 39.8 % (ref 36.3–47.1)
HGB BLD-MCNC: 13.2 G/DL (ref 11.9–15.1)
IMM GRANULOCYTES # BLD AUTO: 0.03 K/UL (ref 0–0.3)
IMM GRANULOCYTES NFR BLD: 0 %
LYMPHOCYTES NFR BLD: 0.75 K/UL (ref 1.1–3.7)
LYMPHOCYTES RELATIVE PERCENT: 11 % (ref 24–43)
MAGNESIUM SERPL-MCNC: 2 MG/DL (ref 1.6–2.6)
MCH RBC QN AUTO: 32 PG (ref 25.2–33.5)
MCHC RBC AUTO-ENTMCNC: 33.2 G/DL (ref 28.4–34.8)
MCV RBC AUTO: 96.6 FL (ref 82.6–102.9)
MONOCYTES NFR BLD: 0.56 K/UL (ref 0.1–1.2)
MONOCYTES NFR BLD: 8 % (ref 3–12)
NEUTROPHILS NFR BLD: 78 % (ref 36–65)
NEUTS SEG NFR BLD: 5.51 K/UL (ref 1.5–8.1)
NRBC BLD-RTO: 0 PER 100 WBC
PHOSPHATE SERPL-MCNC: 4.9 MG/DL (ref 2.5–4.5)
PLATELET # BLD AUTO: 257 K/UL (ref 138–453)
PMV BLD AUTO: 10.3 FL (ref 8.1–13.5)
POTASSIUM SERPL-SCNC: 4.4 MMOL/L (ref 3.7–5.3)
PROT SERPL-MCNC: 6.7 G/DL (ref 6.6–8.7)
RBC # BLD AUTO: 4.12 M/UL (ref 3.95–5.11)
SODIUM SERPL-SCNC: 143 MMOL/L (ref 136–145)
URATE SERPL-MCNC: 7.7 MG/DL (ref 2.4–5.7)
WBC OTHER # BLD: 7.1 K/UL (ref 3.5–11.3)

## 2025-05-20 PROCEDURE — 84550 ASSAY OF BLOOD/URIC ACID: CPT

## 2025-05-20 PROCEDURE — 36415 COLL VENOUS BLD VENIPUNCTURE: CPT

## 2025-05-20 PROCEDURE — 83735 ASSAY OF MAGNESIUM: CPT

## 2025-05-20 PROCEDURE — 85025 COMPLETE CBC W/AUTO DIFF WBC: CPT

## 2025-05-20 PROCEDURE — 82248 BILIRUBIN DIRECT: CPT

## 2025-05-20 PROCEDURE — 80053 COMPREHEN METABOLIC PANEL: CPT

## 2025-05-20 PROCEDURE — 80197 ASSAY OF TACROLIMUS: CPT

## 2025-05-20 PROCEDURE — 84100 ASSAY OF PHOSPHORUS: CPT

## 2025-05-20 PROCEDURE — 83036 HEMOGLOBIN GLYCOSYLATED A1C: CPT

## 2025-05-22 LAB — TACROLIMUS BLD-MCNC: 9.6 NG/ML

## 2025-05-27 ENCOUNTER — OFFICE VISIT (OUTPATIENT)
Dept: PRIMARY CARE CLINIC | Age: 60
End: 2025-05-27

## 2025-05-27 VITALS
OXYGEN SATURATION: 99 % | BODY MASS INDEX: 48.8 KG/M2 | WEIGHT: 265.2 LBS | HEIGHT: 62 IN | DIASTOLIC BLOOD PRESSURE: 74 MMHG | HEART RATE: 78 BPM | SYSTOLIC BLOOD PRESSURE: 132 MMHG | TEMPERATURE: 97.3 F

## 2025-05-27 DIAGNOSIS — N18.30 CONTROLLED TYPE 2 DIABETES MELLITUS WITH STAGE 3 CHRONIC KIDNEY DISEASE, WITHOUT LONG-TERM CURRENT USE OF INSULIN (HCC): ICD-10-CM

## 2025-05-27 DIAGNOSIS — I50.22 CHRONIC SYSTOLIC (CONGESTIVE) HEART FAILURE (HCC): ICD-10-CM

## 2025-05-27 DIAGNOSIS — I77.82 ANCA-ASSOCIATED VASCULITIS (HCC): ICD-10-CM

## 2025-05-27 DIAGNOSIS — E11.22 CONTROLLED TYPE 2 DIABETES MELLITUS WITH STAGE 3 CHRONIC KIDNEY DISEASE, WITHOUT LONG-TERM CURRENT USE OF INSULIN (HCC): ICD-10-CM

## 2025-05-27 DIAGNOSIS — Z00.00 MEDICARE ANNUAL WELLNESS VISIT, SUBSEQUENT: Primary | ICD-10-CM

## 2025-05-27 SDOH — ECONOMIC STABILITY: FOOD INSECURITY: WITHIN THE PAST 12 MONTHS, THE FOOD YOU BOUGHT JUST DIDN'T LAST AND YOU DIDN'T HAVE MONEY TO GET MORE.: NEVER TRUE

## 2025-05-27 SDOH — ECONOMIC STABILITY: FOOD INSECURITY: WITHIN THE PAST 12 MONTHS, YOU WORRIED THAT YOUR FOOD WOULD RUN OUT BEFORE YOU GOT MONEY TO BUY MORE.: NEVER TRUE

## 2025-05-27 ASSESSMENT — PATIENT HEALTH QUESTIONNAIRE - PHQ9
2. FEELING DOWN, DEPRESSED OR HOPELESS: NOT AT ALL
9. THOUGHTS THAT YOU WOULD BE BETTER OFF DEAD, OR OF HURTING YOURSELF: NOT AT ALL
8. MOVING OR SPEAKING SO SLOWLY THAT OTHER PEOPLE COULD HAVE NOTICED. OR THE OPPOSITE, BEING SO FIGETY OR RESTLESS THAT YOU HAVE BEEN MOVING AROUND A LOT MORE THAN USUAL: NOT AT ALL
SUM OF ALL RESPONSES TO PHQ QUESTIONS 1-9: 0
10. IF YOU CHECKED OFF ANY PROBLEMS, HOW DIFFICULT HAVE THESE PROBLEMS MADE IT FOR YOU TO DO YOUR WORK, TAKE CARE OF THINGS AT HOME, OR GET ALONG WITH OTHER PEOPLE: NOT DIFFICULT AT ALL
SUM OF ALL RESPONSES TO PHQ QUESTIONS 1-9: 0
3. TROUBLE FALLING OR STAYING ASLEEP: NOT AT ALL
6. FEELING BAD ABOUT YOURSELF - OR THAT YOU ARE A FAILURE OR HAVE LET YOURSELF OR YOUR FAMILY DOWN: NOT AT ALL
7. TROUBLE CONCENTRATING ON THINGS, SUCH AS READING THE NEWSPAPER OR WATCHING TELEVISION: NOT AT ALL
5. POOR APPETITE OR OVEREATING: NOT AT ALL
4. FEELING TIRED OR HAVING LITTLE ENERGY: NOT AT ALL
1. LITTLE INTEREST OR PLEASURE IN DOING THINGS: NOT AT ALL

## 2025-05-27 ASSESSMENT — ENCOUNTER SYMPTOMS
SHORTNESS OF BREATH: 0
ABDOMINAL PAIN: 0
VISUAL CHANGE: 0

## 2025-05-27 NOTE — PROGRESS NOTES
Medicare Annual Wellness Visit    Kathrin Carmichael is here for Medicare AWV (Awv and check up .), Hypertension, Hyperlipidemia, and Gastroesophageal Reflux    Assessment & Plan   Medicare annual wellness visit, subsequent  Controlled type 2 diabetes mellitus with stage 3 chronic kidney disease, without long-term current use of insulin (HCC)  -     Tirzepatide (MOUNJARO) 2.5 MG/0.5ML SOAJ pen; Inject 2.5 mg into the skin every 7 days, Disp-2 mL, R-0Normal  -     CBC with Auto Differential; Future  -     ALT; Future  -     AST; Future  -     Basic Metabolic Panel; Future  -     Hemoglobin A1C; Future  -     Lipid Panel; Future  -     Albumin/Creatinine Ratio, Urine; Future  Chronic systolic (congestive) heart failure (HCC)  ANCA-associated vasculitis (HCC)       Return in 6 months (on 11/27/2025).     Subjective   The following acute and/or chronic problems were also addressed today:  Kathrin is here today for a Medicare annual wellness visit and routine office visit.    ANCA associated vasculitis-stable, patient does follow with neurology and her transplant specialist team.  She has been compliant with her medications.    Diabetes  She presents for her follow-up diabetic visit. She has type 2 diabetes mellitus. Onset time: YEARS. Her disease course has been stable. There are no hypoglycemic associated symptoms. Pertinent negatives for hypoglycemia include no dizziness, headaches, hunger, nervousness/anxiousness, pallor, sleepiness or speech difficulty. Associated symptoms include foot paresthesias. Pertinent negatives for diabetes include no chest pain, no fatigue, no polydipsia, no polyphagia, no polyuria and no visual change. There are no hypoglycemic complications. Pertinent negatives for hypoglycemia complications include no blackouts, no hospitalization, no nocturnal hypoglycemia, no required assistance and no required glucagon injection. Symptoms are stable. Diabetic complications include heart disease, nephropathy

## 2025-05-27 NOTE — PATIENT INSTRUCTIONS
SURVEY:     You may be receiving a survey from [employer] regarding your visit today.     Please complete the survey to enable us to provide the highest quality of care to you and your family.     If you cannot score us a very good on any question, please call the office to discuss how we could have made your experience a very good one.     Thank you,    Demetrius Vargas, APRN-CNP  Cony Chen, APRN-CNP  Corinne, LPN  Eva, CMA  Zelalem, CMA  Edwina, CMA  Filomena, PCA  Lindsey, CMA  Mildred, PM        Learning About Vision Tests  What are vision tests?     The four most common vision tests are visual acuity tests, refraction, visual field tests, and color vision tests.  Visual acuity (sharpness) tests  These tests are used:  To see if you need glasses or contact lenses.  To monitor an eye problem.  To check an eye injury.  Visual acuity tests are done as part of routine exams. You may also have this test when you get your 's license or apply for some types of jobs.  Visual field tests  These tests are used:  To check for vision loss in any area of your range of vision.  To screen for certain eye diseases.  To look for nerve damage after a stroke, head injury, or other problem that could reduce blood flow to the brain.  Refraction and color tests  A refraction test is done to find the right prescription for glasses and contact lenses.  A color vision test is done to check for color blindness.  Color vision is often tested as part of a routine exam. You may also have this test when you apply for a job where recognizing different colors is important, such as , electronics, or the .  How are vision tests done?  Visual acuity test   You cover one eye at a time.  You read aloud from a wall chart across the room.  You read aloud from a small card that you hold in your hand.  Refraction   You look into a special device.  The device puts lenses of different strengths in front of each eye to see how

## 2025-06-03 ENCOUNTER — HOSPITAL ENCOUNTER (OUTPATIENT)
Dept: MRI IMAGING | Age: 60
Discharge: HOME | End: 2025-06-05
Payer: MEDICARE

## 2025-06-03 ENCOUNTER — HOSPITAL ENCOUNTER (OUTPATIENT)
Dept: NEUROLOGY | Age: 60
Discharge: HOME | End: 2025-06-03
Payer: MEDICARE

## 2025-06-03 DIAGNOSIS — I67.1 ANEURYSM OF ANTERIOR CEREBRAL ARTERY: ICD-10-CM

## 2025-06-03 DIAGNOSIS — R41.3 MEMORY CHANGES: ICD-10-CM

## 2025-06-03 PROCEDURE — 70551 MRI BRAIN STEM W/O DYE: CPT

## 2025-06-03 PROCEDURE — 95819 EEG AWAKE AND ASLEEP: CPT

## 2025-06-03 PROCEDURE — 70544 MR ANGIOGRAPHY HEAD W/O DYE: CPT

## 2025-06-06 NOTE — PROCEDURES
[address] [address]                       ELECTROENCEPHALOGRAM REPORT      PATIENT NAME: ROSALIE MOONEY               : 1965  MED REC NO: [id number]                          ROOM:   ACCOUNT NO: [id number]                       ADMIT DATE: 2025  PROVIDER: Alena Bradley MD      DATE OF SERVICE:  2025    REFERRING PHYSICIAN:  Alena Bradley MD    INDICATION:  The patient is a 59-year-old lady with  ***.    SUMMARY:  This EEG was recorded with standard international 10-20 montages.  The background rhythm comprises of well-formed, organized, reactive, alpha activity of 8 to 9 hertz throughout the recording.  There is no focal slowing or epileptic activity. Cardiac rhythm is normal.  There is no photic driving.  Hyperventilation was unremarkable.  No sleep.    INTERPRETATION:  Normal awake EEG.          ALENA BRADLEY MD      D:  2025 18:03:15     T:  2025 18:55:30     TERESA/VLADISLAV  Job #:  [id number]     Doc#:  [id number]    CC:   Demetrius Vargas CNP

## 2025-06-10 ENCOUNTER — HOSPITAL ENCOUNTER (OUTPATIENT)
Age: 60
Discharge: HOME | End: 2025-06-10
Payer: MEDICARE

## 2025-06-10 LAB
ALBUMIN SERPL-MCNC: 4.1 G/DL (ref 3.5–5.2)
ALBUMIN/GLOB SERPL: 1.6 {RATIO} (ref 1–2.5)
ALP SERPL-CCNC: 102 U/L (ref 35–104)
ALT SERPL-CCNC: 17 U/L (ref 10–35)
ANION GAP SERPL CALCULATED.3IONS-SCNC: 14 MMOL/L (ref 9–16)
AST SERPL-CCNC: 25 U/L (ref 10–35)
BASOPHILS # BLD: 0.06 K/UL (ref 0–0.2)
BASOPHILS NFR BLD: 1 % (ref 0–2)
BILIRUB DIRECT SERPL-MCNC: <0.2 MG/DL (ref 0–0.3)
BILIRUB INDIRECT SERPL-MCNC: ABNORMAL MG/DL (ref 0–1)
BILIRUB SERPL-MCNC: 0.5 MG/DL (ref 0–1.2)
BUN SERPL-MCNC: 13 MG/DL (ref 6–20)
CALCIUM SERPL-MCNC: 9.5 MG/DL (ref 8.6–10.4)
CHLORIDE SERPL-SCNC: 104 MMOL/L (ref 98–107)
CHOLEST SERPL-MCNC: 179 MG/DL (ref 0–199)
CHOLESTEROL/HDL RATIO: 4.3
CO2 SERPL-SCNC: 24 MMOL/L (ref 20–31)
CREAT SERPL-MCNC: 1.5 MG/DL (ref 0.5–0.9)
EOSINOPHIL # BLD: 0.17 K/UL (ref 0–0.44)
EOSINOPHILS RELATIVE PERCENT: 3 % (ref 1–4)
ERYTHROCYTE [DISTWIDTH] IN BLOOD BY AUTOMATED COUNT: 13.1 % (ref 11.8–14.4)
EST. AVERAGE GLUCOSE BLD GHB EST-MCNC: 126 MG/DL
GFR, ESTIMATED: 39 ML/MIN/1.73M2
GLUCOSE SERPL-MCNC: 111 MG/DL (ref 74–99)
HBA1C MFR BLD: 6 % (ref 4–6)
HCT VFR BLD AUTO: 38.3 % (ref 36.3–47.1)
HDLC SERPL-MCNC: 42 MG/DL
HGB BLD-MCNC: 12.6 G/DL (ref 11.9–15.1)
IMM GRANULOCYTES # BLD AUTO: <0.03 K/UL (ref 0–0.3)
IMM GRANULOCYTES NFR BLD: 0 %
LDLC SERPL CALC-MCNC: 85 MG/DL (ref 0–100)
LYMPHOCYTES NFR BLD: 0.5 K/UL (ref 1.1–3.7)
LYMPHOCYTES RELATIVE PERCENT: 8 % (ref 24–43)
MAGNESIUM SERPL-MCNC: 2 MG/DL (ref 1.6–2.6)
MCH RBC QN AUTO: 32 PG (ref 25.2–33.5)
MCHC RBC AUTO-ENTMCNC: 32.9 G/DL (ref 28.4–34.8)
MCV RBC AUTO: 97.2 FL (ref 82.6–102.9)
MONOCYTES NFR BLD: 0.49 K/UL (ref 0.1–1.2)
MONOCYTES NFR BLD: 8 % (ref 3–12)
NEUTROPHILS NFR BLD: 80 % (ref 36–65)
NEUTS SEG NFR BLD: 4.71 K/UL (ref 1.5–8.1)
NRBC BLD-RTO: 0 PER 100 WBC
PHOSPHATE SERPL-MCNC: 4.1 MG/DL (ref 2.5–4.5)
PLATELET # BLD AUTO: 263 K/UL (ref 138–453)
PMV BLD AUTO: 10 FL (ref 8.1–13.5)
POTASSIUM SERPL-SCNC: 3.7 MMOL/L (ref 3.7–5.3)
PROT SERPL-MCNC: 6.7 G/DL (ref 6.6–8.7)
RBC # BLD AUTO: 3.94 M/UL (ref 3.95–5.11)
SODIUM SERPL-SCNC: 142 MMOL/L (ref 136–145)
TRIGL SERPL-MCNC: 262 MG/DL
URATE SERPL-MCNC: 7.5 MG/DL (ref 2.4–5.7)
VLDLC SERPL CALC-MCNC: 52 MG/DL (ref 1–30)
WBC OTHER # BLD: 5.9 K/UL (ref 3.5–11.3)

## 2025-06-10 PROCEDURE — 80061 LIPID PANEL: CPT

## 2025-06-10 PROCEDURE — 85025 COMPLETE CBC W/AUTO DIFF WBC: CPT

## 2025-06-10 PROCEDURE — 36415 COLL VENOUS BLD VENIPUNCTURE: CPT

## 2025-06-10 PROCEDURE — 80053 COMPREHEN METABOLIC PANEL: CPT

## 2025-06-10 PROCEDURE — 84550 ASSAY OF BLOOD/URIC ACID: CPT

## 2025-06-10 PROCEDURE — 80197 ASSAY OF TACROLIMUS: CPT

## 2025-06-10 PROCEDURE — 80050 GENERAL HEALTH PANEL: CPT

## 2025-06-10 PROCEDURE — 84100 ASSAY OF PHOSPHORUS: CPT

## 2025-06-10 PROCEDURE — 82248 BILIRUBIN DIRECT: CPT

## 2025-06-10 PROCEDURE — 83036 HEMOGLOBIN GLYCOSYLATED A1C: CPT

## 2025-06-10 PROCEDURE — 83735 ASSAY OF MAGNESIUM: CPT

## 2025-06-12 ENCOUNTER — OFFICE VISIT (OUTPATIENT)
Dept: NEUROLOGY | Age: 60
End: 2025-06-12
Payer: MEDICARE

## 2025-06-12 VITALS
BODY MASS INDEX: 47.57 KG/M2 | HEART RATE: 85 BPM | SYSTOLIC BLOOD PRESSURE: 122 MMHG | TEMPERATURE: 96.8 F | DIASTOLIC BLOOD PRESSURE: 64 MMHG | RESPIRATION RATE: 20 BRPM | WEIGHT: 258.5 LBS | HEIGHT: 62 IN

## 2025-06-12 DIAGNOSIS — R41.3 MEMORY CHANGES: Primary | ICD-10-CM

## 2025-06-12 LAB — TACROLIMUS BLD-MCNC: 7.7 NG/ML

## 2025-06-12 PROCEDURE — G8417 CALC BMI ABV UP PARAM F/U: HCPCS | Performed by: NEUROMUSCULOSKELETAL MEDICINE, SPORTS MEDICINE

## 2025-06-12 PROCEDURE — 99213 OFFICE O/P EST LOW 20 MIN: CPT | Performed by: NEUROMUSCULOSKELETAL MEDICINE, SPORTS MEDICINE

## 2025-06-12 PROCEDURE — 3078F DIAST BP <80 MM HG: CPT | Performed by: NEUROMUSCULOSKELETAL MEDICINE, SPORTS MEDICINE

## 2025-06-12 PROCEDURE — 1036F TOBACCO NON-USER: CPT | Performed by: NEUROMUSCULOSKELETAL MEDICINE, SPORTS MEDICINE

## 2025-06-12 PROCEDURE — G8427 DOCREV CUR MEDS BY ELIG CLIN: HCPCS | Performed by: NEUROMUSCULOSKELETAL MEDICINE, SPORTS MEDICINE

## 2025-06-12 PROCEDURE — 3074F SYST BP LT 130 MM HG: CPT | Performed by: NEUROMUSCULOSKELETAL MEDICINE, SPORTS MEDICINE

## 2025-06-12 PROCEDURE — 3017F COLORECTAL CA SCREEN DOC REV: CPT | Performed by: NEUROMUSCULOSKELETAL MEDICINE, SPORTS MEDICINE

## 2025-06-12 RX ORDER — DONEPEZIL HYDROCHLORIDE 5 MG/1
5 TABLET, FILM COATED ORAL NIGHTLY
Qty: 30 TABLET | Refills: 2 | Status: SHIPPED | OUTPATIENT
Start: 2025-06-12 | End: 2025-07-12

## 2025-06-12 NOTE — PROGRESS NOTES
GAIT: No ataxia     Romberg test is positive.       IMPRESSION:     1.  Memory lapses, intermittent confusion and visual hallucinations.  Etiology is not clear but could be metabolic or due to Alzheimer's dementia  2.  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  Last MRA of the brain was in 2022  3.  Bilateral upper extremity tremors.  Mild at this time.  Needs observation and close follow-up.     PLAN:     1.  Started on Aricept 5 mg once a day  2.  Follow-up in 3 months          NOTE: This neurology evaluation is part of outpatient coverage at [facility]  1-2 days per week.  Patients requiring frequent evaluations or uncomfortable with potential 3-4 day turnaround on questions or calls  may be better served by a neurologist in the area full time.  [facility] at [facility] is available for outpatient visits and procedures including EMG/NCS.  Non-[facility] neurologists also practice in [place] (Dr. Ortiz) and [place] (Dr's Danner, Benedikt).       Braulio Goodwin MD   6/12/2025  5:30 PM

## 2025-06-12 NOTE — PATIENT INSTRUCTIONS
SURVEY:    Thank you for allowing us to care for you today.    You may be receiving a survey from [facility] regarding your visit today- electronically or via mail.      Please help us by completing the survey as this will provide the needed feedback to ensure we are providing the very best care for you and your family.    If you cannot score us a very good on any question, please call the office to discuss how we could have made your experience a very good one.    Thank you.       STAFF:    Sylvia Alfred, Lidya MATHEW      CLINICAL STAFF:    Daisy DIAZ, Debo MATHEW, Elsy MATHEW, Damaris DIAZ\

## 2025-06-19 DIAGNOSIS — E11.22 CONTROLLED TYPE 2 DIABETES MELLITUS WITH STAGE 3 CHRONIC KIDNEY DISEASE, WITHOUT LONG-TERM CURRENT USE OF INSULIN (HCC): Primary | ICD-10-CM

## 2025-06-19 DIAGNOSIS — N18.30 CONTROLLED TYPE 2 DIABETES MELLITUS WITH STAGE 3 CHRONIC KIDNEY DISEASE, WITHOUT LONG-TERM CURRENT USE OF INSULIN (HCC): Primary | ICD-10-CM

## 2025-06-19 NOTE — TELEPHONE ENCOUNTER
Health Maintenance   Topic Date Due    COVID-19 Vaccine (5 - 2024-25 season) 12/12/2025 (Originally 9/1/2024)    Flu vaccine (Season Ended) 08/01/2025    Breast cancer screen  05/06/2026    Pneumococcal 50+ years Vaccine (3 of 3 - PPSV23, PCV20 or PCV21) 05/14/2026    Depression Monitoring  05/27/2026    Annual Wellness Visit (Medicare)  05/28/2026    A1C test (Diabetic or Prediabetic)  06/10/2026    Lipids  06/10/2026    Colorectal Cancer Screen  10/14/2026    DTaP/Tdap/Td vaccine (2 - Td or Tdap) 09/06/2028    Hepatitis B vaccine  Completed    Shingles vaccine  Completed    Hepatitis C screen  Completed    HIV screen  Completed    Hepatitis A vaccine  Aged Out    Hib vaccine  Aged Out    Polio vaccine  Aged Out    Meningococcal (ACWY) vaccine  Aged Out    Meningococcal B vaccine  Aged Out    Pneumococcal 0-49 years Vaccine  Discontinued    Diabetes screen  Discontinued    Cervical cancer screen  Discontinued             (applicable per patient's age: Cancer Screenings, Depression Screening, Fall Risk Screening, Immunizations)    Hemoglobin A1C (%)   Date Value   06/10/2025 6.0   05/20/2025 6.3 (H)   04/01/2025 6.3 (H)     AST (U/L)   Date Value   06/10/2025 25     ALT (U/L)   Date Value   06/10/2025 17     BUN (mg/dL)   Date Value   06/10/2025 13      (goal A1C is < 7)   (goal LDL is <100) need 30-50% reduction from baseline     BP Readings from Last 3 Encounters:   06/12/25 122/64   05/27/25 132/74   05/01/25 134/64    (goal /80)      All Future Testing planned in [facility]:  Lab Frequency Next Occurrence   CBC with Auto Differential Once 11/23/2025   ALT Once 11/27/2025   AST Once 11/27/2025   Basic Metabolic Panel Once 11/27/2025   Hemoglobin A1C Once 11/23/2025   Lipid Panel Once 11/23/2025   Albumin/Creatinine Ratio, Urine Once 11/27/2025   Transfuse RBC TRANSFUSE 1 UNIT    EKG 12 lead - PRN for Chest Pain or symptoms of ACS PRN        Next Visit Date:  Future Appointments   Date Time Provider Department

## 2025-07-07 ENCOUNTER — HOSPITAL ENCOUNTER (OUTPATIENT)
Age: 60
Discharge: HOME | End: 2025-07-07
Payer: MEDICARE

## 2025-07-07 LAB
ALBUMIN SERPL-MCNC: 4.4 G/DL (ref 3.5–5.2)
ALBUMIN/GLOB SERPL: 1.7 {RATIO} (ref 1–2.5)
ALP SERPL-CCNC: 102 U/L (ref 35–104)
ALT SERPL-CCNC: 14 U/L (ref 10–35)
ANION GAP SERPL CALCULATED.3IONS-SCNC: 12 MMOL/L (ref 9–16)
AST SERPL-CCNC: 20 U/L (ref 10–35)
BASOPHILS # BLD: 0.06 K/UL (ref 0–0.2)
BASOPHILS NFR BLD: 1 % (ref 0–2)
BILIRUB DIRECT SERPL-MCNC: <0.2 MG/DL (ref 0–0.3)
BILIRUB INDIRECT SERPL-MCNC: ABNORMAL MG/DL (ref 0–1)
BILIRUB SERPL-MCNC: 0.5 MG/DL (ref 0–1.2)
BUN SERPL-MCNC: 18 MG/DL (ref 6–20)
CALCIUM SERPL-MCNC: 9.9 MG/DL (ref 8.6–10.4)
CHLORIDE SERPL-SCNC: 103 MMOL/L (ref 98–107)
CO2 SERPL-SCNC: 27 MMOL/L (ref 20–31)
CREAT SERPL-MCNC: 1.8 MG/DL (ref 0.5–0.9)
EOSINOPHIL # BLD: 0.15 K/UL (ref 0–0.44)
EOSINOPHILS RELATIVE PERCENT: 3 % (ref 1–4)
ERYTHROCYTE [DISTWIDTH] IN BLOOD BY AUTOMATED COUNT: 12.9 % (ref 11.8–14.4)
GFR, ESTIMATED: 31 ML/MIN/1.73M2
GLUCOSE SERPL-MCNC: 105 MG/DL (ref 74–99)
HCT VFR BLD AUTO: 38 % (ref 36.3–47.1)
HGB BLD-MCNC: 12.6 G/DL (ref 11.9–15.1)
IMM GRANULOCYTES # BLD AUTO: <0.03 K/UL (ref 0–0.3)
IMM GRANULOCYTES NFR BLD: 0 %
LYMPHOCYTES NFR BLD: 0.6 K/UL (ref 1.1–3.7)
LYMPHOCYTES RELATIVE PERCENT: 10 % (ref 24–43)
MAGNESIUM SERPL-MCNC: 1.9 MG/DL (ref 1.6–2.6)
MCH RBC QN AUTO: 32.9 PG (ref 25.2–33.5)
MCHC RBC AUTO-ENTMCNC: 33.2 G/DL (ref 28.4–34.8)
MCV RBC AUTO: 99.2 FL (ref 82.6–102.9)
MONOCYTES NFR BLD: 0.46 K/UL (ref 0.1–1.2)
MONOCYTES NFR BLD: 8 % (ref 3–12)
NEUTROPHILS NFR BLD: 78 % (ref 36–65)
NEUTS SEG NFR BLD: 4.79 K/UL (ref 1.5–8.1)
NRBC BLD-RTO: 0 PER 100 WBC
PHOSPHATE SERPL-MCNC: 3.5 MG/DL (ref 2.5–4.5)
PLATELET # BLD AUTO: 240 K/UL (ref 138–453)
PMV BLD AUTO: 10.7 FL (ref 8.1–13.5)
POTASSIUM SERPL-SCNC: 4.8 MMOL/L (ref 3.7–5.3)
PROT SERPL-MCNC: 6.9 G/DL (ref 6.6–8.7)
RBC # BLD AUTO: 3.83 M/UL (ref 3.95–5.11)
SODIUM SERPL-SCNC: 142 MMOL/L (ref 136–145)
URATE SERPL-MCNC: 7.3 MG/DL (ref 2.4–5.7)
WBC OTHER # BLD: 6.1 K/UL (ref 3.5–11.3)

## 2025-07-07 PROCEDURE — 36415 COLL VENOUS BLD VENIPUNCTURE: CPT

## 2025-07-07 PROCEDURE — 80197 ASSAY OF TACROLIMUS: CPT

## 2025-07-07 PROCEDURE — 84100 ASSAY OF PHOSPHORUS: CPT

## 2025-07-07 PROCEDURE — 85025 COMPLETE CBC W/AUTO DIFF WBC: CPT

## 2025-07-07 PROCEDURE — 83735 ASSAY OF MAGNESIUM: CPT

## 2025-07-07 PROCEDURE — 82248 BILIRUBIN DIRECT: CPT

## 2025-07-07 PROCEDURE — 84550 ASSAY OF BLOOD/URIC ACID: CPT

## 2025-07-07 PROCEDURE — 80053 COMPREHEN METABOLIC PANEL: CPT

## 2025-07-10 LAB — TACROLIMUS BLD-MCNC: 8.7 NG/ML

## 2025-07-16 DIAGNOSIS — E11.22 CONTROLLED TYPE 2 DIABETES MELLITUS WITH STAGE 3 CHRONIC KIDNEY DISEASE, WITHOUT LONG-TERM CURRENT USE OF INSULIN (HCC): ICD-10-CM

## 2025-07-16 DIAGNOSIS — N18.30 CONTROLLED TYPE 2 DIABETES MELLITUS WITH STAGE 3 CHRONIC KIDNEY DISEASE, WITHOUT LONG-TERM CURRENT USE OF INSULIN (HCC): ICD-10-CM

## 2025-07-16 RX ORDER — PREDNISONE 5 MG/1
5 TABLET ORAL EVERY MORNING
COMMUNITY
Start: 2025-07-11

## 2025-07-16 NOTE — TELEPHONE ENCOUNTER
Patient  called office for a refill for patient.     Will you increase?     Health Maintenance   Topic Date Due    COVID-19 Vaccine (5 - 2024-25 season) 12/12/2025 (Originally 9/1/2024)    Flu vaccine (1) 08/01/2025    Breast cancer screen  05/06/2026    Pneumococcal 50+ years Vaccine (3 of 3 - PPSV23, PCV20 or PCV21) 05/14/2026    Depression Monitoring  05/27/2026    Annual Wellness Visit (Medicare)  05/28/2026    A1C test (Diabetic or Prediabetic)  06/10/2026    Lipids  06/10/2026    Colorectal Cancer Screen  10/14/2026    DTaP/Tdap/Td vaccine (2 - Td or Tdap) 09/06/2028    Hepatitis B vaccine  Completed    Shingles vaccine  Completed    Hepatitis C screen  Completed    HIV screen  Completed    Hepatitis A vaccine  Aged Out    Hib vaccine  Aged Out    Polio vaccine  Aged Out    Meningococcal (ACWY) vaccine  Aged Out    Meningococcal B vaccine  Aged Out    Pneumococcal 0-49 years Vaccine  Discontinued    Diabetes screen  Discontinued    Cervical cancer screen  Discontinued             (applicable per patient's age: Cancer Screenings, Depression Screening, Fall Risk Screening, Immunizations)    Hemoglobin A1C (%)   Date Value   06/10/2025 6.0   05/20/2025 6.3 (H)   04/01/2025 6.3 (H)     AST (U/L)   Date Value   07/07/2025 20     ALT (U/L)   Date Value   07/07/2025 14     BUN (mg/dL)   Date Value   07/07/2025 18      (goal A1C is < 7)   (goal LDL is <100) need 30-50% reduction from baseline     BP Readings from Last 3 Encounters:   06/12/25 122/64   05/27/25 132/74   05/01/25 134/64    (goal /80)      All Future Testing planned in [facility]:  Lab Frequency Next Occurrence   CBC with Auto Differential Once 11/23/2025   ALT Once 11/27/2025   AST Once 11/27/2025   Basic Metabolic Panel Once 11/27/2025   Hemoglobin A1C Once 11/23/2025   Lipid Panel Once 11/23/2025   Albumin/Creatinine Ratio, Urine Once 11/27/2025   Transfuse RBC TRANSFUSE 1 UNIT    EKG 12 lead - PRN for Chest Pain or symptoms of ACS PRN

## 2025-07-18 ENCOUNTER — HOSPITAL ENCOUNTER (OUTPATIENT)
Age: 60
Discharge: HOME | End: 2025-07-18
Payer: MEDICARE

## 2025-07-18 LAB
ALBUMIN SERPL-MCNC: 4.2 G/DL (ref 3.5–5.2)
ALBUMIN/GLOB SERPL: 1.7 {RATIO} (ref 1–2.5)
ALP SERPL-CCNC: 102 U/L (ref 35–104)
ALT SERPL-CCNC: 19 U/L (ref 10–35)
ANION GAP SERPL CALCULATED.3IONS-SCNC: 12 MMOL/L (ref 9–16)
AST SERPL-CCNC: 26 U/L (ref 10–35)
BILIRUB SERPL-MCNC: 0.6 MG/DL (ref 0–1.2)
BUN SERPL-MCNC: 17 MG/DL (ref 6–20)
BUN/CREAT SERPL: 10 (ref 9–20)
CALCIUM SERPL-MCNC: 9.5 MG/DL (ref 8.6–10.4)
CHLORIDE SERPL-SCNC: 104 MMOL/L (ref 98–107)
CO2 SERPL-SCNC: 24 MMOL/L (ref 20–31)
CREAT SERPL-MCNC: 1.7 MG/DL (ref 0.5–0.9)
GFR, ESTIMATED: 33 ML/MIN/1.73M2
GLUCOSE SERPL-MCNC: 101 MG/DL (ref 74–99)
POTASSIUM SERPL-SCNC: 4.6 MMOL/L (ref 3.7–5.3)
PROT SERPL-MCNC: 6.7 G/DL (ref 6.6–8.7)
SODIUM SERPL-SCNC: 140 MMOL/L (ref 136–145)

## 2025-07-18 PROCEDURE — 36415 COLL VENOUS BLD VENIPUNCTURE: CPT

## 2025-07-18 PROCEDURE — 87799 DETECT AGENT NOS DNA QUANT: CPT

## 2025-07-18 PROCEDURE — 80053 COMPREHEN METABOLIC PANEL: CPT

## 2025-07-20 LAB
CMV QNT BY NAAT, PLASMA INTERP: NOT DETECTED
CMV QNT BY NAAT, PLASMA IU/ML: NOT DETECTED IU/ML
CMV QNT BY NAAT, PLASMA LOG IU/ML: NOT DETECTED LOG IU/ML

## 2025-07-24 RX ORDER — METFORMIN HYDROCHLORIDE 500 MG/1
500 TABLET, EXTENDED RELEASE ORAL
Qty: 90 TABLET | Refills: 0 | Status: SHIPPED | OUTPATIENT
Start: 2025-07-24

## 2025-07-24 NOTE — TELEPHONE ENCOUNTER
Health Maintenance   Topic Date Due    COVID-19 Vaccine (5 - 2024-25 season) 12/12/2025 (Originally 9/1/2024)    Flu vaccine (1) 08/01/2025    Breast cancer screen  05/06/2026    Pneumococcal 50+ years Vaccine (3 of 3 - PPSV23, PCV20 or PCV21) 05/14/2026    Depression Monitoring  05/27/2026    Annual Wellness Visit (Medicare)  05/28/2026    A1C test (Diabetic or Prediabetic)  06/10/2026    Lipids  06/10/2026    Colorectal Cancer Screen  10/14/2026    DTaP/Tdap/Td vaccine (2 - Td or Tdap) 09/06/2028    Hepatitis B vaccine  Completed    Shingles vaccine  Completed    Hepatitis C screen  Completed    HIV screen  Completed    Hepatitis A vaccine  Aged Out    Hib vaccine  Aged Out    Polio vaccine  Aged Out    Meningococcal (ACWY) vaccine  Aged Out    Meningococcal B vaccine  Aged Out    Pneumococcal 0-49 years Vaccine  Discontinued    Diabetes screen  Discontinued    Cervical cancer screen  Discontinued             (applicable per patient's age: Cancer Screenings, Depression Screening, Fall Risk Screening, Immunizations)    Hemoglobin A1C (%)   Date Value   06/10/2025 6.0   05/20/2025 6.3 (H)   04/01/2025 6.3 (H)     AST (U/L)   Date Value   07/18/2025 26     ALT (U/L)   Date Value   07/18/2025 19     BUN (mg/dL)   Date Value   07/18/2025 17      (goal A1C is < 7)   (goal LDL is <100) need 30-50% reduction from baseline     BP Readings from Last 3 Encounters:   06/12/25 122/64   05/27/25 132/74   05/01/25 134/64    (goal /80)      All Future Testing planned in [facility]:  Lab Frequency Next Occurrence   CBC with Auto Differential Once 11/23/2025   ALT Once 11/27/2025   AST Once 11/27/2025   Basic Metabolic Panel Once 11/27/2025   Hemoglobin A1C Once 11/23/2025   Lipid Panel Once 11/23/2025   Albumin/Creatinine Ratio, Urine Once 11/27/2025   Transfuse RBC TRANSFUSE 1 UNIT    EKG 12 lead - PRN for Chest Pain or symptoms of ACS PRN        Next Visit Date:  Future Appointments   Date Time Provider Department Center

## 2025-07-28 DIAGNOSIS — K21.9 GASTROESOPHAGEAL REFLUX DISEASE WITHOUT ESOPHAGITIS: ICD-10-CM

## 2025-07-28 RX ORDER — METOCLOPRAMIDE 5 MG/1
5 TABLET ORAL 2 TIMES DAILY
Qty: 180 TABLET | Refills: 0 | Status: SHIPPED | OUTPATIENT
Start: 2025-07-28

## 2025-08-07 RX ORDER — DONEPEZIL HYDROCHLORIDE 5 MG/1
5 TABLET, FILM COATED ORAL NIGHTLY
Qty: 30 TABLET | Refills: 1 | Status: SHIPPED | OUTPATIENT
Start: 2025-08-07

## 2025-08-11 DIAGNOSIS — E11.22 CONTROLLED TYPE 2 DIABETES MELLITUS WITH STAGE 3 CHRONIC KIDNEY DISEASE, WITHOUT LONG-TERM CURRENT USE OF INSULIN (HCC): ICD-10-CM

## 2025-08-11 DIAGNOSIS — N18.30 CONTROLLED TYPE 2 DIABETES MELLITUS WITH STAGE 3 CHRONIC KIDNEY DISEASE, WITHOUT LONG-TERM CURRENT USE OF INSULIN (HCC): ICD-10-CM

## 2025-08-12 ENCOUNTER — HOSPITAL ENCOUNTER (OUTPATIENT)
Dept: LAB | Age: 60
Discharge: HOME | End: 2025-08-12
Payer: MEDICARE

## 2025-08-12 LAB
ALBUMIN SERPL-MCNC: 4.1 G/DL (ref 3.5–5.2)
ALBUMIN/GLOB SERPL: 1.7 {RATIO} (ref 1–2.5)
ALP SERPL-CCNC: 93 U/L (ref 35–104)
ALT SERPL-CCNC: 15 U/L (ref 10–35)
ANION GAP SERPL CALCULATED.3IONS-SCNC: 11 MMOL/L (ref 9–16)
AST SERPL-CCNC: 21 U/L (ref 10–35)
BASOPHILS # BLD: 0.05 K/UL (ref 0–0.2)
BASOPHILS NFR BLD: 1 % (ref 0–2)
BILIRUB SERPL-MCNC: 0.6 MG/DL (ref 0–1.2)
BUN SERPL-MCNC: 23 MG/DL (ref 6–20)
BUN/CREAT SERPL: 15 (ref 9–20)
CALCIUM SERPL-MCNC: 9.4 MG/DL (ref 8.6–10.4)
CHLORIDE SERPL-SCNC: 103 MMOL/L (ref 98–107)
CHOLEST SERPL-MCNC: 166 MG/DL (ref 0–199)
CHOLESTEROL/HDL RATIO: 3.6
CO2 SERPL-SCNC: 27 MMOL/L (ref 20–31)
CREAT SERPL-MCNC: 1.5 MG/DL (ref 0.5–0.9)
EOSINOPHIL # BLD: 0.13 K/UL (ref 0–0.44)
EOSINOPHILS RELATIVE PERCENT: 3 % (ref 1–4)
ERYTHROCYTE [DISTWIDTH] IN BLOOD BY AUTOMATED COUNT: 13.4 % (ref 11.8–14.4)
EST. AVERAGE GLUCOSE BLD GHB EST-MCNC: 108 MG/DL
GFR, ESTIMATED: 39 ML/MIN/1.73M2
GLUCOSE SERPL-MCNC: 109 MG/DL (ref 74–99)
HBA1C MFR BLD: 5.4 % (ref 4–6)
HCT VFR BLD AUTO: 35.3 % (ref 36.3–47.1)
HDLC SERPL-MCNC: 46 MG/DL
HGB BLD-MCNC: 11.8 G/DL (ref 11.9–15.1)
IMM GRANULOCYTES # BLD AUTO: <0.03 K/UL (ref 0–0.3)
IMM GRANULOCYTES NFR BLD: 0 %
LDLC SERPL CALC-MCNC: 79 MG/DL (ref 0–100)
LYMPHOCYTES NFR BLD: 0.5 K/UL (ref 1.1–3.7)
LYMPHOCYTES RELATIVE PERCENT: 10 % (ref 24–43)
MCH RBC QN AUTO: 33.1 PG (ref 25.2–33.5)
MCHC RBC AUTO-ENTMCNC: 33.4 G/DL (ref 28.4–34.8)
MCV RBC AUTO: 98.9 FL (ref 82.6–102.9)
MONOCYTES NFR BLD: 0.36 K/UL (ref 0.1–1.2)
MONOCYTES NFR BLD: 7 % (ref 3–12)
NEUTROPHILS NFR BLD: 79 % (ref 36–65)
NEUTS SEG NFR BLD: 3.88 K/UL (ref 1.5–8.1)
NRBC BLD-RTO: 0 PER 100 WBC
PHOSPHATE SERPL-MCNC: 3.9 MG/DL (ref 2.5–4.5)
PLATELET # BLD AUTO: 254 K/UL (ref 138–453)
PMV BLD AUTO: 10.4 FL (ref 8.1–13.5)
POTASSIUM SERPL-SCNC: 4.5 MMOL/L (ref 3.7–5.3)
PROT SERPL-MCNC: 6.5 G/DL (ref 6.6–8.7)
RBC # BLD AUTO: 3.57 M/UL (ref 3.95–5.11)
SODIUM SERPL-SCNC: 141 MMOL/L (ref 136–145)
TRIGL SERPL-MCNC: 206 MG/DL
URATE SERPL-MCNC: 6.1 MG/DL (ref 2.4–5.7)
VLDLC SERPL CALC-MCNC: 41 MG/DL (ref 1–30)
WBC OTHER # BLD: 4.9 K/UL (ref 3.5–11.3)

## 2025-08-12 PROCEDURE — 36415 COLL VENOUS BLD VENIPUNCTURE: CPT

## 2025-08-12 PROCEDURE — 83036 HEMOGLOBIN GLYCOSYLATED A1C: CPT

## 2025-08-12 PROCEDURE — 84100 ASSAY OF PHOSPHORUS: CPT

## 2025-08-12 PROCEDURE — 84550 ASSAY OF BLOOD/URIC ACID: CPT

## 2025-08-12 PROCEDURE — 85025 COMPLETE CBC W/AUTO DIFF WBC: CPT

## 2025-08-12 PROCEDURE — 80053 COMPREHEN METABOLIC PANEL: CPT

## 2025-08-12 PROCEDURE — 80061 LIPID PANEL: CPT

## 2025-08-12 PROCEDURE — 80197 ASSAY OF TACROLIMUS: CPT

## 2025-08-12 PROCEDURE — 87799 DETECT AGENT NOS DNA QUANT: CPT

## 2025-08-14 LAB
BK QNT BY NAAT, PLASMA INTERP: NOT DETECTED
BK QNT BY NAAT, PLASMA IU/ML: NOT DETECTED IU/ML
BK QNT BY NAAT, PLASMA LOG IU/ML: NOT DETECTED LOG IU/ML
TACROLIMUS BLD-MCNC: 7.4 NG/ML

## (undated) DEVICE — SOLUTION IV IRRIG POUR BRL 0.9% SODIUM CHL 2F7124

## (undated) DEVICE — CANNULA ORAL NSL AD CO2 N INTUB O2 DEL DISP TRU LNK

## (undated) DEVICE — FORCEPS BX JUMBO L240CM DIA2.8MM WRK CHN 3.2MM ORNG W/ NDL

## (undated) DEVICE — FORCEPS BX L240CM JAW DIA2.4MM ORNG L CAP W/ NDL DISP RAD

## (undated) DEVICE — MEDI-VAC NON-CONDUCTIVE TUBING7MM X 30.5 (100FT): Brand: CARDINAL HEALTH

## (undated) DEVICE — FORCEPS BX 240CM JAW 3.2MM L CAP NDL MIC MESH TTH M00513372